# Patient Record
Sex: FEMALE | Race: BLACK OR AFRICAN AMERICAN | NOT HISPANIC OR LATINO | Employment: OTHER | URBAN - NONMETROPOLITAN AREA
[De-identification: names, ages, dates, MRNs, and addresses within clinical notes are randomized per-mention and may not be internally consistent; named-entity substitution may affect disease eponyms.]

---

## 2017-01-04 ENCOUNTER — HOSPITAL ENCOUNTER (EMERGENCY)
Facility: HOSPITAL | Age: 62
Discharge: HOME | End: 2017-01-04
Attending: EMERGENCY MEDICINE | Admitting: EMERGENCY MEDICINE
Payer: COMMERCIAL

## 2017-01-04 VITALS
OXYGEN SATURATION: 93 % | HEART RATE: 78 BPM | HEIGHT: 65 IN | BODY MASS INDEX: 38.99 KG/M2 | RESPIRATION RATE: 18 BRPM | TEMPERATURE: 98 F | DIASTOLIC BLOOD PRESSURE: 59 MMHG | WEIGHT: 234 LBS | SYSTOLIC BLOOD PRESSURE: 111 MMHG

## 2017-01-04 DIAGNOSIS — T78.40XA ALLERGIC REACTION: ICD-10-CM

## 2017-01-04 DIAGNOSIS — H57.89 DISCHARGE OF RIGHT EYE: Primary | ICD-10-CM

## 2017-01-04 DIAGNOSIS — H53.141 VISUAL DISCOMFORT, RIGHT EYE: ICD-10-CM

## 2017-01-04 PROCEDURE — 99283 EMERGENCY DEPT VISIT LOW MDM: CPT

## 2017-01-04 RX ORDER — CETIRIZINE HYDROCHLORIDE 5 MG/1
5 TABLET ORAL DAILY
Qty: 30 TABLET | Refills: 0 | Status: ON HOLD | OUTPATIENT
Start: 2017-01-04 | End: 2017-07-28

## 2017-02-07 ENCOUNTER — RX ONLY (RX ONLY)
Age: 62
End: 2017-02-07

## 2017-02-07 ENCOUNTER — DOCTOR'S OFFICE (OUTPATIENT)
Dept: URBAN - NONMETROPOLITAN AREA CLINIC 1 | Facility: CLINIC | Age: 62
Setting detail: OPHTHALMOLOGY
End: 2017-02-07
Payer: COMMERCIAL

## 2017-02-07 ENCOUNTER — GENERIC CONVERSION - ENCOUNTER (OUTPATIENT)
Dept: OTHER | Facility: OTHER | Age: 62
End: 2017-02-07

## 2017-02-07 DIAGNOSIS — H27.9: ICD-10-CM

## 2017-02-07 DIAGNOSIS — H27.8: ICD-10-CM

## 2017-02-07 DIAGNOSIS — H04.121: ICD-10-CM

## 2017-02-07 DIAGNOSIS — E11.9: ICD-10-CM

## 2017-02-07 DIAGNOSIS — H40.013: ICD-10-CM

## 2017-02-07 DIAGNOSIS — H04.122: ICD-10-CM

## 2017-02-07 PROCEDURE — 76514 ECHO EXAM OF EYE THICKNESS: CPT | Performed by: OPHTHALMOLOGY

## 2017-02-07 PROCEDURE — 92004 COMPRE OPH EXAM NEW PT 1/>: CPT | Performed by: OPHTHALMOLOGY

## 2017-02-07 ASSESSMENT — REFRACTION_MANIFEST
OS_VA2: 20/
OU_VA: 20/
OS_VA2: 20/
OS_VA3: 20/
OD_VA2: 20/
OD_VA1: 20/
OD_VA3: 20/
OS_VA3: 20/
OD_VA1: 20/
OS_VA3: 20/
OS_VA1: 20/
OD_VA1: 20/
OD_VA2: 20/
OD_VA3: 20/
OD_VA2: 20/
OD_VA3: 20/
OU_VA: 20/
OS_VA1: 20/
OU_VA: 20/
OS_VA2: 20/
OS_VA1: 20/

## 2017-02-07 ASSESSMENT — SPHEQUIV_DERIVED
OD_SPHEQUIV: -0.875
OS_SPHEQUIV: -0.5

## 2017-02-07 ASSESSMENT — REFRACTION_AUTOREFRACTION
OS_AXIS: 60
OD_SPHERE: -0.50
OD_CYLINDER: -0.75
OS_SPHERE: -0.25
OS_CYLINDER: -0.50
OD_AXIS: 177

## 2017-02-07 ASSESSMENT — REFRACTION_CURRENTRX
OS_OVR_VA: 20/
OS_OVR_VA: 20/
OD_OVR_VA: 20/
OS_OVR_VA: 20/

## 2017-02-07 ASSESSMENT — PACHYMETRY
OS_CT_CORRECTION: -3
OD_CT_CORRECTION: -3
OS_CT_UM: 585
OD_CT_UM: 582

## 2017-02-07 ASSESSMENT — VISUAL ACUITY
OD_BCVA: 20/25-2
OS_BCVA: 20/25

## 2017-02-07 ASSESSMENT — SUPERFICIAL PUNCTATE KERATITIS (SPK)
OD_SPK: 1+ 2+
OS_SPK: T

## 2017-02-07 ASSESSMENT — CONFRONTATIONAL VISUAL FIELD TEST (CVF)
OD_FINDINGS: FULL
OS_FINDINGS: FULL

## 2017-02-16 ENCOUNTER — GENERIC CONVERSION - ENCOUNTER (OUTPATIENT)
Dept: OTHER | Facility: OTHER | Age: 62
End: 2017-02-16

## 2017-04-06 ENCOUNTER — ALLSCRIPTS OFFICE VISIT (OUTPATIENT)
Dept: OTHER | Facility: OTHER | Age: 62
End: 2017-04-06

## 2017-04-06 ENCOUNTER — TRANSCRIBE ORDERS (OUTPATIENT)
Dept: ADMINISTRATIVE | Facility: HOSPITAL | Age: 62
End: 2017-04-06

## 2017-04-06 DIAGNOSIS — E11.29 TYPE 2 DIABETES MELLITUS WITH OTHER DIABETIC KIDNEY COMPLICATION (HCC): ICD-10-CM

## 2017-04-06 DIAGNOSIS — R07.89 OTHER CHEST PAIN: Primary | ICD-10-CM

## 2017-04-06 DIAGNOSIS — R07.89 OTHER CHEST PAIN: ICD-10-CM

## 2017-04-11 ENCOUNTER — GENERIC CONVERSION - ENCOUNTER (OUTPATIENT)
Dept: OTHER | Facility: OTHER | Age: 62
End: 2017-04-11

## 2017-04-11 ENCOUNTER — APPOINTMENT (OUTPATIENT)
Dept: LAB | Facility: CLINIC | Age: 62
End: 2017-04-11
Payer: COMMERCIAL

## 2017-04-11 DIAGNOSIS — E11.29 TYPE 2 DIABETES MELLITUS WITH OTHER DIABETIC KIDNEY COMPLICATION (HCC): ICD-10-CM

## 2017-04-11 DIAGNOSIS — R07.89 OTHER CHEST PAIN: ICD-10-CM

## 2017-04-11 LAB
ALBUMIN SERPL BCP-MCNC: 3.6 G/DL (ref 3.5–5)
ALP SERPL-CCNC: 83 U/L (ref 46–116)
ALT SERPL W P-5'-P-CCNC: 23 U/L (ref 12–78)
ANION GAP SERPL CALCULATED.3IONS-SCNC: 9 MMOL/L (ref 4–13)
AST SERPL W P-5'-P-CCNC: 15 U/L (ref 5–45)
BILIRUB SERPL-MCNC: 0.5 MG/DL (ref 0.2–1)
BUN SERPL-MCNC: 10 MG/DL (ref 5–25)
CALCIUM SERPL-MCNC: 8.9 MG/DL (ref 8.3–10.1)
CHLORIDE SERPL-SCNC: 102 MMOL/L (ref 100–108)
CHOLEST SERPL-MCNC: 212 MG/DL (ref 50–200)
CK SERPL-CCNC: 86 U/L (ref 26–192)
CO2 SERPL-SCNC: 27 MMOL/L (ref 21–32)
CREAT SERPL-MCNC: 0.87 MG/DL (ref 0.6–1.3)
EST. AVERAGE GLUCOSE BLD GHB EST-MCNC: 283 MG/DL
GFR SERPL CREATININE-BSD FRML MDRD: >60 ML/MIN/1.73SQ M
GLUCOSE P FAST SERPL-MCNC: 264 MG/DL (ref 65–99)
HBA1C MFR BLD: 11.5 % (ref 4.2–6.3)
HDLC SERPL-MCNC: 42 MG/DL (ref 40–60)
LDLC SERPL CALC-MCNC: 149 MG/DL (ref 0–100)
POTASSIUM SERPL-SCNC: 3.6 MMOL/L (ref 3.5–5.3)
PROT SERPL-MCNC: 7.7 G/DL (ref 6.4–8.2)
SODIUM SERPL-SCNC: 138 MMOL/L (ref 136–145)
TRIGL SERPL-MCNC: 106 MG/DL
TROPONIN I SERPL-MCNC: <0.02 NG/ML

## 2017-04-11 PROCEDURE — 80061 LIPID PANEL: CPT

## 2017-04-11 PROCEDURE — 36415 COLL VENOUS BLD VENIPUNCTURE: CPT

## 2017-04-11 PROCEDURE — 84484 ASSAY OF TROPONIN QUANT: CPT

## 2017-04-11 PROCEDURE — 82550 ASSAY OF CK (CPK): CPT

## 2017-04-11 PROCEDURE — 83036 HEMOGLOBIN GLYCOSYLATED A1C: CPT

## 2017-04-11 PROCEDURE — 80053 COMPREHEN METABOLIC PANEL: CPT

## 2017-04-14 ENCOUNTER — HOSPITAL ENCOUNTER (OUTPATIENT)
Dept: NON INVASIVE DIAGNOSTICS | Facility: HOSPITAL | Age: 62
Discharge: HOME | End: 2017-04-14
Attending: FAMILY MEDICINE
Payer: COMMERCIAL

## 2017-04-14 ENCOUNTER — HOSPITAL ENCOUNTER (OUTPATIENT)
Dept: NON INVASIVE DIAGNOSTICS | Facility: HOSPITAL | Age: 62
End: 2017-04-14
Attending: FAMILY MEDICINE
Payer: COMMERCIAL

## 2017-04-14 ENCOUNTER — HOSPITAL ENCOUNTER (OUTPATIENT)
Dept: RADIOLOGY | Facility: HOSPITAL | Age: 62
Discharge: HOME | End: 2017-04-14
Attending: FAMILY MEDICINE
Payer: COMMERCIAL

## 2017-04-14 ENCOUNTER — TRANSCRIBE ORDERS (OUTPATIENT)
Dept: ADMINISTRATIVE | Facility: HOSPITAL | Age: 62
End: 2017-04-14

## 2017-04-14 ENCOUNTER — GENERIC CONVERSION - ENCOUNTER (OUTPATIENT)
Dept: OTHER | Facility: OTHER | Age: 62
End: 2017-04-14

## 2017-04-14 DIAGNOSIS — R07.89 OTHER CHEST PAIN: ICD-10-CM

## 2017-04-14 DIAGNOSIS — R07.89 OTHER CHEST PAIN: Primary | ICD-10-CM

## 2017-04-14 LAB
ATRIAL RATE: 69 BPM
P AXIS: 51 DEGREES
PR INTERVAL: 188 MS
QRS AXIS: -32 DEGREES
QRSD INTERVAL: 90 MS
QT INTERVAL: 420 MS
QTC INTERVAL: 450 MS
T WAVE AXIS: -20 DEGREES
VENTRICULAR RATE: 69 BPM

## 2017-04-14 PROCEDURE — 93005 ELECTROCARDIOGRAM TRACING: CPT

## 2017-04-14 PROCEDURE — 71020 HB CHEST X-RAY 2VW FRONTAL&LATL: CPT

## 2017-04-14 PROCEDURE — 93306 TTE W/DOPPLER COMPLETE: CPT

## 2017-04-14 PROCEDURE — 74220 X-RAY XM ESOPHAGUS 1CNTRST: CPT

## 2017-04-17 ENCOUNTER — GENERIC CONVERSION - ENCOUNTER (OUTPATIENT)
Dept: OTHER | Facility: OTHER | Age: 62
End: 2017-04-17

## 2017-05-15 ENCOUNTER — TRANSCRIBE ORDERS (OUTPATIENT)
Dept: ADMINISTRATIVE | Facility: HOSPITAL | Age: 62
End: 2017-05-15

## 2017-05-15 ENCOUNTER — ALLSCRIPTS OFFICE VISIT (OUTPATIENT)
Dept: OTHER | Facility: OTHER | Age: 62
End: 2017-05-15

## 2017-05-15 DIAGNOSIS — R07.9 CHEST PAIN, UNSPECIFIED: ICD-10-CM

## 2017-05-15 DIAGNOSIS — I67.2 CEREBRAL ATHEROSCLEROSIS: Primary | ICD-10-CM

## 2017-05-15 DIAGNOSIS — R07.9 CHEST PAIN: ICD-10-CM

## 2017-05-15 DIAGNOSIS — I67.2 CEREBRAL ATHEROSCLEROSIS: ICD-10-CM

## 2017-05-23 ENCOUNTER — TRANSCRIBE ORDERS (OUTPATIENT)
Dept: ADMINISTRATIVE | Facility: HOSPITAL | Age: 62
End: 2017-05-23

## 2017-05-23 DIAGNOSIS — Z12.31 VISIT FOR SCREENING MAMMOGRAM: Primary | ICD-10-CM

## 2017-05-26 ENCOUNTER — HOSPITAL ENCOUNTER (OUTPATIENT)
Dept: MAMMOGRAPHY | Facility: HOSPITAL | Age: 62
Discharge: HOME | End: 2017-05-26
Attending: FAMILY MEDICINE
Payer: COMMERCIAL

## 2017-05-26 ENCOUNTER — HOSPITAL ENCOUNTER (OUTPATIENT)
Dept: NUCLEAR MEDICINE | Facility: HOSPITAL | Age: 62
Discharge: HOME | End: 2017-05-26
Attending: INTERNAL MEDICINE
Payer: COMMERCIAL

## 2017-05-26 ENCOUNTER — HOSPITAL ENCOUNTER (OUTPATIENT)
Dept: ULTRASOUND IMAGING | Facility: HOSPITAL | Age: 62
Discharge: HOME | End: 2017-05-26
Attending: INTERNAL MEDICINE
Payer: COMMERCIAL

## 2017-05-26 DIAGNOSIS — I67.2 CEREBRAL ATHEROSCLEROSIS: ICD-10-CM

## 2017-05-26 DIAGNOSIS — R07.9 CHEST PAIN: ICD-10-CM

## 2017-05-26 DIAGNOSIS — Z12.31 ENCOUNTER FOR SCREENING MAMMOGRAM FOR MALIGNANT NEOPLASM OF BREAST: ICD-10-CM

## 2017-05-26 PROCEDURE — A9502 TC99M TETROFOSMIN: HCPCS

## 2017-05-26 PROCEDURE — 93017 CV STRESS TEST TRACING ONLY: CPT

## 2017-05-26 PROCEDURE — G0202 SCR MAMMO BI INCL CAD: HCPCS

## 2017-05-26 PROCEDURE — 93880 EXTRACRANIAL BILAT STUDY: CPT

## 2017-05-26 PROCEDURE — 78452 HT MUSCLE IMAGE SPECT MULT: CPT

## 2017-06-02 ENCOUNTER — ALLSCRIPTS OFFICE VISIT (OUTPATIENT)
Dept: OTHER | Facility: OTHER | Age: 62
End: 2017-06-02

## 2017-06-09 ENCOUNTER — GENERIC CONVERSION - ENCOUNTER (OUTPATIENT)
Dept: OTHER | Facility: OTHER | Age: 62
End: 2017-06-09

## 2017-06-28 ENCOUNTER — ALLSCRIPTS OFFICE VISIT (OUTPATIENT)
Dept: OTHER | Facility: OTHER | Age: 62
End: 2017-06-28

## 2017-07-11 DIAGNOSIS — R92.8 OTHER ABNORMAL AND INCONCLUSIVE FINDINGS ON DIAGNOSTIC IMAGING OF BREAST: ICD-10-CM

## 2017-07-13 DIAGNOSIS — I10 ESSENTIAL (PRIMARY) HYPERTENSION: ICD-10-CM

## 2017-07-13 DIAGNOSIS — E11.29 TYPE 2 DIABETES MELLITUS WITH OTHER DIABETIC KIDNEY COMPLICATION (HCC): ICD-10-CM

## 2017-07-13 DIAGNOSIS — E78.5 HYPERLIPIDEMIA: ICD-10-CM

## 2017-07-13 DIAGNOSIS — R07.9 CHEST PAIN: ICD-10-CM

## 2017-07-13 DIAGNOSIS — E11.9 TYPE 2 DIABETES MELLITUS WITHOUT COMPLICATIONS (HCC): ICD-10-CM

## 2017-07-13 DIAGNOSIS — R94.39 OTHER NONSPECIFIC ABNORMAL CARDIOVASCULAR SYSTEM FUNCTION STUDY: ICD-10-CM

## 2017-07-20 ENCOUNTER — HOSPITAL ENCOUNTER (OUTPATIENT)
Dept: MAMMOGRAPHY | Facility: HOSPITAL | Age: 62
Discharge: HOME | End: 2017-07-20
Attending: FAMILY MEDICINE
Payer: COMMERCIAL

## 2017-07-20 ENCOUNTER — GENERIC CONVERSION - ENCOUNTER (OUTPATIENT)
Dept: OTHER | Facility: OTHER | Age: 62
End: 2017-07-20

## 2017-07-20 ENCOUNTER — HOSPITAL ENCOUNTER (OUTPATIENT)
Dept: ULTRASOUND IMAGING | Facility: HOSPITAL | Age: 62
Discharge: HOME | End: 2017-07-20
Attending: FAMILY MEDICINE
Payer: COMMERCIAL

## 2017-07-20 DIAGNOSIS — R92.8 OTHER ABNORMAL AND INCONCLUSIVE FINDINGS ON DIAGNOSTIC IMAGING OF BREAST: ICD-10-CM

## 2017-07-20 PROCEDURE — 76642 ULTRASOUND BREAST LIMITED: CPT

## 2017-07-20 PROCEDURE — G0279 TOMOSYNTHESIS, MAMMO: HCPCS

## 2017-07-20 PROCEDURE — G0204 DX MAMMO INCL CAD BI: HCPCS

## 2017-07-24 ENCOUNTER — ALLSCRIPTS OFFICE VISIT (OUTPATIENT)
Dept: OTHER | Facility: OTHER | Age: 62
End: 2017-07-24

## 2017-07-27 ENCOUNTER — TELEPHONE (OUTPATIENT)
Dept: INPATIENT UNIT | Facility: HOSPITAL | Age: 62
End: 2017-07-27

## 2017-07-27 PROBLEM — R94.39 ABNORMAL STRESS TEST: Status: ACTIVE | Noted: 2017-07-27

## 2017-07-27 RX ORDER — ASPIRIN 81 MG/1
324 TABLET, CHEWABLE ORAL ONCE
Status: CANCELLED | OUTPATIENT
Start: 2017-07-27 | End: 2017-07-27

## 2017-07-27 RX ORDER — SODIUM CHLORIDE 9 MG/ML
125 INJECTION, SOLUTION INTRAVENOUS CONTINUOUS
Status: CANCELLED | OUTPATIENT
Start: 2017-07-27

## 2017-07-28 ENCOUNTER — HOSPITAL ENCOUNTER (OUTPATIENT)
Dept: NON INVASIVE DIAGNOSTICS | Facility: HOSPITAL | Age: 62
Discharge: HOME | End: 2017-07-28
Attending: INTERNAL MEDICINE | Admitting: INTERNAL MEDICINE
Payer: COMMERCIAL

## 2017-07-28 VITALS
HEART RATE: 54 BPM | HEIGHT: 65 IN | OXYGEN SATURATION: 96 % | TEMPERATURE: 97.5 F | BODY MASS INDEX: 37.15 KG/M2 | WEIGHT: 223 LBS | DIASTOLIC BLOOD PRESSURE: 71 MMHG | RESPIRATION RATE: 18 BRPM | SYSTOLIC BLOOD PRESSURE: 142 MMHG

## 2017-07-28 DIAGNOSIS — R07.9 CHEST PAIN: ICD-10-CM

## 2017-07-28 LAB
ANION GAP SERPL CALCULATED.3IONS-SCNC: 7 MMOL/L (ref 4–13)
ATRIAL RATE: 66 BPM
BUN SERPL-MCNC: 15 MG/DL (ref 5–25)
CALCIUM SERPL-MCNC: 9.1 MG/DL (ref 8.3–10.1)
CHLORIDE SERPL-SCNC: 100 MMOL/L (ref 100–108)
CHOLEST SERPL-MCNC: 162 MG/DL (ref 50–200)
CO2 SERPL-SCNC: 30 MMOL/L (ref 21–32)
CREAT SERPL-MCNC: 0.92 MG/DL (ref 0.6–1.3)
ERYTHROCYTE [DISTWIDTH] IN BLOOD BY AUTOMATED COUNT: 14.2 % (ref 11.6–15.1)
GFR SERPL CREATININE-BSD FRML MDRD: 78 ML/MIN/1.73SQ M
GLUCOSE P FAST SERPL-MCNC: 275 MG/DL (ref 65–99)
GLUCOSE SERPL-MCNC: 275 MG/DL (ref 65–140)
HCT VFR BLD AUTO: 38.4 % (ref 34.8–46.1)
HDLC SERPL-MCNC: 40 MG/DL (ref 40–60)
HGB BLD-MCNC: 12.6 G/DL (ref 11.5–15.4)
LDLC SERPL CALC-MCNC: 99 MG/DL (ref 0–100)
MAGNESIUM SERPL-MCNC: 1.7 MG/DL (ref 1.6–2.6)
MCH RBC QN AUTO: 26.9 PG (ref 26.8–34.3)
MCHC RBC AUTO-ENTMCNC: 32.8 G/DL (ref 31.4–37.4)
MCV RBC AUTO: 82 FL (ref 82–98)
P AXIS: 40 DEGREES
PLATELET # BLD AUTO: 203 THOUSANDS/UL (ref 149–390)
PMV BLD AUTO: 10.5 FL (ref 8.9–12.7)
POTASSIUM SERPL-SCNC: 3.5 MMOL/L (ref 3.5–5.3)
PR INTERVAL: 194 MS
QRS AXIS: -39 DEGREES
QRSD INTERVAL: 92 MS
QT INTERVAL: 422 MS
QTC INTERVAL: 442 MS
RBC # BLD AUTO: 4.68 MILLION/UL (ref 3.81–5.12)
SODIUM SERPL-SCNC: 137 MMOL/L (ref 136–145)
T WAVE AXIS: -26 DEGREES
TRIGL SERPL-MCNC: 113 MG/DL
VENTRICULAR RATE: 66 BPM
WBC # BLD AUTO: 4.94 THOUSAND/UL (ref 4.31–10.16)

## 2017-07-28 PROCEDURE — 80061 LIPID PANEL: CPT | Performed by: STUDENT IN AN ORGANIZED HEALTH CARE EDUCATION/TRAINING PROGRAM

## 2017-07-28 PROCEDURE — 93005 ELECTROCARDIOGRAM TRACING: CPT | Performed by: STUDENT IN AN ORGANIZED HEALTH CARE EDUCATION/TRAINING PROGRAM

## 2017-07-28 PROCEDURE — 85027 COMPLETE CBC AUTOMATED: CPT | Performed by: STUDENT IN AN ORGANIZED HEALTH CARE EDUCATION/TRAINING PROGRAM

## 2017-07-28 PROCEDURE — 80048 BASIC METABOLIC PNL TOTAL CA: CPT | Performed by: STUDENT IN AN ORGANIZED HEALTH CARE EDUCATION/TRAINING PROGRAM

## 2017-07-28 PROCEDURE — 83735 ASSAY OF MAGNESIUM: CPT | Performed by: STUDENT IN AN ORGANIZED HEALTH CARE EDUCATION/TRAINING PROGRAM

## 2017-07-28 PROCEDURE — 99152 MOD SED SAME PHYS/QHP 5/>YRS: CPT | Performed by: INTERNAL MEDICINE

## 2017-07-28 PROCEDURE — C1894 INTRO/SHEATH, NON-LASER: HCPCS | Performed by: INTERNAL MEDICINE

## 2017-07-28 PROCEDURE — C1769 GUIDE WIRE: HCPCS | Performed by: INTERNAL MEDICINE

## 2017-07-28 PROCEDURE — 93458 L HRT ARTERY/VENTRICLE ANGIO: CPT | Performed by: INTERNAL MEDICINE

## 2017-07-28 RX ORDER — SODIUM CHLORIDE 9 MG/ML
125 INJECTION, SOLUTION INTRAVENOUS CONTINUOUS
Status: DISPENSED | OUTPATIENT
Start: 2017-07-28 | End: 2017-07-28

## 2017-07-28 RX ORDER — ASPIRIN 81 MG/1
324 TABLET, CHEWABLE ORAL ONCE
Status: COMPLETED | OUTPATIENT
Start: 2017-07-28 | End: 2017-07-28

## 2017-07-28 RX ORDER — LIDOCAINE HYDROCHLORIDE 10 MG/ML
INJECTION, SOLUTION INFILTRATION; PERINEURAL CODE/TRAUMA/SEDATION MEDICATION
Status: COMPLETED | OUTPATIENT
Start: 2017-07-28 | End: 2017-07-28

## 2017-07-28 RX ORDER — MIDAZOLAM HYDROCHLORIDE 1 MG/ML
INJECTION INTRAMUSCULAR; INTRAVENOUS CODE/TRAUMA/SEDATION MEDICATION
Status: COMPLETED | OUTPATIENT
Start: 2017-07-28 | End: 2017-07-28

## 2017-07-28 RX ORDER — SODIUM CHLORIDE 9 MG/ML
125 INJECTION, SOLUTION INTRAVENOUS CONTINUOUS
Status: DISCONTINUED | OUTPATIENT
Start: 2017-07-28 | End: 2017-07-28

## 2017-07-28 RX ORDER — ALBUTEROL SULFATE 90 UG/1
2 AEROSOL, METERED RESPIRATORY (INHALATION) EVERY 4 HOURS PRN
COMMUNITY
End: 2019-01-18 | Stop reason: SDUPTHER

## 2017-07-28 RX ORDER — SIMVASTATIN 20 MG
20 TABLET ORAL
COMMUNITY
End: 2018-04-25 | Stop reason: SDUPTHER

## 2017-07-28 RX ORDER — HEPARIN SODIUM 1000 [USP'U]/ML
INJECTION, SOLUTION INTRAVENOUS; SUBCUTANEOUS CODE/TRAUMA/SEDATION MEDICATION
Status: COMPLETED | OUTPATIENT
Start: 2017-07-28 | End: 2017-07-28

## 2017-07-28 RX ORDER — FENTANYL CITRATE 50 UG/ML
INJECTION, SOLUTION INTRAMUSCULAR; INTRAVENOUS CODE/TRAUMA/SEDATION MEDICATION
Status: COMPLETED | OUTPATIENT
Start: 2017-07-28 | End: 2017-07-28

## 2017-07-28 RX ORDER — NITROGLYCERIN 20 MG/100ML
INJECTION INTRAVENOUS CODE/TRAUMA/SEDATION MEDICATION
Status: COMPLETED | OUTPATIENT
Start: 2017-07-28 | End: 2017-07-28

## 2017-07-28 RX ADMIN — FENTANYL CITRATE 50 MCG: 50 INJECTION, SOLUTION INTRAMUSCULAR; INTRAVENOUS at 13:29

## 2017-07-28 RX ADMIN — HEPARIN SODIUM 4000 UNITS: 1000 INJECTION INTRAVENOUS; SUBCUTANEOUS at 13:35

## 2017-07-28 RX ADMIN — SODIUM CHLORIDE 125 ML/HR: 0.9 INJECTION, SOLUTION INTRAVENOUS at 09:17

## 2017-07-28 RX ADMIN — NITROGLYCERIN 200 MCG: 20 INJECTION INTRAVENOUS at 13:34

## 2017-07-28 RX ADMIN — MIDAZOLAM 2 MG: 1 INJECTION INTRAMUSCULAR; INTRAVENOUS at 13:29

## 2017-07-28 RX ADMIN — ASPIRIN 81 MG 324 MG: 81 TABLET ORAL at 09:14

## 2017-07-28 RX ADMIN — LIDOCAINE HYDROCHLORIDE 1 ML: 10 INJECTION, SOLUTION INFILTRATION; PERINEURAL at 13:31

## 2017-07-28 RX ADMIN — IOHEXOL 60 ML: 350 INJECTION, SOLUTION INTRAVENOUS at 13:40

## 2017-08-15 ENCOUNTER — ALLSCRIPTS OFFICE VISIT (OUTPATIENT)
Dept: OTHER | Facility: OTHER | Age: 62
End: 2017-08-15

## 2017-08-18 ENCOUNTER — ALLSCRIPTS OFFICE VISIT (OUTPATIENT)
Dept: OTHER | Facility: OTHER | Age: 62
End: 2017-08-18

## 2017-09-27 ENCOUNTER — OFFICE VISIT (OUTPATIENT)
Dept: URGENT CARE | Facility: CLINIC | Age: 62
End: 2017-09-27
Payer: COMMERCIAL

## 2017-09-27 PROCEDURE — G0382 LEV 3 HOSP TYPE B ED VISIT: HCPCS

## 2017-09-27 PROCEDURE — 99283 EMERGENCY DEPT VISIT LOW MDM: CPT
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
## 2017-11-07 ENCOUNTER — ALLSCRIPTS OFFICE VISIT (OUTPATIENT)
Dept: OTHER | Facility: OTHER | Age: 62
End: 2017-11-07

## 2017-11-07 DIAGNOSIS — R10.13 EPIGASTRIC PAIN: ICD-10-CM

## 2017-11-07 DIAGNOSIS — I10 ESSENTIAL (PRIMARY) HYPERTENSION: ICD-10-CM
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
## 2017-11-17 ENCOUNTER — LAB REQUISITION (OUTPATIENT)
Dept: LAB | Facility: HOSPITAL | Age: 62
End: 2017-11-17
Payer: COMMERCIAL

## 2017-11-17 ENCOUNTER — GENERIC CONVERSION - ENCOUNTER (OUTPATIENT)
Dept: OTHER | Facility: OTHER | Age: 62
End: 2017-11-17

## 2017-11-17 DIAGNOSIS — R30.0 DYSURIA: ICD-10-CM

## 2017-11-17 DIAGNOSIS — Z01.419 ENCOUNTER FOR GYNECOLOGICAL EXAMINATION WITHOUT ABNORMAL FINDING: ICD-10-CM

## 2017-11-17 PROCEDURE — 81001 URINALYSIS AUTO W/SCOPE: CPT | Performed by: OBSTETRICS & GYNECOLOGY

## 2017-11-17 PROCEDURE — 87086 URINE CULTURE/COLONY COUNT: CPT | Performed by: OBSTETRICS & GYNECOLOGY

## 2017-11-17 PROCEDURE — 87624 HPV HI-RISK TYP POOLED RSLT: CPT | Performed by: OBSTETRICS & GYNECOLOGY

## 2017-11-17 PROCEDURE — G0145 SCR C/V CYTO,THINLAYER,RESCR: HCPCS | Performed by: OBSTETRICS & GYNECOLOGY

## 2017-11-18 LAB
BACTERIA UR CULT: ABNORMAL
BACTERIA UR QL AUTO: NORMAL /HPF
BILIRUB UR QL STRIP: NEGATIVE
CLARITY UR: CLEAR
COLOR UR: YELLOW
GLUCOSE UR STRIP-MCNC: ABNORMAL MG/DL
HGB UR QL STRIP.AUTO: NEGATIVE
HYALINE CASTS #/AREA URNS LPF: NORMAL /LPF
KETONES UR STRIP-MCNC: NEGATIVE MG/DL
LEUKOCYTE ESTERASE UR QL STRIP: ABNORMAL
NITRITE UR QL STRIP: NEGATIVE
NON-SQ EPI CELLS URNS QL MICRO: NORMAL /HPF
PH UR STRIP.AUTO: 5.5 [PH] (ref 4.5–8)
PROT UR STRIP-MCNC: ABNORMAL MG/DL
RBC #/AREA URNS AUTO: NORMAL /HPF
SP GR UR STRIP.AUTO: 1.04 (ref 1–1.03)
UROBILINOGEN UR QL STRIP.AUTO: 0.2 E.U./DL
WBC #/AREA URNS AUTO: NORMAL /HPF

## 2017-11-21 LAB — HPV RRNA GENITAL QL NAA+PROBE: NORMAL

## 2017-11-27 LAB
LAB AP GYN PRIMARY INTERPRETATION: NORMAL
Lab: NORMAL

## 2017-11-29 ENCOUNTER — GENERIC CONVERSION - ENCOUNTER (OUTPATIENT)
Dept: OTHER | Facility: OTHER | Age: 62
End: 2017-11-29

## 2017-12-19 ENCOUNTER — GENERIC CONVERSION - ENCOUNTER (OUTPATIENT)
Dept: OTHER | Facility: OTHER | Age: 62
End: 2017-12-19

## 2018-01-02 ENCOUNTER — ALLSCRIPTS OFFICE VISIT (OUTPATIENT)
Dept: OTHER | Facility: OTHER | Age: 63
End: 2018-01-02

## 2018-01-02 RX ORDER — FLUCONAZOLE 150 MG/1
150 TABLET ORAL ONCE
COMMUNITY
End: 2018-03-01

## 2018-01-02 RX ORDER — INSULIN GLARGINE 100 [IU]/ML
30 INJECTION, SOLUTION SUBCUTANEOUS
COMMUNITY
End: 2018-01-24 | Stop reason: SDUPTHER

## 2018-01-02 RX ORDER — PANTOPRAZOLE SODIUM 40 MG/1
40 TABLET, DELAYED RELEASE ORAL DAILY
COMMUNITY
End: 2018-03-06 | Stop reason: SDUPTHER

## 2018-01-02 RX ORDER — ASPIRIN 325 MG
325 TABLET ORAL DAILY
COMMUNITY
End: 2018-03-01

## 2018-01-04 ENCOUNTER — ANESTHESIA EVENT (OUTPATIENT)
Dept: PERIOP | Facility: HOSPITAL | Age: 63
End: 2018-01-04
Payer: COMMERCIAL

## 2018-01-05 ENCOUNTER — GENERIC CONVERSION - ENCOUNTER (OUTPATIENT)
Dept: GASTROENTEROLOGY | Facility: CLINIC | Age: 63
End: 2018-01-05

## 2018-01-05 ENCOUNTER — ANESTHESIA (OUTPATIENT)
Dept: PERIOP | Facility: HOSPITAL | Age: 63
End: 2018-01-05
Payer: COMMERCIAL

## 2018-01-05 ENCOUNTER — HOSPITAL ENCOUNTER (OUTPATIENT)
Facility: HOSPITAL | Age: 63
Setting detail: OUTPATIENT SURGERY
Discharge: HOME | End: 2018-01-05
Attending: INTERNAL MEDICINE | Admitting: INTERNAL MEDICINE
Payer: COMMERCIAL

## 2018-01-05 VITALS
DIASTOLIC BLOOD PRESSURE: 76 MMHG | HEART RATE: 66 BPM | OXYGEN SATURATION: 97 % | TEMPERATURE: 97.6 F | SYSTOLIC BLOOD PRESSURE: 134 MMHG | RESPIRATION RATE: 18 BRPM

## 2018-01-05 DIAGNOSIS — R10.13 EPIGASTRIC PAIN: ICD-10-CM

## 2018-01-05 PROCEDURE — 88342 IMHCHEM/IMCYTCHM 1ST ANTB: CPT | Performed by: INTERNAL MEDICINE

## 2018-01-05 PROCEDURE — 88305 TISSUE EXAM BY PATHOLOGIST: CPT | Performed by: INTERNAL MEDICINE

## 2018-01-05 RX ORDER — LIDOCAINE HYDROCHLORIDE 10 MG/ML
INJECTION, SOLUTION INFILTRATION; PERINEURAL AS NEEDED
Status: DISCONTINUED | OUTPATIENT
Start: 2018-01-05 | End: 2018-01-05 | Stop reason: SURG

## 2018-01-05 RX ORDER — PROPOFOL 10 MG/ML
INJECTION, EMULSION INTRAVENOUS AS NEEDED
Status: DISCONTINUED | OUTPATIENT
Start: 2018-01-05 | End: 2018-01-05 | Stop reason: SURG

## 2018-01-05 RX ORDER — SODIUM CHLORIDE, SODIUM LACTATE, POTASSIUM CHLORIDE, CALCIUM CHLORIDE 600; 310; 30; 20 MG/100ML; MG/100ML; MG/100ML; MG/100ML
125 INJECTION, SOLUTION INTRAVENOUS CONTINUOUS
Status: DISCONTINUED | OUTPATIENT
Start: 2018-01-05 | End: 2018-01-05 | Stop reason: HOSPADM

## 2018-01-05 RX ADMIN — PROPOFOL 30 MG: 10 INJECTION, EMULSION INTRAVENOUS at 08:37

## 2018-01-05 RX ADMIN — PROPOFOL 20 MG: 10 INJECTION, EMULSION INTRAVENOUS at 08:40

## 2018-01-05 RX ADMIN — LIDOCAINE HYDROCHLORIDE 50 MG: 10 INJECTION, SOLUTION INFILTRATION; PERINEURAL at 08:34

## 2018-01-05 RX ADMIN — PROPOFOL 120 MG: 10 INJECTION, EMULSION INTRAVENOUS at 08:34

## 2018-01-05 RX ADMIN — SODIUM CHLORIDE, POTASSIUM CHLORIDE, SODIUM LACTATE AND CALCIUM CHLORIDE 125 ML/HR: 600; 310; 30; 20 INJECTION, SOLUTION INTRAVENOUS at 07:06

## 2018-01-05 NOTE — ANESTHESIA PREPROCEDURE EVALUATION
Review of Systems/Medical History  Patient summary reviewed  Chart reviewed  No history of anesthetic complications     Cardiovascular  Hyperlipidemia, Hypertension ,   Comment: Clean cath 7/2017, EF 55%,  Pulmonary  Asthma (prn inhaler, last 1 week ago): well controlled/ stable , Recent URI (dry cough only) , ,        GI/Hepatic    GERD ,  Hiatal hernia,        Negative  ROS        Endo/Other  Diabetes () poorly controlled type 2 ,      GYN  Negative gynecology ROS          Hematology  Negative hematology ROS      Musculoskeletal  Obesity (BMI 37) ,        Neurology    TIA (16 years ago), Diabetic neuropathy,    Psychology   Anxiety, Depression ,            Physical Exam    Airway    Mallampati score: I  TM Distance: >3 FB  Neck ROM: full     Dental   Comment: Crowns, denies loose,     Cardiovascular      Pulmonary      Other Findings       Lab Results   Component Value Date    WBC 4 94 07/28/2017    HGB 12 6 07/28/2017     07/28/2017     Lab Results   Component Value Date     07/28/2017    K 3 5 07/28/2017    BUN 15 07/28/2017    CREATININE 0 92 07/28/2017    GLUCOSE 275 (H) 07/28/2017     Lab Results   Component Value Date    HGBA1C 11 5 (H) 04/11/2017         Anesthesia Plan  ASA Score- 3     Anesthesia Type- IV sedation with anesthesia with ASA Monitors  Additional Monitors:   Airway Plan:         Plan Factors-    Induction- intravenous  Postoperative Plan-     Informed Consent- Anesthetic plan and risks discussed with patient, spouse and daughter

## 2018-01-05 NOTE — OP NOTE
**** GI/ENDOSCOPY REPORT ****     PATIENT NAME: Brianna Bliss - VISIT ID:  Patient ID: [id number]   YOB: 1955     INTRODUCTION: Esophagogastroduodenoscopy - A 58 female patient presents   for an outpatient Esophagogastroduodenoscopy at [facility]  INDICATIONS: Abdominal pain  GERD  CONSENT: The benefits, risks, and alternatives to the procedure were   discussed and informed consent was obtained from the patient  PREPARATION:  EKG, pulse, pulse oximetry and blood pressure were monitored   throughout the procedure  MEDICATIONS: Anesthesia-check records     PROCEDURE:  The endoscope was passed without difficulty through the mouth   under direct visualization and advanced to the 2nd portion of the   duodenum  The scope was withdrawn and the mucosa was carefully examined  FINDINGS:   Esophagus: Mild LA Class B esophagitis was found in the distal   third of the esophagus  There was also a Schatzki's ring at the distal   esophagus  Biopsies were taken from the esophagitis and also from the ring   for tissue destruction  Stomach: The stomach appeared to be normal on   direct and retroflexed views  A biopsy was taken  Duodenum: The 1st   portion of the duodenum and 2nd portion of the duodenum appeared to be   normal  A cold forceps biopsy was taken  COMPLICATIONS: There were no complications  IMPRESSIONS: Mild esophagitis seen in the distal third of the esophagus  Biopsy taken  Schatzki's ring  Biopsied  Normal stomach  Biopsy taken  Normal 1st portion of the duodenum and 2nd portion of the duodenum  Biopsy   taken  RECOMMENDATIONS: Await biopsy results  PATHOLOGY SPECIMENS: Biopsy taken  Associated finding: Esophagitis  Random   biopsy taken  Cold forceps random biopsy taken       ESTIMATED BLOOD LOSS:     PROCEDURE CODES:     ICD-9 Codes: 789 00 Abdominal pain, unspecified site 530 81 Esophageal   reflux 530 10 Esophagitis, unspecified ICD-10 Codes: R10 Abdominal and pelvic pain K21 Gastro-esophageal reflux   disease K20 9 Esophagitis, unspecified     PERFORMED BY: MURPHY Castillo  on 01/05/2018  Version 1, electronically signed by MURPHY Braun  on   01/05/2018 at 08:53

## 2018-01-05 NOTE — ANESTHESIA POSTPROCEDURE EVALUATION
Post-Op Assessment Note      CV Status:  Stable    Mental Status:  Alert and awake    Hydration Status:  Euvolemic    PONV Controlled:  Controlled    Airway Patency:  Patent    Post Op Vitals Reviewed: Yes          Staff: Anesthesiologist           BP   113/61   Temp 97 7   Pulse 64   Resp 16   SpO2 100
Principal Discharge DX:	Pneumonia

## 2018-01-05 NOTE — H&P
History and Physical - [facility]  Eleno Doran 58 y o  female MRN: [id number]                  HPI: Eleno Doran is a 58y o  year old female who presents for abdominal pain for EGD  REVIEW OF SYSTEMS: Per the HPI, and otherwise unremarkable  Historical Information   Past Medical History:   Diagnosis Date    Asthma     Back problem     Depression     Diabetes mellitus ([address] )     Hyperlipidemia     Hypertension     Neuropathy     Psychiatric disorder     anxiety     Past Surgical History:   Procedure Laterality Date    ARM WOUND REPAIR / CLOSURE      CATARACT EXTRACTION      CYST REMOVAL      right upper arm   EYE SURGERY      cataract removaql    TUBAL LIGATION       Social History   History   Alcohol Use No     History   Drug Use No     History   Smoking Status    Never Smoker   Smokeless Tobacco    Not on file     No family history on file      Meds/Allergies     Prescriptions Prior to Admission   Medication    albuterol (PROVENTIL HFA,VENTOLIN HFA) 90 mcg/act inhaler    amLODIPine (NORVASC) 10 mg tablet    aspirin 325 mg tablet    beclomethasone (QVAR) 80 MCG/ACT inhaler    busPIRone (BUSPAR) 5 mg tablet    cycloSPORINE (RESTASIS) 0 05 % ophthalmic emulsion    escitalopram (LEXAPRO) 10 mg tablet    fluconazole (DIFLUCAN) 150 mg tablet    gabapentin (NEURONTIN) 300 mg capsule    glipiZIDE-metFORMIN (METAGLIP) 5-500 MG per tablet    hydrochlorothiazide (HYDRODIURIL) 25 mg tablet    insulin detemir (LEVEMIR) 100 units/mL subcutaneous injection    insulin glargine (LANTUS) 100 units/mL subcutaneous injection    lisinopril (ZESTRIL) 40 mg tablet    metFORMIN (GLUCOPHAGE) 500 mg tablet    pantoprazole (PROTONIX) 40 mg tablet    simvastatin (ZOCOR) 20 mg tablet    traZODone (DESYREL) 50 mg tablet       Allergies   Allergen Reactions    Percocet [Oxycodone-Acetaminophen] GI Intolerance    Vicodin [Hydrocodone-Acetaminophen] GI Intolerance Objective     Blood pressure 125/67, pulse 78, temperature 98 2 °F (36 8 °C), temperature source Tympanic, resp  rate 18, SpO2 95 %  PHYSICAL EXAM    Gen: NAD  CV: RRR  CHEST: Clear  ABD: soft, NT/ND  EXT: no edema  Neuro: AAO      ASSESSMENT/PLAN:  This is a 58y o  year old female here for abdominal pain      PLAN: EGD

## 2018-01-09 NOTE — RESULT NOTES
Verified Results  * MAMMO SCREENING BILATERAL W CAD [id number] 07:29AM Marrie Shape Order Number: [id number]    - Patient Instructions: To schedule this appointment, please contact Central Scheduling at [phone]  Do not wear any perfume, powder, lotion or deodorant on breast or underarm area  Please bring your doctors order, referral (if needed) and insurance information with you on the day of the test  Failure to bring this information may result in this test being rescheduled  Arrive 15 minutes prior to your appointment time to register  On the day of your test, please bring any prior mammogram or breast studies with you that were not performed at a [facility]  Failure to bring prior exams may result in your test needing to be rescheduled  Test Name Result Flag Reference   MAMMO SCREENING BILATERAL W CAD (Report)     Patient History:   Patient is postmenopausal    No known family history of cancer  No Hormone Replacement Therapy   Patient has never smoked  Patient's BMI is 38 9  Reason for exam: screening, asymptomatic  Screening     Mammo Screening Bilateral W CAD: May 26, 2017 - Check In #:    [de-identified]   Bilateral CC and MLO view(s) were taken  Technologist: Viki Mohan, RT(R)(M)   No prior studies available for comparison  There are scattered fibroglandular densities  There is a 1 2 cm    nodular asymmetry in the outer left breast, 8 cm deep to the    nipple, possibly located in the upper half of the breast in the    mediolateral oblique view  The patient should return for lateral   and spot compression views, with possible ultrasound, for more    definitive evaluation  There is, in addition, an 8 mm nodular asymmetry in the upper    medial periareolar left breast, 4 cm from the nipple  This also    should be further evaluated with lateral and spot compression    views, with possible ultrasound       There is a 1 1 cm nodular asymmetry in the right breast, just    below the nipple line, 11 cm deep to the nipple, possible    summation artifact  This is seen with certainty on the    mediolateral oblique view only  Lateral and spot compression    views, with possible ultrasound, recommended  No dominant soft tissue mass, architectural distortion or    suspicious calcifications are noted in either breast  The skin    and nipple contours are within normal limits  1  There are bilateral nodular asymmetries, possible summation    artifact  Further evaluation recommended  2  If the patient's prior outside studies can be sent to us, and   it is shown that the areas of asymmetry are stable, the    recommendation for additional imaging would change  ACR BI-RADSï¾® Assessments: BiRad:0 - Incomplete: needs additional    imaging evaluation     Recommendation:   Further imaging of both breasts  Obtain prior study for comparison  A breast health care nurse from our facility will be contacting    the patient regarding the need for additional imaging  Analyzed by CAD     8-10% of cancers will be missed on mammography  Management of a    palpable abnormality must be based on clinical grounds  Patients   will be notified of their results via letter from our facility  Accredited by [employer] of Radiology and FDA       Transcription Location: [facility]: [id number]     Risk Value(s):   Tyrer-Cuzick 10 Year: 3 000%, Tyrer-Cuzick Lifetime: 7 300%,    Myriad Table: 1 5%, LESIA 5 Year: 1 5%, NCI Lifetime: 6 7%   Signed by:   John Delvalle MD   6/9/17   Faxed release to [phone] [address] in [place], [place]

## 2018-01-10 ENCOUNTER — GENERIC CONVERSION - ENCOUNTER (OUTPATIENT)
Dept: OTHER | Facility: OTHER | Age: 63
End: 2018-01-10

## 2018-01-10 NOTE — RESULT NOTES
Verified Results  ECHO COMPLETE WITH CONTRAST IF INDICATED 2017 09:58AM Duong Luo Order Number: [id number]    - Patient Instructions: To schedule this appointment, please contact Central Scheduling at [phone]  Test Name Result Flag Reference   ECHO COMPLETE WITH CONTRAST IF INDICATED (Report)      [facility]   [address]   [phone]     Transthoracic Echocardiogram   2D, M-mode, Doppler, and Color Doppler     Study date: 2017     Patient: Josephine Brady   MR number: [id number]   Account number: [de-identified]   : 1955   Age: 64 years   Gender: Female   Status: Outpatient   Location: Echo lab   Height: 65 in   Weight: 237 6 lb   BP: 124/ 76 mmHg     Diagnoses: R07 9 - Chest pain, unspecified     Sonographer: Cuba Rivers RDCS   Primary Physician: Vadim Burk DO   Referring Physician: Vadim Burk DO   Group: [facility]   Interpreting Physician: Ronak Lau MD     SUMMARY     LEFT VENTRICLE:   Size was normal    Systolic function was normal  Ejection fraction was estimated to be 60 %  There were no regional wall motion abnormalities  Wall thickness was normal      TRICUSPID VALVE:   There was mild regurgitation  HISTORY: PRIOR HISTORY: DIABETES MELLITUS, PALPITATIONS, HYPERTENSION     PROCEDURE: The procedure was performed in the echo lab  This was a routine study  The transthoracic approach was used  The study included complete 2D imaging, M-mode, complete spectral Doppler, and color Doppler  Images were obtained from   the parasternal, apical, subcostal, and suprasternal notch acoustic windows  Image quality was adequate  LEFT VENTRICLE: Size was normal  Systolic function was normal  Ejection fraction was estimated to be 60 %  There were no regional wall motion abnormalities   Wall thickness was normal      RIGHT VENTRICLE: The size was normal  Systolic function was normal  Wall thickness was normal      LEFT ATRIUM: Size was normal      RIGHT ATRIUM: Size was normal      MITRAL VALVE: Valve structure was normal  There was normal leaflet separation  DOPPLER: The transmitral velocity was within the normal range  There was no evidence for stenosis  There was no significant regurgitation  AORTIC VALVE: The valve was trileaflet  Leaflets exhibited normal thickness and normal cuspal separation  DOPPLER: Transaortic velocity was within the normal range  There was no evidence for stenosis  There was no regurgitation  TRICUSPID VALVE: The valve structure was normal  There was normal leaflet separation  DOPPLER: The transtricuspid velocity was within the normal range  There was no evidence for stenosis  There was mild regurgitation  PULMONIC VALVE: Leaflets exhibited normal thickness, no calcification, and normal cuspal separation  DOPPLER: The transpulmonic velocity was within the normal range  There was no regurgitation  PERICARDIUM: There was no pericardial effusion  The pericardium was normal in appearance  AORTA: The root exhibited normal size       SYSTEM MEASUREMENT TABLES     2D   %FS: 45 11 %   AV Diam: 3 03 cm   EDV(Teich): 62 6 ml   EF(Teich): 77 12 %   ESV(Teich): 14 32 ml   IVSd: 1 37 cm   LA Area: 15 47 cm2   LA Diam: 3 65 cm   LVEDV MOD A4C: 121 38 ml   LVEF MOD A4C: 63 23 %   LVESV MOD A4C: 44 63 ml   LVIDd: 3 82 cm   LVIDs: 2 1 cm   LVLd A4C: 8 67 cm   LVLs A4C: 6 58 cm   LVPWd: 0 91 cm   RA Area: 11 67 cm2   RV DIAM: 2 16 cm   SV MOD A4C: 76 75 ml   SV(Teich): 48 28 ml     CW   AV Vmax: 1 29 m/s   AV maxP 61 mmHg   PV Vmax: 0 94 m/s   PV maxPG: 3 51 mmHg     MM   TAPSE: 2 03 cm     PW   E': 0 08 m/s   E/E': 7 45   LVOT Vmax: 0 63 m/s   LVOT maxP 61 mmHg   MV A Tomas: 0 77 m/s   MV Dec Avery: 3 04 m/s2   MV DecT: 203 31 ms   MV E Tomas: 0 62 m/s   MV E/A Ratio: 0 81   RVOT Vmax: 0 51 m/s   RVOT maxP 03 mmHg     [employer] Accredited Echocardiography Laboratory     Prepared and electronically signed by     Temo Smith MD   Signed 14-Apr-2017 12:14:21       Plan  Yeast vaginitis    · DrRx Diflucan 150 MG #1; take one now and one in one week

## 2018-01-10 NOTE — RESULT NOTES
Verified Results  [facility] BARIUM SWALLOW [id number] 09:57AM Daljit Sheikh   TW Order Number: [id number]    - Patient Instructions: To schedule this appointment, please contact Central Scheduling at [phone]  Test Name Result Flag Reference   FL ESOPHAGRAM COMPLETE (Report)     BARIUM SWALLOW-ESOPHAGRAM     INDICATION: Hiatal hernia, reflux  COMPARISON: CT abdomen and pelvis 8/27/2016     IMAGES: 22     FLUOROSCOPY TIME:  3 1 min      TECHNIQUE:   The patient was given effervescent granules and barium by mouth and images of the esophagus were obtained  FINDINGS:     The esophagus is normal in caliber  Esophageal motility is normal and emptying of contrast from the esophagus is prompt  No mucosal lesion, ulceration or evidence of fold thickening is seen  Gastroesophageal reflux was not observed  Small hiatal hernia is present  IMPRESSION:     Small hiatal hernia       Otherwise unremarkable esophagram        Workstation performed: [id number]     Signed by:   Paulino Cueva MD   4/14/17

## 2018-01-10 NOTE — CONSULTS
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 from the lesion  The other images do not   demonstrate any definitive shadowing  At the 1:00 location 2 cm from nipple there is a 4 mm simple   cyst    At the 1:30 position 7 cm from nipple there is a 2 or 3 mm simple   cyst    At the 2:00 location 4 cm from nipple there is a hypoechoic   structure which is probably an intramammary lymph node, appearing   to have an echogenic fatty hilum  It measures 6 x 6 x 4 mm  Doppler doesn't suggest possible hilar flow  This could also be   reassessed on the next follow-up  Immediately adjacent to it   there is a simple cyst which is 3 mm  US Breast Right Limited: July 20, 2017 - Check In #: [de-identified]   Standard views  Technologist: Jil Hernandez RDMS     ACR BI-RADS? ??  Assessments: BiRad:3 - Probably Benign (Overall)   Left breast Left Brst US: BiRad:3 - probably benign finding in   the left breast    Right breast Right Brst US: BiRad:2 - benign finding in the right   breast    Diag Mammo: BiRad:3 - probably benign finding in the left breast      Recommendation:   Ultrasound of the left breast in 6 months  The patient is scheduled in a reminder system  Transcription Location: [facility] 143: [id number]     Risk Value(s):   Tyrer-Cuzick 10 Year: 3 000%, Tyrer-Cuzick Lifetime: 7 300%,   Myriad Table: 1 5%, LESIA 5 Year: 1 5%, NCI Lifetime: 6 7%         Ordered by: Jasper Boggs Collected/Examined: [id number] 09:54AM   Verified by: Jasper Boggs [id number] 03:01PM   Result Communication: No patient communication needed at this time;  Stage: Final Resulted: [id number] 02:41PM Last Updated: [id number] 03:01PM Accession: [id number]  Future Appointments    Date/Time Provider Specialty Site   11/07/2017 02:00 PM Erika Cheng DO Gastroenterology Adult [facility]   09/14/2017 01:40 PM ANNETTE Manning   Cardiology  [address]     Signatures   Electronically signed by : Jossie Villasenor MD; Aug 18 2017  8:53AM EST                       (Author)

## 2018-01-13 VITALS
HEART RATE: 78 BPM | TEMPERATURE: 98.4 F | BODY MASS INDEX: 37.82 KG/M2 | SYSTOLIC BLOOD PRESSURE: 124 MMHG | HEIGHT: 65 IN | OXYGEN SATURATION: 98 % | WEIGHT: 227 LBS | DIASTOLIC BLOOD PRESSURE: 82 MMHG | RESPIRATION RATE: 15 BRPM

## 2018-01-13 VITALS
SYSTOLIC BLOOD PRESSURE: 120 MMHG | HEIGHT: 66 IN | WEIGHT: 238 LBS | BODY MASS INDEX: 38.25 KG/M2 | DIASTOLIC BLOOD PRESSURE: 72 MMHG

## 2018-01-13 VITALS
WEIGHT: 218.5 LBS | BODY MASS INDEX: 36.4 KG/M2 | DIASTOLIC BLOOD PRESSURE: 71 MMHG | TEMPERATURE: 98.6 F | HEIGHT: 65 IN | HEART RATE: 78 BPM | OXYGEN SATURATION: 96 % | SYSTOLIC BLOOD PRESSURE: 113 MMHG

## 2018-01-13 VITALS
BODY MASS INDEX: 36.96 KG/M2 | WEIGHT: 230 LBS | SYSTOLIC BLOOD PRESSURE: 102 MMHG | DIASTOLIC BLOOD PRESSURE: 60 MMHG | HEIGHT: 66 IN

## 2018-01-13 NOTE — RESULT NOTES
Verified Results  ECG 12-LEAD 14Apr2017 10:04AM Jayy Cody     Test Name Result Flag Reference   ECG 12-LEAD (Report)     Normal sinus rhythm   Left axis deviation   Minimal voltage criteria for LVH, may be normal variant   Septal infarct , age undetermined   T wave abnormality, consider inferior ischemia   No previous ECGs available   Confirmed by [facility] - [facility] Joe PINTO 59 ([id number]) on 4/14/2017 5:21:57 PM     * XR CHEST PA & LATERAL 14Apr2017 10:02AM Fenton Peed Order Number: [id number]     Test Name Result Flag Reference   XR CHEST PA & LATERAL (Report)     CHEST      INDICATION: Chest pain     COMPARISON: None     VIEWS: Frontal and lateral projections     IMAGES: 2     FINDINGS:        Cardiomediastinal silhouette appears unremarkable  The lungs are clear  No pneumothorax or pleural effusion  Visualized osseous structures appear within normal limits for the patient's age  IMPRESSION:     No active pulmonary disease         Workstation performed: [id number]     Signed by:   Marlen Evans MD   4/16/17

## 2018-01-14 VITALS
SYSTOLIC BLOOD PRESSURE: 132 MMHG | WEIGHT: 230 LBS | DIASTOLIC BLOOD PRESSURE: 78 MMHG | HEIGHT: 66 IN | BODY MASS INDEX: 36.96 KG/M2

## 2018-01-14 VITALS
WEIGHT: 227 LBS | HEART RATE: 76 BPM | DIASTOLIC BLOOD PRESSURE: 80 MMHG | BODY MASS INDEX: 36.48 KG/M2 | HEIGHT: 66 IN | SYSTOLIC BLOOD PRESSURE: 124 MMHG

## 2018-01-14 VITALS
SYSTOLIC BLOOD PRESSURE: 156 MMHG | HEART RATE: 64 BPM | HEIGHT: 66 IN | BODY MASS INDEX: 37.28 KG/M2 | DIASTOLIC BLOOD PRESSURE: 96 MMHG | WEIGHT: 232 LBS

## 2018-01-14 VITALS
HEIGHT: 66 IN | HEART RATE: 76 BPM | SYSTOLIC BLOOD PRESSURE: 110 MMHG | BODY MASS INDEX: 36.8 KG/M2 | DIASTOLIC BLOOD PRESSURE: 70 MMHG | WEIGHT: 229 LBS

## 2018-01-14 NOTE — RESULT NOTES
Verified Results  (1) THIN PREP PAP WITH IMAGING 27Nov2017 09:53AM Edison Ramsey     Test Name Result Flag Reference   LAB AP CASE REPORT (Report)     Gynecologic Cytology Report            Case: [id number]                  Authorizing Provider: Eben Patino MD Collected:      11/17/2017           First Screen:     SANTI Blackman    Received:      11/20/2017 0911        Specimen:  LIQUID-BASED PAP, SCREENING, Endocervical   HPV HIGH RISK RESULT (Report)     HPV, High Risk: HPV NEG, HPV16 NEG, HPV18 NEG      Other High Risk HPV Negative, HPV 16 Negative, HPV 18 Negative  HPV types: 16,18,31,33,35,39,45,51,52,56,58,59,66 and 68 DNA are undetectable or below the pre-set threshold  [employer] FDA approved Marlen 4800 is utilized with strict adherence to the manufacturers instruction  manual to test for the presence of High-Risk HPV DNA, as well as HPV 16 and HPV 18  This instrument  has been validated by our laboratory and/or by the   A negative result does not preclude the presence of HPV infection because results depend on adequate  specimen collection, absence of inhibitors and sufficient DNA to be detected  Additionally, HPV negative  results are not intended to prevent women from proceeding to colposcopy if clinically warranted  Positive HPV test results indicate the presence of any one or more of the high risk types, but since patients  are often co-infected with low-risk types it does not rule out the presence of low-risk types in patients  with mixed infections  LAB AP GYN PRIMARY INTERPRETATION      Negative for intraepithelial lesion or malignancy  Electronically signed by SANTI Blackman on 11/27/2017 at 9:53 AM   LAB AP GYN SPECIMEN ADEQUACY      Satisfactory for evaluation  Endocervical/transformation zone component present     LAB AP GYN ADDITIONAL INFORMATION (Report)     [employer] FDA approved ,  and ThinPrep Imaging System are   utilized with strict adherence to the 's instruction manual to   prepare gynecologic and non-gynecologic cytology specimens for the   production of ThinPrep slides as well as for gynecologic ThinPrep imaging  These processes have been validated by our laboratory and/or by the     The Pap test is not a diagnostic procedure and should not be used as the   sole means to detect cervical cancer  It is only a screening procedure to   aid in the detection of cervical cancer and its precursors  Both   false-negative and false-positive results have been experienced  Your   patient's test result should be interpreted in this context together with   the history and clinical findings

## 2018-01-16 ENCOUNTER — GENERIC CONVERSION - ENCOUNTER (OUTPATIENT)
Dept: OTHER | Facility: OTHER | Age: 63
End: 2018-01-16

## 2018-01-16 ENCOUNTER — ALLSCRIPTS OFFICE VISIT (OUTPATIENT)
Dept: OTHER | Facility: OTHER | Age: 63
End: 2018-01-16

## 2018-01-16 DIAGNOSIS — E78.5 HYPERLIPIDEMIA: ICD-10-CM

## 2018-01-16 DIAGNOSIS — E11.29 TYPE 2 DIABETES MELLITUS WITH OTHER DIABETIC KIDNEY COMPLICATION (CODE): ICD-10-CM

## 2018-01-16 NOTE — RESULT NOTES
Verified Results  *US BREAST RIGHT LIMITED (DIAGNOSTIC) [id number] 09:54AM Leata Lennox Order Number: [id number]    - Patient Instructions: To schedule this appointment, please contact Central Scheduling at [phone]  Test Name Result Flag Reference   US BREAST RIGHT LIMITED (Report)     Patient History:   Patient is postmenopausal    No known family history of cancer  No Hormone Replacement Therapy   Patient has never smoked  Patient's BMI is 38 9  Reason for exam: addl eval requested from prior study  Mammo Diagnostic Bilateral W DBT and CAD: July 20, 2017 - Check    In #: [de-identified]   3D Procedure   3D views: Bilateral MLO and CC view(s) were taken  2D Synthetic views: Bilateral MLO and CC view(s) were taken  Technologist: KIKO Galeas (KIKO)(M)   Prior study comparison: May 26, 2017, mammo screening bilateral W   CAD performed at [address] [place] [address]  US Breast Left Limited: July 20, 2017 - Check In #: [de-identified]   Standard views  Technologist: Giovanni Hyde RDMS   A uniformly echogenic layer of fibroglandular tissue  3-D CC and   MLO images of both breasts were obtained and compared with the    screening study  In the right breast there is a small    circumscribed benign-appearing persistent mass in the outer    quadrant at the 9 o'clock position  It measures about 9 mm long    axis diameter  About mid 3rd depth in the breast  An ultrasound   was performed over this area  At the 9 o'clock position 7 cm    from nipple there is a concordant appearing anechoic structure    which is 7 x 4 x 4 mm  Doppler does not demonstrate any internal   or peripheral blood flow  This is most compatible with a benign   cyst and no specific surveillance is required  Shadowing which    is present beneath this area of the breast does not seem to    originate from the cyst and rather seems to be dense breast    tissue which is more diffusely distributed       In the left breast, there were 3 separate small benign-appearing    nodular masses seen in the upper outer quadrant on the    mammographic views  The largest seems to represent the finding    which was seen on the screening study and measures almost 1 cm    long axis at about the 12 to 1 o'clock position  A smaller    finding is noted slightly farther back in the breast in the same    general vicinity, estimated to be about 5 mm diameter  A 3rd    finding slightly lower in the breast towards the nipple line on    the MLO view minimally to the 3:00 location is also about 5 or 6    mm diameter  There was no upper inner quadrant lesion seen on    the 3-D images  An ultrasound of the left breast was performed  At the 12    o'clock position 5 cm from nipple there is what appears to be a    cluster of small cysts  In total, this measures 1 cm long axis    and seems to be a reasonably good correlate for the mammogram     As there are some internal echoes seen, this probably warrants    reevaluation with ultrasound in 6 months  Shadowing which is    visible deep to this area on a couple of the images is again felt   to probably be an artifact of the tissue plane rather than    actually arising from the lesion  The other images do not    demonstrate any definitive shadowing  At the 1:00 location 2 cm from nipple there is a 4 mm simple    cyst    At the 1:30 position 7 cm from nipple there is a 2 or 3 mm simple   cyst    At the 2:00 location 4 cm from nipple there is a hypoechoic    structure which is probably an intramammary lymph node, appearing   to have an echogenic fatty hilum  It measures 6 x 6 x 4 mm  Doppler doesn't suggest possible hilar flow  This could also be    reassessed on the next follow-up  Immediately adjacent to it    there is a simple cyst which is 3 mm  US Breast Right Limited: July 20, 2017 - Check In #: [de-identified]   Standard views       Technologist: Alyssa Houser RDMS     ACR BI-RADSï¾® Assessments: BiRad:3 - Probably Benign (Overall)   Left breast Left Brst US: BiRad:3 - probably benign finding in    the left breast    Right breast Right Brst US: BiRad:2 - benign finding in the right   breast    Diag Mammo: BiRad:3 - probably benign finding in the left breast      Recommendation:   Ultrasound of the left breast in 6 months  The patient is scheduled in a reminder system  Transcription Location: [facility] 98: [id number]     Risk Value(s):   Tyrer-Cuzick 10 Year: 3 000%, Tyrer-Cuzick Lifetime: 7 300%,    Myriad Table: 1 5%, LESIA 5 Year: 1 5%, NCI Lifetime: 6 7%     MAMMO DIAGNOSTIC BILATERAL W 3D & CAD [id number] 09:53AM [facility] Order Number: [id number]    - Patient Instructions: To schedule this appointment, please contact Central Scheduling at [phone]  Do not wear any perfume, powder, lotion or deodorant on breast or underarm area  Please bring your doctors order, referral (if needed) and insurance information with you on the day of the test  Failure to bring this information may result in this test being rescheduled  Arrive 15 minutes prior to your appointment time to register  On the day of your test, please bring any prior mammogram or breast studies with you that were not performed at a [facility]  Failure to bring prior exams may result in your test needing to be rescheduled  Test Name Result Flag Reference   MAMMO DIAGNOSTIC BILATERAL W 3D & CAD (Report)     Patient History:   Patient is postmenopausal    No known family history of cancer  No Hormone Replacement Therapy   Patient has never smoked  Patient's BMI is 38 9  Reason for exam: addl eval requested from prior study  Mammo Diagnostic Bilateral W DBT and CAD: July 20, 2017 - Check    In #: [de-identified]   3D Procedure   3D views: Bilateral MLO and CC view(s) were taken  2D Synthetic views: Bilateral MLO and CC view(s) were taken         Technologist: KIKO Matias (R)(M)   Prior study comparison: May 26, 2017, mammo screening bilateral W   CAD performed at [address] [address]  US Breast Left Limited: July 20, 2017 - Check In #: [de-identified]   Standard views  Technologist: Rossy Cameron RDMS   A uniformly echogenic layer of fibroglandular tissue  3-D CC and   MLO images of both breasts were obtained and compared with the    screening study  In the right breast there is a small    circumscribed benign-appearing persistent mass in the outer    quadrant at the 9 o'clock position  It measures about 9 mm long    axis diameter  About mid 3rd depth in the breast  An ultrasound   was performed over this area  At the 9 o'clock position 7 cm    from nipple there is a concordant appearing anechoic structure    which is 7 x 4 x 4 mm  Doppler does not demonstrate any internal   or peripheral blood flow  This is most compatible with a benign   cyst and no specific surveillance is required  Shadowing which    is present beneath this area of the breast does not seem to    originate from the cyst and rather seems to be dense breast    tissue which is more diffusely distributed  In the left breast, there were 3 separate small benign-appearing    nodular masses seen in the upper outer quadrant on the    mammographic views  The largest seems to represent the finding    which was seen on the screening study and measures almost 1 cm    long axis at about the 12 to 1 o'clock position  A smaller    finding is noted slightly farther back in the breast in the same    general vicinity, estimated to be about 5 mm diameter  A 3rd    finding slightly lower in the breast towards the nipple line on    the MLO view minimally to the 3:00 location is also about 5 or 6    mm diameter  There was no upper inner quadrant lesion seen on    the 3-D images  An ultrasound of the left breast was performed   At the 12    o'clock position 5 cm from nipple there is what appears to be a    cluster of small cysts  In total, this measures 1 cm long axis    and seems to be a reasonably good correlate for the mammogram     As there are some internal echoes seen, this probably warrants    reevaluation with ultrasound in 6 months  Shadowing which is    visible deep to this area on a couple of the images is again felt   to probably be an artifact of the tissue plane rather than    actually arising from the lesion  The other images do not    demonstrate any definitive shadowing  At the 1:00 location 2 cm from nipple there is a 4 mm simple    cyst    At the 1:30 position 7 cm from nipple there is a 2 or 3 mm simple   cyst    At the 2:00 location 4 cm from nipple there is a hypoechoic    structure which is probably an intramammary lymph node, appearing   to have an echogenic fatty hilum  It measures 6 x 6 x 4 mm  Doppler doesn't suggest possible hilar flow  This could also be    reassessed on the next follow-up  Immediately adjacent to it    there is a simple cyst which is 3 mm  US Breast Right Limited: July 20, 2017 - Check In #: [de-identified]   Standard views  Technologist: Molly Houser RDMS     ACR BI-RADSï¾® Assessments: BiRad:3 - Probably Benign (Overall)   Left breast Left Brst US: BiRad:3 - probably benign finding in    the left breast    Right breast Right Brst US: BiRad:2 - benign finding in the right   breast    Diag Mammo: BiRad:3 - probably benign finding in the left breast      Recommendation:   Ultrasound of the left breast in 6 months  The patient is scheduled in a reminder system       Transcription Location: [facility] 98: [id number]     Risk Value(s):   Tyrer-Cuzick 10 Year: 3 000%, Tyrer-Cuzick Lifetime: 7 300%,    Myriad Table: 1 5%, LESIA 5 Year: 1 5%, NCI Lifetime: 6 7%

## 2018-01-16 NOTE — RESULT NOTES
Verified Results  (1) COMPREHENSIVE METABOLIC PANEL [id number] 18:29ZT Adelfo Osuna Order Number: [id number]     Test Name Result Flag Reference   SODIUM 138 mmol/L  136-145   POTASSIUM 3 6 mmol/L  3 5-5 3   CHLORIDE 102 mmol/L  100-108   CARBON DIOXIDE 27 mmol/L  21-32   ANION GAP (CALC) 9 mmol/L  4-13   BLOOD UREA NITROGEN 10 mg/dL  5-25   CREATININE 0 87 mg/dL  0 60-1 30   Standardized to IDMS reference method   CALCIUM 8 9 mg/dL  8 3-10 1   BILI, TOTAL 0 50 mg/dL  0 20-1 00   ALK PHOSPHATAS 83 U/L     ALT (SGPT) 23 U/L  12-78   AST(SGOT) 15 U/L  5-45   ALBUMIN 3 6 g/dL  3 5-5 0   TOTAL PROTEIN 7 7 g/dL  6 4-8 2   eGFR Non-African American      >60 0 ml/min/1 73sq m   - Patient Instructions: This is a fasting blood test  Water,black tea or black  coffee only after 9:00pm the night before test Drink 2 glasses of water the morning of test   National Kidney Disease Education Program recommendations are as follows:  GFR calculation is accurate only with a steady state creatinine  Chronic Kidney disease less than 60 ml/min/1 73 sq  meters  Kidney failure less than 15 ml/min/1 73 sq  meters  GLUCOSE FASTING 264 mg/dL H 65-99     (1) HEMOGLOBIN A1C 11Apr2017 08:45AM [facility]    Order Number: [id number]     Test Name Result Flag Reference   HEMOGLOBIN A1C 11 5 % H 4 2-6 3   EST  AVG  GLUCOSE 283 mg/dl       (1) LIPID PANEL, FASTING 11Apr2017 08:45AM [facility]    Order Number: [id number]     Test Name Result Flag Reference   CHOLESTEROL 212 mg/dL H    HDL,DIRECT 42 mg/dL  40-60   Specimen collection should occur prior to Metamizole administration due to the potential for falsely depressed results  LDL CHOLESTEROL CALCULATED 149 mg/dL H 0-100   - Patient Instructions: This is a fasting blood test  Water,black tea or black  coffee only after 9:00pm the night before test   Drink 2 glasses of water the morning of test     - Patient Instructions:  This is a fasting blood test  Water,black tea or black  coffee only after 9:00pm the night before test Drink 2 glasses of water the morning of test   Triglyceride:         Normal              <150 mg/dl       Borderline High    150-199 mg/dl       High               200-499 mg/dl       Very High          >499 mg/dl  Cholesterol:         Desirable        <200 mg/dl      Borderline High  200-239 mg/dl      High             >239 mg/dl  HDL Cholesterol:        High    >59 mg/dL      Low     <41 mg/dL  LDL CALCULATED:    This screening LDL is a calculated result  It does not have the accuracy of the Direct Measured LDL in the monitoring of patients with hyperlipidemia and/or statin therapy  Direct Measure LDL (LXB226) must be ordered separately in these patients  TRIGLYCERIDES 106 mg/dL  <=150   Specimen collection should occur prior to N-Acetylcysteine or Metamizole administration due to the potential for falsely depressed results
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
## 2018-01-22 VITALS
WEIGHT: 222 LBS | SYSTOLIC BLOOD PRESSURE: 110 MMHG | DIASTOLIC BLOOD PRESSURE: 70 MMHG | BODY MASS INDEX: 36.99 KG/M2 | HEIGHT: 65 IN

## 2018-01-23 VITALS
DIASTOLIC BLOOD PRESSURE: 84 MMHG | SYSTOLIC BLOOD PRESSURE: 128 MMHG | HEIGHT: 65 IN | BODY MASS INDEX: 36.33 KG/M2 | WEIGHT: 218.04 LBS

## 2018-01-23 VITALS
HEIGHT: 65 IN | WEIGHT: 222.5 LBS | SYSTOLIC BLOOD PRESSURE: 104 MMHG | BODY MASS INDEX: 37.07 KG/M2 | DIASTOLIC BLOOD PRESSURE: 68 MMHG

## 2018-01-23 NOTE — RESULT NOTES
Discussion/Summary   egd biopsies were fine, nothing worrisome     Verified Results  (1) TISSUE EXAM [id number] 08:37AM Quinten Marilee     Test Name Result Flag Reference   LAB AP CASE REPORT (Report)     Surgical Pathology Report             Case: [id number]                   Authorizing Provider: Luiza Kim DO    Collected:      01/05/2018 7048        Ordering Location:   [facility] Received:      01/05/2018 1116                    Operating Room                                 Pathologist:      Stephanie Thurman MD                             Specimens:  A) - Duodenum, normal appearing duodenum, retrieved with cold biopsy forcep              B) - Stomach, stomach body, normal appearing, retrieved with cold biopsy forcep            C) - Esophagus, distal esophagus biopsy, esophagitis, retrieved with cold biopsy            forcep   LAB AP FINAL DIAGNOSIS (Report)     A  Duodenum, biopsy:    - No significant histologic abnormality     - No increase in intraepithelial lymphocytes, villous blunting, acute   and chronic inflammation     - Negative for granulomas, dysplasia and malignancy  B  Gastric body, biopsy:    - Chronic inactive gastritis  - Negative for intestinal metaplasia, dysplasia and malignancy  C  Distal esophagus, biopsy:    - Squamocolumnar gastroesophageal junction mucosa with chronic active   inflammation, reactive epithelial      changes and features consistent with reflux     - Squamous intraepithelial eosinophils range from 2-focally 8/hpf  - Negative for intestinal metaplasia, dysplasia and malignancy  Electronically signed by Stephanie Thurman MD on 1/8/2018 at 12:35 PM   LAB AP NOTE      Immunohistochemical stain for Helicobacter pylori organisms on part B is   in progress  An addendum report will   follow      Interpretation performed at [facility], [address] [place] [address]   LAB AP SURGICAL ADDITIONAL INFORMATION (Report)     All controls performed with the immunohistochemical stains reported above   reacted appropriately  These tests were developed and their performance   characteristics determined by [facility] or   Jessica  They may not be cleared or approved by the [place]  Food and Drug Administration  The FDA has determined that such clearance   or approval is not necessary  These tests are used for clinical purposes  They should not be regarded as investigational or for research  This   laboratory has been approved by [place] [address], designated as a high-complexity   laboratory and is qualified to perform these tests  LAB AP GROSS DESCRIPTION (Report)     A  The specimen is received in formalin, labeled with the patient's   name and hospital number, and is designated normal-appearing duodenum   The specimen consists of 3 rubbery tan-brown tissue fragments measuring   from 0 2 up to 0 3 cm in greatest dimension  Entirely submitted  One   cassette  B  The specimen is received in formalin, labeled with the patient's name   and hospital number, and is designated Stomach body, normal appearing   The specimen consists of 2 rubbery and friable, tan-brown tissue fragments   measuring from 0 1 up to 0 4 cm in greatest dimension  Entirely submitted  One cassette  C  The specimen is received in formalin, labeled with the patient's name   and hospital number, and is designated Distal esophagus biopsy,   esophagitis   The specimen consists of 3 rubbery tan tissue fragments   measuring from 0 1 up to 0 25 cm in greatest dimension  Entirely   submitted  One cassette  Note: The estimated total formalin fixation time based upon information   provided by the submitting clinician and the standard processing schedule   is less than 72 hours    S[facility]endez   LAB AP ADDENDUM 1      Helicobacter pylori organisms are identified by immunohistochemical stain   performed on part B (appropriate   positive control performed)    Addendum electronically signed by Erum Higgins MD on 1/9/2018 at 9:24 AM

## 2018-01-23 NOTE — MISCELLANEOUS
Message  GI Reminder Recall [facility]:   Date: 12/20/2017   Dear Crissy Chen:     Review of our records shows you are due for the following: Follow Up Visit and Please schedule for after your procedure  Please call the following office to schedule your appointment:   [address] [phone]  We look forward to hearing from you!      Sincerely,     [facility]      Signatures   Electronically signed by : Luis Vaughn, ; Dec 20 2017  2:22PM EST                       (Author)

## 2018-01-23 NOTE — MISCELLANEOUS
Message  GI Reminder Recall [facility]:   Date: 01/16/2018   Dear Collette Antonio:     Review of our records shows you are due for the following: Our office has tried to contact you  Please call us at [phone] for your procedure results  Please call the following office to schedule your appointment:     We look forward to hearing from you!      Sincerely,     [facility]      Signatures   Electronically signed by : Gloria Delatorre, ; Jan 16 2018  1:14PM EST                       (Author)
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
## 2018-01-24 DIAGNOSIS — E11.22 TYPE 2 DIABETES MELLITUS WITH CHRONIC KIDNEY DISEASE, WITH LONG-TERM CURRENT USE OF INSULIN, UNSPECIFIED CKD STAGE (HCC): Primary | ICD-10-CM

## 2018-01-24 DIAGNOSIS — Z79.4 TYPE 2 DIABETES MELLITUS WITH CHRONIC KIDNEY DISEASE, WITH LONG-TERM CURRENT USE OF INSULIN, UNSPECIFIED CKD STAGE (HCC): Primary | ICD-10-CM

## 2018-01-24 RX ORDER — INSULIN GLARGINE 100 [IU]/ML
34 INJECTION, SOLUTION SUBCUTANEOUS
Qty: 10 ML | Refills: 5 | Status: CANCELLED | OUTPATIENT
Start: 2018-01-24

## 2018-01-24 RX ORDER — INSULIN GLARGINE 100 [IU]/ML
30 INJECTION, SOLUTION SUBCUTANEOUS
Qty: 10 ML | Refills: 0 | Status: SHIPPED | OUTPATIENT
Start: 2018-01-24 | End: 2018-02-13 | Stop reason: SDUPTHER

## 2018-01-25 ENCOUNTER — TRANSCRIBE ORDERS (OUTPATIENT)
Dept: ADMINISTRATIVE | Facility: HOSPITAL | Age: 63
End: 2018-01-25

## 2018-01-25 DIAGNOSIS — M54.9 CHRONIC BACK PAIN, UNSPECIFIED BACK LOCATION, UNSPECIFIED BACK PAIN LATERALITY: Primary | ICD-10-CM

## 2018-01-25 DIAGNOSIS — G89.29 CHRONIC BACK PAIN, UNSPECIFIED BACK LOCATION, UNSPECIFIED BACK PAIN LATERALITY: Primary | ICD-10-CM

## 2018-02-08 ENCOUNTER — TELEPHONE (OUTPATIENT)
Dept: FAMILY MEDICINE CLINIC | Facility: CLINIC | Age: 63
End: 2018-02-08

## 2018-02-08 DIAGNOSIS — Z79.4 TYPE 2 DIABETES MELLITUS WITH CHRONIC KIDNEY DISEASE, WITH LONG-TERM CURRENT USE OF INSULIN, UNSPECIFIED CKD STAGE (HCC): ICD-10-CM

## 2018-02-08 DIAGNOSIS — E11.22 TYPE 2 DIABETES MELLITUS WITH CHRONIC KIDNEY DISEASE, WITH LONG-TERM CURRENT USE OF INSULIN, UNSPECIFIED CKD STAGE (HCC): ICD-10-CM

## 2018-02-13 DIAGNOSIS — Z79.4 TYPE 2 DIABETES MELLITUS WITH CHRONIC KIDNEY DISEASE, WITH LONG-TERM CURRENT USE OF INSULIN, UNSPECIFIED CKD STAGE (HCC): ICD-10-CM

## 2018-02-13 DIAGNOSIS — E11.22 TYPE 2 DIABETES MELLITUS WITH CHRONIC KIDNEY DISEASE, WITH LONG-TERM CURRENT USE OF INSULIN, UNSPECIFIED CKD STAGE (HCC): ICD-10-CM

## 2018-02-13 RX ORDER — INSULIN GLARGINE 100 [IU]/ML
30 INJECTION, SOLUTION SUBCUTANEOUS
Qty: 10 ML | Refills: 5 | Status: SHIPPED | OUTPATIENT
Start: 2018-02-13 | End: 2018-03-01

## 2018-02-18 DIAGNOSIS — N60.02 SOLITARY CYST OF LEFT BREAST: ICD-10-CM

## 2018-02-20 PROBLEM — H92.09 OTALGIA: Status: ACTIVE | Noted: 2018-01-02

## 2018-02-20 PROBLEM — IMO0002 DIABETES MELLITUS TYPE 2, UNCONTROLLED: Status: ACTIVE | Noted: 2017-11-17

## 2018-02-20 PROBLEM — N60.02 CYST OF LEFT BREAST: Status: ACTIVE | Noted: 2017-07-20

## 2018-02-20 PROBLEM — IMO0001 IMPAIRED HEARING: Status: ACTIVE | Noted: 2017-06-02

## 2018-02-20 PROBLEM — E11.40 DIABETIC NEUROPATHY (HCC): Status: ACTIVE | Noted: 2018-01-02

## 2018-02-20 PROBLEM — E78.5 HYPERLIPIDEMIA: Status: ACTIVE | Noted: 2017-04-13

## 2018-02-20 PROBLEM — I67.2 CEREBRAL ATHEROSCLEROSIS: Status: ACTIVE | Noted: 2017-05-15

## 2018-02-20 PROBLEM — K59.09 CHRONIC CONSTIPATION: Status: ACTIVE | Noted: 2017-11-07

## 2018-02-20 PROBLEM — H91.90 IMPAIRED HEARING: Status: ACTIVE | Noted: 2017-06-02

## 2018-02-21 ENCOUNTER — APPOINTMENT (OUTPATIENT)
Dept: LAB | Facility: CLINIC | Age: 63
End: 2018-02-21
Payer: COMMERCIAL

## 2018-02-21 ENCOUNTER — TRANSCRIBE ORDERS (OUTPATIENT)
Dept: LAB | Facility: CLINIC | Age: 63
End: 2018-02-21

## 2018-02-21 ENCOUNTER — TRANSCRIBE ORDERS (OUTPATIENT)
Dept: URGENT CARE | Facility: CLINIC | Age: 63
End: 2018-02-21

## 2018-02-21 ENCOUNTER — APPOINTMENT (OUTPATIENT)
Dept: RADIOLOGY | Facility: CLINIC | Age: 63
End: 2018-02-21
Payer: COMMERCIAL

## 2018-02-21 DIAGNOSIS — Z87.01 HISTORY OF RECURRENT PNEUMONIA: ICD-10-CM

## 2018-02-21 DIAGNOSIS — Z87.01 HISTORY OF RECURRENT PNEUMONIA: Primary | ICD-10-CM

## 2018-02-21 DIAGNOSIS — E78.5 HYPERLIPIDEMIA: ICD-10-CM

## 2018-02-21 LAB
ALBUMIN SERPL BCP-MCNC: 3.9 G/DL (ref 3.5–5)
ALP SERPL-CCNC: 87 U/L (ref 46–116)
ALT SERPL W P-5'-P-CCNC: 20 U/L (ref 12–78)
ANION GAP SERPL CALCULATED.3IONS-SCNC: 8 MMOL/L (ref 4–13)
AST SERPL W P-5'-P-CCNC: 15 U/L (ref 5–45)
BILIRUB SERPL-MCNC: 0.55 MG/DL (ref 0.2–1)
BUN SERPL-MCNC: 10 MG/DL (ref 5–25)
CALCIUM SERPL-MCNC: 9.1 MG/DL (ref 8.3–10.1)
CHLORIDE SERPL-SCNC: 100 MMOL/L (ref 100–108)
CHOLEST SERPL-MCNC: 176 MG/DL (ref 50–200)
CO2 SERPL-SCNC: 29 MMOL/L (ref 21–32)
CREAT SERPL-MCNC: 0.94 MG/DL (ref 0.6–1.3)
CREAT UR-MCNC: 145 MG/DL
ERYTHROCYTE [DISTWIDTH] IN BLOOD BY AUTOMATED COUNT: 14.4 % (ref 11.6–15.1)
EST. AVERAGE GLUCOSE BLD GHB EST-MCNC: 344 MG/DL
GFR SERPL CREATININE-BSD FRML MDRD: 75 ML/MIN/1.73SQ M
GLUCOSE P FAST SERPL-MCNC: 322 MG/DL (ref 65–99)
HBA1C MFR BLD: 13.6 % (ref 4.2–6.3)
HCT VFR BLD AUTO: 41.8 % (ref 34.8–46.1)
HDLC SERPL-MCNC: 44 MG/DL (ref 40–60)
HGB BLD-MCNC: 13.8 G/DL (ref 11.5–15.4)
LDLC SERPL CALC-MCNC: 117 MG/DL (ref 0–100)
MCH RBC QN AUTO: 26.9 PG (ref 26.8–34.3)
MCHC RBC AUTO-ENTMCNC: 33 G/DL (ref 31.4–37.4)
MCV RBC AUTO: 82 FL (ref 82–98)
MICROALBUMIN UR-MCNC: 545 MG/L (ref 0–20)
MICROALBUMIN/CREAT 24H UR: 376 MG/G CREATININE (ref 0–30)
PLATELET # BLD AUTO: 226 THOUSANDS/UL (ref 149–390)
PMV BLD AUTO: 11.3 FL (ref 8.9–12.7)
POTASSIUM SERPL-SCNC: 3.8 MMOL/L (ref 3.5–5.3)
PROT SERPL-MCNC: 8.3 G/DL (ref 6.4–8.2)
RBC # BLD AUTO: 5.13 MILLION/UL (ref 3.81–5.12)
SODIUM SERPL-SCNC: 137 MMOL/L (ref 136–145)
TRIGL SERPL-MCNC: 74 MG/DL
TSH SERPL DL<=0.05 MIU/L-ACNC: 2.42 UIU/ML (ref 0.36–3.74)
WBC # BLD AUTO: 4.92 THOUSAND/UL (ref 4.31–10.16)

## 2018-02-21 PROCEDURE — 82570 ASSAY OF URINE CREATININE: CPT

## 2018-02-21 PROCEDURE — 3062F POS MACROALBUMINURIA REV: CPT | Performed by: PHYSICIAN ASSISTANT

## 2018-02-21 PROCEDURE — 82043 UR ALBUMIN QUANTITATIVE: CPT

## 2018-02-21 PROCEDURE — 72110 X-RAY EXAM L-2 SPINE 4/>VWS: CPT

## 2018-02-21 PROCEDURE — 83036 HEMOGLOBIN GLYCOSYLATED A1C: CPT

## 2018-02-21 PROCEDURE — 80061 LIPID PANEL: CPT

## 2018-02-21 PROCEDURE — 72050 X-RAY EXAM NECK SPINE 4/5VWS: CPT

## 2018-02-21 PROCEDURE — 36415 COLL VENOUS BLD VENIPUNCTURE: CPT

## 2018-02-21 PROCEDURE — 80053 COMPREHEN METABOLIC PANEL: CPT

## 2018-02-21 PROCEDURE — 85027 COMPLETE CBC AUTOMATED: CPT

## 2018-02-21 PROCEDURE — 84443 ASSAY THYROID STIM HORMONE: CPT

## 2018-02-26 ENCOUNTER — TELEPHONE (OUTPATIENT)
Dept: FAMILY MEDICINE CLINIC | Facility: CLINIC | Age: 63
End: 2018-02-26

## 2018-02-26 NOTE — TELEPHONE ENCOUNTER
L/M requesting return call to discuss recommendations & if she is willing to be set up at [facility] for Diabetic teaching

## 2018-02-26 NOTE — TELEPHONE ENCOUNTER
HbA1c elevated to 13 6    Pt was to see Mellisa Stauffer to assist with Diabetic control - Provider is out on sick leave    ASKING IF THERE IS ANYONE ELSE THAT YOU CAN RECOMMEND?

## 2018-02-26 NOTE — TELEPHONE ENCOUNTER
Set patient up for a  Diabetic teaching about her diet and set her up for a visit to come in here so that we can talk about getting her sugars under control all take the ball in run with it

## 2018-02-28 ENCOUNTER — APPOINTMENT (OUTPATIENT)
Dept: LAB | Facility: CLINIC | Age: 63
End: 2018-02-28
Payer: COMMERCIAL

## 2018-02-28 DIAGNOSIS — R10.13 EPIGASTRIC PAIN: ICD-10-CM

## 2018-02-28 DIAGNOSIS — IMO0002 UNCONTROLLED TYPE 1 DIABETES MELLITUS WITH COMPLICATION: Primary | ICD-10-CM

## 2018-02-28 PROCEDURE — 87338 HPYLORI STOOL AG IA: CPT

## 2018-03-01 ENCOUNTER — APPOINTMENT (OUTPATIENT)
Dept: LAB | Facility: MEDICAL CENTER | Age: 63
End: 2018-03-01
Payer: COMMERCIAL

## 2018-03-01 ENCOUNTER — OFFICE VISIT (OUTPATIENT)
Dept: FAMILY MEDICINE CLINIC | Facility: CLINIC | Age: 63
End: 2018-03-01
Payer: COMMERCIAL

## 2018-03-01 ENCOUNTER — TRANSCRIBE ORDERS (OUTPATIENT)
Dept: LAB | Facility: MEDICAL CENTER | Age: 63
End: 2018-03-01

## 2018-03-01 VITALS
DIASTOLIC BLOOD PRESSURE: 68 MMHG | WEIGHT: 216.4 LBS | HEIGHT: 65 IN | SYSTOLIC BLOOD PRESSURE: 122 MMHG | BODY MASS INDEX: 36.06 KG/M2

## 2018-03-01 DIAGNOSIS — B37.3 VAGINAL CANDIDIASIS: ICD-10-CM

## 2018-03-01 DIAGNOSIS — E11.9 TYPE 2 DIABETES MELLITUS WITHOUT COMPLICATION, WITHOUT LONG-TERM CURRENT USE OF INSULIN (HCC): Primary | ICD-10-CM

## 2018-03-01 DIAGNOSIS — Z11.59 NEED FOR HEPATITIS C SCREENING TEST: ICD-10-CM

## 2018-03-01 LAB — H PYLORI AG STL QL IA: POSITIVE

## 2018-03-01 PROCEDURE — 99214 OFFICE O/P EST MOD 30 MIN: CPT | Performed by: PHYSICIAN ASSISTANT

## 2018-03-01 PROCEDURE — 36415 COLL VENOUS BLD VENIPUNCTURE: CPT | Performed by: PHYSICIAN ASSISTANT

## 2018-03-01 PROCEDURE — 86803 HEPATITIS C AB TEST: CPT | Performed by: PHYSICIAN ASSISTANT

## 2018-03-01 RX ORDER — FLUCONAZOLE 150 MG/1
150 TABLET ORAL ONCE
Qty: 2 TABLET | Refills: 0 | Status: SHIPPED | OUTPATIENT
Start: 2018-03-01 | End: 2018-03-01

## 2018-03-01 RX ORDER — DIAPER,BRIEF,INFANT-TODD,DISP
EACH MISCELLANEOUS 2 TIMES DAILY PRN
COMMUNITY
Start: 2017-09-27 | End: 2020-03-24 | Stop reason: SDUPTHER

## 2018-03-01 NOTE — PROGRESS NOTES
Assessment/Plan:    No problem-specific Assessment & Plan notes found for this encounter  Diagnoses and all orders for this visit:    Type 2 diabetes mellitus without complication, without long-term current use of insulin (HCC)  -     Empagliflozin (JARDIANCE) 25 MG TABS; Take 1 tablet (25 mg total) by mouth every morning  -     metFORMIN (GLUCOPHAGE) 1000 MG tablet; Take 1 tablet (1,000 mg total) by mouth 2 (two) times a day with meals  -     Ambulatory referral to Endocrinology; Future    Need for hepatitis C screening test  -     Hepatitis C antibody    Other orders  -     hydrocortisone 0 5 % cream; Apply topically 2 (two) times a day  -     POLYETHYLENE GLYCOL 3350 PO; Take 17 g by mouth daily as needed          Had a long discussion today with Alexia Lipscomb about her diet  She is going to try to cut down on some carbohydrates  There was a referral placed for a nutrition consult at [facility]  She would also like a referral to an endocrinologist and says she has transportation  Will increase metformin and also try adding Jardiance  I'll see her back in 1 month or sooner if needed  Pauline Tong will start checking daily FBS and keep a log so we can watch her glucose trends  Subjective:      Patient ID: Claudia Frias is a 58 y o  female  Diabetes   She presents for her follow-up diabetic visit  She has type 2 diabetes mellitus  Her disease course has been worsening  There are no hypoglycemic associated symptoms  Pertinent negatives for hypoglycemia include no dizziness  Associated symptoms include fatigue  Pertinent negatives for diabetes include no blurred vision, no chest pain, no foot paresthesias, no foot ulcerations and no visual change  There are no hypoglycemic complications  Symptoms are stable  Risk factors for coronary artery disease include diabetes mellitus, obesity, post-menopausal and hypertension   Current diabetic treatment includes diet, insulin injections and oral agent (monotherapy)  She is compliant with treatment some of the time  She is following a generally unhealthy diet  An ACE inhibitor/angiotensin II receptor blocker is being taken  Braxton Long admits to eating a lot of carbohydrates including bagels, breads, white rice, and pasta  She also drinks [place] green tea (not diet) and ice cream  She struggles because she and her  use food stamps and she can only buy certain foods  Braxton Long has not been using her Lantus x 3 weeks because she said it is not covered by her insurance  She is interested in adding another oral agent instead of trying a different injectable  She also is complaining of a vaginal yeast infection due to her increased glucose  The following portions of the patient's history were reviewed and updated as appropriate:   She  has a past medical history of Asthma; Back problem; Depression; Diabetes mellitus ([address] ); Hyperlipidemia; Hypertension; Neuropathy; and Psychiatric disorder  She   Patient Active Problem List    Diagnosis Date Noted    Diabetic neuropathy ([facility] [address] ) 01/02/2018    Otalgia 01/02/2018    Chronic constipation 11/07/2017    Abnormal stress test 07/27/2017    Cyst of left breast 07/20/2017    Impaired hearing 06/02/2017    Cerebral atherosclerosis 05/15/2017    Hyperlipidemia 04/13/2017    Moderate persistent asthma without complication 47/01/1453    Back pain of lumbar region with sciatica 10/18/2016    Type 2 diabetes mellitus without complication ([address] ) 23/03/1212    Hiatal hernia 10/18/2016    Keloid of skin 10/18/2016    Mild persistent asthma without complication 98/35/0954    Chronic back pain 09/06/2016    Depression 09/06/2016    Essential hypertension 09/06/2016    Neuropathy, diabetic ([facility] [address] ) 09/06/2016     She  has a past surgical history that includes Cyst Removal; Eye surgery; Cataract extraction; Tubal ligation;  Arm wound repair / closure; and pr esophagogastroduodenoscopy transoral diagnostic (N/A, 1/5/2018)  Her family history includes Arthritis in her mother; Diabetes in her mother; Heart disease in her mother  She  reports that she has never smoked  She has never used smokeless tobacco  She reports that she does not drink alcohol or use drugs  Current Outpatient Prescriptions   Medication Sig Dispense Refill    albuterol (PROVENTIL HFA,VENTOLIN HFA) 90 mcg/act inhaler Inhale 2 puffs every 4 (four) hours as needed for wheezing      amLODIPine (NORVASC) 10 mg tablet Take 10 mg by mouth daily   aspirin 325 mg tablet Take 1 tablet by mouth daily      beclomethasone (QVAR) 80 MCG/ACT inhaler Inhale 2 puffs 2 (two) times a day      busPIRone (BUSPAR) 5 mg tablet Take 5 mg by mouth 3 (three) times a day   cycloSPORINE (RESTASIS) 0 05 % ophthalmic emulsion Apply to eye      escitalopram (LEXAPRO) 10 mg tablet Take 10 mg by mouth every morning   gabapentin (NEURONTIN) 300 mg capsule Take 300 mg by mouth 3 (three) times a day   glucose blood (ACCU-CHEK SHANE PLUS) test strip by In Vitro route 2 (two) times a day      hydrochlorothiazide (HYDRODIURIL) 25 mg tablet Take 25 mg by mouth daily   hydrocortisone 0 5 % cream Apply topically 2 (two) times a day      Insulin Pen Needle (B-D UF III MINI PEN NEEDLES) 31G X 5 MM MISC by Does not apply route      Lancets (FREESTYLE) lancets by Does not apply route      lisinopril (ZESTRIL) 40 mg tablet Take 40 mg by mouth daily   metFORMIN (GLUCOPHAGE) 1000 MG tablet Take 1 tablet (1,000 mg total) by mouth 2 (two) times a day with meals 60 tablet 2    pantoprazole (PROTONIX) 40 mg tablet Take 40 mg by mouth daily      POLYETHYLENE GLYCOL 3350 PO Take 17 g by mouth daily as needed      simvastatin (ZOCOR) 20 mg tablet Take 20 mg by mouth daily at bedtime      traZODone (DESYREL) 50 mg tablet Take 50 mg by mouth daily at bedtime        Empagliflozin (JARDIANCE) 25 MG TABS Take 1 tablet (25 mg total) by mouth every morning 30 tablet 2 No current facility-administered medications for this visit  Current Outpatient Prescriptions on File Prior to Visit   Medication Sig    albuterol (PROVENTIL HFA,VENTOLIN HFA) 90 mcg/act inhaler Inhale 2 puffs every 4 (four) hours as needed for wheezing    amLODIPine (NORVASC) 10 mg tablet Take 10 mg by mouth daily   aspirin 325 mg tablet Take 1 tablet by mouth daily    beclomethasone (QVAR) 80 MCG/ACT inhaler Inhale 2 puffs 2 (two) times a day    busPIRone (BUSPAR) 5 mg tablet Take 5 mg by mouth 3 (three) times a day   cycloSPORINE (RESTASIS) 0 05 % ophthalmic emulsion Apply to eye    escitalopram (LEXAPRO) 10 mg tablet Take 10 mg by mouth every morning   gabapentin (NEURONTIN) 300 mg capsule Take 300 mg by mouth 3 (three) times a day   glucose blood (ACCU-CHEK SHANE PLUS) test strip by In Vitro route 2 (two) times a day    hydrochlorothiazide (HYDRODIURIL) 25 mg tablet Take 25 mg by mouth daily   Insulin Pen Needle (B-D UF III MINI PEN NEEDLES) 31G X 5 MM MISC by Does not apply route    Lancets (FREESTYLE) lancets by Does not apply route    lisinopril (ZESTRIL) 40 mg tablet Take 40 mg by mouth daily   pantoprazole (PROTONIX) 40 mg tablet Take 40 mg by mouth daily    simvastatin (ZOCOR) 20 mg tablet Take 20 mg by mouth daily at bedtime    traZODone (DESYREL) 50 mg tablet Take 50 mg by mouth daily at bedtime   [DISCONTINUED] metFORMIN (GLUCOPHAGE) 500 mg tablet Take 500 mg by mouth 2 (two) times a day with meals    [DISCONTINUED] aspirin 325 mg tablet Take 325 mg by mouth daily    [DISCONTINUED] beclomethasone (QVAR) 80 MCG/ACT inhaler Inhale 2 puffs every 4 (four) hours    [DISCONTINUED] cycloSPORINE (RESTASIS) 0 05 % ophthalmic emulsion 1 drop 2 (two) times a day   [DISCONTINUED] fluconazole (DIFLUCAN) 150 mg tablet Take 150 mg by mouth once    [DISCONTINUED] glipiZIDE-metFORMIN (METAGLIP) 5-500 MG per tablet Take 1 tablet by mouth 2 (two) times a day before meals   [DISCONTINUED] insulin detemir (LEVEMIR) 100 units/mL subcutaneous injection Inject 15 Units under the skin Medrol Dose Pack scheduling ONLY      [DISCONTINUED] insulin glargine (LANTUS) 100 units/mL subcutaneous injection Inject 30 Units under the skin daily at bedtime (Patient taking differently: Inject 34 Units under the skin daily at bedtime  )     No current facility-administered medications on file prior to visit  She is allergic to percocet [oxycodone-acetaminophen] and vicodin [hydrocodone-acetaminophen]       Review of Systems   Constitutional: Positive for fatigue  Negative for chills and fever  Eyes: Negative for blurred vision and visual disturbance  Respiratory: Negative for cough, chest tightness and shortness of breath  Cardiovascular: Negative for chest pain and palpitations  Gastrointestinal: Negative for abdominal pain, constipation, diarrhea, nausea and vomiting  Genitourinary: Negative for difficulty urinating and dysuria  Skin: Negative for rash and wound  Neurological: Negative for dizziness and numbness  Objective:      /68 (BP Location: Left arm, Patient Position: Sitting)   Ht 5' 5" (1 651 m)   Wt 98 2 kg (216 lb 6 4 oz)   BMI 36 01 kg/m²          Physical Exam   Constitutional: She is oriented to person, place, and time  She appears well-developed and well-nourished  No distress  HENT:   Head: Normocephalic and atraumatic  Right Ear: External ear normal    Nose: Nose normal    Mouth/Throat: No oropharyngeal exudate  Eyes: Conjunctivae are normal    Neck: Neck supple  No thyromegaly present  Cardiovascular: Normal rate, regular rhythm and normal heart sounds  Pulmonary/Chest: Effort normal and breath sounds normal  No respiratory distress  Musculoskeletal: Normal range of motion  Lymphadenopathy:     She has no cervical adenopathy  Neurological: She is alert and oriented to person, place, and time  Skin: Skin is warm and dry   She is not diaphoretic  Psychiatric: She has a normal mood and affect  Her behavior is normal  Judgment and thought content normal    Vitals reviewed

## 2018-03-01 NOTE — PATIENT INSTRUCTIONS
10% - bad control"> 10% - bad control,Hemoglobin A1c (HbA1c) greater than 10% indicating poor diabetic control,Haemoglobin A1c greater than 10% indicating poor diabetic control">   Diabetes Mellitus Type 2 in Adults, Ambulatory Care   GENERAL INFORMATION:   Diabetes mellitus type 2  is a disease that affects how your body uses glucose (sugar)  Insulin helps move sugar out of the blood so it can be used for energy  Normally, when the blood sugar level increases, the pancreas makes more insulin  Type 2 diabetes develops because either the body cannot make enough insulin, or it cannot use the insulin correctly  After many years, your pancreas may stop making insulin  Common symptoms include the following:   · More hunger or thirst than usual     · Frequent urination     · Weight loss without trying     · Blurred vision  Seek immediate care for the following symptoms:   · Severe abdominal pain, or pain that spreads to your back  You may also be vomiting  · Trouble staying awake or focusing    · Shaking or sweating    · Blurred or double vision    · Breath has a fruity, sweet smell    · Breathing is deep and labored, or rapid and shallow    · Heartbeat is fast and weak  Treatment for diabetes mellitus type 2  includes keeping your blood sugar at a normal level  You must eat the right foods, and exercise regularly  You may also need medicine if you cannot control your blood sugar level with nutrition and exercise  Manage diabetes mellitus type 2:   · Check your blood sugar level  You will be taught how to check a small drop of blood in a glucose monitor  Ask your healthcare provider when and how often to check during the day  Ask your healthcare provider what your blood sugar levels should be when you check them  · Keep track of carbohydrates (sugar and starchy foods)  Your blood sugar level can get too high if you eat too many carbohydrates   Your dietitian will help you plan meals and snacks that have the right amount of carbohydrates  · Eat low-fat foods  Some examples are skinless chicken and low-fat milk  · Eat less sodium (salt)  Some examples of high-sodium foods to limit are soy sauce, potato chips, and soup  Do not add salt to food you cook  Limit your use of table salt  · Eat high-fiber foods  Foods that are a good source of fiber include vegetables, whole grain bread, and beans  · Limit alcohol  Alcohol affects your blood sugar level and can make it harder to manage your diabetes  Women should limit alcohol to 1 drink a day  Men should limit alcohol to 2 drinks a day  A drink of alcohol is 12 ounces of beer, 5 ounces of wine, or 1½ ounces of liquor  · Get regular exercise  Exercise can help keep your blood sugar level steady, decrease your risk of heart disease, and help you lose weight  Exercise for at least 30 minutes, 5 days a week  Include muscle strengthening activities 2 days each week  Work with your healthcare provider to create an exercise plan  · Check your feet each day  for injuries or open sores  Ask your healthcare provider for activities you can do if you have an open sore  · Quit smoking  If you smoke, it is never too late to quit  Smoking can worsen the problems that may occur with diabetes  Ask your healthcare provider for information about how to stop smoking if you are having trouble quitting  · Ask about your weight:  Ask healthcare providers if you need to lose weight, and how much to lose  Ask them to help you with a weight loss program  Even a 10 to 15 pound weight loss can help you manage your blood sugar level  · Carry medical alert identification  Wear medical alert jewelry or carry a card that says you have diabetes  Ask your healthcare provider where to get these items  · Ask about vaccines  Diabetes puts you at risk of serious illness if you get the flu, pneumonia, or hepatitis   Ask your healthcare provider if you should get a flu, pneumonia, or hepatitis B vaccine, and when to get the vaccine  Follow up with your healthcare provider as directed:  Write down your questions so you remember to ask them during your visits  CARE AGREEMENT:   You have the right to help plan your care  Learn about your health condition and how it may be treated  Discuss treatment options with your caregivers to decide what care you want to receive  You always have the right to refuse treatment  The above information is an  only  It is not intended as medical advice for individual conditions or treatments  Talk to your doctor, nurse or pharmacist before following any medical regimen to see if it is safe and effective for you  © 2014 [address] is for End User's use only and may not be sold, redistributed or otherwise used for commercial purposes  All illustrations and images included in CareNotes® are the copyrighted property of A D A M , Inc  or Matthew Espinosa

## 2018-03-02 ENCOUNTER — TELEPHONE (OUTPATIENT)
Dept: FAMILY MEDICINE CLINIC | Facility: CLINIC | Age: 63
End: 2018-03-02

## 2018-03-02 LAB — HCV AB SER QL: NORMAL

## 2018-03-05 ENCOUNTER — CLINICAL SUPPORT (OUTPATIENT)
Dept: NUTRITION | Facility: HOSPITAL | Age: 63
End: 2018-03-05
Payer: COMMERCIAL

## 2018-03-05 VITALS — WEIGHT: 215 LBS | HEIGHT: 65 IN | BODY MASS INDEX: 35.82 KG/M2

## 2018-03-05 DIAGNOSIS — E10.8 TYPE I DIABETES MELLITUS WITH MANIFESTATIONS (HCC): ICD-10-CM

## 2018-03-05 DIAGNOSIS — A04.8 H. PYLORI INFECTION: Primary | ICD-10-CM

## 2018-03-05 PROCEDURE — 97802 MEDICAL NUTRITION INDIV IN: CPT

## 2018-03-05 RX ORDER — CLARITHROMYCIN 500 MG/1
500 TABLET, COATED ORAL EVERY 12 HOURS SCHEDULED
Qty: 28 TABLET | Refills: 0 | Status: SHIPPED | OUTPATIENT
Start: 2018-03-05 | End: 2018-03-06 | Stop reason: SDUPTHER

## 2018-03-05 RX ORDER — AMOXICILLIN 500 MG/1
1000 CAPSULE ORAL EVERY 12 HOURS SCHEDULED
Qty: 56 CAPSULE | Refills: 0 | Status: SHIPPED | OUTPATIENT
Start: 2018-03-05 | End: 2018-03-06 | Stop reason: SDUPTHER

## 2018-03-05 NOTE — PROGRESS NOTES
Initial Nutrition Assessment Form    Patient Name: Danelle Robison    YOB: 1955    Sex: Female     Assessment Date: 3/5/2018  Start Time: 11:20 Stop Time: 12:20 Total Minutes: 60     Data:  Present at session: self   Parent Concerns: Blood sugars   Medical Dx/Reason for Referral: Diabetes   Past Medical History:   Diagnosis Date    Asthma     Back problem     Depression     Diabetes mellitus ([address] )     Hyperlipidemia     Hypertension     Neuropathy     Psychiatric disorder     anxiety       Current Outpatient Prescriptions   Medication Sig Dispense Refill    albuterol (PROVENTIL HFA,VENTOLIN HFA) 90 mcg/act inhaler Inhale 2 puffs every 4 (four) hours as needed for wheezing      amLODIPine (NORVASC) 10 mg tablet Take 10 mg by mouth daily   aspirin 325 mg tablet Take 1 tablet by mouth daily      beclomethasone (QVAR) 80 MCG/ACT inhaler Inhale 2 puffs 2 (two) times a day      busPIRone (BUSPAR) 5 mg tablet Take 5 mg by mouth 3 (three) times a day   cycloSPORINE (RESTASIS) 0 05 % ophthalmic emulsion Apply to eye      Empagliflozin (JARDIANCE) 25 MG TABS Take 1 tablet (25 mg total) by mouth every morning 30 tablet 2    escitalopram (LEXAPRO) 10 mg tablet Take 10 mg by mouth every morning   gabapentin (NEURONTIN) 300 mg capsule Take 300 mg by mouth 3 (three) times a day   glucose blood (ACCU-CHEK SHANE PLUS) test strip by In Vitro route 2 (two) times a day      hydrochlorothiazide (HYDRODIURIL) 25 mg tablet Take 25 mg by mouth daily   hydrocortisone 0 5 % cream Apply topically 2 (two) times a day      Insulin Pen Needle (B-D UF III MINI PEN NEEDLES) 31G X 5 MM MISC by Does not apply route      Lancets (FREESTYLE) lancets by Does not apply route      lisinopril (ZESTRIL) 40 mg tablet Take 40 mg by mouth daily        metFORMIN (GLUCOPHAGE) 1000 MG tablet Take 1 tablet (1,000 mg total) by mouth 2 (two) times a day with meals 60 tablet 2    pantoprazole (PROTONIX) 40 mg tablet Take 40 mg by mouth daily      POLYETHYLENE GLYCOL 3350 PO Take 17 g by mouth daily as needed      simvastatin (ZOCOR) 20 mg tablet Take 20 mg by mouth daily at bedtime      traZODone (DESYREL) 50 mg tablet Take 50 mg by mouth daily at bedtime  No current facility-administered medications for this visit  Additional Meds/Supplements:    Special Learning Needs: none   Height: HC Readings from Last 3 Encounters:   No data found for [facility]       Weight: Wt Readings from Last 3 Encounters:   03/05/18 97 5 kg (215 lb)   03/01/18 98 2 kg (216 lb 6 4 oz)   01/16/18 98 9 kg (218 lb 0 6 oz)     Body mass index is 35 78 kg/m²  Recent Weight Change: [x]Yes     []No  Amount: Wt loss 41# over 2 year      Energy Needs: ~1700 calories (25 jailene/kg ABW)   Allergies   Allergen Reactions    Percocet [Oxycodone-Acetaminophen] GI Intolerance    Vicodin [Hydrocodone-Acetaminophen] GI Intolerance       History   Alcohol Use No       History   Smoking Status    Never Smoker   Smokeless Tobacco    Never Used       Who shops? patient   Who cooks? spouse   Exercise: Not currently but patient has stationary bike   Prior Counseling? []Yes     [x]No  When:    Why:         Diet Hx:  Breakfast: 7:00 a m  Non-sweetened cereal with 1 tsp sugar, lactaid milk, coffee or tea with 1 tsp sugar or eggs and large bagel or oatmeal; occasionally french toast or pancakes   Lunch: 12-1 p m  [place] or breaded chicken thomas on bagel, regular jello, regular ice tea and lots of it         Dinner: 7:00-8:00  p m    Cooked meal which includes meat, starch and vegetable; large serving of starch plus starchy beans, green tea, regular jello         Snacks:   PM - chips or donut or fruit          Nutrition Diagnosis:   Excess carbohydrate intake  related to Physiological causes requiring modified carbohydrate intake (i e  diabetes mellitus) as  evidenced by Hemoglobin A1C >6%       Medical Nutrition Therapy Intervention:  [x]Individualized Meal Plan [x]Understanding Lab Values   []Basic Pathophysiology of Disease []Food/Medication Interactions   []Food Diary [x]Exercise   []Lifestyle/Behavior Modification Techniques []Medication, Mechanism of Action   [x]Label Reading [x]Self Blood Glucose Monitoring   [x]Weight/BMI Goals []Other -    Other Notes: Taking medication as prescribed; currently not taking lantus due to insurance coverage; metformin increased per patient       Comprehension: []Excellent  []Very Good  [x]Good  []Fair   []Poor    Receptivity: []Excellent  []Very Good  [x]Good  []Fair   []Poor    Expected Compliance: []Excellent  []Very Good  [x]Good  []Fair   []Poor        Goals:  1  Improve A1c level by 1 point within 6 months; currently 13 6%   2   Carb counting with goal intake <213 gm daily; measure portions for accuracy   3  Exercise 150 minutes week       Follow up scheduled;   April 2, 2018  Labs:  [facility]  Lab Results   Component Value Date     02/21/2018    K 3 8 02/21/2018     02/21/2018    CO2 29 02/21/2018    ANIONGAP 8 02/21/2018    BUN 10 02/21/2018    CREATININE 0 94 02/21/2018    GLUCOSE 275 (H) 07/28/2017    GLUF 322 (H) 02/21/2018    CALCIUM 9 1 02/21/2018    AST 15 02/21/2018    ALT 20 02/21/2018    ALKPHOS 87 02/21/2018    PROT 8 3 (H) 02/21/2018    BILITOT 0 55 02/21/2018    EGFR 75 02/21/2018       BMP  Lab Results   Component Value Date    GLUCOSE 275 (H) 07/28/2017    CALCIUM 9 1 02/21/2018     02/21/2018    K 3 8 02/21/2018    CO2 29 02/21/2018     02/21/2018    BUN 10 02/21/2018    CREATININE 0 94 02/21/2018       Lipids  Lab Results   Component Value Date    CHOL 176 02/21/2018    CHOL 162 07/28/2017    CHOL 212 (H) 04/11/2017     Lab Results   Component Value Date    HDL 44 02/21/2018    HDL 40 07/28/2017    HDL 42 04/11/2017     Lab Results   Component Value Date    LDLCALC 117 (H) 02/21/2018    LDLCALC 99 07/28/2017    LDLCALC 149 (H) 04/11/2017     Lab Results   Component Value Date    TRIG 74 02/21/2018    TRIG 113 07/28/2017    TRIG 106 04/11/2017     No components found for: CHOLHDL    Hemoglobin A1C  Lab Results   Component Value Date    HGBA1C 13 6 (H) 02/21/2018       Fasting Glucose  Lab Results   Component Value Date    GLUF 322 (H) 02/21/2018       Insulin     Thyroid  No results found for: TSH, G5ONDJI, N2NKGDI, THYROIDAB    Hepatic Function Panel  Lab Results   Component Value Date    ALT 20 02/21/2018    AST 15 02/21/2018    ALKPHOS 87 02/21/2018    BILITOT 0 55 02/21/2018       Celiac Disease Antibody Panel  No results found for: ENDOMYSIAL IGA, GLIADIN IGA, GLIADIN IGG, IGA, TISSUE TRANSGLUT AB, TTG IGA   Iron  No results found for: IRON, TIBC, FERRITIN    Vitamins  No results found for: VITAMIN B2   No results found for: NICOTINAMIDE, NICOTINIC ACID   No results found for: VITAMINB6  No results found for: [id number]  No results found for: VITB5  No results found for: U1QUECZA  No results found for: THYROGLB  No results found for: VITAMIN K   No results found for: 25-HYDROXY VIT D   No results found for: [facility] MA,RD,LDN,CDE  [address] [address]

## 2018-03-06 DIAGNOSIS — E11.8 TYPE 2 DIABETES MELLITUS WITH COMPLICATION, UNSPECIFIED LONG TERM INSULIN USE STATUS: Primary | ICD-10-CM

## 2018-03-06 DIAGNOSIS — A04.8 H. PYLORI INFECTION: ICD-10-CM

## 2018-03-06 RX ORDER — CLARITHROMYCIN 500 MG/1
500 TABLET, COATED ORAL EVERY 12 HOURS SCHEDULED
Qty: 28 TABLET | Refills: 0 | Status: SHIPPED | OUTPATIENT
Start: 2018-03-06 | End: 2018-03-20

## 2018-03-06 RX ORDER — AMOXICILLIN 500 MG/1
1000 CAPSULE ORAL EVERY 12 HOURS SCHEDULED
Qty: 56 CAPSULE | Refills: 0 | Status: SHIPPED | OUTPATIENT
Start: 2018-03-06 | End: 2018-03-20

## 2018-03-06 RX ORDER — PANTOPRAZOLE SODIUM 40 MG/1
40 TABLET, DELAYED RELEASE ORAL DAILY
Qty: 30 TABLET | Refills: 0 | Status: SHIPPED | OUTPATIENT
Start: 2018-03-06 | End: 2018-12-19 | Stop reason: SDUPTHER

## 2018-03-13 DIAGNOSIS — F32.4 MAJOR DEPRESSIVE DISORDER WITH SINGLE EPISODE, IN PARTIAL REMISSION (HCC): Primary | ICD-10-CM

## 2018-03-13 RX ORDER — ESCITALOPRAM OXALATE 10 MG/1
10 TABLET ORAL EVERY MORNING
Qty: 30 TABLET | Refills: 2 | Status: SHIPPED | OUTPATIENT
Start: 2018-03-13 | End: 2018-12-06 | Stop reason: SDUPTHER

## 2018-04-06 DIAGNOSIS — Z79.4 TYPE 2 DIABETES MELLITUS WITHOUT COMPLICATION, WITH LONG-TERM CURRENT USE OF INSULIN (HCC): Primary | ICD-10-CM

## 2018-04-06 DIAGNOSIS — E11.9 TYPE 2 DIABETES MELLITUS WITHOUT COMPLICATION, WITH LONG-TERM CURRENT USE OF INSULIN (HCC): Primary | ICD-10-CM

## 2018-04-06 RX ORDER — INSULIN GLARGINE 100 [IU]/ML
INJECTION, SOLUTION SUBCUTANEOUS
Qty: 15 ML | Refills: 3 | Status: SHIPPED | OUTPATIENT
Start: 2018-04-06 | End: 2018-08-17 | Stop reason: SDUPTHER

## 2018-04-13 ENCOUNTER — CLINICAL SUPPORT (OUTPATIENT)
Dept: NUTRITION | Facility: HOSPITAL | Age: 63
End: 2018-04-13
Payer: COMMERCIAL

## 2018-04-13 DIAGNOSIS — E10.8 TYPE I DIABETES MELLITUS WITH MANIFESTATIONS (HCC): ICD-10-CM

## 2018-04-13 PROCEDURE — S9470 NUTRITIONAL COUNSELING, DIET: HCPCS

## 2018-04-13 NOTE — PROGRESS NOTES
Follow-Up Nutrition Assessment Form    Patient Name: Jesus Rodriguez    YOB: 1955    Sex: Female      Follow Up Date: 4/13/2018  Start Time: 11:10 Stop Time: 12:10 Total Minutes: 60 minutes     Data:  Present at session: Self/   Parent Concerns: Blood sugars/heart disease   Medical Dx/Reason for Referral: Diabetes   Past Medical History:   Diagnosis Date    Asthma     Back problem     Depression     Diabetes mellitus ([address] )     Hyperlipidemia     Hypertension     Neuropathy     Psychiatric disorder     anxiety       Current Outpatient Prescriptions   Medication Sig Dispense Refill    albuterol (PROVENTIL HFA,VENTOLIN HFA) 90 mcg/act inhaler Inhale 2 puffs every 4 (four) hours as needed for wheezing      amLODIPine (NORVASC) 10 mg tablet Take 10 mg by mouth daily   aspirin 325 mg tablet Take 1 tablet by mouth daily      beclomethasone (QVAR) 80 MCG/ACT inhaler Inhale 2 puffs 2 (two) times a day      busPIRone (BUSPAR) 5 mg tablet Take 5 mg by mouth 3 (three) times a day   cycloSPORINE (RESTASIS) 0 05 % ophthalmic emulsion Apply to eye      Empagliflozin (JARDIANCE) 25 MG TABS Take 1 tablet (25 mg total) by mouth every morning 30 tablet 2    escitalopram (LEXAPRO) 10 mg tablet Take 1 tablet (10 mg total) by mouth every morning 30 tablet 2    gabapentin (NEURONTIN) 300 mg capsule Take 300 mg by mouth 3 (three) times a day   glucose blood (ACCU-CHEK SHANE PLUS) test strip by In Vitro route 2 (two) times a day      hydrochlorothiazide (HYDRODIURIL) 25 mg tablet Take 25 mg by mouth daily   hydrocortisone 0 5 % cream Apply topically 2 (two) times a day      Insulin Pen Needle (B-D UF III MINI PEN NEEDLES) 31G X 5 MM MISC by Does not apply route      Lancets (FREESTYLE) lancets by Does not apply route      LANTUS SOLOSTAR injection pen 100 units/mL INJECT 20 UNITS DAILY 15 mL 3    lisinopril (ZESTRIL) 40 mg tablet Take 40 mg by mouth daily        metFORMIN (GLUCOPHAGE) 1000 MG tablet Take 1 tablet (1,000 mg total) by mouth 2 (two) times a day with meals 60 tablet 2    pantoprazole (PROTONIX) 40 mg tablet Take 1 tablet (40 mg total) by mouth daily 30 tablet 0    POLYETHYLENE GLYCOL 3350 PO Take 17 g by mouth daily as needed      simvastatin (ZOCOR) 20 mg tablet Take 20 mg by mouth daily at bedtime      traZODone (DESYREL) 50 mg tablet Take 50 mg by mouth daily at bedtime  No current facility-administered medications for this visit  Additional Meds/Supplements:    Barriers to Learning: None   Labs:    Height: Ht Readings from Last 3 Encounters:   03/05/18 5' 5" (1 651 m)   03/01/18 5' 5" (1 651 m)   01/16/18 5' 5" (1 651 m)      Weight: Wt Readings from Last 3 Encounters:   03/05/18 97 5 kg (215 lb)   03/01/18 98 2 kg (216 lb 6 4 oz)   01/16/18 98 9 kg (218 lb 0 6 oz)     There is no height or weight on file to calculate BMI  Wt  Change Since Last Visit: []Yes     []No  Amount:       Energy Needs:    Pain Screen: Are you having pain now? No      Goals Achieved:  1  Blood sugars improved per SMBG  2  Carb intake within goal range 213 gm daily  3  Gave up sugary drinks       New Goals:   1  Exercise 150 minute week   2  Test Blood sugar daily   3  Start insulin as prescribed       Initial PES: Excess carbohydrate intake  related to Physiological causes requiring modified carbohydrate intake (i e  diabetes mellitus) as  evidenced by Hemoglobin A1C >6%      New PES: No Change      New Problem List:  1  Confusion with prescribed meds   2    3        Assessment:  Patient seen for f/u visit for DM  She is seeing improvement in blood sugars with dietary changes  She is feeling better but mentioned she is having some stomach issues  She believes it is r/t metformin however recently treated for H pylori which can be contributing to the symptoms of gas and diarrhea  Patient to discuss with physician       Medical Nutrition Therapy Intervention:  [x]Individualized Meal Plan []Understanding Lab Values   []Basic Pathophysiology of Disease []Food/Medication Interactions   []Food Diary []Exercise   []Lifestyle/Behavior Modification Techniques [x]Medication, Mechanism of Action   [x]Label Reading []Self Blood Glucose Monitoring   []Weight/BMI Goals []Other -    Other Notes:        Comprehension: []Excellent  []Very Good  [x]Good  []Fair   []Poor    Receptivity: []Excellent  []Very Good  [x]Good  []Fair   []Poor    Expected Compliance: []Excellent  []Very Good  [x]Good  []Fair   []Poor      Labs:  CMP  Lab Results   Component Value Date     02/21/2018    K 3 8 02/21/2018     02/21/2018    CO2 29 02/21/2018    ANIONGAP 8 02/21/2018    BUN 10 02/21/2018    CREATININE 0 94 02/21/2018    GLUCOSE 275 (H) 07/28/2017    GLUF 322 (H) 02/21/2018    CALCIUM 9 1 02/21/2018    AST 15 02/21/2018    ALT 20 02/21/2018    ALKPHOS 87 02/21/2018    PROT 8 3 (H) 02/21/2018    BILITOT 0 55 02/21/2018    EGFR 75 02/21/2018       BMP  Lab Results   Component Value Date    GLUCOSE 275 (H) 07/28/2017    CALCIUM 9 1 02/21/2018     02/21/2018    K 3 8 02/21/2018    CO2 29 02/21/2018     02/21/2018    BUN 10 02/21/2018    CREATININE 0 94 02/21/2018       Lipids  Lab Results   Component Value Date    CHOL 176 02/21/2018    CHOL 162 07/28/2017    CHOL 212 (H) 04/11/2017     Lab Results   Component Value Date    HDL 44 02/21/2018    HDL 40 07/28/2017    HDL 42 04/11/2017     Lab Results   Component Value Date    LDLCALC 117 (H) 02/21/2018    LDLCALC 99 07/28/2017    LDLCALC 149 (H) 04/11/2017     Lab Results   Component Value Date    TRIG 74 02/21/2018    TRIG 113 07/28/2017    TRIG 106 04/11/2017     No components found for: CHOLHDL    Hemoglobin A1C  Lab Results   Component Value Date    HGBA1C 13 6 (H) 02/21/2018       Fasting Glucose  Lab Results   Component Value Date    GLUF 322 (H) 02/21/2018       Insulin     Thyroid  No results found for: TSH, Татьяна Kiran    Hepatic Function Panel  Lab Results   Component Value Date    ALT 20 02/21/2018    AST 15 02/21/2018    ALKPHOS 87 02/21/2018    BILITOT 0 55 02/21/2018       Celiac Disease Antibody Panel  No results found for: ENDOMYSIAL IGA, GLIADIN IGA, GLIADIN IGG, IGA, TISSUE TRANSGLUT AB, TTG IGA   Iron  No results found for: IRON, TIBC, FERRITIN    Vitamins  No results found for: VITAMIN B2   No results found for: NICOTINAMIDE, NICOTINIC ACID   No results found for: VITAMINB6  No results found for: [id number]  No results found for: VITB5  No results found for: [id number]  No results found for: THYROGLB  No results found for: VITAMIN K   No results found for: 25-HYDROXY VIT D   No results found for: VITAMINE     Follow up appointment:  May 11, 2018    Tulio Joyner MA,RD,LDN,CDE  [address] [facility]  [address] [facility] [address]

## 2018-04-25 DIAGNOSIS — E78.5 HYPERLIPIDEMIA, UNSPECIFIED HYPERLIPIDEMIA TYPE: Primary | ICD-10-CM

## 2018-04-25 RX ORDER — SIMVASTATIN 20 MG
20 TABLET ORAL
Qty: 30 TABLET | Refills: 3 | Status: SHIPPED | OUTPATIENT
Start: 2018-04-25 | End: 2019-05-16 | Stop reason: SDUPTHER

## 2018-05-01 ENCOUNTER — TRANSCRIBE ORDERS (OUTPATIENT)
Dept: ADMINISTRATIVE | Facility: HOSPITAL | Age: 63
End: 2018-05-01

## 2018-05-01 DIAGNOSIS — Z12.31 ENCOUNTER FOR SCREENING MAMMOGRAM FOR MALIGNANT NEOPLASM OF BREAST: Primary | ICD-10-CM

## 2018-05-03 ENCOUNTER — OFFICE VISIT (OUTPATIENT)
Dept: URGENT CARE | Facility: CLINIC | Age: 63
End: 2018-05-03
Payer: COMMERCIAL

## 2018-05-03 VITALS
BODY MASS INDEX: 35.99 KG/M2 | WEIGHT: 216 LBS | HEART RATE: 70 BPM | DIASTOLIC BLOOD PRESSURE: 80 MMHG | SYSTOLIC BLOOD PRESSURE: 132 MMHG | TEMPERATURE: 99.2 F | HEIGHT: 65 IN | OXYGEN SATURATION: 97 % | RESPIRATION RATE: 18 BRPM

## 2018-05-03 DIAGNOSIS — K08.89 PAIN, DENTAL: ICD-10-CM

## 2018-05-03 DIAGNOSIS — J01.90 ACUTE SINUSITIS, RECURRENCE NOT SPECIFIED, UNSPECIFIED LOCATION: Primary | ICD-10-CM

## 2018-05-03 PROCEDURE — 99283 EMERGENCY DEPT VISIT LOW MDM: CPT | Performed by: PHYSICIAN ASSISTANT

## 2018-05-03 PROCEDURE — G0382 LEV 3 HOSP TYPE B ED VISIT: HCPCS | Performed by: PHYSICIAN ASSISTANT

## 2018-05-03 RX ORDER — AMOXICILLIN AND CLAVULANATE POTASSIUM 875; 125 MG/1; MG/1
1 TABLET, FILM COATED ORAL EVERY 12 HOURS SCHEDULED
Qty: 20 TABLET | Refills: 0 | Status: SHIPPED | OUTPATIENT
Start: 2018-05-03 | End: 2018-05-13

## 2018-05-03 NOTE — PROGRESS NOTES
[address] [facility]  (office) [phone]  (fax) [phone]        NAME: Fiona Coles is a 58 y o  female  : 1955    MRN: [id number]  DATE: May 3, 2018  TIME: 4:47 PM    Assessment and Plan   Acute sinusitis, recurrence not specified, unspecified location [J01 90]  1  Acute sinusitis, recurrence not specified, unspecified location  amoxicillin-clavulanate (AUGMENTIN) 875-125 mg per tablet   2  Pain, dental         Patient Instructions   I have prescribed an antibiotic for the infection  Please take the antibiotic as prescribed and finish the entire prescription  I recommend that the patient takes an over the counter probiotic or eats yogurt with live cultures in it [place]) to keep good bacteria in the gut and help prevent diarrhea  Wash hands frequently to prevent the spread of infection  Can use over the counter cough and cold medications to help with symptoms  Ibuprofen and/or tylenol as needed for pain or fever  If not improving over the next 3-5 days, follow up with PCP  To follow up with dentist for ongoing dental pain  To present to the ER if symptoms worsen  Chief Complaint     Chief Complaint   Patient presents with    Earache     bilateral x 1 week    Dental Pain     R lower tooth pain [facility] L[facility]         History of Present Illness   Fiona Coles presents to the clinic c/o    In process of trying to find a dentist       Dental Pain    This is a new problem  The current episode started in the past 7 days  The problem occurs constantly  The problem has been unchanged  The pain is mild  Associated symptoms include sinus pressure  Pertinent negatives include no fever or oral bleeding  She has tried NSAIDs for the symptoms  The treatment provided mild relief  Sinusitis   This is a new problem  The current episode started in the past 7 days  The problem has been gradually worsening since onset  There has been no fever  The pain is moderate  Associated symptoms include congestion, coughing (occassional), ear pain, sinus pressure and a sore throat  Pertinent negatives include no chills, diaphoresis, headaches, hoarse voice, neck pain, shortness of breath, sneezing or swollen glands  Past treatments include nothing  The treatment provided no relief  Review of Systems   Review of Systems   Constitutional: Negative for activity change, appetite change, chills, diaphoresis, fatigue and fever  HENT: Positive for congestion, dental problem, ear pain, sinus pressure and sore throat  Negative for ear discharge, facial swelling, hoarse voice, rhinorrhea, sinus pain and sneezing  Eyes: Negative for photophobia, pain, discharge, redness, itching and visual disturbance  Respiratory: Positive for cough (occassional)  Negative for apnea, chest tightness, shortness of breath and wheezing  Cardiovascular: Negative for chest pain  Gastrointestinal: Negative for abdominal distention, abdominal pain, anal bleeding, blood in stool, constipation, diarrhea, nausea and vomiting  Genitourinary: Negative for dysuria, flank pain, frequency, hematuria and urgency  Musculoskeletal: Negative for arthralgias, back pain, gait problem, joint swelling, myalgias, neck pain and neck stiffness  Skin: Negative for color change, rash and wound  Allergic/Immunologic: Negative for immunocompromised state  Neurological: Negative for dizziness, facial asymmetry and headaches  Hematological: Negative for adenopathy  Psychiatric/Behavioral: Negative for confusion and decreased concentration           Current Medications     Long-Term Prescriptions   Medication Sig Dispense Refill    beclomethasone (QVAR) 80 MCG/ACT inhaler Inhale 2 puffs 2 (two) times a day      escitalopram (LEXAPRO) 10 mg tablet Take 1 tablet (10 mg total) by mouth every morning 30 tablet 2    hydrocortisone 0 5 % cream Apply topically 2 (two) times a day      Insulin Pen Needle (B-D UF III MINI PEN NEEDLES) 31G X 5 MM MISC by Does not apply route      Lancets (FREESTYLE) lancets by Does not apply route      LANTUS SOLOSTAR injection pen 100 units/mL INJECT 20 UNITS DAILY 15 mL 3    metFORMIN (GLUCOPHAGE) 1000 MG tablet Take 1 tablet (1,000 mg total) by mouth 2 (two) times a day with meals 60 tablet 2    pantoprazole (PROTONIX) 40 mg tablet Take 1 tablet (40 mg total) by mouth daily 30 tablet 0    simvastatin (ZOCOR) 20 mg tablet Take 1 tablet (20 mg total) by mouth daily at bedtime 30 tablet 3       Current Allergies     Allergies as of 05/03/2018 - Reviewed 05/03/2018   Allergen Reaction Noted    Percocet [oxycodone-acetaminophen] GI Intolerance 09/06/2016    Vicodin [hydrocodone-acetaminophen] GI Intolerance 09/06/2016            The following portions of the patient's history were reviewed and updated as appropriate: allergies, current medications, past family history, past medical history, past social history, past surgical history and problem list   Past Medical History:   Diagnosis Date    Asthma     Back problem     Depression     Diabetes mellitus ([address] )     Hyperlipidemia     Hypertension     Neuropathy     Psychiatric disorder     anxiety     Past Surgical History:   Procedure Laterality Date    ARM WOUND REPAIR / CLOSURE      CATARACT EXTRACTION      CYST REMOVAL      right upper arm   EYE SURGERY      cataract removaql    ND ESOPHAGOGASTRODUODENOSCOPY TRANSORAL DIAGNOSTIC N/A 1/5/2018    Procedure: ESOPHAGOGASTRODUODENOSCOPY (EGD); Surgeon: Caryn Cannon DO;  Location: [facility];  Service: Gastroenterology    TUBAL LIGATION       Social History     Social History    Marital status: /Civil Union     Spouse name: N/A    Number of children: N/A    Years of education: N/A     Occupational History    Not on file       Social History Main Topics    Smoking status: Never Smoker    Smokeless tobacco: Never Used    Alcohol use No    Drug use: No    Sexual activity: Not on file     Other Topics Concern    Not on file     Social History Narrative    No narrative on file       Objective   /80 (BP Location: Right arm, Patient Position: Sitting, Cuff Size: Large)   Pulse 70   Temp 99 2 °F (37 3 °C) (Tympanic)   Resp 18   Ht 5' 5" (1 651 m)   Wt 98 kg (216 lb)   SpO2 97%   BMI 35 94 kg/m²      Physical Exam     Physical Exam   Constitutional: She is oriented to person, place, and time  She appears well-developed and well-nourished  No distress  HENT:   Head: Normocephalic and atraumatic  Right Ear: Tympanic membrane and external ear normal    Left Ear: Tympanic membrane and external ear normal    Nose: Mucosal edema present  Right sinus exhibits maxillary sinus tenderness  Right sinus exhibits no frontal sinus tenderness  Left sinus exhibits maxillary sinus tenderness  Left sinus exhibits no frontal sinus tenderness  Mouth/Throat: Oropharynx is clear and moist  No oropharyngeal exudate or posterior oropharyngeal erythema  Poor dentition, multiple missing teeth, no abscess visualized, no halitosis, no gingivitis   Eyes: Conjunctivae and EOM are normal  Pupils are equal, round, and reactive to light  Right eye exhibits no discharge  Left eye exhibits no discharge  No scleral icterus  Neck: Normal range of motion  Neck supple  No JVD present  No tracheal deviation present  No thyromegaly present  Cardiovascular: Normal rate, regular rhythm and normal heart sounds  Exam reveals no gallop and no friction rub  No murmur heard  Pulmonary/Chest: Effort normal and breath sounds normal  No stridor  No respiratory distress  She has no wheezes  She has no rales  She exhibits no tenderness  Abdominal: Soft  Bowel sounds are normal  She exhibits no distension and no mass  There is no tenderness  There is no rebound and no guarding  Musculoskeletal: Normal range of motion  She exhibits no tenderness or deformity     Lymphadenopathy: She has no cervical adenopathy  Neurological: She is alert and oriented to person, place, and time  She has normal reflexes  Coordination normal    Skin: Skin is warm and dry  No rash noted  She is not diaphoretic  No erythema  No pallor  Psychiatric: She has a normal mood and affect  Her behavior is normal  Judgment and thought content normal    Nursing note and vitals reviewed        Gin Reynoso PA-C

## 2018-07-25 DIAGNOSIS — I10 ESSENTIAL HYPERTENSION: Primary | ICD-10-CM

## 2018-07-26 PROCEDURE — 4010F ACE/ARB THERAPY RXD/TAKEN: CPT | Performed by: PHYSICIAN ASSISTANT

## 2018-07-26 RX ORDER — LISINOPRIL 40 MG/1
TABLET ORAL
Qty: 30 TABLET | Refills: 5 | Status: SHIPPED | OUTPATIENT
Start: 2018-07-26 | End: 2019-03-29

## 2018-07-26 RX ORDER — AMLODIPINE BESYLATE 10 MG/1
TABLET ORAL
Qty: 30 TABLET | Refills: 5 | Status: SHIPPED | OUTPATIENT
Start: 2018-07-26 | End: 2019-03-29

## 2018-08-14 ENCOUNTER — DOCTOR'S OFFICE (OUTPATIENT)
Dept: URBAN - NONMETROPOLITAN AREA CLINIC 1 | Facility: CLINIC | Age: 63
Setting detail: OPHTHALMOLOGY
End: 2018-08-14
Payer: COMMERCIAL

## 2018-08-14 DIAGNOSIS — Z96.1: ICD-10-CM

## 2018-08-14 DIAGNOSIS — E11.9: ICD-10-CM

## 2018-08-14 DIAGNOSIS — H02.844: ICD-10-CM

## 2018-08-14 DIAGNOSIS — H05.20: ICD-10-CM

## 2018-08-14 DIAGNOSIS — H04.123: ICD-10-CM

## 2018-08-14 DIAGNOSIS — H40.013: ICD-10-CM

## 2018-08-14 DIAGNOSIS — H26.493: ICD-10-CM

## 2018-08-14 LAB
LEFT EYE DIABETIC RETINOPATHY: NORMAL
RIGHT EYE DIABETIC RETINOPATHY: NORMAL

## 2018-08-14 PROCEDURE — 92250 FUNDUS PHOTOGRAPHY W/I&R: CPT | Performed by: OPHTHALMOLOGY

## 2018-08-14 PROCEDURE — 92014 COMPRE OPH EXAM EST PT 1/>: CPT | Performed by: OPHTHALMOLOGY

## 2018-08-14 PROCEDURE — 3072F LOW RISK FOR RETINOPATHY: CPT | Performed by: PHYSICIAN ASSISTANT

## 2018-08-14 ASSESSMENT — REFRACTION_MANIFEST
OS_VA3: 20/
OD_VA3: 20/
OS_VA1: 20/
OD_VA2: 20/
OU_VA: 20/
OS_VA1: 20/
OD_VA3: 20/
OS_VA3: 20/
OD_VA2: 20/
OU_VA: 20/
OU_VA: 20/
OD_VA1: 20/
OS_VA2: 20/
OS_VA1: 20/
OD_VA1: 20/
OD_VA3: 20/
OS_VA3: 20/
OS_VA2: 20/
OS_VA2: 20/
OD_VA2: 20/
OD_VA1: 20/

## 2018-08-14 ASSESSMENT — REFRACTION_CURRENTRX
OD_OVR_VA: 20/
OS_OVR_VA: 20/
OS_OVR_VA: 20/
OD_OVR_VA: 20/
OS_OVR_VA: 20/
OD_OVR_VA: 20/

## 2018-08-14 ASSESSMENT — REFRACTION_AUTOREFRACTION
OD_AXIS: 021
OD_CYLINDER: -0.75
OS_SPHERE: +0.75
OD_SPHERE: +0.25
OS_CYLINDER: -1.25
OS_AXIS: 070

## 2018-08-14 ASSESSMENT — VISUAL ACUITY
OS_BCVA: 20/25-1
OD_BCVA: 20/40+2

## 2018-08-14 ASSESSMENT — CONFRONTATIONAL VISUAL FIELD TEST (CVF)
OD_FINDINGS: FULL
OS_FINDINGS: FULL

## 2018-08-14 ASSESSMENT — SPHEQUIV_DERIVED
OD_SPHEQUIV: -0.125
OS_SPHEQUIV: 0.125

## 2018-08-14 ASSESSMENT — LID EXAM ASSESSMENTS: OS_EDEMA: LUL

## 2018-08-14 ASSESSMENT — SUPERFICIAL PUNCTATE KERATITIS (SPK)
OS_SPK: T
OD_SPK: 1+ 2+

## 2018-08-16 ENCOUNTER — APPOINTMENT (OUTPATIENT)
Dept: LAB | Facility: MEDICAL CENTER | Age: 63
End: 2018-08-16
Payer: COMMERCIAL

## 2018-08-16 ENCOUNTER — TRANSCRIBE ORDERS (OUTPATIENT)
Dept: LAB | Facility: MEDICAL CENTER | Age: 63
End: 2018-08-16

## 2018-08-16 ENCOUNTER — OFFICE VISIT (OUTPATIENT)
Dept: FAMILY MEDICINE CLINIC | Facility: CLINIC | Age: 63
End: 2018-08-16
Payer: COMMERCIAL

## 2018-08-16 VITALS
DIASTOLIC BLOOD PRESSURE: 78 MMHG | HEIGHT: 65 IN | WEIGHT: 214 LBS | SYSTOLIC BLOOD PRESSURE: 110 MMHG | BODY MASS INDEX: 35.65 KG/M2

## 2018-08-16 DIAGNOSIS — Z12.12 SCREENING FOR COLORECTAL CANCER: ICD-10-CM

## 2018-08-16 DIAGNOSIS — B37.3 VAGINAL YEAST INFECTION: ICD-10-CM

## 2018-08-16 DIAGNOSIS — Z23 NEED FOR INFLUENZA VACCINATION: ICD-10-CM

## 2018-08-16 DIAGNOSIS — E03.9 ACQUIRED HYPOTHYROIDISM: ICD-10-CM

## 2018-08-16 DIAGNOSIS — I10 ESSENTIAL HYPERTENSION: ICD-10-CM

## 2018-08-16 DIAGNOSIS — E11.9 TYPE 2 DIABETES MELLITUS WITHOUT COMPLICATION, WITH LONG-TERM CURRENT USE OF INSULIN (HCC): ICD-10-CM

## 2018-08-16 DIAGNOSIS — E78.2 MIXED HYPERLIPIDEMIA: ICD-10-CM

## 2018-08-16 DIAGNOSIS — J45.30 MILD PERSISTENT ASTHMA WITHOUT COMPLICATION: ICD-10-CM

## 2018-08-16 DIAGNOSIS — Z12.11 SCREENING FOR COLORECTAL CANCER: ICD-10-CM

## 2018-08-16 DIAGNOSIS — Z79.4 TYPE 2 DIABETES MELLITUS WITHOUT COMPLICATION, WITH LONG-TERM CURRENT USE OF INSULIN (HCC): ICD-10-CM

## 2018-08-16 DIAGNOSIS — H05.20 PROPTOSIS: ICD-10-CM

## 2018-08-16 DIAGNOSIS — Z23 NEED FOR PNEUMOCOCCAL VACCINATION: Primary | ICD-10-CM

## 2018-08-16 DIAGNOSIS — H05.20 PROPTOSIS: Primary | ICD-10-CM

## 2018-08-16 LAB
ANION GAP SERPL CALCULATED.3IONS-SCNC: 8 MMOL/L (ref 4–13)
BUN SERPL-MCNC: 13 MG/DL (ref 5–25)
CALCIUM SERPL-MCNC: 9.3 MG/DL (ref 8.3–10.1)
CHLORIDE SERPL-SCNC: 99 MMOL/L (ref 100–108)
CO2 SERPL-SCNC: 29 MMOL/L (ref 21–32)
CREAT SERPL-MCNC: 0.98 MG/DL (ref 0.6–1.3)
CREAT UR-MCNC: 455 MG/DL
EST. AVERAGE GLUCOSE BLD GHB EST-MCNC: 272 MG/DL
GFR SERPL CREATININE-BSD FRML MDRD: 71 ML/MIN/1.73SQ M
GLUCOSE P FAST SERPL-MCNC: 239 MG/DL (ref 65–99)
HBA1C MFR BLD: 11.1 % (ref 4.2–6.3)
MICROALBUMIN UR-MCNC: 1970 MG/L (ref 0–20)
MICROALBUMIN/CREAT 24H UR: 433 MG/G CREATININE (ref 0–30)
POTASSIUM SERPL-SCNC: 3.5 MMOL/L (ref 3.5–5.3)
SODIUM SERPL-SCNC: 136 MMOL/L (ref 136–145)
T3 SERPL-MCNC: 1 NG/ML (ref 0.6–1.8)
T4 SERPL-MCNC: 10.4 UG/DL (ref 4.7–13.3)
TSH SERPL DL<=0.05 MIU/L-ACNC: 1.35 UIU/ML (ref 0.36–3.74)

## 2018-08-16 PROCEDURE — 3008F BODY MASS INDEX DOCD: CPT | Performed by: FAMILY MEDICINE

## 2018-08-16 PROCEDURE — 90471 IMMUNIZATION ADMIN: CPT | Performed by: FAMILY MEDICINE

## 2018-08-16 PROCEDURE — 3062F POS MACROALBUMINURIA REV: CPT | Performed by: PHYSICIAN ASSISTANT

## 2018-08-16 PROCEDURE — 90682 RIV4 VACC RECOMBINANT DNA IM: CPT | Performed by: FAMILY MEDICINE

## 2018-08-16 PROCEDURE — 3046F HEMOGLOBIN A1C LEVEL >9.0%: CPT | Performed by: FAMILY MEDICINE

## 2018-08-16 PROCEDURE — 82043 UR ALBUMIN QUANTITATIVE: CPT | Performed by: FAMILY MEDICINE

## 2018-08-16 PROCEDURE — 3074F SYST BP LT 130 MM HG: CPT | Performed by: FAMILY MEDICINE

## 2018-08-16 PROCEDURE — 84436 ASSAY OF TOTAL THYROXINE: CPT

## 2018-08-16 PROCEDURE — 84443 ASSAY THYROID STIM HORMONE: CPT | Performed by: FAMILY MEDICINE

## 2018-08-16 PROCEDURE — 90472 IMMUNIZATION ADMIN EACH ADD: CPT | Performed by: FAMILY MEDICINE

## 2018-08-16 PROCEDURE — 82570 ASSAY OF URINE CREATININE: CPT | Performed by: FAMILY MEDICINE

## 2018-08-16 PROCEDURE — 83036 HEMOGLOBIN GLYCOSYLATED A1C: CPT | Performed by: FAMILY MEDICINE

## 2018-08-16 PROCEDURE — 99214 OFFICE O/P EST MOD 30 MIN: CPT | Performed by: FAMILY MEDICINE

## 2018-08-16 PROCEDURE — 36415 COLL VENOUS BLD VENIPUNCTURE: CPT | Performed by: FAMILY MEDICINE

## 2018-08-16 PROCEDURE — 3078F DIAST BP <80 MM HG: CPT | Performed by: FAMILY MEDICINE

## 2018-08-16 PROCEDURE — 84480 ASSAY TRIIODOTHYRONINE (T3): CPT

## 2018-08-16 PROCEDURE — 90670 PCV13 VACCINE IM: CPT | Performed by: FAMILY MEDICINE

## 2018-08-16 PROCEDURE — 80048 BASIC METABOLIC PNL TOTAL CA: CPT | Performed by: FAMILY MEDICINE

## 2018-08-16 RX ORDER — HYDROCHLOROTHIAZIDE 25 MG/1
25 TABLET ORAL DAILY
Qty: 30 TABLET | Refills: 5 | Status: SHIPPED | OUTPATIENT
Start: 2018-08-16 | End: 2019-06-03 | Stop reason: SDUPTHER

## 2018-08-16 RX ORDER — FLUCONAZOLE 150 MG/1
150 TABLET ORAL ONCE
Qty: 1 TABLET | Refills: 0 | Status: SHIPPED | OUTPATIENT
Start: 2018-08-16 | End: 2018-08-16

## 2018-08-16 NOTE — ASSESSMENT & PLAN NOTE
Lab Results   Component Value Date    HGBA1C 13 6 (H) 02/21/2018   needs ffresh labs    No results for input(s): POCGLU in the last 72 hours      Blood Sugar Average: Last 72 hrs:

## 2018-08-16 NOTE — PROGRESS NOTES
Assessment/Plan:    Type 2 diabetes mellitus without complication (HCC)  Lab Results   Component Value Date    HGBA1C 13 6 (H) 02/21/2018   needs ffresh labs    No results for input(s): POCGLU in the last 72 hours  Blood Sugar Average: Last 72 hrs:         Diagnoses and all orders for this visit:    Mild persistent asthma without complication  -     beclomethasone (QVAR) 80 MCG/ACT inhaler; Inhale 2 puffs 2 (two) times a day    Type 2 diabetes mellitus without complication, with long-term current use of insulin (HCC)  -     Basic metabolic panel; Standing  -     Microalbumin / creatinine urine ratio; Standing  -     HEMOGLOBIN A1C W/ EAG ESTIMATION; Standing    Vaginal yeast infection  -     fluconazole (DIFLUCAN) 150 mg tablet; Take 1 tablet (150 mg total) by mouth once for 1 dose    Screening for colorectal cancer  -     Ambulatory referral to Gastroenterology; Future    Essential hypertension  -     hydrochlorothiazide (HYDRODIURIL) 25 mg tablet; Take 1 tablet (25 mg total) by mouth daily    Mixed hyperlipidemia          Subjective:      Patient ID: Funmi Maurer is a 58 y o  female  Diabetes   Pertinent negatives for hypoglycemia include no dizziness, headaches, seizures or speech difficulty  Pertinent negatives for diabetes include no chest pain, no fatigue, no polydipsia, no polyphagia and no polyuria  The following portions of the patient's history were reviewed and updated as appropriate:   She  has a past medical history of Abnormal ECG; Abnormal mammogram; Abnormal stress test; Arthritis; Asthma; Back problem; Chest pain; Cyst of left breast; Depression; Depression; Diabetes mellitus ([address] ); Hiatal hernia; Hyperlipidemia; Hypertension; Keloid of skin; Neuropathy; and Psychiatric disorder    She   Patient Active Problem List    Diagnosis Date Noted    Diabetic neuropathy ([address] ) 01/02/2018    Otalgia 01/02/2018    Chronic constipation 11/07/2017    Abnormal stress test 07/27/2017    Cyst of left breast 07/20/2017    Impaired hearing 06/02/2017    Cerebral atherosclerosis 05/15/2017    Hyperlipidemia 04/13/2017    Moderate persistent asthma without complication 03/66/3714    Back pain of lumbar region with sciatica 10/18/2016    Type 2 diabetes mellitus without complication ([facility] ) 88/47/9471    Hiatal hernia 10/18/2016    Keloid of skin 10/18/2016    Mild persistent asthma without complication 41/48/2156    Chronic back pain 09/06/2016    Depression 09/06/2016    Essential hypertension 09/06/2016    Neuropathy, diabetic ([facility] ) 09/06/2016     She  has a past surgical history that includes Cyst Removal; Eye surgery; Cataract extraction; Tubal ligation; Arm wound repair / closure; and pr esophagogastroduodenoscopy transoral diagnostic (N/A, 1/5/2018)  Her family history includes Arthritis in her mother, paternal grandmother, and sister; Cancer in her family, maternal grandmother, and paternal grandmother; Coronary artery disease in her family; Diabetes in her mother; Glaucoma in her family; Heart attack in her maternal uncle, mother, and paternal grandmother; Heart disease in her mother; Hypertension in her father and mother; Kidney disease in her father; Rheum arthritis in her family; Stroke in her maternal aunt and maternal grandmother  She  reports that she has never smoked  She has never used smokeless tobacco  She reports that she does not drink alcohol or use drugs  Current Outpatient Prescriptions   Medication Sig Dispense Refill    albuterol (PROVENTIL HFA,VENTOLIN HFA) 90 mcg/act inhaler Inhale 2 puffs every 4 (four) hours as needed for wheezing      amLODIPine (NORVASC) 10 mg tablet take 1 tablet by mouth once daily 30 tablet 5    aspirin 325 mg tablet Take 1 tablet by mouth daily      beclomethasone (QVAR) 80 MCG/ACT inhaler Inhale 2 puffs 2 (two) times a day      busPIRone (BUSPAR) 5 mg tablet Take 5 mg by mouth 3 (three) times a day        cycloSPORINE (RESTASIS) 0 05 % ophthalmic emulsion Apply to eye      escitalopram (LEXAPRO) 10 mg tablet Take 1 tablet (10 mg total) by mouth every morning 30 tablet 2    gabapentin (NEURONTIN) 300 mg capsule Take 300 mg by mouth 3 (three) times a day   glucose blood (ACCU-CHEK SHANE PLUS) test strip by In Vitro route 2 (two) times a day      hydrochlorothiazide (HYDRODIURIL) 25 mg tablet Take 25 mg by mouth daily   hydrocortisone 0 5 % cream Apply topically 2 (two) times a day      Insulin Pen Needle (B-D UF III MINI PEN NEEDLES) 31G X 5 MM MISC by Does not apply route      Lancets (FREESTYLE) lancets by Does not apply route      LANTUS SOLOSTAR injection pen 100 units/mL INJECT 20 UNITS DAILY 15 mL 3    lisinopril (ZESTRIL) 40 mg tablet take 1 tablet by mouth once daily 30 tablet 5    metFORMIN (GLUCOPHAGE) 1000 MG tablet Take 1 tablet (1,000 mg total) by mouth 2 (two) times a day with meals 60 tablet 2    pantoprazole (PROTONIX) 40 mg tablet Take 1 tablet (40 mg total) by mouth daily 30 tablet 0    POLYETHYLENE GLYCOL 3350 PO Take 17 g by mouth daily as needed      simvastatin (ZOCOR) 20 mg tablet Take 1 tablet (20 mg total) by mouth daily at bedtime 30 tablet 3    traZODone (DESYREL) 50 mg tablet Take 50 mg by mouth daily at bedtime  No current facility-administered medications for this visit  Current Outpatient Prescriptions on File Prior to Visit   Medication Sig    albuterol (PROVENTIL HFA,VENTOLIN HFA) 90 mcg/act inhaler Inhale 2 puffs every 4 (four) hours as needed for wheezing    amLODIPine (NORVASC) 10 mg tablet take 1 tablet by mouth once daily    aspirin 325 mg tablet Take 1 tablet by mouth daily    beclomethasone (QVAR) 80 MCG/ACT inhaler Inhale 2 puffs 2 (two) times a day    busPIRone (BUSPAR) 5 mg tablet Take 5 mg by mouth 3 (three) times a day      cycloSPORINE (RESTASIS) 0 05 % ophthalmic emulsion Apply to eye    escitalopram (LEXAPRO) 10 mg tablet Take 1 tablet (10 mg total) by mouth every morning    gabapentin (NEURONTIN) 300 mg capsule Take 300 mg by mouth 3 (three) times a day   glucose blood (ACCU-CHEK SHANE PLUS) test strip by In Vitro route 2 (two) times a day    hydrochlorothiazide (HYDRODIURIL) 25 mg tablet Take 25 mg by mouth daily   hydrocortisone 0 5 % cream Apply topically 2 (two) times a day    Insulin Pen Needle (B-D UF III MINI PEN NEEDLES) 31G X 5 MM MISC by Does not apply route    Lancets (FREESTYLE) lancets by Does not apply route    LANTUS SOLOSTAR injection pen 100 units/mL INJECT 20 UNITS DAILY    lisinopril (ZESTRIL) 40 mg tablet take 1 tablet by mouth once daily    metFORMIN (GLUCOPHAGE) 1000 MG tablet Take 1 tablet (1,000 mg total) by mouth 2 (two) times a day with meals    pantoprazole (PROTONIX) 40 mg tablet Take 1 tablet (40 mg total) by mouth daily    POLYETHYLENE GLYCOL 3350 PO Take 17 g by mouth daily as needed    simvastatin (ZOCOR) 20 mg tablet Take 1 tablet (20 mg total) by mouth daily at bedtime    traZODone (DESYREL) 50 mg tablet Take 50 mg by mouth daily at bedtime  No current facility-administered medications on file prior to visit  She is allergic to percocet [oxycodone-acetaminophen] and vicodin [hydrocodone-acetaminophen]       Review of Systems   Constitutional: Negative for activity change, appetite change, diaphoresis, fatigue and fever  HENT: Negative  Eyes: Negative  Respiratory: Negative for apnea, cough, chest tightness, shortness of breath and wheezing  Cardiovascular: Negative for chest pain, palpitations and leg swelling  Gastrointestinal: Negative for abdominal distention, abdominal pain, anal bleeding, constipation, diarrhea, nausea and vomiting  Endocrine: Negative for cold intolerance, heat intolerance, polydipsia, polyphagia and polyuria  Genitourinary: Negative for difficulty urinating, dysuria, flank pain, hematuria and urgency  Musculoskeletal: Positive for arthralgias and back pain   Negative for gait problem, joint swelling and myalgias  Skin: Negative for color change, rash and wound  Allergic/Immunologic: Negative for environmental allergies, food allergies and immunocompromised state  Neurological: Negative for dizziness, seizures, syncope, speech difficulty, numbness and headaches  Hematological: Negative for adenopathy  Does not bruise/bleed easily  Psychiatric/Behavioral: Negative for agitation, behavioral problems, hallucinations, sleep disturbance and suicidal ideas  Objective:      /78 (BP Location: Left arm, Patient Position: Sitting, Cuff Size: Standard)   Ht 5' 5" (1 651 m)   Wt 97 1 kg (214 lb)   BMI 35 61 kg/m²          Physical Exam   Constitutional: She is oriented to person, place, and time  She appears well-developed and well-nourished  No distress  HENT:   Head: Normocephalic  Right Ear: External ear normal    Left Ear: External ear normal    Nose: Nose normal    Mouth/Throat: Oropharynx is clear and moist    Eyes: Conjunctivae and EOM are normal  Pupils are equal, round, and reactive to light  Right eye exhibits no discharge  Left eye exhibits no discharge  No scleral icterus  Neck: Normal range of motion  No tracheal deviation present  No thyromegaly present  Cardiovascular: Normal rate, regular rhythm and normal heart sounds  Exam reveals no gallop and no friction rub  Pulses are no weak pulses  No murmur heard  Pulses:       Dorsalis pedis pulses are 2+ on the right side, and 2+ on the left side  Posterior tibial pulses are 2+ on the right side, and 2+ on the left side  Pulmonary/Chest: Effort normal and breath sounds normal  No respiratory distress  She has no wheezes  Abdominal: Soft  Bowel sounds are normal  She exhibits no mass  There is no tenderness  There is no guarding  Musculoskeletal: She exhibits tenderness  She exhibits no edema or deformity     Feet:   Right Foot:   Skin Integrity: Negative for ulcer, skin breakdown, erythema, warmth, callus or dry skin  Left Foot:   Skin Integrity: Negative for ulcer, skin breakdown, erythema, warmth, callus or dry skin  Lymphadenopathy:     She has no cervical adenopathy  Neurological: She is alert and oriented to person, place, and time  No cranial nerve deficit  Skin: Skin is warm and dry  No rash noted  She is not diaphoretic  No erythema  Psychiatric: She has a normal mood and affect  Thought content normal      Patient's shoes and socks removed  Right Foot/Ankle   Right Foot Inspection  Skin Exam: skin normal and skin intact no dry skin, no warmth, no callus, no erythema, no maceration, no abnormal color, no pre-ulcer, no ulcer and no callus                          Toe Exam: ROM and strength within normal limits  Sensory   Vibration: intact  Proprioception: intact   Monofilament testing: intact  Vascular  Capillary refills: < 3 seconds  The right DP pulse is 2+  The right PT pulse is 2+  Left Foot/Ankle  Left Foot Inspection  Skin Exam: skin normal and skin intactno dry skin, no warmth, no erythema, no maceration, normal color, no pre-ulcer, no ulcer and no callus                         Toe Exam: ROM and strength within normal limits                   Sensory   Vibration: intact  Proprioception: intact  Monofilament: intact  Vascular  Capillary refills: < 3 seconds  The left DP pulse is 2+  The left PT pulse is 2+  Assign Risk Category:  No deformity present; No loss of protective sensation;  No weak pulses       Risk: 0

## 2018-08-17 DIAGNOSIS — E11.21 TYPE 2 DIABETES MELLITUS WITH DIABETIC NEPHROPATHY, WITH LONG-TERM CURRENT USE OF INSULIN (HCC): Primary | ICD-10-CM

## 2018-08-17 DIAGNOSIS — R80.1 PERSISTENT PROTEINURIA: ICD-10-CM

## 2018-08-17 DIAGNOSIS — Z79.4 TYPE 2 DIABETES MELLITUS WITHOUT COMPLICATION, WITH LONG-TERM CURRENT USE OF INSULIN (HCC): ICD-10-CM

## 2018-08-17 DIAGNOSIS — Z79.4 TYPE 2 DIABETES MELLITUS WITH DIABETIC NEPHROPATHY, WITH LONG-TERM CURRENT USE OF INSULIN (HCC): Primary | ICD-10-CM

## 2018-08-17 DIAGNOSIS — E11.9 TYPE 2 DIABETES MELLITUS WITHOUT COMPLICATION, WITH LONG-TERM CURRENT USE OF INSULIN (HCC): ICD-10-CM

## 2018-08-17 NOTE — PROGRESS NOTES
Please call the patient regarding her abnormal result   Hemoglobin A1c is really bad her value was 11 1 1 year ago it was 9 7 so she is going in the wrong direction her average blood sugar for the preceding 90 days was 272 not good her urine sample shows that she is spilling a tremendous amount of protein in her urine that is from diabetic kidney disease her thyroid is normal her chemistry profile shows blood sugar of 239 the rest of these are normal she needs to really get serious about following a diabetic diet her on protein in her urine needs to be investigated by doing a 24 hour urine for quantitative protein excretion I will put the order in the chart and she needs to add Januvia 100 mg daily to her list of medications to try to get better control of her blood sugar and she needs to increase her Lantus by 10 units she needs repeat hemoglobin A1c and BMP in 3 months

## 2018-09-04 DIAGNOSIS — Z79.4 TYPE 2 DIABETES MELLITUS WITHOUT COMPLICATION, WITH LONG-TERM CURRENT USE OF INSULIN (HCC): Primary | ICD-10-CM

## 2018-09-04 DIAGNOSIS — E11.9 TYPE 2 DIABETES MELLITUS WITHOUT COMPLICATION, WITH LONG-TERM CURRENT USE OF INSULIN (HCC): Primary | ICD-10-CM

## 2018-09-04 RX ORDER — BLOOD SUGAR DIAGNOSTIC
STRIP MISCELLANEOUS
Qty: 100 EACH | Refills: 3 | Status: SHIPPED | OUTPATIENT
Start: 2018-09-04 | End: 2019-02-20 | Stop reason: SDUPTHER

## 2018-09-07 ENCOUNTER — OFFICE VISIT (OUTPATIENT)
Dept: URGENT CARE | Facility: CLINIC | Age: 63
End: 2018-09-07
Payer: COMMERCIAL

## 2018-09-07 ENCOUNTER — HOSPITAL ENCOUNTER (EMERGENCY)
Facility: HOSPITAL | Age: 63
Discharge: HOME | End: 2018-09-07
Attending: EMERGENCY MEDICINE
Payer: COMMERCIAL

## 2018-09-07 ENCOUNTER — APPOINTMENT (OUTPATIENT)
Dept: LAB | Facility: CLINIC | Age: 63
End: 2018-09-07
Payer: COMMERCIAL

## 2018-09-07 ENCOUNTER — APPOINTMENT (EMERGENCY)
Dept: CT IMAGING | Facility: HOSPITAL | Age: 63
End: 2018-09-07
Payer: COMMERCIAL

## 2018-09-07 VITALS
SYSTOLIC BLOOD PRESSURE: 138 MMHG | HEART RATE: 69 BPM | BODY MASS INDEX: 35.99 KG/M2 | OXYGEN SATURATION: 97 % | WEIGHT: 216 LBS | DIASTOLIC BLOOD PRESSURE: 84 MMHG | RESPIRATION RATE: 18 BRPM | TEMPERATURE: 98.7 F | HEIGHT: 65 IN

## 2018-09-07 VITALS
HEIGHT: 65 IN | OXYGEN SATURATION: 98 % | SYSTOLIC BLOOD PRESSURE: 141 MMHG | DIASTOLIC BLOOD PRESSURE: 77 MMHG | WEIGHT: 222 LBS | RESPIRATION RATE: 18 BRPM | BODY MASS INDEX: 36.99 KG/M2 | TEMPERATURE: 98.2 F | HEART RATE: 50 BPM

## 2018-09-07 DIAGNOSIS — K44.9 HIATAL HERNIA: Primary | ICD-10-CM

## 2018-09-07 DIAGNOSIS — R80.1 PERSISTENT PROTEINURIA: ICD-10-CM

## 2018-09-07 DIAGNOSIS — R07.89 OTHER CHEST PAIN: Primary | ICD-10-CM

## 2018-09-07 LAB
ALBUMIN SERPL BCP-MCNC: 3.4 G/DL (ref 3.5–5)
ALP SERPL-CCNC: 74 U/L (ref 46–116)
ALT SERPL W P-5'-P-CCNC: 19 U/L (ref 12–78)
ANION GAP SERPL CALCULATED.3IONS-SCNC: 6 MMOL/L (ref 4–13)
APTT PPP: 26 SECONDS (ref 24–36)
AST SERPL W P-5'-P-CCNC: 14 U/L (ref 5–45)
BASOPHILS # BLD AUTO: 0.01 THOUSANDS/ΜL (ref 0–0.1)
BASOPHILS NFR BLD AUTO: 0 % (ref 0–1)
BILIRUB SERPL-MCNC: 0.2 MG/DL (ref 0.2–1)
BUN SERPL-MCNC: 15 MG/DL (ref 5–25)
CALCIUM SERPL-MCNC: 8.9 MG/DL (ref 8.3–10.1)
CHLORIDE SERPL-SCNC: 102 MMOL/L (ref 100–108)
CO2 SERPL-SCNC: 32 MMOL/L (ref 21–32)
CREAT SERPL-MCNC: 1.03 MG/DL (ref 0.6–1.3)
DEPRECATED D DIMER PPP: 694 NG/ML (FEU) (ref 0–424)
EOSINOPHIL # BLD AUTO: 0.09 THOUSAND/ΜL (ref 0–0.61)
EOSINOPHIL NFR BLD AUTO: 2 % (ref 0–6)
ERYTHROCYTE [DISTWIDTH] IN BLOOD BY AUTOMATED COUNT: 13.9 % (ref 11.6–15.1)
GFR SERPL CREATININE-BSD FRML MDRD: 67 ML/MIN/1.73SQ M
GLUCOSE SERPL-MCNC: 175 MG/DL (ref 65–140)
HCT VFR BLD AUTO: 38.6 % (ref 34.8–46.1)
HGB BLD-MCNC: 12.3 G/DL (ref 11.5–15.4)
IMM GRANULOCYTES # BLD AUTO: 0.01 THOUSAND/UL (ref 0–0.2)
IMM GRANULOCYTES NFR BLD AUTO: 0 % (ref 0–2)
INR PPP: 0.97 (ref 0.86–1.17)
LYMPHOCYTES # BLD AUTO: 1.63 THOUSANDS/ΜL (ref 0.6–4.47)
LYMPHOCYTES NFR BLD AUTO: 39 % (ref 14–44)
MCH RBC QN AUTO: 26.7 PG (ref 26.8–34.3)
MCHC RBC AUTO-ENTMCNC: 31.9 G/DL (ref 31.4–37.4)
MCV RBC AUTO: 84 FL (ref 82–98)
MONOCYTES # BLD AUTO: 0.42 THOUSAND/ΜL (ref 0.17–1.22)
MONOCYTES NFR BLD AUTO: 10 % (ref 4–12)
NEUTROPHILS # BLD AUTO: 2.06 THOUSANDS/ΜL (ref 1.85–7.62)
NEUTS SEG NFR BLD AUTO: 49 % (ref 43–75)
NRBC BLD AUTO-RTO: 0 /100 WBCS
PLATELET # BLD AUTO: 211 THOUSANDS/UL (ref 149–390)
PMV BLD AUTO: 10.7 FL (ref 8.9–12.7)
POTASSIUM SERPL-SCNC: 4.2 MMOL/L (ref 3.5–5.3)
PROT SERPL-MCNC: 7.8 G/DL (ref 6.4–8.2)
PROTHROMBIN TIME: 12.4 SECONDS (ref 11.8–14.2)
RBC # BLD AUTO: 4.6 MILLION/UL (ref 3.81–5.12)
SODIUM SERPL-SCNC: 140 MMOL/L (ref 136–145)
TROPONIN I SERPL-MCNC: <0.02 NG/ML
TROPONIN I SERPL-MCNC: <0.02 NG/ML
WBC # BLD AUTO: 4.22 THOUSAND/UL (ref 4.31–10.16)

## 2018-09-07 PROCEDURE — 80053 COMPREHEN METABOLIC PANEL: CPT | Performed by: EMERGENCY MEDICINE

## 2018-09-07 PROCEDURE — 85379 FIBRIN DEGRADATION QUANT: CPT | Performed by: EMERGENCY MEDICINE

## 2018-09-07 PROCEDURE — G0382 LEV 3 HOSP TYPE B ED VISIT: HCPCS | Performed by: PHYSICIAN ASSISTANT

## 2018-09-07 PROCEDURE — 84156 ASSAY OF PROTEIN URINE: CPT

## 2018-09-07 PROCEDURE — 99285 EMERGENCY DEPT VISIT HI MDM: CPT

## 2018-09-07 PROCEDURE — 85730 THROMBOPLASTIN TIME PARTIAL: CPT | Performed by: EMERGENCY MEDICINE

## 2018-09-07 PROCEDURE — 36415 COLL VENOUS BLD VENIPUNCTURE: CPT | Performed by: EMERGENCY MEDICINE

## 2018-09-07 PROCEDURE — 96360 HYDRATION IV INFUSION INIT: CPT

## 2018-09-07 PROCEDURE — 74177 CT ABD & PELVIS W/CONTRAST: CPT

## 2018-09-07 PROCEDURE — 85025 COMPLETE CBC W/AUTO DIFF WBC: CPT | Performed by: EMERGENCY MEDICINE

## 2018-09-07 PROCEDURE — 93005 ELECTROCARDIOGRAM TRACING: CPT

## 2018-09-07 PROCEDURE — 99283 EMERGENCY DEPT VISIT LOW MDM: CPT | Performed by: PHYSICIAN ASSISTANT

## 2018-09-07 PROCEDURE — 84484 ASSAY OF TROPONIN QUANT: CPT | Performed by: EMERGENCY MEDICINE

## 2018-09-07 PROCEDURE — 85610 PROTHROMBIN TIME: CPT | Performed by: EMERGENCY MEDICINE

## 2018-09-07 PROCEDURE — 71275 CT ANGIOGRAPHY CHEST: CPT

## 2018-09-07 RX ORDER — FAMOTIDINE 20 MG/1
20 TABLET, FILM COATED ORAL ONCE
Status: COMPLETED | OUTPATIENT
Start: 2018-09-07 | End: 2018-09-07

## 2018-09-07 RX ORDER — MAGNESIUM HYDROXIDE/ALUMINUM HYDROXICE/SIMETHICONE 120; 1200; 1200 MG/30ML; MG/30ML; MG/30ML
30 SUSPENSION ORAL ONCE
Status: COMPLETED | OUTPATIENT
Start: 2018-09-07 | End: 2018-09-07

## 2018-09-07 RX ORDER — FAMOTIDINE 20 MG/1
20 TABLET, FILM COATED ORAL 2 TIMES DAILY
Qty: 30 TABLET | Refills: 0 | Status: SHIPPED | OUTPATIENT
Start: 2018-09-07 | End: 2019-06-13 | Stop reason: ALTCHOICE

## 2018-09-07 RX ADMIN — SODIUM CHLORIDE 1000 ML: 0.9 INJECTION, SOLUTION INTRAVENOUS at 10:45

## 2018-09-07 RX ADMIN — SODIUM CHLORIDE 500 ML: 0.9 INJECTION, SOLUTION INTRAVENOUS at 13:41

## 2018-09-07 RX ADMIN — IOHEXOL 100 ML: 350 INJECTION, SOLUTION INTRAVENOUS at 12:00

## 2018-09-07 RX ADMIN — FAMOTIDINE 20 MG: 20 TABLET ORAL at 13:40

## 2018-09-07 RX ADMIN — ALUMINUM HYDROXIDE, MAGNESIUM HYDROXIDE, AND SIMETHICONE 30 ML: 200; 200; 20 SUSPENSION ORAL at 10:50

## 2018-09-07 NOTE — ED PROVIDER NOTES
History  Chief Complaint   Patient presents with    Chest Pain     Mid sternal chest pain started yesterday evening  Had similiar episode 8 month ago  59-year-old female presents complaining of a burning sensation under her sternum which began yesterday at 1800 hr  She states that she had this discomfort in the past and after an EGD was told that she had gastritis  She states she is out of medication for this  She states she did eat buffalo wings prior to this discomfort  Patient had a cardiac catheterization performed on July 28, 2017 she had no significant stenosis of her coronary vessels at that time had an ejection fraction of 55%  The patient states that when she drank coffee this more the burning sensation in her chest became worse  There is not seem to be any increase in this pain with exertion  History provided by:  Patient  Chest Pain   Pain location:  Substernal area and epigastric  Pain quality: aching and burning    Pain radiates to:  Does not radiate  Pain radiates to the back: no    Pain severity:  Mild  Onset quality:  Gradual  Duration:  17 hours  Timing:  Constant  Progression:  Waxing and waning  Chronicity:  Recurrent  Context: not breathing and no drug use    Relieved by:  Nothing  Worsened by:  Nothing tried  Ineffective treatments:  None tried  Associated symptoms: no abdominal pain, no AICD problem, no altered mental status, no back pain, no dizziness and no headache    Risk factors: no aortic disease, no birth control and no coronary artery disease        Prior to Admission Medications   Prescriptions Last Dose Informant Patient Reported? Taking?    ACCU-CHEK SHANE PLUS test strip   No No   Sig: TEST 2 TIMES A DAY   Insulin Pen Needle (B-D UF III MINI PEN NEEDLES) 31G X 5 MM MISC   Yes No   Sig: by Does not apply route   Lancets (FREESTYLE) lancets   Yes No   Sig: by Does not apply route   POLYETHYLENE GLYCOL 3350 PO   Yes No   Sig: Take 17 g by mouth daily as needed albuterol (PROVENTIL HFA,VENTOLIN HFA) 90 mcg/act inhaler   Yes No   Sig: Inhale 2 puffs every 4 (four) hours as needed for wheezing   amLODIPine (NORVASC) 10 mg tablet   No No   Sig: take 1 tablet by mouth once daily   aspirin 325 mg tablet   Yes No   Sig: Take 1 tablet by mouth daily   beclomethasone (QVAR) 80 MCG/ACT inhaler   No No   Sig: Inhale 2 puffs 2 (two) times a day   busPIRone (BUSPAR) 5 mg tablet   Yes No   Sig: Take 5 mg by mouth 3 (three) times a day  cycloSPORINE (RESTASIS) 0 05 % ophthalmic emulsion   Yes No   Sig: Apply to eye   escitalopram (LEXAPRO) 10 mg tablet   No No   Sig: Take 1 tablet (10 mg total) by mouth every morning   gabapentin (NEURONTIN) 300 mg capsule   Yes No   Sig: Take 300 mg by mouth 3 (three) times a day  hydrochlorothiazide (HYDRODIURIL) 25 mg tablet   No No   Sig: Take 1 tablet (25 mg total) by mouth daily   hydrocortisone 0 5 % cream   Yes No   Sig: Apply topically 2 (two) times a day   insulin glargine (LANTUS SOLOSTAR) 100 units/mL injection pen   No No   Sig: Inject 30 Units under the skin daily   lisinopril (ZESTRIL) 40 mg tablet   No No   Sig: take 1 tablet by mouth once daily   metFORMIN (GLUCOPHAGE) 1000 MG tablet   No No   Sig: Take 1 tablet (1,000 mg total) by mouth 2 (two) times a day with meals   Patient not taking: Reported on 9/7/2018    pantoprazole (PROTONIX) 40 mg tablet   No No   Sig: Take 1 tablet (40 mg total) by mouth daily   Patient not taking: Reported on 9/7/2018    simvastatin (ZOCOR) 20 mg tablet   No No   Sig: Take 1 tablet (20 mg total) by mouth daily at bedtime   sitaGLIPtin (JANUVIA) 100 mg tablet   No No   Sig: Take 1 tablet (100 mg total) by mouth daily   traZODone (DESYREL) 50 mg tablet   Yes No   Sig: Take 50 mg by mouth daily at bedtime        Facility-Administered Medications: None       Past Medical History:   Diagnosis Date    Abnormal ECG     last assessed: 5/15/17    Abnormal mammogram     last assessed: 7/11/17    Abnormal stress test     last assesed: 7/24/17    Arthritis     Asthma     Back problem     Chest pain     last assessed: 7/24/17    Cyst of left breast     last assessed: 8/18/17    Depression     Depression     resolved: 1/2/18    Diabetes mellitus ([address] )     Hiatal hernia     last assessed: 11/7/17    Hyperlipidemia     Hypertension     Keloid of skin     last assessed: 11/2/16    Neuropathy     Psychiatric disorder     anxiety       Past Surgical History:   Procedure Laterality Date    ARM WOUND REPAIR / CLOSURE      CATARACT EXTRACTION      CYST REMOVAL      right upper arm   EYE SURGERY      cataract removaql    MT ESOPHAGOGASTRODUODENOSCOPY TRANSORAL DIAGNOSTIC N/A 1/5/2018    Procedure: ESOPHAGOGASTRODUODENOSCOPY (EGD); Surgeon: Hector Zaragoza DO;  Location: [facility];  Service: Gastroenterology    TUBAL LIGATION         Family History   Problem Relation Age of Onset    Heart disease Mother     Diabetes Mother     Arthritis Mother     Heart attack Mother     Hypertension Mother     Kidney disease Father     Hypertension Father     Arthritis Sister     Cancer Maternal Grandmother     Stroke Maternal Grandmother     Arthritis Paternal Grandmother     Cancer Paternal Grandmother     Heart attack Paternal Grandmother     Stroke Maternal Aunt     Heart attack Maternal Uncle     Cancer Family         spinal column    Coronary artery disease Family     Glaucoma Family     Rheum arthritis Family      I have reviewed and agree with the history as documented  Social History   Substance Use Topics    Smoking status: Never Smoker    Smokeless tobacco: Never Used    Alcohol use No        Review of Systems   Constitutional: Negative for activity change, appetite change and chills  HENT: Negative for congestion, dental problem and drooling  Eyes: Negative for pain, discharge and itching  Respiratory: Negative for apnea, choking and chest tightness      Cardiovascular: Positive for chest pain  Gastrointestinal: Negative for abdominal pain  Endocrine: Negative for cold intolerance, heat intolerance and polydipsia  Genitourinary: Negative for difficulty urinating, dyspareunia, dysuria and enuresis  Musculoskeletal: Negative for arthralgias, back pain and gait problem  Skin: Negative for color change and pallor  Allergic/Immunologic: Negative for environmental allergies and food allergies  Neurological: Negative for dizziness, facial asymmetry and headaches  Hematological: Negative for adenopathy  Does not bruise/bleed easily  Psychiatric/Behavioral: Negative for agitation, behavioral problems, confusion and decreased concentration  All other systems reviewed and are negative  Physical Exam  Physical Exam   Constitutional: She appears well-developed and well-nourished  HENT:   Head: Normocephalic  Right Ear: External ear normal    Left Ear: External ear normal    Eyes: Pupils are equal, round, and reactive to light  Right eye exhibits no discharge  Left eye exhibits no discharge  Neck: Normal range of motion  No tracheal deviation present  No thyromegaly present  Cardiovascular: Normal rate, regular rhythm and normal heart sounds  Pulmonary/Chest: Effort normal  No respiratory distress  She has no wheezes  She has no rales  Abdominal: Soft  She exhibits no distension and no mass  There is tenderness  There is no guarding  Minimal tenderness in the epigastric region   Musculoskeletal: Normal range of motion  She exhibits no edema, tenderness or deformity  Neurological: She is alert  She displays normal reflexes  No cranial nerve deficit  Coordination normal    Skin: Skin is warm  Capillary refill takes less than 2 seconds  No erythema  Psychiatric: She has a normal mood and affect  Her behavior is normal    Vitals reviewed        Vital Signs  ED Triage Vitals [09/07/18 1026]   Temperature Pulse Respirations Blood Pressure SpO2   98 2 °F (36 8 °C) 65 18 160/78 96 %      Temp Source Heart Rate Source Patient Position - Orthostatic VS BP Location FiO2 (%)   Temporal Monitor Lying Left arm --      Pain Score       8           Vitals:    09/07/18 1026 09/07/18 1115 09/07/18 1345 09/07/18 1355   BP: 160/78 152/71 134/74 141/77   Pulse: 65 57 (!) 54 (!) 50   Patient Position - Orthostatic VS: Lying Sitting Sitting        Visual Acuity      ED Medications  Medications   sodium chloride 0 9 % bolus 1,000 mL (0 mL Intravenous Stopped 9/7/18 1200)   aluminum-magnesium hydroxide-simethicone (MYLANTA) 200-200-20 mg/5 mL oral suspension 30 mL (30 mL Oral Given 9/7/18 1050)   iohexol (OMNIPAQUE) 350 MG/ML injection (SINGLE-DOSE) 100 mL (100 mL Intravenous Given 9/7/18 1200)   sodium chloride 0 9 % bolus 500 mL (0 mL Intravenous Stopped 9/7/18 1356)   famotidine (PEPCID) tablet 20 mg (20 mg Oral Given 9/7/18 1340)       Diagnostic Studies  Results Reviewed     Procedure Component Value Units Date/Time    Troponin I [[id number]]  (Normal) Collected:  09/07/18 1314    Lab Status:  Final result Specimen:  Blood from Hand, Left Updated:  09/07/18 1336     Troponin I <0 02 ng/mL     D-Dimer [[id number]]  (Abnormal) Collected:  09/07/18 1044    Lab Status:  Final result Specimen:  Blood from Arm, Right Updated:  09/07/18 1124     D-Dimer, Quant 694 (H) ng/ml (FEU)     Troponin I [[id number]]  (Normal) Collected:  09/07/18 1044    Lab Status:  Final result Specimen:  Blood from Arm, Right Updated:  09/07/18 1108     Troponin I <0 02 ng/mL     Comprehensive metabolic panel [[id number]]  (Abnormal) Collected:  09/07/18 1044    Lab Status:  Final result Specimen:  Blood from Arm, Right Updated:  09/07/18 1106     Sodium 140 mmol/L      Potassium 4 2 mmol/L      Chloride 102 mmol/L      CO2 32 mmol/L      ANION GAP 6 mmol/L      BUN 15 mg/dL      Creatinine 1 03 mg/dL      Glucose 175 (H) mg/dL      Calcium 8 9 mg/dL      AST 14 U/L      ALT 19 U/L      Alkaline Phosphatase 74 U/L      Total Protein 7 8 g/dL      Albumin 3 4 (L) g/dL      Total Bilirubin 0 20 mg/dL      eGFR 67 ml/min/1 73sq m     Narrative:         National Kidney Disease Education Program recommendations are as follows:  GFR calculation is accurate only with a steady state creatinine  Chronic Kidney disease less than 60 ml/min/1 73 sq  meters  Kidney failure less than 15 ml/min/1 73 sq  meters  Protime-INR [[id number]]  (Normal) Collected:  09/07/18 1044    Lab Status:  Final result Specimen:  Blood from Arm, Right Updated:  09/07/18 1100     Protime 12 4 seconds      INR 0 97    APTT [[id number]]  (Normal) Collected:  09/07/18 1044    Lab Status:  Final result Specimen:  Blood from Arm, Right Updated:  09/07/18 1100     PTT 26 seconds     CBC and differential [[id number]]  (Abnormal) Collected:  09/07/18 1044    Lab Status:  Final result Specimen:  Blood from Arm, Right Updated:  09/07/18 1053     WBC 4 22 (L) Thousand/uL      RBC 4 60 Million/uL      Hemoglobin 12 3 g/dL      Hematocrit 38 6 %      MCV 84 fL      MCH 26 7 (L) pg      MCHC 31 9 g/dL      RDW 13 9 %      MPV 10 7 fL      Platelets 338 Thousands/uL      nRBC 0 /100 WBCs      Neutrophils Relative 49 %      Immat GRANS % 0 %      Lymphocytes Relative 39 %      Monocytes Relative 10 %      Eosinophils Relative 2 %      Basophils Relative 0 %      Neutrophils Absolute 2 06 Thousands/µL      Immature Grans Absolute 0 01 Thousand/uL      Lymphocytes Absolute 1 63 Thousands/µL      Monocytes Absolute 0 42 Thousand/µL      Eosinophils Absolute 0 09 Thousand/µL      Basophils Absolute 0 01 Thousands/µL                  PE Study with CT Abdomen and Pelvis with contrast   Final Result by Simone Hsu MD (09/07 4445)   Addendum 1 of 1 by Simone Hsu MD (09/07 3036)   ADDENDUM:      Note: Imaging follow-up reminder notification was scheduled in the    electronic medical record  Final      1  No pulmonary embolism or other acute thoracic findings        2   No acute abdominopelvic findings  Chronic findings including small hiatal hernia and stable fat-containing umbilical hernia  3   Incidental thyroid nodule for which nonemergent thyroid ultrasound is recommended  Workstation performed: [id number]                    Procedures  Procedures       Phone Contacts  ED Phone Contact    ED Course         HEART Risk Score      Most Recent Value   History  1 Filed at: 09/07/2018 1113   ECG  1 Filed at: 09/07/2018 1113   Age  1 Filed at: 09/07/2018 1113   Risk Factors  1 Filed at: 09/07/2018 1113   Troponin  0 Filed at: 09/07/2018 1113   Heart Score Risk Calculator   History  1 Filed at: 09/07/2018 1113   ECG  1 Filed at: 09/07/2018 1113   Age  1 Filed at: 09/07/2018 1113   Risk Factors  1 Filed at: 09/07/2018 1113   Troponin  0 Filed at: 09/07/2018 1113   HEART Score  4 Filed at: 09/07/2018 1113   HEART Score  4 Filed at: 09/07/2018 1113                            MDM  Number of Diagnoses or Management Options  Diagnosis management comments: 80-year-old female presents complaining of burning in her chest which she states feels similar to previous episode of GERD that she has had the past   Patient does have a known history of hiatal hernia  Patient was referred here by urgent care  Patient states the symptoms have been present for over 17 hr continuously  She states that when she drank coffee this morning her symptoms got worse  Differential diagnosis 1  Gastritis 2 duodenitis 3 acute coronary syndrome for pulmonary embolism  I will perform EKG check labs  I will also give patient Maalox which will hopefully relieve symptoms    11:18  Patient states that her symptoms are greatly improved after receiving Maalox p o     13:16  I spoke with patient at length she is aware of thyroid nodule will follow up with PCP           Amount and/or Complexity of Data Reviewed  Clinical lab tests: reviewed  Tests in the radiology section of CPT®: reviewed  Tests in the medicine section of CPT®: reviewed    Risk of Complications, Morbidity, and/or Mortality  Presenting problems: high  Diagnostic procedures: high  Management options: high      CritCare Time    Disposition  Final diagnoses:   Hiatal hernia     Time reflects when diagnosis was documented in both MDM as applicable and the Disposition within this note     Time User Action Codes Description Comment    9/7/2018  1:16 PM Milagros Felty Add [K44 9] Hiatal hernia       ED Disposition     ED Disposition Condition Comment    Discharge  [facility] discharge to home/self care  Condition at discharge: Good        Follow-up Information    None         Patient's Medications   Discharge Prescriptions    FAMOTIDINE (PEPCID) 20 MG TABLET    Take 1 tablet (20 mg total) by mouth 2 (two) times a day       Start Date: 9/7/2018  End Date: --       Order Dose: 20 mg       Quantity: 30 tablet    Refills: 0     No discharge procedures on file      ED Provider  Electronically Signed by           Enzo Miller DO  09/07/18 5782

## 2018-09-07 NOTE — DISCHARGE INSTRUCTIONS
Hiatal Hernia   WHAT YOU NEED TO KNOW:   What is a hiatal hernia? A hiatal hernia is a condition that causes part of your stomach to bulge through the hiatus (small opening) in your diaphragm  The part of the stomach may move up and down, or it may get trapped above the diaphragm  What increases my risk for a hiatal hernia? The exact cause of a hiatal hernia is not known  You may have been born with a large hiatus  The following may increase your risk of a hiatal hernia:  · Obesity    · Older age    · Medical conditions such as diverticulosis or esophagitis    · Previous surgery of the esophagus or stomach or trauma such as from a motor vehicle accident  What are the types of hiatal hernia? · Type I (sliding hiatal hernia): A portion of the stomach slides in and out of the hiatus  This type is the most common and usually causes gastroesophageal reflux disease (GERD)  GERD occurs when the esophageal sphincter does not close properly and causes acid reflux  The esophageal sphincter is the lower muscle of the esophagus  · Type II (paraesophageal hiatal hernia):  Type II hiatal hernia forms when a part of the stomach squeezes through the hiatus and lies next to the esophagus  · Type III (combined):  Type III hiatal hernia is a combination of a sliding and a paraesophageal hiatal hernia  · Type IV (complex paraesophageal hiatal hernia): The whole stomach, the small and large bowels, spleen, pancreas, or liver is pushed up into the chest   What are the signs and symptoms of a hiatal hernia? The most common symptom is heartburn  This usually occurs after meals and spreads to your neck, jaw, or shoulder   You may have no signs or symptoms, or you may have any of the following:  · Abdominal pain, especially in the area just above your navel    · Bitter or acid taste in your mouth    · Trouble swallowing    · Coughing or hoarseness    · Chest pain or shortness of breath that occurs after eating    · Frequent burping or hiccups    · Uncomfortable feeling of fullness after eating  How is a hiatal hernia diagnosed? · An upper GI series test  includes x-rays of your esophagus, stomach, and your small intestines  It is also called a barium swallow test  You will be given barium (a chalky liquid) to drink before the pictures are taken  This liquid helps your stomach and intestines show up better on the x-rays  An upper GI series can show if you have an ulcer, a blocked intestine, or other problems  · An endoscopy  uses a scope to see the inside of your digestive tract  A scope is a long, bendable tube with a light on the end of it  A camera may be hooked to the scope to take pictures  How is a hiatal hernia treated? Treatment depends on the type of hiatal hernia you have and on your symptoms  You may not need any treatment  You may need any of the following:  · Medicines  may be given to relieve heartburn symptoms  These medicines help to decrease or block stomach acid  You may also be given medicines that help to tighten the esophageal sphincter  · Surgery  may be done when medicines cannot control your symptoms, or other problems are present  Your healthcare provider may also suggest surgery depending on the type of hernia you have  Your healthcare provider can put your stomach back into its normal location  He may make the hiatus (hole) smaller and anchor your stomach in your abdomen  Fundoplication is a surgery that wraps the upper part of the stomach around the esophageal sphincter to strengthen it  How can I manage symptoms? The following nutrition and lifestyle changes may be recommended to relieve symptoms of heartburn  · Avoid foods that make your symptoms worse  These may include spicy foods, fruit juices, alcohol, caffeine, chocolate, and mint  · Eat several small meals during the day    Small meals give your stomach less food to digest     · Avoid lying down and bending forward after you eat  Do not eat meals 2 to 3 hours before bedtime  This decreases your risk for reflux  · Maintain a healthy weight  If you are overweight, weight loss may help relieve your symptoms  · Sleep with your head elevated  at least 6 inches  · Do not smoke  Smoking can increase your symptoms of heartburn  When should I seek immediate care? · You have severe abdominal pain  · You try to vomit but nothing comes out (retching)  · You have severe chest pain and sudden trouble breathing  · Your bowel movements are black or bloody  · Your vomit looks like coffee grounds or has blood in it  When should I contact my healthcare provider? · Your symptoms are getting worse  · You have nausea, and you are vomiting  · You are losing weight without trying  · You have questions or concerns about your condition or care  CARE AGREEMENT:   You have the right to help plan your care  Learn about your health condition and how it may be treated  Discuss treatment options with your caregivers to decide what care you want to receive  You always have the right to refuse treatment  The above information is an  only  It is not intended as medical advice for individual conditions or treatments  Talk to your doctor, nurse or pharmacist before following any medical regimen to see if it is safe and effective for you  © 2017 [employer]  Information is for End User's use only and may not be sold, redistributed or otherwise used for commercial purposes  All illustrations and images included in CareNotes® are the copyrighted property of [employer]  or Matthew Espinosa

## 2018-09-07 NOTE — PROGRESS NOTES
[address] [facility]  (office) [phone]  (fax) [phone]        NAME: Uzma You is a 61 y o  female  : 1955    MRN: [id number]  DATE: 2018  TIME: 9:39 AM    Assessment and Plan   Other chest pain [R07 89]  1  Other chest pain  POCT ECG    Transfer to other facility       Patient Instructions   Reviewed EKG no acute findings at this time  NSR  Patient headed to [address] via private vehicle,  driving for further evaluation/cardiac enzymes  Left clinic in no acute distress  Personally called [facility] and spoke with [facility] to update him on patient's status  To present to the ER if symptoms worsen  Chief Complaint     Chief Complaint   Patient presents with    Chest Pain     x 1 day  Has had episodes of chest pain x months    Nicki Holman LPN         History of Present Illness   Uzma You presents to the clinic c/o    Patient does report a hx of GERD  She has not been taking her medication as she reports it irritates her stomach  She does report she ate hot wings last night  Chest Pain    This is a new problem  The current episode started yesterday (although has had years on and off of chest pain (had a full work up 5 months ago per patient which was normal))  The onset quality is sudden  The problem occurs constantly  The problem has been unchanged  The pain is present in the substernal region  The pain is at a severity of 7/10  The pain is moderate  The quality of the pain is described as sharp  The pain radiates to the left arm, lower back and epigastrium  Associated symptoms include malaise/fatigue   Pertinent negatives include no abdominal pain, back pain, claudication, cough, diaphoresis, dizziness, exertional chest pressure, fever, headaches, hemoptysis, irregular heartbeat, leg pain, lower extremity edema, nausea, near-syncope, numbness, orthopnea, palpitations, PND, shortness of breath, sputum production, syncope, vomiting or weakness  The pain is aggravated by nothing  She has tried nothing for the symptoms  The treatment provided no relief  Review of Systems   Review of Systems   Constitutional: Positive for malaise/fatigue  Negative for activity change, appetite change, chills, diaphoresis, fatigue and fever  HENT: Negative for congestion, ear discharge, ear pain, facial swelling, rhinorrhea, sinus pain, sinus pressure, sneezing and sore throat  Eyes: Negative for photophobia, pain, discharge, redness, itching and visual disturbance  Respiratory: Negative for apnea, cough, hemoptysis, sputum production, chest tightness, shortness of breath and wheezing  Cardiovascular: Positive for chest pain  Negative for palpitations, orthopnea, claudication, syncope, PND and near-syncope  Gastrointestinal: Negative for abdominal distention, abdominal pain, anal bleeding, blood in stool, constipation, diarrhea, nausea and vomiting  Genitourinary: Negative for dysuria, flank pain, frequency, hematuria and urgency  Musculoskeletal: Positive for arthralgias and myalgias  Negative for back pain, gait problem, joint swelling, neck pain and neck stiffness  Skin: Negative for color change, rash and wound  Allergic/Immunologic: Negative for immunocompromised state  Neurological: Negative for dizziness, facial asymmetry, weakness, numbness and headaches  Hematological: Negative for adenopathy  Psychiatric/Behavioral: Negative for confusion and decreased concentration           Current Medications     Long-Term Prescriptions   Medication Sig Dispense Refill    amLODIPine (NORVASC) 10 mg tablet take 1 tablet by mouth once daily 30 tablet 5    beclomethasone (QVAR) 80 MCG/ACT inhaler Inhale 2 puffs 2 (two) times a day 1 Inhaler 5    escitalopram (LEXAPRO) 10 mg tablet Take 1 tablet (10 mg total) by mouth every morning 30 tablet 2    hydrochlorothiazide (HYDRODIURIL) 25 mg tablet Take 1 tablet (25 mg total) by mouth daily 30 tablet 5    hydrocortisone 0 5 % cream Apply topically 2 (two) times a day      insulin glargine (LANTUS SOLOSTAR) 100 units/mL injection pen Inject 30 Units under the skin daily 30 mL 2    Insulin Pen Needle (B-D UF III MINI PEN NEEDLES) 31G X 5 MM MISC by Does not apply route      Lancets (FREESTYLE) lancets by Does not apply route      lisinopril (ZESTRIL) 40 mg tablet take 1 tablet by mouth once daily 30 tablet 5    simvastatin (ZOCOR) 20 mg tablet Take 1 tablet (20 mg total) by mouth daily at bedtime 30 tablet 3    sitaGLIPtin (JANUVIA) 100 mg tablet Take 1 tablet (100 mg total) by mouth daily 30 tablet 5    metFORMIN (GLUCOPHAGE) 1000 MG tablet Take 1 tablet (1,000 mg total) by mouth 2 (two) times a day with meals (Patient not taking: Reported on 9/7/2018 ) 60 tablet 2    pantoprazole (PROTONIX) 40 mg tablet Take 1 tablet (40 mg total) by mouth daily (Patient not taking: Reported on 9/7/2018 ) 30 tablet 0       Current Allergies     Allergies as of 09/07/2018 - Reviewed 09/07/2018   Allergen Reaction Noted    Percocet [oxycodone-acetaminophen] GI Intolerance 09/06/2016    Vicodin [hydrocodone-acetaminophen] GI Intolerance 09/06/2016            The following portions of the patient's history were reviewed and updated as appropriate: allergies, current medications, past family history, past medical history, past social history, past surgical history and problem list   Past Medical History:   Diagnosis Date    Abnormal ECG     last assessed: 5/15/17    Abnormal mammogram     last assessed: 7/11/17    Abnormal stress test     last assesed: 7/24/17    Arthritis     Asthma     Back problem     Chest pain     last assessed: 7/24/17    Cyst of left breast     last assessed: 8/18/17    Depression     Depression     resolved: 1/2/18    Diabetes mellitus ([facility] [address] )     Hiatal hernia     last assessed: 11/7/17    Hyperlipidemia     Hypertension     Keloid of skin last assessed: 11/2/16    Neuropathy     Psychiatric disorder     anxiety     Past Surgical History:   Procedure Laterality Date    ARM WOUND REPAIR / CLOSURE      CATARACT EXTRACTION      CYST REMOVAL      right upper arm   EYE SURGERY      cataract removaql    TX ESOPHAGOGASTRODUODENOSCOPY TRANSORAL DIAGNOSTIC N/A 1/5/2018    Procedure: ESOPHAGOGASTRODUODENOSCOPY (EGD); Surgeon: Hank Berumen DO;  Location: [facility];  Service: Gastroenterology    TUBAL LIGATION       Social History     Social History    Marital status: /Civil Union     Spouse name: N/A    Number of children: N/A    Years of education: N/A     Occupational History    housewife/homemaker      Social History Main Topics    Smoking status: Never Smoker    Smokeless tobacco: Never Used    Alcohol use No    Drug use: No    Sexual activity: Not Currently     Other Topics Concern    Not on file     Social History Narrative    Always uses seatbelts    No living will       Objective   /84 (BP Location: Right arm, Patient Position: Sitting, Cuff Size: Standard)   Pulse 69   Temp 98 7 °F (37 1 °C) (Tympanic)   Resp 18   Ht 5' 5" (1 651 m)   Wt 98 kg (216 lb)   SpO2 97%   BMI 35 94 kg/m²      Physical Exam     Physical Exam   Constitutional: She is oriented to person, place, and time  She appears well-developed and well-nourished  No distress  HENT:   Head: Normocephalic and atraumatic  Right Ear: Tympanic membrane and external ear normal    Left Ear: Tympanic membrane and external ear normal    Nose: Nose normal    Mouth/Throat: Oropharynx is clear and moist  No oropharyngeal exudate  Eyes: Conjunctivae and EOM are normal  Pupils are equal, round, and reactive to light  Right eye exhibits no discharge  Left eye exhibits no discharge  No scleral icterus  Neck: Normal range of motion  Neck supple  No JVD present  No tracheal deviation present  No thyromegaly present     Cardiovascular: Normal rate, regular rhythm and normal heart sounds  Exam reveals no gallop and no friction rub  No murmur heard  Pulmonary/Chest: Effort normal and breath sounds normal  No stridor  No respiratory distress  She has no wheezes  She has no rales  She exhibits no tenderness  Abdominal: Soft  Bowel sounds are normal  She exhibits no distension and no mass  There is generalized tenderness  There is no rebound, no guarding and no CVA tenderness  Musculoskeletal: Normal range of motion  She exhibits no tenderness or deformity  Lymphadenopathy:     She has no cervical adenopathy  Neurological: She is alert and oriented to person, place, and time  She has normal reflexes  Coordination normal    Skin: Skin is warm and dry  No rash noted  She is not diaphoretic  No erythema  No pallor  Psychiatric: She has a normal mood and affect  Her behavior is normal  Judgment and thought content normal    Nursing note and vitals reviewed        Jasper Ayers PA-C

## 2018-09-07 NOTE — ED PROCEDURE NOTE
PROCEDURE  ECG 12 Lead Documentation  Date/Time: 9/7/2018 10:39 AM  Performed by: Regino Juan  Authorized by: Regino Juan     Indications / Diagnosis:  Chest pain   ECG reviewed by me, the ED Provider: yes    Patient location:  ED  Previous ECG:     Previous ECG:  Unavailable  Interpretation:     Interpretation: non-specific    Rate:     ECG rate:  62    ECG rate assessment: normal    ST segments:     ST segments:  Non-specific         Vania Jaimes DO  09/07/18 1039

## 2018-09-08 LAB
PROT 24H UR-MCNC: 216.3 MG/24 HRS (ref 40–150)
SPECIMEN VOL UR: 1030 ML

## 2018-09-10 LAB
ATRIAL RATE: 62 BPM
ATRIAL RATE: 65 BPM
P AXIS: 37 DEGREES
P AXIS: 43 DEGREES
PR INTERVAL: 194 MS
PR INTERVAL: 200 MS
QRS AXIS: -31 DEGREES
QRS AXIS: -34 DEGREES
QRSD INTERVAL: 92 MS
QRSD INTERVAL: 96 MS
QT INTERVAL: 438 MS
QT INTERVAL: 440 MS
QTC INTERVAL: 446 MS
QTC INTERVAL: 455 MS
T WAVE AXIS: -14 DEGREES
T WAVE AXIS: -15 DEGREES
VENTRICULAR RATE: 62 BPM
VENTRICULAR RATE: 65 BPM

## 2018-09-10 PROCEDURE — 93010 ELECTROCARDIOGRAM REPORT: CPT | Performed by: INTERNAL MEDICINE

## 2018-09-17 NOTE — PROGRESS NOTES
Please call the patient regarding her abnormal result   Please make sure this diabetic eye exam gets into the dashboard

## 2018-11-14 ENCOUNTER — HOSPITAL ENCOUNTER (OUTPATIENT)
Dept: MAMMOGRAPHY | Facility: HOSPITAL | Age: 63
Discharge: HOME | End: 2018-11-14
Attending: FAMILY MEDICINE
Payer: COMMERCIAL

## 2018-11-14 VITALS — BODY MASS INDEX: 36.99 KG/M2 | HEIGHT: 65 IN | WEIGHT: 222 LBS

## 2018-11-14 DIAGNOSIS — Z12.31 ENCOUNTER FOR SCREENING MAMMOGRAM FOR MALIGNANT NEOPLASM OF BREAST: ICD-10-CM

## 2018-11-14 PROCEDURE — 77063 BREAST TOMOSYNTHESIS BI: CPT

## 2018-11-14 PROCEDURE — 77067 SCR MAMMO BI INCL CAD: CPT

## 2018-11-15 NOTE — PROGRESS NOTES
Please call the patient regarding her abnormal result    Mammogram needs follow-up with additional mammogram views and an ultrasound of the breast make sure patient follows up

## 2018-11-16 DIAGNOSIS — N63.20 MASSES OF BOTH BREASTS: Primary | ICD-10-CM

## 2018-11-16 DIAGNOSIS — N63.10 MASSES OF BOTH BREASTS: Primary | ICD-10-CM

## 2018-12-06 DIAGNOSIS — F32.4 MAJOR DEPRESSIVE DISORDER WITH SINGLE EPISODE, IN PARTIAL REMISSION (HCC): ICD-10-CM

## 2018-12-06 RX ORDER — ESCITALOPRAM OXALATE 10 MG/1
10 TABLET ORAL EVERY MORNING
Qty: 30 TABLET | Refills: 5 | Status: SHIPPED | OUTPATIENT
Start: 2018-12-06 | End: 2019-03-29

## 2018-12-07 ENCOUNTER — TELEPHONE (OUTPATIENT)
Dept: FAMILY MEDICINE CLINIC | Facility: CLINIC | Age: 63
End: 2018-12-07

## 2018-12-08 ENCOUNTER — APPOINTMENT (EMERGENCY)
Dept: ULTRASOUND IMAGING | Facility: HOSPITAL | Age: 63
End: 2018-12-08
Payer: COMMERCIAL

## 2018-12-08 ENCOUNTER — APPOINTMENT (EMERGENCY)
Dept: CT IMAGING | Facility: HOSPITAL | Age: 63
End: 2018-12-08
Payer: COMMERCIAL

## 2018-12-08 ENCOUNTER — HOSPITAL ENCOUNTER (EMERGENCY)
Facility: HOSPITAL | Age: 63
Discharge: HOME | End: 2018-12-08
Attending: EMERGENCY MEDICINE | Admitting: EMERGENCY MEDICINE
Payer: COMMERCIAL

## 2018-12-08 ENCOUNTER — APPOINTMENT (EMERGENCY)
Dept: RADIOLOGY | Facility: HOSPITAL | Age: 63
End: 2018-12-08
Payer: COMMERCIAL

## 2018-12-08 VITALS
HEIGHT: 65 IN | RESPIRATION RATE: 18 BRPM | BODY MASS INDEX: 37.47 KG/M2 | TEMPERATURE: 98.5 F | OXYGEN SATURATION: 96 % | HEART RATE: 61 BPM | DIASTOLIC BLOOD PRESSURE: 83 MMHG | SYSTOLIC BLOOD PRESSURE: 163 MMHG | WEIGHT: 224.87 LBS

## 2018-12-08 DIAGNOSIS — E04.1 THYROID NODULE: Primary | ICD-10-CM

## 2018-12-08 DIAGNOSIS — M25.512 LEFT SHOULDER PAIN: ICD-10-CM

## 2018-12-08 LAB
ALBUMIN SERPL BCP-MCNC: 3.1 G/DL (ref 3.5–5)
ALP SERPL-CCNC: 73 U/L (ref 46–116)
ALT SERPL W P-5'-P-CCNC: 19 U/L (ref 12–78)
ANION GAP SERPL CALCULATED.3IONS-SCNC: 9 MMOL/L (ref 4–13)
APTT PPP: 27 SECONDS (ref 26–38)
AST SERPL W P-5'-P-CCNC: 11 U/L (ref 5–45)
BASOPHILS # BLD AUTO: 0.01 THOUSANDS/ΜL (ref 0–0.1)
BASOPHILS NFR BLD AUTO: 0 % (ref 0–1)
BILIRUB SERPL-MCNC: 0.2 MG/DL (ref 0.2–1)
BUN SERPL-MCNC: 14 MG/DL (ref 5–25)
CALCIUM SERPL-MCNC: 8.3 MG/DL (ref 8.3–10.1)
CHLORIDE SERPL-SCNC: 100 MMOL/L (ref 100–108)
CO2 SERPL-SCNC: 30 MMOL/L (ref 21–32)
CREAT SERPL-MCNC: 0.96 MG/DL (ref 0.6–1.3)
EOSINOPHIL # BLD AUTO: 0.09 THOUSAND/ΜL (ref 0–0.61)
EOSINOPHIL NFR BLD AUTO: 2 % (ref 0–6)
ERYTHROCYTE [DISTWIDTH] IN BLOOD BY AUTOMATED COUNT: 14.3 % (ref 11.6–15.1)
GFR SERPL CREATININE-BSD FRML MDRD: 73 ML/MIN/1.73SQ M
GLUCOSE SERPL-MCNC: 292 MG/DL (ref 65–140)
HCT VFR BLD AUTO: 36.1 % (ref 34.8–46.1)
HGB BLD-MCNC: 11.8 G/DL (ref 11.5–15.4)
IMM GRANULOCYTES # BLD AUTO: 0.01 THOUSAND/UL (ref 0–0.2)
IMM GRANULOCYTES NFR BLD AUTO: 0 % (ref 0–2)
INR PPP: 1.01 (ref 0.86–1.17)
LYMPHOCYTES # BLD AUTO: 1.62 THOUSANDS/ΜL (ref 0.6–4.47)
LYMPHOCYTES NFR BLD AUTO: 36 % (ref 14–44)
MCH RBC QN AUTO: 27.3 PG (ref 26.8–34.3)
MCHC RBC AUTO-ENTMCNC: 32.7 G/DL (ref 31.4–37.4)
MCV RBC AUTO: 84 FL (ref 82–98)
MONOCYTES # BLD AUTO: 0.38 THOUSAND/ΜL (ref 0.17–1.22)
MONOCYTES NFR BLD AUTO: 8 % (ref 4–12)
NEUTROPHILS # BLD AUTO: 2.45 THOUSANDS/ΜL (ref 1.85–7.62)
NEUTS SEG NFR BLD AUTO: 54 % (ref 43–75)
NRBC BLD AUTO-RTO: 0 /100 WBCS
PLATELET # BLD AUTO: 202 THOUSANDS/UL (ref 149–390)
PMV BLD AUTO: 10.4 FL (ref 8.9–12.7)
POTASSIUM SERPL-SCNC: 3.6 MMOL/L (ref 3.5–5.3)
PROT SERPL-MCNC: 7.2 G/DL (ref 6.4–8.2)
PROTHROMBIN TIME: 12.8 SECONDS (ref 11.8–14.2)
RBC # BLD AUTO: 4.32 MILLION/UL (ref 3.81–5.12)
SODIUM SERPL-SCNC: 139 MMOL/L (ref 136–145)
TROPONIN I SERPL-MCNC: <0.02 NG/ML
WBC # BLD AUTO: 4.56 THOUSAND/UL (ref 4.31–10.16)

## 2018-12-08 PROCEDURE — 93005 ELECTROCARDIOGRAM TRACING: CPT

## 2018-12-08 PROCEDURE — 93970 EXTREMITY STUDY: CPT | Performed by: SURGERY

## 2018-12-08 PROCEDURE — 72125 CT NECK SPINE W/O DYE: CPT

## 2018-12-08 PROCEDURE — 96374 THER/PROPH/DIAG INJ IV PUSH: CPT

## 2018-12-08 PROCEDURE — 85730 THROMBOPLASTIN TIME PARTIAL: CPT | Performed by: EMERGENCY MEDICINE

## 2018-12-08 PROCEDURE — 85025 COMPLETE CBC W/AUTO DIFF WBC: CPT | Performed by: EMERGENCY MEDICINE

## 2018-12-08 PROCEDURE — 99285 EMERGENCY DEPT VISIT HI MDM: CPT

## 2018-12-08 PROCEDURE — 85610 PROTHROMBIN TIME: CPT | Performed by: EMERGENCY MEDICINE

## 2018-12-08 PROCEDURE — 84484 ASSAY OF TROPONIN QUANT: CPT | Performed by: EMERGENCY MEDICINE

## 2018-12-08 PROCEDURE — 36415 COLL VENOUS BLD VENIPUNCTURE: CPT | Performed by: EMERGENCY MEDICINE

## 2018-12-08 PROCEDURE — 93970 EXTREMITY STUDY: CPT

## 2018-12-08 PROCEDURE — 74177 CT ABD & PELVIS W/CONTRAST: CPT

## 2018-12-08 PROCEDURE — 73060 X-RAY EXAM OF HUMERUS: CPT

## 2018-12-08 PROCEDURE — 71275 CT ANGIOGRAPHY CHEST: CPT

## 2018-12-08 PROCEDURE — 96361 HYDRATE IV INFUSION ADD-ON: CPT

## 2018-12-08 PROCEDURE — 80053 COMPREHEN METABOLIC PANEL: CPT | Performed by: EMERGENCY MEDICINE

## 2018-12-08 RX ORDER — KETOROLAC TROMETHAMINE 30 MG/ML
15 INJECTION, SOLUTION INTRAMUSCULAR; INTRAVENOUS ONCE
Status: COMPLETED | OUTPATIENT
Start: 2018-12-08 | End: 2018-12-08

## 2018-12-08 RX ORDER — NAPROXEN 500 MG/1
500 TABLET ORAL 2 TIMES DAILY WITH MEALS
Qty: 14 TABLET | Refills: 0 | Status: SHIPPED | OUTPATIENT
Start: 2018-12-08 | End: 2019-08-12

## 2018-12-08 RX ADMIN — IOHEXOL 100 ML: 350 INJECTION, SOLUTION INTRAVENOUS at 15:18

## 2018-12-08 RX ADMIN — SODIUM CHLORIDE 500 ML: 0.9 INJECTION, SOLUTION INTRAVENOUS at 15:33

## 2018-12-08 RX ADMIN — KETOROLAC TROMETHAMINE 15 MG: 30 INJECTION, SOLUTION INTRAMUSCULAR at 15:33

## 2018-12-08 NOTE — ED PROCEDURE NOTE
PROCEDURE  ECG 12 Lead Documentation  Date/Time: 12/8/2018 1:49 PM  Performed by: Aleyda Rao  Authorized by: Alyeda Rao     Indications / Diagnosis:  Left arm pain   ECG reviewed by me, the ED Provider: yes    Patient location:  ED  Previous ECG:     Previous ECG:  Unavailable  Interpretation:     Interpretation: non-specific    Rate:     ECG rate:  76    ECG rate assessment: normal    Rhythm:     Rhythm: sinus rhythm    ST segments:     ST segments:  Non-specific         Carlos Mccarty DO  12/08/18 0505

## 2018-12-08 NOTE — ED PROVIDER NOTES
History  Chief Complaint   Patient presents with    Chest Pain     Intermittent, chest pain for one week radiating down left arm      66-year-old female presents with left-sided chest and shoulder pain radiating down her arm for 1 week  Patient states she did not see her PCP for this because she was busy"  Patient states the pain has been present daily  the pain is intensified when she moves her arm  She also states she has been having abdominal pain and belching  She states that it has felt like indigestion"  Patient has had no focal neurological deficits  Patient also states that her ankles were swollen last night when she went to bed but better when she woke up this morning  History provided by:  Patient  Chest Pain   Pain location:  L chest  Pain quality: aching and radiating    Pain radiates to:  L shoulder  Pain severity:  Mild  Onset quality:  Gradual  Duration:  6 days  Timing:  Intermittent  Progression:  Waxing and waning  Chronicity:  Recurrent  Context: lifting, movement, raising an arm, at rest and stress    Relieved by:  Rest  Worsened by:  Exertion and movement  Ineffective treatments:  None tried  Associated symptoms: abdominal pain, fatigue, heartburn, lower extremity edema and nausea    Associated symptoms: no back pain, no fever and no headache    Risk factors: coronary artery disease, diabetes mellitus, high cholesterol, hypertension and obesity    Risk factors: no aortic disease        Prior to Admission Medications   Prescriptions Last Dose Informant Patient Reported? Taking?    ACCU-CHEK SHANE PLUS test strip   No No   Sig: TEST 2 TIMES A DAY   Insulin Pen Needle (B-D UF III MINI PEN NEEDLES) 31G X 5 MM MISC   Yes No   Sig: by Does not apply route   Lancets (FREESTYLE) lancets   Yes No   Sig: by Does not apply route   POLYETHYLENE GLYCOL 3350 PO   Yes No   Sig: Take 17 g by mouth daily as needed   albuterol (PROVENTIL HFA,VENTOLIN HFA) 90 mcg/act inhaler   Yes No   Sig: Inhale 2 puffs every 4 (four) hours as needed for wheezing   amLODIPine (NORVASC) 10 mg tablet   No No   Sig: take 1 tablet by mouth once daily   aspirin 325 mg tablet   Yes No   Sig: Take 1 tablet by mouth daily   beclomethasone (QVAR) 80 MCG/ACT inhaler   No No   Sig: Inhale 2 puffs 2 (two) times a day   busPIRone (BUSPAR) 5 mg tablet   Yes No   Sig: Take 5 mg by mouth 3 (three) times a day  cycloSPORINE (RESTASIS) 0 05 % ophthalmic emulsion   Yes No   Sig: Apply to eye   escitalopram (LEXAPRO) 10 mg tablet   No No   Sig: Take 1 tablet (10 mg total) by mouth every morning   famotidine (PEPCID) 20 mg tablet   No No   Sig: Take 1 tablet (20 mg total) by mouth 2 (two) times a day   gabapentin (NEURONTIN) 300 mg capsule   Yes No   Sig: Take 300 mg by mouth 3 (three) times a day  hydrochlorothiazide (HYDRODIURIL) 25 mg tablet   No No   Sig: Take 1 tablet (25 mg total) by mouth daily   hydrocortisone 0 5 % cream   Yes No   Sig: Apply topically 2 (two) times a day   ibuprofen (MOTRIN) 600 mg tablet   Yes No   Sig: Take 600 mg by mouth every 6 (six) hours as needed   insulin glargine (LANTUS SOLOSTAR) 100 units/mL injection pen   No No   Sig: Inject 30 Units under the skin daily   lisinopril (ZESTRIL) 40 mg tablet   No No   Sig: take 1 tablet by mouth once daily   metFORMIN (GLUCOPHAGE) 1000 MG tablet   No No   Sig: Take 1 tablet (1,000 mg total) by mouth 2 (two) times a day with meals   Patient not taking: Reported on 9/7/2018    pantoprazole (PROTONIX) 40 mg tablet   No No   Sig: Take 1 tablet (40 mg total) by mouth daily   Patient not taking: Reported on 9/7/2018    simvastatin (ZOCOR) 20 mg tablet   No No   Sig: Take 1 tablet (20 mg total) by mouth daily at bedtime   sitaGLIPtin (JANUVIA) 100 mg tablet   No No   Sig: Take 1 tablet (100 mg total) by mouth daily   traZODone (DESYREL) 50 mg tablet   Yes No   Sig: Take 50 mg by mouth daily at bedtime        Facility-Administered Medications: None       Past Medical History:   Diagnosis Date    Abnormal ECG     last assessed: 5/15/17    Abnormal mammogram     last assessed: 7/11/17    Abnormal stress test     last assesed: 7/24/17    Arthritis     Asthma     Back problem     Breast cyst     Chest pain     last assessed: 7/24/17    Cyst of left breast     last assessed: 8/18/17    Depression     Depression     resolved: 1/2/18    Diabetes mellitus ([address] )     Hiatal hernia     last assessed: 11/7/17    Hyperlipidemia     Hypertension     Keloid of skin     last assessed: 11/2/16    Neuropathy     Psychiatric disorder     anxiety       Past Surgical History:   Procedure Laterality Date    ARM WOUND REPAIR / CLOSURE      CATARACT EXTRACTION      CYST REMOVAL      right upper arm   EYE SURGERY      cataract removaql    NC ESOPHAGOGASTRODUODENOSCOPY TRANSORAL DIAGNOSTIC N/A 1/5/2018    Procedure: ESOPHAGOGASTRODUODENOSCOPY (EGD); Surgeon: William Mcgovern DO;  Location: [facility];  Service: Gastroenterology    TUBAL LIGATION         Family History   Problem Relation Age of Onset    Heart disease Mother     Diabetes Mother     Arthritis Mother     Heart attack Mother     Hypertension Mother     Kidney disease Father     Hypertension Father     Arthritis Sister     Cancer Maternal Grandmother     Stroke Maternal Grandmother     Arthritis Paternal Grandmother     Cancer Paternal Grandmother     Heart attack Paternal Grandmother     Stroke Maternal Aunt     Heart attack Maternal Uncle     Cancer Family         spinal column    Coronary artery disease Family     Glaucoma Family     Rheum arthritis Family      I have reviewed and agree with the history as documented  Social History   Substance Use Topics    Smoking status: Never Smoker    Smokeless tobacco: Never Used    Alcohol use No        Review of Systems   Constitutional: Positive for fatigue  Negative for fever  HENT: Negative for congestion, dental problem and drooling      Eyes: Negative for pain, discharge and itching  Respiratory: Negative for apnea, choking and chest tightness  Cardiovascular: Positive for chest pain  Gastrointestinal: Positive for abdominal pain, heartburn and nausea  Endocrine: Negative for cold intolerance, heat intolerance and polydipsia  Genitourinary: Negative for difficulty urinating, dyspareunia and dysuria  Musculoskeletal: Negative for arthralgias, back pain and gait problem  Tender palpation over the left proximal humerus   Skin: Negative for color change  Allergic/Immunologic: Negative for environmental allergies  Neurological: Negative for headaches  Hematological: Negative for adenopathy  Psychiatric/Behavioral: Negative for agitation and behavioral problems  Physical Exam  Physical Exam   Constitutional: She appears well-developed  HENT:   Head: Normocephalic  Right Ear: External ear normal    Eyes: Pupils are equal, round, and reactive to light  Right eye exhibits discharge  Left eye exhibits no discharge  Neck: Normal range of motion  No tracheal deviation present  No thyromegaly present  Cardiovascular: Normal rate and regular rhythm  Pulmonary/Chest: Effort normal  No respiratory distress  She has no wheezes  Abdominal: Soft  She exhibits no distension  There is no tenderness  There is no guarding  Musculoskeletal: She exhibits edema  She exhibits no deformity  Neurological: She displays normal reflexes  No cranial nerve deficit  She exhibits normal muscle tone  Coordination normal    Skin: Skin is warm  Capillary refill takes less than 2 seconds  She is not diaphoretic  No erythema  Psychiatric: She has a normal mood and affect  Her behavior is normal  Thought content normal    Vitals reviewed        Vital Signs  ED Triage Vitals [12/08/18 1342]   Temperature Pulse Respirations Blood Pressure SpO2   98 5 °F (36 9 °C) 76 18 (!) 178/86 99 %      Temp Source Heart Rate Source Patient Position - Orthostatic VS BP Location FiO2 (%)   Oral Monitor Lying Right arm --      Pain Score       7           Vitals:    12/08/18 1342 12/08/18 1430 12/08/18 1630   BP: (!) 178/86 168/86 163/83   Pulse: 76 68 61   Patient Position - Orthostatic VS: Lying         Visual Acuity      ED Medications  Medications   sodium chloride 0 9 % bolus 500 mL (500 mL Intravenous New Bag 12/8/18 1533)   ketorolac (TORADOL) injection 15 mg (15 mg Intravenous Given 12/8/18 1533)   iohexol (OMNIPAQUE) 350 MG/ML injection (MULTI-DOSE) 100 mL (100 mL Intravenous Given 12/8/18 1518)       Diagnostic Studies  Results Reviewed     Procedure Component Value Units Date/Time    Comprehensive metabolic panel [[id number]]  (Abnormal) Collected:  12/08/18 1356    Lab Status:  Final result Specimen:  Blood from Arm, Left Updated:  12/08/18 1428     Sodium 139 mmol/L      Potassium 3 6 mmol/L      Chloride 100 mmol/L      CO2 30 mmol/L      ANION GAP 9 mmol/L      BUN 14 mg/dL      Creatinine 0 96 mg/dL      Glucose 292 (H) mg/dL      Calcium 8 3 mg/dL      AST 11 U/L      ALT 19 U/L      Alkaline Phosphatase 73 U/L      Total Protein 7 2 g/dL      Albumin 3 1 (L) g/dL      Total Bilirubin 0 20 mg/dL      eGFR 73 ml/min/1 73sq m     Narrative:         National Kidney Disease Education Program recommendations are as follows:  GFR calculation is accurate only with a steady state creatinine  Chronic Kidney disease less than 60 ml/min/1 73 sq  meters  Kidney failure less than 15 ml/min/1 73 sq  meters      Troponin I [[id number]]  (Normal) Collected:  12/08/18 1356    Lab Status:  Final result Specimen:  Blood from Arm, Left Updated:  12/08/18 1420     Troponin I <0 02 ng/mL     Protime-INR [[id number]]  (Normal) Collected:  12/08/18 1356    Lab Status:  Final result Specimen:  Blood from Arm, Left Updated:  12/08/18 1412     Protime 12 8 seconds      INR 1 01    APTT [[id number]]  (Normal) Collected:  12/08/18 1356    Lab Status:  Final result Specimen:  Blood from Arm, Left Updated:  12/08/18 1412     PTT 27 seconds     CBC and differential [[id number]] Collected:  12/08/18 1356    Lab Status:  Final result Specimen:  Blood from Arm, Left Updated:  12/08/18 1402     WBC 4 56 Thousand/uL      RBC 4 32 Million/uL      Hemoglobin 11 8 g/dL      Hematocrit 36 1 %      MCV 84 fL      MCH 27 3 pg      MCHC 32 7 g/dL      RDW 14 3 %      MPV 10 4 fL      Platelets 642 Thousands/uL      nRBC 0 /100 WBCs      Neutrophils Relative 54 %      Immat GRANS % 0 %      Lymphocytes Relative 36 %      Monocytes Relative 8 %      Eosinophils Relative 2 %      Basophils Relative 0 %      Neutrophils Absolute 2 45 Thousands/µL      Immature Grans Absolute 0 01 Thousand/uL      Lymphocytes Absolute 1 62 Thousands/µL      Monocytes Absolute 0 38 Thousand/µL      Eosinophils Absolute 0 09 Thousand/µL      Basophils Absolute 0 01 Thousands/µL                  PE Study with CT Abdomen and Pelvis with contrast   Final Result by Tiffany Cifuentes MD (12/08 1644)      1  No pulmonary embolism  2   No acute abdominopelvic findings  Chronic findings including small hiatal hernia and stable fat-containing umbilical hernia  3   Incidental left thyroid nodule for which nonemergent thyroid ultrasound is again recommended  Workstation performed: [id number]         CT cervical spine without contrast   Final Result by Tiffany Cifuentes MD (12/08 1634)      No cervical spine fracture or traumatic malalignment  4 1 cm left thyroid nodule  Thyroid ultrasound recommended               Workstation performed: [id number]         XR humerus LEFT   ED Interpretation by Bal Gage DO (12/08 1410)   No fracture       VAS lower limb venous duplex study, complete bilateral    (Results Pending)              Procedures  Procedures       Phone Contacts  ED Phone Contact    ED Course         HEART Risk Score      Most Recent Value   History  1 Filed at: 12/08/2018 1349   ECG  0 Filed at: 12/08/2018 1348 Age  1 Filed at: 12/08/2018 1349   Risk Factors  1 Filed at: 12/08/2018 1349   Troponin  0 Filed at: 12/08/2018 1349   Heart Score Risk Calculator   History  1 Filed at: 12/08/2018 1349   ECG  0 Filed at: 12/08/2018 1349   Age  1 Filed at: 12/08/2018 1349   Risk Factors  1 Filed at: 12/08/2018 1349   Troponin  0 Filed at: 12/08/2018 1349   HEART Score  3 Filed at: 12/08/2018 1349   HEART Score  3 Filed at: 12/08/2018 1349                            MDM  Number of Diagnoses or Management Options  Left shoulder pain:   Thyroid nodule:   Diagnosis management comments: Differential diagnosis 1  Acute coronary syndrome 2  Pulmonary embolism 3  Colitis 3 DVT for peripheral neuropathy secondary to nerve impingement in the cervical spine  Will perform CT of the cervical spine CT scan the chest on pelvis  16 50 print  I spoke with patient at length about following up with PCP for a ultrasound of her thyroid  As patient has a thyroid nodule  Patient understands this and will follow up         Amount and/or Complexity of Data Reviewed  Clinical lab tests: reviewed  Tests in the radiology section of CPT®: reviewed  Tests in the medicine section of CPT®: reviewed    Risk of Complications, Morbidity, and/or Mortality  Presenting problems: high  Diagnostic procedures: high  Management options: high      CritCare Time    Disposition  Final diagnoses:   Thyroid nodule   Left shoulder pain     Time reflects when diagnosis was documented in both MDM as applicable and the Disposition within this note     Time User Action Codes Description Comment    12/8/2018  4:45 PM Martin Neri Add [E04 1] Thyroid nodule, cold     12/8/2018  4:45 PM Martin Neri Remove [E04 1] Thyroid nodule, cold     12/8/2018  4:45 PM Martin Neri Add [E04 1] Thyroid nodule     12/8/2018  4:49 PM Martin Neri Add [M25 512] Left shoulder pain       ED Disposition     ED Disposition Condition Comment    Discharge  [facility] discharge to home/self care  Condition at discharge: Good        Follow-up Information    None         Patient's Medications   Discharge Prescriptions    No medications on file     No discharge procedures on file      ED Provider  Electronically Signed by           Justin Carney DO  12/08/18 6862

## 2018-12-08 NOTE — DISCHARGE INSTRUCTIONS
Arm Pain   WHAT YOU NEED TO KNOW:   Your arm pain may be caused by a number of conditions  Examples include arthritis, nerve problems, or an awkward position while you sleep  X-rays did not show a broken bone in your arm or wrist  Arm pain may be a sign of a serious condition that needs immediate care, such as a heart attack  DISCHARGE INSTRUCTIONS:   Call 911 for any of the following: You have any of the following signs of a heart attack:   · Squeezing, pressure, or pain in your chest that lasts longer than 5 minutes or returns    · Discomfort or pain in your back, neck, jaw, stomach, or arm     · Trouble breathing or a fast, fluttery heartbeat    · Nausea or vomiting    · Lightheadedness or a sudden cold sweat, especially with chest pain or trouble breathing  Return to the emergency department if:   · You have severe pain, or pain that spreads from your arm to other areas  · You have swelling, tingling, or numbness in your hand or fingers, or the skin turns blue  · You cannot move your arm  Contact your healthcare provider if:   · You have questions or concerns about your condition or care  Medicines: You may need any of the following:  · Prescription pain medicine  may be given  Ask how to take this medicine safely  · NSAIDs , such as ibuprofen, help decrease swelling, pain, and fever  This medicine is available with or without a doctor's order  NSAIDs can cause stomach bleeding or kidney problems in certain people  If you take blood thinner medicine, always ask your healthcare provider if NSAIDs are safe for you  Always read the medicine label and follow directions  · Take your medicine as directed  Contact your healthcare provider if you think your medicine is not helping or if you have side effects  Tell him or her if you are allergic to any medicine  Keep a list of the medicines, vitamins, and herbs you take  Include the amounts, and when and why you take them   Bring the list or the pill bottles to follow-up visits  Carry your medicine list with you in case of an emergency  Self-care:   · Rest your arm as directed  A sling may be used to keep your arm from moving while it heals  · Apply ice as directed  Ice helps decrease pain and swelling  Ice may also help prevent tissue damage  Use an ice pack, or put crushed ice in a plastic bag  Cover it with a towel  Apply it to your arm for 20 minutes every few hours, or as directed  Ask how many times to apply ice each day, and for how many days  · Elevate your arm above the level of your heart as often as you can  This will help decrease swelling and pain  Prop your arm on pillows or blankets to keep the area elevated comfortably  · Adjust your position if you work in front of a computer  You may need arm or wrist supports or change the height of your chair  · Keep a pain record  Write down when your pain happens and how severe it is  Include any other symptoms you have with your pain  A record will help you keep track of pain cycles  Bring the record with you to your follow-up visits  It may also help your healthcare provider find out what is causing your pain  Follow up with your healthcare provider as directed: You may need physical therapy  You may need to see an orthopedic specialist  Write down your questions so you remember to ask them during your visits  © 2017 [address] Information is for End User's use only and may not be sold, redistributed or otherwise used for commercial purposes  All illustrations and images included in CareNotes® are the copyrighted property of [employer]  or Matthew Espinosa  The above information is an  only  It is not intended as medical advice for individual conditions or treatments  Talk to your doctor, nurse or pharmacist before following any medical regimen to see if it is safe and effective for you      Shoulder Pain, Ambulatory Care   GENERAL INFORMATION:   Shoulder pain  is a common problem and can affect your daily activities  Pain can be caused by a problem within your shoulder  Shoulder pain may also be caused by pain that spreads to your shoulder from another part of your body  Seek immediate care for the following symptoms:   · Severe pain    · Inability to move your arm or shoulder    · Numbness or tingling in your shoulder or arm  Treatment for shoulder pain  may include any of the following:  · Acetaminophen  decreases pain and fever  It is available without a doctor's order  Ask how much to take and how often to take it  Follow directions  Acetaminophen can cause liver damage if not taken correctly  · NSAIDs  help decrease swelling and pain or fever  This medicine is available with or without a doctor's order  NSAIDs can cause stomach bleeding or kidney problems in certain people  If you take blood thinner medicine, always ask your healthcare provider if NSAIDs are safe for you  Always read the medicine label and follow directions  · A steroid injection  may help decrease pain and swelling  · Surgery  may be needed for long-term pain and loss of function  Manage your symptoms:   · Apply ice  on your shoulder for 20 to 30 minutes every 2 hours or as directed  Use an ice pack, or put crushed ice in a plastic bag  Cover it with a towel  Ice helps prevent tissue damage and decreases swelling and pain  · Apply heat if ice does not help your symptoms  Apply heat on your shoulder for 20 to 30 minutes every 2 hours for as many days as directed  Heat helps decrease pain and muscle spasms  · Go to physical or occupational therapy as directed  A physical therapist teaches you exercises to help improve movement and strength, and to decrease pain  An occupational therapist teaches you skills to help with your daily activities  Prevent shoulder pain:   · Stretch and strengthen your shoulder    Use proper technique during exercises and sports  · Limit activities as directed  Try to avoid repeated overhead movements  Follow up with your healthcare provider or orthopedist as directed:  Write down your questions so you remember to ask them during your visits  CARE AGREEMENT:   You have the right to help plan your care  Learn about your health condition and how it may be treated  Discuss treatment options with your caregivers to decide what care you want to receive  You always have the right to refuse treatment  The above information is an  only  It is not intended as medical advice for individual conditions or treatments  Talk to your doctor, nurse or pharmacist before following any medical regimen to see if it is safe and effective for you  © 2014 [address] is for End User's use only and may not be sold, redistributed or otherwise used for commercial purposes  All illustrations and images included in CareNotes® are the copyrighted property of [employer]  or Matthew Espinosa  Thyroid Nodules   WHAT YOU NEED TO KNOW:   Thyroid nodules are growths on your thyroid gland  Your thyroid makes hormones that help control your body temperature, heart rate, and growth  The hormones also control how fast your body uses food for energy  Some nodules are lumps of tissue, and others are filled with fluid  DISCHARGE INSTRUCTIONS:   Medicines:   · Thyroid medicine  is given to bring your thyroid hormone levels back to a normal range  · Radioactive iodine  is given to damage cells in your thyroid gland and decrease the size of your nodules  · Take your medicine as directed  Contact your healthcare provider if you think your medicine is not helping or if you have side effects  Tell him or her if you are allergic to any medicine  Keep a list of the medicines, vitamins, and herbs you take  Include the amounts, and when and why you take them   Bring the list or the pill bottles to follow-up visits  Carry your medicine list with you in case of an emergency  Follow up with your healthcare provider as directed: You may need frequent blood tests to check your thyroid hormone levels  You may also need tests such as an ultrasound to check if any nodules are growing or have returned  If you had surgery, follow directions about how to care for your wound  Write down your questions so you remember to ask them during your visits  Eat iodine-rich foods:  Examples include fish, seaweed, dairy products, eggs, beans, and lean meat  Iodized salt also contains iodine  You may need to use iodized table salt when you cook and season your food  Iodine may be added to bread or to your drinking water  Ask for a list of foods that contain iodine, and ask how much iodine you need each day  Contact your healthcare provider if:   · You have a new cough that does not improve  · You begin choking or have new or increased trouble swallowing  · Your voice becomes hoarse  · You are losing weight without trying  · You have questions or concerns about your condition or care  Seek immediate care or call 911 if:   · You have redness, swelling, or drainage at your surgery site  · You have sudden chest pain or trouble breathing  · Your symptoms worsen, even after you take your medicines  © 2017 [address]  Information is for End User's use only and may not be sold, redistributed or otherwise used for commercial purposes  All illustrations and images included in CareNotes® are the copyrighted property of [employer]  or Matthew Espinosa  The above information is an  only  It is not intended as medical advice for individual conditions or treatments  Talk to your doctor, nurse or pharmacist before following any medical regimen to see if it is safe and effective for you

## 2018-12-09 LAB
ATRIAL RATE: 76 BPM
P AXIS: 35 DEGREES
PR INTERVAL: 198 MS
QRS AXIS: -40 DEGREES
QRSD INTERVAL: 92 MS
QT INTERVAL: 384 MS
QTC INTERVAL: 432 MS
T WAVE AXIS: -6 DEGREES
VENTRICULAR RATE: 76 BPM

## 2018-12-09 PROCEDURE — 93010 ELECTROCARDIOGRAM REPORT: CPT | Performed by: INTERNAL MEDICINE

## 2018-12-10 ENCOUNTER — HOSPITAL ENCOUNTER (OUTPATIENT)
Dept: ULTRASOUND IMAGING | Facility: HOSPITAL | Age: 63
Discharge: HOME | End: 2018-12-10
Attending: FAMILY MEDICINE
Payer: COMMERCIAL

## 2018-12-10 ENCOUNTER — HOSPITAL ENCOUNTER (OUTPATIENT)
Dept: MAMMOGRAPHY | Facility: HOSPITAL | Age: 63
Discharge: HOME | End: 2018-12-10
Attending: FAMILY MEDICINE
Payer: COMMERCIAL

## 2018-12-10 DIAGNOSIS — N63.10 MASSES OF BOTH BREASTS: ICD-10-CM

## 2018-12-10 DIAGNOSIS — R92.8 ABNORMAL MAMMOGRAM: ICD-10-CM

## 2018-12-10 DIAGNOSIS — N63.20 MASSES OF BOTH BREASTS: ICD-10-CM

## 2018-12-10 PROCEDURE — 76642 ULTRASOUND BREAST LIMITED: CPT

## 2018-12-19 ENCOUNTER — OFFICE VISIT (OUTPATIENT)
Dept: FAMILY MEDICINE CLINIC | Facility: CLINIC | Age: 63
End: 2018-12-19
Payer: COMMERCIAL

## 2018-12-19 VITALS
HEIGHT: 65 IN | DIASTOLIC BLOOD PRESSURE: 82 MMHG | WEIGHT: 228 LBS | SYSTOLIC BLOOD PRESSURE: 134 MMHG | BODY MASS INDEX: 37.99 KG/M2

## 2018-12-19 DIAGNOSIS — A04.8 H. PYLORI INFECTION: ICD-10-CM

## 2018-12-19 DIAGNOSIS — E04.1 THYROID NODULE: ICD-10-CM

## 2018-12-19 DIAGNOSIS — F41.1 GENERALIZED ANXIETY DISORDER: Primary | ICD-10-CM

## 2018-12-19 PROCEDURE — 99214 OFFICE O/P EST MOD 30 MIN: CPT | Performed by: FAMILY MEDICINE

## 2018-12-19 PROCEDURE — 3008F BODY MASS INDEX DOCD: CPT | Performed by: FAMILY MEDICINE

## 2018-12-19 RX ORDER — BUSPIRONE HYDROCHLORIDE 5 MG/1
5 TABLET ORAL 3 TIMES DAILY
Qty: 90 TABLET | Refills: 2 | Status: SHIPPED | OUTPATIENT
Start: 2018-12-19 | End: 2019-05-19 | Stop reason: SDUPTHER

## 2018-12-19 RX ORDER — PANTOPRAZOLE SODIUM 40 MG/1
40 TABLET, DELAYED RELEASE ORAL DAILY
Qty: 30 TABLET | Refills: 5 | Status: SHIPPED | OUTPATIENT
Start: 2018-12-19 | End: 2019-06-17 | Stop reason: SDUPTHER

## 2018-12-19 NOTE — PROGRESS NOTES
Assessment/Plan:    No problem-specific Assessment & Plan notes found for this encounter  Diagnoses and all orders for this visit:    Generalized anxiety disorder  -     busPIRone (BUSPAR) 5 mg tablet; Take 1 tablet (5 mg total) by mouth 3 (three) times a day    H  pylori infection  -     pantoprazole (PROTONIX) 40 mg tablet; Take 1 tablet (40 mg total) by mouth daily    Thyroid nodule    Other orders  -     Cancel: US abdomen limited; Future          Subjective:      Patient ID: Amanda Joshi is a 61 y o  female  Recent emergency room visit feelings of malaise fatigue left shoulder and arm pain incidental finding of a little they thyroid nodule also on PE study CT scan of the chest was normal patient is head was ruled out as an MI also      Diabetes   Pertinent negatives for hypoglycemia include no dizziness, headaches, seizures or speech difficulty  Pertinent negatives for diabetes include no chest pain, no fatigue, no polydipsia, no polyphagia and no polyuria  The following portions of the patient's history were reviewed and updated as appropriate:   She  has a past medical history of Abnormal ECG; Abnormal mammogram; Abnormal stress test; Arthritis; Asthma; Back problem; Breast cyst; Chest pain; Cyst of left breast; Depression; Depression; Diabetes mellitus ([address] ); Hiatal hernia; Hyperlipidemia; Hypertension; Keloid of skin; Neuropathy; and Psychiatric disorder    She   Patient Active Problem List    Diagnosis Date Noted    Diabetic neuropathy ([address] ) 01/02/2018    Otalgia 01/02/2018    Chronic constipation 11/07/2017    Abnormal stress test 07/27/2017    Cyst of left breast 07/20/2017    Impaired hearing 06/02/2017    Cerebral atherosclerosis 05/15/2017    Hyperlipidemia 04/13/2017    Moderate persistent asthma without complication 85/15/4406    Back pain of lumbar region with sciatica 10/18/2016    Type 2 diabetes mellitus without complication ([address] ) 02/12/2089    Hiatal hernia 10/18/2016    Keloid of skin 10/18/2016    Mild persistent asthma without complication 46/87/1205    Chronic back pain 09/06/2016    Depression 09/06/2016    Essential hypertension 09/06/2016    Neuropathy, diabetic ([facility] [address] ) 09/06/2016     She  has a past surgical history that includes Cyst Removal; Eye surgery; Cataract extraction; Tubal ligation; Arm wound repair / closure; and pr esophagogastroduodenoscopy transoral diagnostic (N/A, 1/5/2018)  Her family history includes Arthritis in her mother, paternal grandmother, and sister; Cancer in her family, maternal grandmother, and paternal grandmother; Coronary artery disease in her family; Diabetes in her mother; Glaucoma in her family; Heart attack in her maternal uncle, mother, and paternal grandmother; Heart disease in her mother; Hypertension in her father and mother; Kidney disease in her father; Rheum arthritis in her family; Stroke in her maternal aunt and maternal grandmother  She  reports that she has never smoked  She has never used smokeless tobacco  She reports that she does not drink alcohol or use drugs  Current Outpatient Prescriptions   Medication Sig Dispense Refill    ACCU-CHEK SHANE PLUS test strip TEST 2 TIMES A  each 3    albuterol (PROVENTIL HFA,VENTOLIN HFA) 90 mcg/act inhaler Inhale 2 puffs every 4 (four) hours as needed for wheezing      amLODIPine (NORVASC) 10 mg tablet take 1 tablet by mouth once daily 30 tablet 5    aspirin 325 mg tablet Take 1 tablet by mouth daily      beclomethasone (QVAR) 80 MCG/ACT inhaler Inhale 2 puffs 2 (two) times a day 1 Inhaler 5    busPIRone (BUSPAR) 5 mg tablet Take 5 mg by mouth 3 (three) times a day        cycloSPORINE (RESTASIS) 0 05 % ophthalmic emulsion Apply to eye      escitalopram (LEXAPRO) 10 mg tablet Take 1 tablet (10 mg total) by mouth every morning 30 tablet 5    famotidine (PEPCID) 20 mg tablet Take 1 tablet (20 mg total) by mouth 2 (two) times a day 30 tablet 0    gabapentin (NEURONTIN) 300 mg capsule Take 300 mg by mouth 3 (three) times a day   hydrochlorothiazide (HYDRODIURIL) 25 mg tablet Take 1 tablet (25 mg total) by mouth daily 30 tablet 5    hydrocortisone 0 5 % cream Apply topically 2 (two) times a day      ibuprofen (MOTRIN) 600 mg tablet Take 600 mg by mouth every 6 (six) hours as needed      insulin glargine (LANTUS SOLOSTAR) 100 units/mL injection pen Inject 30 Units under the skin daily 30 mL 2    Insulin Pen Needle (B-D UF III MINI PEN NEEDLES) 31G X 5 MM MISC by Does not apply route      Lancets (FREESTYLE) lancets by Does not apply route      lisinopril (ZESTRIL) 40 mg tablet take 1 tablet by mouth once daily 30 tablet 5    naproxen (NAPROSYN) 500 mg tablet Take 1 tablet (500 mg total) by mouth 2 (two) times a day with meals 14 tablet 0    simvastatin (ZOCOR) 20 mg tablet Take 1 tablet (20 mg total) by mouth daily at bedtime 30 tablet 3    sitaGLIPtin (JANUVIA) 100 mg tablet Take 1 tablet (100 mg total) by mouth daily 30 tablet 5    pantoprazole (PROTONIX) 40 mg tablet Take 1 tablet (40 mg total) by mouth daily (Patient not taking: Reported on 9/7/2018 ) 30 tablet 0    POLYETHYLENE GLYCOL 3350 PO Take 17 g by mouth daily as needed      traZODone (DESYREL) 50 mg tablet Take 50 mg by mouth daily at bedtime  No current facility-administered medications for this visit  Current Outpatient Prescriptions on File Prior to Visit   Medication Sig    ACCU-CHEK SHANE PLUS test strip TEST 2 TIMES A DAY    albuterol (PROVENTIL HFA,VENTOLIN HFA) 90 mcg/act inhaler Inhale 2 puffs every 4 (four) hours as needed for wheezing    amLODIPine (NORVASC) 10 mg tablet take 1 tablet by mouth once daily    aspirin 325 mg tablet Take 1 tablet by mouth daily    beclomethasone (QVAR) 80 MCG/ACT inhaler Inhale 2 puffs 2 (two) times a day    busPIRone (BUSPAR) 5 mg tablet Take 5 mg by mouth 3 (three) times a day      cycloSPORINE (RESTASIS) 0 05 % ophthalmic emulsion Apply to eye    escitalopram (LEXAPRO) 10 mg tablet Take 1 tablet (10 mg total) by mouth every morning    famotidine (PEPCID) 20 mg tablet Take 1 tablet (20 mg total) by mouth 2 (two) times a day    gabapentin (NEURONTIN) 300 mg capsule Take 300 mg by mouth 3 (three) times a day   hydrochlorothiazide (HYDRODIURIL) 25 mg tablet Take 1 tablet (25 mg total) by mouth daily    hydrocortisone 0 5 % cream Apply topically 2 (two) times a day    ibuprofen (MOTRIN) 600 mg tablet Take 600 mg by mouth every 6 (six) hours as needed    insulin glargine (LANTUS SOLOSTAR) 100 units/mL injection pen Inject 30 Units under the skin daily    Insulin Pen Needle (B-D UF III MINI PEN NEEDLES) 31G X 5 MM MISC by Does not apply route    Lancets (FREESTYLE) lancets by Does not apply route    lisinopril (ZESTRIL) 40 mg tablet take 1 tablet by mouth once daily    naproxen (NAPROSYN) 500 mg tablet Take 1 tablet (500 mg total) by mouth 2 (two) times a day with meals    simvastatin (ZOCOR) 20 mg tablet Take 1 tablet (20 mg total) by mouth daily at bedtime    sitaGLIPtin (JANUVIA) 100 mg tablet Take 1 tablet (100 mg total) by mouth daily    pantoprazole (PROTONIX) 40 mg tablet Take 1 tablet (40 mg total) by mouth daily (Patient not taking: Reported on 9/7/2018 )    POLYETHYLENE GLYCOL 3350 PO Take 17 g by mouth daily as needed    traZODone (DESYREL) 50 mg tablet Take 50 mg by mouth daily at bedtime   [DISCONTINUED] metFORMIN (GLUCOPHAGE) 1000 MG tablet Take 1 tablet (1,000 mg total) by mouth 2 (two) times a day with meals (Patient not taking: Reported on 9/7/2018 )     No current facility-administered medications on file prior to visit  She is allergic to percocet [oxycodone-acetaminophen] and vicodin [hydrocodone-acetaminophen]       Review of Systems   Constitutional: Negative for activity change, appetite change, diaphoresis, fatigue and fever  HENT: Negative  Eyes: Negative      Respiratory: Negative for apnea, cough, chest tightness, shortness of breath and wheezing  Cardiovascular: Negative for chest pain, palpitations and leg swelling  Gastrointestinal: Negative for abdominal distention, abdominal pain, anal bleeding, constipation, diarrhea, nausea and vomiting  Endocrine: Negative for cold intolerance, heat intolerance, polydipsia, polyphagia and polyuria  Genitourinary: Negative for difficulty urinating, dysuria, flank pain, hematuria and urgency  Musculoskeletal: Negative for arthralgias, back pain, gait problem, joint swelling and myalgias  Skin: Negative for color change, rash and wound  Allergic/Immunologic: Negative for environmental allergies, food allergies and immunocompromised state  Neurological: Negative for dizziness, seizures, syncope, speech difficulty, numbness and headaches  Hematological: Negative for adenopathy  Does not bruise/bleed easily  Psychiatric/Behavioral: Negative for agitation, behavioral problems, hallucinations, sleep disturbance and suicidal ideas  Objective:      /82 (BP Location: Left arm, Patient Position: Sitting, Cuff Size: Standard)   Ht 5' 5" (1 651 m)   Wt 103 kg (228 lb)   BMI 37 94 kg/m²          Physical Exam   Constitutional: She is oriented to person, place, and time  She appears well-developed and well-nourished  No distress  HENT:   Head: Normocephalic  Right Ear: External ear normal    Left Ear: External ear normal    Nose: Nose normal    Mouth/Throat: Oropharynx is clear and moist    Eyes: Pupils are equal, round, and reactive to light  Conjunctivae and EOM are normal  Right eye exhibits no discharge  Left eye exhibits no discharge  No scleral icterus  Neck: Normal range of motion  No tracheal deviation present  No thyromegaly present  Cardiovascular: Normal rate, regular rhythm and normal heart sounds  Exam reveals no gallop and no friction rub  No murmur heard    Pulmonary/Chest: Effort normal and breath sounds normal  No respiratory distress  She has no wheezes  Abdominal: Soft  Bowel sounds are normal  She exhibits no mass  There is no tenderness  There is no guarding  Musculoskeletal: She exhibits no edema or deformity  Lymphadenopathy:     She has no cervical adenopathy  Neurological: She is alert and oriented to person, place, and time  No cranial nerve deficit  Skin: Skin is warm and dry  No rash noted  She is not diaphoretic  No erythema  Psychiatric: She has a normal mood and affect   Thought content normal

## 2019-01-04 ENCOUNTER — APPOINTMENT (OUTPATIENT)
Dept: LAB | Facility: CLINIC | Age: 64
End: 2019-01-04
Payer: COMMERCIAL

## 2019-01-04 ENCOUNTER — TRANSCRIBE ORDERS (OUTPATIENT)
Dept: LAB | Facility: CLINIC | Age: 64
End: 2019-01-04

## 2019-01-09 ENCOUNTER — DOCTOR'S OFFICE (OUTPATIENT)
Dept: URBAN - NONMETROPOLITAN AREA CLINIC 1 | Facility: CLINIC | Age: 64
Setting detail: OPHTHALMOLOGY
End: 2019-01-09
Payer: COMMERCIAL

## 2019-01-09 DIAGNOSIS — H04.122: ICD-10-CM

## 2019-01-09 DIAGNOSIS — H04.121: ICD-10-CM

## 2019-01-09 DIAGNOSIS — H05.20: ICD-10-CM

## 2019-01-09 DIAGNOSIS — E11.9: ICD-10-CM

## 2019-01-09 DIAGNOSIS — Z96.1: ICD-10-CM

## 2019-01-09 DIAGNOSIS — H26.493: ICD-10-CM

## 2019-01-09 DIAGNOSIS — H40.013: ICD-10-CM

## 2019-01-09 LAB
LEFT EYE DIABETIC RETINOPATHY: NORMAL
RIGHT EYE DIABETIC RETINOPATHY: NORMAL

## 2019-01-09 PROCEDURE — 76514 ECHO EXAM OF EYE THICKNESS: CPT | Performed by: OPHTHALMOLOGY

## 2019-01-09 PROCEDURE — 92133 CPTRZD OPH DX IMG PST SGM ON: CPT | Performed by: OPHTHALMOLOGY

## 2019-01-09 PROCEDURE — 92012 INTRM OPH EXAM EST PATIENT: CPT | Performed by: OPHTHALMOLOGY

## 2019-01-09 PROCEDURE — 3072F LOW RISK FOR RETINOPATHY: CPT | Performed by: FAMILY MEDICINE

## 2019-01-09 ASSESSMENT — PACHYMETRY
OS_CT_CORRECTION: -6
OD_CT_CORRECTION: -6
OS_CT_UM: 626
OD_CT_UM: 632

## 2019-01-09 ASSESSMENT — REFRACTION_MANIFEST
OD_VA2: 20/
OD_VA3: 20/
OD_VA2: 20/
OS_VA2: 20/
OU_VA: 20/
OS_VA1: 20/
OD_VA1: 20/
OD_VA3: 20/
OS_VA1: 20/
OS_VA3: 20/
OU_VA: 20/
OD_VA1: 20/
OS_VA2: 20/
OS_VA3: 20/

## 2019-01-09 ASSESSMENT — VISUAL ACUITY
OS_BCVA: 20/30+1
OD_BCVA: 20/30-2

## 2019-01-09 ASSESSMENT — REFRACTION_AUTOREFRACTION
OS_SPHERE: -0.25
OD_AXIS: 001
OD_CYLINDER: -1.75
OS_AXIS: 055
OD_SPHERE: +0.25
OS_CYLINDER: -1.00

## 2019-01-09 ASSESSMENT — REFRACTION_CURRENTRX
OD_OVR_VA: 20/
OS_OVR_VA: 20/
OD_OVR_VA: 20/
OD_OVR_VA: 20/
OS_OVR_VA: 20/
OS_OVR_VA: 20/

## 2019-01-09 ASSESSMENT — SPHEQUIV_DERIVED
OS_SPHEQUIV: -0.75
OD_SPHEQUIV: -0.625

## 2019-01-09 ASSESSMENT — LID EXAM ASSESSMENTS: OS_EDEMA: LUL

## 2019-01-09 ASSESSMENT — CONFRONTATIONAL VISUAL FIELD TEST (CVF)
OD_FINDINGS: FULL
OS_FINDINGS: FULL

## 2019-01-09 ASSESSMENT — SUPERFICIAL PUNCTATE KERATITIS (SPK)
OS_SPK: T
OD_SPK: 1+ 2+

## 2019-01-11 DIAGNOSIS — E11.9 TYPE 2 DIABETES MELLITUS WITHOUT COMPLICATION, WITH LONG-TERM CURRENT USE OF INSULIN (HCC): ICD-10-CM

## 2019-01-11 DIAGNOSIS — Z79.4 TYPE 2 DIABETES MELLITUS WITHOUT COMPLICATION, WITH LONG-TERM CURRENT USE OF INSULIN (HCC): ICD-10-CM

## 2019-01-18 DIAGNOSIS — J45.20 INTERMITTENT ASTHMA, UNSPECIFIED ASTHMA SEVERITY, UNSPECIFIED WHETHER COMPLICATED: ICD-10-CM

## 2019-01-18 DIAGNOSIS — E11.9 TYPE 2 DIABETES MELLITUS WITHOUT COMPLICATION, WITH LONG-TERM CURRENT USE OF INSULIN (HCC): Primary | ICD-10-CM

## 2019-01-18 DIAGNOSIS — Z79.4 TYPE 2 DIABETES MELLITUS WITHOUT COMPLICATION, WITH LONG-TERM CURRENT USE OF INSULIN (HCC): Primary | ICD-10-CM

## 2019-01-18 RX ORDER — ALBUTEROL SULFATE 90 UG/1
2 AEROSOL, METERED RESPIRATORY (INHALATION) EVERY 4 HOURS PRN
Qty: 1 INHALER | Refills: 5 | Status: SHIPPED | OUTPATIENT
Start: 2019-01-18 | End: 2020-04-20 | Stop reason: SDUPTHER

## 2019-02-01 ENCOUNTER — OFFICE VISIT (OUTPATIENT)
Dept: OTOLARYNGOLOGY | Facility: CLINIC | Age: 64
End: 2019-02-01
Payer: COMMERCIAL

## 2019-02-01 VITALS
HEIGHT: 65 IN | WEIGHT: 236 LBS | SYSTOLIC BLOOD PRESSURE: 140 MMHG | DIASTOLIC BLOOD PRESSURE: 80 MMHG | BODY MASS INDEX: 39.32 KG/M2

## 2019-02-01 DIAGNOSIS — E04.1 THYROID NODULE: Primary | ICD-10-CM

## 2019-02-01 DIAGNOSIS — R49.9 HOARSENESS OR CHANGING VOICE: ICD-10-CM

## 2019-02-01 PROCEDURE — 31575 DIAGNOSTIC LARYNGOSCOPY: CPT | Performed by: OTOLARYNGOLOGY

## 2019-02-01 PROCEDURE — 99243 OFF/OP CNSLTJ NEW/EST LOW 30: CPT | Performed by: OTOLARYNGOLOGY

## 2019-02-01 NOTE — PROGRESS NOTES
Review of Systems   Constitutional: Negative  HENT: Positive for trouble swallowing  Eyes: Negative  Respiratory: Negative  Cardiovascular: Negative  Gastrointestinal: Negative  Endocrine: Negative  Genitourinary: Negative  Musculoskeletal: Negative  Skin: Negative  Allergic/Immunologic: Negative  Neurological: Negative  Hematological: Negative  Psychiatric/Behavioral: Negative

## 2019-02-01 NOTE — PROGRESS NOTES
Consultation - Otolaryngology - Head and Neck Surgery  Facial Plastic and Reconstructive Surgery  Oliver Gonzáles 61 y o  female MRN: [id number]  Encounter: [id number]        Assessment/Plan:  1  Thyroid nodule     2  Hoarseness or changing voice         Thyroid nodule: We discussed the nature of thyroid nodules and the appropriate work up per the recommendation of the SERINA and ACR  She is scheduled for an ultrasound of the thyroid  We discussed classification of thyroid nodules with the TI-RADS system  We also discussed option to remove thyroid due to symptoms of mass effect  Discussed the need for thyroid hormone replacement following total thyroidectomy  She will follow up after the ultrasound to further discuss options  History of Present Illness   Physician Requesting Consult: Aimee Segovia DO  Reason for Consult / Principal Problem: Thyroid nodule  HPI: Oliver Gonzáles is a 61y o  year old female who presents for evaluation of a thyroid nodule found incidentally on CT/PE scan  She reports a history of thyroid nodules in the past that were FNA'd over 10 years ago  Reports nodules were not concerning at that time  She has not had an ultrasound of her thyroid  Most recent TSH in August was 1 350, T4 10 4  She denies any family history of thyroid cancers, no history of head or neck radiation exposure, no history of environmental radiation  She does not the presence of mass effect symptoms including shortness of breath when laying down, dysphagia, light headedness  She also notes occasional changes in her voice  Review of systems:  10 Point ROS was performed and negative except as above or otherwise noted in the medical record      Historical Information   Past Medical History:   Diagnosis Date    Abnormal ECG     last assessed: 5/15/17    Abnormal mammogram     last assessed: 7/11/17    Abnormal stress test     last assesed: 7/24/17    Arthritis     Asthma     Back problem     Breast cyst     Chest pain     last assessed: 7/24/17    Cyst of left breast     last assessed: 8/18/17    Depression     Depression     resolved: 1/2/18    Diabetes mellitus ([address] )     Hiatal hernia     last assessed: 11/7/17    Hyperlipidemia     Hypertension     Keloid of skin     last assessed: 11/2/16    Neuropathy     Psychiatric disorder     anxiety     Past Surgical History:   Procedure Laterality Date    ARM WOUND REPAIR / CLOSURE      CATARACT EXTRACTION      CYST REMOVAL      right upper arm   EYE SURGERY      cataract removaql    AK ESOPHAGOGASTRODUODENOSCOPY TRANSORAL DIAGNOSTIC N/A 1/5/2018    Procedure: ESOPHAGOGASTRODUODENOSCOPY (EGD);   Surgeon: Bruno Schmid DO;  Location: [facility];  Service: Gastroenterology    TUBAL LIGATION       Social History   History   Alcohol Use No     History   Drug Use No     History   Smoking Status    Never Smoker   Smokeless Tobacco    Never Used     Family History:   Family History   Problem Relation Age of Onset    Heart disease Mother     Diabetes Mother     Arthritis Mother     Heart attack Mother     Hypertension Mother     Kidney disease Father     Hypertension Father     Arthritis Sister     Cancer Maternal Grandmother     Stroke Maternal Grandmother     Arthritis Paternal Grandmother     Cancer Paternal Grandmother     Heart attack Paternal Grandmother     Stroke Maternal Aunt     Heart attack Maternal Uncle     Cancer Family         spinal column    Coronary artery disease Family     Glaucoma Family     Rheum arthritis Family        Current Outpatient Prescriptions on File Prior to Visit   Medication Sig    ACCU-CHEK SHANE PLUS test strip TEST 2 TIMES A DAY    albuterol (PROVENTIL HFA,VENTOLIN HFA) 90 mcg/act inhaler Inhale 2 puffs every 4 (four) hours as needed for wheezing    amLODIPine (NORVASC) 10 mg tablet take 1 tablet by mouth once daily    aspirin 325 mg tablet Take 1 tablet by mouth daily    beclomethasone (QVAR) 80 MCG/ACT inhaler Inhale 2 puffs 2 (two) times a day    busPIRone (BUSPAR) 5 mg tablet Take 1 tablet (5 mg total) by mouth 3 (three) times a day    cycloSPORINE (RESTASIS) 0 05 % ophthalmic emulsion Apply to eye    escitalopram (LEXAPRO) 10 mg tablet Take 1 tablet (10 mg total) by mouth every morning    famotidine (PEPCID) 20 mg tablet Take 1 tablet (20 mg total) by mouth 2 (two) times a day    gabapentin (NEURONTIN) 300 mg capsule Take 300 mg by mouth 3 (three) times a day   hydrochlorothiazide (HYDRODIURIL) 25 mg tablet Take 1 tablet (25 mg total) by mouth daily    hydrocortisone 0 5 % cream Apply topically 2 (two) times a day    ibuprofen (MOTRIN) 600 mg tablet Take 600 mg by mouth every 6 (six) hours as needed    insulin glargine (BASAGLAR KWIKPEN) 100 units/mL injection pen Inject 35 Units under the skin daily    Insulin Pen Needle (B-D UF III MINI PEN NEEDLES) 31G X 5 MM MISC Inject under the skin daily    Lancets (FREESTYLE) lancets by Does not apply route    lisinopril (ZESTRIL) 40 mg tablet take 1 tablet by mouth once daily    naproxen (NAPROSYN) 500 mg tablet Take 1 tablet (500 mg total) by mouth 2 (two) times a day with meals    pantoprazole (PROTONIX) 40 mg tablet Take 1 tablet (40 mg total) by mouth daily    POLYETHYLENE GLYCOL 3350 PO Take 17 g by mouth daily as needed    simvastatin (ZOCOR) 20 mg tablet Take 1 tablet (20 mg total) by mouth daily at bedtime    sitaGLIPtin (JANUVIA) 100 mg tablet Take 1 tablet (100 mg total) by mouth daily    traZODone (DESYREL) 50 mg tablet Take 50 mg by mouth daily at bedtime  No current facility-administered medications on file prior to visit  Allergies   Allergen Reactions    Percocet [Oxycodone-Acetaminophen] GI Intolerance    Vicodin [Hydrocodone-Acetaminophen] GI Intolerance       Vitals:    02/01/19 1019   BP: 140/80       Physical Exam   Constitutional: Oriented to person, place, and time   Well-developed and well-nourished, no apparent distress, non-toxic appearance  Cooperative, able to hear and answer questions without difficulty  Voice: Normal voice quality  Head: Normocephalic, atraumatic  No scars, masses or lesions  Face: Symmetric, no edema, no sinus tenderness  Eyes: Vision grossly intact, extra-ocular movement intact  Ears: External ears normal  Tympanic membranes intact with intact normal landmarks  No post-auricular erythema or tenderness  Nose: Septum intact, nares clear  Mucosa moist, turbinates well appearing  No crusting, polyps or discharge evident  Oral cavity: Dentition intact  Mucosa moist, lips without lesions or masses  Tongue mobile, floor of mouth soft and flat  Hard palate intact  No masses or lesions  Oropharynx: Uvula is midline, soft palate intact without lesion or mass  Oropharyngeal inlet without obstruction  Tonsil stone present in the right tonsil  Posterior pharyngeal wall clear  No masses or lesions  Salivary glands:  Parotid glands and submandibular glands symmetric, no enlargement or tenderness  Neck: Normal laryngeal elevation with swallow  Trachea midline  No masses or lesions  No palpable adenopathy  Thyroid: Without tenderness  Large, firm approx 4 5 cm thyroid nodule in the left lobe  Pulmonary/Chest: Normal effort and rate  No respiratory distress  No stertor or stridor  Musculoskeletal: Normal range of motion  Neurological: Cranial nerves 2-12 intact  Skin: Skin is warm and dry  Psychiatric: Normal mood and affect  Procedure: Flexible fiberoptic laryngoscopy    Indications: Evaluate areas of the upper aerodigestive tract not seen on physical exam    Procedure in detail: After informed verbal consent was obtained the nose was anesthetized using 4% lidocaine and neosynephrine spray  After adequate time for anesthesia the scope was guided easily along the nasal cavity floor and into the nasopharynx   The nasopharynx, oropharynx, hypopharynx and larynx were evaluated with the below listed findings  The scope was removed without difficulty and the patient tolerated the procedure well  Findings: No mucopurulence or polyposis  Bilateral vocal cords fully mobile without any nodules or masses present  Imaging Studies: I have personally reviewed pertinent reports  and I have personally reviewed pertinent films in PACS    Lab Results: I have personally reviewed pertinent lab results  Greater than 40 minutes were spent in consultation  More than 1/2 of that time was spent in counselling and coordination of care

## 2019-02-01 NOTE — LETTER
February 1, 2019     Nora Kay DO  [facility]    Patient: Marco A Jackson   YOB: 1955   Date of Visit: 2/1/2019       Dear Dr Cerna December: Thank you for referring Marco A Jackson to me for evaluation  Below are my notes for this consultation  If you have questions, please do not hesitate to call me  I look forward to following your patient along with you  Sincerely,        Shamika Gonsales MD        CC: No Recipients  Edwar Callahan PA-C  2/1/2019 10:31 AM  Sign at close encounter  Consultation - Otolaryngology - Head and Neck Surgery  Facial Plastic and Reconstructive Surgery  Marco A Jackson 61 y o  female MRN: [id number]  Encounter: [id number]        Assessment/Plan:  1  Thyroid nodule     2  Hoarseness or changing voice         Thyroid nodule: We discussed the nature of thyroid nodules and the appropriate work up per the recommendation of the SERINA and ACR  She is scheduled for an ultrasound of the thyroid  We discussed classification of thyroid nodules with the TI-RADS system  We also discussed option to remove thyroid due to symptoms of mass effect  Discussed the need for thyroid hormone replacement following total thyroidectomy  She will follow up after the ultrasound to further discuss options  History of Present Illness   Physician Requesting Consult: Nora aKy DO  Reason for Consult / Principal Problem: Thyroid nodule  HPI: Marco A Jackson is a 61y o  year old female who presents for evaluation of a thyroid nodule found incidentally on CT/PE scan  She reports a history of thyroid nodules in the past that were FNA'd over 10 years ago  Reports nodules were not concerning at that time  She has not had an ultrasound of her thyroid  Most recent TSH in August was 1 350, T4 10 4  She denies any family history of thyroid cancers, no history of head or neck radiation exposure, no history of environmental radiation   She does not the presence of mass effect symptoms including shortness of breath when laying down, dysphagia, light headedness  She also notes occasional changes in her voice  Review of systems:  10 Point ROS was performed and negative except as above or otherwise noted in the medical record  Historical Information   Past Medical History:   Diagnosis Date    Abnormal ECG     last assessed: 5/15/17    Abnormal mammogram     last assessed: 7/11/17    Abnormal stress test     last assesed: 7/24/17    Arthritis     Asthma     Back problem     Breast cyst     Chest pain     last assessed: 7/24/17    Cyst of left breast     last assessed: 8/18/17    Depression     Depression     resolved: 1/2/18    Diabetes mellitus ([facility] [address] )     Hiatal hernia     last assessed: 11/7/17    Hyperlipidemia     Hypertension     Keloid of skin     last assessed: 11/2/16    Neuropathy     Psychiatric disorder     anxiety     Past Surgical History:   Procedure Laterality Date    ARM WOUND REPAIR / CLOSURE      CATARACT EXTRACTION      CYST REMOVAL      right upper arm   EYE SURGERY      cataract removaql    [place] ESOPHAGOGASTRODUODENOSCOPY TRANSORAL DIAGNOSTIC N/A 1/5/2018    Procedure: ESOPHAGOGASTRODUODENOSCOPY (EGD);   Surgeon: Soo Oviedo DO;  Location: [facility];  Service: Gastroenterology    TUBAL LIGATION       Social History   History   Alcohol Use No     History   Drug Use No     History   Smoking Status    Never Smoker   Smokeless Tobacco    Never Used     Family History:   Family History   Problem Relation Age of Onset    Heart disease Mother     Diabetes Mother     Arthritis Mother     Heart attack Mother     Hypertension Mother     Kidney disease Father     Hypertension Father     Arthritis Sister     Cancer Maternal Grandmother     Stroke Maternal Grandmother     Arthritis Paternal Grandmother     Cancer Paternal Grandmother     Heart attack Paternal Grandmother     Stroke Maternal Aunt     Heart attack Maternal Uncle     Cancer Family         spinal column    Coronary artery disease Family     Glaucoma Family     Rheum arthritis Family        Current Outpatient Prescriptions on File Prior to Visit   Medication Sig    ACCU-CHEK SHANE PLUS test strip TEST 2 TIMES A DAY    albuterol (PROVENTIL HFA,VENTOLIN HFA) 90 mcg/act inhaler Inhale 2 puffs every 4 (four) hours as needed for wheezing    amLODIPine (NORVASC) 10 mg tablet take 1 tablet by mouth once daily    aspirin 325 mg tablet Take 1 tablet by mouth daily    beclomethasone (QVAR) 80 MCG/ACT inhaler Inhale 2 puffs 2 (two) times a day    busPIRone (BUSPAR) 5 mg tablet Take 1 tablet (5 mg total) by mouth 3 (three) times a day    cycloSPORINE (RESTASIS) 0 05 % ophthalmic emulsion Apply to eye    escitalopram (LEXAPRO) 10 mg tablet Take 1 tablet (10 mg total) by mouth every morning    famotidine (PEPCID) 20 mg tablet Take 1 tablet (20 mg total) by mouth 2 (two) times a day    gabapentin (NEURONTIN) 300 mg capsule Take 300 mg by mouth 3 (three) times a day      hydrochlorothiazide (HYDRODIURIL) 25 mg tablet Take 1 tablet (25 mg total) by mouth daily    hydrocortisone 0 5 % cream Apply topically 2 (two) times a day    ibuprofen (MOTRIN) 600 mg tablet Take 600 mg by mouth every 6 (six) hours as needed    insulin glargine (BASAGLAR KWIKPEN) 100 units/mL injection pen Inject 35 Units under the skin daily    Insulin Pen Needle (B-D UF III MINI PEN NEEDLES) 31G X 5 MM MISC Inject under the skin daily    Lancets (FREESTYLE) lancets by Does not apply route    lisinopril (ZESTRIL) 40 mg tablet take 1 tablet by mouth once daily    naproxen (NAPROSYN) 500 mg tablet Take 1 tablet (500 mg total) by mouth 2 (two) times a day with meals    pantoprazole (PROTONIX) 40 mg tablet Take 1 tablet (40 mg total) by mouth daily    POLYETHYLENE GLYCOL 3350 PO Take 17 g by mouth daily as needed    simvastatin (ZOCOR) 20 mg tablet Take 1 tablet (20 mg total) by mouth daily at bedtime    sitaGLIPtin (JANUVIA) 100 mg tablet Take 1 tablet (100 mg total) by mouth daily    traZODone (DESYREL) 50 mg tablet Take 50 mg by mouth daily at bedtime  No current facility-administered medications on file prior to visit  Allergies   Allergen Reactions    Percocet [Oxycodone-Acetaminophen] GI Intolerance    Vicodin [Hydrocodone-Acetaminophen] GI Intolerance       Vitals:    02/01/19 1019   BP: 140/80       Physical Exam   Constitutional: Oriented to person, place, and time  Well-developed and well-nourished, no apparent distress, non-toxic appearance  Cooperative, able to hear and answer questions without difficulty  Voice: Normal voice quality  Head: Normocephalic, atraumatic  No scars, masses or lesions  Face: Symmetric, no edema, no sinus tenderness  Eyes: Vision grossly intact, extra-ocular movement intact  Ears: External ears normal  Tympanic membranes intact with intact normal landmarks  No post-auricular erythema or tenderness  Nose: Septum intact, nares clear  Mucosa moist, turbinates well appearing  No crusting, polyps or discharge evident  Oral cavity: Dentition intact  Mucosa moist, lips without lesions or masses  Tongue mobile, floor of mouth soft and flat  Hard palate intact  No masses or lesions  Oropharynx: Uvula is midline, soft palate intact without lesion or mass  Oropharyngeal inlet without obstruction  Tonsil stone present in the right tonsil  Posterior pharyngeal wall clear  No masses or lesions  Salivary glands:  Parotid glands and submandibular glands symmetric, no enlargement or tenderness  Neck: Normal laryngeal elevation with swallow  Trachea midline  No masses or lesions  No palpable adenopathy  Thyroid: Without tenderness  Large, firm approx 4 5 cm thyroid nodule in the left lobe  Pulmonary/Chest: Normal effort and rate  No respiratory distress  No stertor or stridor  Musculoskeletal: Normal range of motion     Neurological: Cranial nerves 2-12 intact  Skin: Skin is warm and dry  Psychiatric: Normal mood and affect  Procedure: Flexible fiberoptic laryngoscopy    Indications: Evaluate areas of the upper aerodigestive tract not seen on physical exam    Procedure in detail: After informed verbal consent was obtained the nose was anesthetized using 4% lidocaine and neosynephrine spray  After adequate time for anesthesia the scope was guided easily along the nasal cavity floor and into the nasopharynx  The nasopharynx, oropharynx, hypopharynx and larynx were evaluated with the below listed findings  The scope was removed without difficulty and the patient tolerated the procedure well  Findings: No mucopurulence or polyposis  Bilateral vocal cords fully mobile without any nodules or masses present  Imaging Studies: I have personally reviewed pertinent reports  and I have personally reviewed pertinent films in PACS    Lab Results: I have personally reviewed pertinent lab results  Greater than 40 minutes were spent in consultation  More than 1/2 of that time was spent in counselling and coordination of care

## 2019-02-06 ENCOUNTER — OFFICE VISIT (OUTPATIENT)
Dept: FAMILY MEDICINE CLINIC | Facility: CLINIC | Age: 64
End: 2019-02-06
Payer: COMMERCIAL

## 2019-02-06 ENCOUNTER — HOSPITAL ENCOUNTER (OUTPATIENT)
Dept: RADIOLOGY | Facility: HOSPITAL | Age: 64
Discharge: HOME | End: 2019-02-06
Attending: FAMILY MEDICINE
Payer: COMMERCIAL

## 2019-02-06 VITALS
WEIGHT: 229 LBS | HEIGHT: 65 IN | DIASTOLIC BLOOD PRESSURE: 86 MMHG | BODY MASS INDEX: 38.15 KG/M2 | SYSTOLIC BLOOD PRESSURE: 154 MMHG

## 2019-02-06 DIAGNOSIS — M25.552 LEFT HIP PAIN: ICD-10-CM

## 2019-02-06 DIAGNOSIS — M54.16 LUMBAR BACK PAIN WITH RADICULOPATHY AFFECTING LEFT LOWER EXTREMITY: ICD-10-CM

## 2019-02-06 DIAGNOSIS — G56.02 CARPAL TUNNEL SYNDROME OF LEFT WRIST: Primary | ICD-10-CM

## 2019-02-06 DIAGNOSIS — E11.9 TYPE 2 DIABETES MELLITUS WITHOUT COMPLICATION, WITHOUT LONG-TERM CURRENT USE OF INSULIN (HCC): ICD-10-CM

## 2019-02-06 PROCEDURE — 73502 X-RAY EXAM HIP UNI 2-3 VIEWS: CPT

## 2019-02-06 PROCEDURE — 99214 OFFICE O/P EST MOD 30 MIN: CPT | Performed by: FAMILY MEDICINE

## 2019-02-06 PROCEDURE — 72110 X-RAY EXAM L-2 SPINE 4/>VWS: CPT

## 2019-02-06 NOTE — PROGRESS NOTES
Assessment/Plan:    No problem-specific Assessment & Plan notes found for this encounter  Diagnoses and all orders for this visit:    Carpal tunnel syndrome of left wrist  -     EMG 1 Limb; Future  -     Ambulatory referral to Neurology; Future          Subjective:      Patient ID: Gerri Palomo is a 61 y o  female  Symptomatic numbness tingling and clumsiness of the left hand patient has had previous EMG ease done several years ago in [place] which documented carpal tunnel at the time she did not pursue any surgical decompression of the median nerve but now our hand is becoming enough of a problem both from a standpoint of pain and clumsiness that she would like to have this problem corrected        The following portions of the patient's history were reviewed and updated as appropriate:   She  has a past medical history of Abnormal ECG; Abnormal mammogram; Abnormal stress test; Arthritis; Asthma; Back problem; Breast cyst; Chest pain; Cyst of left breast; Depression; Depression; Diabetes mellitus ([address] ); Hiatal hernia; Hyperlipidemia; Hypertension; Keloid of skin; Neuropathy; and Psychiatric disorder    She   Patient Active Problem List    Diagnosis Date Noted    Thyroid nodule 02/01/2019    Hoarseness or changing voice 02/01/2019    Diabetic neuropathy ([address] ) 01/02/2018    Otalgia 01/02/2018    Chronic constipation 11/07/2017    Abnormal stress test 07/27/2017    Cyst of left breast 07/20/2017    Impaired hearing 06/02/2017    Cerebral atherosclerosis 05/15/2017    Hyperlipidemia 04/13/2017    Moderate persistent asthma without complication 09/56/8427    Back pain of lumbar region with sciatica 10/18/2016    Type 2 diabetes mellitus without complication ([address] ) 61/90/5874    Hiatal hernia 10/18/2016    Keloid of skin 10/18/2016    Mild persistent asthma without complication 53/52/5291    Chronic back pain 09/06/2016    Depression 09/06/2016    Essential hypertension 09/06/2016    Neuropathy, diabetic ([facility] [address] ) 09/06/2016     She  has a past surgical history that includes Cyst Removal; Eye surgery; Cataract extraction; Tubal ligation; Arm wound repair / closure; and pr esophagogastroduodenoscopy transoral diagnostic (N/A, 1/5/2018)  Her family history includes Arthritis in her mother, paternal grandmother, and sister; Cancer in her family, maternal grandmother, and paternal grandmother; Coronary artery disease in her family; Diabetes in her mother; Glaucoma in her family; Heart attack in her maternal uncle, mother, and paternal grandmother; Heart disease in her mother; Hypertension in her father and mother; Kidney disease in her father; Rheum arthritis in her family; Stroke in her maternal aunt and maternal grandmother  She  reports that she has never smoked  She has never used smokeless tobacco  She reports that she does not drink alcohol or use drugs  Current Outpatient Prescriptions   Medication Sig Dispense Refill    ACCU-CHEK SHANE PLUS test strip TEST 2 TIMES A  each 3    albuterol (PROVENTIL HFA,VENTOLIN HFA) 90 mcg/act inhaler Inhale 2 puffs every 4 (four) hours as needed for wheezing 1 Inhaler 5    amLODIPine (NORVASC) 10 mg tablet take 1 tablet by mouth once daily 30 tablet 5    aspirin 81 MG tablet Take 81 mg by mouth daily        beclomethasone (QVAR) 80 MCG/ACT inhaler Inhale 2 puffs 2 (two) times a day 1 Inhaler 5    busPIRone (BUSPAR) 5 mg tablet Take 1 tablet (5 mg total) by mouth 3 (three) times a day 90 tablet 2    cycloSPORINE (RESTASIS) 0 05 % ophthalmic emulsion Apply to eye      escitalopram (LEXAPRO) 10 mg tablet Take 1 tablet (10 mg total) by mouth every morning 30 tablet 5    famotidine (PEPCID) 20 mg tablet Take 1 tablet (20 mg total) by mouth 2 (two) times a day 30 tablet 0    gabapentin (NEURONTIN) 300 mg capsule Take 300 mg by mouth 3 (three) times a day        hydrochlorothiazide (HYDRODIURIL) 25 mg tablet Take 1 tablet (25 mg total) by mouth daily 30 tablet 5    hydrocortisone 0 5 % cream Apply topically 2 (two) times a day      ibuprofen (MOTRIN) 600 mg tablet Take 600 mg by mouth every 6 (six) hours as needed      insulin glargine (BASAGLAR KWIKPEN) 100 units/mL injection pen Inject 35 Units under the skin daily 5 pen 5    Insulin Pen Needle (B-D UF III MINI PEN NEEDLES) 31G X 5 MM MISC Inject under the skin daily 100 each 3    Lancets (FREESTYLE) lancets by Does not apply route      lisinopril (ZESTRIL) 40 mg tablet take 1 tablet by mouth once daily 30 tablet 5    pantoprazole (PROTONIX) 40 mg tablet Take 1 tablet (40 mg total) by mouth daily 30 tablet 5    sitaGLIPtin (JANUVIA) 100 mg tablet Take 1 tablet (100 mg total) by mouth daily 30 tablet 5    traZODone (DESYREL) 50 mg tablet Take 50 mg by mouth daily at bedtime   naproxen (NAPROSYN) 500 mg tablet Take 1 tablet (500 mg total) by mouth 2 (two) times a day with meals (Patient not taking: Reported on 2/6/2019 ) 14 tablet 0    POLYETHYLENE GLYCOL 3350 PO Take 17 g by mouth daily as needed      simvastatin (ZOCOR) 20 mg tablet Take 1 tablet (20 mg total) by mouth daily at bedtime (Patient not taking: Reported on 2/6/2019 ) 30 tablet 3     No current facility-administered medications for this visit        Current Outpatient Prescriptions on File Prior to Visit   Medication Sig    ACCU-CHEK SHANE PLUS test strip TEST 2 TIMES A DAY    albuterol (PROVENTIL HFA,VENTOLIN HFA) 90 mcg/act inhaler Inhale 2 puffs every 4 (four) hours as needed for wheezing    amLODIPine (NORVASC) 10 mg tablet take 1 tablet by mouth once daily    aspirin 81 MG tablet Take 81 mg by mouth daily      beclomethasone (QVAR) 80 MCG/ACT inhaler Inhale 2 puffs 2 (two) times a day    busPIRone (BUSPAR) 5 mg tablet Take 1 tablet (5 mg total) by mouth 3 (three) times a day    cycloSPORINE (RESTASIS) 0 05 % ophthalmic emulsion Apply to eye    escitalopram (LEXAPRO) 10 mg tablet Take 1 tablet (10 mg total) by mouth every morning    famotidine (PEPCID) 20 mg tablet Take 1 tablet (20 mg total) by mouth 2 (two) times a day    gabapentin (NEURONTIN) 300 mg capsule Take 300 mg by mouth 3 (three) times a day   hydrochlorothiazide (HYDRODIURIL) 25 mg tablet Take 1 tablet (25 mg total) by mouth daily    hydrocortisone 0 5 % cream Apply topically 2 (two) times a day    ibuprofen (MOTRIN) 600 mg tablet Take 600 mg by mouth every 6 (six) hours as needed    insulin glargine (BASAGLAR KWIKPEN) 100 units/mL injection pen Inject 35 Units under the skin daily    Insulin Pen Needle (B-D UF III MINI PEN NEEDLES) 31G X 5 MM MISC Inject under the skin daily    Lancets (FREESTYLE) lancets by Does not apply route    lisinopril (ZESTRIL) 40 mg tablet take 1 tablet by mouth once daily    pantoprazole (PROTONIX) 40 mg tablet Take 1 tablet (40 mg total) by mouth daily    sitaGLIPtin (JANUVIA) 100 mg tablet Take 1 tablet (100 mg total) by mouth daily    traZODone (DESYREL) 50 mg tablet Take 50 mg by mouth daily at bedtime   naproxen (NAPROSYN) 500 mg tablet Take 1 tablet (500 mg total) by mouth 2 (two) times a day with meals (Patient not taking: Reported on 2/6/2019 )    POLYETHYLENE GLYCOL 3350 PO Take 17 g by mouth daily as needed    simvastatin (ZOCOR) 20 mg tablet Take 1 tablet (20 mg total) by mouth daily at bedtime (Patient not taking: Reported on 2/6/2019 )     No current facility-administered medications on file prior to visit  She is allergic to percocet [oxycodone-acetaminophen] and vicodin [hydrocodone-acetaminophen]       Review of Systems   Constitutional: Negative for activity change, appetite change, diaphoresis, fatigue and fever  HENT: Negative  Eyes: Negative  Respiratory: Negative for apnea, cough, chest tightness, shortness of breath and wheezing  Cardiovascular: Negative for chest pain, palpitations and leg swelling     Gastrointestinal: Negative for abdominal distention, abdominal pain, anal bleeding, constipation, diarrhea, nausea and vomiting  Endocrine: Negative for cold intolerance, heat intolerance, polydipsia, polyphagia and polyuria  Genitourinary: Negative for difficulty urinating, dysuria, flank pain, hematuria and urgency  Musculoskeletal: Negative for arthralgias, back pain, gait problem, joint swelling and myalgias  Skin: Negative for color change, rash and wound  Allergic/Immunologic: Negative for environmental allergies, food allergies and immunocompromised state  Neurological: Negative for dizziness, seizures, syncope, speech difficulty, numbness and headaches  Pain left hand secondary to chronic carpal tunnel syndrome   Hematological: Negative for adenopathy  Does not bruise/bleed easily  Psychiatric/Behavioral: Negative for agitation, behavioral problems, hallucinations, sleep disturbance and suicidal ideas  Objective:      /86 (BP Location: Left arm, Patient Position: Sitting, Cuff Size: Standard)   Ht 5' 5" (1 651 m)   Wt 104 kg (229 lb)   BMI 38 11 kg/m²          Physical Exam   Constitutional: She is oriented to person, place, and time  She appears well-developed and well-nourished  No distress  HENT:   Head: Normocephalic  Right Ear: External ear normal    Left Ear: External ear normal    Nose: Nose normal    Mouth/Throat: Oropharynx is clear and moist    Eyes: Pupils are equal, round, and reactive to light  Conjunctivae and EOM are normal  Right eye exhibits no discharge  Left eye exhibits no discharge  No scleral icterus  Neck: Normal range of motion  No tracheal deviation present  No thyromegaly present  Cardiovascular: Normal rate, regular rhythm and normal heart sounds  Exam reveals no gallop and no friction rub  No murmur heard  Pulmonary/Chest: Effort normal and breath sounds normal  No respiratory distress  She has no wheezes  Abdominal: Soft  Bowel sounds are normal  She exhibits no mass  There is no tenderness   There is no guarding  Musculoskeletal: She exhibits no edema or deformity  Lymphadenopathy:     She has no cervical adenopathy  Neurological: She is alert and oriented to person, place, and time  No cranial nerve deficit  Skin: Skin is warm and dry  No rash noted  She is not diaphoretic  No erythema  Psychiatric: She has a normal mood and affect   Thought content normal

## 2019-02-07 DIAGNOSIS — E11.42 DIABETIC POLYNEUROPATHY ASSOCIATED WITH TYPE 2 DIABETES MELLITUS (HCC): Primary | ICD-10-CM

## 2019-02-08 ENCOUNTER — TELEPHONE (OUTPATIENT)
Dept: FAMILY MEDICINE CLINIC | Facility: CLINIC | Age: 64
End: 2019-02-08

## 2019-02-08 DIAGNOSIS — B37.3 VAGINAL MONILIASIS: Primary | ICD-10-CM

## 2019-02-08 RX ORDER — TERCONAZOLE 80 MG/1
80 SUPPOSITORY VAGINAL
Qty: 3 SUPPOSITORY | Refills: 0 | Status: SHIPPED | OUTPATIENT
Start: 2019-02-08 | End: 2019-03-29

## 2019-02-08 RX ORDER — GABAPENTIN 300 MG/1
CAPSULE ORAL
Qty: 90 CAPSULE | Refills: 3 | Status: SHIPPED | OUTPATIENT
Start: 2019-02-08 | End: 2019-05-16 | Stop reason: SDUPTHER

## 2019-02-10 NOTE — RESULT ENCOUNTER NOTE
Please call the patient regarding her abnormal result    Patient had some harsh arthritic changes and some degenerative disc changes of the lumbar spine no tumors and no fractures physical therapy weight reduction and pain management should be her 1st things that she does for her back

## 2019-02-10 NOTE — RESULT ENCOUNTER NOTE
Please call the patient regarding her abnormal result    Arthritic changes of the left hip mild in severity a few bone spurs also

## 2019-02-11 DIAGNOSIS — M54.16 LUMBAR BACK PAIN WITH RADICULOPATHY AFFECTING LEFT LOWER EXTREMITY: Primary | ICD-10-CM

## 2019-02-11 DIAGNOSIS — M25.552 LEFT HIP PAIN: ICD-10-CM

## 2019-02-13 DIAGNOSIS — E04.1 THYROID NODULE: Primary | ICD-10-CM

## 2019-02-14 ENCOUNTER — TELEPHONE (OUTPATIENT)
Dept: FAMILY MEDICINE CLINIC | Facility: CLINIC | Age: 64
End: 2019-02-14

## 2019-02-14 NOTE — TELEPHONE ENCOUNTER
CC: Vaginal Yeast infection - Terconazole vaginal cream is $83+    Asking if you could change Rx to Diflucan      Allergies: Percocet, Vicodin    Pharm: [facility]

## 2019-02-15 DIAGNOSIS — B37.3 VAGINAL MONILIASIS: Primary | ICD-10-CM

## 2019-02-15 RX ORDER — FLUCONAZOLE 150 MG/1
150 TABLET ORAL ONCE
Qty: 1 TABLET | Refills: 0 | Status: SHIPPED | OUTPATIENT
Start: 2019-02-15 | End: 2019-02-15

## 2019-02-20 DIAGNOSIS — Z79.4 TYPE 2 DIABETES MELLITUS WITHOUT COMPLICATION, WITH LONG-TERM CURRENT USE OF INSULIN (HCC): ICD-10-CM

## 2019-02-20 DIAGNOSIS — E11.9 TYPE 2 DIABETES MELLITUS WITHOUT COMPLICATION, WITH LONG-TERM CURRENT USE OF INSULIN (HCC): ICD-10-CM

## 2019-02-20 RX ORDER — LANCETS
EACH MISCELLANEOUS
Qty: 100 EACH | Refills: 3 | Status: SHIPPED | OUTPATIENT
Start: 2019-02-20 | End: 2019-04-11 | Stop reason: SDUPTHER

## 2019-03-01 ENCOUNTER — OFFICE VISIT (OUTPATIENT)
Dept: OTOLARYNGOLOGY | Facility: CLINIC | Age: 64
End: 2019-03-01
Payer: COMMERCIAL

## 2019-03-01 VITALS
HEIGHT: 65 IN | SYSTOLIC BLOOD PRESSURE: 132 MMHG | HEART RATE: 80 BPM | BODY MASS INDEX: 38.32 KG/M2 | DIASTOLIC BLOOD PRESSURE: 84 MMHG | WEIGHT: 230 LBS

## 2019-03-01 DIAGNOSIS — E04.1 THYROID NODULE: Primary | ICD-10-CM

## 2019-03-01 PROCEDURE — 99212 OFFICE O/P EST SF 10 MIN: CPT | Performed by: OTOLARYNGOLOGY

## 2019-03-01 RX ORDER — GABAPENTIN 300 MG/1
300 CAPSULE ORAL 3 TIMES DAILY
COMMUNITY
End: 2019-03-29

## 2019-03-01 RX ORDER — CYCLOSPORINE 0.5 MG/ML
1 EMULSION OPHTHALMIC 2 TIMES DAILY
COMMUNITY
End: 2019-03-29

## 2019-03-01 RX ORDER — TRAZODONE HYDROCHLORIDE 50 MG/1
50 TABLET ORAL AS NEEDED
Status: ON HOLD | COMMUNITY
End: 2019-04-19

## 2019-03-01 RX ORDER — BUSPIRONE HYDROCHLORIDE 5 MG/1
5 TABLET ORAL 3 TIMES DAILY
COMMUNITY
End: 2019-03-29

## 2019-03-01 RX ORDER — AMLODIPINE BESYLATE 10 MG/1
10 TABLET ORAL DAILY
COMMUNITY
End: 2019-05-06 | Stop reason: SDUPTHER

## 2019-03-01 RX ORDER — LISINOPRIL 40 MG/1
40 TABLET ORAL DAILY
COMMUNITY
End: 2019-05-06 | Stop reason: SDUPTHER

## 2019-03-01 RX ORDER — ESCITALOPRAM OXALATE 10 MG/1
10 TABLET ORAL
COMMUNITY
End: 2019-08-12 | Stop reason: SDUPTHER

## 2019-03-01 NOTE — PROGRESS NOTES
Bhavani Amador is a 61 y  o female who presents for re-evaluation of thyroid nodule  At her previous visit, we discussed the need for an ultrasound of her thyroid to evaluate nodule  Unfortunately, she forgot to schedule the ultrasound  She continues to have mild symptoms related to mass effect  No other concerns at this time  Past Medical History:   Diagnosis Date    Abnormal ECG     last assessed: 5/15/17    Abnormal mammogram     last assessed: 7/11/17    Abnormal stress test     last assesed: 7/24/17    Arthritis     Asthma     Back problem     Breast cyst     Chest pain     last assessed: 7/24/17    Cyst of left breast     last assessed: 8/18/17    Depression     Depression     resolved: 1/2/18    Diabetes mellitus ([facility] [address] )     Hiatal hernia     last assessed: 11/7/17    Hyperlipidemia     Hypertension     Keloid of skin     last assessed: 11/2/16    Neuropathy     Psychiatric disorder     anxiety       /84   Pulse 80   Ht 5' 5" (1 651 m)   Wt 104 kg (230 lb)   BMI 38 27 kg/m²       Physical Exam   Constitutional: Oriented to person, place, and time  Well-developed and well-nourished, no apparent distress, non-toxic appearance  Cooperative, able to hear and answer questions without difficulty  Voice: Normal voice quality  Head: Normocephalic, atraumatic  No scars, masses or lesions  Face: Symmetric, no edema, no sinus tenderness  Eyes: Vision grossly intact, extra-ocular movement intact  Ears: External ear normal   Bilateral tympanic membranes are intact with intact normal landmarks  No post-auricular erythema or tenderness  Nose: Septum midline, nares clear  Mucosa moist, turbinates well appearing  No crusting, polyps or discharge evident  Oral cavity: Dentition intact  Mucosa moist, lips normal   Tongue mobile, floor of mouth normal   Hard palate unremarkable  No masses or lesions  Oropharynx: Uvula is midline, soft palate normal   Unremarkable oropharyngeal inlet  Tonsils unremarkable  Posterior pharyngeal wall clear  No masses or lesions  Salivary glands:  Parotid glands and submandibular glands symmetric, no enlargement or tenderness  Neck: Normal laryngeal elevation with swallow  Trachea midline  No masses or lesions  No palpable adenopathy  Thyroid: Without tenderness  Large, firm approx  4 5 cm nodule in left thyroid lobe  Pulmonary/Chest: Normal effort and rate  No respiratory distress  Musculoskeletal: Normal range of motion  Neurological: Cranial nerves 2-12 intact  Skin: Skin is warm and dry  Psychiatric: Normal mood and affect  A/P: Thyroid nodule: We discussed the importance of ultrasound in the evaluation and classification of thyroid nodules  We provided her with another prescription for thyroid ultrasound  Follow up after ultrasound to review results

## 2019-03-01 NOTE — PROGRESS NOTES
Review of Systems   Constitutional: Negative  HENT: Positive for ear pain (itching), postnasal drip and sore throat (off and on)  Eyes: Positive for pain and itching  Respiratory: Negative  Cardiovascular: Negative  Gastrointestinal: Negative  Endocrine: Negative  Genitourinary: Negative  Musculoskeletal: Negative  Skin: Negative  Allergic/Immunologic: Negative  Neurological: Positive for headaches  Hematological: Negative  Psychiatric/Behavioral: Negative

## 2019-03-07 ENCOUNTER — HOSPITAL ENCOUNTER (OUTPATIENT)
Dept: ULTRASOUND IMAGING | Facility: HOSPITAL | Age: 64
Discharge: HOME | End: 2019-03-07
Payer: COMMERCIAL

## 2019-03-07 DIAGNOSIS — E04.1 THYROID NODULE: ICD-10-CM

## 2019-03-07 PROCEDURE — 76536 US EXAM OF HEAD AND NECK: CPT

## 2019-03-12 ENCOUNTER — HOSPITAL ENCOUNTER (OUTPATIENT)
Dept: NEUROLOGY | Facility: CLINIC | Age: 64
Discharge: HOME | End: 2019-03-12
Payer: COMMERCIAL

## 2019-03-12 DIAGNOSIS — G56.02 CARPAL TUNNEL SYNDROME OF LEFT WRIST: ICD-10-CM

## 2019-03-12 DIAGNOSIS — G56.02 CARPAL TUNNEL SYNDROME OF LEFT WRIST: Primary | ICD-10-CM

## 2019-03-12 PROCEDURE — 95886 MUSC TEST DONE W/N TEST COMP: CPT | Performed by: PHYSICAL MEDICINE & REHABILITATION

## 2019-03-12 PROCEDURE — 95909 NRV CNDJ TST 5-6 STUDIES: CPT | Performed by: PHYSICAL MEDICINE & REHABILITATION

## 2019-03-12 NOTE — PROCEDURES
[facility]   Marija Paniagua [facility] , [address]   [phone]        Name: Kris Chris  Patient ID: [id number]   Age: 61 Years 10 Months  YOB: 1955   Account Number:    Gender: Female   Technologist:    Date of Exam: 3/12/2019 09:19   Referring Physician: Kyle Byrd DO  Temperature: 29   Examining Physician: Domenica Shaw MD  Height:                 Patient History:   61 female with longstanding diabetes, peripheral neuropathy, with left hand numbness/pain in digits 1-3  Referred for carpal tunnel syndrome evaluation  She endorses neck pain radiating to the left hand  5/5 bilateral finger flexion/abduction, wrist extension, elbow flexion/extension, shoulder abduction  +phalen left         MNC      Nerve / Sites Muscle Latency Ref  Amplitude Ref  Duration Rel Amp Distance Lat Diff Velocity Ref  ms ms mV mV ms % mm ms m/s m/s   L Median - APB      Wrist APB 5 26 ?4 20 10 0 ?4 0 5 73 100 70         Elbow APB 9 95  9 5  5 89 95 9 220 4 69 47 ?50   L Ulnar - ADM      Wrist ADM 2 81 ?3 30 8 5 ?5 0 5 26 100 70         B  Elbow ADM 7 08  8 0  5 83 94 4 230 4 27 54 ?50      A  Elbow ADM 8 85  8 3  6 09 103 100 1 77 56 ?49            6 04         SNC      Nerve / Sites Rec  Site Onset Lat Peak Lat Ref  Amp Ref  Distance Velocity Ref  ms ms ms µV µV mm m/s m/s   L Median - Digit II (Antidromic)      Wrist Dig II 3 96 5 21 ?3 50 10 5 ?10 0 130 33 ?50   L Ulnar - Digit V (Antidromic)      Wrist Dig V 1 88 2 76 ?3 10 19 8 ?10 0 110 59 ?50   L Radial - Anatomical snuff box (Forearm)      Forearm Wrist 1 98 2 50 ?2 90 14 7 ?10 0 100 51 ?50       EMG         Needle EMG Examination     Insertional Spontaneous MUAP   Muscle Nerve Roots Activity Fib PSW Fasc Dur  Amp Poly Config Recruitment   L  Deltoid Axillary C5-C6 Normal None None None Normal Normal None Normal Normal   L  Biceps brachii Musculocutaneous C5-C6 Normal None None None Normal Normal None Normal Normal   L  Triceps brachii Radial C6-C8 Normal None None None Normal Normal None Normal Normal   L  Pronator teres Median C6-C7 Normal None None None Normal Normal None Normal Normal   L  First dorsal interosseous Ulnar C8-T1 Normal None None None Normal Normal None Normal Normal   L  Abductor pollicis brevis Median L0-W2 Normal None None None Normal Normal None Normal Normal         Findings:   Motor:  Left median compound motor action potential (CMAP) demonstrated prolonged distal latency, normal amplitude, and decreased conduction velocity across the forearm  Left ulnar compound motor action potential (CMAP) demonstrated normal distal latency, normal amplitude, and normal conduction velocity across the elbow and forearm  Sensory:  Left median sensory nerve action potential (SNAP) demonstrated normal amplitude and prolonged peak latency  Left ulnar sensory nerve action potential (SNAP) demonstrated normal amplitude and normal peak latency  Left radial sensory nerve action potential (SNAP) demonstrated normal amplitude and normal peak latency  EMG:  A disposable monopolar needle was used to study selected muscles in the left upper extremity including the deltoid, biceps, triceps, pronator teres, first dorsal interosseous, and abductor pollicis brevis  All muscles tested demonstrated normal insertional activity, no abnormal spontaneous activity, and normal volitional motor unit action potentials  Impression: Abnormal study  1  There is electrodiagnostic evidence of a left median demyelinating sensorimotor mononeuropathy across the wrist, most consistent with clinical diagnosis of moderate left carpal tunnel syndrome  2  There is no electrodiagnostic evidence of a left ulnar or radial mononeuropathy  3  There is no electrodiagnostic evidence of a left brachial plexopathy or cervical radiculopathy  4  There is no electrodiagnostic evidence of a large fiber peripheral polyneuropathy   However, EMG/NCS findings of peripheral neuropathy typically present in the lower extremities before the upper extremities               ___________________________  Cash Gaspar MD

## 2019-03-12 NOTE — RESULT ENCOUNTER NOTE
Please call the patient regarding her abnormal result    Call patient and tell her that her EMG ease do show evidence of a left carpal tunnel and she would benefit from a consultation with or Orthopedics to discuss having surgery done to take the pressure off of the nerve in her wrist if she is amenable set her up with Orthopedics I would suggest Dr Joel Momin

## 2019-03-14 ENCOUNTER — TELEPHONE (OUTPATIENT)
Dept: FAMILY MEDICINE CLINIC | Facility: CLINIC | Age: 64
End: 2019-03-14

## 2019-03-14 DIAGNOSIS — G56.02 CARPAL TUNNEL SYNDROME OF LEFT WRIST: Primary | ICD-10-CM

## 2019-03-14 NOTE — TELEPHONE ENCOUNTER
CC: Carpal tunnel  - Provider change:  Dr Randall Toledo - Kayla Liu 4/5/19 - Myesha Lai does not do surgery on the hand wrist or elbow

## 2019-03-29 ENCOUNTER — OFFICE VISIT (OUTPATIENT)
Dept: OTOLARYNGOLOGY | Facility: CLINIC | Age: 64
End: 2019-03-29
Payer: COMMERCIAL

## 2019-03-29 VITALS
WEIGHT: 231 LBS | HEIGHT: 65 IN | SYSTOLIC BLOOD PRESSURE: 102 MMHG | BODY MASS INDEX: 38.49 KG/M2 | DIASTOLIC BLOOD PRESSURE: 70 MMHG

## 2019-03-29 DIAGNOSIS — E04.2 MULTINODULAR GOITER: Primary | ICD-10-CM

## 2019-03-29 PROCEDURE — 99213 OFFICE O/P EST LOW 20 MIN: CPT | Performed by: OTOLARYNGOLOGY

## 2019-03-29 NOTE — PROGRESS NOTES
Marietta Mcginnis is a 61 y  o female who presents for re-evaluation of thyroid nodules  US was performed and showed: Nodule #1  Image 2,048  RIGHT upper pole nodule measuring 0 7 x 0 5 x 0 6 cm  No priors for comparison  COMPOSITION:  2 points, solid or almost completely solid   ECHOGENICITY:  2 points, hypoechoic  SHAPE:  0 points, wider-than-tall  MARGIN: 0 points, smooth  ECHOGENIC FOCI:  0 points, none or large comet-tail artifacts  TI-RADS Score: TR 4 (4-6 points), Moderately suspicious  FNA if > 1 5 cm  Follow if > 1cm      Nodule #2  Image 2,816  RIGHT lateral lower pole nodule measuring 1 2 x 0 5 x 0 7 cm  No priors for comparison  COMPOSITION:  2 points, solid or almost completely solid   ECHOGENICITY:  2 points, hypoechoic  SHAPE:  0 points, wider-than-tall  MARGIN: 0 points, smooth  ECHOGENIC FOCI:  0 points, none or large comet-tail artifacts  TI-RADS Score: TR 4 (4-6 points), Moderately suspicious  FNA if > 1 5 cm  Follow if > 1cm  Recommend follow-up ultrasound in 1 year      Nodule #3  Image 3,584  RIGHT lower pole nodule measuring 1 1 x 0 6 x 0 7 cm  No priors for comparison  COMPOSITION:  2 points, solid or almost completely solid   ECHOGENICITY:  2 points, hypoechoic  SHAPE:  0 points, wider-than-tall  MARGIN: 0 points, smooth  ECHOGENIC FOCI:  0 points, none or large comet-tail artifacts  TI-RADS Score: TR 4 (4-6 points), Moderately suspicious  FNA if > 1 5 cm  Follow if > 1cm  Recommend follow-up ultrasound in 1 year      Nodule #4  Image 8,192 / [id number]  LEFT mid to lower pole nodule measuring 4 7 x 2 9 x 4 1 cm  No priors thyroid ultrasound for comparison  This nodule corresponds to the 4 1 cm left thyroid nodule identified on recent CT study dated 12/8/2018  COMPOSITION:  2 points, solid or almost completely solid   ECHOGENICITY:  1 point, hyperechoic or isoechoic  SHAPE:  0 points, wider-than-tall  MARGIN: 0 points, smooth      ECHOGENIC FOCI:  0 points, none or large comet-tail artifacts  TI-RADS Score: TR 3 (3 points), Mildly suspicious  FNA if >2 5 cm  Follow if >1 5 cm  Recommend for biopsy  No swallowing difficulty  No voice change  No neck masses  No weight loss  Past Medical History:   Diagnosis Date    Abnormal ECG     last assessed: 5/15/17    Abnormal mammogram     last assessed: 7/11/17    Abnormal stress test     last assesed: 7/24/17    Arthritis     Asthma     Back problem     Breast cyst     Chest pain     last assessed: 7/24/17    Cyst of left breast     last assessed: 8/18/17    Depression     Depression     resolved: 1/2/18    Diabetes mellitus ([facility] [place] [address] )     Hiatal hernia     last assessed: 11/7/17    Hyperlipidemia     Hypertension     Keloid of skin     last assessed: 11/2/16    Neuropathy     Psychiatric disorder     anxiety       /70 (BP Location: Left arm, Patient Position: Sitting, Cuff Size: Large)   Ht 5' 5" (1 651 m)   Wt 105 kg (231 lb)   BMI 38 44 kg/m²       Physical Exam   Constitutional: Oriented to person, place, and time  Well-developed and well-nourished, no apparent distress, non-toxic appearance  Cooperative, able to hear and answer questions without difficulty  Voice: Normal voice quality  Head: Normocephalic, atraumatic  No scars, masses or lesions  Face: Symmetric, no edema, no sinus tenderness  Eyes: Vision grossly intact, extra-ocular movement intact  Ears: External ear normal   Bilateral tympanic membranes are intact with intact normal landmarks  No post-auricular erythema or tenderness  Nose: Septum midline, nares clear  Mucosa moist, turbinates well appearing  No crusting, polyps or discharge evident  Oral cavity: Dentition intact  Mucosa moist, lips normal   Tongue mobile, floor of mouth normal   Hard palate unremarkable  No masses or lesions  Oropharynx: Uvula is midline, soft palate normal   Unremarkable oropharyngeal inlet  Tonsils unremarkable  Posterior pharyngeal wall clear  No masses or lesions  Salivary glands:  Parotid glands and submandibular glands symmetric, no enlargement or tenderness  Neck: Normal laryngeal elevation with swallow  Trachea midline  No masses or lesions  No palpable adenopathy  Thyroid: Without tenderness  Large, firm approx  4 5 cm nodule in left thyroid lobe  Pulmonary/Chest: Normal effort and rate  No respiratory distress  Musculoskeletal: Normal range of motion  Neurological: Cranial nerves 2-12 intact  Skin: Skin is warm and dry  Psychiatric: Normal mood and affect  A/P: Thyroid nodules: We will obtain US guided biopsy of the left thyroid nodule with AFIRMA  RV 6 weeks

## 2019-04-05 ENCOUNTER — OFFICE VISIT (OUTPATIENT)
Dept: OBGYN CLINIC | Facility: CLINIC | Age: 64
End: 2019-04-05
Payer: COMMERCIAL

## 2019-04-05 VITALS
HEART RATE: 70 BPM | SYSTOLIC BLOOD PRESSURE: 149 MMHG | DIASTOLIC BLOOD PRESSURE: 81 MMHG | HEIGHT: 65 IN | WEIGHT: 231 LBS | BODY MASS INDEX: 38.49 KG/M2

## 2019-04-05 DIAGNOSIS — G56.02 CARPAL TUNNEL SYNDROME OF LEFT WRIST: ICD-10-CM

## 2019-04-05 DIAGNOSIS — R20.0 NUMBNESS IN BOTH HANDS: Primary | ICD-10-CM

## 2019-04-05 DIAGNOSIS — G56.02 CARPAL TUNNEL SYNDROME ON LEFT: ICD-10-CM

## 2019-04-05 PROCEDURE — 99203 OFFICE O/P NEW LOW 30 MIN: CPT | Performed by: ORTHOPAEDIC SURGERY

## 2019-04-05 RX ORDER — FLUCONAZOLE 150 MG/1
TABLET ORAL
Refills: 0 | COMMUNITY
Start: 2019-02-15 | End: 2019-04-09 | Stop reason: ALTCHOICE

## 2019-04-05 RX ORDER — CEFAZOLIN SODIUM 2 G/50ML
2000 SOLUTION INTRAVENOUS ONCE
Status: CANCELLED | OUTPATIENT
Start: 2019-04-19 | End: 2019-04-05

## 2019-04-09 ENCOUNTER — OFFICE VISIT (OUTPATIENT)
Dept: URGENT CARE | Facility: CLINIC | Age: 64
End: 2019-04-09
Payer: COMMERCIAL

## 2019-04-09 ENCOUNTER — APPOINTMENT (OUTPATIENT)
Dept: OCCUPATIONAL THERAPY | Facility: CLINIC | Age: 64
End: 2019-04-09
Payer: COMMERCIAL

## 2019-04-09 VITALS
HEIGHT: 65 IN | SYSTOLIC BLOOD PRESSURE: 154 MMHG | RESPIRATION RATE: 20 BRPM | HEART RATE: 78 BPM | TEMPERATURE: 99.4 F | DIASTOLIC BLOOD PRESSURE: 80 MMHG | OXYGEN SATURATION: 96 % | BODY MASS INDEX: 38.82 KG/M2 | WEIGHT: 233 LBS

## 2019-04-09 DIAGNOSIS — R10.84 GENERALIZED ABDOMINAL PAIN: ICD-10-CM

## 2019-04-09 DIAGNOSIS — R35.0 FREQUENCY OF MICTURITION: Primary | ICD-10-CM

## 2019-04-09 LAB
SL AMB  POCT GLUCOSE, UA: ABNORMAL
SL AMB LEUKOCYTE ESTERASE,UA: ABNORMAL
SL AMB POCT BILIRUBIN,UA: ABNORMAL
SL AMB POCT BLOOD,UA: ABNORMAL
SL AMB POCT CLARITY,UA: CLEAR
SL AMB POCT COLOR,UA: ABNORMAL
SL AMB POCT KETONES,UA: ABNORMAL
SL AMB POCT NITRITE,UA: ABNORMAL
SL AMB POCT PH,UA: 6.5
SL AMB POCT SPECIFIC GRAVITY,UA: 1.01
SL AMB POCT URINE PROTEIN: ABNORMAL
SL AMB POCT UROBILINOGEN: 0.2

## 2019-04-09 PROCEDURE — G0382 LEV 3 HOSP TYPE B ED VISIT: HCPCS | Performed by: PHYSICIAN ASSISTANT

## 2019-04-09 PROCEDURE — 99213 OFFICE O/P EST LOW 20 MIN: CPT | Performed by: PHYSICIAN ASSISTANT

## 2019-04-09 PROCEDURE — 99283 EMERGENCY DEPT VISIT LOW MDM: CPT | Performed by: PHYSICIAN ASSISTANT

## 2019-04-09 PROCEDURE — 87086 URINE CULTURE/COLONY COUNT: CPT | Performed by: PHYSICIAN ASSISTANT

## 2019-04-09 PROCEDURE — 81002 URINALYSIS NONAUTO W/O SCOPE: CPT | Performed by: PHYSICIAN ASSISTANT

## 2019-04-10 LAB — BACTERIA UR CULT: NORMAL

## 2019-04-11 ENCOUNTER — OFFICE VISIT (OUTPATIENT)
Dept: FAMILY MEDICINE CLINIC | Facility: CLINIC | Age: 64
End: 2019-04-11
Payer: COMMERCIAL

## 2019-04-11 VITALS
DIASTOLIC BLOOD PRESSURE: 82 MMHG | BODY MASS INDEX: 38.89 KG/M2 | HEIGHT: 65 IN | SYSTOLIC BLOOD PRESSURE: 132 MMHG | WEIGHT: 233.4 LBS

## 2019-04-11 DIAGNOSIS — N30.00 ACUTE CYSTITIS WITHOUT HEMATURIA: Primary | ICD-10-CM

## 2019-04-11 DIAGNOSIS — R35.0 URINE FREQUENCY: ICD-10-CM

## 2019-04-11 DIAGNOSIS — Z79.4 TYPE 2 DIABETES MELLITUS WITHOUT COMPLICATION, WITH LONG-TERM CURRENT USE OF INSULIN (HCC): ICD-10-CM

## 2019-04-11 DIAGNOSIS — E11.9 TYPE 2 DIABETES MELLITUS WITHOUT COMPLICATION, WITH LONG-TERM CURRENT USE OF INSULIN (HCC): ICD-10-CM

## 2019-04-11 DIAGNOSIS — Z12.11 SCREENING FOR COLON CANCER: ICD-10-CM

## 2019-04-11 LAB
SL AMB  POCT GLUCOSE, UA: NORMAL
SL AMB LEUKOCYTE ESTERASE,UA: NORMAL
SL AMB POCT BILIRUBIN,UA: NORMAL
SL AMB POCT BLOOD,UA: NORMAL
SL AMB POCT CLARITY,UA: CLEAR
SL AMB POCT COLOR,UA: YELLOW
SL AMB POCT KETONES,UA: NORMAL
SL AMB POCT NITRITE,UA: NORMAL
SL AMB POCT PH,UA: 6
SL AMB POCT SPECIFIC GRAVITY,UA: 1.03
SL AMB POCT URINE PROTEIN: NORMAL
SL AMB POCT UROBILINOGEN: NORMAL

## 2019-04-11 PROCEDURE — 81002 URINALYSIS NONAUTO W/O SCOPE: CPT | Performed by: PHYSICIAN ASSISTANT

## 2019-04-11 PROCEDURE — 99214 OFFICE O/P EST MOD 30 MIN: CPT | Performed by: PHYSICIAN ASSISTANT

## 2019-04-11 PROCEDURE — 3008F BODY MASS INDEX DOCD: CPT | Performed by: PHYSICIAN ASSISTANT

## 2019-04-11 PROCEDURE — 87086 URINE CULTURE/COLONY COUNT: CPT | Performed by: PHYSICIAN ASSISTANT

## 2019-04-11 RX ORDER — CIPROFLOXACIN 500 MG/1
500 TABLET, FILM COATED ORAL EVERY 12 HOURS SCHEDULED
Qty: 10 TABLET | Refills: 0 | Status: SHIPPED | OUTPATIENT
Start: 2019-04-11 | End: 2019-04-16

## 2019-04-11 RX ORDER — LANCETS
EACH MISCELLANEOUS
Qty: 100 EACH | Refills: 3 | Status: SHIPPED | OUTPATIENT
Start: 2019-04-11 | End: 2019-05-20 | Stop reason: SDUPTHER

## 2019-04-13 LAB — BACTERIA UR CULT: NORMAL

## 2019-04-15 ENCOUNTER — HOSPITAL ENCOUNTER (OUTPATIENT)
Dept: ULTRASOUND IMAGING | Facility: HOSPITAL | Age: 64
Discharge: HOME | End: 2019-04-15
Attending: OTOLARYNGOLOGY
Payer: COMMERCIAL

## 2019-04-15 VITALS
RESPIRATION RATE: 16 BRPM | HEART RATE: 64 BPM | SYSTOLIC BLOOD PRESSURE: 158 MMHG | DIASTOLIC BLOOD PRESSURE: 79 MMHG | OXYGEN SATURATION: 97 %

## 2019-04-15 DIAGNOSIS — E04.2 MULTINODULAR GOITER: ICD-10-CM

## 2019-04-15 PROCEDURE — NC001 PR NO CHARGE: Performed by: RADIOLOGY

## 2019-04-15 PROCEDURE — 60100 BIOPSY OF THYROID: CPT

## 2019-04-15 PROCEDURE — 88173 CYTOPATH EVAL FNA REPORT: CPT | Performed by: PATHOLOGY

## 2019-04-15 PROCEDURE — 10005 FNA BX W/US GDN 1ST LES: CPT | Performed by: RADIOLOGY

## 2019-04-15 RX ORDER — LIDOCAINE HYDROCHLORIDE 10 MG/ML
INJECTION, SOLUTION INFILTRATION; PERINEURAL CODE/TRAUMA/SEDATION MEDICATION
Status: COMPLETED | OUTPATIENT
Start: 2019-04-15 | End: 2019-04-15

## 2019-04-15 RX ADMIN — LIDOCAINE HYDROCHLORIDE 10 ML: 10 INJECTION, SOLUTION INFILTRATION; PERINEURAL at 09:48

## 2019-04-16 ENCOUNTER — ANESTHESIA EVENT (OUTPATIENT)
Dept: PERIOP | Facility: HOSPITAL | Age: 64
End: 2019-04-16
Payer: COMMERCIAL

## 2019-04-19 ENCOUNTER — HOSPITAL ENCOUNTER (OUTPATIENT)
Facility: HOSPITAL | Age: 64
Setting detail: OUTPATIENT SURGERY
Discharge: HOME | End: 2019-04-19
Attending: ORTHOPAEDIC SURGERY | Admitting: ORTHOPAEDIC SURGERY
Payer: COMMERCIAL

## 2019-04-19 ENCOUNTER — ANESTHESIA (OUTPATIENT)
Dept: PERIOP | Facility: HOSPITAL | Age: 64
End: 2019-04-19
Payer: COMMERCIAL

## 2019-04-19 VITALS
TEMPERATURE: 98.4 F | SYSTOLIC BLOOD PRESSURE: 136 MMHG | OXYGEN SATURATION: 95 % | RESPIRATION RATE: 18 BRPM | HEART RATE: 58 BPM | DIASTOLIC BLOOD PRESSURE: 65 MMHG

## 2019-04-19 PROCEDURE — 82948 REAGENT STRIP/BLOOD GLUCOSE: CPT

## 2019-04-19 PROCEDURE — 29848 WRIST ENDOSCOPY/SURGERY: CPT | Performed by: PHYSICIAN ASSISTANT

## 2019-04-19 PROCEDURE — 29848 WRIST ENDOSCOPY/SURGERY: CPT | Performed by: ORTHOPAEDIC SURGERY

## 2019-04-19 RX ORDER — ONDANSETRON 2 MG/ML
4 INJECTION INTRAMUSCULAR; INTRAVENOUS EVERY 6 HOURS PRN
Status: DISCONTINUED | OUTPATIENT
Start: 2019-04-19 | End: 2019-04-19 | Stop reason: HOSPADM

## 2019-04-19 RX ORDER — MEPERIDINE HYDROCHLORIDE 50 MG/ML
12.5 INJECTION INTRAMUSCULAR; INTRAVENOUS; SUBCUTANEOUS
Status: DISCONTINUED | OUTPATIENT
Start: 2019-04-19 | End: 2019-04-19 | Stop reason: HOSPADM

## 2019-04-19 RX ORDER — SODIUM CHLORIDE, SODIUM LACTATE, POTASSIUM CHLORIDE, CALCIUM CHLORIDE 600; 310; 30; 20 MG/100ML; MG/100ML; MG/100ML; MG/100ML
100 INJECTION, SOLUTION INTRAVENOUS CONTINUOUS
Status: DISCONTINUED | OUTPATIENT
Start: 2019-04-19 | End: 2019-04-19 | Stop reason: HOSPADM

## 2019-04-19 RX ORDER — MAGNESIUM HYDROXIDE 1200 MG/15ML
LIQUID ORAL AS NEEDED
Status: DISCONTINUED | OUTPATIENT
Start: 2019-04-19 | End: 2019-04-19 | Stop reason: HOSPADM

## 2019-04-19 RX ORDER — FENTANYL CITRATE 50 UG/ML
INJECTION, SOLUTION INTRAMUSCULAR; INTRAVENOUS AS NEEDED
Status: DISCONTINUED | OUTPATIENT
Start: 2019-04-19 | End: 2019-04-19 | Stop reason: SURG

## 2019-04-19 RX ORDER — KETOROLAC TROMETHAMINE 30 MG/ML
30 INJECTION, SOLUTION INTRAMUSCULAR; INTRAVENOUS ONCE AS NEEDED
Status: DISCONTINUED | OUTPATIENT
Start: 2019-04-19 | End: 2019-04-19 | Stop reason: HOSPADM

## 2019-04-19 RX ORDER — MORPHINE SULFATE 4 MG/ML
2 INJECTION, SOLUTION INTRAMUSCULAR; INTRAVENOUS
Status: DISCONTINUED | OUTPATIENT
Start: 2019-04-19 | End: 2019-04-19 | Stop reason: HOSPADM

## 2019-04-19 RX ORDER — PROPOFOL 10 MG/ML
INJECTION, EMULSION INTRAVENOUS AS NEEDED
Status: DISCONTINUED | OUTPATIENT
Start: 2019-04-19 | End: 2019-04-19 | Stop reason: SURG

## 2019-04-19 RX ORDER — ONDANSETRON 2 MG/ML
4 INJECTION INTRAMUSCULAR; INTRAVENOUS ONCE AS NEEDED
Status: DISCONTINUED | OUTPATIENT
Start: 2019-04-19 | End: 2019-04-19 | Stop reason: HOSPADM

## 2019-04-19 RX ORDER — ACETAMINOPHEN 325 MG/1
650 TABLET ORAL EVERY 6 HOURS PRN
Status: DISCONTINUED | OUTPATIENT
Start: 2019-04-19 | End: 2019-04-19 | Stop reason: HOSPADM

## 2019-04-19 RX ORDER — HYDROMORPHONE HCL/PF 1 MG/ML
0.5 SYRINGE (ML) INJECTION
Status: DISCONTINUED | OUTPATIENT
Start: 2019-04-19 | End: 2019-04-19 | Stop reason: HOSPADM

## 2019-04-19 RX ORDER — SODIUM CHLORIDE, SODIUM LACTATE, POTASSIUM CHLORIDE, CALCIUM CHLORIDE 600; 310; 30; 20 MG/100ML; MG/100ML; MG/100ML; MG/100ML
125 INJECTION, SOLUTION INTRAVENOUS CONTINUOUS
Status: DISCONTINUED | OUTPATIENT
Start: 2019-04-19 | End: 2019-04-19 | Stop reason: HOSPADM

## 2019-04-19 RX ORDER — LIDOCAINE HYDROCHLORIDE AND EPINEPHRINE 10; 10 MG/ML; UG/ML
INJECTION, SOLUTION INFILTRATION; PERINEURAL AS NEEDED
Status: DISCONTINUED | OUTPATIENT
Start: 2019-04-19 | End: 2019-04-19 | Stop reason: HOSPADM

## 2019-04-19 RX ORDER — CEFAZOLIN SODIUM 2 G/50ML
2000 SOLUTION INTRAVENOUS ONCE
Status: COMPLETED | OUTPATIENT
Start: 2019-04-19 | End: 2019-04-19

## 2019-04-19 RX ORDER — MIDAZOLAM HYDROCHLORIDE 1 MG/ML
INJECTION INTRAMUSCULAR; INTRAVENOUS AS NEEDED
Status: DISCONTINUED | OUTPATIENT
Start: 2019-04-19 | End: 2019-04-19 | Stop reason: SURG

## 2019-04-19 RX ORDER — BUPIVACAINE HYDROCHLORIDE AND EPINEPHRINE 5; 5 MG/ML; UG/ML
INJECTION, SOLUTION PERINEURAL AS NEEDED
Status: DISCONTINUED | OUTPATIENT
Start: 2019-04-19 | End: 2019-04-19 | Stop reason: HOSPADM

## 2019-04-19 RX ORDER — METOCLOPRAMIDE HYDROCHLORIDE 5 MG/ML
10 INJECTION INTRAMUSCULAR; INTRAVENOUS ONCE AS NEEDED
Status: DISCONTINUED | OUTPATIENT
Start: 2019-04-19 | End: 2019-04-19 | Stop reason: HOSPADM

## 2019-04-19 RX ADMIN — PROPOFOL 40 MG: 10 INJECTION, EMULSION INTRAVENOUS at 12:20

## 2019-04-19 RX ADMIN — ACETAMINOPHEN 650 MG: 325 TABLET ORAL at 13:45

## 2019-04-19 RX ADMIN — FENTANYL CITRATE 50 MCG: 50 INJECTION INTRAMUSCULAR; INTRAVENOUS at 12:20

## 2019-04-19 RX ADMIN — PROPOFOL 50 MG: 10 INJECTION, EMULSION INTRAVENOUS at 12:18

## 2019-04-19 RX ADMIN — MIDAZOLAM HYDROCHLORIDE 2 MG: 1 INJECTION, SOLUTION INTRAMUSCULAR; INTRAVENOUS at 12:14

## 2019-04-19 RX ADMIN — SODIUM CHLORIDE, POTASSIUM CHLORIDE, SODIUM LACTATE AND CALCIUM CHLORIDE 125 ML/HR: 600; 310; 30; 20 INJECTION, SOLUTION INTRAVENOUS at 11:00

## 2019-04-19 RX ADMIN — FENTANYL CITRATE 50 MCG: 50 INJECTION INTRAMUSCULAR; INTRAVENOUS at 12:16

## 2019-04-19 RX ADMIN — PROPOFOL 20 MG: 10 INJECTION, EMULSION INTRAVENOUS at 12:26

## 2019-04-19 RX ADMIN — LIDOCAINE HYDROCHLORIDE 50 MG: 20 INJECTION, SOLUTION INTRAVENOUS at 12:20

## 2019-04-19 RX ADMIN — CEFAZOLIN SODIUM 2000 MG: 2 SOLUTION INTRAVENOUS at 12:19

## 2019-04-23 ENCOUNTER — EVALUATION (OUTPATIENT)
Dept: OCCUPATIONAL THERAPY | Facility: CLINIC | Age: 64
End: 2019-04-23
Payer: COMMERCIAL

## 2019-04-23 DIAGNOSIS — G56.02 CARPAL TUNNEL SYNDROME ON LEFT: Primary | ICD-10-CM

## 2019-04-23 LAB — GLUCOSE SERPL-MCNC: 139 MG/DL (ref 65–140)

## 2019-04-23 PROCEDURE — 97110 THERAPEUTIC EXERCISES: CPT

## 2019-04-23 PROCEDURE — 97167 OT EVAL HIGH COMPLEX 60 MIN: CPT

## 2019-05-01 ENCOUNTER — OFFICE VISIT (OUTPATIENT)
Dept: FAMILY MEDICINE CLINIC | Facility: CLINIC | Age: 64
End: 2019-05-01
Payer: COMMERCIAL

## 2019-05-01 VITALS
HEIGHT: 65 IN | DIASTOLIC BLOOD PRESSURE: 84 MMHG | BODY MASS INDEX: 38.25 KG/M2 | SYSTOLIC BLOOD PRESSURE: 158 MMHG | WEIGHT: 229.6 LBS

## 2019-05-01 DIAGNOSIS — Z12.11 SCREENING FOR COLON CANCER: Primary | ICD-10-CM

## 2019-05-01 DIAGNOSIS — N30.00 ACUTE CYSTITIS WITHOUT HEMATURIA: ICD-10-CM

## 2019-05-01 DIAGNOSIS — R10.9 ABDOMINAL PAIN, UNSPECIFIED ABDOMINAL LOCATION: ICD-10-CM

## 2019-05-01 LAB
SL AMB  POCT GLUCOSE, UA: ABNORMAL
SL AMB LEUKOCYTE ESTERASE,UA: ABNORMAL
SL AMB POCT BILIRUBIN,UA: ABNORMAL
SL AMB POCT BLOOD,UA: ABNORMAL
SL AMB POCT CLARITY,UA: CLEAR
SL AMB POCT COLOR,UA: ABNORMAL
SL AMB POCT KETONES,UA: ABNORMAL
SL AMB POCT NITRITE,UA: ABNORMAL
SL AMB POCT PH,UA: 5.5
SL AMB POCT SPECIFIC GRAVITY,UA: 1.03
SL AMB POCT URINE PROTEIN: ABNORMAL
SL AMB POCT UROBILINOGEN: 0.2

## 2019-05-01 PROCEDURE — 87086 URINE CULTURE/COLONY COUNT: CPT | Performed by: PHYSICIAN ASSISTANT

## 2019-05-01 PROCEDURE — 99214 OFFICE O/P EST MOD 30 MIN: CPT | Performed by: PHYSICIAN ASSISTANT

## 2019-05-01 PROCEDURE — 81002 URINALYSIS NONAUTO W/O SCOPE: CPT | Performed by: PHYSICIAN ASSISTANT

## 2019-05-01 RX ORDER — SULFAMETHOXAZOLE AND TRIMETHOPRIM 800; 160 MG/1; MG/1
1 TABLET ORAL EVERY 12 HOURS SCHEDULED
Qty: 14 TABLET | Refills: 0 | Status: SHIPPED | OUTPATIENT
Start: 2019-05-01 | End: 2019-05-08

## 2019-05-02 ENCOUNTER — TELEPHONE (OUTPATIENT)
Dept: GASTROENTEROLOGY | Facility: HOSPITAL | Age: 64
End: 2019-05-02

## 2019-05-03 ENCOUNTER — OFFICE VISIT (OUTPATIENT)
Dept: OBGYN CLINIC | Facility: CLINIC | Age: 64
End: 2019-05-03

## 2019-05-03 VITALS
HEART RATE: 77 BPM | HEIGHT: 65 IN | WEIGHT: 229 LBS | DIASTOLIC BLOOD PRESSURE: 82 MMHG | SYSTOLIC BLOOD PRESSURE: 129 MMHG | BODY MASS INDEX: 38.15 KG/M2

## 2019-05-03 DIAGNOSIS — G56.02 CARPAL TUNNEL SYNDROME OF LEFT WRIST: Primary | ICD-10-CM

## 2019-05-03 LAB — BACTERIA UR CULT: NORMAL

## 2019-05-03 PROCEDURE — 99024 POSTOP FOLLOW-UP VISIT: CPT | Performed by: PHYSICIAN ASSISTANT

## 2019-05-04 DIAGNOSIS — I10 ESSENTIAL HYPERTENSION: ICD-10-CM

## 2019-05-06 PROCEDURE — 4010F ACE/ARB THERAPY RXD/TAKEN: CPT | Performed by: PHYSICIAN ASSISTANT

## 2019-05-06 RX ORDER — LISINOPRIL 40 MG/1
TABLET ORAL
Qty: 30 TABLET | Refills: 5 | Status: SHIPPED | OUTPATIENT
Start: 2019-05-06 | End: 2020-03-24 | Stop reason: SDUPTHER

## 2019-05-06 RX ORDER — AMLODIPINE BESYLATE 10 MG/1
TABLET ORAL
Qty: 30 TABLET | Refills: 5 | Status: SHIPPED | OUTPATIENT
Start: 2019-05-06 | End: 2019-08-17 | Stop reason: HOSPADM

## 2019-05-16 DIAGNOSIS — E11.42 DIABETIC POLYNEUROPATHY ASSOCIATED WITH TYPE 2 DIABETES MELLITUS (HCC): ICD-10-CM

## 2019-05-16 DIAGNOSIS — E78.5 HYPERLIPIDEMIA, UNSPECIFIED HYPERLIPIDEMIA TYPE: ICD-10-CM

## 2019-05-16 RX ORDER — GABAPENTIN 300 MG/1
300 CAPSULE ORAL 3 TIMES DAILY
Qty: 90 CAPSULE | Refills: 5 | Status: SHIPPED | OUTPATIENT
Start: 2019-05-16 | End: 2020-03-24 | Stop reason: SDUPTHER

## 2019-05-16 RX ORDER — SIMVASTATIN 20 MG
20 TABLET ORAL
Qty: 90 TABLET | Refills: 1 | Status: SHIPPED | OUTPATIENT
Start: 2019-05-16 | End: 2019-08-17 | Stop reason: HOSPADM

## 2019-05-19 DIAGNOSIS — Z79.4 TYPE 2 DIABETES MELLITUS WITH DIABETIC NEPHROPATHY, WITH LONG-TERM CURRENT USE OF INSULIN (HCC): ICD-10-CM

## 2019-05-19 DIAGNOSIS — E11.21 TYPE 2 DIABETES MELLITUS WITH DIABETIC NEPHROPATHY, WITH LONG-TERM CURRENT USE OF INSULIN (HCC): ICD-10-CM

## 2019-05-19 DIAGNOSIS — F41.1 GENERALIZED ANXIETY DISORDER: ICD-10-CM

## 2019-05-20 ENCOUNTER — OFFICE VISIT (OUTPATIENT)
Dept: URGENT CARE | Facility: CLINIC | Age: 64
End: 2019-05-20
Payer: COMMERCIAL

## 2019-05-20 ENCOUNTER — APPOINTMENT (EMERGENCY)
Dept: CT IMAGING | Facility: HOSPITAL | Age: 64
End: 2019-05-20
Payer: COMMERCIAL

## 2019-05-20 ENCOUNTER — HOSPITAL ENCOUNTER (EMERGENCY)
Facility: HOSPITAL | Age: 64
Discharge: HOME | End: 2019-05-20
Attending: EMERGENCY MEDICINE | Admitting: EMERGENCY MEDICINE
Payer: COMMERCIAL

## 2019-05-20 VITALS
DIASTOLIC BLOOD PRESSURE: 73 MMHG | TEMPERATURE: 99.3 F | BODY MASS INDEX: 38.32 KG/M2 | WEIGHT: 230 LBS | RESPIRATION RATE: 18 BRPM | SYSTOLIC BLOOD PRESSURE: 139 MMHG | HEIGHT: 65 IN | HEART RATE: 85 BPM | OXYGEN SATURATION: 97 %

## 2019-05-20 VITALS
DIASTOLIC BLOOD PRESSURE: 81 MMHG | RESPIRATION RATE: 18 BRPM | OXYGEN SATURATION: 97 % | HEART RATE: 71 BPM | BODY MASS INDEX: 37.79 KG/M2 | TEMPERATURE: 100 F | SYSTOLIC BLOOD PRESSURE: 169 MMHG | WEIGHT: 227.07 LBS

## 2019-05-20 DIAGNOSIS — K04.7 DENTAL ABSCESS: Primary | ICD-10-CM

## 2019-05-20 DIAGNOSIS — Z79.4 TYPE 2 DIABETES MELLITUS WITHOUT COMPLICATION, WITH LONG-TERM CURRENT USE OF INSULIN (HCC): ICD-10-CM

## 2019-05-20 DIAGNOSIS — E11.9 TYPE 2 DIABETES MELLITUS WITHOUT COMPLICATION, WITH LONG-TERM CURRENT USE OF INSULIN (HCC): ICD-10-CM

## 2019-05-20 LAB
ANION GAP SERPL CALCULATED.3IONS-SCNC: 6 MMOL/L (ref 4–13)
BASOPHILS # BLD AUTO: 0.01 THOUSANDS/ΜL (ref 0–0.1)
BASOPHILS NFR BLD AUTO: 0 % (ref 0–1)
BUN SERPL-MCNC: 16 MG/DL (ref 5–25)
CALCIUM SERPL-MCNC: 9.4 MG/DL (ref 8.3–10.1)
CHLORIDE SERPL-SCNC: 100 MMOL/L (ref 100–108)
CO2 SERPL-SCNC: 31 MMOL/L (ref 21–32)
CREAT SERPL-MCNC: 0.99 MG/DL (ref 0.6–1.3)
EOSINOPHIL # BLD AUTO: 0.04 THOUSAND/ΜL (ref 0–0.61)
EOSINOPHIL NFR BLD AUTO: 1 % (ref 0–6)
ERYTHROCYTE [DISTWIDTH] IN BLOOD BY AUTOMATED COUNT: 14.2 % (ref 11.6–15.1)
GFR SERPL CREATININE-BSD FRML MDRD: 70 ML/MIN/1.73SQ M
GLUCOSE SERPL-MCNC: 168 MG/DL (ref 65–140)
HCT VFR BLD AUTO: 37.2 % (ref 34.8–46.1)
HGB BLD-MCNC: 11.8 G/DL (ref 11.5–15.4)
IMM GRANULOCYTES # BLD AUTO: 0.02 THOUSAND/UL (ref 0–0.2)
IMM GRANULOCYTES NFR BLD AUTO: 0 % (ref 0–2)
LYMPHOCYTES # BLD AUTO: 1.65 THOUSANDS/ΜL (ref 0.6–4.47)
LYMPHOCYTES NFR BLD AUTO: 22 % (ref 14–44)
MCH RBC QN AUTO: 27.1 PG (ref 26.8–34.3)
MCHC RBC AUTO-ENTMCNC: 31.7 G/DL (ref 31.4–37.4)
MCV RBC AUTO: 86 FL (ref 82–98)
MONOCYTES # BLD AUTO: 0.77 THOUSAND/ΜL (ref 0.17–1.22)
MONOCYTES NFR BLD AUTO: 10 % (ref 4–12)
NEUTROPHILS # BLD AUTO: 4.97 THOUSANDS/ΜL (ref 1.85–7.62)
NEUTS SEG NFR BLD AUTO: 67 % (ref 43–75)
NRBC BLD AUTO-RTO: 0 /100 WBCS
PLATELET # BLD AUTO: 244 THOUSANDS/UL (ref 149–390)
PMV BLD AUTO: 10 FL (ref 8.9–12.7)
POTASSIUM SERPL-SCNC: 4.4 MMOL/L (ref 3.5–5.3)
RBC # BLD AUTO: 4.35 MILLION/UL (ref 3.81–5.12)
SODIUM SERPL-SCNC: 137 MMOL/L (ref 136–145)
WBC # BLD AUTO: 7.46 THOUSAND/UL (ref 4.31–10.16)

## 2019-05-20 PROCEDURE — 96365 THER/PROPH/DIAG IV INF INIT: CPT

## 2019-05-20 PROCEDURE — 99284 EMERGENCY DEPT VISIT MOD MDM: CPT | Performed by: NURSE PRACTITIONER

## 2019-05-20 PROCEDURE — 36415 COLL VENOUS BLD VENIPUNCTURE: CPT | Performed by: EMERGENCY MEDICINE

## 2019-05-20 PROCEDURE — 99214 OFFICE O/P EST MOD 30 MIN: CPT | Performed by: NURSE PRACTITIONER

## 2019-05-20 PROCEDURE — 99284 EMERGENCY DEPT VISIT MOD MDM: CPT

## 2019-05-20 PROCEDURE — 80048 BASIC METABOLIC PNL TOTAL CA: CPT | Performed by: EMERGENCY MEDICINE

## 2019-05-20 PROCEDURE — 96375 TX/PRO/DX INJ NEW DRUG ADDON: CPT

## 2019-05-20 PROCEDURE — 70487 CT MAXILLOFACIAL W/DYE: CPT

## 2019-05-20 PROCEDURE — G0383 LEV 4 HOSP TYPE B ED VISIT: HCPCS | Performed by: NURSE PRACTITIONER

## 2019-05-20 PROCEDURE — 99285 EMERGENCY DEPT VISIT HI MDM: CPT | Performed by: EMERGENCY MEDICINE

## 2019-05-20 PROCEDURE — 85025 COMPLETE CBC W/AUTO DIFF WBC: CPT | Performed by: EMERGENCY MEDICINE

## 2019-05-20 RX ORDER — KETOROLAC TROMETHAMINE 30 MG/ML
15 INJECTION, SOLUTION INTRAMUSCULAR; INTRAVENOUS ONCE
Status: COMPLETED | OUTPATIENT
Start: 2019-05-20 | End: 2019-05-20

## 2019-05-20 RX ORDER — BUSPIRONE HYDROCHLORIDE 5 MG/1
TABLET ORAL
Qty: 90 TABLET | Refills: 2 | Status: SHIPPED | OUTPATIENT
Start: 2019-05-20 | End: 2019-08-08 | Stop reason: SDUPTHER

## 2019-05-20 RX ORDER — SITAGLIPTIN 100 MG/1
TABLET, FILM COATED ORAL
Qty: 30 TABLET | Refills: 5 | Status: SHIPPED | OUTPATIENT
Start: 2019-05-20 | End: 2020-04-15

## 2019-05-20 RX ORDER — MORPHINE SULFATE 4 MG/ML
4 INJECTION, SOLUTION INTRAMUSCULAR; INTRAVENOUS ONCE
Status: COMPLETED | OUTPATIENT
Start: 2019-05-20 | End: 2019-05-20

## 2019-05-20 RX ORDER — METHYLPREDNISOLONE SODIUM SUCCINATE 125 MG/2ML
125 INJECTION, POWDER, LYOPHILIZED, FOR SOLUTION INTRAMUSCULAR; INTRAVENOUS ONCE
Status: DISCONTINUED | OUTPATIENT
Start: 2019-05-20 | End: 2019-05-20

## 2019-05-20 RX ORDER — AMLODIPINE BESYLATE AND ATORVASTATIN CALCIUM 10; 40 MG/1; MG/1
1 TABLET, FILM COATED ORAL DAILY
COMMUNITY
End: 2019-08-12

## 2019-05-20 RX ORDER — CLINDAMYCIN PHOSPHATE 900 MG/50ML
900 INJECTION INTRAVENOUS ONCE
Status: COMPLETED | OUTPATIENT
Start: 2019-05-20 | End: 2019-05-20

## 2019-05-20 RX ORDER — AMOXICILLIN 500 MG/1
1000 CAPSULE ORAL EVERY 12 HOURS SCHEDULED
Qty: 28 CAPSULE | Refills: 0 | Status: SHIPPED | OUTPATIENT
Start: 2019-05-20 | End: 2019-05-27

## 2019-05-20 RX ORDER — LANCETS
EACH MISCELLANEOUS
Qty: 100 EACH | Refills: 0 | Status: SHIPPED | OUTPATIENT
Start: 2019-05-20 | End: 2019-05-28 | Stop reason: SDUPTHER

## 2019-05-20 RX ADMIN — IOHEXOL 85 ML: 350 INJECTION, SOLUTION INTRAVENOUS at 20:43

## 2019-05-20 RX ADMIN — MORPHINE SULFATE 4 MG: 4 INJECTION INTRAVENOUS at 20:08

## 2019-05-20 RX ADMIN — SODIUM CHLORIDE 1000 ML: 0.9 INJECTION, SOLUTION INTRAVENOUS at 20:06

## 2019-05-20 RX ADMIN — KETOROLAC TROMETHAMINE 15 MG: 30 INJECTION, SOLUTION INTRAMUSCULAR; INTRAVENOUS at 20:07

## 2019-05-20 RX ADMIN — CLINDAMYCIN PHOSPHATE 900 MG: 900 INJECTION, SOLUTION INTRAVENOUS at 20:14

## 2019-05-24 ENCOUNTER — OFFICE VISIT (OUTPATIENT)
Dept: OTOLARYNGOLOGY | Facility: CLINIC | Age: 64
End: 2019-05-24
Payer: COMMERCIAL

## 2019-05-24 VITALS
BODY MASS INDEX: 38.32 KG/M2 | OXYGEN SATURATION: 97 % | SYSTOLIC BLOOD PRESSURE: 140 MMHG | HEART RATE: 70 BPM | WEIGHT: 230 LBS | DIASTOLIC BLOOD PRESSURE: 78 MMHG | HEIGHT: 65 IN

## 2019-05-24 DIAGNOSIS — E04.2 MULTINODULAR GOITER: Primary | ICD-10-CM

## 2019-05-24 PROCEDURE — 99213 OFFICE O/P EST LOW 20 MIN: CPT | Performed by: OTOLARYNGOLOGY

## 2019-05-24 RX ORDER — PENICILLIN V POTASSIUM 500 MG/1
TABLET ORAL
Refills: 0 | COMMUNITY
Start: 2019-05-21 | End: 2019-08-17 | Stop reason: HOSPADM

## 2019-05-24 RX ORDER — METRONIDAZOLE 500 MG/1
TABLET ORAL
Refills: 0 | COMMUNITY
Start: 2019-05-21 | End: 2019-06-13 | Stop reason: ALTCHOICE

## 2019-05-28 DIAGNOSIS — E11.9 TYPE 2 DIABETES MELLITUS WITHOUT COMPLICATION, WITH LONG-TERM CURRENT USE OF INSULIN (HCC): ICD-10-CM

## 2019-05-28 DIAGNOSIS — Z79.4 TYPE 2 DIABETES MELLITUS WITHOUT COMPLICATION, WITH LONG-TERM CURRENT USE OF INSULIN (HCC): ICD-10-CM

## 2019-05-28 RX ORDER — LANCETS
EACH MISCELLANEOUS
Qty: 100 EACH | Refills: 0 | Status: SHIPPED | OUTPATIENT
Start: 2019-05-28

## 2019-06-03 DIAGNOSIS — I10 ESSENTIAL HYPERTENSION: ICD-10-CM

## 2019-06-04 RX ORDER — HYDROCHLOROTHIAZIDE 25 MG/1
TABLET ORAL
Qty: 30 TABLET | Refills: 5 | Status: SHIPPED | OUTPATIENT
Start: 2019-06-04 | End: 2019-08-17 | Stop reason: HOSPADM

## 2019-06-10 ENCOUNTER — TELEPHONE (OUTPATIENT)
Dept: FAMILY MEDICINE CLINIC | Facility: CLINIC | Age: 64
End: 2019-06-10

## 2019-06-13 ENCOUNTER — CONSULT (OUTPATIENT)
Dept: FAMILY MEDICINE CLINIC | Facility: CLINIC | Age: 64
End: 2019-06-13
Payer: COMMERCIAL

## 2019-06-13 VITALS
DIASTOLIC BLOOD PRESSURE: 80 MMHG | BODY MASS INDEX: 38.25 KG/M2 | SYSTOLIC BLOOD PRESSURE: 138 MMHG | HEIGHT: 65 IN | WEIGHT: 229.6 LBS

## 2019-06-13 DIAGNOSIS — E78.2 MIXED HYPERLIPIDEMIA: ICD-10-CM

## 2019-06-13 DIAGNOSIS — Z01.818 PREOP EXAMINATION: Primary | ICD-10-CM

## 2019-06-13 DIAGNOSIS — Z13.6 SCREENING FOR CARDIOVASCULAR CONDITION: ICD-10-CM

## 2019-06-13 DIAGNOSIS — K04.7 CHRONIC DENTAL INFECTION: ICD-10-CM

## 2019-06-13 DIAGNOSIS — M54.16 LUMBAR RADICULOPATHY, CHRONIC: ICD-10-CM

## 2019-06-13 DIAGNOSIS — E11.22 TYPE 2 DIABETES MELLITUS WITH STAGE 3 CHRONIC KIDNEY DISEASE, WITHOUT LONG-TERM CURRENT USE OF INSULIN (HCC): ICD-10-CM

## 2019-06-13 DIAGNOSIS — N18.30 TYPE 2 DIABETES MELLITUS WITH STAGE 3 CHRONIC KIDNEY DISEASE, WITHOUT LONG-TERM CURRENT USE OF INSULIN (HCC): ICD-10-CM

## 2019-06-13 PROCEDURE — 99242 OFF/OP CONSLTJ NEW/EST SF 20: CPT | Performed by: FAMILY MEDICINE

## 2019-06-14 ENCOUNTER — HOSPITAL ENCOUNTER (OUTPATIENT)
Dept: NON INVASIVE DIAGNOSTICS | Facility: HOSPITAL | Age: 64
Discharge: HOME | End: 2019-06-14
Attending: FAMILY MEDICINE
Payer: COMMERCIAL

## 2019-06-14 ENCOUNTER — LAB (OUTPATIENT)
Dept: LAB | Facility: HOSPITAL | Age: 64
End: 2019-06-14
Attending: FAMILY MEDICINE
Payer: COMMERCIAL

## 2019-06-14 ENCOUNTER — HOSPITAL ENCOUNTER (OUTPATIENT)
Dept: RADIOLOGY | Facility: HOSPITAL | Age: 64
Discharge: HOME | End: 2019-06-14
Attending: FAMILY MEDICINE
Payer: COMMERCIAL

## 2019-06-14 DIAGNOSIS — E11.22 TYPE 2 DIABETES MELLITUS WITH STAGE 3 CHRONIC KIDNEY DISEASE, WITHOUT LONG-TERM CURRENT USE OF INSULIN (HCC): ICD-10-CM

## 2019-06-14 DIAGNOSIS — N18.30 TYPE 2 DIABETES MELLITUS WITH STAGE 3 CHRONIC KIDNEY DISEASE, WITHOUT LONG-TERM CURRENT USE OF INSULIN (HCC): ICD-10-CM

## 2019-06-14 DIAGNOSIS — M54.16 LUMBAR RADICULOPATHY, CHRONIC: ICD-10-CM

## 2019-06-14 DIAGNOSIS — E78.2 MIXED HYPERLIPIDEMIA: ICD-10-CM

## 2019-06-14 DIAGNOSIS — M54.9 BACK PAIN, UNSPECIFIED BACK LOCATION, UNSPECIFIED BACK PAIN LATERALITY, UNSPECIFIED CHRONICITY: Primary | ICD-10-CM

## 2019-06-14 DIAGNOSIS — K04.7 CHRONIC DENTAL INFECTION: ICD-10-CM

## 2019-06-14 DIAGNOSIS — Z13.6 SCREENING FOR CARDIOVASCULAR CONDITION: ICD-10-CM

## 2019-06-14 DIAGNOSIS — Z01.818 PREOP EXAMINATION: ICD-10-CM

## 2019-06-14 LAB
ATRIAL RATE: 68 BPM
CHOLEST SERPL-MCNC: 155 MG/DL (ref 50–200)
ERYTHROCYTE [DISTWIDTH] IN BLOOD BY AUTOMATED COUNT: 15.5 % (ref 11.6–15.1)
EST. AVERAGE GLUCOSE BLD GHB EST-MCNC: 183 MG/DL
HBA1C MFR BLD: 8 % (ref 4.2–6.3)
HCT VFR BLD AUTO: 40.2 % (ref 34.8–46.1)
HDLC SERPL-MCNC: 44 MG/DL (ref 40–60)
HGB BLD-MCNC: 12.6 G/DL (ref 11.5–15.4)
LDLC SERPL CALC-MCNC: 95 MG/DL (ref 0–100)
MCH RBC QN AUTO: 26.9 PG (ref 26.8–34.3)
MCHC RBC AUTO-ENTMCNC: 31.3 G/DL (ref 31.4–37.4)
MCV RBC AUTO: 86 FL (ref 82–98)
NONHDLC SERPL-MCNC: 111 MG/DL
P AXIS: 27 DEGREES
PLATELET # BLD AUTO: 235 THOUSANDS/UL (ref 149–390)
PMV BLD AUTO: 9.7 FL (ref 8.9–12.7)
PR INTERVAL: 200 MS
QRS AXIS: -14 DEGREES
QRSD INTERVAL: 92 MS
QT INTERVAL: 404 MS
QTC INTERVAL: 429 MS
RBC # BLD AUTO: 4.69 MILLION/UL (ref 3.81–5.12)
T WAVE AXIS: -32 DEGREES
TRIGL SERPL-MCNC: 81 MG/DL
TSH SERPL DL<=0.05 MIU/L-ACNC: 1.81 UIU/ML (ref 0.36–3.74)
VENTRICULAR RATE: 68 BPM
WBC # BLD AUTO: 4.16 THOUSAND/UL (ref 4.31–10.16)

## 2019-06-14 PROCEDURE — 85027 COMPLETE CBC AUTOMATED: CPT

## 2019-06-14 PROCEDURE — 83036 HEMOGLOBIN GLYCOSYLATED A1C: CPT

## 2019-06-14 PROCEDURE — 72110 X-RAY EXAM L-2 SPINE 4/>VWS: CPT

## 2019-06-14 PROCEDURE — 80061 LIPID PANEL: CPT

## 2019-06-14 PROCEDURE — 93010 ELECTROCARDIOGRAM REPORT: CPT | Performed by: INTERNAL MEDICINE

## 2019-06-14 PROCEDURE — 84443 ASSAY THYROID STIM HORMONE: CPT

## 2019-06-14 PROCEDURE — 93005 ELECTROCARDIOGRAM TRACING: CPT

## 2019-06-14 PROCEDURE — 36415 COLL VENOUS BLD VENIPUNCTURE: CPT

## 2019-06-17 DIAGNOSIS — A04.8 H. PYLORI INFECTION: ICD-10-CM

## 2019-06-18 ENCOUNTER — TELEPHONE (OUTPATIENT)
Dept: FAMILY MEDICINE CLINIC | Facility: CLINIC | Age: 64
End: 2019-06-18

## 2019-06-18 RX ORDER — PANTOPRAZOLE SODIUM 40 MG/1
40 TABLET, DELAYED RELEASE ORAL DAILY
Qty: 30 TABLET | Refills: 5 | Status: SHIPPED | OUTPATIENT
Start: 2019-06-18 | End: 2020-02-04 | Stop reason: SDUPTHER

## 2019-07-02 RX ORDER — METFORMIN HYDROCHLORIDE 1000 MG/1
TABLET, FILM COATED, EXTENDED RELEASE ORAL
Status: ON HOLD | COMMUNITY
End: 2019-08-12 | Stop reason: CLARIF

## 2019-07-02 RX ORDER — CHLORHEXIDINE GLUCONATE 0.12 MG/ML
RINSE ORAL
Refills: 0 | Status: ON HOLD | COMMUNITY
Start: 2019-06-19 | End: 2019-08-12 | Stop reason: CLARIF

## 2019-07-02 RX ORDER — IBUPROFEN 600 MG/1
TABLET ORAL
Refills: 0 | COMMUNITY
Start: 2019-06-19 | End: 2019-08-12

## 2019-07-02 RX ORDER — ACETAMINOPHEN 325 MG
TABLET ORAL
Refills: 0 | COMMUNITY
Start: 2019-06-19

## 2019-07-02 RX ORDER — METRONIDAZOLE 500 MG/1
TABLET ORAL
Refills: 0 | Status: ON HOLD | COMMUNITY
Start: 2019-06-17 | End: 2019-08-12 | Stop reason: CLARIF

## 2019-07-02 RX ORDER — AMOXICILLIN 500 MG/1
CAPSULE ORAL
Refills: 0 | COMMUNITY
Start: 2019-06-19 | End: 2019-08-12

## 2019-07-03 ENCOUNTER — CONSULT (OUTPATIENT)
Dept: GASTROENTEROLOGY | Facility: HOSPITAL | Age: 64
End: 2019-07-03
Payer: COMMERCIAL

## 2019-07-03 VITALS
BODY MASS INDEX: 37.95 KG/M2 | HEART RATE: 75 BPM | TEMPERATURE: 98.6 F | HEIGHT: 65 IN | WEIGHT: 227.8 LBS | DIASTOLIC BLOOD PRESSURE: 73 MMHG | SYSTOLIC BLOOD PRESSURE: 140 MMHG

## 2019-07-03 DIAGNOSIS — R10.84 GENERALIZED ABDOMINAL PAIN: Primary | ICD-10-CM

## 2019-07-03 DIAGNOSIS — A04.8 H. PYLORI INFECTION: ICD-10-CM

## 2019-07-03 DIAGNOSIS — Z12.11 SCREENING FOR COLON CANCER: ICD-10-CM

## 2019-07-03 PROCEDURE — 99214 OFFICE O/P EST MOD 30 MIN: CPT | Performed by: INTERNAL MEDICINE

## 2019-07-03 RX ORDER — SODIUM, POTASSIUM,MAG SULFATES 17.5-3.13G
180 SOLUTION, RECONSTITUTED, ORAL ORAL ONCE
Qty: 180 ML | Refills: 0 | Status: SHIPPED | OUTPATIENT
Start: 2019-07-03 | End: 2019-08-12

## 2019-07-03 NOTE — PATIENT INSTRUCTIONS
Patient will call in September toSchedule  with Dr Pete Dunn in September 2019 @ [facility]  Gave instructions for Miralax/ Dulcolax   Recall in for Follow up in 4 month with bradly MORENO
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
## 2019-07-10 DIAGNOSIS — F32.4 MAJOR DEPRESSIVE DISORDER WITH SINGLE EPISODE, IN PARTIAL REMISSION (HCC): ICD-10-CM

## 2019-07-10 RX ORDER — ESCITALOPRAM OXALATE 10 MG/1
TABLET ORAL
Qty: 30 TABLET | Refills: 5 | OUTPATIENT
Start: 2019-07-10

## 2019-07-30 DIAGNOSIS — E11.9 TYPE 2 DIABETES MELLITUS WITHOUT COMPLICATION, WITHOUT LONG-TERM CURRENT USE OF INSULIN (HCC): Primary | ICD-10-CM

## 2019-07-30 RX ORDER — BLOOD-GLUCOSE METER
EACH MISCELLANEOUS 2 TIMES DAILY
Qty: 1 KIT | Refills: 0 | Status: SHIPPED | OUTPATIENT
Start: 2019-07-30

## 2019-08-08 DIAGNOSIS — F41.1 GENERALIZED ANXIETY DISORDER: ICD-10-CM

## 2019-08-08 RX ORDER — BUSPIRONE HYDROCHLORIDE 5 MG/1
TABLET ORAL
Qty: 90 TABLET | Refills: 2 | Status: SHIPPED | OUTPATIENT
Start: 2019-08-08 | End: 2020-06-04 | Stop reason: SDUPTHER

## 2019-08-12 ENCOUNTER — APPOINTMENT (EMERGENCY)
Dept: CT IMAGING | Facility: HOSPITAL | Age: 64
End: 2019-08-12
Payer: COMMERCIAL

## 2019-08-12 ENCOUNTER — HOSPITAL ENCOUNTER (OUTPATIENT)
Facility: HOSPITAL | Age: 64
Setting detail: OBSERVATION
End: 2019-08-13
Attending: EMERGENCY MEDICINE | Admitting: FAMILY MEDICINE
Payer: COMMERCIAL

## 2019-08-12 DIAGNOSIS — F41.1 GENERALIZED ANXIETY DISORDER: Primary | ICD-10-CM

## 2019-08-12 DIAGNOSIS — Z79.4 UNCONTROLLED TYPE 2 DIABETES MELLITUS WITH HYPERGLYCEMIA, WITH LONG-TERM CURRENT USE OF INSULIN (HCC): ICD-10-CM

## 2019-08-12 DIAGNOSIS — E78.5 HYPERLIPIDEMIA, UNSPECIFIED HYPERLIPIDEMIA TYPE: ICD-10-CM

## 2019-08-12 DIAGNOSIS — E11.65 UNCONTROLLED TYPE 2 DIABETES MELLITUS WITH HYPERGLYCEMIA, WITH LONG-TERM CURRENT USE OF INSULIN (HCC): ICD-10-CM

## 2019-08-12 DIAGNOSIS — H53.2 BINOCULAR VISION DISORDER WITH DIPLOPIA: Primary | ICD-10-CM

## 2019-08-12 LAB
ALBUMIN SERPL BCP-MCNC: 3.4 G/DL (ref 3.5–5)
ALP SERPL-CCNC: 83 U/L (ref 46–116)
ALT SERPL W P-5'-P-CCNC: 17 U/L (ref 12–78)
ANION GAP SERPL CALCULATED.3IONS-SCNC: 6 MMOL/L (ref 4–13)
APTT PPP: 26 SECONDS (ref 23–37)
AST SERPL W P-5'-P-CCNC: 14 U/L (ref 5–45)
BASOPHILS # BLD AUTO: 0.01 THOUSANDS/ΜL (ref 0–0.1)
BASOPHILS NFR BLD AUTO: 0 % (ref 0–1)
BILIRUB SERPL-MCNC: 0.3 MG/DL (ref 0.2–1)
BUN SERPL-MCNC: 10 MG/DL (ref 5–25)
CALCIUM SERPL-MCNC: 9.1 MG/DL (ref 8.3–10.1)
CHLORIDE SERPL-SCNC: 100 MMOL/L (ref 100–108)
CO2 SERPL-SCNC: 32 MMOL/L (ref 21–32)
CREAT SERPL-MCNC: 1.03 MG/DL (ref 0.6–1.3)
EOSINOPHIL # BLD AUTO: 0.05 THOUSAND/ΜL (ref 0–0.61)
EOSINOPHIL NFR BLD AUTO: 1 % (ref 0–6)
ERYTHROCYTE [DISTWIDTH] IN BLOOD BY AUTOMATED COUNT: 14.5 % (ref 11.6–15.1)
ERYTHROCYTE [SEDIMENTATION RATE] IN BLOOD: 32 MM/HOUR (ref 0–20)
GFR SERPL CREATININE-BSD FRML MDRD: 67 ML/MIN/1.73SQ M
GLUCOSE SERPL-MCNC: 203 MG/DL (ref 65–140)
GLUCOSE SERPL-MCNC: 274 MG/DL (ref 65–140)
HCT VFR BLD AUTO: 38 % (ref 34.8–46.1)
HGB BLD-MCNC: 12 G/DL (ref 11.5–15.4)
IMM GRANULOCYTES # BLD AUTO: 0.01 THOUSAND/UL (ref 0–0.2)
IMM GRANULOCYTES NFR BLD AUTO: 0 % (ref 0–2)
INR PPP: 1.01 (ref 0.84–1.19)
LYMPHOCYTES # BLD AUTO: 1.24 THOUSANDS/ΜL (ref 0.6–4.47)
LYMPHOCYTES NFR BLD AUTO: 26 % (ref 14–44)
MAGNESIUM SERPL-MCNC: 1.6 MG/DL (ref 1.6–2.6)
MCH RBC QN AUTO: 27.4 PG (ref 26.8–34.3)
MCHC RBC AUTO-ENTMCNC: 31.6 G/DL (ref 31.4–37.4)
MCV RBC AUTO: 87 FL (ref 82–98)
MONOCYTES # BLD AUTO: 0.46 THOUSAND/ΜL (ref 0.17–1.22)
MONOCYTES NFR BLD AUTO: 10 % (ref 4–12)
NEUTROPHILS # BLD AUTO: 2.98 THOUSANDS/ΜL (ref 1.85–7.62)
NEUTS SEG NFR BLD AUTO: 63 % (ref 43–75)
NRBC BLD AUTO-RTO: 0 /100 WBCS
PLATELET # BLD AUTO: 235 THOUSANDS/UL (ref 149–390)
PMV BLD AUTO: 10.8 FL (ref 8.9–12.7)
POTASSIUM SERPL-SCNC: 4.9 MMOL/L (ref 3.5–5.3)
PROT SERPL-MCNC: 7.6 G/DL (ref 6.4–8.2)
PROTHROMBIN TIME: 13.3 SECONDS (ref 11.6–14.5)
RBC # BLD AUTO: 4.38 MILLION/UL (ref 3.81–5.12)
SODIUM SERPL-SCNC: 138 MMOL/L (ref 136–145)
TSH SERPL DL<=0.05 MIU/L-ACNC: 1.03 UIU/ML (ref 0.36–3.74)
WBC # BLD AUTO: 4.75 THOUSAND/UL (ref 4.31–10.16)

## 2019-08-12 PROCEDURE — 85610 PROTHROMBIN TIME: CPT | Performed by: PHYSICIAN ASSISTANT

## 2019-08-12 PROCEDURE — 99285 EMERGENCY DEPT VISIT HI MDM: CPT

## 2019-08-12 PROCEDURE — 36415 COLL VENOUS BLD VENIPUNCTURE: CPT | Performed by: PHYSICIAN ASSISTANT

## 2019-08-12 PROCEDURE — 83735 ASSAY OF MAGNESIUM: CPT | Performed by: PHYSICIAN ASSISTANT

## 2019-08-12 PROCEDURE — 93005 ELECTROCARDIOGRAM TRACING: CPT

## 2019-08-12 PROCEDURE — 85730 THROMBOPLASTIN TIME PARTIAL: CPT | Performed by: PHYSICIAN ASSISTANT

## 2019-08-12 PROCEDURE — 86140 C-REACTIVE PROTEIN: CPT | Performed by: PHYSICIAN ASSISTANT

## 2019-08-12 PROCEDURE — 84443 ASSAY THYROID STIM HORMONE: CPT | Performed by: PHYSICIAN ASSISTANT

## 2019-08-12 PROCEDURE — 99285 EMERGENCY DEPT VISIT HI MDM: CPT | Performed by: PHYSICIAN ASSISTANT

## 2019-08-12 PROCEDURE — 85652 RBC SED RATE AUTOMATED: CPT | Performed by: PHYSICIAN ASSISTANT

## 2019-08-12 PROCEDURE — 85025 COMPLETE CBC W/AUTO DIFF WBC: CPT | Performed by: PHYSICIAN ASSISTANT

## 2019-08-12 PROCEDURE — 99220 PR INITIAL OBSERVATION CARE/DAY 70 MINUTES: CPT | Performed by: NURSE PRACTITIONER

## 2019-08-12 PROCEDURE — 70496 CT ANGIOGRAPHY HEAD: CPT

## 2019-08-12 PROCEDURE — 80053 COMPREHEN METABOLIC PANEL: CPT | Performed by: PHYSICIAN ASSISTANT

## 2019-08-12 PROCEDURE — 82948 REAGENT STRIP/BLOOD GLUCOSE: CPT

## 2019-08-12 RX ORDER — HYDROCHLOROTHIAZIDE 25 MG/1
25 TABLET ORAL DAILY
Status: DISCONTINUED | OUTPATIENT
Start: 2019-08-13 | End: 2019-08-13 | Stop reason: HOSPADM

## 2019-08-12 RX ORDER — POLYVINYL ALCOHOL 14 MG/ML
1 SOLUTION/ DROPS OPHTHALMIC EVERY 12 HOURS SCHEDULED
Status: DISCONTINUED | OUTPATIENT
Start: 2019-08-12 | End: 2019-08-12

## 2019-08-12 RX ORDER — GABAPENTIN 300 MG/1
300 CAPSULE ORAL 3 TIMES DAILY
Status: DISCONTINUED | OUTPATIENT
Start: 2019-08-12 | End: 2019-08-13 | Stop reason: HOSPADM

## 2019-08-12 RX ORDER — ASPIRIN 81 MG/1
81 TABLET, CHEWABLE ORAL DAILY
Status: DISCONTINUED | OUTPATIENT
Start: 2019-08-13 | End: 2019-08-13 | Stop reason: HOSPADM

## 2019-08-12 RX ORDER — PANTOPRAZOLE SODIUM 40 MG/1
40 TABLET, DELAYED RELEASE ORAL DAILY
Status: DISCONTINUED | OUTPATIENT
Start: 2019-08-13 | End: 2019-08-13 | Stop reason: HOSPADM

## 2019-08-12 RX ORDER — ALBUTEROL SULFATE 90 UG/1
2 AEROSOL, METERED RESPIRATORY (INHALATION) EVERY 4 HOURS PRN
Status: DISCONTINUED | OUTPATIENT
Start: 2019-08-12 | End: 2019-08-13 | Stop reason: HOSPADM

## 2019-08-12 RX ORDER — ESCITALOPRAM OXALATE 10 MG/1
10 TABLET ORAL
Status: DISCONTINUED | OUTPATIENT
Start: 2019-08-13 | End: 2019-08-13 | Stop reason: HOSPADM

## 2019-08-12 RX ORDER — SIMVASTATIN 20 MG
20 TABLET ORAL
Qty: 90 TABLET | Refills: 1 | OUTPATIENT
Start: 2019-08-12

## 2019-08-12 RX ORDER — PRAVASTATIN SODIUM 40 MG
40 TABLET ORAL
Status: DISCONTINUED | OUTPATIENT
Start: 2019-08-13 | End: 2019-08-13

## 2019-08-12 RX ORDER — BUSPIRONE HYDROCHLORIDE 5 MG/1
5 TABLET ORAL 3 TIMES DAILY
Status: DISCONTINUED | OUTPATIENT
Start: 2019-08-12 | End: 2019-08-13 | Stop reason: HOSPADM

## 2019-08-12 RX ORDER — INSULIN GLARGINE 100 [IU]/ML
35 INJECTION, SOLUTION SUBCUTANEOUS EVERY MORNING
Status: DISCONTINUED | OUTPATIENT
Start: 2019-08-13 | End: 2019-08-13 | Stop reason: HOSPADM

## 2019-08-12 RX ORDER — AMLODIPINE BESYLATE 10 MG/1
10 TABLET ORAL DAILY
Status: DISCONTINUED | OUTPATIENT
Start: 2019-08-13 | End: 2019-08-13 | Stop reason: HOSPADM

## 2019-08-12 RX ORDER — ESCITALOPRAM OXALATE 10 MG/1
10 TABLET ORAL
Qty: 30 TABLET | Refills: 2 | Status: SHIPPED | OUTPATIENT
Start: 2019-08-12 | End: 2019-12-25 | Stop reason: SDUPTHER

## 2019-08-12 RX ORDER — LISINOPRIL 20 MG/1
40 TABLET ORAL DAILY
Status: DISCONTINUED | OUTPATIENT
Start: 2019-08-13 | End: 2019-08-13 | Stop reason: HOSPADM

## 2019-08-12 RX ADMIN — IOHEXOL 100 ML: 350 INJECTION, SOLUTION INTRAVENOUS at 19:52

## 2019-08-12 RX ADMIN — GABAPENTIN 300 MG: 300 CAPSULE ORAL at 22:17

## 2019-08-12 RX ADMIN — INSULIN LISPRO 2 UNITS: 100 INJECTION, SOLUTION INTRAVENOUS; SUBCUTANEOUS at 22:25

## 2019-08-12 RX ADMIN — BUSPIRONE HYDROCHLORIDE 5 MG: 5 TABLET ORAL at 22:17

## 2019-08-12 NOTE — TELEPHONE ENCOUNTER
Called Pt L/M requesting return call to see if she is still taking the [address] shows that  is filling Amlodipine separately so I am not sure if she is taking it

## 2019-08-12 NOTE — ED PROVIDER NOTES
History  Chief Complaint   Patient presents with    Eye Problem     patient reports double vision since yesterday afternoon  also reports being lethargic the past two days  patient had both catarats removed five years ago  no prior hx of double vision  61year old female presents for evaluation of double vision  She notes symptoms started yesterday afternoon and have been persistent  She notes double vision is present when both eyes open  Symptoms resolve when she closes individual eyes  Whatever she looks at, she notes there is a side by side comparison  Denies blurry, decreased vision  She notes h/o cataract removal 5 years ago  She also notes h/o corrective vision surgery  She is a diabetic and notes she has not been taking her insulin for the past 3-4 weeks as she notes she wasn't able to get a glucometer to check her sugars  Denies headache but does admit to occasional headaches  Denies head injury or trauma  She notes dry eye on the right and has been unable to get her restasis  She notes she hasn't been able any of her usual meds  She notes increased stress since her  passed away 2 months ago  Denies N/V, chest pain, SOB, abdominal pain  History provided by:  Patient   used: No        Prior to Admission Medications   Prescriptions Last Dose Informant Patient Reported? Taking?    Blood Glucose Monitoring Suppl (ACCU-CHEK SHANE PLUS) w/Device KIT   No No   Sig: by Does not apply route 2 (two) times a day   Insulin Pen Needle (B-D UF III MINI PEN NEEDLES) 31G X 5 MM MISC  Self No No   Sig: Inject under the skin daily   JANUVIA 100 MG tablet 8/11/2019 at Unknown time Self No Yes   Sig: take 1 tablet by mouth once daily   Lancets (ACCU-CHEK MULTICLIX) lancets  Self No No   Sig: Test bid   RA PAIN RELIEF ACETAMINOPHEN 325 MG tablet Past Month at Unknown time Self Yes Yes   Sig: take 1-2 tablets by mouth every 6 hours if needed for MODERATE pain   albuterol (PROVENTIL HFA,VENTOLIN HFA) 90 mcg/act inhaler Past Month at Unknown time Self No Yes   Sig: Inhale 2 puffs every 4 (four) hours as needed for wheezing   amLODIPine (NORVASC) 10 mg tablet 8/11/2019 at Unknown time Self No Yes   Sig: take 1 tablet by mouth once daily   aspirin 81 MG tablet 8/11/2019 at Unknown time Self Yes Yes   Sig: Take 81 mg by mouth daily     beclomethasone (QVAR) 80 MCG/ACT inhaler 8/12/2019 at Unknown time Self No Yes   Sig: Inhale 2 puffs 2 (two) times a day   busPIRone (BUSPAR) 5 mg tablet 8/11/2019 at Unknown time  No Yes   Sig: take 1 tablet by mouth three times a day   cycloSPORINE (RESTASIS) 0 05 % ophthalmic emulsion More than a month at Unknown time Self Yes No   Sig: Apply 1 drop to eye every 12 (twelve) hours    escitalopram (LEXAPRO) 10 mg tablet 8/11/2019 at Unknown time  No Yes   Sig: Take 1 tablet (10 mg total) by mouth daily with breakfast   gabapentin (NEURONTIN) 300 mg capsule 8/11/2019 at Unknown time Self No Yes   Sig: Take 1 capsule (300 mg total) by mouth 3 (three) times a day   glucose blood (ACCU-CHEK SHANE PLUS) test strip   No No   Sig: Test twice daily   hydrochlorothiazide (HYDRODIURIL) 25 mg tablet 8/11/2019 at Unknown time Self No Yes   Sig: take 1 tablet by mouth once daily   hydrocortisone 0 5 % cream  Self Yes No   Sig: Apply topically 2 (two) times a day as needed    insulin glargine (BASAGLAR KWIKPEN) 100 units/mL injection pen  Self No Yes   Sig: Inject 35 Units under the skin daily   Patient taking differently: Inject 25 Units under the skin daily    lisinopril (ZESTRIL) 40 mg tablet 8/11/2019 at Unknown time Self No Yes   Sig: take 1 tablet by mouth once daily   pantoprazole (PROTONIX) 40 mg tablet 8/11/2019 at Unknown time Self No Yes   Sig: Take 1 tablet (40 mg total) by mouth daily   penicillin V potassium (VEETID) 500 mg tablet 8/11/2019 at Unknown time Self Yes Yes   Sig: TAKE 4 TABLETS BY MOUTH NOW, THEN 1 TAB EVERY 6 HOURS TILL FINISHED simvastatin (ZOCOR) 20 mg tablet 8/11/2019 at Unknown time Self No Yes   Sig: Take 1 tablet (20 mg total) by mouth daily at bedtime      Facility-Administered Medications: None       Past Medical History:   Diagnosis Date    Abnormal ECG     last assessed: 5/15/17    Abnormal mammogram     last assessed: 7/11/17    Abnormal stress test     last assesed: 7/24/17    Anemia     Anxiety     Arthritis     Asthma     Back problem     Breast cyst     Chest pain     last assessed: 7/24/17    Chronic pain disorder     Cyst of left breast     last assessed: 8/18/17    Depression     Depression     resolved: 1/2/18    Diabetes mellitus ([facility] [address] )     Hiatal hernia     last assessed: 11/7/17    Hyperlipidemia     Hypertension     Keloid of skin     last assessed: 11/2/16    Neuropathy     Psychiatric disorder     anxiety    Stroke [facility])     TIA 2005    Tuberculosis     as a child        Past Surgical History:   Procedure Laterality Date    ARM WOUND REPAIR / CLOSURE      CARPAL TUNNEL RELEASE      CARPAL TUNNEL RELEASE Left     CATARACT EXTRACTION Bilateral     2015    CYST REMOVAL      right upper arm   EYE SURGERY      cataract removaql    OK ESOPHAGOGASTRODUODENOSCOPY TRANSORAL DIAGNOSTIC N/A 1/5/2018    Procedure: ESOPHAGOGASTRODUODENOSCOPY (EGD);   Surgeon: Hector Zaragoza DO;  Location: [facility] MAIN OR;  Service: Gastroenterology    OK WRIST Christia Kuo LIG Left 4/19/2019    Procedure: ENDOSCOPIC CARPAL TUNNEL RELEASE;  Surgeon: Mona Arauz MD;  Location: [address] [place] [address] MAIN OR;  Service: Orthopedics    [address] THYROID BIOPSY  4/15/2019       Family History   Problem Relation Age of Onset    Heart disease Mother     Diabetes Mother     Arthritis Mother     Heart attack Mother     Hypertension Mother     Kidney disease Father     Hypertension Father     Arthritis Sister     Cancer Maternal Grandmother     Stroke Maternal Grandmother     Arthritis Paternal Grandmother     Cancer Paternal Grandmother     Heart attack Paternal Grandmother     Stroke Maternal Aunt     Heart attack Maternal Uncle     Cancer Family         spinal column    Coronary artery disease Family     Glaucoma Family     Rheum arthritis Family      I have reviewed and agree with the history as documented  Social History     Tobacco Use    Smoking status: Never Smoker    Smokeless tobacco: Never Used   Substance Use Topics    Alcohol use: No     Alcohol/week: 0 0 standard drinks     Frequency: Never     Drinks per session: Patient refused     Binge frequency: Never     Comment: 0    Drug use: No        Review of Systems   Constitutional: Negative  Negative for chills, fatigue and fever  HENT: Negative  Negative for congestion, ear pain, rhinorrhea, sore throat, trouble swallowing and voice change  Eyes: Positive for visual disturbance  Negative for photophobia, pain, discharge, redness and itching  Respiratory: Negative  Negative for cough, shortness of breath and wheezing  Cardiovascular: Negative  Negative for chest pain, palpitations and leg swelling  Gastrointestinal: Negative  Negative for abdominal pain, constipation, diarrhea, nausea and vomiting  Genitourinary: Negative  Negative for decreased urine volume, dysuria, flank pain and hematuria  Musculoskeletal: Negative  Negative for back pain and myalgias  Skin: Negative  Negative for rash  Neurological: Positive for headaches  Negative for dizziness, tremors, seizures, syncope, facial asymmetry, speech difficulty, weakness, light-headedness and numbness  Psychiatric/Behavioral: Negative  Negative for confusion  All other systems reviewed and are negative  Physical Exam  Physical Exam   Constitutional: She is oriented to person, place, and time  She appears well-developed and well-nourished  No distress  AA female   HENT:   Head: Normocephalic and atraumatic     Right Ear: Hearing, tympanic membrane, external ear and ear canal normal    Left Ear: Hearing, tympanic membrane, external ear and ear canal normal    Nose: Nose normal    Mouth/Throat: Uvula is midline, oropharynx is clear and moist and mucous membranes are normal  No oropharyngeal exudate  Eyes: Pupils are equal, round, and reactive to light  Conjunctivae, EOM and lids are normal  No scleral icterus  Right eye exhibits normal extraocular motion  Left eye exhibits normal extraocular motion  Slight proptosis noted bilaterally   Neck: Trachea normal, normal range of motion and phonation normal  Neck supple  No tracheal deviation present  Cardiovascular: Normal rate, regular rhythm, normal heart sounds and normal pulses  No murmur heard  Pulmonary/Chest: Effort normal and breath sounds normal  No respiratory distress  She has no wheezes  She has no rhonchi  She has no rales  Abdominal: Soft  Bowel sounds are normal  There is no tenderness  There is no rebound, no guarding and no CVA tenderness  Musculoskeletal: Normal range of motion  She exhibits no edema or tenderness  Neurological: She is alert and oriented to person, place, and time  She has normal strength and normal reflexes  No cranial nerve deficit or sensory deficit  She exhibits normal muscle tone  Coordination and gait normal  GCS eye subscore is 4  GCS verbal subscore is 5  GCS motor subscore is 6  Skin: Skin is warm and dry  Capillary refill takes less than 2 seconds  No rash noted  No cyanosis  Psychiatric: She has a normal mood and affect  Her speech is normal and behavior is normal    Nursing note and vitals reviewed        Vital Signs  ED Triage Vitals [08/12/19 1838]   Temperature Pulse Respirations Blood Pressure SpO2   98 7 °F (37 1 °C) 75 18 145/80 97 %      Temp Source Heart Rate Source Patient Position - Orthostatic VS BP Location FiO2 (%)   Temporal Monitor Lying Right arm --      Pain Score       No Pain           Vitals:    08/12/19 2000 08/12/19 2030 08/12/19 2100 08/12/19 2159   BP: 164/79   147/88   Pulse: 68 65 69 65   Patient Position - Orthostatic VS:             Visual Acuity  Visual Acuity      Most Recent Value   L Pupil Size (mm)  3   R Pupil Size (mm)  3          ED Medications  Medications   albuterol (PROVENTIL HFA,VENTOLIN HFA) inhaler 2 puff (has no administration in time range)   amLODIPine (NORVASC) tablet 10 mg (has no administration in time range)   aspirin chewable tablet 81 mg (has no administration in time range)   busPIRone (BUSPAR) tablet 5 mg (5 mg Oral Given 8/12/19 2217)   escitalopram (LEXAPRO) tablet 10 mg (has no administration in time range)   gabapentin (NEURONTIN) capsule 300 mg (300 mg Oral Given 8/12/19 2217)   hydrochlorothiazide (HYDRODIURIL) tablet 25 mg (has no administration in time range)   insulin glargine (LANTUS) subcutaneous injection 35 Units 0 35 mL (has no administration in time range)   sitaGLIPtin (JANUVIA) tablet 100 mg (has no administration in time range)   lisinopril (ZESTRIL) tablet 40 mg (has no administration in time range)   pantoprazole (PROTONIX) EC tablet 40 mg (has no administration in time range)   pravastatin (PRAVACHOL) tablet 40 mg (has no administration in time range)   insulin lispro (HumaLOG) 100 units/mL subcutaneous injection 1-6 Units (has no administration in time range)   insulin lispro (HumaLOG) 100 units/mL subcutaneous injection 1-6 Units (2 Units Subcutaneous Given 8/12/19 2225)   enoxaparin (LOVENOX) subcutaneous injection 40 mg (has no administration in time range)   dextran 70-hypromellose (GENTEAL TEARS) 0 1-0 3 % ophthalmic solution 1 drop (1 drop Both Eyes Not Given 8/12/19 2216)   iohexol (OMNIPAQUE) 350 MG/ML injection (SINGLE-DOSE) 100 mL (100 mL Intravenous Given 8/12/19 1952)       Diagnostic Studies  Results Reviewed     Procedure Component Value Units Date/Time    Sedimentation rate, automated [[id number]]  (Abnormal) Collected:  08/12/19 1924    Lab Status:  Final result Specimen:  Blood from Arm, Right Updated:  08/12/19 2227     Sed Rate 32 mm/hour     Platelet count [[id number]]     Lab Status:  No result Specimen:  Blood     Hemoglobin A1c w/EAG Estimation (Orders if not completed within the last 90 days) [[id number]]     Lab Status:  No result Specimen:  Blood     C-reactive protein [[id number]] Collected:  08/12/19 1924    Lab Status: In process Specimen:  Blood Updated:  08/12/19 2125    TSH [[id number]]  (Normal) Collected:  08/12/19 1924    Lab Status:  Final result Specimen:  Blood from Arm, Right Updated:  08/12/19 1950     TSH 3RD GENERATON 1 035 uIU/mL     Narrative:       Patients undergoing fluorescein dye angiography may retain small amounts of fluorescein in the body for 48-72 hours post procedure  Samples containing fluorescein can produce falsely depressed TSH values  If the patient had this procedure,a specimen should be resubmitted post fluorescein clearance        Magnesium [[id number]]  (Normal) Collected:  08/12/19 1924    Lab Status:  Final result Specimen:  Blood from Arm, Right Updated:  08/12/19 1950     Magnesium 1 6 mg/dL     Comprehensive metabolic panel [[id number]]  (Abnormal) Collected:  08/12/19 1924    Lab Status:  Final result Specimen:  Blood from Arm, Right Updated:  08/12/19 1943     Sodium 138 mmol/L      Potassium 4 9 mmol/L      Chloride 100 mmol/L      CO2 32 mmol/L      ANION GAP 6 mmol/L      BUN 10 mg/dL      Creatinine 1 03 mg/dL      Glucose 274 mg/dL      Calcium 9 1 mg/dL      AST 14 U/L      ALT 17 U/L      Alkaline Phosphatase 83 U/L      Total Protein 7 6 g/dL      Albumin 3 4 g/dL      Total Bilirubin 0 30 mg/dL      eGFR 67 ml/min/1 73sq m     Narrative:       [place] guidelines for Chronic Kidney Disease (CKD):     Stage 1 with normal or high GFR (GFR > 90 mL/min/1 73 square meters)    Stage 2 Mild CKD (GFR = 60-89 mL/min/1 73 square meters)    Stage 3A Moderate CKD (GFR = 45-59 mL/min/1 73 square meters)    Stage 3B Moderate CKD (GFR = 30-44 mL/min/1 73 square meters)    Stage 4 Severe CKD (GFR = 15-29 mL/min/1 73 square meters)    Stage 5 End Stage CKD (GFR <15 mL/min/1 73 square meters)  Note: GFR calculation is accurate only with a steady state creatinine    Protime-INR [[id number]]  (Normal) Collected:  08/12/19 1924    Lab Status:  Final result Specimen:  Blood from Arm, Right Updated:  08/12/19 1937     Protime 13 3 seconds      INR 1 01    APTT [[id number]]  (Normal) Collected:  08/12/19 1924    Lab Status:  Final result Specimen:  Blood from Arm, Right Updated:  08/12/19 1937     PTT 26 seconds     CBC and differential [[id number]] Collected:  08/12/19 1924    Lab Status:  Final result Specimen:  Blood from Arm, Right Updated:  08/12/19 1928     WBC 4 75 Thousand/uL      RBC 4 38 Million/uL      Hemoglobin 12 0 g/dL      Hematocrit 38 0 %      MCV 87 fL      MCH 27 4 pg      MCHC 31 6 g/dL      RDW 14 5 %      MPV 10 8 fL      Platelets 965 Thousands/uL      nRBC 0 /100 WBCs      Neutrophils Relative 63 %      Immat GRANS % 0 %      Lymphocytes Relative 26 %      Monocytes Relative 10 %      Eosinophils Relative 1 %      Basophils Relative 0 %      Neutrophils Absolute 2 98 Thousands/µL      Immature Grans Absolute 0 01 Thousand/uL      Lymphocytes Absolute 1 24 Thousands/µL      Monocytes Absolute 0 46 Thousand/µL      Eosinophils Absolute 0 05 Thousand/µL      Basophils Absolute 0 01 Thousands/µL                  CTA head with and without contrast   Final Result by Kevin Jones MD (08/12 2006)         1  No intracranial aneurysm or major intracranial arterial stenosis  2   Microangiopathic changes without hemorrhage            Workstation performed: [id number]         MRI inpatient order    (Results Pending)              Procedures  ECG 12 Lead Documentation Only  Date/Time: 8/12/2019 7:26 PM  Performed by: Saundra Hines PA-C  Authorized by: Saundra Hines PA-C     Indications / Diagnosis:  Double vision  ECG reviewed by me, the ED Provider: yes    Patient location:  ED  Previous ECG:     Previous ECG:  Compared to current    Comparison ECG info:  6/14/19    Similarity:  Changes noted    Comparison to cardiac monitor: Yes    Comments:      NSR rate of 68, left anterior fascicular block  , QRS 88, QT/QTc 414/440           ED Course  ED Course as of Aug 12 2233   Mon Aug 12, 2019   1952 WBC: 4 75   1952 Hemoglobin: 12 0   1952 Platelet Count: 688   1952 INR: 1 01   1952 Protime: 13 3   1952 PTT: 26   1952 TSH 3RD GENERATON: 1 035   1952 Magnesium: 1 6   1952 Glucose, Random(!): 274   1952 Creatinine: 1 03   1952 BUN: 10   1952 Sodium: 138   1952 Potassium: 4 9   1952 Chloride: 100   1952 CO2: [address]: 6   1952 Calcium: 9 1   1952 AST: 14   1952 ALT: 17   1952 Alkaline Phosphatase: 83   1952 Total Protein: 7 6   1952 Albumin(!): 3 4   1952 TOTAL BILIRUBIN: 0 30   1952 eGFR: 67   2016 Impression:      1   No intracranial aneurysm or major intracranial arterial stenosis  2   Microangiopathic changes without hemorrhage  CTA head with and without contrast   2020 Pt was updated on results  2031 Case discussed with attending Dr Maral Eckert, advised to run pass neuro on call  2040 Tiger Text discussion with neurology, Dr Eusebio Dimas  He advised checking TSH, CRP/sed rate, and admit for MRI brain and orbit and he will tele neuro consult tomorrow  2059 Spoke with pt regarding admission and she is agreeable to plan as outlined by neurology  Jake Abel NP notified to discuss admission  2112 Spoke with on call Jake FLORES NP  Appropriate pt information relayed and pt accepted for admission  Pt afebrile and hemodynamically stable  She is agreeable to plan as outlined and had no further questions                 Stroke Assessment     Row Name 08/12/19 1859             NIH Stroke Scale    Interval  Baseline      Level of Consciousness (1a )  0      LOC Questions (1b )  0      LOC Commands (1c )  0      Best Gaze (2 )  0      Visual (3 )  0      Facial Palsy (4 )  0      Motor Arm, Left (5a )  0      Motor Arm, Right (5b )  0      Motor Leg, Left (6a )  0      Motor Leg, Right (6b )  0      Limb Ataxia (7 )  0      Sensory (8 )  0      Best Language (9 )  0      Dysarthria (10 )  0      Extinction and Inattention (11 ) (Formerly Neglect)  0      Total  0          First Filed Value   TPA Decision  Patient not a TPA candidate  Patient is not a candidate options  Unclear time of onset outside appropriate time window                          MDM  Number of Diagnoses or Management Options  Binocular vision disorder with diplopia: new and requires workup  Uncontrolled type 2 diabetes mellitus with hyperglycemia, with long-term current use of insulin ([address] ): established and worsening     Amount and/or Complexity of Data Reviewed  Clinical lab tests: ordered and reviewed  Tests in the radiology section of CPT®: ordered and reviewed  Decide to obtain previous medical records or to obtain history from someone other than the patient: yes  Obtain history from someone other than the patient: yes  Review and summarize past medical records: yes  Discuss the patient with other providers: yes  Independent visualization of images, tracings, or specimens: yes        Disposition  Final diagnoses:   Binocular vision disorder with diplopia   Uncontrolled type 2 diabetes mellitus with hyperglycemia, with long-term current use of insulin ([address] )     Time reflects when diagnosis was documented in both MDM as applicable and the Disposition within this note     Time User Action Codes Description Comment    8/12/2019  9:04 PM Brad Young [H53 2] Binocular vision disorder with diplopia     8/12/2019  9:06 PM Brad Young [E11 65,  Z79 4] Uncontrolled type 2 diabetes mellitus with hyperglycemia, with long-term current use of insulin [facility])       ED Disposition     ED Disposition Condition Date/Time Comment    Admit Stable Mon Aug 12, 2019  9:29 PM Case was discussed with Baltazar Bailey NP and the patient's admission status was agreed to be Admission Status: observation status to the service of Dr Kalia Monteiro          Follow-up Information    None         Current Discharge Medication List      CONTINUE these medications which have NOT CHANGED    Details   albuterol (PROVENTIL HFA,VENTOLIN HFA) 90 mcg/act inhaler Inhale 2 puffs every 4 (four) hours as needed for wheezing  Qty: 1 Inhaler, Refills: 5    Associated Diagnoses: Intermittent asthma, unspecified asthma severity, unspecified whether complicated      amLODIPine (NORVASC) 10 mg tablet take 1 tablet by mouth once daily  Qty: 30 tablet, Refills: 5    Associated Diagnoses: Essential hypertension      aspirin 81 MG tablet Take 81 mg by mouth daily        beclomethasone (QVAR) 80 MCG/ACT inhaler Inhale 2 puffs 2 (two) times a day  Qty: 1 Inhaler, Refills: 5    Associated Diagnoses: Mild persistent asthma without complication      busPIRone (BUSPAR) 5 mg tablet take 1 tablet by mouth three times a day  Qty: 90 tablet, Refills: 2    Associated Diagnoses: Generalized anxiety disorder      escitalopram (LEXAPRO) 10 mg tablet Take 1 tablet (10 mg total) by mouth daily with breakfast  Qty: 30 tablet, Refills: 2    Associated Diagnoses: Generalized anxiety disorder      gabapentin (NEURONTIN) 300 mg capsule Take 1 capsule (300 mg total) by mouth 3 (three) times a day  Qty: 90 capsule, Refills: 5    Associated Diagnoses: Diabetic polyneuropathy associated with type 2 diabetes mellitus (HCC)      hydrochlorothiazide (HYDRODIURIL) 25 mg tablet take 1 tablet by mouth once daily  Qty: 30 tablet, Refills: 5    Associated Diagnoses: Essential hypertension      insulin glargine (BASAGLAR KWIKPEN) 100 units/mL injection pen Inject 35 Units under the skin daily  Qty: 5 pen, Refills: 5    Associated Diagnoses: Type 2 diabetes mellitus without complication, with long-term current use of insulin ([facility])      JANUVIA 100 MG tablet take 1 tablet by mouth once daily  Qty: 30 tablet, Refills: 5    Associated Diagnoses: Type 2 diabetes mellitus with diabetic nephropathy, with long-term current use of insulin ([facility])      lisinopril (ZESTRIL) 40 mg tablet take 1 tablet by mouth once daily  Qty: 30 tablet, Refills: 5    Associated Diagnoses: Essential hypertension      pantoprazole (PROTONIX) 40 mg tablet Take 1 tablet (40 mg total) by mouth daily  Qty: 30 tablet, Refills: 5    Associated Diagnoses: H  pylori infection      penicillin V potassium (VEETID) 500 mg tablet TAKE 4 TABLETS BY MOUTH NOW, THEN 1 TAB EVERY 6 HOURS TILL FINISHED  Refills: 0      RA PAIN RELIEF ACETAMINOPHEN 325 MG tablet take 1-2 tablets by mouth every 6 hours if needed for MODERATE pain  Refills: 0      simvastatin (ZOCOR) 20 mg tablet Take 1 tablet (20 mg total) by mouth daily at bedtime  Qty: 90 tablet, Refills: 1    Associated Diagnoses: Hyperlipidemia, unspecified hyperlipidemia type      Blood Glucose Monitoring Suppl (ACCU-CHEK SHANE PLUS) w/Device KIT by Does not apply route 2 (two) times a day  Qty: 1 kit, Refills: 0    Associated Diagnoses: Type 2 diabetes mellitus without complication, without long-term current use of insulin ([facility])      cycloSPORINE (RESTASIS) 0 05 % ophthalmic emulsion Apply 1 drop to eye every 12 (twelve) hours       glucose blood (ACCU-CHEK SHANE PLUS) test strip Test twice daily  Qty: 100 each, Refills: 5    Associated Diagnoses: Type 2 diabetes mellitus without complication, without long-term current use of insulin ([facility])      hydrocortisone 0 5 % cream Apply topically 2 (two) times a day as needed       Insulin Pen Needle (B-D UF III MINI PEN NEEDLES) 31G X 5 MM MISC Inject under the skin daily  Qty: 100 each, Refills: 3    Associated Diagnoses: Type 2 diabetes mellitus without complication, with long-term current use of insulin ([facility])      Lancets (ACCU-CHEK MULTICLIX) lancets Test bid  Qty: 100 each, Refills: 0    Associated Diagnoses: Type 2 diabetes mellitus without complication, with long-term current use of insulin (HCC)           No discharge procedures on file      ED Provider  Electronically Signed by           Maricruz Pagan PA-C  08/12/19 9130

## 2019-08-12 NOTE — TELEPHONE ENCOUNTER
Pt is not taking Caduet - She does not need a refill of this simvastatin because she has refills on her bottle  Only needs generic Lexapro

## 2019-08-12 NOTE — TELEPHONE ENCOUNTER
The computer program is saying that she is on Caduet which is a combination of a tore of a statin with amlodipine if that is the case then she should also be taking simvastatin that would be like double dosing please call the patient and reaffirm if she is taking Caduet

## 2019-08-13 ENCOUNTER — TELEPHONE (OUTPATIENT)
Dept: GASTROENTEROLOGY | Facility: HOSPITAL | Age: 64
End: 2019-08-13

## 2019-08-13 ENCOUNTER — HOSPITAL ENCOUNTER (INPATIENT)
Facility: HOSPITAL | Age: 64
LOS: 4 days | Discharge: HOME | DRG: 197 | End: 2019-08-17
Attending: INTERNAL MEDICINE | Admitting: INTERNAL MEDICINE
Payer: COMMERCIAL

## 2019-08-13 ENCOUNTER — APPOINTMENT (OUTPATIENT)
Dept: MRI IMAGING | Facility: HOSPITAL | Age: 64
End: 2019-08-13
Payer: COMMERCIAL

## 2019-08-13 VITALS
BODY MASS INDEX: 37.02 KG/M2 | OXYGEN SATURATION: 97 % | RESPIRATION RATE: 14 BRPM | HEIGHT: 65 IN | TEMPERATURE: 98.1 F | WEIGHT: 222.22 LBS | SYSTOLIC BLOOD PRESSURE: 135 MMHG | HEART RATE: 84 BPM | DIASTOLIC BLOOD PRESSURE: 67 MMHG

## 2019-08-13 DIAGNOSIS — Z79.4 TYPE 2 DIABETES MELLITUS WITHOUT COMPLICATION, WITH LONG-TERM CURRENT USE OF INSULIN (HCC): ICD-10-CM

## 2019-08-13 DIAGNOSIS — I67.82 SUBCORTICAL MICROVASCULAR ISCHEMIC OCCLUSIVE DISEASE: ICD-10-CM

## 2019-08-13 DIAGNOSIS — H53.2 BINOCULAR VISION DISORDER WITH DIPLOPIA: Primary | ICD-10-CM

## 2019-08-13 DIAGNOSIS — E11.9 TYPE 2 DIABETES MELLITUS WITHOUT COMPLICATION, WITHOUT LONG-TERM CURRENT USE OF INSULIN (HCC): ICD-10-CM

## 2019-08-13 DIAGNOSIS — E11.8 TYPE 2 DIABETES MELLITUS WITH COMPLICATION, WITH LONG-TERM CURRENT USE OF INSULIN (HCC): ICD-10-CM

## 2019-08-13 DIAGNOSIS — E11.9 TYPE 2 DIABETES MELLITUS WITHOUT COMPLICATION, WITH LONG-TERM CURRENT USE OF INSULIN (HCC): ICD-10-CM

## 2019-08-13 DIAGNOSIS — H49.21 SIXTH NERVE PALSY OF RIGHT EYE: ICD-10-CM

## 2019-08-13 DIAGNOSIS — Z79.4 TYPE 2 DIABETES MELLITUS WITH COMPLICATION, WITH LONG-TERM CURRENT USE OF INSULIN (HCC): ICD-10-CM

## 2019-08-13 DIAGNOSIS — Z12.11 SCREENING FOR COLON CANCER: Primary | ICD-10-CM

## 2019-08-13 LAB
ANION GAP SERPL CALCULATED.3IONS-SCNC: 7 MMOL/L (ref 4–13)
BASOPHILS # BLD AUTO: 0.02 THOUSANDS/ΜL (ref 0–0.1)
BASOPHILS NFR BLD AUTO: 0 % (ref 0–1)
BUN SERPL-MCNC: 9 MG/DL (ref 5–25)
CALCIUM SERPL-MCNC: 9 MG/DL (ref 8.3–10.1)
CHLORIDE SERPL-SCNC: 100 MMOL/L (ref 100–108)
CHOLEST SERPL-MCNC: 187 MG/DL (ref 50–200)
CO2 SERPL-SCNC: 30 MMOL/L (ref 21–32)
CREAT SERPL-MCNC: 0.86 MG/DL (ref 0.6–1.3)
CRP SERPL QL: 13.2 MG/L
EOSINOPHIL # BLD AUTO: 0.09 THOUSAND/ΜL (ref 0–0.61)
EOSINOPHIL NFR BLD AUTO: 2 % (ref 0–6)
ERYTHROCYTE [DISTWIDTH] IN BLOOD BY AUTOMATED COUNT: 14.6 % (ref 11.6–15.1)
EST. AVERAGE GLUCOSE BLD GHB EST-MCNC: 212 MG/DL
GFR SERPL CREATININE-BSD FRML MDRD: 83 ML/MIN/1.73SQ M
GLUCOSE SERPL-MCNC: 116 MG/DL (ref 65–140)
GLUCOSE SERPL-MCNC: 154 MG/DL (ref 65–140)
GLUCOSE SERPL-MCNC: 189 MG/DL (ref 65–140)
GLUCOSE SERPL-MCNC: 191 MG/DL (ref 65–140)
GLUCOSE SERPL-MCNC: 227 MG/DL (ref 65–140)
HBA1C MFR BLD: 9 % (ref 4.2–6.3)
HCT VFR BLD AUTO: 38.7 % (ref 34.8–46.1)
HDLC SERPL-MCNC: 35 MG/DL (ref 40–60)
HGB BLD-MCNC: 12.5 G/DL (ref 11.5–15.4)
IMM GRANULOCYTES # BLD AUTO: 0.01 THOUSAND/UL (ref 0–0.2)
IMM GRANULOCYTES NFR BLD AUTO: 0 % (ref 0–2)
LDLC SERPL CALC-MCNC: 122 MG/DL (ref 0–100)
LYMPHOCYTES # BLD AUTO: 1.94 THOUSANDS/ΜL (ref 0.6–4.47)
LYMPHOCYTES NFR BLD AUTO: 35 % (ref 14–44)
MAGNESIUM SERPL-MCNC: 1.7 MG/DL (ref 1.6–2.6)
MCH RBC QN AUTO: 27.7 PG (ref 26.8–34.3)
MCHC RBC AUTO-ENTMCNC: 32.3 G/DL (ref 31.4–37.4)
MCV RBC AUTO: 86 FL (ref 82–98)
MONOCYTES # BLD AUTO: 0.53 THOUSAND/ΜL (ref 0.17–1.22)
MONOCYTES NFR BLD AUTO: 9 % (ref 4–12)
NEUTROPHILS # BLD AUTO: 3.04 THOUSANDS/ΜL (ref 1.85–7.62)
NEUTS SEG NFR BLD AUTO: 54 % (ref 43–75)
NRBC BLD AUTO-RTO: 0 /100 WBCS
PHOSPHATE SERPL-MCNC: 3.2 MG/DL (ref 2.3–4.1)
PLATELET # BLD AUTO: 228 THOUSANDS/UL (ref 149–390)
PMV BLD AUTO: 10 FL (ref 8.9–12.7)
POTASSIUM SERPL-SCNC: 3.8 MMOL/L (ref 3.5–5.3)
RBC # BLD AUTO: 4.52 MILLION/UL (ref 3.81–5.12)
SODIUM SERPL-SCNC: 137 MMOL/L (ref 136–145)
TRIGL SERPL-MCNC: 152 MG/DL
WBC # BLD AUTO: 5.63 THOUSAND/UL (ref 4.31–10.16)

## 2019-08-13 PROCEDURE — 83036 HEMOGLOBIN GLYCOSYLATED A1C: CPT | Performed by: NURSE PRACTITIONER

## 2019-08-13 PROCEDURE — 85025 COMPLETE CBC W/AUTO DIFF WBC: CPT | Performed by: NURSE PRACTITIONER

## 2019-08-13 PROCEDURE — 70543 MRI ORBT/FAC/NCK W/O &W/DYE: CPT

## 2019-08-13 PROCEDURE — 82948 REAGENT STRIP/BLOOD GLUCOSE: CPT

## 2019-08-13 PROCEDURE — 80048 BASIC METABOLIC PNL TOTAL CA: CPT | Performed by: NURSE PRACTITIONER

## 2019-08-13 PROCEDURE — 99217 PR OBSERVATION CARE DISCHARGE MANAGEMENT: CPT | Performed by: PHYSICIAN ASSISTANT

## 2019-08-13 PROCEDURE — 70553 MRI BRAIN STEM W/O & W/DYE: CPT

## 2019-08-13 PROCEDURE — 99245 OFF/OP CONSLTJ NEW/EST HI 55: CPT | Performed by: PSYCHIATRY & NEUROLOGY

## 2019-08-13 PROCEDURE — 83735 ASSAY OF MAGNESIUM: CPT | Performed by: NURSE PRACTITIONER

## 2019-08-13 PROCEDURE — 80061 LIPID PANEL: CPT | Performed by: NURSE PRACTITIONER

## 2019-08-13 PROCEDURE — A9585 GADOBUTROL INJECTION: HCPCS | Performed by: NURSE PRACTITIONER

## 2019-08-13 PROCEDURE — 84100 ASSAY OF PHOSPHORUS: CPT | Performed by: NURSE PRACTITIONER

## 2019-08-13 PROCEDURE — 99219 PR INITIAL OBSERVATION CARE/DAY 50 MINUTES: CPT | Performed by: INTERNAL MEDICINE

## 2019-08-13 RX ORDER — ASPIRIN 81 MG/1
81 TABLET, CHEWABLE ORAL DAILY
Status: CANCELLED | OUTPATIENT
Start: 2019-08-14

## 2019-08-13 RX ORDER — GABAPENTIN 300 MG/1
300 CAPSULE ORAL 3 TIMES DAILY
Status: CANCELLED | OUTPATIENT
Start: 2019-08-13

## 2019-08-13 RX ORDER — ASPIRIN 81 MG/1
81 TABLET, CHEWABLE ORAL DAILY
Status: DISCONTINUED | OUTPATIENT
Start: 2019-08-14 | End: 2019-08-14

## 2019-08-13 RX ORDER — ATORVASTATIN CALCIUM 40 MG/1
40 TABLET, FILM COATED ORAL
Status: CANCELLED | OUTPATIENT
Start: 2019-08-13

## 2019-08-13 RX ORDER — LISINOPRIL 20 MG/1
40 TABLET ORAL DAILY
Status: DISCONTINUED | OUTPATIENT
Start: 2019-08-14 | End: 2019-08-14

## 2019-08-13 RX ORDER — HYDROCHLOROTHIAZIDE 25 MG/1
25 TABLET ORAL DAILY
Status: DISCONTINUED | OUTPATIENT
Start: 2019-08-14 | End: 2019-08-14

## 2019-08-13 RX ORDER — ALBUTEROL SULFATE 90 UG/1
2 AEROSOL, METERED RESPIRATORY (INHALATION) EVERY 4 HOURS PRN
Status: DISCONTINUED | OUTPATIENT
Start: 2019-08-13 | End: 2019-08-17 | Stop reason: HOSPADM

## 2019-08-13 RX ORDER — PANTOPRAZOLE SODIUM 40 MG/1
40 TABLET, DELAYED RELEASE ORAL DAILY
Status: CANCELLED | OUTPATIENT
Start: 2019-08-14

## 2019-08-13 RX ORDER — ESCITALOPRAM OXALATE 10 MG/1
10 TABLET ORAL
Status: DISCONTINUED | OUTPATIENT
Start: 2019-08-14 | End: 2019-08-17 | Stop reason: HOSPADM

## 2019-08-13 RX ORDER — ATORVASTATIN CALCIUM 40 MG/1
40 TABLET, FILM COATED ORAL
Status: DISCONTINUED | OUTPATIENT
Start: 2019-08-14 | End: 2019-08-17 | Stop reason: HOSPADM

## 2019-08-13 RX ORDER — HYDROCHLOROTHIAZIDE 25 MG/1
25 TABLET ORAL DAILY
Status: CANCELLED | OUTPATIENT
Start: 2019-08-14

## 2019-08-13 RX ORDER — BUSPIRONE HYDROCHLORIDE 5 MG/1
5 TABLET ORAL 3 TIMES DAILY
Status: CANCELLED | OUTPATIENT
Start: 2019-08-13

## 2019-08-13 RX ORDER — BUSPIRONE HYDROCHLORIDE 5 MG/1
5 TABLET ORAL 3 TIMES DAILY
Status: DISCONTINUED | OUTPATIENT
Start: 2019-08-13 | End: 2019-08-17 | Stop reason: HOSPADM

## 2019-08-13 RX ORDER — GABAPENTIN 300 MG/1
300 CAPSULE ORAL 3 TIMES DAILY
Status: DISCONTINUED | OUTPATIENT
Start: 2019-08-13 | End: 2019-08-17 | Stop reason: HOSPADM

## 2019-08-13 RX ORDER — ALBUTEROL SULFATE 90 UG/1
2 AEROSOL, METERED RESPIRATORY (INHALATION) EVERY 4 HOURS PRN
Status: CANCELLED | OUTPATIENT
Start: 2019-08-13

## 2019-08-13 RX ORDER — ESCITALOPRAM OXALATE 10 MG/1
10 TABLET ORAL
Status: CANCELLED | OUTPATIENT
Start: 2019-08-14

## 2019-08-13 RX ORDER — PANTOPRAZOLE SODIUM 40 MG/1
40 TABLET, DELAYED RELEASE ORAL DAILY
Status: DISCONTINUED | OUTPATIENT
Start: 2019-08-14 | End: 2019-08-17 | Stop reason: HOSPADM

## 2019-08-13 RX ORDER — INSULIN GLARGINE 100 [IU]/ML
35 INJECTION, SOLUTION SUBCUTANEOUS EVERY MORNING
Status: DISCONTINUED | OUTPATIENT
Start: 2019-08-14 | End: 2019-08-14

## 2019-08-13 RX ORDER — AMLODIPINE BESYLATE 10 MG/1
10 TABLET ORAL DAILY
Status: DISCONTINUED | OUTPATIENT
Start: 2019-08-14 | End: 2019-08-14

## 2019-08-13 RX ORDER — INSULIN GLARGINE 100 [IU]/ML
35 INJECTION, SOLUTION SUBCUTANEOUS EVERY MORNING
Status: CANCELLED | OUTPATIENT
Start: 2019-08-14

## 2019-08-13 RX ORDER — LISINOPRIL 20 MG/1
40 TABLET ORAL DAILY
Status: CANCELLED | OUTPATIENT
Start: 2019-08-14

## 2019-08-13 RX ORDER — ATORVASTATIN CALCIUM 40 MG/1
40 TABLET, FILM COATED ORAL
Status: DISCONTINUED | OUTPATIENT
Start: 2019-08-13 | End: 2019-08-13 | Stop reason: HOSPADM

## 2019-08-13 RX ORDER — AMLODIPINE BESYLATE 10 MG/1
10 TABLET ORAL DAILY
Status: CANCELLED | OUTPATIENT
Start: 2019-08-14

## 2019-08-13 RX ADMIN — GABAPENTIN 300 MG: 300 CAPSULE ORAL at 08:39

## 2019-08-13 RX ADMIN — SITAGLIPTIN 100 MG: 100 TABLET, FILM COATED ORAL at 08:38

## 2019-08-13 RX ADMIN — GADOBUTROL 10 ML: 604.72 INJECTION INTRAVENOUS at 09:41

## 2019-08-13 RX ADMIN — HYDROCHLOROTHIAZIDE 25 MG: 25 TABLET ORAL at 08:39

## 2019-08-13 RX ADMIN — INSULIN LISPRO 1 UNITS: 100 INJECTION, SOLUTION INTRAVENOUS; SUBCUTANEOUS at 16:32

## 2019-08-13 RX ADMIN — INSULIN LISPRO 2 UNITS: 100 INJECTION, SOLUTION INTRAVENOUS; SUBCUTANEOUS at 11:53

## 2019-08-13 RX ADMIN — ESCITALOPRAM OXALATE 10 MG: 10 TABLET ORAL at 08:39

## 2019-08-13 RX ADMIN — INSULIN LISPRO 2 UNITS: 100 INJECTION, SOLUTION INTRAVENOUS; SUBCUTANEOUS at 07:44

## 2019-08-13 RX ADMIN — LISINOPRIL 40 MG: 20 TABLET ORAL at 08:38

## 2019-08-13 RX ADMIN — PANTOPRAZOLE SODIUM 40 MG: 40 TABLET, DELAYED RELEASE ORAL at 08:39

## 2019-08-13 RX ADMIN — ASPIRIN 81 MG 81 MG: 81 TABLET ORAL at 08:39

## 2019-08-13 RX ADMIN — DEXTRAN 70 AND HYPROMELLOSE 2910 1 DROP: 1; 3 SOLUTION/ DROPS OPHTHALMIC at 21:23

## 2019-08-13 RX ADMIN — INSULIN GLARGINE 35 UNITS: 100 INJECTION, SOLUTION SUBCUTANEOUS at 08:37

## 2019-08-13 RX ADMIN — ATORVASTATIN CALCIUM 40 MG: 40 TABLET, FILM COATED ORAL at 16:30

## 2019-08-13 RX ADMIN — GLYCERIN 1 DROP: .002; .002; .01 SOLUTION/ DROPS OPHTHALMIC at 08:40

## 2019-08-13 RX ADMIN — BUSPIRONE HYDROCHLORIDE 5 MG: 5 TABLET ORAL at 08:39

## 2019-08-13 RX ADMIN — BUSPIRONE HYDROCHLORIDE 5 MG: 5 TABLET ORAL at 16:30

## 2019-08-13 RX ADMIN — ENOXAPARIN SODIUM 40 MG: 40 INJECTION SUBCUTANEOUS at 08:37

## 2019-08-13 RX ADMIN — GABAPENTIN 300 MG: 300 CAPSULE ORAL at 21:23

## 2019-08-13 RX ADMIN — BUSPIRONE HYDROCHLORIDE 5 MG: 5 TABLET ORAL at 21:23

## 2019-08-13 RX ADMIN — AMLODIPINE BESYLATE 10 MG: 10 TABLET ORAL at 08:39

## 2019-08-13 RX ADMIN — GABAPENTIN 300 MG: 300 CAPSULE ORAL at 16:30

## 2019-08-13 NOTE — DISCHARGE SUMMARY
Discharge- Huseyin Iglesias 1955, 61 y o  female MRN: [id number]    Unit/Bed#: 078-92 Encounter: [id number]    Primary Care Provider: Lawanda Perez DO   Date and time admitted to hospital: 8/12/2019  6:32 PM        * Binocular vision disorder with diplopia  Assessment & Plan  Began on 8/11/19  side double vision which is relieved when one eye is covered  Neurology consult by emergency room and recommended MRI of the brain and orbit-ordered    MRI brain with orbits:  1  No acute intracranial abnormality  2   Moderate cerebral chronic microangiopathic disease  3   No orbital pathology  4   Incidentally noted prominent [address], partially empty and slightly enlarged sella, and low normal ventricular size  Correlate for history of headaches  as these constellation of findings can be seen in patients with intracranial   hypertension  Alternatively, the findings may represent anatomic variations of Meckel's diverticula and partially empty sella  5   No pathologic intracranial enhancement  Neurology felt the etiology of the patient's diplopia was likely small vessel ischemic disease  And that prognosis is favorable with expected resolution in 12 weeks  She was advised to patch her left eye intermittently, and to follow with her ophthalmologist  However there was also some concerns about intracranial hypertension, considering the recent development of headache, and the presence of fluid-filled empty sella with bilateral pouching of dura to parasellar area  Since Idiopathic intracranial hypertension can cause 6th nerve palsy as well, Neurology recommended excluding this etiology by performing LP and measuring opening pressure and routine labs (CSF glucose, protein, cells and gram stain)  Given this the patient need to be transferred to a higher level of care to [facility]  The patient's case was discussed with Dr Alex Loomis who accepted the patient onto the [facility] service  Hyperlipidemia  Assessment & Plan  Component      Latest Ref Rng & Units 8/13/2019   Cholesterol      50 - 200 mg/dL 187   Triglycerides      <=150 mg/dL 152 (H)   HDL      40 - 60 mg/dL 35 (L)   LDL Direct      0 - 100 mg/dL 122 (H)     The patient's statin was changed to Lipitor 40 mg po daily, this should be continued on discharge  Essential hypertension  Assessment & Plan  Blood pressure mildly elevated on admission, currently improved  Continue prior to admission medication    Type 2 diabetes mellitus without complication [facility])  Assessment & Plan  Lab Results   Component Value Date    HGBA1C 9 0 (H) 08/13/2019       Recent Labs     08/12/19  2215 08/13/19  0733 08/13/19  1152   POCGLU 203* 191* 227*       Blood Sugar Average: Last 72 hrs:  (P) 207   patient has been somewhat noncompliant with her medications due to multifactorial limitation  Continue prior to admission Lantus  Accu-Cheks a c  HS  Insulin sliding scale      Discharging Physician / Practitioner: Lala Georges PA-C  PCP: Otis Curry DO  Admission Date:   Admission Orders (From admission, onward)     Ordered        08/12/19 2125  Place in Observation  Once                   Discharge Date: 08/13/19    Resolved Problems  Date Reviewed: 8/13/2019    None          Consultations During Hospital Stay:  · Tele-neurology    Procedures Performed:   · none    Significant Findings / Test Results:   Cta Head With And Without Contrast    Result Date: 8/12/2019  Impression: 1  No intracranial aneurysm or major intracranial arterial stenosis  2   Microangiopathic changes without hemorrhage    Workstation performed: [id number]     Mri Brain And Orbits Wo And W Contrast    Result Date: 8/13/2019  · Impression: 1  No acute intracranial abnormality  2   Moderate cerebral chronic microangiopathic disease  3   No orbital pathology  4   Incidentally noted prominent [address], partially empty and slightly enlarged sella, and low normal ventricular size  Correlate for history of headaches  as these constellation of findings can be seen in patients with intracranial hypertension  Alternatively, the findings may represent anatomic variations of Meckel's diverticula and partially empty sella  5   No pathologic intracranial enhancement  Workstation performed: [id number]     Incidental Findings:   · none     Complications:  none    Reason for Admission: diplopia    Hospital Course:     Fly Colvin is a 61 y o  female patient who originally presented to the hospital on 8/12/2019 due to double vision   Patient reports symptoms started yesterday afternoon at been persistent  She states double vision is present 1 both eyes are open  Additionally she reports history of cataract removed about 5 years ago in addition to corrective vision treatment  Patient has a past medical history including stroke, neuropathy, hypertension, hyperlipidemia, diabetes mellitus, depression, asthma, anxiety, diabetic  Additionally patient reports not being able to take her insulin for the past 3 or 4 weeks due to inability to get a glucometer  Patient admits to occasional headaches denies head trauma or head injury  Admits to dry eyes on the right states and has been unable to get her restasis  Additionally patient reports not being able to take any of her usual medications since her  passed away 2 months ago  Patient denies chest pain shortness of breath denies abdominal pain denies nausea vomiting diarrhea or constipation denies dysuria urgency or frequency denies melena or hematochezia  Syble Polo Please see above list of diagnoses and related plan for additional information       Condition at Discharge: good     Discharge Day Visit / Exam:     Subjective:    Vitals: Blood Pressure: 135/67 (08/13/19 0732)  Pulse: 84 (08/13/19 1613)  Temperature: 98 1 °F (36 7 °C) (08/13/19 0732)  Temp Source: Temporal (08/12/19 1838)  Respirations: 14 (08/13/19 0732)  Height: 5' 5" (165 1 cm) (08/12/19 2150)  Weight - Scale: 101 kg (222 lb 3 6 oz) (08/13/19 0600)  SpO2: 97 % (08/13/19 1613)        Discharge instructions/Information to patient and family:   See after visit summary for information provided to patient and family  Provisions for Follow-Up Care:  See after visit summary for information related to follow-up care and any pertinent home health orders  Disposition:     [address] Transfer to [address]  [address] SNF:   · Not Applicable to this Patient - Not Applicable to this Patient    Planned Readmission: yes     Discharge Statement:  I spent 25 minutes discharging the patient  This time was spent on the day of discharge  I had direct contact with the patient on the day of discharge  Greater than 50% of the total time was spent examining patient, answering all patient questions, arranging and discussing plan of care with patient as well as directly providing post-discharge instructions  Additional time then spent on discharge activities  Discharge Medications:  See after visit summary for reconciled discharge medications provided to patient and family        ** Please Note: This note has been constructed using a voice recognition system **

## 2019-08-13 NOTE — SOCIAL WORK
I met with the pt and gave her the information on what glucometer her insurance will allow her to purchase, the pt stated she called her daughter and made her aware of the d/c and I did not need to call her, pt is in agreement with the transfer to [place]

## 2019-08-13 NOTE — TELEMEDICINE
TeleConsultation - Neurology   Huseyin Iglesias 61 y o  female MRN: [id number]   Encounter: [id number]      REQUIRED DOCUMENTATION:     1  This service was provided via Telemedicine  2  Provider located at home  3  TeleMed provider: Jose De Jesus MD   4  Identify all parties in room with patient during tele consult:  RN  5  After connecting through [employer], patient was identified by name and date of birth and assistant checked wristband  Patient was then informed that this was a Telemedicine visit and that the exam was being conducted confidentially over secure lines  My office door was closed  No one else was in the room  Patient acknowledged consent and understanding of privacy and security of the Telemedicine visit, and gave us permission to have the assistant stay in the room in order to assist with the history and to conduct the exam   I informed the patient that I have reviewed their record in Epic and presented the opportunity for them to ask any questions regarding the visit today  The patient agreed to participate  Assessment/Plan   Diplopia:  Right sixth nerve palsy  1) Etiology is likely small vessel ischemic disease: She needs tighter control of BP, HLD (LDL is 121) and DM II (HgbA1c is 9)  Prognosis is favorable with expected resolution in 12 weeks  She was advised to patch her left eye intermittently, and to follow with her ophthalmologist   2) We have some concerns about intracranial hypertension, considering the recent development of headache, and the presence of fluid-filled empty sella with bilateral pouching of dura to parasellar area  Since IIH can cause 6th nerve palsy as well, we will suggest excluding this etiology by performing LP and measuring opening pressure and routine labs (CSF glucose, protein, cells and gram stain)      No reason to suspect GCA at this time   CRP/ESR are normal     History of Present Illness     Reason for Consult / Principal Problem: diplopia  Hx and PE limited by: none  HPI: Jil Cranker is a 61 y o  female who presents with history of double vision started suddenly yesterday  There is also a feeling of discomfort of right eye  She denied any concomitant headache, but she has been having frequent headaches in the last 2-3 months which she was not sure of their reason  She denied any tinnitus, or visual obscurations  No weakness, numbness, fatigue, jaw claudication or temporal pain  Inpatient consult to Neurology  Consult performed by: Dean Cadena MD  Consult ordered by: JUAN Hollingsworth          Review of Systems  Complete ROS was done and is negative other than what is mentioned in HPI    Historical Information   Past Medical History:   Diagnosis Date    Abnormal ECG     last assessed: 5/15/17    Abnormal mammogram     last assessed: 7/11/17    Abnormal stress test     last assesed: 7/24/17    Anemia     Anxiety     Arthritis     Asthma     Back problem     Breast cyst     Chest pain     last assessed: 7/24/17    Chronic pain disorder     Cyst of left breast     last assessed: 8/18/17    Depression     Depression     resolved: 1/2/18    Diabetes mellitus ([facility] [address] )     Hiatal hernia     last assessed: 11/7/17    Hyperlipidemia     Hypertension     Keloid of skin     last assessed: 11/2/16    Neuropathy     Psychiatric disorder     anxiety    Stroke [facility])     TIA 2005    Tuberculosis     as a child      Past Surgical History:   Procedure Laterality Date    ARM WOUND REPAIR / CLOSURE      CARPAL TUNNEL RELEASE      CARPAL TUNNEL RELEASE Left     CATARACT EXTRACTION Bilateral     2015    CYST REMOVAL      right upper arm   EYE SURGERY      cataract removaql    MA ESOPHAGOGASTRODUODENOSCOPY TRANSORAL DIAGNOSTIC N/A 1/5/2018    Procedure: ESOPHAGOGASTRODUODENOSCOPY (EGD);   Surgeon: Shira Mckinney DO;  Location: [facility] MAIN OR;  Service: Gastroenterology    MA WRIST Ladean Annmarie LIG Left 4/19/2019    Procedure: ENDOSCOPIC CARPAL TUNNEL RELEASE;  Surgeon: Lawyer Keagan MD;  Location[facility] MAIN OR;  Service: [address] THYROID BIOPSY  4/15/2019     Social History   Social History     Substance and Sexual Activity   Alcohol Use No    Alcohol/week: 0 0 standard drinks    Frequency: Never    Drinks per session: Patient refused    Binge frequency: Never    Comment: 0     Social History     Substance and Sexual Activity   Drug Use No     Social History     Tobacco Use   Smoking Status Never Smoker   Smokeless Tobacco Never Used     Family History: non-contributory    Review of previous medical records was completed  Meds/Allergies   all current active meds have been reviewed    Allergies   Allergen Reactions    Percocet [Oxycodone-Acetaminophen] GI Intolerance    Vicodin [Hydrocodone-Acetaminophen] GI Intolerance       Objective   Vitals:Blood pressure 135/67, pulse 67, temperature 98 1 °F (36 7 °C), resp  rate 14, height 5' 5" (1 651 m), weight 101 kg (222 lb 3 6 oz), SpO2 97 %  ,Body mass index is 36 98 kg/m²  Intake/Output Summary (Last 24 hours) at 8/13/2019 1222  Last data filed at 8/13/2019 0533  Gross per 24 hour   Intake    Output 400 ml   Net -400 ml       Invasive Devices: Invasive Devices     Peripheral Intravenous Line            Peripheral IV 08/12/19 Left Antecubital less than 1 day          Line            Arterial Sheath 6 Fr  Right Radial 745 days                Physical Exam NAD  Skin: no seen lesions  HEENT: ATNC  Chest: breathing comfortably on room air  GI: no apparent distension  Neurologic Exam Alert and oriented for self, place and time  Memory is intact to recent and remote events  Language is intact to comprehension and expression  No neglect  Speech is normal  Limitation of abduction OD  Cover-uncover test reveals esotropia OD  Pupils are symmetric  Visual field appears intact  Face is symmetric  Tongue is midline   Able to move all extremities against gravity  No dysmetria noted  Lab Results:   CBC:   Results from last 7 days   Lab Units 08/13/19  0503 08/12/19  1924   WBC Thousand/uL 5 63 4 75   RBC Million/uL 4 52 4 38   HEMOGLOBIN g/dL 12 5 12 0   HEMATOCRIT % 38 7 38 0   MCV fL 86 87   PLATELETS Thousands/uL 228 235   , BMP/CMP:   Results from last 7 days   Lab Units 08/13/19  0503 08/12/19  1924   SODIUM mmol/L 137 138   POTASSIUM mmol/L 3 8 4 9   CHLORIDE mmol/L 100 100   CO2 mmol/L 30 32   BUN mg/dL 9 10   CREATININE mg/dL 0 86 1 03   CALCIUM mg/dL 9 0 9 1   AST U/L  --  14   ALT U/L  --  17   ALK PHOS U/L  --  83   EGFR ml/min/1 73sq m 83 67   , HgBA1C:   Results from last 7 days   Lab Units 08/13/19  0503   HEMOGLOBIN A1C % 9 0*   , TSH:   Results from last 7 days   Lab Units 08/12/19  1924   TSH 3RD GENERATON uIU/mL 1 035   , Lipid Profile:   Results from last 7 days   Lab Units 08/13/19  0503   HDL mg/dL 35*   LDL CALC mg/dL 122*   TRIGLYCERIDES mg/dL 152*     Imaging Studies: I have personally reviewed pertinent films in PACS with a Radiologist   EKG, Pathology, and Other Studies: I have personally reviewed pertinent reports

## 2019-08-13 NOTE — ASSESSMENT & PLAN NOTE
Blood pressure mildly elevated on admission, currently improved  Continue prior to admission medication

## 2019-08-13 NOTE — H&P
H&P- Ryne Bryant 1955, 61 y o  female MRN: [id number]    Unit/Bed#: [facility] 708-01 Encounter: [id number]    Primary Care Provider: Delmi Medrano DO   Date and time admitted to hospital: 8/13/2019  7:17 PM        Binocular vision disorder with diplopia  Assessment & Plan  Started on a 11/14/2019, patient has double vision which is relieved when she sees from 1 eye  Seen by Neurology in the emergency room at [facility] they have recommended MRI of the brain and orbits  MRI shows no acute intracranial abnormality, has moderate to severe to April chronic microangiopathic disease, has no orbital pathology, noted to have incidental findings where there is a prominent [address], partially empty sella and slightly enlarged sella and low normal ventricular size  History of headaches as these constellation of findings are seen in patients with intracranial hypertension  Alternatively findings may represent an anatomic variation of Meckel's diverticula and partially empty sella  There is no pathological intracranial enhancement  Neurology top thought this might be related to small vessel ischemic disease  Possible relapse solution in about 12 weeks  She was has twice to wear patch on her left eye intermittently and to follow with her ophthalmologist   However because of the intracranial hypertension, secondary headache that she has developed and empty sella with fluid and bilateral putting of the dura to parasellar area they have decided she needs a lumbar puncture  [facility] does not have opening pressure measuring so patient is transferred to [place] for that pressure measurement  Of note idiopathic intracranial hypertension may cause 6th nerve palsy and hence Neurology wants to exclude this       Consult Neurology  Consult Ophthalmology    Essential hypertension  Assessment & Plan  Patient has blood pressure old mildly elevated on admission, currently improved  Continue to monitor    Type 2 diabetes mellitus without complication [facility])  Assessment & Plan  Lab Results   Component Value Date    HGBA1C 9 0 (H) 08/13/2019       Recent Labs     08/12/19  2215 08/13/19  0733 08/13/19  1152 08/13/19  1631   POCGLU 203* 191* 227* 154*       Blood Sugar Average: Last 72 hrs:   Type 2 diabetes mellitus without complication  Patient is noncompliant with medications  Continue to monitor fingersticks  Better glucose control        VTE Prophylaxis: Enoxaparin (Lovenox)  / sequential compression device   Code Status: full code  POLST: POLST is not applicable to this patient  Discussion with family:      Anticipated Length of Stay:  Patient will be admitted on an Inpatient basis with an anticipated length of stay of  more 2 midnights  Justification for Hospital Stay: needs LP tomorrow by IR     Total Time for Visit, including Counseling / Coordination of Care: 30 minutes  Greater than 50% of this total time spent on direct patient counseling and coordination of care  Chief Complaint:  Binoccular vision    History of Present Illness:    Del Singletary is a 61 y o  female who presents with double vision present by opening her eyes  She reported a history of cataract removed but 5 years ago in addition to correct to treatment  She has diabetes poorly controlled she has a history of stroke, neuropathy hypertension, hyperlipidemia, and depression, asthma, anxiety  She was seen by Neurology and has recommended MRI of the brain and the orbits  Findings consists of increased intracranial pressure, neurology wanted to obtain an lumbar puncture with an opening pressure  [facility] does not have this at their facility the patient was transferred here for an LP with an opening pressure  Review of Systems:    Review of Systems   Constitutional: Negative  HENT: Negative  Eyes: Positive for photophobia and visual disturbance  Respiratory: Negative  Cardiovascular: Negative  Gastrointestinal: Negative  Endocrine: Negative  Genitourinary: Negative  Musculoskeletal: Negative  Skin: Negative  Allergic/Immunologic: Negative  Neurological: Positive for headaches  Hematological: Negative  Psychiatric/Behavioral: Negative  Past Medical and Surgical History:     Past Medical History:   Diagnosis Date    Abnormal ECG     last assessed: 5/15/17    Abnormal mammogram     last assessed: 7/11/17    Abnormal stress test     last assesed: 7/24/17    Anemia     Anxiety     Arthritis     Asthma     Back problem     Breast cyst     Chest pain     last assessed: 7/24/17    Chronic pain disorder     Cyst of left breast     last assessed: 8/18/17    Depression     Depression     resolved: 1/2/18    Diabetes mellitus ([facility] [address] )     Hiatal hernia     last assessed: 11/7/17    Hyperlipidemia     Hypertension     Keloid of skin     last assessed: 11/2/16    Neuropathy     Psychiatric disorder     anxiety    Stroke [facility])     TIA 2005    Tuberculosis     as a child        Past Surgical History:   Procedure Laterality Date    ARM WOUND REPAIR / CLOSURE      CARPAL TUNNEL RELEASE      CARPAL TUNNEL RELEASE Left     CATARACT EXTRACTION Bilateral     2015    CYST REMOVAL      right upper arm   EYE SURGERY      cataract removaql    NM ESOPHAGOGASTRODUODENOSCOPY TRANSORAL DIAGNOSTIC N/A 1/5/2018    Procedure: ESOPHAGOGASTRODUODENOSCOPY (EGD); Surgeon: Karlie Clemente DO;  Location: [facility] MAIN OR;  Service: Gastroenterology    NM WRIST Eri Angela LIG Left 4/19/2019    Procedure: ENDOSCOPIC CARPAL TUNNEL RELEASE;  Surgeon: Hanna Simmons MD;  Location: [address] [place] [address] MAIN OR;  Service: [address] THYROID BIOPSY  4/15/2019       Meds/Allergies:    Prior to Admission medications    Medication Sig Start Date End Date Taking?  Authorizing Provider   albuterol (PROVENTIL HFA,VENTOLIN HFA) 90 mcg/act inhaler Inhale 2 puffs every 4 (four) hours as needed for wheezing 1/18/19 Dannis Hal, DO   amLODIPine (NORVASC) 10 mg tablet take 1 tablet by mouth once daily 5/6/19 Dannis Hal, DO   aspirin 81 MG tablet Take 81 mg by mouth daily   6/28/17   Historical Provider, MD   beclomethasone (QVAR) 80 MCG/ACT inhaler Inhale 2 puffs 2 (two) times a day 8/16/18   [facility], DO   Blood Glucose Monitoring Suppl (ACCU-CHEK SHANE PLUS) w/Device KIT by Does not apply route 2 (two) times a day 7/30/19 Dannis Hal, DO   busPIRone (BUSPAR) 5 mg tablet take 1 tablet by mouth three times a day 8/8/19 Dannis Hal, DO   cycloSPORINE (RESTASIS) 0 05 % ophthalmic emulsion Apply 1 drop to eye every 12 (twelve) hours     Historical Provider, MD   escitalopram (LEXAPRO) 10 mg tablet Take 1 tablet (10 mg total) by mouth daily with breakfast 8/12/19 Dannis Hal, DO   gabapentin (NEURONTIN) 300 mg capsule Take 1 capsule (300 mg total) by mouth 3 (three) times a day 5/16/19 Dannis Hal, DO   glucose blood (ACCU-CHEK SHANE PLUS) test strip Test twice daily 7/30/19 Dannis Hal, DO   hydrochlorothiazide (HYDRODIURIL) 25 mg tablet take 1 tablet by mouth once daily 6/4/19 Dannis Hal, DO   hydrocortisone 0 5 % cream Apply topically 2 (two) times a day as needed  9/27/17   Historical Provider, MD   insulin glargine (BASAGLAR KWIKPEN) 100 units/mL injection pen Inject 35 Units under the skin daily  Patient taking differently: Inject 25 Units under the skin daily  1/18/19 Dannis Hal, DO   Insulin Pen Needle (B-D UF III MINI PEN NEEDLES) 31G X 5 MM MISC Inject under the skin daily 1/18/19 Dannis Hal, DO   JANUVIA 100 MG tablet take 1 tablet by mouth once daily 5/20/19 Dannis Hal, DO   Lancets (ACCU-CHEK MULTICLIX) lancets Test bid 5/28/19 Dannis Hal, DO   lisinopril (ZESTRIL) 40 mg tablet take 1 tablet by mouth once daily 5/6/19 Dannis Hal, DO   pantoprazole (PROTONIX) 40 mg tablet Take 1 tablet (40 mg total) by mouth daily 6/18/19   Jony Choi, DO jaffe V potassium (VEETID) 500 mg tablet TAKE 4 TABLETS BY MOUTH NOW, THEN 1 TAB EVERY 6 HOURS TILL FINISHED 5/21/19   Historical Provider, MD   RA PAIN RELIEF ACETAMINOPHEN 325 MG tablet take 1-2 tablets by mouth every 6 hours if needed for MODERATE pain 6/19/19   Historical Provider, MD   simvastatin (ZOCOR) 20 mg tablet Take 1 tablet (20 mg total) by mouth daily at bedtime 5/16/19   Carlos Later, DO     I have reviewed home medications with patient personally  Allergies:    Allergies   Allergen Reactions    Percocet [Oxycodone-Acetaminophen] GI Intolerance    Vicodin [Hydrocodone-Acetaminophen] GI Intolerance       Social History:     Marital Status: /Civil Union   Occupation: retired  Patient Pre-hospital Living Situation: independent  Patient Pre-hospital Level of Mobility: independent  Patient Pre-hospital Diet Restrictions: diabetic   Substance Use History:   Social History     Substance and Sexual Activity   Alcohol Use Not Currently    Alcohol/week: 0 0 standard drinks    Frequency: Never    Drinks per session: Patient refused    Binge frequency: Never    Comment: 0     Social History     Tobacco Use   Smoking Status Never Smoker   Smokeless Tobacco Never Used     Social History     Substance and Sexual Activity   Drug Use No       Family History:    Family History   Problem Relation Age of Onset    Heart disease Mother     Diabetes Mother     Arthritis Mother     Heart attack Mother     Hypertension Mother     Kidney disease Father     Hypertension Father     Arthritis Sister     Cancer Maternal Grandmother     Stroke Maternal Grandmother     Arthritis Paternal Grandmother     Cancer Paternal Grandmother     Heart attack Paternal Grandmother     Stroke Maternal Aunt     Heart attack Maternal Uncle     Cancer Family         spinal column    Coronary artery disease Family     Glaucoma Family     Rheum arthritis Family        Physical Exam:     Vitals:   Blood Pressure: 125/78 (08/13/19 1926)  Pulse: 67 (08/13/19 1926)  Temperature: 98 4 °F (36 9 °C) (08/13/19 1926)  Respirations: 14 (08/13/19 1926)  SpO2: 97 % (08/13/19 1926)    Physical Exam   Constitutional: She is oriented to person, place, and time  She appears well-developed and well-nourished  HENT:   Head: Normocephalic and atraumatic  Eyes: Pupils are equal, round, and reactive to light  Conjunctivae and EOM are normal    Neck: Normal range of motion  Neck supple  Cardiovascular: Normal rate, regular rhythm, normal heart sounds and intact distal pulses  Exam reveals no gallop and no friction rub  No murmur heard  Pulmonary/Chest: Effort normal and breath sounds normal  No respiratory distress  Abdominal: Soft  Bowel sounds are normal    Musculoskeletal: Normal range of motion  She exhibits no edema, tenderness or deformity  Neurological: She is alert and oriented to person, place, and time  No cranial nerve deficit  Skin: Skin is warm and dry  Psychiatric: She has a normal mood and affect  Her behavior is normal    Nursing note and vitals reviewed  Additional Data:     Lab Results: I have personally reviewed pertinent reports        Results from last 7 days   Lab Units 08/13/19  0503   WBC Thousand/uL 5 63   HEMOGLOBIN g/dL 12 5   HEMATOCRIT % 38 7   PLATELETS Thousands/uL 228   NEUTROS PCT % 54   LYMPHS PCT % 35   MONOS PCT % 9   EOS PCT % 2     Results from last 7 days   Lab Units 08/13/19  0503 08/12/19  1924   SODIUM mmol/L 137 138   POTASSIUM mmol/L 3 8 4 9   CHLORIDE mmol/L 100 100   CO2 mmol/L 30 32   BUN mg/dL 9 10   CREATININE mg/dL 0 86 1 03   ANION GAP mmol/L 7 6   CALCIUM mg/dL 9 0 9 1   ALBUMIN g/dL  --  3 4*   TOTAL BILIRUBIN mg/dL  --  0 30   ALK PHOS U/L  --  83   ALT U/L  --  17   AST U/L  --  14   GLUCOSE RANDOM mg/dL 189* 274*     Results from last 7 days   Lab Units 08/12/19  1924   INR  1 01     Results from last 7 days   Lab Units 08/13/19  1631 08/13/19  1152 08/13/19  4419 08/12/19  2215   POC GLUCOSE mg/dl 154* 227* 191* 203*     Results from last 7 days   Lab Units 08/13/19  0503   HEMOGLOBIN A1C % 9 0*           Imaging: I have personally reviewed pertinent reports  No orders to display        Allscripts / Epic Records Reviewed: Yes     ** Please Note: This note has been constructed using a voice recognition system   **

## 2019-08-13 NOTE — NURSING NOTE
[facility]  Vital signs stable on tranfer, no complaints at this time

## 2019-08-13 NOTE — ASSESSMENT & PLAN NOTE
Lab Results   Component Value Date    HGBA1C 9 0 (H) 08/13/2019       Recent Labs     08/12/19  2215 08/13/19  0733 08/13/19  1152   POCGLU 203* 191* 227*       Blood Sugar Average: Last 72 hrs:  (P) 207   patient has been somewhat noncompliant with her medications due to multifactorial limitation  Continue prior to admission Lantus  Accu-Cheks a c  HS  Insulin sliding scale

## 2019-08-13 NOTE — SOCIAL WORK
Chart reviewed by case management, I attempted tos assess the pt at 09:14 and the pt was not in her room, I attempted again at 09:51 and she was not in the room, I was able to assess the pt at 12:50 pm, I made the pt aware that she is here as an obs status and obs booklet was given, pt lives with her daughter now, she stated her  has just passed away,pt lives in a trailer home, no steps inside and 5 steps outside , pt is disabled, pt has hx of depression, pt has a glucometer and she is having trouble getting supplies from her insurance company, pharmacy was called and the [facility] will pay for "accu check guide" lancets fast click 233, pt was given this information,  Pt states she is independent and she drives, d/c plan was discussed at care coordination rounds, pt needs a LP and needs to be transferred as per the doctor, cm will continue to follow, Patient/caregiver received discharge checklist   Content reviewed  Patient/caregiver encouraged to participate in discharge plan of care prior to discharge home  CM reviewed d/c planning process including the following: identifying help at home, patient preference for d/c planning needs, availability of treatment team to discuss questions or concerns patient and/or family may have regarding understanding medications and recognizing signs and symptoms once discharged  CM also encouraged patient to follow up with all recommended appointments after discharge  Patient advised of importance for patient and family to participate in managing patients medical well being

## 2019-08-13 NOTE — TELEPHONE ENCOUNTER
----- Message from Sadiq Duran sent at 8/2/2019  1:02 PM EDT -----  Regarding: Schedule patient for colon at [facility]  Please contact patient to schedule colon/[facility]

## 2019-08-13 NOTE — ASSESSMENT & PLAN NOTE
Component      Latest Ref Rng & Units 8/13/2019   Cholesterol      50 - 200 mg/dL 187   Triglycerides      <=150 mg/dL 152 (H)   HDL      40 - 60 mg/dL 35 (L)   LDL Direct      0 - 100 mg/dL 122 (H)     The patient's statin was changed to Lipitor 40 mg po daily, this should be continued on discharge

## 2019-08-13 NOTE — ASSESSMENT & PLAN NOTE
Patient has blood pressure old mildly elevated on admission, currently improved  Continue to monitor

## 2019-08-13 NOTE — PLAN OF CARE
Problem: DISCHARGE PLANNING - CARE MANAGEMENT  Goal: Discharge to post-acute care or home with appropriate resources  Description  INTERVENTIONS:  - Conduct assessment to determine patient/family and health care team treatment goals, and need for post-acute services based on payer coverage, community resources, and patient preferences, and barriers to discharge  - Address psychosocial, clinical, and financial barriers to discharge as identified in assessment in conjunction with the patient/family and health care team  - Arrange appropriate level of post-acute services according to patient's   needs and preference and payer coverage in collaboration with the physician and health care team  - Communicate with and update the patient/family, physician, and health care team regarding progress on the discharge plan  - Arrange appropriate transportation to post-acute venues    Pt to be transferred for higher level of care   Outcome: Completed

## 2019-08-13 NOTE — PLAN OF CARE
Problem: Potential for Falls  Goal: Patient will remain free of falls  Description  INTERVENTIONS:  - Assess patient frequently for physical needs  -  Identify cognitive and physical deficits and behaviors that affect risk of falls    -  [place] fall precautions as indicated by assessment   - Educate patient/family on patient safety including physical limitations  - Instruct patient to call for assistance with activity based on assessment  - Modify environment to reduce risk of injury  - Consider OT/PT consult to assist with strengthening/mobility  Outcome: Progressing     Problem: PAIN - ADULT  Goal: Verbalizes/displays adequate comfort level or baseline comfort level  Description  Interventions:  - Encourage patient to monitor pain and request assistance  - Assess pain using appropriate pain scale  - Administer analgesics based on type and severity of pain and evaluate response  - Implement non-pharmacological measures as appropriate and evaluate response  - Consider cultural and social influences on pain and pain management  - Notify physician/advanced practitioner if interventions unsuccessful or patient reports new pain  Outcome: Progressing     Problem: SAFETY ADULT  Goal: Maintain or return to baseline ADL function  Description  INTERVENTIONS:  -  Assess patient's ability to carry out ADLs; assess patient's baseline for ADL function and identify physical deficits which impact ability to perform ADLs (bathing, care of mouth/teeth, toileting, grooming, dressing, etc )  - Assess/evaluate cause of self-care deficits   - Assess range of motion  - Assess patient's mobility; develop plan if impaired  - Assess patient's need for assistive devices and provide as appropriate  - Encourage maximum independence but intervene and supervise when necessary  ¯ Involve family in performance of ADLs  ¯ Assess for home care needs following discharge   ¯ Request OT consult to assist with ADL evaluation and planning for discharge  ¯ Provide patient education as appropriate  Outcome: Progressing  Goal: Maintain or return mobility status to optimal level  Description  INTERVENTIONS:  - Assess patient's baseline mobility status (ambulation, transfers, stairs, etc )    - Identify cognitive and physical deficits and behaviors that affect mobility  - Identify mobility aids required to assist with transfers and/or ambulation (gait belt, sit-to-stand, lift, walker, cane, etc )  - [place] fall precautions as indicated by assessment  - Record patient progress and toleration of activity level on Mobility SBAR; progress patient to next Phase/Stage  - Instruct patient to call for assistance with activity based on assessment  - Request Rehabilitation consult to assist with strengthening/weightbearing, etc   Outcome: Progressing

## 2019-08-13 NOTE — H&P
H&P- Alyson Archer 1955, 61 y o  female MRN: [id number]    Unit/Bed#: RM10 Encounter: [id number]    Primary Care Provider: Fernanda Hall DO   Date and time admitted to hospital: 8/12/2019  6:32 PM        * Binocular vision disorder with diplopia  Assessment & Plan  Began yesterday  Signed by side double vision which is relieved when one eye is covered  Neurology consult by emergency room  Patient will be admitted for observation  Neurology recommended MRI of the brain and orbit-ordered  Admit to med surge with telemetry  Monitor per protocol  Provide supportive care    Hyperlipidemia  Assessment & Plan  Continue prior to admission statin    Essential hypertension  Assessment & Plan  Blood pressure currently mildly elevated  Admit to med surge with telemetry  Monitor per protocol  Continue prior to admission medication    Type 2 diabetes mellitus without complication ([facility] [address] )  Assessment & Plan  Lab Results   Component Value Date    HGBA1C 8 0 (H) 06/14/2019       No results for input(s): POCGLU in the last 72 hours  Blood Sugar Average: Last 72 hrs:   patient has been somewhat noncompliant with her medications due to multifactorial limitation  Continue prior to admission Lantus  Accu-Cheks a c  HS  Insulin sliding scale          VTE Prophylaxis: Enoxaparin (Lovenox)  / sequential compression device   Code Status: FULL  POLST: POLST is not applicable to this patient    Anticipated Length of Stay:  Patient will be admitted on an Observation basis with an anticipated length of stay of  Less than 2 midnights  Justification for Hospital Stay:  Double vision    Total Time for Visit, including Counseling / Coordination of Care: 1 hour  Greater than 50% of this total time spent on direct patient counseling and coordination of care  Chief Complaint:  Double vision since yesterday afternoon    Additionally reports increased lethargy for the past 2 days    History of Present Illness:    Alyson Archer is a 61 y o  female who presented to emergency room for evaluation of double vision   Patient reports symptoms started yesterday afternoon at been persistent  She states double vision is present 1 both eyes are open  Additionally she reports history of cataract removed about 5 years ago in addition to corrective vision treatment  Patient has a past medical history including stroke, neuropathy, hypertension, hyperlipidemia, diabetes mellitus, depression, asthma, anxiety, diabetic  Additionally patient reports not being able to take her insulin for the past 3 or 4 weeks due to inability to get a glucometer  Patient admits to occasional headaches denies head trauma or head injury  Admits to dry eyes on the right states and has been unable to get her restasis  Additionally patient reports not being able to take any of her usual medications since her  passed away 2 months ago  Patient denies chest pain shortness of breath denies abdominal pain denies nausea vomiting diarrhea or constipation denies dysuria urgency or frequency denies melena or hematochezia     Images and labs were collected emergency room-see below  Emergency room doctor consult a neurologist on call-per neurologist patient should be admitted for observation and MRI of the brain ordered should be collected in addition to TSH  Review of Systems:    Review of Systems   HENT:        Visual distrubance    Patient reports dull pressure right below right eye   Neurological: Positive for headaches  All other systems reviewed and are negative        Past Medical and Surgical History:     Past Medical History:   Diagnosis Date    Abnormal ECG     last assessed: 5/15/17    Abnormal mammogram     last assessed: 7/11/17    Abnormal stress test     last assesed: 7/24/17    Anemia     Anxiety     Arthritis     Asthma     Back problem     Breast cyst     Chest pain     last assessed: 7/24/17    Chronic pain disorder     Cyst of left breast last assessed: 8/18/17    Depression     Depression     resolved: 1/2/18    Diabetes mellitus ([facility] [address] )     Hiatal hernia     last assessed: 11/7/17    Hyperlipidemia     Hypertension     Keloid of skin     last assessed: 11/2/16    Neuropathy     Psychiatric disorder     anxiety    Stroke [facility])     TIA 2005    Tuberculosis     as a child        Past Surgical History:   Procedure Laterality Date    ARM WOUND REPAIR / CLOSURE      CARPAL TUNNEL RELEASE      CARPAL TUNNEL RELEASE Left     CATARACT EXTRACTION Bilateral     2015    CYST REMOVAL      right upper arm   EYE SURGERY      cataract removaql    NV ESOPHAGOGASTRODUODENOSCOPY TRANSORAL DIAGNOSTIC N/A 1/5/2018    Procedure: ESOPHAGOGASTRODUODENOSCOPY (EGD); Surgeon: Maggy Cotter DO;  Location: [facility] MAIN OR;  Service: Gastroenterology    NV WRIST Cathye Graciela LIG Left 4/19/2019    Procedure: ENDOSCOPIC CARPAL TUNNEL RELEASE;  Surgeon: Martha Mcelroy MD;  Location: [address] [place] [address] MAIN OR;  Service: [address] THYROID BIOPSY  4/15/2019       Meds/Allergies:    Prior to Admission medications    Medication Sig Start Date End Date Taking?  Authorizing Provider   albuterol (PROVENTIL HFA,VENTOLIN HFA) 90 mcg/act inhaler Inhale 2 puffs every 4 (four) hours as needed for wheezing 1/18/19  Yes Vadim Burk DO   amLODIPine (NORVASC) 10 mg tablet take 1 tablet by mouth once daily 5/6/19  Yes Vadim Burk DO   aspirin 81 MG tablet Take 81 mg by mouth daily   6/28/17  Yes Historical Provider, MD   beclomethasone (QVAR) 80 MCG/ACT inhaler Inhale 2 puffs 2 (two) times a day 8/16/18  Yes Vadim Burk DO   busPIRone (BUSPAR) 5 mg tablet take 1 tablet by mouth three times a day 8/8/19  Yes Vadim Burk DO   escitalopram (LEXAPRO) 10 mg tablet Take 1 tablet (10 mg total) by mouth daily with breakfast 8/12/19  Yes Vadim Burk DO   gabapentin (NEURONTIN) 300 mg capsule Take 1 capsule (300 mg total) by mouth 3 (three) times a day 5/16/19  Yes Unknown Litchfield, DO   hydrochlorothiazide (HYDRODIURIL) 25 mg tablet take 1 tablet by mouth once daily 6/4/19  Yes Unknown Litchfield, DO   insulin glargine [facility]) 100 units/mL injection pen Inject 35 Units under the skin daily 1/18/19  Yes Unknown Litchfield, DO   JANUVIA 100 MG tablet take 1 tablet by mouth once daily 5/20/19  Yes Unknown Litchfield, DO   lisinopril (ZESTRIL) 40 mg tablet take 1 tablet by mouth once daily 5/6/19  Yes Unknown Litchfield, DO   pantoprazole (PROTONIX) 40 mg tablet Take 1 tablet (40 mg total) by mouth daily 6/18/19  Yes Unknown Litchfield, DO   penicillin V potassium (VEETID) 500 mg tablet TAKE 4 TABLETS BY MOUTH NOW, THEN 1 TAB EVERY 6 HOURS TILL FINISHED 5/21/19  Yes Historical Provider, MD   simvastatin (ZOCOR) 20 mg tablet Take 1 tablet (20 mg total) by mouth daily at bedtime 5/16/19  Yes Unknown Litchfield, DO   Blood Glucose Monitoring Suppl (ACCU-CHEK SHANE PLUS) w/Device KIT by Does not apply route 2 (two) times a day 7/30/19   Unknown Litchfield, DO   chlorhexidine (PERIDEX) 0 12 % solution RINSE WITH 1/2 CAPFUL BY MOUTH FOR 1 MINUTE BEFORE BREAKFAST AND BEFORE BED 6/19/19   Historical Provider, MD   cycloSPORINE (RESTASIS) 0 05 % ophthalmic emulsion Apply to eye    Historical Provider, MD   glucose blood (ACCU-CHEK SHANE PLUS) test strip Test twice daily 7/30/19   Unknown Litchfield, DO   hydrocortisone 0 5 % cream Apply topically 2 (two) times a day 9/27/17   Historical Provider, MD   insulin detemir (LEVEMIR) 100 units/mL subcutaneous injection Inject under the skin    Historical Provider, MD   Insulin Pen Needle (B-D UF III MINI PEN NEEDLES) 31G X 5 MM MISC Inject under the skin daily 1/18/19   Unknown Litchfield, DO   Lancets (ACCU-CHEK MULTICLIX) lancets Test bid 5/28/19   Unknown Litchfield, DO   metFORMIN (GLUMETZA) 1000 MG (MOD) 24 hr tablet Take by mouth    Historical Provider, MD   metroNIDAZOLE (FLAGYL) 500 mg tablet  6/17/19   Historical Provider, MD   RA PAIN RELIEF ACETAMINOPHEN 325 MG tablet take 1-2 tablets by mouth every 6 hours if needed for MODERATE pain 6/19/19   Historical Provider, MD   amLODIPine-atorvastatin (CADUET) 10-40 MG per tablet Take 1 tablet by mouth daily  8/12/19  Historical Provider, MD   amoxicillin (AMOXIL) 500 mg capsule take 1 capsule by mouth every 8 hours until finished 6/19/19 8/12/19  Historical Provider, MD   escitalopram (LEXAPRO) 10 mg tablet Take 10 mg by mouth daily with breakfast  8/12/19  Historical Provider, MD   ibuprofen (MOTRIN) 600 mg tablet take 1 tablet by mouth every 6 hours if needed for pain and inflammation 6/19/19 8/12/19  Historical Provider, MD   naproxen (NAPROSYN) 500 mg tablet Take 1 tablet (500 mg total) by mouth 2 (two) times a day with meals 12/8/18 8/12/19  Julio Cooper DO   SUPREP BOWEL PREP KIT 17 5-3 13-1 6 GM/177ML SOLN Take 180 mL by mouth once for 1 dose 7/3/19 8/12/19  Dominick Sullivan MD     I have reviewed home medications with patient personally  Allergies:    Allergies   Allergen Reactions    Percocet [Oxycodone-Acetaminophen] GI Intolerance    Vicodin [Hydrocodone-Acetaminophen] GI Intolerance       Social History:     Marital Status: /Civil Union   Occupation:  Retired  Patient Pre-hospital Living Situation:  Independent  Patient Pre-hospital Level of Mobility:  Independent  Patient Pre-hospital Diet Restrictions:  Diabetic  Substance Use History:   Social History     Substance and Sexual Activity   Alcohol Use No     Social History     Tobacco Use   Smoking Status Never Smoker   Smokeless Tobacco Never Used     Social History     Substance and Sexual Activity   Drug Use No       Family History:    Family History   Problem Relation Age of Onset    Heart disease Mother     Diabetes Mother     Arthritis Mother     Heart attack Mother     Hypertension Mother     Kidney disease Father     Hypertension Father     Arthritis Sister     Cancer Maternal Grandmother     Stroke Maternal Grandmother     Arthritis Paternal Grandmother     Cancer Paternal Grandmother     Heart attack Paternal Grandmother     Stroke Maternal Aunt     Heart attack Maternal Uncle     Cancer Family         spinal column    Coronary artery disease Family     Glaucoma Family     Rheum arthritis Family        Physical Exam:     Vitals:   Blood Pressure: 164/79 (08/12/19 2000)  Pulse: 69 (08/12/19 2100)  Temperature: 98 7 °F (37 1 °C) (08/12/19 1838)  Temp Source: Temporal (08/12/19 1838)  Respirations: 18 (08/12/19 2100)  Height: 5' 5" (165 1 cm) (08/12/19 1838)  Weight - Scale: 103 kg (227 lb 1 2 oz) (08/12/19 1838)  SpO2: 99 % (08/12/19 2100)    Physical Exam   Constitutional: She is oriented to person, place, and time  She appears well-developed and well-nourished  Slight proptosis noted bilaterally   HENT:   Head: Normocephalic and atraumatic  Eyes: Pupils are equal, round, and reactive to light  EOM are normal  Right eye exhibits no discharge  Left eye exhibits no discharge  Neck: Normal range of motion  Neck supple  No JVD present  No tracheal deviation present  No thyromegaly present  Cardiovascular: Normal rate, regular rhythm, normal heart sounds and intact distal pulses  Exam reveals no gallop and no friction rub  No murmur heard  Pulmonary/Chest: Effort normal and breath sounds normal  No stridor  No respiratory distress  She has no wheezes  Abdominal: Soft  Bowel sounds are normal  She exhibits no distension  There is no tenderness  There is no guarding  Musculoskeletal: She exhibits no edema, tenderness or deformity  Neurological: She is alert and oriented to person, place, and time  She displays normal reflexes  No cranial nerve deficit  Coordination normal    Skin: Skin is warm and dry  Capillary refill takes less than 2 seconds  No rash noted  No erythema  No pallor  Psychiatric: She has a normal mood and affect   Her behavior is normal  Judgment and thought content normal          Additional Data:     Lab Results: I have personally reviewed pertinent reports  Results from last 7 days   Lab Units 08/12/19  1924   WBC Thousand/uL 4 75   HEMOGLOBIN g/dL 12 0   HEMATOCRIT % 38 0   PLATELETS Thousands/uL 235   NEUTROS PCT % 63   LYMPHS PCT % 26   MONOS PCT % 10   EOS PCT % 1     Results from last 7 days   Lab Units 08/12/19  1924   POTASSIUM mmol/L 4 9   CHLORIDE mmol/L 100   CO2 mmol/L 32   BUN mg/dL 10   CREATININE mg/dL 1 03   CALCIUM mg/dL 9 1   ALK PHOS U/L 83   ALT U/L 17   AST U/L 14     Results from last 7 days   Lab Units 08/12/19  1924   INR  1 01       Imaging: I have personally reviewed pertinent reports  Cta Head With And Without Contrast    Result Date: 8/12/2019  Narrative: CTA BRAIN - WITH AND WITHOUT CONTRAST INDICATION: Dizziness COMPARISON:   None  TECHNIQUE:  Routine noncontrast CT brain followed by axial 0 625 mm imaging after administration of intravenous contrast   MIP and 3D reconstructions performed  3D rendering was performed on an independent workstation  Radiation dose length product (DLP) for this visit:  1277 81 mGy-cm   This examination, like all CT scans performed in the [facility], was performed utilizing techniques to minimize radiation dose exposure, including the use of iterative reconstruction and automated exposure control  IV Contrast:  100 mL of iohexol (OMNIPAQUE)  IMAGE QUALITY:  Diagnostic  FINDINGS: NONCONTRAST BRAIN:  PARENCHYMA: Decreased attenuation is noted in periventricular and subcortical white matter demonstrating an appearance that is statistically most likely to represent mild microangiopathic change  Chronic appearing right thalamic lacunar infarction noted  Incidental note is made of senescent bilateral basal ganglial calcifications  Bilateral lens implants noted  No acute parenchymal hemorrhage  VENTRICLES AND EXTRA-AXIAL SPACES:  Normal for the patient's age  VISUALIZED ORBITS AND PARANASAL SINUSES:  Unremarkable   VASCULATURE: DISTAL INTERNAL CAROTID ARTERIES:  Normal enhancement of the distal cervical internal carotid arteries and intracranial portions of the internal carotid arteries  Normal ophthalmic artery origins  Normal ICA terminus  ANTERIOR CIRCULATION:  Symmetric A1 segments and anterior cerebral arteries with normal enhancement  Normal anterior communicating artery  MIDDLE CEREBRAL ARTERY CIRCULATION:  M1 segment and middle cerebral artery branches demonstrate normal enhancement bilaterally  DISTAL VERTEBRAL ARTERIES:  Normal distal vertebral arteries  Posterior inferior cerebellar artery origins are normal  Normal vertebral basilar junction  BASILAR ARTERY:  Basilar artery is normal in caliber  Normal superior cerebellar arteries  POSTERIOR CEREBRAL ARTERIES: Both posterior cerebral arteries arises from the basilar tip  Both arteries demonstrate normal enhancement  Normal posterior communicating arteries  DURAL VENOUS SINUSES:  Normal  BONY STRUCTURES:  Unremarkable bony structures  Impression: 1  No intracranial aneurysm or major intracranial arterial stenosis  2   Microangiopathic changes without hemorrhage    Workstation performed: [id number]         [address] [place] [address] Rd / Care Everywhere Records Reviewed: Yes     ** Please Note: This note has been constructed using a voice recognition system   **

## 2019-08-13 NOTE — UTILIZATION REVIEW
Initial Clinical Review    Admission: Date/Time/Statement:   OBS  ORDER    8/12  @    2125     Orders Placed This Encounter   Procedures    Place in Observation     Standing Status:   Standing     Number of Occurrences:   1     Order Specific Question:   Admitting Physician     Answer:   Zahrakatty Donya [[id number]]     Order Specific Question:   Level of Care     Answer:   Med Surg [[id number]]     ED Arrival Information     Expected Arrival Acuity Means of Arrival Escorted By Service Admission Type    - 8/12/2019 18:29 Urgent Walk-In Family Member General Medicine Urgent    Arrival Complaint    vision problem        Chief Complaint   Patient presents with    Eye Problem     patient reports double vision since yesterday afternoon  also reports being lethargic the past two days  patient had both catarats removed five years ago  no prior hx of double vision  Assessment/Plan: Binocular vision disorder with diplopia  Assessment & Plan  Began yesterday  Signed by side double vision which is relieved when one eye is covered  Neurology consult by emergency room  Patient will be admitted for observation  Neurology recommended MRI of the brain and orbit-ordered  Admit to med surge with telemetry  Monitor per protocol  Provide supportive care     Hyperlipidemia  Assessment & Plan  Continue prior to admission statin     Essential hypertension  Assessment & Plan  Blood pressure currently mildly elevated  Admit to med surge with telemetry  Monitor per protocol  Continue prior to admission medication     Type 2 diabetes mellitus without complication ([facility] [address] )    vision   Patient reports symptoms started yesterday afternoon at been persistent  She states double vision is present 1 both eyes are open  Additionally she reports history of cataract removed about 5 years ago in addition to corrective vision treatment    Patient has a past medical history including stroke, neuropathy, hypertension, hyperlipidemia, diabetes mellitus, depression, asthma, anxiety, diabetic  Additionally patient reports not being able to take her insulin for the past 3 or 4 weeks due to inability to get a glucometer  Patient admits to occasional headaches denies head trauma or head injury  Admits to dry eyes on the right states and has been unable to get her restasis  Additionally patient reports not being able to take any of her usual medications since her  passed away 2 months ago  Patient denies chest pain shortness of breath denies abdominal pain denies nausea vomiting diarrhea or constipation denies dysuria urgency or frequency denies melena or hematochezia     Images and labs were collected emergency room-see below  Emergency room doctor consult a neurologist on call-per neurologist patient should be admitted for observation and MRI of the brain ordered should be collected in addition to TSH         ED Triage Vitals [08/12/19 1838]   Temperature Pulse Respirations Blood Pressure SpO2   98 7 °F (37 1 °C) 75 18 145/80 97 %      Temp Source Heart Rate Source Patient Position - Orthostatic VS BP Location FiO2 (%)   Temporal Monitor Lying Right arm --      Pain Score       No Pain        Wt Readings from Last 1 Encounters:   08/13/19 101 kg (222 lb 3 6 oz)     Additional Vital Signs:   08/13/19 07:32:42  98 1 °F (36 7 °C)  67  14  135/67  90  97 %       08/12/19 21:59:11  98 5 °F (36 9 °C)  65  20  147/88  108  99 %       08/12/19 2100    69  18      99 %       08/12/19 2030    65  18      96 %       08/12/19 2000    68  12  164/79    98 %       08/12/19 1930    69  18      95 %       08/12/19 1838  98 7 °F (37 1 °C)  75  18  145/80    97 %  None (Room air)  Lying         Pertinent Labs/Diagnostic Test Results:   Results from last 7 days   Lab Units 08/13/19  0503 08/12/19  1924   WBC Thousand/uL 5 63 4 75   HEMOGLOBIN g/dL 12 5 12 0   HEMATOCRIT % 38 7 38 0   PLATELETS Thousands/uL 228 235   NEUTROS ABS Thousands/µL 3 04 2 98 Results from last 7 days   Lab Units 08/13/19  0503 08/12/19  1924   SODIUM mmol/L 137 138   POTASSIUM mmol/L 3 8 4 9   CHLORIDE mmol/L 100 100   CO2 mmol/L 30 32   ANION GAP mmol/L 7 6   BUN mg/dL 9 10   CREATININE mg/dL 0 86 1 03   EGFR ml/min/1 73sq m 83 67   CALCIUM mg/dL 9 0 9 1   MAGNESIUM mg/dL 1 7 1 6   PHOSPHORUS mg/dL 3 2  --      Results from last 7 days   Lab Units 08/12/19  1924   AST U/L 14   ALT U/L 17   ALK PHOS U/L 83   TOTAL PROTEIN g/dL 7 6   ALBUMIN g/dL 3 4*   TOTAL BILIRUBIN mg/dL 0 30     Results from last 7 days   Lab Units 08/13/19  0733 08/12/19  2215   POC GLUCOSE mg/dl 191* 203*     Results from last 7 days   Lab Units 08/13/19  0503 08/12/19  1924   GLUCOSE RANDOM mg/dL 189* 274*           Results from last 7 days   Lab Units 08/12/19  1924   PROTIME seconds 13 3   INR  1 01   PTT seconds 26     Results from last 7 days   Lab Units 08/12/19  1924   TSH 3RD GENERATON uIU/mL 1 035               Results from last 7 days   Lab Units 08/12/19  1924   CRP mg/L 13 2*   SED RATE mm/hour 32*       CTA  Head/neck:     No intracranial aneurysm or major intracranial arterial stenosis  2   Microangiopathic changes without hemorrhage       ED Treatment:   Medication Administration from 08/12/2019 1829 to 08/12/2019 2154       Date/Time Order Dose Route Action Action by Comments     08/12/2019 1952 iohexol (OMNIPAQUE) 350 MG/ML injection (SINGLE-DOSE) 100 mL 100 mL Intravenous Given Candida Walker         Past Medical History:   Diagnosis Date    Abnormal ECG     last assessed: 5/15/17    Abnormal mammogram     last assessed: 7/11/17    Abnormal stress test     last assesed: 7/24/17    Anemia     Anxiety     Arthritis     Asthma     Back problem     Breast cyst     Chest pain     last assessed: 7/24/17    Chronic pain disorder     Cyst of left breast     last assessed: 8/18/17    Depression     Depression     resolved: 1/2/18    Diabetes mellitus ([address] )     Hiatal hernia     last assessed: 11/7/17    Hyperlipidemia     Hypertension     Keloid of skin     last assessed: 11/2/16    Neuropathy     Psychiatric disorder     anxiety    Stroke [facility])     TIA 2005    Tuberculosis     as a child      Present on Admission:   Type 2 diabetes mellitus without complication ([address] )   Binocular vision disorder with diplopia   Essential hypertension   Hyperlipidemia      Admitting Diagnosis: Vision problem [H54 7]  Binocular vision disorder with diplopia [H53 2]  Uncontrolled type 2 diabetes mellitus with hyperglycemia, with long-term current use of insulin (HCC) [E11 65, Z79 4]  Age/Sex: 61 y o  female  Admission Orders:    Current Facility-Administered Medications:  albuterol 2 puff Inhalation Q4H PRN Georgina Y Swank, CRNP   amLODIPine 10 mg Oral Daily Georgina Y Swank, CRNP   aspirin 81 mg Oral Daily Georgina Y Swank, CRNP   busPIRone 5 mg Oral TID Georgina Y Swank, CRNP   enoxaparin 40 mg Subcutaneous Daily Georgina Y Swank, CRNP   escitalopram 10 mg Oral Daily With Breakfast Georgina Y Swank, CRNP   gabapentin 300 mg Oral TID Gonzalez Salguero, CRNP   glycerin-hypromellose- 1 drop Both Eyes Q12H [address] Stanislav Bonilla MD   hydrochlorothiazide 25 mg Oral Daily Georgina Y Swank, CRNP   insulin glargine 35 Units Subcutaneous QAM Georgina Y Swank, CRNP   insulin lispro 1-6 Units Subcutaneous TID AC Georgina Y Swank, CRNP   insulin lispro 1-6 Units Subcutaneous HS Georgina Y Swank, CRNP   lisinopril 40 mg Oral Daily Georgina Y Swank, CRNP   pantoprazole 40 mg Oral Daily Georgina Y Swank, CRNP   pravastatin 40 mg Oral Daily With Dinner Georgina Y Swank, CRNP   sitaGLIPtin 100 mg Oral Daily Georgina Y Swank, CRNP       IP CONSULT TO NEUROLOGY  IP CONSULT TO CASE MANAGEMENT     MRI  brain    Network Utilization Review Department  Phone: [phone]; Fax [phone]  [email or web address]  ATTENTION: Please call with any questions or concerns to [phone]  and carefully listen to the prompts so that you are directed to the right person  Send all requests for admission clinical reviews, approved or denied determinations and any other requests to fax [phone]   All voicemails are confidential

## 2019-08-13 NOTE — ASSESSMENT & PLAN NOTE
Started on a 11/14/2019, patient has double vision which is relieved when she sees from 1 eye  Seen by Neurology in the emergency room at [facility] they have recommended MRI of the brain and orbits  MRI shows no acute intracranial abnormality, has moderate to severe to April chronic microangiopathic disease, has no orbital pathology, noted to have incidental findings where there is a prominent [address], partially empty sella and slightly enlarged sella and low normal ventricular size  History of headaches as these constellation of findings are seen in patients with intracranial hypertension  Alternatively findings may represent an anatomic variation of Meckel's diverticula and partially empty sella  There is no pathological intracranial enhancement  Neurology top thought this might be related to small vessel ischemic disease  Possible relapse solution in about 12 weeks  She was has twice to wear patch on her left eye intermittently and to follow with her ophthalmologist   However because of the intracranial hypertension, secondary headache that she has developed and empty sella with fluid and bilateral putting of the dura to parasellar area they have decided she needs a lumbar puncture  [facility] does not have opening pressure measuring so patient is transferred to [place] for that pressure measurement  Of note idiopathic intracranial hypertension may cause 6th nerve palsy and hence Neurology wants to exclude this       Consult Neurology  Consult Ophthalmology

## 2019-08-13 NOTE — ASSESSMENT & PLAN NOTE
Began yesterday  Signed by side double vision which is relieved when one eye is covered  Neurology consult by emergency room  Patient will be admitted for observation  Neurology recommended MRI of the brain and orbit-ordered  Admit to [facility] with telemetry  Monitor per protocol  Provide supportive care

## 2019-08-13 NOTE — ASSESSMENT & PLAN NOTE
Lab Results   Component Value Date    HGBA1C 9 0 (H) 08/13/2019       Recent Labs     08/12/19  2215 08/13/19  0733 08/13/19  1152 08/13/19  1631   POCGLU 203* 191* 227* 154*       Blood Sugar Average: Last 72 hrs:   Type 2 diabetes mellitus without complication  Patient is noncompliant with medications  Continue to monitor fingersticks  Better glucose control

## 2019-08-13 NOTE — SOCIAL WORK
Spoke with Ria Young at [facility] who took the information regarding pt being transferred  Ria Young also will be sending it off for ambulance auth and they will call us back with ambulance authorization  CM will then call the ambulance in the am with the authorization information

## 2019-08-13 NOTE — SOCIAL WORK
Pt being transferred to [facility]  Cm is calling pt's insurance([employer]) to notify them and to see if prior auth/notification is required

## 2019-08-13 NOTE — PHYSICAL THERAPY NOTE
PT NOTE:              Order received and chart review performed  Pt with MRI showing possible intracranial HTN with HA and double vision  Pt neuro consult, pt being transferred to [facility] for LP  Will hold PT evaluation as pt is being transferred this date      Bhupendra Kaplan PT

## 2019-08-13 NOTE — ASSESSMENT & PLAN NOTE
Blood pressure currently mildly elevated  Admit to [facility] with telemetry  Monitor per protocol  Continue prior to admission medication

## 2019-08-13 NOTE — ASSESSMENT & PLAN NOTE
Began on 8/11/19  side double vision which is relieved when one eye is covered  Neurology consult by emergency room and recommended MRI of the brain and orbit-ordered    MRI brain with orbits:  1  No acute intracranial abnormality  2   Moderate cerebral chronic microangiopathic disease  3   No orbital pathology  4   Incidentally noted prominent [address], partially empty and slightly enlarged sella, and low normal ventricular size  Correlate for history of headaches  as these constellation of findings can be seen in patients with intracranial   hypertension  Alternatively, the findings may represent anatomic variations of Meckel's diverticula and partially empty sella  5   No pathologic intracranial enhancement  Neurology felt the etiology of the patient's diplopia was likely small vessel ischemic disease  And that prognosis is favorable with expected resolution in 12 weeks  She was advised to patch her left eye intermittently, and to follow with her ophthalmologist  However there was also some concerns about intracranial hypertension, considering the recent development of headache, and the presence of fluid-filled empty sella with bilateral pouching of dura to parasellar area  Since Idiopathic intracranial hypertension can cause 6th nerve palsy as well, Neurology recommended excluding this etiology by performing LP and measuring opening pressure and routine labs (CSF glucose, protein, cells and gram stain)  Given this the patient need to be transferred to a higher level of care to [place] [id number]  The patient's case was discussed with Dr Amy Bean who accepted the patient onto the [place] service

## 2019-08-13 NOTE — PLAN OF CARE
Problem: Potential for Falls  Goal: Patient will remain free of falls  Description  INTERVENTIONS:  - Assess patient frequently for physical needs  -  Identify cognitive and physical deficits and behaviors that affect risk of falls    -  [place] fall precautions as indicated by assessment   - Educate patient/family on patient safety including physical limitations  - Instruct patient to call for assistance with activity based on assessment  - Modify environment to reduce risk of injury  - Consider OT/PT consult to assist with strengthening/mobility  Outcome: Progressing     Problem: PAIN - ADULT  Goal: Verbalizes/displays adequate comfort level or baseline comfort level  Description  Interventions:  - Encourage patient to monitor pain and request assistance  - Assess pain using appropriate pain scale  - Administer analgesics based on type and severity of pain and evaluate response  - Implement non-pharmacological measures as appropriate and evaluate response  - Consider cultural and social influences on pain and pain management  - Notify physician/advanced practitioner if interventions unsuccessful or patient reports new pain  Outcome: Progressing     Problem: SAFETY ADULT  Goal: Maintain or return to baseline ADL function  Description  INTERVENTIONS:  -  Assess patient's ability to carry out ADLs; assess patient's baseline for ADL function and identify physical deficits which impact ability to perform ADLs (bathing, care of mouth/teeth, toileting, grooming, dressing, etc )  - Assess/evaluate cause of self-care deficits   - Assess range of motion  - Assess patient's mobility; develop plan if impaired  - Assess patient's need for assistive devices and provide as appropriate  - Encourage maximum independence but intervene and supervise when necessary  ¯ Involve family in performance of ADLs  ¯ Assess for home care needs following discharge   ¯ Request OT consult to assist with ADL evaluation and planning for discharge  ¯ Provide patient education as appropriate  Outcome: Progressing  Goal: Maintain or return mobility status to optimal level  Description  INTERVENTIONS:  - Assess patient's baseline mobility status (ambulation, transfers, stairs, etc )    - Identify cognitive and physical deficits and behaviors that affect mobility  - Identify mobility aids required to assist with transfers and/or ambulation (gait belt, sit-to-stand, lift, walker, cane, etc )  - [place] fall precautions as indicated by assessment  - Record patient progress and toleration of activity level on Mobility SBAR; progress patient to next Phase/Stage  - Instruct patient to call for assistance with activity based on assessment  - Request Rehabilitation consult to assist with strengthening/weightbearing, etc   Outcome: Progressing

## 2019-08-13 NOTE — ASSESSMENT & PLAN NOTE
Lab Results   Component Value Date    HGBA1C 8 0 (H) 06/14/2019       No results for input(s): POCGLU in the last 72 hours      Blood Sugar Average: Last 72 hrs:   patient has been somewhat noncompliant with her medications due to multifactorial limitation  Continue prior to admission Lantus  Accu-Cheks a c  HS  Insulin sliding scale

## 2019-08-14 PROBLEM — I67.82 SUBCORTICAL MICROVASCULAR ISCHEMIC OCCLUSIVE DISEASE: Status: ACTIVE | Noted: 2019-08-14

## 2019-08-14 LAB
ANION GAP SERPL CALCULATED.3IONS-SCNC: 8 MMOL/L (ref 4–13)
ATRIAL RATE: 68 BPM
BASOPHILS # BLD AUTO: 0.03 THOUSANDS/ΜL (ref 0–0.1)
BASOPHILS NFR BLD AUTO: 1 % (ref 0–1)
BUN SERPL-MCNC: 13 MG/DL (ref 5–25)
CALCIUM SERPL-MCNC: 9.8 MG/DL (ref 8.3–10.1)
CHLORIDE SERPL-SCNC: 103 MMOL/L (ref 100–108)
CO2 SERPL-SCNC: 31 MMOL/L (ref 21–32)
CREAT SERPL-MCNC: 0.84 MG/DL (ref 0.6–1.3)
EOSINOPHIL # BLD AUTO: 0.08 THOUSAND/ΜL (ref 0–0.61)
EOSINOPHIL NFR BLD AUTO: 2 % (ref 0–6)
ERYTHROCYTE [DISTWIDTH] IN BLOOD BY AUTOMATED COUNT: 14.7 % (ref 11.6–15.1)
EST. AVERAGE GLUCOSE BLD GHB EST-MCNC: 209 MG/DL
GFR SERPL CREATININE-BSD FRML MDRD: 86 ML/MIN/1.73SQ M
GLUCOSE SERPL-MCNC: 100 MG/DL (ref 65–140)
GLUCOSE SERPL-MCNC: 109 MG/DL (ref 65–140)
GLUCOSE SERPL-MCNC: 137 MG/DL (ref 65–140)
GLUCOSE SERPL-MCNC: 194 MG/DL (ref 65–140)
GLUCOSE SERPL-MCNC: 96 MG/DL (ref 65–140)
HBA1C MFR BLD: 8.9 % (ref 4.2–6.3)
HCT VFR BLD AUTO: 41.5 % (ref 34.8–46.1)
HGB BLD-MCNC: 13.1 G/DL (ref 11.5–15.4)
IMM GRANULOCYTES # BLD AUTO: 0.01 THOUSAND/UL (ref 0–0.2)
IMM GRANULOCYTES NFR BLD AUTO: 0 % (ref 0–2)
LYMPHOCYTES # BLD AUTO: 2.05 THOUSANDS/ΜL (ref 0.6–4.47)
LYMPHOCYTES NFR BLD AUTO: 38 % (ref 14–44)
MCH RBC QN AUTO: 26.7 PG (ref 26.8–34.3)
MCHC RBC AUTO-ENTMCNC: 31.6 G/DL (ref 31.4–37.4)
MCV RBC AUTO: 85 FL (ref 82–98)
MONOCYTES # BLD AUTO: 0.53 THOUSAND/ΜL (ref 0.17–1.22)
MONOCYTES NFR BLD AUTO: 10 % (ref 4–12)
NEUTROPHILS # BLD AUTO: 2.77 THOUSANDS/ΜL (ref 1.85–7.62)
NEUTS SEG NFR BLD AUTO: 49 % (ref 43–75)
NRBC BLD AUTO-RTO: 0 /100 WBCS
P AXIS: 44 DEGREES
PLATELET # BLD AUTO: 242 THOUSANDS/UL (ref 149–390)
PMV BLD AUTO: 10.5 FL (ref 8.9–12.7)
POTASSIUM SERPL-SCNC: 3.6 MMOL/L (ref 3.5–5.3)
PR INTERVAL: 202 MS
QRS AXIS: -50 DEGREES
QRSD INTERVAL: 88 MS
QT INTERVAL: 414 MS
QTC INTERVAL: 440 MS
RBC # BLD AUTO: 4.9 MILLION/UL (ref 3.81–5.12)
SODIUM SERPL-SCNC: 142 MMOL/L (ref 136–145)
T WAVE AXIS: 4 DEGREES
VENTRICULAR RATE: 68 BPM
WBC # BLD AUTO: 5.47 THOUSAND/UL (ref 4.31–10.16)

## 2019-08-14 PROCEDURE — 80048 BASIC METABOLIC PNL TOTAL CA: CPT | Performed by: INTERNAL MEDICINE

## 2019-08-14 PROCEDURE — G8979 MOBILITY GOAL STATUS: HCPCS

## 2019-08-14 PROCEDURE — 85025 COMPLETE CBC W/AUTO DIFF WBC: CPT | Performed by: INTERNAL MEDICINE

## 2019-08-14 PROCEDURE — 82948 REAGENT STRIP/BLOOD GLUCOSE: CPT

## 2019-08-14 PROCEDURE — 83036 HEMOGLOBIN GLYCOSYLATED A1C: CPT | Performed by: INTERNAL MEDICINE

## 2019-08-14 PROCEDURE — 99232 SBSQ HOSP IP/OBS MODERATE 35: CPT | Performed by: GENERAL PRACTICE

## 2019-08-14 PROCEDURE — G8987 SELF CARE CURRENT STATUS: HCPCS

## 2019-08-14 PROCEDURE — 99232 SBSQ HOSP IP/OBS MODERATE 35: CPT | Performed by: PSYCHIATRY & NEUROLOGY

## 2019-08-14 PROCEDURE — G8978 MOBILITY CURRENT STATUS: HCPCS

## 2019-08-14 PROCEDURE — 93010 ELECTROCARDIOGRAM REPORT: CPT | Performed by: INTERNAL MEDICINE

## 2019-08-14 PROCEDURE — G8988 SELF CARE GOAL STATUS: HCPCS

## 2019-08-14 PROCEDURE — 97163 PT EVAL HIGH COMPLEX 45 MIN: CPT

## 2019-08-14 PROCEDURE — 97166 OT EVAL MOD COMPLEX 45 MIN: CPT

## 2019-08-14 RX ORDER — SODIUM CHLORIDE 9 MG/ML
75 INJECTION, SOLUTION INTRAVENOUS CONTINUOUS
Status: DISCONTINUED | OUTPATIENT
Start: 2019-08-14 | End: 2019-08-14

## 2019-08-14 RX ORDER — HYDROCHLOROTHIAZIDE 25 MG/1
25 TABLET ORAL DAILY
Status: DISCONTINUED | OUTPATIENT
Start: 2019-08-14 | End: 2019-08-15

## 2019-08-14 RX ORDER — LISINOPRIL 20 MG/1
40 TABLET ORAL DAILY
Status: DISCONTINUED | OUTPATIENT
Start: 2019-08-14 | End: 2019-08-17 | Stop reason: HOSPADM

## 2019-08-14 RX ORDER — INSULIN GLARGINE 100 [IU]/ML
18 INJECTION, SOLUTION SUBCUTANEOUS EVERY MORNING
Status: DISCONTINUED | OUTPATIENT
Start: 2019-08-14 | End: 2019-08-14

## 2019-08-14 RX ORDER — AMLODIPINE BESYLATE 10 MG/1
10 TABLET ORAL DAILY
Status: DISCONTINUED | OUTPATIENT
Start: 2019-08-14 | End: 2019-08-15

## 2019-08-14 RX ORDER — ACETAMINOPHEN 325 MG/1
650 TABLET ORAL EVERY 6 HOURS PRN
Status: DISCONTINUED | OUTPATIENT
Start: 2019-08-14 | End: 2019-08-17 | Stop reason: HOSPADM

## 2019-08-14 RX ORDER — INSULIN GLARGINE 100 [IU]/ML
25 INJECTION, SOLUTION SUBCUTANEOUS EVERY MORNING
Status: DISCONTINUED | OUTPATIENT
Start: 2019-08-14 | End: 2019-08-17 | Stop reason: HOSPADM

## 2019-08-14 RX ADMIN — BUSPIRONE HYDROCHLORIDE 5 MG: 5 TABLET ORAL at 09:00

## 2019-08-14 RX ADMIN — DEXTRAN 70 AND HYPROMELLOSE 2910 1 DROP: 1; 3 SOLUTION/ DROPS OPHTHALMIC at 08:34

## 2019-08-14 RX ADMIN — GABAPENTIN 300 MG: 300 CAPSULE ORAL at 09:03

## 2019-08-14 RX ADMIN — SODIUM CHLORIDE 75 ML/HR: 0.9 INJECTION, SOLUTION INTRAVENOUS at 04:58

## 2019-08-14 RX ADMIN — BUSPIRONE HYDROCHLORIDE 5 MG: 5 TABLET ORAL at 16:42

## 2019-08-14 RX ADMIN — INSULIN GLARGINE 25 UNITS: 100 INJECTION, SOLUTION SUBCUTANEOUS at 09:10

## 2019-08-14 RX ADMIN — ASPIRIN 81 MG 81 MG: 81 TABLET ORAL at 09:02

## 2019-08-14 RX ADMIN — ACETAMINOPHEN 650 MG: 325 TABLET ORAL at 04:57

## 2019-08-14 RX ADMIN — GABAPENTIN 300 MG: 300 CAPSULE ORAL at 16:42

## 2019-08-14 RX ADMIN — ATORVASTATIN CALCIUM 40 MG: 40 TABLET, FILM COATED ORAL at 16:42

## 2019-08-14 RX ADMIN — GABAPENTIN 300 MG: 300 CAPSULE ORAL at 21:45

## 2019-08-14 RX ADMIN — DEXTRAN 70 AND HYPROMELLOSE 2910 1 DROP: 1; 3 SOLUTION/ DROPS OPHTHALMIC at 21:45

## 2019-08-14 RX ADMIN — HYDROCHLOROTHIAZIDE 25 MG: 25 TABLET ORAL at 09:03

## 2019-08-14 RX ADMIN — PANTOPRAZOLE SODIUM 40 MG: 40 TABLET, DELAYED RELEASE ORAL at 09:05

## 2019-08-14 RX ADMIN — ESCITALOPRAM OXALATE 10 MG: 10 TABLET ORAL at 09:02

## 2019-08-14 RX ADMIN — BUSPIRONE HYDROCHLORIDE 5 MG: 5 TABLET ORAL at 21:45

## 2019-08-14 NOTE — PLAN OF CARE
Problem: OCCUPATIONAL THERAPY ADULT  Goal: Performs self-care activities at highest level of function for planned discharge setting  See evaluation for individualized goals  Description  Treatment Interventions: Visual perceptual retraining, Patient/family training, Equipment evaluation/education, Compensatory technique education, Continued evaluation, Activityengagement, Functional transfer training, ADL retraining  Equipment Recommended: Tub seat with back       See flowsheet documentation for full assessment, interventions and recommendations  Note:   Limitation: Visual deficit, Decreased high-level ADLs, Decreased self-care trans  Prognosis: Good  Assessment: Pt is a 61year old female seen for initial OT evaluation s/p admission to [facility] 8/13/19 with binocular vision disorder with diplopia  MRI negative for acute abnormalities, however showed moderate microangiopathic disease  Pt found to have cranial nerve 6 palsy likely secondary to small vessel ischemic disease  Additional active problems include: DM and lumbar pain  Pt with active OT orders and cleared by BHARAT Diego for OT evaluation and OOB mobility  Pt resides with her 25year old daughter in a mobile home with 5STE  Pt's daughter is leaving for college at the end of the month, however pt reports that her other daughter may be able to assist her  Pt reports being I with ADLs, IADLs (with rest breaks), and functional mobility without use of DME PTA  Pt is a   Pt does not work secondary to reported back injury  Pt is currently demonstrating the following occupational deficits: UB ADLs with supervision, LB ADLs with Bobby, bed mobility with supervision, functional transfers with Bobby, and functional mobility with Bobby  Factors currently limiting pt's independence in these areas include: visual deficits, balance, functional mobility, and unsupportive home environment  Overall, pt scored 55/100 on the Barthel Index    From an OT standpoint, recommend STR upon d/c pending progress  Pt is to continue to benefit from skilled occupational therapy services while in the hospital to maximize functioning and independence with daily activities  See below for OT goals to be addressed 3-5x/wk         OT Discharge Recommendation: Short Term Rehab(pending progress and availability of support at home)  OT - OK to Discharge: Yes(if to STR)

## 2019-08-14 NOTE — PROGRESS NOTES
Progress Note - Neurology   Mirta Lock 61 y o  female [id number]  Unit/Bed#: [facility]    Assessment:  Mirta Lock is a 61 y o  female who originally presented with diplopia, found to have a cranial nerve 6 palsy, with concern for intracranial hypertension  If no evidence of elevated ICP on LP, likely secondary to small vessel ischemic disease  Plan:  - LP pending with opening pressure, RBC, WBC, protein, glucose, and gram stain   - Aspirin on hold in setting of LP- Restart after LP  - Recommend intermittently patching left eye    - Continue to follow with ophthalmologist    - Medical management and supportive care per primary team  Correction of any metabolic or infectious disturbances  Subjective: In summary, patient is a 61year old female with multiple vascular risk factors who presented to  [facility] with double vision x 1 day  She was seen in neurologic consultation via TeleConsult by Dr Richard Cordero, who suspected the patient to have a right 6th nerve palsy, likely secondary to small vessel ischemic disease  She was transferred to [facility] due to concern over increased intracranial hypertension in the setting of headache and abnormal MRI  She is to receive an LP  GCA was ruled out as ESR/CRP are normal   She underwent an MRI brain/orbits which revealed no acute abnormalities, moderate microangiopathic disease, and an incidentally noted prominent Meckel's cave, partially empty and slightly enlarged sella, and low normal ventricular size  Since admission, her vitals have been stable  Today on neurologic evaluation, the patient reports that she is feeling off balance  She had ambulated with physical therapy earlier today  She did not note anything off balance related to something mechanical, such as weakness or pain, rather she noted herself to be somewhat lightheaded  She reports an improvement in her headache today   Yesterday it was a 5/10, described as a pressure sensation, and located in the right parietal region of her head  She had received Tylenol this morning and states that her headache has mostly resolved, and is only a 1/10  She continues to experience double vision, specifically when she looks midline and to the far right or far left  It is horizontally stacked  She reports some slight dry eye on the right  Past Medical History:   Diagnosis Date    Abnormal ECG     last assessed: 5/15/17    Abnormal mammogram     last assessed: 7/11/17    Abnormal stress test     last assesed: 7/24/17    Anemia     Anxiety     Arthritis     Asthma     Back problem     Breast cyst     Chest pain     last assessed: 7/24/17    Chronic pain disorder     Cyst of left breast     last assessed: 8/18/17    Depression     Depression     resolved: 1/2/18    Diabetes mellitus ([address] )     Hiatal hernia     last assessed: 11/7/17    Hyperlipidemia     Hypertension     Keloid of skin     last assessed: 11/2/16    Neuropathy     Psychiatric disorder     anxiety    Stroke [facility])     TIA 2005    Tuberculosis     as a child      Past Surgical History:   Procedure Laterality Date    ARM WOUND REPAIR / CLOSURE      CARPAL TUNNEL RELEASE      CARPAL TUNNEL RELEASE Left     CATARACT EXTRACTION Bilateral     2015    CYST REMOVAL      right upper arm   EYE SURGERY      cataract removaql    WI ESOPHAGOGASTRODUODENOSCOPY TRANSORAL DIAGNOSTIC N/A 1/5/2018    Procedure: ESOPHAGOGASTRODUODENOSCOPY (EGD);   Surgeon: Jared Velazquez DO;  Location: [facility] MAIN OR;  Service: Gastroenterology    WI WRIST Castella Dine LIG Left 4/19/2019    Procedure: ENDOSCOPIC CARPAL TUNNEL RELEASE;  Surgeon: Jacque Anguiano MD;  Location: [facility] MAIN OR;  Service: Orthopedics    [facility] [address] THYROID BIOPSY  4/15/2019     Family History   Problem Relation Age of Onset    Heart disease Mother     Diabetes Mother     Arthritis Mother     Heart attack Mother    Veronica Montgomery Hypertension Mother     Kidney disease Father     Hypertension Father     Arthritis Sister     Cancer Maternal Grandmother     Stroke Maternal Grandmother     Arthritis Paternal Grandmother     Cancer Paternal Grandmother     Heart attack Paternal Grandmother     Stroke Maternal Aunt     Heart attack Maternal Uncle     Cancer Family         spinal column    Coronary artery disease Family     Glaucoma Family     Rheum arthritis Family      Social History     Socioeconomic History    Marital status: /Civil Union     Spouse name: Not on file    Number of children: Not on file    Years of education: Not on file    Highest education level: Not on file   Occupational History    Occupation: housewife/homemaker   Social Needs    Financial resource strain: Not on file    Food insecurity:     Worry: Not on file     Inability: Not on file    Transportation needs:     Medical: Not on file     Non-medical: Not on file   Tobacco Use    Smoking status: Never Smoker    Smokeless tobacco: Never Used   Substance and Sexual Activity    Alcohol use: Not Currently     Alcohol/week: 0 0 standard drinks     Frequency: Never     Drinks per session: Patient refused     Binge frequency: Never     Comment: 0    Drug use: No    Sexual activity: Not Currently     Partners: Male   Lifestyle    Physical activity:     Days per week: Not on file     Minutes per session: Not on file    Stress: Not on file   Relationships    Social connections:     Talks on phone: Not on file     Gets together: Not on file     Attends [employer] service: Not on file     Active member of club or organization: Not on file     Attends meetings of clubs or organizations: Not on file     Relationship status: Not on file    Intimate partner violence:     Fear of current or ex partner: Not on file     Emotionally abused: Not on file     Physically abused: Not on file     Forced sexual activity: Not on file   Other Topics Concern    Not on file   Social History Narrative    Always uses seatbelts    No living will         Medications: All current active meds have been reviewed and current meds:  Scheduled Meds:  Current Facility-Administered Medications:  acetaminophen 650 mg Oral Q6H PRN Remy Basilio PA-C   albuterol 2 puff Inhalation Q4H PRN Keagan Heredia MD   amLODIPine 10 mg Oral Daily Shelia Chavarria,    aspirin 81 mg Oral Daily Keagan Heredia MD   atorvastatin 40 mg Oral Daily With Corkimberly Jung MD   busPIRone 5 mg Oral TID Keagan Heerdia MD   dextran 70-hypromellose 1 drop Both Eyes Q12H [facility] Keagan Heredia MD   [START ON 8/15/2019] enoxaparin 40 mg Subcutaneous Daily Shelia Chavarria,    escitalopram 10 mg Oral Daily With Breakfast Keagan Heredia MD   gabapentin 300 mg Oral TID Keagan Heredia MD   hydrochlorothiazide 25 mg Oral Daily Shelia Chavarria,    insulin glargine 25 Units Subcutaneous QAM Shelia Chavarria, DO   insulin lispro 1-6 Units Subcutaneous HS Keagan Heredia MD   insulin lispro 1-6 Units Subcutaneous TID AC Keagan Heredia MD   lisinopril 40 mg Oral Daily Shelia Chavarria,    pantoprazole 40 mg Oral Daily Keagan Heredia MD     Continuous Infusions:   PRN Meds:   acetaminophen    albuterol       ROS:   Review of Systems          Vitals:   /71   Pulse 61   Temp 98 1 °F (36 7 °C)   Resp 16   Ht 5' 5" (1 651 m)   Wt 99 7 kg (219 lb 12 8 oz)   SpO2 97%   BMI 36 58 kg/m²     Physical Exam:   Physical Exam   Constitutional: She is oriented to person, place, and time  She appears well-developed and well-nourished  No distress  Resting comfortably in chair  HENT:   Head: Normocephalic and atraumatic  Right Ear: External ear normal    Left Ear: External ear normal    Nose: Nose normal    Mouth/Throat: Oropharynx is clear and moist    Eyes: Pupils are equal, round, and reactive to light  Conjunctivae are normal  Right eye exhibits no discharge  Left eye exhibits no discharge  No scleral icterus  Reduced abduction of right eye     Neck: Normal range of motion  Neck supple  Cardiovascular: Normal rate, regular rhythm and normal heart sounds  Exam reveals no gallop and no friction rub  No murmur heard  Pulmonary/Chest: Effort normal and breath sounds normal  No stridor  No respiratory distress  She has no wheezes  Musculoskeletal: Normal range of motion  She exhibits no edema, tenderness or deformity  Neurological: She is alert and oriented to person, place, and time  She has normal strength  No sensory deficit  Reflex Scores:       Bicep reflexes are 2+ on the right side and 2+ on the left side  Brachioradialis reflexes are 2+ on the right side and 2+ on the left side  Patellar reflexes are 3+ on the right side and 2+ on the left side  Achilles reflexes are 2+ on the right side and 1+ on the left side  Skin: Skin is warm and dry  No rash noted  She is not diaphoretic  No erythema  Psychiatric: She has a normal mood and affect  Her speech is normal and behavior is normal  Judgment and thought content normal    Nursing note and vitals reviewed  Neurologic Exam     Mental Status   Oriented to person, place, and time  Speech: speech is normal   Level of consciousness: alert  Able to name object  Able to repeat  Cranial Nerves     CN II   Visual fields full to confrontation  Visual acuity: (Grossly intact, other than double vision noted slightly to the right of midline and with far right or far left gaze)    CN III, IV, VI   Pupils are equal, round, and reactive to light  Right pupil: Size: 3 mm  Shape: regular  Left pupil: Size: 3 mm  Shape: regular  CN VI: right CN VI palsy (Unable to full abduct right eye)  Nystagmus: none   Conjugate gaze absent: Slightly dysconjugate gaze at times  CN V   Facial sensation intact  CN VII   Facial expression full, symmetric       CN VIII   Hearing: intact    CN XI   Right sternocleidomastoid strength: normal  Left sternocleidomastoid strength: normal  Right trapezius strength: normal  Left trapezius strength: normal    CN XII   Tongue: not atrophic  Fasciculations: absent  Tongue deviation: none  No ocular muscle weakness     Motor Exam   Right arm pronator drift: absent  Left arm pronator drift: absent    Strength   Strength 5/5 throughout  Sensory Exam   Light touch normal      Gait, Coordination, and Reflexes     Tremor   Resting tremor: absent  Intention tremor: absent  Action tremor: absent    Reflexes   Right brachioradialis: 2+  Left brachioradialis: 2+  Right biceps: 2+  Left biceps: 2+  Right patellar: 3+  Left patellar: 2+  Right achilles: 2+  Left achilles: 1+  Right plantar: normal  Left plantar: normal          Labs: I have personally reviewed pertinent reports     Recent Results (from the past 24 hour(s))   Fingerstick Glucose (POCT)    Collection Time: 08/13/19 11:52 AM   Result Value Ref Range    POC Glucose 227 (H) 65 - 140 mg/dl   Fingerstick Glucose (POCT)    Collection Time: 08/13/19  4:31 PM   Result Value Ref Range    POC Glucose 154 (H) 65 - 140 mg/dl   Fingerstick Glucose (POCT)    Collection Time: 08/13/19  9:35 PM   Result Value Ref Range    POC Glucose 116 65 - 140 mg/dl   CBC and differential    Collection Time: 08/14/19  5:29 AM   Result Value Ref Range    WBC 5 47 4 31 - 10 16 Thousand/uL    RBC 4 90 3 81 - 5 12 Million/uL    Hemoglobin 13 1 11 5 - 15 4 g/dL    Hematocrit 41 5 34 8 - 46 1 %    MCV 85 82 - 98 fL    MCH 26 7 (L) 26 8 - 34 3 pg    MCHC 31 6 31 4 - 37 4 g/dL    RDW 14 7 11 6 - 15 1 %    MPV 10 5 8 9 - 12 7 fL    Platelets 064 842 - 252 Thousands/uL    nRBC 0 /100 WBCs    Neutrophils Relative 49 43 - 75 %    Immat GRANS % 0 0 - 2 %    Lymphocytes Relative 38 14 - 44 %    Monocytes Relative 10 4 - 12 %    Eosinophils Relative 2 0 - 6 %    Basophils Relative 1 0 - 1 %    Neutrophils Absolute 2 77 1 85 - 7 62 Thousands/µL    Immature Grans Absolute 0 01 0 00 - 0 20 Thousand/uL    Lymphocytes Absolute 2 05 0 60 - 4 47 Thousands/µL    Monocytes Absolute 0 53 0 17 - 1 22 Thousand/µL    Eosinophils Absolute 0 08 0 00 - 0 61 Thousand/µL    Basophils Absolute 0 03 0 00 - 0 10 Thousands/µL   Basic metabolic panel    Collection Time: 08/14/19  5:29 AM   Result Value Ref Range    Sodium 142 136 - 145 mmol/L    Potassium 3 6 3 5 - 5 3 mmol/L    Chloride 103 100 - 108 mmol/L    CO2 31 21 - 32 mmol/L    ANION GAP 8 4 - 13 mmol/L    BUN 13 5 - 25 mg/dL    Creatinine 0 84 0 60 - 1 30 mg/dL    Glucose 100 65 - 140 mg/dL    Calcium 9 8 8 3 - 10 1 mg/dL    eGFR 86 ml/min/1 73sq m   Hemoglobin A1C w/ EAG Estimation    Collection Time: 08/14/19  5:29 AM   Result Value Ref Range    Hemoglobin A1C 8 9 (H) 4 2 - 6 3 %     mg/dl   Fingerstick Glucose (POCT)    Collection Time: 08/14/19  6:35 AM   Result Value Ref Range    POC Glucose 96 65 - 140 mg/dl   Fingerstick Glucose (POCT)    Collection Time: 08/14/19 10:55 AM   Result Value Ref Range    POC Glucose 137 65 - 140 mg/dl       Imaging: I have personally reviewed pertinent imaging and I have personally reviewed PACS reports  EKG, Pathology, and Other Studies: I have personally reviewed pertinent reports  VTE Prophylaxis: Sequential compression device (Venodyne)  and Enoxaparin (Lovenox)        Total time spent today 25 minutes  Greater than 50% of total time was spent with the patient and / or family counseling and / or coordination of care  A description of the counseling / coordination of care: Discussed pathophysiology, reason for LP and basic procedure, and current medications   Reviewed MRI

## 2019-08-14 NOTE — ASSESSMENT & PLAN NOTE
Lab Results   Component Value Date    HGBA1C 8 9 (H) 08/14/2019       Recent Labs     08/13/19  1631 08/13/19  2135 08/14/19  0635 08/14/19  1055   POCGLU 154* 116 96 137       Blood Sugar Average: Last 72 hrs:  (P) 742 [id number] Type 2 diabetes mellitus without complication  Patient is noncompliant with medications  BSs appear controlled on Lantus 25U qAM

## 2019-08-14 NOTE — ASSESSMENT & PLAN NOTE
Started on a 11/14/2019, patient has double vision which is relieved when she sees from 1 eye  Seen by Neurology in the emergency room at [facility] they have recommended MRI of the brain and orbits  MRI shows no acute intracranial abnormality, has moderate to severe to April chronic microangiopathic disease, has no orbital pathology, noted to have incidental findings where there is a prominent [address], partially empty sella and slightly enlarged sella and low normal ventricular size  History of headaches as these constellation of findings are seen in patients with intracranial hypertension  Alternatively findings may represent an anatomic variation of Meckel's diverticula and partially empty sella  There is no pathological intracranial enhancement  Neurology top thought this might be related to small vessel ischemic disease  Possible relapse solution in about 12 weeks  She was has twice to wear patch on her left eye intermittently and to follow with her ophthalmologist   However because of the intracranial hypertension, secondary headache that she has developed and empty sella with fluid and bilateral putting of the dura to parasellar area they have decided she needs a lumbar puncture  [facility] does not have opening pressure measuring so patient is transferred to [facility] for that pressure measurement  Of note idiopathic intracranial hypertension may cause 6th nerve palsy and hence Neurology wants to exclude this       Consult Neurology  Consult Ophthalmology

## 2019-08-14 NOTE — SOCIAL WORK
Late entry:Pt received a call from [address] [place] [address] at [employer] with the ambulance auth for pt to be transferred to [facility]  Auth # is [id number]  Cm will notify [facility] ambulance

## 2019-08-14 NOTE — PLAN OF CARE
Problem: Nutrition/Hydration-ADULT  Goal: Nutrient/Hydration intake appropriate for improving, restoring or maintaining nutritional needs  Description  Monitor and assess patient's nutrition/hydration status for malnutrition  Collaborate with interdisciplinary team and initiate plan and interventions as ordered  Monitor patient's weight and dietary intake as ordered or per policy  Utilize nutrition screening tool and intervene as necessary  Determine patient's food preferences and provide high-protein, high-caloric foods as appropriate       INTERVENTIONS:  - Monitor oral intake, urinary output, labs, and treatment plans  - Assess nutrition and hydration status and recommend course of action  - Evaluate amount of meals eaten  - Assist patient with eating if necessary   - Allow adequate time for meals  - Recommend/ encourage appropriate diets, oral nutritional supplements, and vitamin/mineral supplements  - Order, calculate, and assess calorie counts as needed  - Recommend, monitor, and adjust tube feedings and TPN/PPN based on assessed needs  - Assess need for intravenous fluids  - Provide specific nutrition/hydration education as appropriate  - Include patient/family/caregiver in decisions related to nutrition  Outcome: Progressing

## 2019-08-14 NOTE — UTILIZATION REVIEW
Initial Clinical Review    Admission: Date/Time/Statement: 8/13/19 @ 1917     Orders Placed This Encounter   Procedures    Inpatient Admission     Standing Status:   Standing     Number of Occurrences:   1     Order Specific Question:   Admitting Physician     Answer:   Mary Maynard [[id number]]     Order Specific Question:   Level of Care     Answer:   Med Surg [[id number]]     Order Specific Question:   Estimated length of stay     Answer:   More than 2 Midnights     Order Specific Question:   Certification     Answer:   I certify that inpatient services are medically necessary for this patient for a duration of greater than two midnights  See H&P and MD Progress Notes for additional information about the patient's course of treatment  8/12/2019 - 8/13/2019 (23 hours)  Observation   St  [id number] [facility] vision disorder with diplopia   Sixth nerve palsy of right eye   Transfer to [address] [place] [address]     Assessment/Plan:     61year old female received from [address] [place] [address] (observation) for inpatient medical evaluation of double vision and MRI which indicates increased intracranial pressure   PMHX cataract surgery 5 years ago and stroke  Plan to obtain lumbar puncture with an opening pressure and continue neurologic follow up  Consult ophthalmology  CSF results pending  Patient with headache today 5/10  3/10     Consult neurology  Patient with cranial nerve 6 palsy with concern for intracranial hypertension  If no elevated intracranial pressure on lumbar puncture, etiology is likely secondary to small vessel ischemic disease       Recommend eye patch    08/13/19 1926 08/13/19 1926 08/13/19 1926 08/13/19 1926 08/13/19 1926   98 4 °F (36 9 °C) 67 14 125/78 97 %      No Pain       08/14/19 99 7 kg (219 lb 12 8 oz)     Additional Vital Signs:    Date/Time  Temp  Pulse  Resp  BP  MAP (mmHg)  SpO2  O2 Device   08/14/19 0901  --  --  --  117/71  --  --  --   08/14/19 07:14:28  98 1 °F (36 7 °C)  61  16  115/74  88  97 %  --   08/14/19 0315  --  --  --  --  --  --  None (Room air)   08/13/19 2155  97 7 °F (36 5 °C)  62  16  131/81  98  96 %  --   08/13/19 21:52:37  97 7 °F (36 5 °C)  66  --  131/81  98  95 %  --   08/13/19 19:26:45  98 4 °F (36 9 °C)  67  14  125/78  94  97 %       Date and Time R Pupil Size (mm) L Pupil Size (mm) R Pupil Reaction L Pupil Reaction   08/13/19 2000 2 2 Brisk Brisk   08/13/19 1634 3 3 Brisk Brisk   08/12/19 1924 3 3 Brisk Brisk     Date and Time Eye Opening Best Verbal Response Best Motor Response [place] Coma Scale Score   08/14/19 0840 4 5 6 15   08/14/19 0315 4 5 6 15   08/13/19 2000 4 5 6 15   08/13/19 1634 4 5 6 15   08/12/19 2354 4 5 6 15   08/12/19 1924 4 5 6 15     PAIN HEADACHE 5/10  3/10      Pertinent Labs/Diagnostic Test Results:      Previous ECG:  Compared to current    Comparison ECG info:  6/14/19    Similarity:  Changes noted    Comparison to cardiac monitor: Yes    Comments:      NSR rate of 68, left anterior fascicular block  , QRS 88, QT/QTc 414/440      MRI BRAIN  1   No acute intracranial abnormality  2   Moderate cerebral chronic microangiopathic disease  3   No orbital pathology  4   Incidentally noted prominent [address], partially empty and slightly enlarged sella, and low normal ventricular size   Correlate for history of headaches   as these constellation of findings can be seen in patients with intracranial hypertension   Alternatively, the findings may represent anatomic variations of Meckel's diverticula and partially empty sella  5   No pathologic intracranial enhancement  CTA HEAD AND NECK  1   No intracranial aneurysm or major intracranial arterial stenosis    2   Microangiopathic changes without hemorrhage    Results from last 7 days   Lab Units 08/14/19  0529 08/13/19  0503 08/12/19  1924   WBC Thousand/uL 5 47 5 63 4 75   HEMOGLOBIN g/dL 13 1 12 5 12 0   HEMATOCRIT % 41 5 38 7 38 0   PLATELETS Thousands/uL 242 228 235 NEUTROS ABS Thousands/µL 2 77 3 04 2 98         Results from last 7 days   Lab Units 08/14/19  0529 08/13/19  0503 08/12/19  1924   SODIUM mmol/L 142 137 138   POTASSIUM mmol/L 3 6 3 8 4 9   CHLORIDE mmol/L 103 100 100   CO2 mmol/L 31 30 32   ANION GAP mmol/L 8 7 6   BUN mg/dL 13 9 10   CREATININE mg/dL 0 84 0 86 1 03   EGFR ml/min/1 73sq m 86 83 67   CALCIUM mg/dL 9 8 9 0 9 1   MAGNESIUM mg/dL  --  1 7 1 6   PHOSPHORUS mg/dL  --  3 2  --      Results from last 7 days   Lab Units 08/12/19  1924   AST U/L 14   ALT U/L 17   ALK PHOS U/L 83   TOTAL PROTEIN g/dL 7 6   ALBUMIN g/dL 3 4*   TOTAL BILIRUBIN mg/dL 0 30     Results from last 7 days   Lab Units 08/14/19  1055 08/14/19  0635 08/13/19  2135 08/13/19  1631 08/13/19  1152 08/13/19  0733 08/12/19  2215   POC GLUCOSE mg/dl 137 96 116 154* 227* 191* 203*     Results from last 7 days   Lab Units 08/14/19  0529 08/13/19  0503 08/12/19  1924   GLUCOSE RANDOM mg/dL 100 189* 274*         Results from last 7 days   Lab Units 08/14/19  0529 08/13/19  0503   HEMOGLOBIN A1C % 8 9* 9 0*   EAG mg/dl 209 212     Results from last 7 days   Lab Units 08/12/19  1924   PROTIME seconds 13 3   INR  1 01   PTT seconds 26     Results from last 7 days   Lab Units 08/12/19  1924   TSH 3RD GENERATON uIU/mL 1 035     Results from last 7 days   Lab Units 08/12/19  1924   CRP mg/L 13 2*   SED RATE mm/hour 32*       Past Medical History:   Diagnosis    Abnormal ECG    last assessed: 5/15/17    Abnormal mammogram    last assessed: 7/11/17    Abnormal stress test    last assesed: 7/24/17    Anemia    Anxiety    Arthritis    Asthma    Back problem    Breast cyst    Chest pain    last assessed: 7/24/17    Chronic pain disorder    Cyst of left breast    last assessed: 8/18/17    Depression    Depression    resolved: 1/2/18    Diabetes mellitus ([facility] )    Hiatal hernia    last assessed: 11/7/17    Hyperlipidemia    Hypertension    Keloid of skin    last assessed: 11/2/16    Neuropathy    Psychiatric disorder    anxiety    Stroke [facility])    TIA 2005    Tuberculosis    as a child      Present on Admission:   Essential hypertension   Binocular vision disorder with diplopia   Type 2 diabetes mellitus without complication (HCC)   Lumbar pain      Admitting Diagnosis: Binocular vision disorder [H53 30]    Age/Sex: 61 y o  female     Admission Orders:      acetaminophen 650 mg Oral Q6H PRN   albuterol 2 puff Inhalation Q4H PRN   amLODIPine 10 mg Oral Daily   atorvastatin 40 mg Oral Daily With Dinner   busPIRone 5 mg Oral TID   dextran 70-hypromellose 1 drop Both Eyes Q12H [facility]   [START ON 8/15/2019] enoxaparin 40 mg Subcutaneous Daily   escitalopram 10 mg Oral Daily With Breakfast   gabapentin 300 mg Oral TID   hydrochlorothiazide 25 mg Oral Daily   insulin glargine 25 Units Subcutaneous QAM   insulin lispro 1-6 Units Subcutaneous HS   insulin lispro 1-6 Units Subcutaneous TID AC   lisinopril 40 mg Oral Daily   pantoprazole 40 mg Oral Daily       IP CONSULT TO NEUROLOGY  IP CONSULT TO OPHTHALMOLOGY    Network Utilization Review Department  Phone: [phone]; Fax [phone]  [email or web address]  ATTENTION: Please call with any questions or concerns to [phone]  and carefully listen to the prompts so that you are directed to the right person  Send all requests for admission clinical reviews, approved or denied determinations and any other requests to fax [phone]   All voicemails are confidential

## 2019-08-14 NOTE — PHYSICAL THERAPY NOTE
Physical Therapy Evaluation    Patient's Name: Funmi Maurer    Admitting Diagnosis  Binocular vision disorder [H53 30]    Problem List  Patient Active Problem List   Diagnosis    Abnormal stress test    Lumbar pain    Cerebral atherosclerosis    Chronic back pain    Chronic constipation    Type 2 diabetes mellitus without complication ([address] )    Cyst of left breast    Depression    Diabetic neuropathy ([address] )    Hiatal hernia    Essential hypertension    Hyperlipidemia    Keloid of skin    Mild persistent asthma without complication    Moderate persistent asthma without complication    Neuropathy, diabetic ([address] )    Otalgia    Impaired hearing    Multinodular goiter    Hoarseness or changing voice    Carpal tunnel syndrome on left    Numbness in both hands    Carpal tunnel syndrome of left wrist    Binocular vision disorder with diplopia    Sixth nerve palsy of right eye    Subcortical microvascular ischemic occlusive disease       Past Medical History  Past Medical History:   Diagnosis Date    Abnormal ECG     last assessed: 5/15/17    Abnormal mammogram     last assessed: 7/11/17    Abnormal stress test     last assesed: 7/24/17    Anemia     Anxiety     Arthritis     Asthma     Back problem     Breast cyst     Chest pain     last assessed: 7/24/17    Chronic pain disorder     Cyst of left breast     last assessed: 8/18/17    Depression     Depression     resolved: 1/2/18    Diabetes mellitus ([address] )     Hiatal hernia     last assessed: 11/7/17    Hyperlipidemia     Hypertension     Keloid of skin     last assessed: 11/2/16    Neuropathy     Psychiatric disorder     anxiety    Stroke [facility])     TIA 2005    Tuberculosis     as a child        Past Surgical History  Past Surgical History:   Procedure Laterality Date    ARM WOUND REPAIR / CLOSURE      CARPAL TUNNEL RELEASE      CARPAL TUNNEL RELEASE Left     CATARACT EXTRACTION Bilateral     2015    CYST REMOVAL right upper arm   EYE SURGERY      cataract removaql    IN ESOPHAGOGASTRODUODENOSCOPY TRANSORAL DIAGNOSTIC N/A 1/5/2018    Procedure: ESOPHAGOGASTRODUODENOSCOPY (EGD); Surgeon: Miguel Sawyer DO;  Location: [facility] MAIN OR;  Service: Gastroenterology    IN WRIST Julane Money LIG Left 4/19/2019    Procedure: ENDOSCOPIC CARPAL TUNNEL RELEASE;  Surgeon: Aung Keith MD;  Location: [address] [place] [address] MAIN OR;  Service: Orthopedics    [address] THYROID BIOPSY  4/15/2019        08/14/19 1000   Note Type   Note type Eval only   Pain Assessment   Pain Assessment 0-10   Pain Score 3   Pain Type Acute pain   Pain Location Head   Patient's Stated Pain Goal No pain   Hospital Pain Intervention(s) Ambulation/increased activity   Response to Interventions unchanged   Home Living   Type of Home Other (Comment)   Additional Comments Resides alone in mobile home, [address]  Indep self care, ambulates w/ out device  dtr checks on pt frequently   Prior Function   Level of [place] Independent with ADLs and functional mobility   Falls in the last 6 months 0   Restrictions/Precautions   Weight Bearing Precautions Per Order No   Braces or Orthoses   (using eye patch- switches frequently between eyes)   Other Precautions Fall Risk;Pain; Impulsive;Visual impairment   General   Family/Caregiver Present No   Cognition   Overall Cognitive Status WFL   Arousal/Participation Alert   Orientation Level Oriented X4   Memory Unable to assess   Following Commands Follows one step commands without difficulty   RLE Assessment   RLE Assessment   (strength grossly 4-/5)   LLE Assessment   LLE Assessment   (strength grossly 4-/5)   Coordination   Movements are Fluid and Coordinated 0   Coordination and Movement Description B LE ataxia   Bed Mobility   Supine to Sit 5  Supervision   Additional items Assist x 1   Transfers   Sit to Stand 4  Minimal assistance   Additional items Assist x 1   Stand to Sit 4  Minimal assistance Additional items Assist x 1   Ambulation/Elevation   Gait pattern   (ataxia, several small LOB- c/o double vision)   Gait Assistance 4  Minimal assist   Additional items Assist x 2   Assistive Device   (none initially, HHA of 2 for 2nd half of gair distance)   Distance 70'   Balance   Static Sitting Good   Dynamic Sitting Fair   Static Standing Fair -   Dynamic Standing Poor +   Ambulatory Poor +   Endurance Deficit   Endurance Deficit Yes   Endurance Deficit Description fatugue, weakness, pain, double vision   Activity Tolerance   Activity Tolerance Patient limited by fatigue;Treatment limited secondary to medical complications (Comment); Patient limited by pain   Nurse Made Aware yes   Assessment   Prognosis Fair   Problem List Decreased strength;Decreased endurance;Decreased mobility; Impaired balance;Decreased coordination;Pain; Impaired vision   Assessment Pt seen for high complexity physical therapy evaluation  Pt is a 60 y/o female w/ history/comorbidities of HLD, HTN, DM II, CVA, neuropathy, depression/anxiety now admitted as a transfer from [facility]  Seen there w/ double vision- dx include R 6th nerve palsy, ? intracranial hypertension  Transferred here for w/u and care  Due to acute medical issues w/ unclear dx, need for transfer to higher level of care, pain, fall risk due to double vision, note unstable clinical picture  PT consutled to assess mobility, d/c needs  Pt presents w/ decreased functional mob, standing balance, endurance, B LE strength and coordination, barriers at home  will benefit from skilled PT to correct for the above problems    recommend rehab at d/c presently   Goals   Patient Goals to feel better   [facility] Expiration Date 08/28/19   Short Term Goal #1 1-2 wks: bed mob and transfers w/ indep, standing balance to good/normal w/ device as needed, ambulate 200-300 ft w/ RW vs least restrictive device and mod I, increase B LE strength by 1/2 -1 grade, ambulate 5 stairs w S   Treatment Day 0   Plan   Treatment/Interventions Functional transfer training;Elevations;LE strengthening/ROM; Therapeutic exercise; Endurance training;Gait training;Bed mobility; Equipment eval/education;Patient/family training   PT Frequency   (3-5x/wk)   Recommendation   Recommendation   (currently, recommend rehab at d/c)   Equipment Recommended Nish Camera   PT - OK to Discharge Yes  (when stable to rehab presently)   Modified [place] Scale   Modified [place] Scale 4   Barthel Index   Feeding 10   Bathing 0   Grooming Score 5   Dressing Score 5   Bladder Score 10   Bowels Score 10   Toilet Use Score 5   Transfers (Bed/Chair) Score 10   Mobility (Level Surface) Score 0   Stairs Score 0   Barthel Index Score 55           Chema Trujillo PT, DPT, CSRS

## 2019-08-14 NOTE — PLAN OF CARE
Problem: PHYSICAL THERAPY ADULT  Goal: Performs mobility at highest level of function for planned discharge setting  See evaluation for individualized goals  Description  Treatment/Interventions: Functional transfer training, Elevations, LE strengthening/ROM, Therapeutic exercise, Endurance training, Gait training, Bed mobility, Equipment eval/education, Patient/family training  Equipment Recommended: Adriana Charles       See flowsheet documentation for full assessment, interventions and recommendations  Note:   Prognosis: Fair  Problem List: Decreased strength, Decreased endurance, Decreased mobility, Impaired balance, Decreased coordination, Pain, Impaired vision  Assessment: Pt seen for high complexity physical therapy evaluation  Pt is a 62 y/o female w/ history/comorbidities of HLD, HTN, DM II, CVA, neuropathy, depression/anxiety now admitted as a transfer from [facility]  Seen there w/ double vision- dx include R 6th nerve palsy, ? intracranial hypertension  Transferred here for w/u and care  Due to acute medical issues w/ unclear dx, need for transfer to higher level of care, pain, fall risk due to double vision, note unstable clinical picture  PT consutled to assess mobility, d/c needs  Pt presents w/ decreased functional mob, standing balance, endurance, B LE strength and coordination, barriers at home  will benefit from skilled PT to correct for the above problems  recommend rehab at d/c presently        Recommendation: (currently, recommend rehab at d/c)     PT - OK to Discharge: (S) Yes(when stable to rehab presently)    See flowsheet documentation for full assessment

## 2019-08-14 NOTE — OCCUPATIONAL THERAPY NOTE
[address]  Evaluation     Patient Name: Carlos Caldwell  FXAGA'R Date: 8/14/2019  Problem List  Patient Active Problem List   Diagnosis    Abnormal stress test    Lumbar pain    Cerebral atherosclerosis    Chronic back pain    Chronic constipation    Type 2 diabetes mellitus without complication ([address] )    Cyst of left breast    Depression    Diabetic neuropathy ([address]    Hiatal hernia    Essential hypertension    Hyperlipidemia    Keloid of skin    Mild persistent asthma without complication    Moderate persistent asthma without complication    Neuropathy, diabetic ([address] )    Otalgia    Impaired hearing    Multinodular goiter    Hoarseness or changing voice    Carpal tunnel syndrome on left    Numbness in both hands    Carpal tunnel syndrome of left wrist    Binocular vision disorder with diplopia    Sixth nerve palsy of right eye    Subcortical microvascular ischemic occlusive disease     Past Medical History  Past Medical History:   Diagnosis Date    Abnormal ECG     last assessed: 5/15/17    Abnormal mammogram     last assessed: 7/11/17    Abnormal stress test     last assesed: 7/24/17    Anemia     Anxiety     Arthritis     Asthma     Back problem     Breast cyst     Chest pain     last assessed: 7/24/17    Chronic pain disorder     Cyst of left breast     last assessed: 8/18/17    Depression     Depression     resolved: 1/2/18    Diabetes mellitus ([address] )     Hiatal hernia     last assessed: 11/7/17    Hyperlipidemia     Hypertension     Keloid of skin     last assessed: 11/2/16    Neuropathy     Psychiatric disorder     anxiety    Stroke [facility])     TIA 2005    Tuberculosis     as a child      Past Surgical History  Past Surgical History:   Procedure Laterality Date    ARM WOUND REPAIR / CLOSURE      CARPAL TUNNEL RELEASE      CARPAL TUNNEL RELEASE Left     CATARACT EXTRACTION Bilateral     2015    CYST REMOVAL      right upper arm      EYE SURGERY cataract removaql    [place] ESOPHAGOGASTRODUODENOSCOPY TRANSORAL DIAGNOSTIC N/A 1/5/2018    Procedure: ESOPHAGOGASTRODUODENOSCOPY (EGD); Surgeon: Joslyn Martinez DO;  Location: [facility] MAIN OR;  Service: Gastroenterology    [place] WRIST Suzan Shaver LIG Left 4/19/2019    Procedure: ENDOSCOPIC CARPAL TUNNEL RELEASE;  Surgeon: Kristina Almaraz MD;  Location: [facility] MAIN OR;  Service: Orthopedics   [address] [facility] THYROID BIOPSY  4/15/2019        08/14/19 7120   Note Type   Note type Eval only   Restrictions/Precautions   Weight Bearing Precautions Per Order No   Other Precautions Visual impairment; Fall Risk;Multiple lines; Bed Alarm; Chair Alarm   Pain Assessment   Pain Assessment No/denies pain   Pain Score No Pain   Home Living   Type of Home Mobile home  (5STE)   Home Layout One level   Bathroom Shower/Tub Tub/shower unit   Bathroom Toilet Standard   Bathroom Equipment   (no bathroom DME)   Home Equipment   (no use of DME PTA)   Additional Comments Pt resides with her 25year old daughter in a mobile home with 5STE  Pt's daughter is leaving for college at the end of the month  Pt's other daughter may be able to help upon d/c   Prior Function   Level of [place] Independent with ADLs and functional mobility   Lives With Daughter   ADL Assistance Independent   IADLs Independent   Falls in the last 6 months 0   Vocational On disability   Lifestyle   Autonomy Pt reports being I with ADLs, IADLs (with breaks due to back pain), and functional mobility without use of DME PTA  Pt is a   Reciprocal Relationships 5 kids (lives with youngest daughter),  recently passed away (June)   Service to Others on disability   Intrinsic Gratification loves her 4 dogs and 1 cat   Psychosocial   Psychosocial (WDL) WDL   Subjective   Subjective Pt has an eyepatch prior to OT evaluation  Pt noted to frequently switch between eyes/forehead     ADL   Eating Assistance 5  Supervision/Setup   Grooming Assistance 5  Supervision/Setup   UB Bathing Assistance 5  Supervision/Setup   UB Bathing Deficit   (seated)   LB Bathing Assistance 4  Minimal Assistance   LB Bathing Deficit   (seated)   UB Dressing Assistance 5  Supervision/Setup   LB Dressing Assistance 4  Minimal Assistance   Toileting Assistance  5  Supervision/Setup   Bed Mobility   Supine to Sit 5  Supervision   Additional items Increased time required   Sit to Supine 5  Supervision   Additional Comments Reports mild dizziness upon sitting EOB  Resolves quickly   Transfers   Sit to Stand 4  Minimal assistance   Stand to Sit 5  Supervision   Stand pivot 4  Minimal assistance   Additional items Assist x 1; Increased time required;Verbal cues   Functional Mobility   Functional Mobility 4  Minimal assistance   Additional Comments Bobby without use of DME, noted to hold on to railing for majority of ambulation   Balance   Static Sitting Good   Dynamic Sitting Fair +   Static Standing Fair -   Dynamic Standing Poor +   Ambulatory Poor +   Activity Tolerance   Activity Tolerance Patient tolerated treatment well   Medical Staff Made Aware Spoke with PT Darrel Carroll   Nurse Made Aware Pt cleared by RN Ralene Moritz for OT evaluation and OOB mobility   RUE Assessment   RUE Assessment WFL   LUE Assessment   LUE Assessment WFL   Hand Function   Gross Motor Coordination Impaired  (may be secondary to diplopia)   Sensation   Light Touch No apparent deficits   Sharp/Dull No apparent deficits   Stereognosis No apparent deficits   Vision-Basic Assessment   Current Vision Does not wear glasses   Patient Visual Report Diplopia  (R>L)   Vision - Complex Assessment   Ocular Range of Motion WFL   Head Position WDL   Tracking Able to track stimulus in all quads without difficulty   Acuity   (reports mild decreased acuity R eye)   Additional Comments Pt with decreased diplopia with eye patch, however still present   Perception   Inattention/Neglect Appears intact   Cognition   Overall Cognitive Status WFL   Arousal/Participation Alert; Responsive; Cooperative   Attention Within functional limits   Orientation Level Oriented X4   Memory Within functional limits   Following Commands Follows one step commands without difficulty   Assessment   Limitation Visual deficit; Decreased high-level ADLs; Decreased self-care trans   Prognosis Good   Assessment Pt is a 61year old female seen for initial OT evaluation s/p admission to [facility] 8/13/19 with binocular vision disorder with diplopia  MRI negative for acute abnormalities, however showed moderate microangiopathic disease  Pt found to have cranial nerve 6 palsy likely secondary to small vessel ischemic disease  Additional active problems include: DM and lumbar pain  Pt with active OT orders and cleared by BHARAT Dennis for OT evaluation and OOB mobility  Pt resides with her 25year old daughter in a mobile home with 5STE  Pt's daughter is leaving for college at the end of the month, however pt reports that her other daughter may be able to assist her  Pt reports being I with ADLs, IADLs (with rest breaks), and functional mobility without use of DME PTA  Pt is a   Pt does not work secondary to reported back injury  Pt is currently demonstrating the following occupational deficits: UB ADLs with supervision, LB ADLs with Bobby, bed mobility with supervision, functional transfers with Bobby, and functional mobility with Bobby  Factors currently limiting pt's independence in these areas include: visual deficits, balance, functional mobility, and unsupportive home environment  Overall, pt scored 55/100 on the Barthel Index  From an OT standpoint, recommend STR upon d/c pending progress  Pt is to continue to benefit from skilled occupational therapy services while in the hospital to maximize functioning and independence with daily activities  See below for OT goals to be addressed 3-5x/wk       Goals   Patient Goals when medically stable   Plan   Treatment Interventions Visual perceptual retraining;Patient/family training;Equipment evaluation/education; Compensatory technique education;Continued evaluation; Activityengagement; Functional transfer training;ADL retraining   Goal Expiration Date 08/24/19   Treatment Day 1   OT Frequency 3-5x/wk   Recommendation   OT Discharge Recommendation Short Term Rehab  (pending progress and availability of support at home)   Equipment Recommended Tub seat with back   OT - OK to Discharge Yes  (if to STR)   Barthel Index   Feeding 10   Bathing 0   Grooming Score 5   Dressing Score 5   Bladder Score 10   Bowels Score 10   Toilet Use Score 5   Transfers (Bed/Chair) Score 10   Mobility (Level Surface) Score 0   Stairs Score 0   Barthel Index Score 55     GOALS    Pt will improve activity tolerance to G for min 30 min txment sessions for increase engagement in functional tasks    Pt will complete all self care w/ mod I using adaptive device and DME as needed    Pt will improve functional transfers to Mod I on/off all surfaces using DME as needed w/ G balance/safety     Pt will improve functional mobility during ADL/IADL/leisure tasks to Mod I using DME as needed w/ G balance/safety     Pt will participate in simulated IADL management task to increase independence to Mod I w/ G safety and endurance    Pt will demonstrate G carryover of pt/caregiver education and training as appropriate w/o cues w/ good tolerance to increase safety during functional tasks    Maged Mcarthur, MAYA, OTR/L

## 2019-08-14 NOTE — PLAN OF CARE
Problem: Potential for Falls  Goal: Patient will remain free of falls  Description  INTERVENTIONS:  - Assess patient frequently for physical needs  -  Identify cognitive and physical deficits and behaviors that affect risk of falls  -  [place] fall precautions as indicated by assessment   - Educate patient/family on patient safety including physical limitations  - Instruct patient to call for assistance with activity based on assessment  - Modify environment to reduce risk of injury  - Consider OT/PT consult to assist with strengthening/mobility  Outcome: Progressing     Problem: NEUROSENSORY - ADULT  Goal: Achieves stable or improved neurological status  Description  INTERVENTIONS  - Monitor and report changes in neurological status  - Monitor vital signs such as temperature, blood pressure, glucose, and any other labs ordered   - Initiate measures to prevent increased intracranial pressure  - Monitor for seizure activity and implement precautions if appropriate      Outcome: Progressing  Goal: Achieves maximal functionality and self care  Description  INTERVENTIONS  - Monitor swallowing and airway patency with patient fatigue and changes in neurological status  - Encourage and assist patient to increase activity and self care     - Encourage visually impaired, hearing impaired and aphasic patients to use assistive/communication devices  Outcome: Progressing     Problem: SAFETY ADULT  Goal: Maintain or return to baseline ADL function  Description  INTERVENTIONS:  -  Assess patient's ability to carry out ADLs; assess patient's baseline for ADL function and identify physical deficits which impact ability to perform ADLs (bathing, care of mouth/teeth, toileting, grooming, dressing, etc )  - Assess/evaluate cause of self-care deficits   - Assess range of motion  - Assess patient's mobility; develop plan if impaired  - Assess patient's need for assistive devices and provide as appropriate  - Encourage maximum independence but intervene and supervise when necessary  - Involve family in performance of ADLs  - Assess for home care needs following discharge   - Consider OT consult to assist with ADL evaluation and planning for discharge  - Provide patient education as appropriate  Outcome: Progressing  Goal: Maintain or return mobility status to optimal level  Description  INTERVENTIONS:  - Assess patient's baseline mobility status (ambulation, transfers, stairs, etc )    - Identify cognitive and physical deficits and behaviors that affect mobility  - Identify mobility aids required to assist with transfers and/or ambulation (gait belt, sit-to-stand, lift, walker, cane, etc )  - [place] fall precautions as indicated by assessment  - Record patient progress and toleration of activity level on Mobility SBAR; progress patient to next Phase/Stage  - Instruct patient to call for assistance with activity based on assessment  - Consider rehabilitation consult to assist with strengthening/weightbearing, etc   Outcome: Progressing     Problem: DISCHARGE PLANNING  Goal: Discharge to home or other facility with appropriate resources  Description  INTERVENTIONS:  - Identify barriers to discharge w/patient and caregiver  - Arrange for needed discharge resources and transportation as appropriate  - Identify discharge learning needs (meds, wound care, etc )  - Arrange for interpretive services to assist at discharge as needed  - Refer to Case Management Department for coordinating discharge planning if the patient needs post-hospital services based on physician/advanced practitioner order or complex needs related to functional status, cognitive ability, or social support system  Outcome: Progressing     Problem: Knowledge Deficit  Goal: Patient/family/caregiver demonstrates understanding of disease process, treatment plan, medications, and discharge instructions  Description  Complete learning assessment and assess knowledge base   Interventions:  - Provide teaching at level of understanding  - Provide teaching via preferred learning methods  Outcome: Progressing

## 2019-08-14 NOTE — RESTORATIVE TECHNICIAN NOTE
Restorative Specialist Mobility Note       Activity: Ambulate in bautista, Ambulate in room, Bathroom privileges, Chair, Dangle, Stand at bedside(Educated/encouraged pt to ambulate with assistance 3-4 x's/day  Chair alarm on   Pt callbell, phone/tray within reach )     Assistive Device: Other (Comment)(HHA x2, Assisted PT Randall )    Naa Yoon BS, Restorative Technician, [employer]

## 2019-08-14 NOTE — SOCIAL WORK
Met with pt and explained Cm role  Pt lives with her dgt in a mobile home with 5 [address]  Pt was independent PTA and was able to drive  Pt's PCP is Dr Derek Le  No DME avail  No hx of HHC or rehab  Pt has a hx of anxiety and depression  Pt denied inpt [address] stay  Pt stated that she recently lost her  in June of this year and Dr Dee Chung was assisting pt in obtaining an appt with a psychiatrist to help with medication adjustments  No hx of drug or alcohol use  Pt has a prescription plan and uses [facility] in [place] for her prescriptions  Primary contact is pt's dgt Gisell Corley, contact # (H) [phone] (pt's phone they use as a home phone) or (c) [phone], or (P) [phone] (per pt this number is turned off currently)  Pt is unsure of transportation home upon discharge  Pt does not have a POA  Informed pt of therapy's recommendation for inpt acute rehab  Pt was agreeable  A post acute care recommendation was made by your care team for Acute Rehab  Discussed [place] of Choice with patient  List of facilities given to patient via in person  patient aware the list is custom filtered for them by zip code location and that [facility] post acute providers are designated  Pt prefers a referral to [address] [place] [address] referral sent for same

## 2019-08-14 NOTE — UTILIZATION REVIEW
Notification of Inpatient Admission/Inpatient Authorization Request  This is a Notification of Inpatient Admission/Request for Inpatient Authorization for our facility [place] [address]  Be advised that this patient was admitted to our facility under Inpatient Status  Please contact the Utilization Review Department where the patient is receiving care services for additional admission information  Place of Service Code: 24   Place of Service Name: Inpatient Hospital  Presentation Date & Time: 8/13/2019  7:17 PM  Inpatient Admission Date & Time: 8/13/19 0001  Discharge Date & Time: No discharge date for patient encounter  Discharge Disposition (if discharged): [facility]  Attending Physician: Jose Ott Do [[id number]]   Attending Physician:  MURPHY Foote  Specialty- Hospitalist,   [facility] ID- [id number]  [address]  Phone 1: [phone]  Fax: [phone]  Admission Orders (From admission, onward)     Ordered        08/13/19 1924  Inpatient Admission  Once                   Facility: [address])  [facility] Utilization Review Department  Phone: [phone]; Fax [phone]  [email or web address]  ATTENTION: Please call with any questions or concerns to [phone]  and carefully listen to the prompts so that you are directed to the right person  Send all requests for admission clinical reviews, approved or denied determinations and any other requests to fax [phone]   All voicemails are confidential

## 2019-08-14 NOTE — PROGRESS NOTES
Progress Note - Del Singletary 1955, 61 y o  female MRN: [id number]    Unit/Bed#: [facility] 708-01 Encounter: [id number]    Primary Care Provider: Kathie Martinez DO   Date and time admitted to hospital: 8/13/2019  7:17 PM        * Binocular vision disorder with diplopia  Assessment & Plan  Started on a 11/14/2019, patient has double vision which is relieved when she sees from 1 eye  Seen by Neurology in the emergency room at [address] [place] [address] they have recommended MRI of the brain and orbits  MRI shows no acute intracranial abnormality, has moderate to severe to April chronic microangiopathic disease, has no orbital pathology, noted to have incidental findings where there is a prominent [address], partially empty sella and slightly enlarged sella and low normal ventricular size  History of headaches as these constellation of findings are seen in patients with intracranial hypertension  Alternatively findings may represent an anatomic variation of Meckel's diverticula and partially empty sella  There is no pathological intracranial enhancement  Neurology top thought this might be related to small vessel ischemic disease  Possible relapse solution in about 12 weeks  She was has twice to wear patch on her left eye intermittently and to follow with her ophthalmologist   However because of the intracranial hypertension, secondary headache that she has developed and empty sella with fluid and bilateral putting of the dura to parasellar area they have decided she needs a lumbar puncture  [facility] does not have opening pressure measuring so patient is transferred to [place] for that pressure measurement  Of note idiopathic intracranial hypertension may cause 6th nerve palsy and hence Neurology wants to exclude this       Consult Neurology  Consult Ophthalmology    Subcortical microvascular ischemic occlusive disease  Assessment & Plan  Restart ASA after LP  Will d/c on Lipitor as LDL > 100    Essential hypertension  Assessment & Plan  Will hold BP meds if SBP < 130  Needs to be on ACEi at d/c in setting of DM    Type 2 diabetes mellitus without complication [facility])  Assessment & Plan  Lab Results   Component Value Date    HGBA1C 8 9 (H) 2019       Recent Labs     19  1631 19  2135 19  0635 19  1055   POCGLU 154* 116 96 137       Blood Sugar Average: Last 72 hrs:  (P) 427 [id number] Type 2 diabetes mellitus without complication  Patient is noncompliant with medications  BSs appear controlled on Lantus 25U qAM      Lumbar pain  Assessment & Plan  C/w Neurontin  Tylenol prn      VTE Pharmacologic Prophylaxis:   Pharmacologic: Enoxaparin (Lovenox) will be given after LP  Mechanical VTE Prophylaxis in Place: Yes    Patient Centered Rounds: I have performed bedside rounds with nursing staff today  Discussions with Specialists or Other Care Team Provider: neuro    Education and Discussions with Family / Patient: pt    Time Spent for Care: 30 minutes  More than 50% of total time spent on counseling and coordination of care as described above  Current Length of Stay: 1 day(s)    Current Patient Status: Inpatient   Certification Statement: The patient will continue to require additional inpatient hospital stay due to need for LP and ophtho eval    Discharge Plan: pending w/u    Code Status: Level 1 - Full Code      Subjective:   c/o double vision when looking to her right    Objective:     Vitals:   Temp (24hrs), Av °F (36 7 °C), Min:97 7 °F (36 5 °C), Max:98 4 °F (36 9 °C)    Temp:  [97 7 °F (36 5 °C)-98 4 °F (36 9 °C)] 98 1 °F (36 7 °C)  HR:  [61-84] 61  Resp:  [14-16] 16  BP: (115-131)/(71-81) 117/71  SpO2:  [95 %-97 %] 97 %  Body mass index is 36 58 kg/m²  Input and Output Summary (last 24 hours):        Intake/Output Summary (Last 24 hours) at 2019 1511  Last data filed at 2019 1300  Gross per 24 hour   Intake 674 25 ml   Output 1600 ml   Net -925 75 ml Physical Exam:     Physical Exam   Constitutional: She is oriented to person, place, and time  No distress  HENT:   Head: Normocephalic and atraumatic  Eyes: Conjunctivae are normal    Horizontal nystagmus   Neck: Normal range of motion  Neck supple  Cardiovascular: Normal rate and regular rhythm  Pulmonary/Chest: Effort normal and breath sounds normal  She has no wheezes  She has no rales  Abdominal: Soft  Bowel sounds are normal  She exhibits no distension  There is no tenderness  Musculoskeletal: Normal range of motion  She exhibits no edema  Neurological: She is alert and oriented to person, place, and time  Skin: Skin is warm and dry  She is not diaphoretic  Additional Data:     Labs:    Results from last 7 days   Lab Units 08/14/19  0529   WBC Thousand/uL 5 47   HEMOGLOBIN g/dL 13 1   HEMATOCRIT % 41 5   PLATELETS Thousands/uL 242   NEUTROS PCT % 49   LYMPHS PCT % 38   MONOS PCT % 10   EOS PCT % 2     Results from last 7 days   Lab Units 08/14/19  0529  08/12/19  1924   POTASSIUM mmol/L 3 6   < > 4 9   CHLORIDE mmol/L 103   < > 100   CO2 mmol/L 31   < > 32   BUN mg/dL 13   < > 10   CREATININE mg/dL 0 84   < > 1 03   CALCIUM mg/dL 9 8   < > 9 1   ALK PHOS U/L  --   --  83   ALT U/L  --   --  17   AST U/L  --   --  14    < > = values in this interval not displayed  Results from last 7 days   Lab Units 08/12/19  1924   INR  1 01       * I Have Reviewed All Lab Data Listed Above  * Additional Pertinent Lab Tests Reviewed:  [facility] Admission Reviewed        Recent Cultures (last 7 days):           Last 24 Hours Medication List:     Current Facility-Administered Medications:  acetaminophen 650 mg Oral Q6H PRN Erik Gee PA-C   albuterol 2 puff Inhalation Q4H PRN Maegan Patel MD   amLODIPine 10 mg Oral Daily Dipika Buck DO   atorvastatin 40 mg Oral Daily With Tati Armenta MD   busPIRone 5 mg Oral TID Maegan Patel MD   dextran 70-hypromellose 1 drop Both Eyes Q12H [address] Jair Harrell MD   [START ON 8/15/2019] enoxaparin 40 mg Subcutaneous Daily Lise Carrasco DO   escitalopram 10 mg Oral Daily With Breakfast Jair Harrell MD   gabapentin 300 mg Oral TID Jair Harrell MD   hydrochlorothiazide 25 mg Oral Daily Lise Carrasco DO   insulin glargine 25 Units Subcutaneous QAM Lise Carrasco DO   insulin lispro 1-6 Units Subcutaneous HS Jair Harrell MD   insulin lispro 1-6 Units Subcutaneous TID AC Jair Harrell MD   lisinopril 40 mg Oral Daily Lise Carrasco DO   pantoprazole 40 mg Oral Daily Jair Harrell MD        Today, Patient Was Seen By: Lise Carrasco DO    ** Please Note: Dictation voice to text software may have been used in the creation of this document   **

## 2019-08-14 NOTE — CONSULTS
Duplicate order  Please see original consult performed via TeleConsult by Dr Ema Grier on 8/13  Neurology to follow

## 2019-08-14 NOTE — PLAN OF CARE
Problem: SAFETY ADULT  Goal: Maintain or return to baseline ADL function  Description  INTERVENTIONS:  -  Assess patient's ability to carry out ADLs; assess patient's baseline for ADL function and identify physical deficits which impact ability to perform ADLs (bathing, care of mouth/teeth, toileting, grooming, dressing, etc )  - Assess/evaluate cause of self-care deficits   - Assess range of motion  - Assess patient's mobility; develop plan if impaired  - Assess patient's need for assistive devices and provide as appropriate  - Encourage maximum independence but intervene and supervise when necessary  - Involve family in performance of ADLs  - Assess for home care needs following discharge   - Consider OT consult to assist with ADL evaluation and planning for discharge  - Provide patient education as appropriate  Outcome: Progressing

## 2019-08-15 ENCOUNTER — APPOINTMENT (INPATIENT)
Dept: RADIOLOGY | Facility: HOSPITAL | Age: 64
DRG: 197 | End: 2019-08-15
Payer: COMMERCIAL

## 2019-08-15 ENCOUNTER — TRANSITIONAL CARE MANAGEMENT (OUTPATIENT)
Dept: FAMILY MEDICINE CLINIC | Facility: CLINIC | Age: 64
End: 2019-08-15

## 2019-08-15 LAB
ANION GAP SERPL CALCULATED.3IONS-SCNC: 7 MMOL/L (ref 4–13)
APPEARANCE CSF: CLEAR
BUN SERPL-MCNC: 16 MG/DL (ref 5–25)
CALCIUM SERPL-MCNC: 9.2 MG/DL (ref 8.3–10.1)
CHLORIDE SERPL-SCNC: 104 MMOL/L (ref 100–108)
CO2 SERPL-SCNC: 31 MMOL/L (ref 21–32)
CREAT SERPL-MCNC: 0.77 MG/DL (ref 0.6–1.3)
ERYTHROCYTE [DISTWIDTH] IN BLOOD BY AUTOMATED COUNT: 14.6 % (ref 11.6–15.1)
GFR SERPL CREATININE-BSD FRML MDRD: 95 ML/MIN/1.73SQ M
GLUCOSE CSF-MCNC: 89 MG/DL (ref 50–80)
GLUCOSE SERPL-MCNC: 102 MG/DL (ref 65–140)
GLUCOSE SERPL-MCNC: 110 MG/DL (ref 65–140)
GLUCOSE SERPL-MCNC: 151 MG/DL (ref 65–140)
GLUCOSE SERPL-MCNC: 221 MG/DL (ref 65–140)
GLUCOSE SERPL-MCNC: 302 MG/DL (ref 65–140)
HCG SERPL QL: NEGATIVE
HCT VFR BLD AUTO: 41.3 % (ref 34.8–46.1)
HGB BLD-MCNC: 13.2 G/DL (ref 11.5–15.4)
INR PPP: 1.08 (ref 0.84–1.19)
LYMPHOCYTES NFR CSF MANUAL: 78 %
MAGNESIUM SERPL-MCNC: 2.2 MG/DL (ref 1.6–2.6)
MCH RBC QN AUTO: 27.2 PG (ref 26.8–34.3)
MCHC RBC AUTO-ENTMCNC: 32 G/DL (ref 31.4–37.4)
MCV RBC AUTO: 85 FL (ref 82–98)
MONOS+MACROS CSF MANUAL: 22 %
PHOSPHATE SERPL-MCNC: 3.5 MG/DL (ref 2.3–4.1)
PLATELET # BLD AUTO: 247 THOUSANDS/UL (ref 149–390)
PMV BLD AUTO: 10.9 FL (ref 8.9–12.7)
POTASSIUM SERPL-SCNC: 3.8 MMOL/L (ref 3.5–5.3)
PROT CSF-MCNC: 50 MG/DL (ref 15–45)
PROTHROMBIN TIME: 13.6 SECONDS (ref 11.6–14.5)
RBC # BLD AUTO: 4.86 MILLION/UL (ref 3.81–5.12)
RBC # CSF MANUAL: 1 UL (ref 0–10)
SODIUM SERPL-SCNC: 142 MMOL/L (ref 136–145)
T4 SERPL-MCNC: 10.8 UG/DL (ref 4.7–13.3)
TOTAL CELLS COUNTED BLD: NO
TOTAL CELLS COUNTED SPEC: 50
TUBE # CSF: 4
VIT B12 SERPL-MCNC: 536 PG/ML (ref 100–900)
WBC # BLD AUTO: 5.78 THOUSAND/UL (ref 4.31–10.16)
WBC # CSF AUTO: 2 /UL (ref 0–5)

## 2019-08-15 PROCEDURE — 009U3ZX DRAINAGE OF SPINAL CANAL, PERCUTANEOUS APPROACH, DIAGNOSTIC: ICD-10-PCS | Performed by: GENERAL PRACTICE

## 2019-08-15 PROCEDURE — 84703 CHORIONIC GONADOTROPIN ASSAY: CPT | Performed by: PHYSICIAN ASSISTANT

## 2019-08-15 PROCEDURE — 82948 REAGENT STRIP/BLOOD GLUCOSE: CPT

## 2019-08-15 PROCEDURE — 88108 CYTOPATH CONCENTRATE TECH: CPT | Performed by: PATHOLOGY

## 2019-08-15 PROCEDURE — 84100 ASSAY OF PHOSPHORUS: CPT | Performed by: GENERAL PRACTICE

## 2019-08-15 PROCEDURE — 86255 FLUORESCENT ANTIBODY SCREEN: CPT | Performed by: PHYSICIAN ASSISTANT

## 2019-08-15 PROCEDURE — 85610 PROTHROMBIN TIME: CPT | Performed by: PHYSICIAN ASSISTANT

## 2019-08-15 PROCEDURE — 85027 COMPLETE CBC AUTOMATED: CPT | Performed by: GENERAL PRACTICE

## 2019-08-15 PROCEDURE — 82042 OTHER SOURCE ALBUMIN QUAN EA: CPT | Performed by: PHYSICIAN ASSISTANT

## 2019-08-15 PROCEDURE — 86038 ANTINUCLEAR ANTIBODIES: CPT | Performed by: PHYSICIAN ASSISTANT

## 2019-08-15 PROCEDURE — 97535 SELF CARE MNGMENT TRAINING: CPT

## 2019-08-15 PROCEDURE — 82040 ASSAY OF SERUM ALBUMIN: CPT | Performed by: PHYSICIAN ASSISTANT

## 2019-08-15 PROCEDURE — B01B1ZZ FLUOROSCOPY OF SPINAL CORD USING LOW OSMOLAR CONTRAST: ICD-10-PCS | Performed by: GENERAL PRACTICE

## 2019-08-15 PROCEDURE — 80048 BASIC METABOLIC PNL TOTAL CA: CPT | Performed by: GENERAL PRACTICE

## 2019-08-15 PROCEDURE — 82784 ASSAY IGA/IGD/IGG/IGM EACH: CPT | Performed by: PHYSICIAN ASSISTANT

## 2019-08-15 PROCEDURE — 97116 GAIT TRAINING THERAPY: CPT

## 2019-08-15 PROCEDURE — 84157 ASSAY OF PROTEIN OTHER: CPT | Performed by: PHYSICIAN ASSISTANT

## 2019-08-15 PROCEDURE — 99232 SBSQ HOSP IP/OBS MODERATE 35: CPT | Performed by: GENERAL PRACTICE

## 2019-08-15 PROCEDURE — 83873 ASSAY OF CSF PROTEIN: CPT | Performed by: PHYSICIAN ASSISTANT

## 2019-08-15 PROCEDURE — 82607 VITAMIN B-12: CPT | Performed by: PHYSICIAN ASSISTANT

## 2019-08-15 PROCEDURE — 99233 SBSQ HOSP IP/OBS HIGH 50: CPT | Performed by: PHYSICIAN ASSISTANT

## 2019-08-15 PROCEDURE — 83735 ASSAY OF MAGNESIUM: CPT | Performed by: GENERAL PRACTICE

## 2019-08-15 PROCEDURE — 83916 OLIGOCLONAL BANDS: CPT | Performed by: PHYSICIAN ASSISTANT

## 2019-08-15 PROCEDURE — 82945 GLUCOSE OTHER FLUID: CPT | Performed by: PHYSICIAN ASSISTANT

## 2019-08-15 PROCEDURE — 87070 CULTURE OTHR SPECIMN AEROBIC: CPT | Performed by: PHYSICIAN ASSISTANT

## 2019-08-15 PROCEDURE — 86430 RHEUMATOID FACTOR TEST QUAL: CPT | Performed by: PHYSICIAN ASSISTANT

## 2019-08-15 PROCEDURE — 86235 NUCLEAR ANTIGEN ANTIBODY: CPT | Performed by: PHYSICIAN ASSISTANT

## 2019-08-15 PROCEDURE — 89050 BODY FLUID CELL COUNT: CPT | Performed by: PHYSICIAN ASSISTANT

## 2019-08-15 PROCEDURE — 89051 BODY FLUID CELL COUNT: CPT | Performed by: PHYSICIAN ASSISTANT

## 2019-08-15 PROCEDURE — 62270 DX LMBR SPI PNXR: CPT

## 2019-08-15 PROCEDURE — 84436 ASSAY OF TOTAL THYROXINE: CPT | Performed by: PHYSICIAN ASSISTANT

## 2019-08-15 RX ORDER — PYRIDOSTIGMINE BROMIDE 60 MG/1
30 TABLET ORAL 3 TIMES DAILY
Status: DISCONTINUED | OUTPATIENT
Start: 2019-08-15 | End: 2019-08-17 | Stop reason: HOSPADM

## 2019-08-15 RX ORDER — ASPIRIN 81 MG/1
81 TABLET ORAL DAILY
Status: DISCONTINUED | OUTPATIENT
Start: 2019-08-16 | End: 2019-08-17 | Stop reason: HOSPADM

## 2019-08-15 RX ORDER — LIDOCAINE HYDROCHLORIDE 10 MG/ML
15 INJECTION, SOLUTION INFILTRATION; PERINEURAL
Status: COMPLETED | OUTPATIENT
Start: 2019-08-15 | End: 2019-08-15

## 2019-08-15 RX ADMIN — GABAPENTIN 300 MG: 300 CAPSULE ORAL at 08:13

## 2019-08-15 RX ADMIN — DEXTRAN 70 AND HYPROMELLOSE 2910 1 DROP: 1; 3 SOLUTION/ DROPS OPHTHALMIC at 21:21

## 2019-08-15 RX ADMIN — INSULIN LISPRO 2 UNITS: 100 INJECTION, SOLUTION INTRAVENOUS; SUBCUTANEOUS at 11:16

## 2019-08-15 RX ADMIN — LIDOCAINE HYDROCHLORIDE 15 ML: 10 INJECTION, SOLUTION INFILTRATION; PERINEURAL at 12:10

## 2019-08-15 RX ADMIN — INSULIN LISPRO 4 UNITS: 100 INJECTION, SOLUTION INTRAVENOUS; SUBCUTANEOUS at 21:21

## 2019-08-15 RX ADMIN — PANTOPRAZOLE SODIUM 40 MG: 40 TABLET, DELAYED RELEASE ORAL at 08:13

## 2019-08-15 RX ADMIN — BUSPIRONE HYDROCHLORIDE 5 MG: 5 TABLET ORAL at 21:19

## 2019-08-15 RX ADMIN — ACETAMINOPHEN 650 MG: 325 TABLET ORAL at 07:49

## 2019-08-15 RX ADMIN — PYRIDOSTIGMINE BROMIDE 30 MG: 60 TABLET ORAL at 21:19

## 2019-08-15 RX ADMIN — GABAPENTIN 300 MG: 300 CAPSULE ORAL at 21:19

## 2019-08-15 RX ADMIN — INSULIN GLARGINE 25 UNITS: 100 INJECTION, SOLUTION SUBCUTANEOUS at 08:13

## 2019-08-15 RX ADMIN — ESCITALOPRAM OXALATE 10 MG: 10 TABLET ORAL at 07:47

## 2019-08-15 RX ADMIN — BUSPIRONE HYDROCHLORIDE 5 MG: 5 TABLET ORAL at 17:00

## 2019-08-15 RX ADMIN — INSULIN LISPRO 1 UNITS: 100 INJECTION, SOLUTION INTRAVENOUS; SUBCUTANEOUS at 17:14

## 2019-08-15 RX ADMIN — ATORVASTATIN CALCIUM 40 MG: 40 TABLET, FILM COATED ORAL at 17:15

## 2019-08-15 RX ADMIN — GABAPENTIN 300 MG: 300 CAPSULE ORAL at 17:00

## 2019-08-15 RX ADMIN — DEXTRAN 70 AND HYPROMELLOSE 2910 1 DROP: 1; 3 SOLUTION/ DROPS OPHTHALMIC at 08:13

## 2019-08-15 RX ADMIN — BUSPIRONE HYDROCHLORIDE 5 MG: 5 TABLET ORAL at 08:13

## 2019-08-15 NOTE — RESTORATIVE TECHNICIAN NOTE
Restorative Specialist Mobility Note       Activity: Ambulate in bautista, Ambulate in room, Bathroom privileges, Chair, Dangle, Stand at bedside(Educated/encouraged pt to ambulate with assistance 3-4 x's/day  Chair alarm on   Pt callbell, phone/tray within reach )     Assistive Device: Other (Comment)(HHA x1)    Sheng LANTIGUA, Restorative Technician, [employer]

## 2019-08-15 NOTE — PROGRESS NOTES
Pt forgot to notify attending here, that she had "bad infection about 2 months ago and it was in her sinuses, her entire R side of face was swollen  Also, because of the infection she had to have 6 teeth pulled out  At that time she had a lot of pain and it also affected her R eye  Pt states that she was put on strong antibiotics and it took couple days for her to start feeling better  She is not sure if that has any impact on what is happening right now  But felt that it might be important information " Information was passed on to  The next shift and will be addressed with Attending  Continue to monitor

## 2019-08-15 NOTE — PLAN OF CARE
Problem: OCCUPATIONAL THERAPY ADULT  Goal: Performs self-care activities at highest level of function for planned discharge setting  See evaluation for individualized goals  Description  Treatment Interventions: Visual perceptual retraining, Patient/family training, Equipment evaluation/education, Compensatory technique education, Continued evaluation, Activityengagement, Functional transfer training, ADL retraining  Equipment Recommended: Tub seat with back       See flowsheet documentation for full assessment, interventions and recommendations  Outcome: Progressing  Note:   Limitation: Visual deficit, Decreased high-level ADLs, Decreased self-care trans  Prognosis: Good  Assessment: Pt participated in occupational therapy with focus on activity tolerance,  bed mob, functional transfers/mob, standing tolerance and balance for pt engagement in UB/LB self-care and grooming, energy conservation techniques  Pt cleared by [address] for pt participation in therapy  Pt received supine/following pt on bed rest following pt lumbar puncture  pt agreeable to therapy following pt Identifiers confirmed  Pt reported her goal today to return to home with family support and reported his daughter would assist her  Pt is now progressing well and able to complete her full ADL including toileting with SBA/set up assist  OT now anticipates pt should be able to return to home with family support         OT Discharge Recommendation: Home with family support  OT - OK to Discharge: Yes(if to STR)

## 2019-08-15 NOTE — PROGRESS NOTES
Progress Note - Funmi Maurer 1955, 61 y o  female MRN: [id number]    Unit/Bed#: [facility] 708-01 Encounter: [id number]    Primary Care Provider: Poly Almaraz DO   Date and time admitted to hospital: 8/13/2019  7:17 PM        * Binocular vision disorder with diplopia  Assessment & Plan  Started on a 11/14/2019, patient has double vision which is relieved when she sees from 1 eye  Seen by Neurology in the emergency room at [facility] they have recommended MRI of the brain and orbits  MRI shows no acute intracranial abnormality, has moderate to severe to April chronic microangiopathic disease, has no orbital pathology, noted to have incidental findings where there is a prominent [address], partially empty sella and slightly enlarged sella and low normal ventricular size  History of headaches as these constellation of findings are seen in patients with intracranial hypertension  Alternatively findings may represent an anatomic variation of Meckel's diverticula and partially empty sella  There is no pathological intracranial enhancement  Neurology top thought this might be related to small vessel ischemic disease  Possible relapse solution in about 12 weeks  She was has twice to wear patch on her left eye intermittently and to follow with her ophthalmologist   However because of the intracranial hypertension, secondary headache that she has developed and empty sella with fluid and bilateral putting of the dura to parasellar area they have decided she needs a lumbar puncture  [place] does not have opening pressure measuring so patient was transferred to [place] for that pressure measurement    ICP WNL    LP studies pending:  · C/S  · Lyme   · HSV  · MS  · ACE    Blood tests pending:  · RF   · GARY   · ANCA  · Sjogren's      Consult Neurology - appreciated  Consult Ophthalmology - pending    Subcortical microvascular ischemic occlusive disease  Assessment & Plan  Restart ASA after LP  Will d/c on Lipitor as LDL > 100    Essential hypertension  Assessment & Plan  Will stop ACEi and norvasc and only give ACEi if SBP < 130  Needs to be on ACEi at d/c in setting of DM    Depression  Assessment & Plan  C/w Lexapro and Buspar    Type 2 diabetes mellitus without complication [facility])  Assessment & Plan  Lab Results   Component Value Date    HGBA1C 8 9 (H) 2019       Recent Labs     19  1558 19  2055 08/15/19  0627 08/15/19  1106   POCGLU 109 194* 110 221*       Blood Sugar Average: Last 72 hrs:  (P) 140 [id number] Type 2 diabetes mellitus without complication  Patient is noncompliant with medications  BSs overall acceptable on Lantus 25U qAM  Will need glucometer and insulin if d/c home      Lumbar pain  Assessment & Plan  C/w Neurontin  Tylenol prn  PT/OT      VTE Pharmacologic Prophylaxis:   Pharmacologic: Enoxaparin (Lovenox)  Mechanical VTE Prophylaxis in Place: Yes    Patient Centered Rounds: I have performed bedside rounds with nursing staff today  Discussions with Specialists or Other Care Team Provider: neuro    Education and Discussions with Family / Patient: pt    Time Spent for Care: 30 minutes  More than 50% of total time spent on counseling and coordination of care as described above  Current Length of Stay: 2 day(s)    Current Patient Status: Inpatient   Certification Statement: The patient will continue to require additional inpatient hospital stay due to need to w/u diplopia    Discharge Plan: pending w/u likely needs rehab    Code Status: Level 1 - Full Code      Subjective:   Double vision improving    Objective:     Vitals:   Temp (24hrs), Av 5 °F (36 9 °C), Min:98 1 °F (36 7 °C), Max:99 °F (37 2 °C)    Temp:  [98 1 °F (36 7 °C)-99 °F (37 2 °C)] 98 1 °F (36 7 °C)  HR:  [64-82] 65  Resp:  [18-20] 20  BP: (118-146)/(74-93) 146/88  SpO2:  [95 %-98 %] 95 %  Body mass index is 36 61 kg/m²  Input and Output Summary (last 24 hours):        Intake/Output Summary (Last 24 hours) at 8/15/2019 1528  Last data filed at 8/15/2019 0744  Gross per 24 hour   Intake 420 ml   Output 1750 ml   Net -1330 ml       Physical Exam:     Physical Exam   Constitutional: She is oriented to person, place, and time  No distress  HENT:   Head: Normocephalic and atraumatic  Eyes: Conjunctivae are normal    Horizontal nystagmus   Neck: Normal range of motion  Neck supple  Cardiovascular: Normal rate and regular rhythm  Pulmonary/Chest: Effort normal and breath sounds normal  She has no wheezes  She has no rales  Abdominal: Soft  Bowel sounds are normal  She exhibits no distension  There is no tenderness  Musculoskeletal: Normal range of motion  She exhibits no edema  Neurological: She is alert and oriented to person, place, and time  Skin: Skin is warm and dry  She is not diaphoretic  Additional Data:     Labs:    Results from last 7 days   Lab Units 08/15/19  0458 08/14/19  0529   WBC Thousand/uL 5 78 5 47   HEMOGLOBIN g/dL 13 2 13 1   HEMATOCRIT % 41 3 41 5   PLATELETS Thousands/uL 247 242   NEUTROS PCT %  --  49   LYMPHS PCT %  --  38   MONOS PCT %  --  10   EOS PCT %  --  2     Results from last 7 days   Lab Units 08/15/19  0458  08/12/19  1924   POTASSIUM mmol/L 3 8   < > 4 9   CHLORIDE mmol/L 104   < > 100   CO2 mmol/L 31   < > 32   BUN mg/dL 16   < > 10   CREATININE mg/dL 0 77   < > 1 03   CALCIUM mg/dL 9 2   < > 9 1   ALK PHOS U/L  --   --  83   ALT U/L  --   --  17   AST U/L  --   --  14    < > = values in this interval not displayed  Results from last 7 days   Lab Units 08/15/19  0921   INR  1 08       * I Have Reviewed All Lab Data Listed Above  * Additional Pertinent Lab Tests Reviewed:  Marybeth 66 Admission Reviewed        Recent Cultures (last 7 days):           Last 24 Hours Medication List:     Current Facility-Administered Medications:  acetaminophen 650 mg Oral Q6H PRN Giles Sorensen PA-C   albuterol 2 puff Inhalation Q4H PRN Edis Helm Denilson Akhtar MD   [START ON 8/16/2019] aspirin 81 mg Oral Daily Deandra Madera PA-C   atorvastatin 40 mg Oral Daily With Pepe Rosenberg MD   busPIRone 5 mg Oral TID Jesús Macdonald MD   dextran 70-hypromellose 1 drop Both Eyes Q12H [facility] Jesús Macdonald MD   enoxaparin 40 mg Subcutaneous Daily Pita Leos DO   escitalopram 10 mg Oral Daily With Breakfast Jesús Macdonald MD   gabapentin 300 mg Oral TID Jesús Macdonald MD   insulin glargine 25 Units Subcutaneous QAM Pita Leos DO   insulin lispro 1-6 Units Subcutaneous HS Jesús Macdonald MD   insulin lispro 1-6 Units Subcutaneous TID AC Jesús Macdonald MD   lisinopril 40 mg Oral Daily Pita Leos DO   pantoprazole 40 mg Oral Daily Jesús Macdonald MD        Today, Patient Was Seen By: Pita Leos DO    ** Please Note: Dictation voice to text software may have been used in the creation of this document   **

## 2019-08-15 NOTE — ASSESSMENT & PLAN NOTE
Lab Results   Component Value Date    HGBA1C 8 9 (H) 08/14/2019       Recent Labs     08/14/19  1558 08/14/19  2055 08/15/19  0627 08/15/19  1106   POCGLU 109 194* 110 221*       Blood Sugar Average: Last 72 hrs:  (P) 140 [id number] Type 2 diabetes mellitus without complication  Patient is noncompliant with medications  BSs overall acceptable on Lantus 25U qAM  Will need glucometer and insulin if d/c home

## 2019-08-15 NOTE — PLAN OF CARE
Problem: PHYSICAL THERAPY ADULT  Goal: Performs mobility at highest level of function for planned discharge setting  See evaluation for individualized goals  Description  Treatment/Interventions: Functional transfer training, Elevations, LE strengthening/ROM, Therapeutic exercise, Endurance training, Gait training, Bed mobility, Equipment eval/education, Patient/family training  Equipment Recommended: [place]       See flowsheet documentation for full assessment, interventions and recommendations  Outcome: Progressing  Note:   Prognosis: Good  Problem List: Decreased strength, Decreased endurance, Impaired balance, Decreased mobility, Decreased coordination, Impaired vision  Assessment: Pt is making steady progress with functional mobility  Gait is slow but steady today and without loss of balance  Pt requires occasional use of hallway rail  Pt reports daughter is home with her 24/7 until she attends college beginning Sept 1st   Pending stair trial and OT assessment, recommend rehab vs  home with therapies once medically stable for discharge  Recommendation: (Pending progress rehab vs  home PT)     PT - OK to Discharge: (S) Yes(when stable to rehab presently)    See flowsheet documentation for full assessment

## 2019-08-15 NOTE — OCCUPATIONAL THERAPY NOTE
Occupational Therapy Treatment Note     08/15/19 1502   Restrictions/Precautions   Weight Bearing Precautions Per Order No   Braces or Orthoses   (using eye patch- switches frequently between eyes)   Other Precautions Fall Risk   Lifestyle   Autonomy Pt reports being I with ADLs, IADLs (with breaks due to back pain), and functional mobility without use of DME PTA  Pt is a   Reciprocal Relationships 5 kids (lives with youngest daughter),  recently passed away (June)   Service to Others on disability   Intrinsic Gratification loves her 4 dogs and 1 cat   Pain Assessment   Pain Assessment No/denies pain   Pain Score No Pain   ADL   Where Assessed Standing at sink   Grooming Assistance 5  Supervision/Setup   Grooming Deficit Setup   UB Bathing Assistance 5  Supervision/Setup   UB Bathing Deficit Setup   LB Bathing Assistance 5  Supervision/Setup   LB Bathing Deficit Setup   UB Dressing Assistance 5  Supervision/Setup   UB Dressing Deficit Setup   LB Dressing Assistance 5  Supervision/Setup   LB Dressing Deficit Setup   Toileting Assistance  5  Supervision/Setup   Toileting Deficit Setup   Bed Mobility   Supine to Sit 5  Supervision   Additional items Increased time required   Sit to Supine 5  Supervision   Additional items Increased time required   Transfers   Sit to Stand 5  Supervision   Additional items Increased time required   Stand to Sit 5  Supervision   Additional items Increased time required   Functional Mobility   Functional Mobility 5  Supervision   Additional Comments without use of DME   Cognition   Overall Cognitive Status [facility]   Arousal/Participation Alert; Cooperative   Attention Within functional limits   Orientation Level Oriented X4   Memory Within functional limits   Following Commands Follows one step commands without difficulty   Assessment   Assessment Pt participated in occupational therapy with focus on activity tolerance,  bed mob, functional transfers/mob, standing tolerance and balance for pt engagement in UB/LB self-care and grooming, energy conservation techniques  Pt cleared by [address] for pt participation in therapy  Pt received supine/following pt on bed rest following pt lumbar puncture  pt agreeable to therapy following pt Identifiers confirmed  Pt reported her goal today to return to home with family support and reported his daughter would assist her  Pt is now progressing well and able to complete her full ADL including toileting with SBA/set up assist  OT now anticipates pt should be able to return to home with family support       Plan   Treatment Interventions Visual perceptual retraining;Patient/family training;Energy conservation   Goal Expiration Date 08/24/19   Treatment Day 2   OT Frequency 3-5x/wk   Recommendation   OT Discharge Recommendation Home with family support  (pending pt progress with physical therapy vs STR)   Barthel Index   Feeding 10   Bathing 0   Grooming Score 5   Dressing Score 5   Bladder Score 10   Bowels Score 10   Toilet Use Score 5   Transfers (Bed/Chair) Score 10   Mobility (Level Surface) Score 0   Stairs Score 0   Barthel Index Score 55   Modified Harris Scale   Modified Harris Scale 4       Cleo ROA/ZAFAR

## 2019-08-15 NOTE — BRIEF OP NOTE (RAD/CATH)
FLUOROSCOPICALLY GUIDED LUMBAR PUNCTURE     INDICATION:  Diplopia and 6th intracranial policy  Evaluate for intracranial hypertension  FLUOROSCOPY TIME:  2 MINUTES 32 SECONDS    IMAGES:  3      TECHNIQUE:       Consent was obtained after fully explaining the procedure to the patient  Risks and benefits of procedure were described and understood  Precautions to avoid spinal headache were reviewed  1% lidocaine was infiltrated at the puncture site  Starting in the left lateral decubitus position ,utilizing the right paravertebral approach, a 20 gauge 6 inch spinal needle was advanced under fluoroscopic guidance towards the subarachnoid space at the L2-L3 level, utilizing sterile technique  After several attempts we were unable to access the subarachnoid space at L2-L3  Thus the patient was placed in the prone position  Continuing to use the right paravertebral approach, a 20 gauge 6 inch needle was advanced under fluoroscopy toward the subarachnoid space at the the L2-L3 level  Prior to entering the subarachnoid space, the patient was placed in the left lateral decubitus position  The needle was then advanced into the subarachnoid space of L2-L3  Once in position, opening pressure was 11 cm H2O  Approximately 16 5 cc of clear, colorless CSF were removed and placed into 4 tubes which subsequently were transported to the lab for requested analysis  Closing pressure was 7 cm H2O  The needle was removed  The patient tolerated the procedure well  The patient was discharged from the department with appropriate instructions  IMPRESSION:    Successful fluoroscopically guided lumbar puncture with an opening pressure of 11 cm H2O  Approximately 16 5 mL's of clear colorless CSF was removed and sent to lab for requested analysis  Closing pressure was 7 cm H2O  I reviewed the above findings and procedure with Dr Mark Flanagan  PERFORMED by Griselda Doran PA-C and Dr Brett Hayden      DICTATED AND SIGNED BY: Davy Magana PA-C

## 2019-08-15 NOTE — PROGRESS NOTES
Progress Note - Neurology   Carmen Gage 61 y o  female [id number]  Unit/Bed#: [facility] 708/[facility] 708-01    Assessment:  Carmen Gage is a 61 y o  female with a cranial nerve 6 palsy, of unclear etiology   Suspicious for this to occur secondary to small vessel disease due to uncontrolled risk factors, although awaiting labs to rule out other possibilities, including infection/rheumatologic  No evidence of elevated ICP on LP, so suspect headache secondary to diplopia  Given report of worsening weakness with exertion and ptosis, will trial Mestinon to rule out the possibility that this is secondary to myasthenia gravis  Acetylcholine labs are pending  Plan:  - Will trial Mestinon 30 mg TID due to concerns over mysasthenia gravis  - Labs pending:   · LP results pending: Cytology, ACE, MS panel, HSV, and gram stain   · Rheumatologic labs pending: GARY, ANCA, Sjogren's, RF  · Lyme pending   · Acetylcholine receptor antibodies, binding/blocking/modulating  · Vitamin B12   · Vitamin B1  - Aspirin on hold in setting of LP- Resume 81 mg tomorrow    - Recommend intermittently patching left eye  Patient does have eyepatch    - Follow with outpatient ophthalmologist closely  (approximately every 2 months)  - PT/OT  - Medical management and supportive care per primary team    - Consider outpatient sleep apnea testing in the future  - Follow up with the outpatient neurology team  An appointment has been requested  Results:   - Opening pressure: 11   - Normal LP results: RBC, WBC w/Diff,   - Abnormal LP results: Glucose 89, Protein 50      Subjective:   Patient underwent LP this morning without complications  There was no evidence of elevated opening pressure on exam  She tolerated the procedure well and denies any subsequent headache or pain at the puncture site     Upon further questioning of the patient regarding other possibilities that may have caused her cranial nerve palsy, the patient does report that she, over the past few weeks, has been noting that she is becoming more easily fatigued  She states that she notices that she cannot ambulate around the grocery store as long as she was able to do in the past   She denies any significant shortness of breath  She reports urinary frequency  She reports night sweats  She does report that she is noticing headaches upon awakening in the morning  She has been told by her  and daughter in the past that she does snore  The patient has a history of difficulty swallowing, that seems to be getting somewhat worse, and is most evident with solids  She does follow with ENT  She is noted to have ptosis on examination  Past Medical History:   Diagnosis Date    Abnormal ECG     last assessed: 5/15/17    Abnormal mammogram     last assessed: 7/11/17    Abnormal stress test     last assesed: 7/24/17    Anemia     Anxiety     Arthritis     Asthma     Back problem     Breast cyst     Chest pain     last assessed: 7/24/17    Chronic pain disorder     Cyst of left breast     last assessed: 8/18/17    Depression     Depression     resolved: 1/2/18    Diabetes mellitus ([address] )     Hiatal hernia     last assessed: 11/7/17    Hyperlipidemia     Hypertension     Keloid of skin     last assessed: 11/2/16    Neuropathy     Psychiatric disorder     anxiety    Stroke [facility])     TIA 2005    Tuberculosis     as a child      Past Surgical History:   Procedure Laterality Date    ARM WOUND REPAIR / CLOSURE      CARPAL TUNNEL RELEASE      CARPAL TUNNEL RELEASE Left     CATARACT EXTRACTION Bilateral     2015    CYST REMOVAL      right upper arm   EYE SURGERY      cataract removaql    ME ESOPHAGOGASTRODUODENOSCOPY TRANSORAL DIAGNOSTIC N/A 1/5/2018    Procedure: ESOPHAGOGASTRODUODENOSCOPY (EGD);   Surgeon: Joslyn Martinez DO;  Location: [facility] MAIN OR;  Service: Gastroenterology    ME WRIST Suzan Shaver LIG Left 4/19/2019    Procedure: ENDOSCOPIC CARPAL TUNNEL RELEASE;  Surgeon: Crissy Biswas MD;  Location: [address] [place] [address] MAIN OR;  Service: Orthopedics    [address] THYROID BIOPSY  4/15/2019     Family History   Problem Relation Age of Onset    Heart disease Mother     Diabetes Mother     Arthritis Mother     Heart attack Mother     Hypertension Mother     Kidney disease Father     Hypertension Father     Arthritis Sister     Cancer Maternal Grandmother     Stroke Maternal Grandmother     Arthritis Paternal Grandmother     Cancer Paternal Grandmother     Heart attack Paternal Grandmother     Stroke Maternal Aunt     Heart attack Maternal Uncle     Cancer Family         spinal column    Coronary artery disease Family     Glaucoma Family     Rheum arthritis Family      Social History     Socioeconomic History    Marital status: /Civil Union     Spouse name: Not on file    Number of children: Not on file    Years of education: Not on file    Highest education level: Not on file   Occupational History    Occupation: housewife/homemaker   Social Needs    Financial resource strain: Not on file    Food insecurity:     Worry: Not on file     Inability: Not on file    Transportation needs:     Medical: Not on file     Non-medical: Not on file   Tobacco Use    Smoking status: Never Smoker    Smokeless tobacco: Never Used   Substance and Sexual Activity    Alcohol use: Not Currently     Alcohol/week: 0 0 standard drinks     Frequency: Never     Drinks per session: Patient refused     Binge frequency: Never     Comment: 0    Drug use: No    Sexual activity: Not Currently     Partners: Male   Lifestyle    Physical activity:     Days per week: Not on file     Minutes per session: Not on file    Stress: Not on file   Relationships    Social connections:     Talks on phone: Not on file     Gets together: Not on file     Attends Hoahaoism service: Not on file     Active member of club or organization: Not on file Attends meetings of clubs or organizations: Not on file     Relationship status: Not on file    Intimate partner violence:     Fear of current or ex partner: Not on file     Emotionally abused: Not on file     Physically abused: Not on file     Forced sexual activity: Not on file   Other Topics Concern    Not on file   Social History Narrative    Always uses seatbelts    No living will         Medications: All current active meds have been reviewed and current meds:  Scheduled Meds:  Current Facility-Administered Medications:  acetaminophen 650 mg Oral Q6H PRN Cheko Cardona PA-C   albuterol 2 puff Inhalation Q4H PRN Nadia Metcalf MD   atorvastatin 40 mg Oral Daily With Oliverio Alexander MD   busPIRone 5 mg Oral TID Nadia Metcalf MD   dextran 70-hypromellose 1 drop Both Eyes Q12H [address] Nadia Metcalf MD   enoxaparin 40 mg Subcutaneous Daily Silverio Martinez,    escitalopram 10 mg Oral Daily With Breakfast Nadia Metcalf MD   gabapentin 300 mg Oral TID Nadia Metcalf MD   insulin glargine 25 Units Subcutaneous QAM Silverio Martinez, DO   insulin lispro 1-6 Units Subcutaneous HS Nadia Metcalf MD   insulin lispro 1-6 Units Subcutaneous TID AC Nadia Metcalf MD   lisinopril 40 mg Oral Daily Silverio Martinez, DO   pantoprazole 40 mg Oral Daily Nadia Metcalf MD     Continuous Infusions:   PRN Meds:   acetaminophen    albuterol       ROS:   A 12 point ROS was completed  Other than the above mentioned complaints in the HPI, all remaining systems were negative  Vitals:   /93   Pulse 82   Temp 99 °F (37 2 °C)   Resp 18   Ht 5' 5" (1 651 m)   Wt 99 8 kg (220 lb 0 3 oz)   SpO2 98%   BMI 36 61 kg/m²     Physical Exam:   Physical Exam   Constitutional: She is oriented to person, place, and time  She appears well-developed and well-nourished  No distress  Resting comfortably in the chair   HENT:   Head: Normocephalic and atraumatic     Right Ear: External ear normal  Left Ear: External ear normal    Nose: Nose normal    Mouth/Throat: Oropharynx is clear and moist    Eyes: Pupils are equal, round, and reactive to light  Conjunctivae are normal  Right eye exhibits no discharge  Left eye exhibits no discharge  No scleral icterus  Ptosis noted, evidence of cranial nerve 6 palsy, unable to abduct right eye   Neck: Normal range of motion  Neck supple  Cardiovascular: Normal rate  Pulmonary/Chest: Effort normal  No respiratory distress  Musculoskeletal: Normal range of motion  She exhibits no edema, tenderness or deformity  Neurological: She is alert and oriented to person, place, and time  She has normal strength  No sensory deficit  Coordination normal    Reflex Scores:       Tricep reflexes are 2+ on the right side and 2+ on the left side  Bicep reflexes are 2+ on the right side and 2+ on the left side  Brachioradialis reflexes are 2+ on the right side and 2+ on the left side  Patellar reflexes are 2+ on the right side and 2+ on the left side  Achilles reflexes are 2+ on the right side and 2+ on the left side  Skin: Skin is warm and dry  No rash noted  She is not diaphoretic  No erythema  Psychiatric: She has a normal mood and affect  Her speech is normal and behavior is normal  Judgment and thought content normal    Nursing note and vitals reviewed  Neurologic Exam     Mental Status   Oriented to person, place, and time  Speech: speech is normal   Level of consciousness: alert    Cranial Nerves     CN II   Visual acuity: (Diplopia with far right gaze)    CN III, IV, VI   Pupils are equal, round, and reactive to light  CN VI: right CN VI palsy    CN V   Facial sensation intact  CN VII   Facial expression full, symmetric       CN VIII   Hearing: intact    CN XI   Right sternocleidomastoid strength: normal  Left sternocleidomastoid strength: normal    CN XII   Tongue: not atrophic  Fasciculations: absent  Tongue deviation: none  Slight ocular muscle weakness on right  Ptosis noted     Motor Exam     Strength   Strength 5/5 throughout  Sensory Exam   Light touch normal      Gait, Coordination, and Reflexes     Tremor   Resting tremor: absent  Intention tremor: absent  Action tremor: absent    Reflexes   Right brachioradialis: 2+  Left brachioradialis: 2+  Right biceps: 2+  Left biceps: 2+  Right triceps: 2+  Left triceps: 2+  Right patellar: 2+  Left patellar: 2+  Right achilles: 2+  Left achilles: 2+          Labs: I have personally reviewed pertinent reports     Recent Results (from the past 24 hour(s))   Fingerstick Glucose (POCT)    Collection Time: 08/14/19  3:58 PM   Result Value Ref Range    POC Glucose 109 65 - 140 mg/dl   Fingerstick Glucose (POCT)    Collection Time: 08/14/19  8:55 PM   Result Value Ref Range    POC Glucose 194 (H) 65 - 140 mg/dl   CBC    Collection Time: 08/15/19  4:58 AM   Result Value Ref Range    WBC 5 78 4 31 - 10 16 Thousand/uL    RBC 4 86 3 81 - 5 12 Million/uL    Hemoglobin 13 2 11 5 - 15 4 g/dL    Hematocrit 41 3 34 8 - 46 1 %    MCV 85 82 - 98 fL    MCH 27 2 26 8 - 34 3 pg    MCHC 32 0 31 4 - 37 4 g/dL    RDW 14 6 11 6 - 15 1 %    Platelets 888 986 - 196 Thousands/uL    MPV 10 9 8 9 - 12 7 fL   Basic metabolic panel    Collection Time: 08/15/19  4:58 AM   Result Value Ref Range    Sodium 142 136 - 145 mmol/L    Potassium 3 8 3 5 - 5 3 mmol/L    Chloride 104 100 - 108 mmol/L    CO2 31 21 - 32 mmol/L    ANION GAP 7 4 - 13 mmol/L    BUN 16 5 - 25 mg/dL    Creatinine 0 77 0 60 - 1 30 mg/dL    Glucose 102 65 - 140 mg/dL    Calcium 9 2 8 3 - 10 1 mg/dL    eGFR 95 ml/min/1 73sq m   Magnesium    Collection Time: 08/15/19  4:58 AM   Result Value Ref Range    Magnesium 2 2 1 6 - 2 6 mg/dL   Phosphorus    Collection Time: 08/15/19  4:58 AM   Result Value Ref Range    Phosphorus 3 5 2 3 - 4 1 mg/dL   Fingerstick Glucose (POCT)    Collection Time: 08/15/19  6:27 AM   Result Value Ref Range    POC Glucose 110 65 - 140 mg/dl   Pregnancy Test (HCG Qualitative)    Collection Time: 08/15/19  9:21 AM   Result Value Ref Range    Preg, Serum Negative Negative   Protime-INR    Collection Time: 08/15/19  9:21 AM   Result Value Ref Range    Protime 13 6 11 6 - 14 5 seconds    INR 1 08 0 84 - 1 19   Fingerstick Glucose (POCT)    Collection Time: 08/15/19 11:06 AM   Result Value Ref Range    POC Glucose 221 (H) 65 - 140 mg/dl       Imaging: I have personally reviewed pertinent imaging and I have personally reviewed PACS reports  EKG, Pathology, and Other Studies: I have personally reviewed pertinent reports         VTE Prophylaxis: Sequential compression device (Venodyne)  and Enoxaparin (Lovenox)

## 2019-08-15 NOTE — ASSESSMENT & PLAN NOTE
Will stop ACEi and norvasc and only give ACEi if SBP < 130  Needs to be on ACEi at d/c in setting of DM

## 2019-08-15 NOTE — PROGRESS NOTES
08/15/19 [address]    Spiritual Beliefs/Perceptions   Concept of God Accepting   God's Role in Disease Natural   Relationship with God Close   Support Systems Children   Stress Factors   Patient Stress Factors Health changes; Financial concerns   Coping Responses   Patient Coping Sadness; Anxiety   Plan of Care   Comments Cultivated a relationship of care and support with patient  Listened empathetically about health issues  Explored support system  Provided grief support in regards to pt's 's death (June 2019)  Explored how her oscar helps in difficult times     Assessment Completed by: Unit visit

## 2019-08-15 NOTE — ASSESSMENT & PLAN NOTE
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, has no orbital pathology, noted to have incidental findings where there is a prominent [address], partially empty sella and slightly enlarged sella and low normal ventricular size  History of headaches as these constellation of findings are seen in patients with intracranial hypertension  Alternatively findings may represent an anatomic variation of Meckel's diverticula and partially empty sella  There is no pathological intracranial enhancement  Neurology top thought this might be related to small vessel ischemic disease  Possible relapse solution in about 12 weeks  She was has twice to wear patch on her left eye intermittently and to follow with her ophthalmologist   However because of the intracranial hypertension, secondary headache that she has developed and empty sella with fluid and bilateral putting of the dura to parasellar area they have decided she needs a lumbar puncture  [facility] does not have opening pressure measuring so patient was transferred to [place] for that pressure measurement    ICP WNL    LP studies pending:  · C/S  · Lyme   · HSV  · MS  · ACE    Blood tests pending:  · RF   · GARY   · ANCA  · Sjogren's      Consult Neurology - appreciated  Consult Ophthalmology - pending

## 2019-08-15 NOTE — PHYSICAL THERAPY NOTE
Physical Therapy Progress Note     08/15/19 1529   Pain Assessment   Pain Assessment No/denies pain   Pain Score No Pain   Restrictions/Precautions   Weight Bearing Precautions Per Order No   Other Precautions Fall Risk   General   Family/Caregiver Present No   Cognition   Overall Cognitive Status WFL   Arousal/Participation Alert; Cooperative   Transfers   Sit to Stand 5  Supervision   Additional items Assist x 1   Stand to Sit 5  Supervision   Additional items Assist x 1   Ambulation/Elevation   Gait pattern   (slow, short step length)   Gait Assistance 5  Supervision   Additional items Assist x 1   Assistive Device   (occasional use of hallway rail)   Distance 110 feet   Balance   Static Sitting Fair +   Static Standing Fair +   Dynamic Standing Fair   Ambulatory Fair -   Endurance Deficit   Endurance Deficit Yes   Endurance Deficit Description fatigue   Activity Tolerance   Activity Tolerance Patient limited by fatigue   Nurse [address] to see per RN   Assessment   Prognosis Good   Problem List Decreased strength;Decreased endurance; Impaired balance;Decreased mobility; Decreased coordination; Impaired vision   Assessment Pt is making steady progress with functional mobility  Gait is slow but steady today and without loss of balance  Pt requires occasional use of hallway rail  Pt reports daughter is home with her 24/7 until she attends college beginning Sept 1st   Pending stair trial and OT assessment, recommend rehab vs  home with therapies once medically stable for discharge  Goals   Patient Goals To go home   STG Expiration Date 08/28/19   Short Term Goal #1 1-2 wks: bed mob and transfers w/ indep, standing balance to good/normal w/ device as needed, ambulate 200-300 ft w/ RW vs least restrictive device and mod I, increase B LE strength by 1/2 -1 grade, ambulate 5 stairs w S   Treatment Day 1   Plan   Treatment/Interventions Functional transfer training;LE strengthening/ROM; Elevations; Therapeutic exercise; Endurance training;Patient/family training;Bed mobility;Gait training;Spoke to nursing   Progress Progressing toward goals   PT Frequency   (3-5x/week)   Recommendation   Recommendation   (Pending progress rehab vs  home PT)     Lindy Serrano, EDY

## 2019-08-16 LAB
ENA SS-A AB SER-ACNC: <0.2 AI (ref 0–0.9)
ENA SS-B AB SER-ACNC: <0.2 AI (ref 0–0.9)
GLUCOSE SERPL-MCNC: 111 MG/DL (ref 65–140)
GLUCOSE SERPL-MCNC: 135 MG/DL (ref 65–140)
GLUCOSE SERPL-MCNC: 149 MG/DL (ref 65–140)
GLUCOSE SERPL-MCNC: 225 MG/DL (ref 65–140)
RHEUMATOID FACT SER QL LA: NEGATIVE
RYE IGE QN: NEGATIVE

## 2019-08-16 PROCEDURE — 84425 ASSAY OF VITAMIN B-1: CPT | Performed by: PHYSICIAN ASSISTANT

## 2019-08-16 PROCEDURE — 83519 RIA NONANTIBODY: CPT | Performed by: PHYSICIAN ASSISTANT

## 2019-08-16 PROCEDURE — 97116 GAIT TRAINING THERAPY: CPT

## 2019-08-16 PROCEDURE — 84238 ASSAY NONENDOCRINE RECEPTOR: CPT | Performed by: PHYSICIAN ASSISTANT

## 2019-08-16 PROCEDURE — 99232 SBSQ HOSP IP/OBS MODERATE 35: CPT | Performed by: PSYCHIATRY & NEUROLOGY

## 2019-08-16 PROCEDURE — 99232 SBSQ HOSP IP/OBS MODERATE 35: CPT | Performed by: GENERAL PRACTICE

## 2019-08-16 PROCEDURE — 82948 REAGENT STRIP/BLOOD GLUCOSE: CPT

## 2019-08-16 RX ORDER — LANCETS 30 GAUGE
EACH MISCELLANEOUS DAILY
Qty: 30 EACH | Refills: 0 | Status: SHIPPED | OUTPATIENT
Start: 2019-08-16 | End: 2019-10-31 | Stop reason: SDUPTHER

## 2019-08-16 RX ORDER — LANCETS 30 GAUGE
EACH MISCELLANEOUS DAILY
Qty: 30 EACH | Refills: 0 | Status: SHIPPED | OUTPATIENT
Start: 2019-08-16 | End: 2019-09-26 | Stop reason: SDUPTHER

## 2019-08-16 RX ORDER — PEN NEEDLE, DIABETIC 30 GX3/16"
NEEDLE, DISPOSABLE MISCELLANEOUS
Qty: 30 EACH | Refills: 0 | Status: SHIPPED | OUTPATIENT
Start: 2019-08-16 | End: 2019-10-31 | Stop reason: SDUPTHER

## 2019-08-16 RX ORDER — BLOOD GLUCOSE CONTROL HIGH,LOW
EACH MISCELLANEOUS DAILY
Qty: 1 EACH | Refills: 0 | Status: SHIPPED | OUTPATIENT
Start: 2019-08-16

## 2019-08-16 RX ADMIN — GABAPENTIN 300 MG: 300 CAPSULE ORAL at 08:34

## 2019-08-16 RX ADMIN — INSULIN GLARGINE 25 UNITS: 100 INJECTION, SOLUTION SUBCUTANEOUS at 08:36

## 2019-08-16 RX ADMIN — PYRIDOSTIGMINE BROMIDE 30 MG: 60 TABLET ORAL at 21:32

## 2019-08-16 RX ADMIN — ENOXAPARIN SODIUM 40 MG: 40 INJECTION SUBCUTANEOUS at 08:36

## 2019-08-16 RX ADMIN — PANTOPRAZOLE SODIUM 40 MG: 40 TABLET, DELAYED RELEASE ORAL at 08:34

## 2019-08-16 RX ADMIN — LISINOPRIL 40 MG: 20 TABLET ORAL at 08:34

## 2019-08-16 RX ADMIN — PYRIDOSTIGMINE BROMIDE 30 MG: 60 TABLET ORAL at 08:35

## 2019-08-16 RX ADMIN — BUSPIRONE HYDROCHLORIDE 5 MG: 5 TABLET ORAL at 16:58

## 2019-08-16 RX ADMIN — ASPIRIN 81 MG: 81 TABLET, COATED ORAL at 08:34

## 2019-08-16 RX ADMIN — BUSPIRONE HYDROCHLORIDE 5 MG: 5 TABLET ORAL at 08:34

## 2019-08-16 RX ADMIN — BUSPIRONE HYDROCHLORIDE 5 MG: 5 TABLET ORAL at 21:31

## 2019-08-16 RX ADMIN — INSULIN LISPRO 2 UNITS: 100 INJECTION, SOLUTION INTRAVENOUS; SUBCUTANEOUS at 11:50

## 2019-08-16 RX ADMIN — GABAPENTIN 300 MG: 300 CAPSULE ORAL at 21:31

## 2019-08-16 RX ADMIN — ESCITALOPRAM OXALATE 10 MG: 10 TABLET ORAL at 08:34

## 2019-08-16 RX ADMIN — DEXTRAN 70 AND HYPROMELLOSE 2910 1 DROP: 1; 3 SOLUTION/ DROPS OPHTHALMIC at 21:30

## 2019-08-16 RX ADMIN — ACETAMINOPHEN 650 MG: 325 TABLET ORAL at 10:18

## 2019-08-16 RX ADMIN — PYRIDOSTIGMINE BROMIDE 30 MG: 60 TABLET ORAL at 16:58

## 2019-08-16 RX ADMIN — DEXTRAN 70 AND HYPROMELLOSE 2910 1 DROP: 1; 3 SOLUTION/ DROPS OPHTHALMIC at 08:36

## 2019-08-16 RX ADMIN — ATORVASTATIN CALCIUM 40 MG: 40 TABLET, FILM COATED ORAL at 16:58

## 2019-08-16 RX ADMIN — GABAPENTIN 300 MG: 300 CAPSULE ORAL at 16:58

## 2019-08-16 NOTE — SOCIAL WORK
Informed by Renetta Hodges, and Rocio Sommers, that pt will need outpt therapy at discharge  Outpt therapy script provided to pt along with outpt therapy locations  She stated that she will have her dgt provide her with transportation to outpt therapy

## 2019-08-16 NOTE — PHYSICAL THERAPY NOTE
Physical Therapy Progress Note     08/16/19 1143   Pain Assessment   Pain Assessment No/denies pain   Pain Score No Pain   General   Family/Caregiver Present No   Cognition   Overall Cognitive Status WFL   Arousal/Participation Cooperative; Alert   Transfers   Sit to Stand 6  Modified independent   Stand to Sit 6  Modified independent   Ambulation/Elevation   Gait pattern   (slow, short step length)   Gait Assistance 5  Supervision   Additional items Assist x 1   Assistive Device None   Distance 200 feet   Stair Management Assistance 5  Supervision   Additional items Assist x 1   Stair Management Technique One rail R;Alternating pattern   Number of Stairs 7   Balance   Static Standing Fair +   Dynamic Standing Fair   Ambulatory Fair   Activity Tolerance   Activity Tolerance Patient tolerated treatment well   Nurse [facility] to see per RN Sahra   Assessment   Prognosis Good   Problem List Decreased strength;Decreased endurance;Decreased coordination;Decreased mobility; Impaired vision   Assessment Pt is progressing well with functional mobility  Gait is slow but steady and without loss of balance  Pt does not require use of handrail today during ambulation trial   Completed 7 stairs with one handrail  Without loss of balance as well  Based on continued progress, recommend home with family support (24/7)  Goals   Patient Goals To go home   STG Expiration Date 08/28/19   Short Term Goal #1 1-2 wks: bed mob and transfers w/ indep, standing balance to good/normal w/ device as needed, ambulate 200-300 ft w/ RW vs least restrictive device and mod I, increase B LE strength by 1/2 -1 grade, ambulate 5 stairs w S   Treatment Day 2   Plan   Treatment/Interventions Functional transfer training;LE strengthening/ROM; Elevations; Therapeutic exercise; Endurance training;Patient/family training;Bed mobility;Gait training;Spoke to nursing   Progress Progressing toward goals   PT Frequency   (4-6x/week)   Recommendation Recommendation Home with family support;Home PT     Jovanni Hugo, PTA

## 2019-08-16 NOTE — SOCIAL WORK
Informed by Dr Patino Cool that pt will be discharged to home on [facility] and will need a glucometer with supplies  Prescriptions received for Basaglar, Glucometer, test strips, and pen needles  Cm met with pt to discuss above and she was agreeable to have her copay cost checked at [facility]  Prescriptions sent to [address] [place] [address] to have pt's copay cost checked  Received phone call from Bryan Trujillo who stated that pt's copay for all above medications and supplies is $0  She stated that pt's insurance covers [employer] and requested a script for control solution and lancets  CM informed Dr Patino Cool of request for a script for control solution and lancets  Prescriptions received and sent to [facility]  Cm informed pt that her Basaglar, glucometer and supplies are $0 copay

## 2019-08-16 NOTE — RESTORATIVE TECHNICIAN NOTE
Restorative Specialist Mobility Note       Activity: Ambulate in bautista, Ambulate in room, Bathroom privileges, Dangle, Chair, Stand at bedside(Educated/encouraged pt to ambulate with assistance 3-4 x's/day  Chair alarm on   Pt callbell, phone/tray within reach )     Assistive Device: None          [employer] BS, Restorative Technician, [employer] [facility]

## 2019-08-16 NOTE — SOCIAL WORK
Received phone call from Constantin Handy at [employer]. who stated that the lancets are $0 copay and the control solution is not covered and costs $11 56  Cm informed pt of cost of control solution and she stated that she does not have the cost with her and would not be able to afford the control solution  Cm contacted Rafa Serrano CM Manager to discuss the cost of the control solution and pt unable to afford the cost  She approved the cost of the control solution under indigent with [employer].  Indigent form completed and sent to [employer].  Cm informed Constantin Handy at [employer]. that the form was completed and faxed to [employer] [facility].  Fernando informed pt's bedside BHARAT Varma that pt's [employer]raClimb, glucometer, and supplies are ready and will need to be picked up at [facility]. prior to discharge

## 2019-08-16 NOTE — PLAN OF CARE
Problem: PHYSICAL THERAPY ADULT  Goal: Performs mobility at highest level of function for planned discharge setting  See evaluation for individualized goals  Description  Treatment/Interventions: Functional transfer training, Elevations, LE strengthening/ROM, Therapeutic exercise, Endurance training, Gait training, Bed mobility, Equipment eval/education, Patient/family training  Equipment Recommended: Jocelyn Coronado       See flowsheet documentation for full assessment, interventions and recommendations  Outcome: Progressing  Note:   Prognosis: Good  Problem List: Decreased strength, Decreased endurance, Decreased coordination, Decreased mobility, Impaired vision  Assessment: Pt is progressing well with functional mobility  Gait is slow but steady and without loss of balance  Pt does not require use of handrail today during ambulation trial   Completed 7 stairs with one handrail  Without loss of balance as well  Based on continued progress, recommend home with family support (24/7)  Recommendation: Home with family support, Home PT     PT - OK to Discharge: (S) Yes(when stable to rehab presently)    See flowsheet documentation for full assessment

## 2019-08-16 NOTE — ASSESSMENT & PLAN NOTE
Will stop ACEi and norvasc and only give ACEi if SBP > 130  Needs to be on ACEi at d/c in setting of DM

## 2019-08-16 NOTE — ASSESSMENT & PLAN NOTE
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

## 2019-08-16 NOTE — ASSESSMENT & PLAN NOTE
Started on a 11/14/2019, patient has double vision which is relieved when she sees from 1 eye  Seen by Neurology in the emergency room at [facility] they have recommended MRI of the brain and orbits  MRI shows no acute intracranial abnormality, has moderate to severe to April chronic microangiopathic disease, has no orbital pathology, noted to have incidental findings where there is a prominent [address], partially empty sella and slightly enlarged sella and low normal ventricular size  History of headaches as these constellation of findings are seen in patients with intracranial hypertension  Alternatively findings may represent an anatomic variation of Meckel's diverticula and partially empty sella  There is no pathological intracranial enhancement  Neurology top thought this might be related to small vessel ischemic disease  Possible relapse solution in about 12 weeks  She was has twice to wear patch on her left eye intermittently and to follow with her ophthalmologist   However because of the intracranial hypertension, secondary headache that she has developed and empty sella with fluid and bilateral putting of the dura to parasellar area they have decided she needs a lumbar puncture  [facility] does not have opening pressure measuring so patient was transferred to [place] for that pressure measurement    ICP WNL    LP studies pending:  · C/S  · Lyme   · HSV  · MS  · ACE    Blood tests pending:  · ANCA  · MG testing  · For suspected MG, trialing Mestinon tid  GARY, RF, Sjogren's negative        Consult Neurology - appreciated  Consult Ophthalmology - pending  Noted to have R CN VI deficit today - c/w intermittent patching

## 2019-08-16 NOTE — PROGRESS NOTES
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, unclear etiology, though suspicious for autoimmune process  Not clearly myasthenic in nature, patient additionally has proptosis of unknown etiology  Patient with additional headache, likely secondary to diplopia, no concern for elevated ICP  -MRI brain demonstrates no acute intracranial abnormality  Incidental notation of slightly enlarged sella with low ventricular size  Patient did have lumbar puncture with opening pressure of 11, no concern at this point for increased intracranial hypertension   -Mestinon 30 mg PO TID    -CSF studies:    -Cytology, ACE, MS panel, HSV, and gram stain    -Serum labs pending:     -ANCA, Sjogren's, Lyme, B12, B1     -RF, GARY within normal limits   -Acetylcholine receptor antibodies, binding/blocking/modulating  -ASA 81mg PO QD    - Recommend intermittently patching left eye  Patient does have eyepatch     - Follow with outpatient ophthalmologist closely  (approximately every 2 months)   -supportive care and medication management per primary team    Subjective:   Patient is a 70-year-old female with a past medical history of hypertension who originally presented to [facility] with a chief complaint of new onset diplopia  The patient was found to have a cranial nerve 6 palsy with concern for increased intracranial hypertension  The patient was transferred to [facility] for further neurologic evaluation  Lumbar puncture performed yesterday, which demonstrated opening pressure of 11  Patient has had additional autoimmune and CSF workup as above  No clear etiology at this time  On examination today, the patient is sitting in a chair in no acute distress    A 12 point review systems was completed and is negative with exception of occasional brief periods of posterior bilateral headache, as well as binocular diplopia  The patient was initiated on Mestinon yesterday, which she reports has improved symptoms somewhat  Neurological exam is significant for bilateral proptosis, left greater than right eyelid ptosis, inability to fully abduct right eye, and inability to fully abduct left eye  Patient does demonstrate full strength with eyelid closure, neck flexion and extension intact, no fatigable weakness noted  Remainder of neurological exam as detailed below  ROS:  See subjective    Medications:  Scheduled Meds:  Current Facility-Administered Medications:  acetaminophen 650 mg Oral Q6H PRN Mc Baca PA-C   albuterol 2 puff Inhalation Q4H PRN Sammy Puente MD   aspirin 81 mg Oral Daily Deandra Madera PA-C   atorvastatin 40 mg Oral Daily With Katherin Dimas MD   busPIRone 5 mg Oral TID Sammy Puente MD   dextran 70-hypromellose 1 drop Both Eyes Q12H [address] Sammy Puente MD   enoxaparin 40 mg Subcutaneous Daily Alverto Reason, DO   escitalopram 10 mg Oral Daily With Breakfast Sammy Puente MD   gabapentin 300 mg Oral TID Sammy Puente MD   insulin glargine 25 Units Subcutaneous QAM Alverto Reason, DO   insulin lispro 1-6 Units Subcutaneous HS Sammy Puente MD   insulin lispro 1-6 Units Subcutaneous TID AC Sammy Puente MD   lisinopril 40 mg Oral Daily Alverto Reason, DO   pantoprazole 40 mg Oral Daily Sammy Puente MD   pyridostigmine 30 mg Oral TID Deandra Madera PA-C     Continuous Infusions:   PRN Meds:   acetaminophen    albuterol      Vitals: Blood pressure 148/89, pulse 69, temperature 98 8 °F (37 1 °C), resp  rate 18, height 5' 5" (1 651 m), weight 100 kg (221 lb 1 9 oz), SpO2 97 %  ,Body mass index is 36 8 kg/m²  Physical Exam:   Physical Exam   Constitutional: She is oriented to person, place, and time  She appears well-developed and well-nourished  No distress     HENT:   Head: Normocephalic and atraumatic  Right Ear: External ear normal    Left Ear: External ear normal    Mouth/Throat: Oropharynx is clear and moist  No oropharyngeal exudate  Eyes: Conjunctivae are normal  Right eye exhibits no discharge  Left eye exhibits no discharge  No scleral icterus  Bilateral proptosis noted   Neck: Normal range of motion  Neck supple  Pulmonary/Chest: Effort normal  No respiratory distress  Musculoskeletal: Normal range of motion  She exhibits no edema, tenderness or deformity  Neurological: She is oriented to person, place, and time  She has normal strength  She has a normal Finger-Nose-Finger Test  Gait normal    Skin: Skin is warm and dry  No rash noted  She is not diaphoretic  No erythema  No pallor  Psychiatric: She has a normal mood and affect  Her speech is normal and behavior is normal    Nursing note and vitals reviewed  Neurologic Exam     Mental Status   Oriented to person, place, and time  Follows 2 step commands  Attention: normal  Concentration: normal    Speech: speech is normal   Level of consciousness: alert  Knowledge: good  Normal comprehension  Cranial Nerves     Cranial nerves 2-12 intact with exception of:  Minimal bilateral lid ptosis,, L>R  Right 6th nerve palsy  Left slightly decreased but incomplete right eye abduction  Patient with fully preserved eye closure against resistance  Tongue strength and neck flexion extension intact     Motor Exam   Muscle bulk: normal  Overall muscle tone: normal    Strength   Strength 5/5 throughout  No fatigable weakness noted     Sensory Exam   Light touch normal      Gait, Coordination, and Reflexes     Gait  Gait: normal    Coordination   Finger to nose coordination: normal    Tremor   Resting tremor: absent      Lab, Imaging and other studies: I have personally reviewed pertinent reports     I have personally reviewed pertinent imaging in PACs   Recent Results (from the past 24 hour(s))   CSF white cell count with differential    Collection Time: 08/15/19 12:20 PM   Result Value Ref Range    Appearance, CSF clear     Tube Number, CSF 4     WBC, CSF 2 0 - 5 /uL    Xanthochromia No No   Glucose, CSF    Collection Time: 08/15/19 12:20 PM   Result Value Ref Range    Glucose, CSF 89 (H) 50 - 80 mg/dL   RBC count,CSF    Collection Time: 08/15/19 12:20 PM   Result Value Ref Range    RBC, CSF 1 0 - 10 uL   Total Protein, CSF    Collection Time: 08/15/19 12:20 PM   Result Value Ref Range    Protein, CSF 50 (H) 15 - 45 mg/dL   CSF Diff    Collection Time: 08/15/19 12:20 PM   Result Value Ref Range    Total Counted 50     Lymphs % CSF 78 %    Monocytes % (CSF) 22 %   GARY Screen w/ Reflex to Titer/Pattern    Collection Time: 08/15/19  3:10 PM   Result Value Ref Range    GARY Negative Negative   RF Screen w/ Reflex to Titer    Collection Time: 08/15/19  3:10 PM   Result Value Ref Range    Rheumatoid Factor Negative Negative   Fingerstick Glucose (POCT)    Collection Time: 08/15/19  4:22 PM   Result Value Ref Range    POC Glucose 151 (H) 65 - 140 mg/dl   Fingerstick Glucose (POCT)    Collection Time: 08/15/19  9:00 PM   Result Value Ref Range    POC Glucose 302 (H) 65 - 140 mg/dl   Fingerstick Glucose (POCT)    Collection Time: 08/16/19  6:46 AM   Result Value Ref Range    POC Glucose 135 65 - 140 mg/dl   Fingerstick Glucose (POCT)    Collection Time: 08/16/19 11:10 AM   Result Value Ref Range    POC Glucose 225 (H) 65 - 140 mg/dl   ]      VTE Prophylaxis: Sequential compression device (Venodyne)  and Enoxaparin (Lovenox)    Counseling / Coordination of Care  Total time spent today 25 minutes  Greater than 50% of total time was spent with the patient and / or family counseling and / or coordination of care  A description of the counseling / coordination of care: Kim Cervantes The pt was seen and examined by myself and the attending physician  The chart was reviewed thoroughly, including laboratory values and imaging studies   The pt was counseled in the room

## 2019-08-16 NOTE — PROGRESS NOTES
Progress Note - Suly Greene 1955, 61 y o  female MRN: [id number]    Unit/Bed#: [facility] 708-01 Encounter: [id number]    Primary Care Provider: Vadim Burk DO   Date and time admitted to hospital: 8/13/2019  7:17 PM        * Binocular vision disorder with diplopia  Assessment & Plan  Started on a 11/14/2019, patient has double vision which is relieved when she sees from 1 eye  Seen by Neurology in the emergency room at [facility] they have recommended MRI of the brain and orbits  MRI shows no acute intracranial abnormality, has moderate to severe to April chronic microangiopathic disease, has no orbital pathology, noted to have incidental findings where there is a prominent [address], partially empty sella and slightly enlarged sella and low normal ventricular size  History of headaches as these constellation of findings are seen in patients with intracranial hypertension  Alternatively findings may represent an anatomic variation of Meckel's diverticula and partially empty sella  There is no pathological intracranial enhancement  Neurology top thought this might be related to small vessel ischemic disease  Possible relapse solution in about 12 weeks  She was has twice to wear patch on her left eye intermittently and to follow with her ophthalmologist   However because of the intracranial hypertension, secondary headache that she has developed and empty sella with fluid and bilateral putting of the dura to parasellar area they have decided she needs a lumbar puncture  [facility] does not have opening pressure measuring so patient was transferred to [place] for that pressure measurement    ICP WNL    LP studies pending:  · C/S  · Lyme   · HSV  · MS  · ACE    Blood tests pending:  · ANCA  · MG testing  · For suspected MG, trialing Mestinon tid  GARY, RF, Sjogren's negative        Consult Neurology - appreciated  Consult Ophthalmology - pending  Noted to have R CN VI deficit today - c/w intermittent patching    Subcortical microvascular ischemic occlusive disease  Assessment & Plan  Restarted ASA after LP  Will d/c on Lipitor as LDL > 100    Essential hypertension  Assessment & Plan  Will stop ACEi and norvasc and only give ACEi if SBP > 130  Needs to be on ACEi at d/c in setting of DM    Depression  Assessment & Plan  C/w Lexapro and Buspar    Type 2 diabetes mellitus with complication, with long-term current use of insulin [facility])  Assessment & Plan  Lab Results   Component Value Date    HGBA1C 8 9 (H) 2019       Recent Labs     08/15/19  1622 08/15/19  2100 19  0646 19  1110   POCGLU 151* 302* 135 225*       Blood Sugar Average: Last 72 hrs:  (P) 163 [id number] 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  Patient is noncompliant with medications  BSs overall acceptable on Lantus 25U qAM  I gave to CM scripts for Basaglar, glucometer, test strips, and lancets  Pt told to check BSs daily for now  Lumbar pain  Assessment & Plan  C/w Neurontin  Tylenol prn  PT/OT appreciated - will plan for outpt PT      VTE Pharmacologic Prophylaxis:   Pharmacologic: Enoxaparin (Lovenox)  Mechanical VTE Prophylaxis in Place: Yes    Patient Centered Rounds: I have performed bedside rounds with nursing staff today  Discussions with Specialists or Other Care Team Provider: neuro    Education and Discussions with Family / Patient: pt    Time Spent for Care: 30 minutes  More than 50% of total time spent on counseling and coordination of care as described above  Current Length of Stay: 3 day(s)    Current Patient Status: Inpatient   Certification Statement: The patient will continue to require additional inpatient hospital stay due to need for mesitnon and to see further w/u    Discharge Plan: possibly in 24h pending w/u    Code Status: Level 1 - Full Code      Subjective:   Still has double vision    Improved w/ patch    Objective:     Vitals:   Temp (24hrs), Av 6 °F (37 °C), Min:98 1 °F (36 7 °C), Max:98 8 °F (37 1 °C)    Temp:  [98 1 °F (36 7 °C)-98 8 °F (37 1 °C)] 98 1 °F (36 7 °C)  HR:  [63-69] 63  Resp:  [17-18] 18  BP: (113-148)/(68-89) 113/68  SpO2:  [97 %-98 %] 98 %  Body mass index is 36 8 kg/m²  Input and Output Summary (last 24 hours): Intake/Output Summary (Last 24 hours) at 8/16/2019 1523  Last data filed at 8/16/2019 1143  Gross per 24 hour   Intake 1440 ml   Output 1800 ml   Net -360 ml       Physical Exam:     Physical Exam   Constitutional: She is oriented to person, place, and time  No distress  HENT:   Head: Normocephalic and atraumatic  Eyes: Conjunctivae and EOM are normal    Neck: Normal range of motion  Neck supple  Cardiovascular: Normal rate and regular rhythm  Pulmonary/Chest: Effort normal and breath sounds normal  She has no wheezes  She has no rales  Abdominal: Soft  Bowel sounds are normal  She exhibits no distension  There is no tenderness  Musculoskeletal: Normal range of motion  She exhibits no edema  Neurological: She is alert and oriented to person, place, and time  Skin: Skin is warm and dry  She is not diaphoretic  Additional Data:     Labs:    Results from last 7 days   Lab Units 08/15/19  0458 08/14/19  0529   WBC Thousand/uL 5 78 5 47   HEMOGLOBIN g/dL 13 2 13 1   HEMATOCRIT % 41 3 41 5   PLATELETS Thousands/uL 247 242   NEUTROS PCT %  --  49   LYMPHS PCT %  --  38   MONOS PCT %  --  10   EOS PCT %  --  2     Results from last 7 days   Lab Units 08/15/19  0458  08/12/19  1924   POTASSIUM mmol/L 3 8   < > 4 9   CHLORIDE mmol/L 104   < > 100   CO2 mmol/L 31   < > 32   BUN mg/dL 16   < > 10   CREATININE mg/dL 0 77   < > 1 03   CALCIUM mg/dL 9 2   < > 9 1   ALK PHOS U/L  --   --  83   ALT U/L  --   --  17   AST U/L  --   --  14    < > = values in this interval not displayed  Results from last 7 days   Lab Units 08/15/19  0921   INR  1 08       * I Have Reviewed All Lab Data Listed Above    * Additional Pertinent Lab Tests Reviewed: Marioinglan 66 Admission Reviewed        Recent Cultures (last 7 days):           Last 24 Hours Medication List:     Current Facility-Administered Medications:  acetaminophen 650 mg Oral Q6H PRN Rosalba March PA-C   albuterol 2 puff Inhalation Q4H PRN Joe Trujillo MD   aspirin 81 mg Oral Daily Deandra Madera PA-C   atorvastatin 40 mg Oral Daily With Jaylyn Robles MD   busPIRone 5 mg Oral TID Joe Trujillo MD   dextran 70-hypromellose 1 drop Both Eyes Q12H [address] Joe Trujillo MD   enoxaparin 40 mg Subcutaneous Daily Terrell Del Angel DO   escitalopram 10 mg Oral Daily With Breakfast Joe Trujillo MD   gabapentin 300 mg Oral TID Joe Trujillo MD   insulin glargine 25 Units Subcutaneous QAM Terrell Del Angel DO   insulin lispro 1-6 Units Subcutaneous HS Joe Trujillo MD   insulin lispro 1-6 Units Subcutaneous TID AC Joe Trujillo MD   lisinopril 40 mg Oral Daily Terrell Del Angel DO   pantoprazole 40 mg Oral Daily Joe Trujillo MD   pyridostigmine 30 mg Oral TID Kristin Pillai PA-C        Today, Patient Was Seen By: Terrell Del Angel DO    ** Please Note: Dictation voice to text software may have been used in the creation of this document   **

## 2019-08-17 VITALS
HEIGHT: 65 IN | RESPIRATION RATE: 18 BRPM | SYSTOLIC BLOOD PRESSURE: 121 MMHG | TEMPERATURE: 98.6 F | WEIGHT: 221.12 LBS | BODY MASS INDEX: 36.84 KG/M2 | OXYGEN SATURATION: 96 % | DIASTOLIC BLOOD PRESSURE: 78 MMHG | HEART RATE: 71 BPM

## 2019-08-17 LAB
GLUCOSE SERPL-MCNC: 172 MG/DL (ref 65–140)
GLUCOSE SERPL-MCNC: 174 MG/DL (ref 65–140)

## 2019-08-17 PROCEDURE — 82948 REAGENT STRIP/BLOOD GLUCOSE: CPT

## 2019-08-17 PROCEDURE — 99239 HOSP IP/OBS DSCHRG MGMT >30: CPT | Performed by: GENERAL PRACTICE

## 2019-08-17 RX ORDER — ATORVASTATIN CALCIUM 40 MG/1
40 TABLET, FILM COATED ORAL
Qty: 30 TABLET | Refills: 0 | Status: SHIPPED | OUTPATIENT
Start: 2019-08-17 | End: 2019-08-29 | Stop reason: SDUPTHER

## 2019-08-17 RX ORDER — PYRIDOSTIGMINE BROMIDE 30 MG/1
30 TABLET ORAL 3 TIMES DAILY
Qty: 90 TABLET | Refills: 0 | Status: SHIPPED | OUTPATIENT
Start: 2019-08-17 | End: 2019-08-29 | Stop reason: SDUPTHER

## 2019-08-17 RX ADMIN — INSULIN GLARGINE 25 UNITS: 100 INJECTION, SOLUTION SUBCUTANEOUS at 08:36

## 2019-08-17 RX ADMIN — INSULIN LISPRO 1 UNITS: 100 INJECTION, SOLUTION INTRAVENOUS; SUBCUTANEOUS at 08:38

## 2019-08-17 RX ADMIN — ASPIRIN 81 MG: 81 TABLET, COATED ORAL at 08:35

## 2019-08-17 RX ADMIN — LISINOPRIL 40 MG: 20 TABLET ORAL at 08:35

## 2019-08-17 RX ADMIN — BUSPIRONE HYDROCHLORIDE 5 MG: 5 TABLET ORAL at 08:35

## 2019-08-17 RX ADMIN — GABAPENTIN 300 MG: 300 CAPSULE ORAL at 08:35

## 2019-08-17 RX ADMIN — INSULIN LISPRO 1 UNITS: 100 INJECTION, SOLUTION INTRAVENOUS; SUBCUTANEOUS at 11:10

## 2019-08-17 RX ADMIN — ENOXAPARIN SODIUM 40 MG: 40 INJECTION SUBCUTANEOUS at 08:35

## 2019-08-17 RX ADMIN — ESCITALOPRAM OXALATE 10 MG: 10 TABLET ORAL at 08:37

## 2019-08-17 RX ADMIN — DEXTRAN 70 AND HYPROMELLOSE 2910 1 DROP: 1; 3 SOLUTION/ DROPS OPHTHALMIC at 08:38

## 2019-08-17 RX ADMIN — PANTOPRAZOLE SODIUM 40 MG: 40 TABLET, DELAYED RELEASE ORAL at 08:35

## 2019-08-17 RX ADMIN — PYRIDOSTIGMINE BROMIDE 30 MG: 60 TABLET ORAL at 08:39

## 2019-08-17 NOTE — ASSESSMENT & PLAN NOTE
Started on a 11/14/2019, patient has double vision which is relieved when she sees from 1 eye  Seen by Neurology in the emergency room at [facility] they have recommended MRI of the brain and orbits  MRI shows no acute intracranial abnormality, has moderate to severe to April chronic microangiopathic disease, has no orbital pathology, noted to have incidental findings where there is a prominent [address], partially empty sella and slightly enlarged sella and low normal ventricular size  History of headaches as these constellation of findings are seen in patients with intracranial hypertension  Alternatively findings may represent an anatomic variation of Meckel's diverticula and partially empty sella  There is no pathological intracranial enhancement  Neurology top thought this might be related to small vessel ischemic disease  Possible relapse solution in about 12 weeks  She was has twice to wear patch on her left eye intermittently and to follow with her ophthalmologist   However because of the intracranial hypertension, secondary headache that she has developed and empty sella with fluid and bilateral putting of the dura to parasellar area they have decided she needs a lumbar puncture  [facility] does not have opening pressure measuring so patient was transferred to [place] for that pressure measurement  ICP WNL    LP studies pending:  · C/S - prelim neg  · Lyme   · HSV  · MS  · ACE    Blood tests pending:  · ANCA  · MG testing  · For suspected MG, trialing Mestinon tid  Neuro will follow this result  If negative, neuro will call patient and tell her to stop this med    GARY, RF, Sjogren's negative        Consult Neurology - appreciated  Consult Ophthalmology - pending  Noted to have R CN VI deficit today - c/w intermittent patching

## 2019-08-17 NOTE — SOCIAL WORK
Pt discharging with home PT  Script previously provided  Pt made aware of $0 copay for meds and supplies @ [facility]  Pt will  prescriptions prior to discharge  Pt advised was transferred from [facility] and may need assistance with transportation home  CM following

## 2019-08-17 NOTE — PLAN OF CARE
Problem: Potential for Falls  Goal: Patient will remain free of falls  Description  INTERVENTIONS:  - Assess patient frequently for physical needs  -  Identify cognitive and physical deficits and behaviors that affect risk of falls  -  [place] fall precautions as indicated by assessment   - Educate patient/family on patient safety including physical limitations  - Instruct patient to call for assistance with activity based on assessment  - Modify environment to reduce risk of injury  - Consider OT/PT consult to assist with strengthening/mobility  Outcome: Progressing     Problem: NEUROSENSORY - ADULT  Goal: Achieves stable or improved neurological status  Description  INTERVENTIONS  - Monitor and report changes in neurological status  - Monitor vital signs such as temperature, blood pressure, glucose, and any other labs ordered   - Initiate measures to prevent increased intracranial pressure  - Monitor for seizure activity and implement precautions if appropriate      Outcome: Progressing  Goal: Achieves maximal functionality and self care  Description  INTERVENTIONS  - Monitor swallowing and airway patency with patient fatigue and changes in neurological status  - Encourage and assist patient to increase activity and self care     - Encourage visually impaired, hearing impaired and aphasic patients to use assistive/communication devices  Outcome: Progressing     Problem: SAFETY ADULT  Goal: Maintain or return to baseline ADL function  Description  INTERVENTIONS:  -  Assess patient's ability to carry out ADLs; assess patient's baseline for ADL function and identify physical deficits which impact ability to perform ADLs (bathing, care of mouth/teeth, toileting, grooming, dressing, etc )  - Assess/evaluate cause of self-care deficits   - Assess range of motion  - Assess patient's mobility; develop plan if impaired  - Assess patient's need for assistive devices and provide as appropriate  - Encourage maximum independence but intervene and supervise when necessary  - Involve family in performance of ADLs  - Assess for home care needs following discharge   - Consider OT consult to assist with ADL evaluation and planning for discharge  - Provide patient education as appropriate  Outcome: Progressing  Goal: Maintain or return mobility status to optimal level  Description  INTERVENTIONS:  - Assess patient's baseline mobility status (ambulation, transfers, stairs, etc )    - Identify cognitive and physical deficits and behaviors that affect mobility  - Identify mobility aids required to assist with transfers and/or ambulation (gait belt, sit-to-stand, lift, walker, cane, etc )  - [place] fall precautions as indicated by assessment  - Record patient progress and toleration of activity level on Mobility SBAR; progress patient to next Phase/Stage  - Instruct patient to call for assistance with activity based on assessment  - Consider rehabilitation consult to assist with strengthening/weightbearing, etc   Outcome: Progressing     Problem: DISCHARGE PLANNING  Goal: Discharge to home or other facility with appropriate resources  Description  INTERVENTIONS:  - Identify barriers to discharge w/patient and caregiver  - Arrange for needed discharge resources and transportation as appropriate  - Identify discharge learning needs (meds, wound care, etc )  - Arrange for interpretive services to assist at discharge as needed  - Refer to Case Management Department for coordinating discharge planning if the patient needs post-hospital services based on physician/advanced practitioner order or complex needs related to functional status, cognitive ability, or social support system  Outcome: Progressing     Problem: Knowledge Deficit  Goal: Patient/family/caregiver demonstrates understanding of disease process, treatment plan, medications, and discharge instructions  Description  Complete learning assessment and assess knowledge base   Interventions:  - Provide teaching at level of understanding  - Provide teaching via preferred learning methods  Outcome: Progressing     Problem: Nutrition/Hydration-ADULT  Goal: Nutrient/Hydration intake appropriate for improving, restoring or maintaining nutritional needs  Description  Monitor and assess patient's nutrition/hydration status for malnutrition  Collaborate with interdisciplinary team and initiate plan and interventions as ordered  Monitor patient's weight and dietary intake as ordered or per policy  Utilize nutrition screening tool and intervene as necessary  Determine patient's food preferences and provide high-protein, high-caloric foods as appropriate       INTERVENTIONS:  - Monitor oral intake, urinary output, labs, and treatment plans  - Assess nutrition and hydration status and recommend course of action  - Evaluate amount of meals eaten  - Assist patient with eating if necessary   - Allow adequate time for meals  - Recommend/ encourage appropriate diets, oral nutritional supplements, and vitamin/mineral supplements  - Order, calculate, and assess calorie counts as needed  - Recommend, monitor, and adjust tube feedings and TPN/PPN based on assessed needs  - Assess need for intravenous fluids  - Provide specific nutrition/hydration education as appropriate  - Include patient/family/caregiver in decisions related to nutrition  Outcome: Progressing

## 2019-08-17 NOTE — ASSESSMENT & PLAN NOTE
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

## 2019-08-17 NOTE — DISCHARGE INSTRUCTIONS
Neurology will discuss the results of your lumbar puncture with you        Lumbar Puncture   WHAT YOU NEED TO KNOW:   Lumbar puncture (LP) is a procedure in which a needle is inserted in your back and into your spinal canal  This is usually done to collect cerebrospinal fluid (CSF) to check for an infection, inflammation, bleeding, or other conditions that affect the brain  CSF is a clear, protective fluid that flows around the brain and inside the spinal canal  LP may also be done to remove CSF to reduce pressure in the brain  DISCHARGE INSTRUCTIONS:   Medicines:   · Acetaminophen: This medicine decreases pain and lowers a fever  It is available without a doctor's order  Ask how much to take and how often to take it  Follow directions  Acetaminophen can cause liver damage  · NSAIDs:  These medicines decrease swelling, pain, and fever  NSAIDs are available without a doctor's order  Ask your healthcare provider which medicine is right for you and how much to take  Take as directed  NSAIDs can cause stomach bleeding or kidney problems if not taken correctly  · Pain medicine: You may be given a prescription medicine to decrease severe pain  Do not wait until the pain is severe before you take more pain medicine  · Take your medicine as directed  Contact your healthcare provider if you think your medicine is not helping or if you have side effects  Tell him or her if you are allergic to any medicine  Keep a list of the medicines, vitamins, and herbs you take  Include the amounts, and when and why you take them  Bring the list or the pill bottles to follow-up visits  Carry your medicine list with you in case of an emergency  Follow up with your healthcare provider as directed:  Write down your questions so you remember to ask them during your visits  Post-lumbar puncture headache: You may develop a headache during the first few hours after your LP that may last for several days   The headache may be mild to severe and may get worse when you sit or stand  The following may help ease a post-lumbar puncture headache:  · Drink plenty of liquids: You should drink more liquid than usual after your LP  Ask how much liquid is right for you  Caffeine may be used to treat a headache  Drinks, such as coffee, tea, or some sodas, have caffeine  Caffeine is also available over the counter in tablet form  Ask about using caffeine to treat your headache  Do not drink alcohol  · Lie down: If you have a headache after your lumbar puncture, it may be helpful to lie down and rest   Contact your healthcare provider if:   · You have questions or concerns about your condition or care  Seek care immediately or call 911 if:   · You have a severe headache that does not get better after you lie down  · You have a fever  · You have a stiff neck or have trouble thinking clearly  · Your legs, feet, or other parts below the waist feel numb, tingly, or weak  · You have bleeding or a discharge coming from the area where the needle was put into your back  · You have severe pain in your back or neck  © 2017 [facility] Information is for End User's use only and may not be sold, redistributed or otherwise used for commercial purposes  All illustrations and images included in CareNotes® are the copyrighted property of A D A M , Inc  or [employer]  The above information is an  only  It is not intended as medical advice for individual conditions or treatments  Talk to your doctor, nurse or pharmacist before following any medical regimen to see if it is safe and effective for you  Lumbar Puncture   WHAT YOU NEED TO KNOW:   Lumbar puncture (LP) is a procedure in which a needle is inserted in your back and into your spinal canal  This is usually done to collect cerebrospinal fluid (CSF) to check for an infection, inflammation, bleeding, or other conditions that affect the brain  CSF is a clear, protective fluid that flows around the brain and inside the spinal canal  LP may also be done to remove CSF to reduce pressure in the brain  WHILE YOU ARE HERE:   Before your procedure:  · Informed consent  is a legal document that explains the tests, treatments, or procedures that you may need  Informed consent means you understand what will be done and can make decisions about what you want  You give your permission when you sign the consent form  You can have someone sign this form for you if you are not able to sign it  You have the right to understand your medical care in words you know  Before you sign the consent form, understand the risks and benefits of what will be done  Make sure all your questions are answered  During your procedure:  · You will be asked to lie on your side or to sit up  If you are on your side, your knees will be bent and drawn up toward your chest  Your neck will be tucked toward your chest and you may be given a pillow  If you are sitting, you will need to bend forward with your neck tucked toward your chest  You may be given medicine to help you relax or make you drowsy  · Your healthcare provider will feel your lower spine to look for the best place to do the LP  He will lilia this position using a marker  Your lower back is cleaned  You may be given one or more shots of numbing medicine under the skin  A needle is inserted between the vertebrae (spine) in your lower back  You may feel some pushing or discomfort as the needle enters your back  Tell your healthcare provider if you have pain that does not stop within a few seconds  Your healthcare provider may need to pull out, reinsert, or change the position of the needle  · Your healthcare provider may take readings of your CSF pressure  This is done by connecting a measuring device to the needle  After the pressure is measured, the device is removed and CSF is allowed to flow out of the needle   Samples of your CSF may be taken and placed in sterile bottles  The needle is then taken out and the area will be covered with a bandage  After your procedure: You should lie flat in bed until your healthcare provider says it is okay to get up  When healthcare providers see that you are okay, you may be allowed to go home  If healthcare providers want you to stay in the hospital, you will be taken back to your hospital room  Tell a healthcare provider if you have a headache, back pain, or tingling, numbness, or weakness below your waist   · Medicines:      ¨ Acetaminophen: This medicine decreases pain and lowers a fever  Acetaminophen can cause liver damage  ¨ NSAIDs:  These medicines decrease swelling, pain, and fever  NSAIDs can cause stomach bleeding or kidney problems if not taken correctly  ¨ Pain medicine: You may be given a prescription medicine to decrease severe pain  Do not wait until the pain is severe before you ask for more pain medicine  · Post-lumbar puncture headache: You may develop a headache during the first few hours after your LP that may last for several days  The headache may be mild to severe and may get worse when you sit or stand  The following may be used to treat a post-lumbar puncture headache:    ¨ Liquids: You may be asked to drink more liquid than usual after your LP  Ask your healthcare provider how much liquid is right for you  ¨ Caffeine:  Caffeine may be used to treat a LP headache  Caffeine may be given in drinks, such as coffee or soda, every 4 to 6 hours  You may also be given caffeine through an IV  · Monitoring:      ¨ Vital signs:  Caregivers will check your blood pressure, heart rate, breathing rate, and temperature  They will also ask about your pain  These vital signs give caregivers information about your current health  ¨ Neurologic exam:  This is also called neuro signs, neuro checks, or neuro status   A neurologic exam can show caregivers how well your brain works after an injury or illness  Caregivers will check how your pupils (black dots in the center of each eye) react to light  They may check your memory and how easily you wake up  Your hand grasp and balance may also be tested  RISKS:   You may have a headache that gets worse when you sit or stand  You may also have neck or back pain  There may be bleeding, infection, or injury to a disk in your spine  Spinal fluid may leak from the puncture site  Your nerves or spinal cord may be damaged  Patients who have blood disorders or who are taking certain medicines are at a higher risk for problems  CARE AGREEMENT:   You have the right to help plan your care  Learn about your health condition and how it may be treated  Discuss treatment options with your caregivers to decide what care you want to receive  You always have the right to refuse treatment  © 2017 [facility] Information is for End User's use only and may not be sold, redistributed or otherwise used for commercial purposes  All illustrations and images included in CareNotes® are the copyrighted property of A D A M , Inc  or Matthew Espinosa  The above information is an  only  It is not intended as medical advice for individual conditions or treatments  Talk to your doctor, nurse or pharmacist before following any medical regimen to see if it is safe and effective for you  Diplopia   WHAT YOU NEED TO KNOW:   Diplopia is double vision in one or both eyes  Diplopia may be caused by eye problems, eye surgery, or health conditions such as Graves disease or Parkinson disease  The cause of your diplopia may not be known  DISCHARGE INSTRUCTIONS:   Follow up with your healthcare provider or ophthalmologist as directed: You may be referred to other specialists for more testing if your diplopia does not improve with treatment  Write down your questions so you remember to ask them during your visits    Manage your diplopia:   · Do not drive  until you know how the eye patch, prism, or other treatments affect you  Ask your healthcare provider for more information  · Manage other health conditions  that may be causing or worsening your diplopia  Take your medicine for the condition as directed  Contact your healthcare provider if:   · Your vision worsens, or does not improve, even after treatment  · You have questions or concerns about your condition or care  © 2017 [facility] Information is for End User's use only and may not be sold, redistributed or otherwise used for commercial purposes  All illustrations and images included in CareNotes® are the copyrighted property of A D A M , Inc  or Matthew Espinosa  The above information is an  only  It is not intended as medical advice for individual conditions or treatments  Talk to your doctor, nurse or pharmacist before following any medical regimen to see if it is safe and effective for you

## 2019-08-17 NOTE — SOCIAL WORK
Prescription picked up by staff and delivered to pt room  Pt signed [employer] waiver  Nurse given phone number and instruction to phone [facility] for ED pickup

## 2019-08-17 NOTE — QUICK NOTE
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

## 2019-08-17 NOTE — DISCHARGE SUMMARY
Discharge- Man Vickers 1955, 61 y o  female MRN: [id number]    Unit/Bed#: [facility] 708-01 Encounter: [id number]    Primary Care Provider: Carlos Bailey DO   Date and time admitted to hospital: 8/13/2019  7:17 PM        * Binocular vision disorder with diplopia  Assessment & Plan  Started on a 11/14/2019, patient has double vision which is relieved when she sees from 1 eye  Seen by Neurology in the emergency room at [facility] ([place], [place]) they have recommended MRI of the brain and orbits  MRI shows no acute intracranial abnormality, has moderate to severe to April chronic microangiopathic disease, has no orbital pathology, noted to have incidental findings where there is a prominent [address], partially empty sella and slightly enlarged sella and low normal ventricular size  History of headaches as these constellation of findings are seen in patients with intracranial hypertension  Alternatively findings may represent an anatomic variation of Meckel's diverticula and partially empty sella  There is no pathological intracranial enhancement  Neurology top thought this might be related to small vessel ischemic disease  Possible relapse solution in about 12 weeks  She was has twice to wear patch on her left eye intermittently and to follow with her ophthalmologist   However because of the intracranial hypertension, secondary headache that she has developed and empty sella with fluid and bilateral putting of the dura to parasellar area they have decided she needs a lumbar puncture  [facility] does not have opening pressure measuring so patient was transferred to [place] for that pressure measurement  ICP WNL    LP studies pending:  · C/S - prelim neg  · Lyme   · HSV  · MS  · ACE    Blood tests pending:  · ANCA  · MG testing  · For suspected MG, trialing Mestinon tid  Neuro will follow this result  If negative, neuro will call patient and tell her to stop this med    GARY, RF, Sjogren's negative        Consult Neurology - appreciated  Consult Ophthalmology - pending  Noted to have R CN VI deficit today - c/w intermittent patching    Subcortical microvascular ischemic occlusive disease  Assessment & Plan  Restarted ASA after LP  Will d/c on Lipitor as LDL > 100    Essential hypertension  Assessment & Plan  Will stop HCTZ and norvasc and only give ACEi  Needs to be on ACEi at d/c in setting of DM    Depression  Assessment & Plan  C/w Lexapro and Buspar    Type 2 diabetes mellitus with complication, with long-term current use of insulin [facility])  Assessment & Plan  Lab Results   Component Value Date    HGBA1C 8 9 (H) 08/14/2019       Recent Labs     08/16/19  1559 08/16/19  2119 08/17/19  0804 08/17/19  1110   POCGLU 111 149* 174* 172*       Blood Sugar Average: Last 72 hrs:  (P) 163 [id number] 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  Patient is noncompliant with medications  BSs overall acceptable on Lantus 25U qAM  I gave to CM scripts for Basaglar, glucometer, test strips, and lancets  Pt told to check BSs daily for now  Lumbar pain  Assessment & Plan  C/w Neurontin  Tylenol prn  PT/OT appreciated - will plan for outpt PT        Discharging Physician / Practitioner: Alverto Napier DO  PCP: Anabel Thomas DO  Admission Date:   Admission Orders (From admission, onward)     Ordered        08/13/19 1924  Inpatient Admission  Once                   Discharge Date: 08/17/19    Resolved Problems  Date Reviewed: 8/17/2019    None          Consultations During Hospital Stay:  · Neuro    Procedures Performed:   · LP    Significant Findings / Test Results:   · See above    Incidental Findings:   · none     Test Results Pending at Discharge (will require follow up):    · See above - neuro will follow up on these results     Outpatient Tests Requested:  · Pt needs to check her BSs daily  · Pt needs to f/u w/ ophtho    Complications:  none    Reason for Admission: diplopia needing LP    Hospital Course: Rosita Concepcion is a 61 y o  female patient who originally presented to the hospital on 8/13/2019 due to diplopia needing an LP  LP was done and most results are pending  CSF pressure WNL  Pt thought to have possible MG and started on Mestinon w/ some improvement, so neuro rec d/c on Mestinon until MG tests resulted  Please see above list of diagnoses and related plan for additional information  Condition at Discharge: stable     Discharge Day Visit / Exam:     Subjective:  Has double vision when looking right  Vitals: Blood Pressure: 121/78 (08/17/19 0742)  Pulse: 71 (08/17/19 0742)  Temperature: 98 6 °F (37 °C) (08/17/19 0742)  Respirations: 18 (08/17/19 0742)  Height: 5' 5" (165 1 cm) (08/13/19 1926)  Weight - Scale: 100 kg (221 lb 1 9 oz) (08/16/19 0600)  SpO2: 96 % (08/17/19 0742)  Exam:   Physical Exam   Constitutional: She is oriented to person, place, and time  No distress  HENT:   Head: Normocephalic and atraumatic  Eyes: Conjunctivae are normal    R CN6 palsy   Neck: Normal range of motion  Neck supple  Cardiovascular: Normal rate and regular rhythm  Pulmonary/Chest: Effort normal and breath sounds normal  She has no wheezes  She has no rales  Abdominal: Soft  Bowel sounds are normal  She exhibits no distension  There is no tenderness  Musculoskeletal: Normal range of motion  She exhibits no edema  Neurological: She is alert and oriented to person, place, and time  Skin: Skin is warm and dry  She is not diaphoretic  Discussion with Family: no    Discharge instructions/Information to patient and family:   See after visit summary for information provided to patient and family  Provisions for Follow-Up Care:  See after visit summary for information related to follow-up care and any pertinent home health orders        Disposition:     Home    For Discharges to [facility] SNF:   · Not Applicable to this Patient - Not Applicable to this Patient    Planned Readmission: no     Discharge Statement:  I spent 35 minutes discharging the patient  This time was spent on the day of discharge  I had direct contact with the patient on the day of discharge  Greater than 50% of the total time was spent examining patient, answering all patient questions, arranging and discussing plan of care with patient as well as directly providing post-discharge instructions  Additional time then spent on discharge activities  Discharge Medications:  See after visit summary for reconciled discharge medications provided to patient and family        ** Please Note: This note has been constructed using a voice recognition system **

## 2019-08-18 LAB — BACTERIA CSF CULT: NO GROWTH

## 2019-08-19 LAB
C-ANCA TITR SER IF: ABNORMAL TITER
MYELOPEROXIDASE AB SER IA-ACNC: <9 U/ML (ref 0–9)
P-ANCA ATYPICAL TITR SER IF: ABNORMAL TITER
P-ANCA TITR SER IF: ABNORMAL TITER
PROTEINASE3 AB SER IA-ACNC: 4.5 U/ML (ref 0–3.5)
VIT B1 BLD-SCNC: 115.3 NMOL/L (ref 66.5–200)

## 2019-08-19 NOTE — TELEPHONE ENCOUNTER
I called patient and scheduled her for 10- with Dr Valentino Stanford  Sent her out a blue folder with all the paper work that she needs to have  I and gave her instructions for  [facility]-prep  She is going to stop by the office to   I scheduled her for a follow up with LYLE Rowley on 11/26/2019 @ [facility]

## 2019-08-19 NOTE — TELEPHONE ENCOUNTER
Patient is scheduled for a Colonoscopy with Dr Teddy Marrufo, on  10/29/2019 @ [facility]  Can we put in a script for [facility]-prep for a sample?

## 2019-08-20 ENCOUNTER — TRANSITIONAL CARE MANAGEMENT (OUTPATIENT)
Dept: FAMILY MEDICINE CLINIC | Facility: CLINIC | Age: 64
End: 2019-08-20

## 2019-08-20 ENCOUNTER — TELEPHONE (OUTPATIENT)
Dept: NEUROLOGY | Facility: CLINIC | Age: 64
End: 2019-08-20

## 2019-08-20 LAB
ACHR BIND AB SER-SCNC: 0.05 NMOL/L (ref 0–0.24)
ACHR BLOCK AB/ACHR TOTAL SFR SER: 20 % (ref 0–25)
ALB CSF/SERPL: 5 {RATIO} (ref 0–8)
ALBUMIN CSF-MCNC: 21 MG/DL (ref 11–48)
ALBUMIN SERPL-MCNC: 4.1 G/DL (ref 3.6–4.8)
IGG CSF-MCNC: 3.4 MG/DL (ref 0–8.6)
IGG SERPL-MCNC: 1416 MG/DL (ref 700–1600)
IGG SYNTH RATE SER+CSF CALC-MRATE: -4.5 MG/DAY
IGG/ALB CLEAR SER+CSF-RTO: 0.5 (ref 0–0.7)
IGG/ALB CSF: 0.16 {RATIO} (ref 0–0.25)
MBP CSF-MCNC: 3.3 NG/ML (ref 0–1.2)
OLIGOCLONAL BANDS.IT SER+CSF QL: ABNORMAL

## 2019-08-20 NOTE — TELEPHONE ENCOUNTER
----- Message from Jerri Medina PA-C sent at 8/17/2019 12:47 PM EDT -----  Regarding: HFU    Diagnosis/Reason for follow-up: Nerve palsies   Subspecialty for follow-up: General neuro  Existing neurologist: None   Tests/Labs/Imaging ordered: Rheum, Myasthenia labs pending   Orders placed electronically: None       Please add patient seen in General Neurology at earliest appointment, may see Dr Liana joe AP

## 2019-08-21 ENCOUNTER — TELEPHONE (OUTPATIENT)
Dept: FAMILY MEDICINE CLINIC | Facility: CLINIC | Age: 64
End: 2019-08-21

## 2019-08-21 ENCOUNTER — OFFICE VISIT (OUTPATIENT)
Dept: FAMILY MEDICINE CLINIC | Facility: CLINIC | Age: 64
End: 2019-08-21
Payer: COMMERCIAL

## 2019-08-21 VITALS — BODY MASS INDEX: 37.82 KG/M2 | WEIGHT: 227 LBS | HEIGHT: 65 IN

## 2019-08-21 DIAGNOSIS — Z79.4 TYPE 2 DIABETES MELLITUS WITH COMPLICATION, WITH LONG-TERM CURRENT USE OF INSULIN (HCC): Primary | ICD-10-CM

## 2019-08-21 DIAGNOSIS — I67.82 SUBCORTICAL MICROVASCULAR ISCHEMIC OCCLUSIVE DISEASE: ICD-10-CM

## 2019-08-21 DIAGNOSIS — E11.8 TYPE 2 DIABETES MELLITUS WITH COMPLICATION, WITH LONG-TERM CURRENT USE OF INSULIN (HCC): Primary | ICD-10-CM

## 2019-08-21 PROCEDURE — 99496 TRANSJ CARE MGMT HIGH F2F 7D: CPT | Performed by: FAMILY MEDICINE

## 2019-08-21 NOTE — PROGRESS NOTES
Assessment/Plan:  Patient will continue her basal are she will start regular insulin 3 units at breakfast 4 units at lunch and 5 units at suppertime unfortunately she will have to draw this up out of a vial she already has the syringes at home and just needs the vial she is familiar with insulin technique patient will continue to monitor her blood sugars and we will see her in 1 month she will follow up with Neurology she will start exercising she will monitor her and modify her diet     Diagnoses and all orders for this visit:    Type 2 diabetes mellitus with complication, with long-term current use of insulin ([address] )    Subcortical microvascular ischemic occlusive disease         Subjective:     Patient ID: Mirta Lock is a 59 y o  female  Mrs Jayne Muller is here for transition of care I have reviewed her hospital record she had a sudden onset of diplopia and while in the hospital she was evaluated for normal pressure hydrocephalus multiple sclerosis and myasthenia gravis she has a follow-up appointment with Neurology services to rule out these she did have some abnormal proteins in the leg 0 bands in her spinal fluid she also had acetylcholine receptors antibodies which were considered abnormal so she was given a trial of Mestinon she is also using basal are an while in the hospital she was on mealtime insulin but she did not continue this when she came home and went back to her basal are in [address] [facility] [address] I did look at her meter her sugars are in the 190-200 range I did explain to her that getting her sugars under better control and will possibly help her diplopia in case this is an oculomotor nerve palsy due to her diabetes      Review of Systems   Constitutional: Positive for activity change  Negative for appetite change, diaphoresis, fatigue and fever  HENT: Negative  Eyes: Negative  Respiratory: Negative for apnea, cough, chest tightness, shortness of breath and wheezing      Cardiovascular: Negative for chest pain, palpitations and leg swelling  Gastrointestinal: Negative for abdominal distention, abdominal pain, anal bleeding, constipation, diarrhea, nausea and vomiting  Endocrine: Negative for cold intolerance, heat intolerance, polydipsia, polyphagia and polyuria  Genitourinary: Negative for difficulty urinating, dysuria, flank pain, hematuria and urgency  Musculoskeletal: Negative for arthralgias, back pain, gait problem, joint swelling and myalgias  Skin: Negative for color change, rash and wound  Allergic/Immunologic: Negative for environmental allergies, food allergies and immunocompromised state  Neurological: Positive for headaches  Negative for dizziness, seizures, syncope, speech difficulty and numbness  diplopia   Hematological: Negative for adenopathy  Does not bruise/bleed easily  Psychiatric/Behavioral: Negative for agitation, behavioral problems, hallucinations, sleep disturbance and suicidal ideas  Objective:     Physical Exam   Constitutional: She is oriented to person, place, and time  She appears well-developed and well-nourished  No distress  HENT:   Head: Normocephalic  Right Ear: External ear normal    Left Ear: External ear normal    Nose: Nose normal    Mouth/Throat: Oropharynx is clear and moist    Eyes: Pupils are equal, round, and reactive to light  Conjunctivae and EOM are normal  Right eye exhibits no discharge  Left eye exhibits no discharge  No scleral icterus  Neck: Normal range of motion  No tracheal deviation present  No thyromegaly present  Cardiovascular: Normal rate, regular rhythm and normal heart sounds  Exam reveals no gallop and no friction rub  No murmur heard  Pulmonary/Chest: Effort normal and breath sounds normal  No respiratory distress  She has no wheezes  Abdominal: Soft  Bowel sounds are normal  She exhibits no mass  There is no tenderness  There is no guarding  Musculoskeletal: She exhibits no edema or deformity  Lymphadenopathy:     She has no cervical adenopathy  Neurological: She is alert and oriented to person, place, and time  No cranial nerve deficit  Skin: Skin is warm and dry  No rash noted  She is not diaphoretic  No erythema  Psychiatric: She has a normal mood and affect  Thought content normal          Vitals:    08/21/19 0921   Weight: 103 kg (227 lb)   Height: 5' 5" (1 651 m)       The following portions of the patient's history were reviewed and updated as appropriate:   She  has a past medical history of Abnormal ECG, Abnormal mammogram, Abnormal stress test, Anemia, Anxiety, Arthritis, Asthma, Back problem, Breast cyst, Chest pain, Chronic pain disorder, Cyst of left breast, Depression, Depression, Diabetes mellitus ([address] ), Hiatal hernia, Hyperlipidemia, Hypertension, Keloid of skin, Neuropathy, Psychiatric disorder, Stroke ([address] ), and Tuberculosis    She   Patient Active Problem List    Diagnosis Date Noted    Subcortical microvascular ischemic occlusive disease 08/14/2019    Sixth nerve palsy of right eye     Binocular vision disorder with diplopia 08/12/2019    Carpal tunnel syndrome on left 04/05/2019    Numbness in both hands 04/05/2019    Carpal tunnel syndrome of left wrist 04/05/2019    Multinodular goiter 02/01/2019    Hoarseness or changing voice 02/01/2019    Diabetic neuropathy ([address] ) 01/02/2018    Otalgia 01/02/2018    Chronic constipation 11/07/2017    Abnormal stress test 07/27/2017    Cyst of left breast 07/20/2017    Impaired hearing 06/02/2017    Cerebral atherosclerosis 05/15/2017    Hyperlipidemia 04/13/2017    Moderate persistent asthma without complication 36/19/5550    Lumbar pain 10/18/2016    Type 2 diabetes mellitus with complication, with long-term current use of insulin ([address] ) 10/18/2016    Hiatal hernia 10/18/2016    Keloid of skin 10/18/2016    Mild persistent asthma without complication 88/44/1307    Chronic back pain 09/06/2016    Depression 09/06/2016    Essential hypertension 09/06/2016    Neuropathy, diabetic ([facility] [address] ) 09/06/2016     She  has a past surgical history that includes Cyst Removal; Eye surgery; Tubal ligation; Arm wound repair / closure; pr esophagogastroduodenoscopy transoral diagnostic (N/A, 1/5/2018); Cataract extraction (Bilateral); US guided thyroid biopsy (4/15/2019); pr wrist arthroscop,release xvers lig (Left, 4/19/2019); Carpal tunnel release; Carpal tunnel release (Left); and [place] lumbar puncture (8/15/2019)  Her family history includes Arthritis in her mother, paternal grandmother, and sister; Cancer in her family, maternal grandmother, and paternal grandmother; Coronary artery disease in her family; Diabetes in her mother; Glaucoma in her family; Heart attack in her maternal uncle, mother, and paternal grandmother; Heart disease in her mother; Hypertension in her father and mother; Kidney disease in her father; Rheum arthritis in her family; Stroke in her maternal aunt and maternal grandmother  She  reports that she has never smoked  She has never used smokeless tobacco  She reports that she drank alcohol  She reports that she does not use drugs    Current Outpatient Medications   Medication Sig Dispense Refill    albuterol (PROVENTIL HFA,VENTOLIN HFA) 90 mcg/act inhaler Inhale 2 puffs every 4 (four) hours as needed for wheezing 1 Inhaler 5    aspirin 81 MG tablet Take 81 mg by mouth daily        atorvastatin (LIPITOR) 40 mg tablet Take 1 tablet (40 mg total) by mouth daily with dinner 30 tablet 0    beclomethasone (QVAR) 80 MCG/ACT inhaler Inhale 2 puffs 2 (two) times a day 1 Inhaler 5    Blood Glucose Calibration (ACCU-CHEK COMPACT PLUS CONTROL) SOLN by In Vitro route daily 1 each 0    Blood Glucose Monitoring Suppl (ACCU-CHEK SHANE PLUS) w/Device KIT by Does not apply route 2 (two) times a day 1 kit 0    busPIRone (BUSPAR) 5 mg tablet take 1 tablet by mouth three times a day 90 tablet 2    cycloSPORINE (RESTASIS) 0 05 % ophthalmic emulsion Apply 1 drop to eye every 12 (twelve) hours       escitalopram (LEXAPRO) 10 mg tablet Take 1 tablet (10 mg total) by mouth daily with breakfast 30 tablet 2    gabapentin (NEURONTIN) 300 mg capsule Take 1 capsule (300 mg total) by mouth 3 (three) times a day 90 capsule 5    glucose blood (ACCU-CHEK SHANE PLUS) test strip Test twice daily 100 each 5    hydrocortisone 0 5 % cream Apply topically 2 (two) times a day as needed       insulin glargine (BASAGLAR KWIKPEN) 100 units/mL injection pen Inject 25 Units under the skin daily at bedtime 5 pen 0    Insulin Pen Needle (B-D UF III MINI PEN NEEDLES) 31G X 5 MM MISC Inject under the skin daily 100 each 3    Insulin Pen Needle (PEN NEEDLES) 31G X 5 MM MISC by Does not apply route daily at bedtime 30 each 0    JANUVIA 100 MG tablet take 1 tablet by mouth once daily 30 tablet 5    Lancets (ACCU-CHEK MULTICLIX) lancets Test bid 100 each 0    Lancets MISC by Does not apply route daily 30 each 0    Lancets MISC by Does not apply route daily 30 each 0    lisinopril (ZESTRIL) 40 mg tablet take 1 tablet by mouth once daily 30 tablet 5    Na Sulfate-K Sulfate-Mg Sulf (SUPREP BOWEL PREP KIT) 17 5-3 13-1 6 GM/177ML SOLN Take as directed by the office  2 Bottle 0    pantoprazole (PROTONIX) 40 mg tablet Take 1 tablet (40 mg total) by mouth daily 30 tablet 5    pyridostigmine 30 MG TABS Take 30 mg by mouth 3 (three) times a day 90 tablet 0    RA PAIN RELIEF ACETAMINOPHEN 325 MG tablet take 1-2 tablets by mouth every 6 hours if needed for MODERATE pain  0     No current facility-administered medications for this visit        Current Outpatient Medications on File Prior to Visit   Medication Sig    albuterol (PROVENTIL HFA,VENTOLIN HFA) 90 mcg/act inhaler Inhale 2 puffs every 4 (four) hours as needed for wheezing    aspirin 81 MG tablet Take 81 mg by mouth daily      atorvastatin (LIPITOR) 40 mg tablet Take 1 tablet (40 mg total) by mouth daily with dinner    beclomethasone (QVAR) 80 MCG/ACT inhaler Inhale 2 puffs 2 (two) times a day    Blood Glucose Calibration (ACCU-CHEK COMPACT PLUS CONTROL) SOLN by In Vitro route daily    Blood Glucose Monitoring Suppl (ACCU-CHEK SHANE PLUS) w/Device KIT by Does not apply route 2 (two) times a day    busPIRone (BUSPAR) 5 mg tablet take 1 tablet by mouth three times a day    cycloSPORINE (RESTASIS) 0 05 % ophthalmic emulsion Apply 1 drop to eye every 12 (twelve) hours     escitalopram (LEXAPRO) 10 mg tablet Take 1 tablet (10 mg total) by mouth daily with breakfast    gabapentin (NEURONTIN) 300 mg capsule Take 1 capsule (300 mg total) by mouth 3 (three) times a day    glucose blood (ACCU-CHEK SHANE PLUS) test strip Test twice daily    hydrocortisone 0 5 % cream Apply topically 2 (two) times a day as needed     insulin glargine (BASAGLAR KWIKPEN) 100 units/mL injection pen Inject 25 Units under the skin daily at bedtime    Insulin Pen Needle (B-D UF III MINI PEN NEEDLES) 31G X 5 MM MISC Inject under the skin daily    Insulin Pen Needle (PEN NEEDLES) 31G X 5 MM MISC by Does not apply route daily at bedtime    JANUVIA 100 MG tablet take 1 tablet by mouth once daily    Lancets (ACCU-CHEK MULTICLIX) lancets Test bid    Lancets MISC by Does not apply route daily    Lancets MISC by Does not apply route daily    lisinopril (ZESTRIL) 40 mg tablet take 1 tablet by mouth once daily    Na Sulfate-K Sulfate-Mg Sulf (SUPREP BOWEL PREP KIT) 17 5-3 13-1 6 GM/177ML SOLN Take as directed by the office   pantoprazole (PROTONIX) 40 mg tablet Take 1 tablet (40 mg total) by mouth daily    pyridostigmine 30 MG TABS Take 30 mg by mouth 3 (three) times a day    RA PAIN RELIEF ACETAMINOPHEN 325 MG tablet take 1-2 tablets by mouth every 6 hours if needed for MODERATE pain     No current facility-administered medications on file prior to visit        She is allergic to percocet [oxycodone-acetaminophen] and vicodin [hydrocodone-acetaminophen]       Transitional Care Management Review:  Lindsey Cage is a 59 y o  female here for TCM follow up  During the TCM phone call patient stated:    TCM Call (since 7/21/2019)     Date and time call was made  8/20/2019  1:44 PM    Patient was hospitialized at  [facility]    Date of Admission  08/13/19    Date of discharge  08/17/19    Diagnosis  Binocular Vision Disorder with Diplopia    Disposition  Home    Were the patients medications reviewed and updated  No    Current Symptoms  Leg pain - left side; Leg pain - right side; Back pain - left side; Back pain - right side; Headache; Dizziness <img src='C:FILES (X86)    Left side leg pain severity  Mild    Leg pain, left side, onset  Unable to tell; Unable to assess    Right side leg pain severity  Moderate    Leg pain, right side, onset  Unable to tell; Unable to assess    Back pain, left side, severity  Moderate <img src='C:FILES (X86)    Back pain, left side, onset  Unable to tell; Unable to assess    Dizziness severity  Mild; Moderate <img src='C:FILES (X86)    Back pain, right side, severity  Moderate <img src='C:FILES (X86)    Back pain, right side, onset  Unable to tell; Unable to assess    Headache pain severity  Moderate <img src='C:FILES (X86)    Headache pain onset  Ongoing <img src='C:FILES (X86)    Headache cause / trigger  -- <img src='C:FILES (X86)      TCM Call (since 7/21/2019)     Modifying factors-feels better  Rest; Darkened room <img src='C:FILES (X86)    Modifying factors-feels worse  Light    Clinical progress  Unchanged    Headache risk factors  -- <img src='C:FILES (X86)    Post hospital issues  Reduced activity <img src='C:FILES (X86)    Should patient be enrolled in anticoag monitoring? No    Scheduled for follow up?   Yes <img src='C:FILES (X86)    Not clinically warranted  PT TRANSFERRED TO [address] [place] [address]    Patients specialists  Neurologist; Other (comment)    Other specialists names Ophthalmolgy    Referrals needed  psychiatrist wanted by patient because she lost her     Did you obtain your prescribed medications  No <img src='C:FILES (X86)    Why were you unable to obtain your medications  no RX  Do you need help managing your prescriptions or medications  No    Is transportation to your appointment needed  No    I have advised the patient to call PCP with any new or worsening symptoms  [address]; Friends;  Neighboors    The type of support provided  Emotional; Financial; Physical    Do you have social support  Yes, as much as I need    Are you recieving any outpatient services  No <img src='C:FILES (X86)    Are you recieving home care services  No    Are you using any community resources  No    Current waiver services  No    Have you fallen in the last 12 months  No    Interperter language line needed  No    Counseling  Patient              Carlos Later, DO

## 2019-08-23 LAB — ACHR MOD AB/ACHR TOTAL SFR SER: <12 % (ref 0–20)

## 2019-08-28 ENCOUNTER — RX ONLY (RX ONLY)
Age: 64
End: 2019-08-28

## 2019-08-28 ENCOUNTER — DOCTOR'S OFFICE (OUTPATIENT)
Dept: URBAN - NONMETROPOLITAN AREA CLINIC 1 | Facility: CLINIC | Age: 64
Setting detail: OPHTHALMOLOGY
End: 2019-08-28
Payer: COMMERCIAL

## 2019-08-28 DIAGNOSIS — H26.493: ICD-10-CM

## 2019-08-28 DIAGNOSIS — E03.4: ICD-10-CM

## 2019-08-28 DIAGNOSIS — H05.20: ICD-10-CM

## 2019-08-28 DIAGNOSIS — G70.01: ICD-10-CM

## 2019-08-28 DIAGNOSIS — E11.9: ICD-10-CM

## 2019-08-28 DIAGNOSIS — H04.122: ICD-10-CM

## 2019-08-28 DIAGNOSIS — H40.011: ICD-10-CM

## 2019-08-28 DIAGNOSIS — Z96.1: ICD-10-CM

## 2019-08-28 DIAGNOSIS — H40.012: ICD-10-CM

## 2019-08-28 DIAGNOSIS — H04.121: ICD-10-CM

## 2019-08-28 DIAGNOSIS — H50.00: ICD-10-CM

## 2019-08-28 PROCEDURE — 92060 SENSORIMOTOR EXAMINATION: CPT | Performed by: OPHTHALMOLOGY

## 2019-08-28 PROCEDURE — 92083 EXTENDED VISUAL FIELD XM: CPT | Performed by: OPHTHALMOLOGY

## 2019-08-28 PROCEDURE — 99214 OFFICE O/P EST MOD 30 MIN: CPT | Performed by: OPHTHALMOLOGY

## 2019-08-28 ASSESSMENT — LID EXAM ASSESSMENTS: OS_EDEMA: LUL

## 2019-08-28 ASSESSMENT — REFRACTION_CURRENTRX
OS_OVR_VA: 20/
OD_OVR_VA: 20/
OD_OVR_VA: 20/
OS_OVR_VA: 20/
OD_OVR_VA: 20/
OS_OVR_VA: 20/

## 2019-08-28 ASSESSMENT — SUPERFICIAL PUNCTATE KERATITIS (SPK)
OD_SPK: 1+ 2+
OS_SPK: T

## 2019-08-28 ASSESSMENT — REFRACTION_MANIFEST
OD_VA1: 20/
OD_VA3: 20/
OS_VA3: 20/
OD_VA3: 20/
OS_VA2: 20/
OS_VA2: 20/
OU_VA: 20/
OD_VA2: 20/
OU_VA: 20/
OD_VA1: 20/
OS_VA1: 20/
OS_VA1: 20/
OS_VA3: 20/
OD_VA2: 20/

## 2019-08-28 ASSESSMENT — CONFRONTATIONAL VISUAL FIELD TEST (CVF)
OD_FINDINGS: FULL
OS_FINDINGS: FULL

## 2019-08-28 ASSESSMENT — VISUAL ACUITY
OD_BCVA: 20/30-1
OS_BCVA: 20/30+2

## 2019-08-28 ASSESSMENT — SPHEQUIV_DERIVED
OD_SPHEQUIV: -0.75
OS_SPHEQUIV: -0.5

## 2019-08-28 ASSESSMENT — REFRACTION_AUTOREFRACTION
OS_SPHERE: 0.00
OD_CYLINDER: -1.50
OS_AXIS: 041
OD_AXIS: 168
OS_CYLINDER: -1.00
OD_SPHERE: 0.00

## 2019-08-28 ASSESSMENT — LID POSITION - COMMENTS
OS_COMMENTS: MILD TO MODERATE BUL PTOSISINCREASED PTOSIS WITH FATIGUE
OD_COMMENTS: MILD TO MODERATE BUL PTOSISINCREASED PTOSIS WITH FATIGUE

## 2019-08-29 DIAGNOSIS — I67.82 SUBCORTICAL MICROVASCULAR ISCHEMIC OCCLUSIVE DISEASE: ICD-10-CM

## 2019-08-29 DIAGNOSIS — H53.2 BINOCULAR VISION DISORDER WITH DIPLOPIA: ICD-10-CM

## 2019-08-29 RX ORDER — PYRIDOSTIGMINE BROMIDE 30 MG/1
30 TABLET ORAL 3 TIMES DAILY
Qty: 360 TABLET | Refills: 2 | Status: SHIPPED | OUTPATIENT
Start: 2019-08-29 | End: 2019-09-26

## 2019-08-29 RX ORDER — ATORVASTATIN CALCIUM 40 MG/1
40 TABLET, FILM COATED ORAL
Qty: 90 TABLET | Refills: 2 | Status: SHIPPED | OUTPATIENT
Start: 2019-08-29 | End: 2020-02-05 | Stop reason: ALTCHOICE

## 2019-09-12 ENCOUNTER — TELEPHONE (OUTPATIENT)
Dept: GASTROENTEROLOGY | Facility: CLINIC | Age: 64
End: 2019-09-12

## 2019-09-17 ENCOUNTER — TELEPHONE (OUTPATIENT)
Dept: GASTROENTEROLOGY | Facility: HOSPITAL | Age: 64
End: 2019-09-17

## 2019-09-17 NOTE — TELEPHONE ENCOUNTER
Pt emmanuel  For 10/29 with tommy Coley mailed instructions and went over prep, all questions answered

## 2019-09-23 ENCOUNTER — TELEPHONE (OUTPATIENT)
Dept: GASTROENTEROLOGY | Facility: MEDICAL CENTER | Age: 64
End: 2019-09-23

## 2019-09-23 NOTE — TELEPHONE ENCOUNTER
Dr Coley's     Patient called requesting to please send colonoscopy/egd instructions to her home address   Patient can be reached at [phone]

## 2019-09-24 ENCOUNTER — TELEPHONE (OUTPATIENT)
Dept: FAMILY MEDICINE CLINIC | Facility: CLINIC | Age: 64
End: 2019-09-24

## 2019-09-24 NOTE — TELEPHONE ENCOUNTER
Her pyrodostigmine was prescribed by Neurology perhaps they would have better luck getting it prescribed

## 2019-09-24 NOTE — TELEPHONE ENCOUNTER
Patient script for the pyridostigmine was denied by her insurance, she wants to know if there is anything that she can take that replaces it? Also wants to get [facility] transportation to get to her appt with you

## 2019-09-26 ENCOUNTER — OFFICE VISIT (OUTPATIENT)
Dept: FAMILY MEDICINE CLINIC | Facility: CLINIC | Age: 64
End: 2019-09-26
Payer: COMMERCIAL

## 2019-09-26 VITALS
WEIGHT: 227 LBS | DIASTOLIC BLOOD PRESSURE: 80 MMHG | BODY MASS INDEX: 37.82 KG/M2 | SYSTOLIC BLOOD PRESSURE: 130 MMHG | HEIGHT: 65 IN

## 2019-09-26 DIAGNOSIS — I10 ESSENTIAL HYPERTENSION: ICD-10-CM

## 2019-09-26 DIAGNOSIS — Z79.4 TYPE 2 DIABETES MELLITUS WITH COMPLICATION, WITH LONG-TERM CURRENT USE OF INSULIN (HCC): ICD-10-CM

## 2019-09-26 DIAGNOSIS — E11.8 TYPE 2 DIABETES MELLITUS WITH COMPLICATION, WITH LONG-TERM CURRENT USE OF INSULIN (HCC): ICD-10-CM

## 2019-09-26 DIAGNOSIS — Z23 NEED FOR INFLUENZA VACCINATION: Primary | ICD-10-CM

## 2019-09-26 PROBLEM — H92.09 OTALGIA: Status: RESOLVED | Noted: 2018-01-02 | Resolved: 2019-09-26

## 2019-09-26 PROBLEM — R94.39 ABNORMAL STRESS TEST: Status: RESOLVED | Noted: 2017-07-27 | Resolved: 2019-09-26

## 2019-09-26 PROCEDURE — 3075F SYST BP GE 130 - 139MM HG: CPT | Performed by: FAMILY MEDICINE

## 2019-09-26 PROCEDURE — 90682 RIV4 VACC RECOMBINANT DNA IM: CPT | Performed by: FAMILY MEDICINE

## 2019-09-26 PROCEDURE — 99213 OFFICE O/P EST LOW 20 MIN: CPT | Performed by: FAMILY MEDICINE

## 2019-09-26 PROCEDURE — 90471 IMMUNIZATION ADMIN: CPT | Performed by: FAMILY MEDICINE

## 2019-09-26 PROCEDURE — 3079F DIAST BP 80-89 MM HG: CPT | Performed by: FAMILY MEDICINE

## 2019-09-26 RX ORDER — HYDROCHLOROTHIAZIDE 25 MG/1
25 TABLET ORAL DAILY
Refills: 0 | COMMUNITY
Start: 2019-09-13 | End: 2020-07-06

## 2019-09-26 RX ORDER — AMLODIPINE BESYLATE 10 MG/1
10 TABLET ORAL DAILY
Refills: 0 | COMMUNITY
Start: 2019-09-13 | End: 2020-05-12

## 2019-09-26 NOTE — PROGRESS NOTES
Assessment/Plan:    Problem List Items Addressed This Visit        Endocrine    Type 2 diabetes mellitus with complication, with long-term current use of insulin (HCC)       Cardiovascular and Mediastinum    Essential hypertension    Relevant Medications    amLODIPine (NORVASC) 10 mg tablet    hydrochlorothiazide (HYDRODIURIL) 25 mg tablet      Other Visit Diagnoses     Need for influenza vaccination    -  Primary    Relevant Orders    influenza vaccine, [id number], quadrivalent, recombinant, PF, 0 5 mL, for patients 18 yr+ (FLUBLOK) (Completed)           Diagnoses and all orders for this visit:    Need for influenza vaccination  -     influenza vaccine, [id number], quadrivalent, recombinant, PF, 0 5 mL, for patients 18 yr+ (FLUBLOK)    Type 2 diabetes mellitus with complication, with long-term current use of insulin ([address] )    Essential hypertension    Other orders  -     amLODIPine (NORVASC) 10 mg tablet  -     hydrochlorothiazide (HYDRODIURIL) 25 mg tablet        No problem-specific Assessment & Plan notes found for this encounter  Subjective:      Patient ID: Kimberlyn Dozier is a 59 y o  female      Tejal El is here today for a follow-up of visit on she is doing okay she still has diplopia still has to wear an eye patch she has an appointment with Neurology to try to firm up a diagnosis so right now the possibilities are that her diplopia was caused by diabetic neuropathy and oculomotor nerve palsy versus multiple sclerosis versus myasthenia gravis she had been given Mestinon in the hospital she ran out of it she had called our office for refill but we deferred refilling it until she sees Neurology she feels a little better she is she also has a mild depression from losing her  on her sugars have been under really good control her large diabetic log shows very good control of her blood sugars her diet has changed significant for the better      The following portions of the patient's history were reviewed and updated as appropriate:   She has a past medical history of Abnormal ECG, Abnormal mammogram, Abnormal stress test, Anemia, Anxiety, Arthritis, Asthma, Back problem, Breast cyst, Chest pain, Chronic pain disorder, Cyst of left breast, Depression, Depression, Diabetes mellitus ([facility] ), Hiatal hernia, Hyperlipidemia, Hypertension, Keloid of skin, Neuropathy, Psychiatric disorder, Stroke ([facility] [address] ), and Tuberculosis  ,  does not have any pertinent problems on file  ,   has a past surgical history that includes Cyst Removal; Eye surgery; Tubal ligation; Arm wound repair / closure; pr esophagogastroduodenoscopy transoral diagnostic (N/A, 1/5/2018); Cataract extraction (Bilateral); US guided thyroid biopsy (4/15/2019); pr wrist arthroscop,release xvers lig (Left, 4/19/2019); Carpal tunnel release; Carpal tunnel release (Left); and FL lumbar puncture (8/15/2019)  ,  family history includes Arthritis in her mother, paternal grandmother, and sister; Cancer in her family, maternal grandmother, and paternal grandmother; Coronary artery disease in her family; Diabetes in her mother; Glaucoma in her family; Heart attack in her maternal uncle, mother, and paternal grandmother; Heart disease in her mother; Hypertension in her father and mother; Kidney disease in her father; Rheum arthritis in her family; Stroke in her maternal aunt and maternal grandmother  ,   reports that she has never smoked  She has never used smokeless tobacco  She reports that she drank alcohol  She reports that she does not use drugs  ,  is allergic to percocet [oxycodone-acetaminophen] and vicodin [hydrocodone-acetaminophen]     Current Outpatient Medications   Medication Sig Dispense Refill    albuterol (PROVENTIL HFA,VENTOLIN HFA) 90 mcg/act inhaler Inhale 2 puffs every 4 (four) hours as needed for wheezing 1 Inhaler 5    amLODIPine (NORVASC) 10 mg tablet   0    aspirin 81 MG tablet Take 81 mg by mouth daily        atorvastatin (LIPITOR) 40 mg tablet Take 1 tablet (40 mg total) by mouth daily with dinner 90 tablet 2    beclomethasone (QVAR) 80 MCG/ACT inhaler Inhale 2 puffs 2 (two) times a day 1 Inhaler 5    Blood Glucose Calibration (ACCU-CHEK COMPACT PLUS CONTROL) SOLN by In Vitro route daily 1 each 0    Blood Glucose Monitoring Suppl (ACCU-CHEK SHANE PLUS) w/Device KIT by Does not apply route 2 (two) times a day 1 kit 0    busPIRone (BUSPAR) 5 mg tablet take 1 tablet by mouth three times a day 90 tablet 2    cycloSPORINE (RESTASIS) 0 05 % ophthalmic emulsion Apply 1 drop to eye every 12 (twelve) hours       escitalopram (LEXAPRO) 10 mg tablet Take 1 tablet (10 mg total) by mouth daily with breakfast 30 tablet 2    gabapentin (NEURONTIN) 300 mg capsule Take 1 capsule (300 mg total) by mouth 3 (three) times a day 90 capsule 5    glucose blood (ACCU-CHEK SHANE PLUS) test strip Test twice daily 100 each 5    hydrochlorothiazide (HYDRODIURIL) 25 mg tablet   0    hydrocortisone 0 5 % cream Apply topically 2 (two) times a day as needed       insulin glargine (BASAGLAR KWIKPEN) 100 units/mL injection pen Inject 25 Units under the skin daily at bedtime 5 pen 0    Insulin Pen Needle (B-D UF III MINI PEN NEEDLES) 31G X 5 MM MISC Inject under the skin daily 100 each 3    Insulin Pen Needle (PEN NEEDLES) 31G X 5 MM MISC by Does not apply route daily at bedtime 30 each 0    insulin regular (HumuLIN R,NovoLIN R) 100 units/mL injection 3 units at breakfast 4 units at lunch 5 units at supper 10 mL 3    JANUVIA 100 MG tablet take 1 tablet by mouth once daily 30 tablet 5    Lancets (ACCU-CHEK MULTICLIX) lancets Test bid 100 each 0    Lancets MISC by Does not apply route daily 30 each 0    lisinopril (ZESTRIL) 40 mg tablet take 1 tablet by mouth once daily 30 tablet 5    pantoprazole (PROTONIX) 40 mg tablet Take 1 tablet (40 mg total) by mouth daily 30 tablet 5    RA PAIN RELIEF ACETAMINOPHEN 325 MG tablet take 1-2 tablets by mouth every 6 hours if needed for MODERATE pain  0     No current facility-administered medications for this visit  Review of Systems   Constitutional: Negative for activity change, appetite change, diaphoresis, fatigue and fever  HENT: Negative  Eyes: Positive for visual disturbance  Diplopia   Respiratory: Negative for apnea, cough, chest tightness, shortness of breath and wheezing  Cardiovascular: Negative for chest pain, palpitations and leg swelling  Gastrointestinal: Negative for abdominal distention, abdominal pain, anal bleeding, constipation, diarrhea, nausea and vomiting  Endocrine: Negative for cold intolerance, heat intolerance, polydipsia, polyphagia and polyuria  Genitourinary: Negative for difficulty urinating, dysuria, flank pain, hematuria and urgency  Musculoskeletal: Positive for arthralgias  Negative for back pain, gait problem, joint swelling and myalgias  Skin: Negative for color change, rash and wound  Allergic/Immunologic: Negative for environmental allergies, food allergies and immunocompromised state  Neurological: Negative for dizziness, seizures, syncope, speech difficulty, numbness and headaches  Hematological: Negative for adenopathy  Does not bruise/bleed easily  Psychiatric/Behavioral: Negative for agitation, behavioral problems, hallucinations, sleep disturbance and suicidal ideas  Objective:  Vitals:    09/26/19 1231   BP: 130/80   BP Location: Left arm   Patient Position: Sitting   Cuff Size: Standard   Weight: 103 kg (227 lb)   Height: 5' 5" (1 651 m)     Body mass index is 37 77 kg/m²  Physical Exam   Constitutional: She is oriented to person, place, and time  She appears well-developed and well-nourished  No distress  HENT:   Head: Normocephalic  Right Ear: External ear normal    Left Ear: External ear normal    Nose: Nose normal    Mouth/Throat: Oropharynx is clear and moist    Eyes: Pupils are equal, round, and reactive to light   Conjunctivae and EOM are normal  Right eye exhibits no discharge  Left eye exhibits no discharge  No scleral icterus  Neck: Normal range of motion  No tracheal deviation present  No thyromegaly present  Cardiovascular: Normal rate, regular rhythm and normal heart sounds  Exam reveals no gallop and no friction rub  No murmur heard  Pulmonary/Chest: Effort normal and breath sounds normal  No respiratory distress  She has no wheezes  Abdominal: Soft  Bowel sounds are normal  She exhibits no mass  There is no tenderness  There is no guarding  Musculoskeletal: She exhibits no edema or deformity  Lymphadenopathy:     She has no cervical adenopathy  Neurological: She is alert and oriented to person, place, and time  No cranial nerve deficit  Skin: Skin is warm and dry  No rash noted  She is not diaphoretic  No erythema  Psychiatric: She has a normal mood and affect   Thought content normal

## 2019-09-26 NOTE — PROGRESS NOTES
BMI Counseling: Body mass index is 37 77 kg/m²  The BMI is above normal  Nutrition recommendations include reducing portion sizes, decreasing overall calorie intake and 3-5 servings of fruits/vegetables daily  Exercise recommendations include moderate aerobic physical activity for 150 minutes/week

## 2019-09-26 NOTE — PATIENT INSTRUCTIONS
Obesity   AMBULATORY CARE:   Obesity  is when your body mass index (BMI) is greater than 30  Your healthcare provider will use your height and weight to measure your BMI  The risks of obesity include  many health problems, such as injuries or physical disability  You may need tests to check for the following:  · Diabetes     · High blood pressure or high cholesterol     · Heart disease     · Gallbladder or liver disease     · Cancer of the colon, breast, prostate, liver, or kidney     · Sleep apnea     · Arthritis or gout  Seek care immediately if:   · You have a severe headache, confusion, or difficulty speaking  · You have weakness on one side of your body  · You have chest pain, sweating, or shortness of breath  Contact your healthcare provider if:   · You have symptoms of gallbladder or liver disease, such as pain in your upper abdomen  · You have knee or hip pain and discomfort while walking  · You have symptoms of diabetes, such as intense hunger and thirst, and frequent urination  · You have symptoms of sleep apnea, such as snoring or daytime sleepiness  · You have questions or concerns about your condition or care  Treatment for obesity  focuses on helping you lose weight to improve your health  Even a small decrease in BMI can reduce the risk for many health problems  Your healthcare provider will help you set a weight-loss goal   · Lifestyle changes  are the first step in treating obesity  These include making healthy food choices and getting regular physical activity  Your healthcare provider may suggest a weight-loss program that involves coaching, education, and therapy  · Medicine  may help you lose weight when it is used with a healthy diet and physical activity  · Surgery  can help you lose weight if you are very obese and have other health problems  There are several types of weight-loss surgery  Ask your healthcare provider for more information    Be successful losing weight:   · Set small, realistic goals  An example of a small goal is to walk for 20 minutes 5 days a week  Anther goal is to lose 5% of your body weight  · Tell friends, family members, and coworkers about your goals  and ask for their support  Ask a friend to lose weight with you, or join a weight-loss support group  · Identify foods or triggers that may cause you to overeat , and find ways to avoid them  Remove tempting high-calorie foods from your home and workplace  Place a bowl of fresh fruit on your kitchen counter  If stress causes you to eat, then find other ways to cope with stress  · Keep a diary to track what you eat and drink  Also write down how many minutes of physical activity you do each day  Weigh yourself once a week and record it in your diary  Eating changes: You will need to eat 500 to 1,000 fewer calories each day than you currently eat to lose 1 to 2 pounds a week  The following changes will help you cut calories:  · Eat smaller portions  Use small plates, no larger than 9 inches in diameter  Fill your plate half full of fruits and vegetables  Measure your food using measuring cups until you know what a serving size looks like  · Eat 3 meals and 1 or 2 snacks each day  Plan your meals in advance  [place] and eat at home most of the time  Eat slowly  · Eat fruits and vegetables at every meal   They are low in calories and high in fiber, which makes you feel full  Do not add butter, margarine, or cream sauce to vegetables  Use herbs to season steamed vegetables  · Eat less fat and fewer fried foods  Eat more baked or grilled chicken and fish  These protein sources are lower in calories and fat than red meat  Limit fast food  Dress your salads with olive oil and vinegar instead of bottled dressing  · Limit the amount of sugar you eat  Do not drink sugary beverages  Limit alcohol  Activity changes:  Physical activity is good for your body in many ways   It helps you burn calories and build strong muscles  It decreases stress and depression, and improves your mood  It can also help you sleep better  Talk to your healthcare provider before you begin an exercise program   · Exercise for at least 30 minutes 5 days a week  Start slowly  Set aside time each day for physical activity that you enjoy and that is convenient for you  It is best to do both weight training and an activity that increases your heart rate, such as walking, bicycling, or swimming  · Find ways to be more active  Do yard work and housecleaning  Walk up the stairs instead of using elevators  Spend your leisure time going to events that require walking, such as outdoor festivals or fairs  This extra physical activity can help you lose weight and keep it off  Follow up with your healthcare provider as directed: You may need to meet with a dietitian  Write down your questions so you remember to ask them during your visits  © 2017 [address] Information is for End User's use only and may not be sold, redistributed or otherwise used for commercial purposes  All illustrations and images included in CareNotes® are the copyrighted property of A D [employer] M [employer] Inc  or Matthew Espinosa  The above information is an  only  It is not intended as medical advice for individual conditions or treatments  Talk to your doctor, nurse or pharmacist before following any medical regimen to see if it is safe and effective for you

## 2019-10-09 ENCOUNTER — DOCTOR'S OFFICE (OUTPATIENT)
Dept: URBAN - NONMETROPOLITAN AREA CLINIC 1 | Facility: CLINIC | Age: 64
Setting detail: OPHTHALMOLOGY
End: 2019-10-09
Payer: COMMERCIAL

## 2019-10-09 DIAGNOSIS — G70.01: ICD-10-CM

## 2019-10-09 DIAGNOSIS — H50.00: ICD-10-CM

## 2019-10-09 PROCEDURE — 99214 OFFICE O/P EST MOD 30 MIN: CPT | Performed by: OPHTHALMOLOGY

## 2019-10-09 ASSESSMENT — LID POSITION - COMMENTS
OD_COMMENTS: MILD TO MODERATE BUL PTOSISINCREASED PTOSIS WITH FATIGUE
OS_COMMENTS: MILD TO MODERATE BUL PTOSISINCREASED PTOSIS WITH FATIGUE

## 2019-10-09 ASSESSMENT — REFRACTION_MANIFEST
OU_VA: 20/
OD_VA2: 20/
OS_VA1: 20/
OD_VA1: 20/
OS_VA3: 20/
OS_VA1: 20/
OS_VA3: 20/
OS_VA2: 20/
OU_VA: 20/
OD_VA2: 20/
OD_VA3: 20/
OD_VA3: 20/
OS_VA2: 20/
OD_VA1: 20/

## 2019-10-09 ASSESSMENT — LID EXAM ASSESSMENTS
OD_EDEMA: RUL
OS_EDEMA: LUL

## 2019-10-09 ASSESSMENT — REFRACTION_CURRENTRX
OD_OVR_VA: 20/
OS_OVR_VA: 20/
OD_OVR_VA: 20/
OS_OVR_VA: 20/
OS_OVR_VA: 20/
OD_OVR_VA: 20/

## 2019-10-09 ASSESSMENT — REFRACTION_AUTOREFRACTION
OD_CYLINDER: -1.50
OD_AXIS: 168
OD_SPHERE: 0.00
OS_SPHERE: 0.00
OS_CYLINDER: -1.00
OS_AXIS: 041

## 2019-10-09 ASSESSMENT — VISUAL ACUITY
OD_BCVA: 20/40+2
OS_BCVA: 20/25-2

## 2019-10-09 ASSESSMENT — CONFRONTATIONAL VISUAL FIELD TEST (CVF)
OS_FINDINGS: FULL
OD_FINDINGS: FULL

## 2019-10-09 ASSESSMENT — SPHEQUIV_DERIVED
OD_SPHEQUIV: -0.75
OS_SPHEQUIV: -0.5

## 2019-10-09 ASSESSMENT — SUPERFICIAL PUNCTATE KERATITIS (SPK)
OS_SPK: T
OD_SPK: 1+ 2+

## 2019-10-10 ENCOUNTER — TELEPHONE (OUTPATIENT)
Dept: FAMILY MEDICINE CLINIC | Facility: CLINIC | Age: 64
End: 2019-10-10

## 2019-10-10 DIAGNOSIS — G70.00 MYASTHENIA GRAVIS (HCC): Primary | ICD-10-CM

## 2019-10-10 NOTE — TELEPHONE ENCOUNTER
She went to the eye doctor yesterday and they told he to call you to see if you could get her medicaid to pay for her medication for myastemia gravis    (Gave her the # for neurology)

## 2019-10-25 ENCOUNTER — TELEPHONE (OUTPATIENT)
Dept: GASTROENTEROLOGY | Facility: CLINIC | Age: 64
End: 2019-10-25

## 2019-10-28 ENCOUNTER — ANESTHESIA EVENT (OUTPATIENT)
Dept: PERIOP | Facility: HOSPITAL | Age: 64
End: 2019-10-28

## 2019-10-29 ENCOUNTER — HOSPITAL ENCOUNTER (OUTPATIENT)
Dept: PERIOP | Facility: HOSPITAL | Age: 64
Setting detail: OUTPATIENT SURGERY
Discharge: HOME | End: 2019-10-29
Attending: INTERNAL MEDICINE | Admitting: INTERNAL MEDICINE
Payer: COMMERCIAL

## 2019-10-29 ENCOUNTER — ANESTHESIA (OUTPATIENT)
Dept: PERIOP | Facility: HOSPITAL | Age: 64
End: 2019-10-29

## 2019-10-29 VITALS
RESPIRATION RATE: 20 BRPM | HEIGHT: 65 IN | OXYGEN SATURATION: 96 % | BODY MASS INDEX: 37.82 KG/M2 | TEMPERATURE: 98 F | SYSTOLIC BLOOD PRESSURE: 146 MMHG | WEIGHT: 227 LBS | HEART RATE: 82 BPM | DIASTOLIC BLOOD PRESSURE: 74 MMHG

## 2019-10-29 DIAGNOSIS — A04.8 H. PYLORI INFECTION: ICD-10-CM

## 2019-10-29 DIAGNOSIS — R10.84 GENERALIZED ABDOMINAL PAIN: ICD-10-CM

## 2019-10-29 DIAGNOSIS — Z12.11 SCREENING FOR COLON CANCER: ICD-10-CM

## 2019-10-29 LAB — GLUCOSE SERPL-MCNC: 159 MG/DL (ref 65–140)

## 2019-10-29 PROCEDURE — 82948 REAGENT STRIP/BLOOD GLUCOSE: CPT

## 2019-10-29 PROCEDURE — 88342 IMHCHEM/IMCYTCHM 1ST ANTB: CPT | Performed by: PATHOLOGY

## 2019-10-29 PROCEDURE — 88305 TISSUE EXAM BY PATHOLOGIST: CPT | Performed by: PATHOLOGY

## 2019-10-29 PROCEDURE — 45380 COLONOSCOPY AND BIOPSY: CPT | Performed by: INTERNAL MEDICINE

## 2019-10-29 PROCEDURE — NC001 PR NO CHARGE: Performed by: INTERNAL MEDICINE

## 2019-10-29 PROCEDURE — 43239 EGD BIOPSY SINGLE/MULTIPLE: CPT | Performed by: INTERNAL MEDICINE

## 2019-10-29 RX ORDER — LIDOCAINE HYDROCHLORIDE 10 MG/ML
INJECTION, SOLUTION INFILTRATION; PERINEURAL AS NEEDED
Status: DISCONTINUED | OUTPATIENT
Start: 2019-10-29 | End: 2019-10-29 | Stop reason: SURG

## 2019-10-29 RX ORDER — SODIUM CHLORIDE, SODIUM LACTATE, POTASSIUM CHLORIDE, CALCIUM CHLORIDE 600; 310; 30; 20 MG/100ML; MG/100ML; MG/100ML; MG/100ML
INJECTION, SOLUTION INTRAVENOUS CONTINUOUS PRN
Status: DISCONTINUED | OUTPATIENT
Start: 2019-10-29 | End: 2019-10-29 | Stop reason: SURG

## 2019-10-29 RX ORDER — PROPOFOL 10 MG/ML
INJECTION, EMULSION INTRAVENOUS AS NEEDED
Status: DISCONTINUED | OUTPATIENT
Start: 2019-10-29 | End: 2019-10-29 | Stop reason: SURG

## 2019-10-29 RX ORDER — SODIUM CHLORIDE, SODIUM LACTATE, POTASSIUM CHLORIDE, CALCIUM CHLORIDE 600; 310; 30; 20 MG/100ML; MG/100ML; MG/100ML; MG/100ML
100 INJECTION, SOLUTION INTRAVENOUS CONTINUOUS
Status: DISCONTINUED | OUTPATIENT
Start: 2019-10-29 | End: 2019-11-02 | Stop reason: HOSPADM

## 2019-10-29 RX ORDER — ONDANSETRON 2 MG/ML
4 INJECTION INTRAMUSCULAR; INTRAVENOUS ONCE AS NEEDED
Status: DISCONTINUED | OUTPATIENT
Start: 2019-10-29 | End: 2019-11-02 | Stop reason: HOSPADM

## 2019-10-29 RX ADMIN — PROPOFOL 50 MG: 10 INJECTION, EMULSION INTRAVENOUS at 10:45

## 2019-10-29 RX ADMIN — PROPOFOL 50 MG: 10 INJECTION, EMULSION INTRAVENOUS at 11:09

## 2019-10-29 RX ADMIN — PROPOFOL 50 MG: 10 INJECTION, EMULSION INTRAVENOUS at 10:41

## 2019-10-29 RX ADMIN — PROPOFOL 50 MG: 10 INJECTION, EMULSION INTRAVENOUS at 10:38

## 2019-10-29 RX ADMIN — PROPOFOL 50 MG: 10 INJECTION, EMULSION INTRAVENOUS at 11:00

## 2019-10-29 RX ADMIN — PROPOFOL 50 MG: 10 INJECTION, EMULSION INTRAVENOUS at 10:54

## 2019-10-29 RX ADMIN — LIDOCAINE HYDROCHLORIDE 100 MG: 10 INJECTION, SOLUTION INFILTRATION; PERINEURAL at 10:35

## 2019-10-29 RX ADMIN — PROPOFOL 50 MG: 10 INJECTION, EMULSION INTRAVENOUS at 10:57

## 2019-10-29 RX ADMIN — PROPOFOL 50 MG: 10 INJECTION, EMULSION INTRAVENOUS at 10:50

## 2019-10-29 RX ADMIN — PROPOFOL 100 MG: 10 INJECTION, EMULSION INTRAVENOUS at 10:35

## 2019-10-29 RX ADMIN — SODIUM CHLORIDE, SODIUM LACTATE, POTASSIUM CHLORIDE, AND CALCIUM CHLORIDE 100 ML/HR: .6; .31; .03; .02 INJECTION, SOLUTION INTRAVENOUS at 10:12

## 2019-10-29 RX ADMIN — SODIUM CHLORIDE, SODIUM LACTATE, POTASSIUM CHLORIDE, AND CALCIUM CHLORIDE: .6; .31; .03; .02 INJECTION, SOLUTION INTRAVENOUS at 10:07

## 2019-10-29 RX ADMIN — PROPOFOL 50 MG: 10 INJECTION, EMULSION INTRAVENOUS at 11:04

## 2019-10-29 NOTE — ANESTHESIA PREPROCEDURE EVALUATION
Review of Systems/Medical History  Patient summary reviewed    No history of anesthetic complications     Cardiovascular  EKG reviewed, Hyperlipidemia, Hypertension controlled, No dysrhythmias , No angina ,    Pulmonary  Asthma , well controlled/ stable ,        GI/Hepatic     Hiatal hernia, Bowel prep            Endo/Other  Diabetes Insulin, History of thyroid disease , hypothyroidism,   Obesity    GYN       Hematology  Anemia ,     Musculoskeletal    Arthritis     Neurology    CVA , no residual symptoms,    Psychology   Anxiety, Depression ,              Physical Exam    Airway    Mallampati score: II  TM Distance: >3 FB  Neck ROM: full     Dental       Cardiovascular  Rhythm: regular, Rate: normal,     Pulmonary  Breath sounds clear to auscultation,     Other Findings        Anesthesia Plan  ASA Score- 3     Anesthesia Type- IV sedation with anesthesia with ASA Monitors  Additional Monitors:   Airway Plan:         Plan Factors-  Patient did not smoke on day of surgery  Induction- intravenous  Postoperative Plan-     Informed Consent- Anesthetic plan and risks discussed with patient  I personally reviewed this patient with the CRNA  Discussed and agreed on the Anesthesia Plan with the CRNA  [place]ye [place]

## 2019-10-29 NOTE — ANESTHESIA POSTPROCEDURE EVALUATION
Post-Op Assessment Note    CV Status:  Stable       Mental Status:  Arousable   Hydration Status:  Stable   PONV Controlled:  None   Airway Patency:  Patent   Post Op Vitals Reviewed: Yes      Staff: CRNA           BP  94/50    Temp      Pulse  69   Resp   13   SpO2   100

## 2019-10-29 NOTE — H&P
History and Physical - [facility]  William Mancia 59 y o  female MRN: [id number]                  HPI: William Mancia is a 59y o  year old female who presents for abdominal pain as well as colon cancer screening  She has prior H pylori infection  REVIEW OF SYSTEMS: Per the HPI, and otherwise unremarkable  Historical Information   Past Medical History:   Diagnosis Date    Abnormal ECG     last assessed: 5/15/17    Abnormal mammogram     last assessed: 7/11/17    Abnormal stress test     last assesed: 7/24/17    Anemia     Anxiety     Arthritis     Asthma     Back problem     Breast cyst     Chest pain     last assessed: 7/24/17    Chronic pain disorder     Cyst of left breast     last assessed: 8/18/17    Depression     Depression     resolved: 1/2/18    Diabetes mellitus ([address] )     Hiatal hernia     last assessed: 11/7/17    Hyperlipidemia     Hypertension     Keloid of skin     last assessed: 11/2/16    Neuropathy     Psychiatric disorder     anxiety    Stroke [facility])     TIA 2005    Tuberculosis     as a child      Past Surgical History:   Procedure Laterality Date    ARM WOUND REPAIR / CLOSURE      CARPAL TUNNEL RELEASE      CARPAL TUNNEL RELEASE Left     CATARACT EXTRACTION Bilateral     2015    COLONOSCOPY      CYST REMOVAL      right upper arm   EYE SURGERY      cataract removaql    FL LUMBAR PUNCTURE  8/15/2019    LA ESOPHAGOGASTRODUODENOSCOPY TRANSORAL DIAGNOSTIC N/A 1/5/2018    Procedure: ESOPHAGOGASTRODUODENOSCOPY (EGD);   Surgeon: Chey Story DO;  Location: [facility] MAIN OR;  Service: Gastroenterology    LA WRIST Volodymyr Henle LIG Left 4/19/2019    Procedure: ENDOSCOPIC CARPAL TUNNEL RELEASE;  Surgeon: Bismark Aguilar MD;  [facility] MAIN OR;  Service: Orthopedics    [facility] [address] THYROID BIOPSY  4/15/2019     Social History   Social History     Substance and Sexual Activity   Alcohol Use Not Currently    Alcohol/week: 0 0 standard drinks    Frequency: Never    Drinks per session: Patient refused    Binge frequency: Never    Comment: 0     Social History     Substance and Sexual Activity   Drug Use No     Social History     Tobacco Use   Smoking Status Never Smoker   Smokeless Tobacco Never Used     Family History   Problem Relation Age of Onset    Heart disease Mother     Diabetes Mother     Arthritis Mother     Heart attack Mother     Hypertension Mother     Kidney disease Father     Hypertension Father     Arthritis Sister     Cancer Maternal Grandmother     Stroke Maternal Grandmother     Arthritis Paternal Grandmother     Cancer Paternal Grandmother     Heart attack Paternal Grandmother     Stroke Maternal Aunt     Heart attack Maternal Uncle     Cancer Family         spinal column    Coronary artery disease Family     Glaucoma Family     Rheum arthritis Family        Meds/Allergies       (Not in a hospital admission)    Allergies   Allergen Reactions    Percocet [Oxycodone-Acetaminophen] GI Intolerance    Vicodin [Hydrocodone-Acetaminophen] GI Intolerance       Objective     /78   Pulse 84   Temp 98 4 °F (36 9 °C) (Tympanic)   Resp 18   Ht 5' 5" (1 651 m)   Wt 103 kg (227 lb)   SpO2 95%   BMI 37 77 kg/m²       PHYSICAL EXAM    Gen: NAD  CV: RRR  CHEST: Clear  ABD: soft, NT/ND  EXT: no edema      ASSESSMENT/PLAN:  This is a 59y o  year old female here for EGD and colonoscopy, and she is stable and optimized for her procedure

## 2019-10-30 LAB — GLUCOSE SERPL-MCNC: 126 MG/DL (ref 65–140)

## 2019-10-31 ENCOUNTER — OFFICE VISIT (OUTPATIENT)
Dept: FAMILY MEDICINE CLINIC | Facility: CLINIC | Age: 64
End: 2019-10-31
Payer: COMMERCIAL

## 2019-10-31 VITALS
WEIGHT: 225 LBS | DIASTOLIC BLOOD PRESSURE: 78 MMHG | SYSTOLIC BLOOD PRESSURE: 132 MMHG | HEIGHT: 65 IN | BODY MASS INDEX: 37.49 KG/M2

## 2019-10-31 DIAGNOSIS — R87.619 ABNORMAL CERVICAL PAPANICOLAOU SMEAR, UNSPECIFIED ABNORMAL PAP FINDING: ICD-10-CM

## 2019-10-31 DIAGNOSIS — E11.8 TYPE 2 DIABETES MELLITUS WITH COMPLICATION, WITH LONG-TERM CURRENT USE OF INSULIN (HCC): Primary | ICD-10-CM

## 2019-10-31 DIAGNOSIS — N63.20 BREAST MASS, LEFT: ICD-10-CM

## 2019-10-31 DIAGNOSIS — S16.1XXA ACUTE STRAIN OF NECK MUSCLE, INITIAL ENCOUNTER: ICD-10-CM

## 2019-10-31 DIAGNOSIS — Z79.4 TYPE 2 DIABETES MELLITUS WITH COMPLICATION, WITH LONG-TERM CURRENT USE OF INSULIN (HCC): Primary | ICD-10-CM

## 2019-10-31 DIAGNOSIS — R74.01 TRANSAMINITIS: ICD-10-CM

## 2019-10-31 PROCEDURE — 3008F BODY MASS INDEX DOCD: CPT | Performed by: FAMILY MEDICINE

## 2019-10-31 PROCEDURE — 99213 OFFICE O/P EST LOW 20 MIN: CPT | Performed by: FAMILY MEDICINE

## 2019-10-31 NOTE — PROGRESS NOTES
Assessment/Plan:    Problem List Items Addressed This Visit        Endocrine    Type 2 diabetes mellitus with complication, with long-term current use of insulin ([address] ) - Primary           Diagnoses and all orders for this visit:    Type 2 diabetes mellitus with complication, with long-term current use of insulin ([address] )    Other orders  -     Polyvinyl Alcohol (LIQUID TEARS OP); Apply to eye        No problem-specific Assessment & Plan notes found for this encounter  Subjective:      Patient ID: Man Noguera is a 59 y o  female  Elvis Enamorado is here today follow-up visit she looks very well she has been eating totally differently really taking care of herself starting to walk she looks like she has lost significant weight on she has been making her visits with her subspecialists she is going to be seeing Neurology tomorrow she is due for a mammogram she also has pain in the cervical spine very much like a cervical strain or arthritis will need an x-ray of cervical spine      The following portions of the patient's history were reviewed and updated as appropriate:   She has a past medical history of Abnormal ECG, Abnormal mammogram, Abnormal stress test, Anemia, Anxiety, Arthritis, Asthma, Back problem, Breast cyst, Chest pain, Chronic pain disorder, Cyst of left breast, Depression, Depression, Diabetes mellitus ([address] ), Hiatal hernia, Hyperlipidemia, Hypertension, Keloid of skin, Neuropathy, Psychiatric disorder, Stroke ([address] ), and Tuberculosis  ,  does not have any pertinent problems on file  ,   has a past surgical history that includes Cyst Removal; Eye surgery; Tubal ligation; Arm wound repair / closure; pr esophagogastroduodenoscopy transoral diagnostic (N/A, 1/5/2018); Cataract extraction (Bilateral); US guided thyroid biopsy (4/15/2019); pr wrist arthroscop,release xvers lig (Left, 4/19/2019); Carpal tunnel release; Carpal tunnel release (Left); FL lumbar puncture (8/15/2019); and Colonoscopy  ,  family history includes Arthritis in her mother, paternal grandmother, and sister; Cancer in her family, maternal grandmother, and paternal grandmother; Coronary artery disease in her family; Diabetes in her mother; Glaucoma in her family; Heart attack in her maternal uncle, mother, and paternal grandmother; Heart disease in her mother; Hypertension in her father and mother; Kidney disease in her father; Rheum arthritis in her family; Stroke in her maternal aunt and maternal grandmother  ,   reports that she has never smoked  She has never used smokeless tobacco  She reports that she drank alcohol  She reports that she does not use drugs  ,  is allergic to percocet [oxycodone-acetaminophen] and vicodin [hydrocodone-acetaminophen]     Current Outpatient Medications   Medication Sig Dispense Refill    albuterol (PROVENTIL HFA,VENTOLIN HFA) 90 mcg/act inhaler Inhale 2 puffs every 4 (four) hours as needed for wheezing 1 Inhaler 5    amLODIPine (NORVASC) 10 mg tablet   0    aspirin 81 MG tablet Take 81 mg by mouth daily        atorvastatin (LIPITOR) 40 mg tablet Take 1 tablet (40 mg total) by mouth daily with dinner 90 tablet 2    beclomethasone (QVAR) 80 MCG/ACT inhaler Inhale 2 puffs 2 (two) times a day 1 Inhaler 5    Blood Glucose Calibration (ACCU-CHEK COMPACT PLUS CONTROL) SOLN by In Vitro route daily 1 each 0    Blood Glucose Monitoring Suppl (ACCU-CHEK SHANE PLUS) w/Device KIT by Does not apply route 2 (two) times a day 1 kit 0    busPIRone (BUSPAR) 5 mg tablet take 1 tablet by mouth three times a day 90 tablet 2    escitalopram (LEXAPRO) 10 mg tablet Take 1 tablet (10 mg total) by mouth daily with breakfast 30 tablet 2    gabapentin (NEURONTIN) 300 mg capsule Take 1 capsule (300 mg total) by mouth 3 (three) times a day 90 capsule 5    glucose blood (ACCU-CHEK SHANE PLUS) test strip Test twice daily 100 each 5    hydrochlorothiazide (HYDRODIURIL) 25 mg tablet   0    hydrocortisone 0 5 % cream Apply topically 2 (two) times a day as needed       insulin glargine (BASAGLAR KWIKPEN) 100 units/mL injection pen Inject 25 Units under the skin daily at bedtime 5 pen 0    Insulin Pen Needle (B-D UF III MINI PEN NEEDLES) 31G X 5 MM MISC Inject under the skin daily 100 each 3    insulin regular (HumuLIN R,NovoLIN R) 100 units/mL injection 3 units at breakfast 4 units at lunch 5 units at supper 10 mL 3    JANUVIA 100 MG tablet take 1 tablet by mouth once daily 30 tablet 5    Lancets (ACCU-CHEK MULTICLIX) lancets Test bid 100 each 0    lisinopril (ZESTRIL) 40 mg tablet take 1 tablet by mouth once daily 30 tablet 5    pantoprazole (PROTONIX) 40 mg tablet Take 1 tablet (40 mg total) by mouth daily 30 tablet 5    Polyvinyl Alcohol (LIQUID TEARS OP) Apply to eye      RA PAIN RELIEF ACETAMINOPHEN 325 MG tablet take 1-2 tablets by mouth every 6 hours if needed for MODERATE pain  0     No current facility-administered medications for this visit  Facility-Administered Medications Ordered in Other Visits   Medication Dose Route Frequency Provider Last Rate Last Dose    lactated ringers infusion  100 mL/hr Intravenous Continuous Ángela Sierra MD   Stopped at 10/29/19 1232    ondansetron (ZOFRAN) injection 4 mg  4 mg Intravenous Once PRN Ángela Sierra MD           Review of Systems   Constitutional: Negative for activity change, appetite change, diaphoresis, fatigue and fever  HENT: Negative  Eyes: Negative  Respiratory: Negative for apnea, cough, chest tightness, shortness of breath and wheezing  Cardiovascular: Negative for chest pain, palpitations and leg swelling  Gastrointestinal: Negative for abdominal distention, abdominal pain, anal bleeding, constipation, diarrhea, nausea and vomiting  Endocrine: Negative for cold intolerance, heat intolerance, polydipsia, polyphagia and polyuria          Type 2 diabetes due for her diabetic lab work   Genitourinary: Negative for difficulty urinating, dysuria, flank pain, hematuria and urgency  Musculoskeletal: Negative for arthralgias, back pain, gait problem, joint swelling and myalgias  Skin: Negative for color change, rash and wound  Allergic/Immunologic: Negative for environmental allergies, food allergies and immunocompromised state  Neurological: Negative for dizziness, seizures, syncope, speech difficulty, numbness and headaches  Hematological: Negative for adenopathy  Does not bruise/bleed easily  Psychiatric/Behavioral: Negative for agitation, behavioral problems, hallucinations, sleep disturbance and suicidal ideas  Objective:  Vitals:    10/31/19 1415   BP: 132/78   BP Location: Right arm   Patient Position: Sitting   Cuff Size: Standard   Weight: 102 kg (225 lb)   Height: 5' 5" (1 651 m)     Body mass index is 37 44 kg/m²  Physical Exam   Constitutional: She is oriented to person, place, and time  She appears well-developed and well-nourished  No distress  HENT:   Head: Normocephalic  Right Ear: External ear normal    Left Ear: External ear normal    Nose: Nose normal    Mouth/Throat: Oropharynx is clear and moist    Eyes: Pupils are equal, round, and reactive to light  Conjunctivae and EOM are normal  Right eye exhibits no discharge  Left eye exhibits no discharge  No scleral icterus  Neck: Normal range of motion  No tracheal deviation present  No thyromegaly present  Cardiovascular: Normal rate, regular rhythm and normal heart sounds  Exam reveals no gallop and no friction rub  Pulses are no weak pulses  No murmur heard  Pulses:       Dorsalis pedis pulses are 2+ on the right side, and 2+ on the left side  Posterior tibial pulses are 2+ on the right side, and 2+ on the left side  Pulmonary/Chest: Effort normal and breath sounds normal  No respiratory distress  She has no wheezes  Abdominal: Soft  Bowel sounds are normal  She exhibits no mass  There is no tenderness  There is no guarding     Musculoskeletal: She exhibits no edema or deformity  Feet:   Right Foot:   Skin Integrity: Negative for ulcer, skin breakdown, erythema, warmth, callus or dry skin  Left Foot:   Skin Integrity: Negative for ulcer, skin breakdown, erythema, warmth, callus or dry skin  Lymphadenopathy:     She has no cervical adenopathy  Neurological: She is alert and oriented to person, place, and time  No cranial nerve deficit  Skin: Skin is warm and dry  No rash noted  She is not diaphoretic  No erythema  Psychiatric: She has a normal mood and affect  Thought content normal      Patient's shoes and socks removed  Right Foot/Ankle   Right Foot Inspection  Skin Exam: skin normal and skin intact no dry skin, no warmth, no callus, no erythema, no maceration, no abnormal color, no pre-ulcer, no ulcer and no callus                          Toe Exam: ROM and strength within normal limits  Sensory   Vibration: intact  Proprioception: intact   Monofilament testing: intact  Vascular  Capillary refills: < 3 seconds  The right DP pulse is 2+  The right PT pulse is 2+  Left Foot/Ankle  Left Foot Inspection  Skin Exam: skin normal and skin intactno dry skin, no warmth, no erythema, no maceration, normal color, no pre-ulcer, no ulcer and no callus                         Toe Exam: ROM and strength within normal limits                   Sensory   Vibration: intact  Proprioception: intact  Monofilament: intact  Vascular  Capillary refills: < 3 seconds  The left DP pulse is 2+  The left PT pulse is 2+  Assign Risk Category:  No deformity present; No loss of protective sensation;  No weak pulses       Risk: 0

## 2019-11-01 ENCOUNTER — OFFICE VISIT (OUTPATIENT)
Dept: NEUROLOGY | Facility: CLINIC | Age: 64
End: 2019-11-01
Payer: COMMERCIAL

## 2019-11-01 ENCOUNTER — TELEPHONE (OUTPATIENT)
Dept: OBGYN CLINIC | Facility: MEDICAL CENTER | Age: 64
End: 2019-11-01

## 2019-11-01 VITALS
RESPIRATION RATE: 16 BRPM | BODY MASS INDEX: 37.82 KG/M2 | DIASTOLIC BLOOD PRESSURE: 88 MMHG | WEIGHT: 227 LBS | HEIGHT: 65 IN | HEART RATE: 70 BPM | SYSTOLIC BLOOD PRESSURE: 164 MMHG

## 2019-11-01 DIAGNOSIS — H53.2 DIPLOPIA: Primary | ICD-10-CM

## 2019-11-01 DIAGNOSIS — G70.00 MYASTHENIA GRAVIS (HCC): ICD-10-CM

## 2019-11-01 PROCEDURE — 99213 OFFICE O/P EST LOW 20 MIN: CPT | Performed by: PSYCHIATRY & NEUROLOGY

## 2019-11-01 NOTE — LETTER
November 1, 2019     Sukhdeep Baires DO  [facility]    Patient: Dary Qureshi   YOB: 1955   Date of Visit: 11/1/2019       Dear Dr Jevon Charles: Thank you for referring Dary Qureshi to me for evaluation  Below are my notes for this consultation  If you have questions, please do not hesitate to call me  I look forward to following your patient along with you  Sincerely,        Katty Calixto MD        CC: No Recipients  Katty Calixto MD  11/1/2019  3:11 PM  Sign at close encounter  Patient ID: Dary Qureshi is a 59 y o  female  Assessment/Plan:    Sixth nerve palsy of right eye  Given the history of sudden onset horizontal diplopia, exaggerated in right lateral gaze, associated with pain in about the right eye, this does appear to be consistent with a diagnosis of a 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  Not at all typical for myasthenia although I would like to complete her workup with Musk and striated muscle antibodies  Had an extensive inpatient evaluation with many study results pending at the time of discharge  Reviewed study results with patient:  Lyme serology negative, GARY and rheumatoid factor negative, Sjogren's antibodies negative, normal TSH, spinal fluid with no significant red or white cells and a mildly elevated protein consistent with her diabetic circumstance, CSF MS panel with no oligoclonal bands and normal IgG index and synthesis rate, acetyl choline receptor binding, blocking and modulating antibodies all negative  Additionally, with an MRI brain and orbits demonstrating a moderate to severe amount of chronic microangiopathic changes  Partially empty sella was present suggesting the possibility of intracranial hypertension  This prompted lumbar puncture with normal pressures  Also with a CTA head which was unremarkable    She is seeing Ophthalmology and her course is one of slow improvement in her diplopia  --will be obtaining reports from her formal outpatient ophthalmologic evaluation  --for completeness will be ordering MUSK and stride muscle antibodies  --she will continue her ongoing follow-up with ophthalmology  She will follow up in 8 weeks  Subjective:    HPI  Patient, 59years of age and right-handed, presents for further assessment in the aftermath of her mid August hospitalizations  On that occasion, she was admitted after experiencing the sudden onset of horizontal diplopia, exaggerated in right lateral gaze associated with pain in about the right eye  There was a question at the time of her evaluation of possibly some modest impairment of abduction left eye and question also of possible mild bilateral ptosis  An MRI brain and orbits was performed  The study demonstrated moderate to severe chronic microangiopathic changes  No orbital issues  Comment was made as to a partially empty sella  This raised the question of possible intracranial hypertension  Lumbar puncture was performed  Pressures normal   CTA head unremarkable  Spinal fluid demonstrated no significant white or red cells and a mildly elevated protein consistent with her diabetic circumstance  An MS panel which was not available at the time of discharge demonstrated no oligo clonal bands and normal IgG index and synthesis rate  Additional study included negative Lyme serology, negative GARY and rheumatoid factor, negative Sjogren's antibodies, and a normal TSH 1 035  As result of a suggestion of a possible association with myasthenia, a myasthenia gravis panel was performed  Those results became available after discharge  Acetyl choline receptor binding, blocking and modulating antibodies were all negative  She was arbitrarily placed on Mestinon during her hospitalization but this provided no improvement in her diplopia    It was discontinued post hospitalization as she was unable to have it approved through her insurance  Post discharge, she notes a slow improvement in her diplopia as it is becoming progressively less apparent  She is also now being followed by outpatient Ophthalmology  Past Medical History:   Diagnosis Date    Abnormal ECG     last assessed: 5/15/17    Abnormal mammogram     last assessed: 7/11/17    Abnormal stress test     last assesed: 7/24/17    Anemia     Anxiety     Arthritis     Asthma     Back problem     Breast cyst     Chest pain     last assessed: 7/24/17    Chronic pain disorder     Cyst of left breast     last assessed: 8/18/17    Depression     Depression     resolved: 1/2/18    Diabetes mellitus ([address] )     Hiatal hernia     last assessed: 11/7/17    Hyperlipidemia     Hypertension     Keloid of skin     last assessed: 11/2/16    Neuropathy     Psychiatric disorder     anxiety    Stroke [facility])     TIA 2005    Tuberculosis     as a child      Past Surgical History:   Procedure Laterality Date    ARM WOUND REPAIR / CLOSURE      CARPAL TUNNEL RELEASE      CARPAL TUNNEL RELEASE Left     CATARACT EXTRACTION Bilateral     2015    COLONOSCOPY      CYST REMOVAL      right upper arm   EYE SURGERY      cataract removaql    FL LUMBAR PUNCTURE  8/15/2019    IA ESOPHAGOGASTRODUODENOSCOPY TRANSORAL DIAGNOSTIC N/A 1/5/2018    Procedure: ESOPHAGOGASTRODUODENOSCOPY (EGD);   Surgeon: Romana Barth, DO;  Location: [facility] MAIN OR;  Service: Gastroenterology    IA WRIST Colie Risen LIG Left 4/19/2019    Procedure: ENDOSCOPIC CARPAL TUNNEL RELEASE;  Surgeon: Francine Yusuf MD;  Location: [address] [place] [address] MAIN OR;  Service: Orthopedics    [address] THYROID BIOPSY  4/15/2019     Social History     Socioeconomic History    Marital status: /Civil Union     Spouse name: None    Number of children: None    Years of education: None    Highest education level: None   Occupational History    Occupation: housewife/homemaker   Social Needs    Financial resource strain: None    Food insecurity:     Worry: None     Inability: None    Transportation needs:     Medical: None     Non-medical: None   Tobacco Use    Smoking status: Never Smoker    Smokeless tobacco: Never Used   Substance and Sexual Activity    Alcohol use: Not Currently     Alcohol/week: 0 0 standard drinks     Frequency: Never     Drinks per session: Patient refused     Binge frequency: Never     Comment: 0    Drug use: No    Sexual activity: Not Currently     Partners: Male   Lifestyle    Physical activity:     Days per week: None     Minutes per session: None    Stress: None   Relationships    Social connections:     Talks on phone: None     Gets together: None     Attends Rastafari service: None     Active member of club or organization: None     Attends meetings of clubs or organizations: None     Relationship status: None    Intimate partner violence:     Fear of current or ex partner: None     Emotionally abused: None     Physically abused: None     Forced sexual activity: None   Other Topics Concern    None   Social History Narrative    Always uses seatbelts    No living will     Family History   Problem Relation Age of Onset    Heart disease Mother     Diabetes Mother     Arthritis Mother     Heart attack Mother     Hypertension Mother     Kidney disease Father     Hypertension Father     Arthritis Sister     Cancer Maternal Grandmother     Stroke Maternal Grandmother     Arthritis Paternal Grandmother     Cancer Paternal Grandmother     Heart attack Paternal Grandmother     Stroke Maternal Aunt     Heart attack Maternal Uncle     Cancer Family         spinal column    Coronary artery disease Family     Glaucoma Family     Rheum arthritis Family      Allergies   Allergen Reactions    Percocet [Oxycodone-Acetaminophen] GI Intolerance    Vicodin [Hydrocodone-Acetaminophen] GI Intolerance       Current Outpatient Medications:     albuterol (Jackeline Craze HFA) 90 mcg/act inhaler, Inhale 2 puffs every 4 (four) hours as needed for wheezing, Disp: 1 Inhaler, Rfl: 5    amLODIPine (NORVASC) 10 mg tablet, , Disp: , Rfl: 0    aspirin 81 MG tablet, Take 81 mg by mouth daily  , Disp: , Rfl:     atorvastatin (LIPITOR) 40 mg tablet, Take 1 tablet (40 mg total) by mouth daily with dinner, Disp: 90 tablet, Rfl: 2    beclomethasone (QVAR) 80 MCG/ACT inhaler, Inhale 2 puffs 2 (two) times a day, Disp: 1 Inhaler, Rfl: 5    Blood Glucose Calibration (ACCU-CHEK COMPACT PLUS CONTROL) SOLN, by In Vitro route daily, Disp: 1 each, Rfl: 0    Blood Glucose Monitoring Suppl (ACCU-CHEK SHANE PLUS) w/Device KIT, by Does not apply route 2 (two) times a day, Disp: 1 kit, Rfl: 0    busPIRone (BUSPAR) 5 mg tablet, take 1 tablet by mouth three times a day, Disp: 90 tablet, Rfl: 2    escitalopram (LEXAPRO) 10 mg tablet, Take 1 tablet (10 mg total) by mouth daily with breakfast, Disp: 30 tablet, Rfl: 2    gabapentin (NEURONTIN) 300 mg capsule, Take 1 capsule (300 mg total) by mouth 3 (three) times a day, Disp: 90 capsule, Rfl: 5    glucose blood (ACCU-CHEK SHANE PLUS) test strip, Test twice daily, Disp: 100 each, Rfl: 5    hydrochlorothiazide (HYDRODIURIL) 25 mg tablet, , Disp: , Rfl: 0    hydrocortisone 0 5 % cream, Apply topically 2 (two) times a day as needed , Disp: , Rfl:     insulin glargine (BASAGLAR KWIKPEN) 100 units/mL injection pen, Inject 25 Units under the skin daily at bedtime, Disp: 5 pen, Rfl: 0    Insulin Pen Needle (B-D UF III MINI PEN NEEDLES) 31G X 5 MM MISC, Inject under the skin daily, Disp: 100 each, Rfl: 3    insulin regular (HumuLIN R,NovoLIN R) 100 units/mL injection, 3 units at breakfast 4 units at lunch 5 units at supper, Disp: 10 mL, Rfl: 3    JANUVIA 100 MG tablet, take 1 tablet by mouth once daily, Disp: 30 tablet, Rfl: 5    Lancets (ACCU-CHEK MULTICLIX) lancets, Test bid, Disp: 100 each, Rfl: 0    lisinopril (ZESTRIL) 40 mg tablet, take 1 tablet by mouth once daily, Disp: 30 tablet, Rfl: 5    pantoprazole (PROTONIX) 40 mg tablet, Take 1 tablet (40 mg total) by mouth daily, Disp: 30 tablet, Rfl: 5    Polyvinyl Alcohol (LIQUID TEARS OP), Apply to eye, Disp: , Rfl:     RA PAIN RELIEF ACETAMINOPHEN 325 MG tablet, take 1-2 tablets by mouth every 6 hours if needed for MODERATE pain, Disp: , Rfl: 0  No current facility-administered medications for this visit  Facility-Administered Medications Ordered in Other Visits:     lactated ringers infusion, 100 mL/hr, Intravenous, Continuous, Silverio Roman MD, Stopped at 10/29/19 1232    ondansetron (ZOFRAN) injection 4 mg, 4 mg, Intravenous, Once PRN, Silverio Roman MD    Objective:    Blood pressure 164/88, pulse 70, resp  rate 16, height 5' 5" (1 651 m), weight 103 kg (227 lb)  Physical Exam  Head normocephalic  Eyes nonicteric with prominent eyes and redundant tissues in about the eyes  Pupils with evidence of past cataract surgeries bilaterally  No audible anterior neck bruits  Lungs clear to auscultation  Rhythm regular  GI (abdomen) soft nontender with bowel sounds present  Neurological Exam  Alert  Pleasantly interactive  Fully oriented  Strong voice  No voice fatigue  Unremarkable spontaneous gait  Cranial Nerves:   I: Olfactory sense intact bilaterally  II: Visual fields full to confrontation  Pupils with evidence of past cataract surgeries bilaterally    Fundus: with bilaterally marginated discs  III,IV,VI:  Extraocular movement full with the exception of a modest reduction in abduction right eye associated with horizontal diplopia in right sided gaze  V: [place] and pterygoid strength  Sensation in the V1 through V3 distributions intact to pinprick and light touch bilaterally  VII: Face is symmetric with no weakness noted  VIII: Audition intact to finger rub bilaterally  IX/X: Uvula midline  Soft palate elevation symmetric  XI: Trapezius and SCM strength 5/5 bilaterally    XII: Tongue midline with no atrophy or fasciculations with appropriate movement  Accurate with finger-to-nose bilaterally  Full symmetrical strength throughout the 4 extremities with no for fatigable and of weakness  Sensory testing grossly intact to pin and position throughout  Muscle stretch reflexes bilaterally 1+ throughout the upper extremities, bilaterally 1 at the knees and bilaterally 1+ at the ankles  Toe response downgoing bilaterally  ROS:    Review of Systems   Constitutional: Negative  Negative for appetite change and fever  HENT: Negative  Negative for hearing loss, tinnitus, trouble swallowing and voice change  Eyes: Negative  Negative for photophobia and pain  Respiratory: Negative  Negative for shortness of breath  Cardiovascular: Negative  Negative for palpitations  Gastrointestinal: Negative  Negative for nausea and vomiting  Endocrine: Negative  Negative for cold intolerance and heat intolerance  Genitourinary: Negative  Negative for dysuria, frequency and urgency  Musculoskeletal: Positive for back pain (lower back pain), neck pain and neck stiffness  Negative for myalgias  Skin: Negative  Negative for rash  Allergic/Immunologic: Negative  Neurological: Positive for dizziness  Negative for tremors, seizures, syncope, facial asymmetry, speech difficulty, weakness, light-headedness, numbness and headaches  Hematological: Negative  Does not bruise/bleed easily  Psychiatric/Behavioral: Positive for sleep disturbance  Negative for confusion and hallucinations  The patient is nervous/anxious  I personally reviewed the ROS that was entered by the medical assistant  *Please note this document was created using voice recognition software and may contain sound-alike word errors  *

## 2019-11-01 NOTE — PROGRESS NOTES
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  Not at all typical for myasthenia although I would like to complete her workup with Musk and striated muscle antibodies  Had an extensive inpatient evaluation with many study results pending at the time of discharge  Reviewed study results with patient:  Lyme serology negative, GARY and rheumatoid factor negative, Sjogren's antibodies negative, normal TSH, spinal fluid with no significant red or white cells and a mildly elevated protein consistent with her diabetic circumstance, CSF MS panel with no oligoclonal bands and normal IgG index and synthesis rate, acetyl choline receptor binding, blocking and modulating antibodies all negative  Additionally, with an MRI brain and orbits demonstrating a moderate to severe amount of chronic microangiopathic changes  Partially empty sella was present suggesting the possibility of intracranial hypertension  This prompted lumbar puncture with normal pressures  Also with a CTA head which was unremarkable  She is seeing Ophthalmology and her course is one of slow improvement in her diplopia  --will be obtaining reports from her formal outpatient ophthalmologic evaluation  --for completeness will be ordering MUSK and stride muscle antibodies  --she will continue her ongoing follow-up with ophthalmology  She will follow up in 8 weeks  Subjective:    HPI  Patient, 59years of age and right-handed, presents for further assessment in the aftermath of her mid August hospitalizations    On that occasion, she was admitted after experiencing the sudden onset of horizontal diplopia, exaggerated in right lateral gaze associated with pain in about the right eye  There was a question at the time of her evaluation of possibly some modest impairment of abduction left eye and question also of possible mild bilateral ptosis  An MRI brain and orbits was performed  The study demonstrated moderate to severe chronic microangiopathic changes  No orbital issues  Comment was made as to a partially empty sella  This raised the question of possible intracranial hypertension  Lumbar puncture was performed  Pressures normal   CTA head unremarkable  Spinal fluid demonstrated no significant white or red cells and a mildly elevated protein consistent with her diabetic circumstance  An MS panel which was not available at the time of discharge demonstrated no oligo clonal bands and normal IgG index and synthesis rate  Additional study included negative Lyme serology, negative GARY and rheumatoid factor, negative Sjogren's antibodies, and a normal TSH 1 035  As result of a suggestion of a possible association with myasthenia, a myasthenia gravis panel was performed  Those results became available after discharge  Acetyl choline receptor binding, blocking and modulating antibodies were all negative  She was arbitrarily placed on Mestinon during her hospitalization but this provided no improvement in her diplopia  It was discontinued post hospitalization as she was unable to have it approved through her insurance  Post discharge, she notes a slow improvement in her diplopia as it is becoming progressively less apparent  She is also now being followed by outpatient Ophthalmology      Past Medical History:   Diagnosis Date    Abnormal ECG     last assessed: 5/15/17    Abnormal mammogram     last assessed: 7/11/17    Abnormal stress test     last assesed: 7/24/17    Anemia     Anxiety     Arthritis     Asthma     Back problem     Breast cyst     Chest pain     last assessed: 7/24/17    Chronic pain disorder     Cyst of left breast     last assessed: 8/18/17    Depression     Depression     resolved: 1/2/18    Diabetes mellitus ([address] )     Hiatal hernia     last assessed: 11/7/17    Hyperlipidemia     Hypertension     Keloid of skin     last assessed: 11/2/16    Neuropathy     Psychiatric disorder     anxiety    Stroke [facility])     TIA 2005    Tuberculosis     as a child      Past Surgical History:   Procedure Laterality Date    ARM WOUND REPAIR / CLOSURE      CARPAL TUNNEL RELEASE      CARPAL TUNNEL RELEASE Left     CATARACT EXTRACTION Bilateral     2015    COLONOSCOPY      CYST REMOVAL      right upper arm   EYE SURGERY      cataract removaql    FL LUMBAR PUNCTURE  8/15/2019    NC ESOPHAGOGASTRODUODENOSCOPY TRANSORAL DIAGNOSTIC N/A 1/5/2018    Procedure: ESOPHAGOGASTRODUODENOSCOPY (EGD);   Surgeon: Ludivina Elizondo DO;  Location: [facility] MA[facility];  Service: Gastroenterology    NC WRIST Juanita Lone LIG Left 4/19/2019    Procedure: ENDOSCOPIC CARPAL TUNNEL RELEASE;  Surgeon: Sandy Dow MD;  Location[facility] MAIN OR;  Service: Orthopedics    [address] [place] [address] THYROID BIOPSY  4/15/2019     Social History     Socioeconomic History    Marital status: /Civil Union     Spouse name: None    Number of children: None    Years of education: None    Highest education level: None   Occupational History    Occupation: housewife/homemaker   Social Needs    Financial resource strain: None    Food insecurity:     Worry: None     Inability: None    Transportation needs:     Medical: None     Non-medical: None   Tobacco Use    Smoking status: Never Smoker    Smokeless tobacco: Never Used   Substance and Sexual Activity    Alcohol use: Not Currently     Alcohol/week: 0 0 standard drinks     Frequency: Never     Drinks per session: Patient refused     Binge frequency: Never     Comment: 0    Drug use: No    Sexual activity: Not Currently     Partners: Male   Lifestyle    Physical activity:     Days per week: None Minutes per session: None    Stress: None   Relationships    Social connections:     Talks on phone: None     Gets together: None     Attends [employer] service: None     Active member of club or organization: None     Attends meetings of clubs or organizations: None     Relationship status: None    Intimate partner violence:     Fear of current or ex partner: None     Emotionally abused: None     Physically abused: None     Forced sexual activity: None   Other Topics Concern    None   Social History Narrative    Always uses seatbelts    No living will     Family History   Problem Relation Age of Onset    Heart disease Mother     Diabetes Mother     Arthritis Mother     Heart attack Mother     Hypertension Mother     Kidney disease Father     Hypertension Father     Arthritis Sister     Cancer Maternal Grandmother     Stroke Maternal Grandmother     Arthritis Paternal Grandmother     Cancer Paternal Grandmother     Heart attack Paternal Grandmother     Stroke Maternal Aunt     Heart attack Maternal Uncle     Cancer Family         spinal column    Coronary artery disease Family     Glaucoma Family     Rheum arthritis Family      Allergies   Allergen Reactions    Percocet [Oxycodone-Acetaminophen] GI Intolerance    Vicodin [Hydrocodone-Acetaminophen] GI Intolerance       Current Outpatient Medications:     albuterol (PROVENTIL HFA,VENTOLIN HFA) 90 mcg/act inhaler, Inhale 2 puffs every 4 (four) hours as needed for wheezing, Disp: 1 Inhaler, Rfl: 5    amLODIPine (NORVASC) 10 mg tablet, , Disp: , Rfl: 0    aspirin 81 MG tablet, Take 81 mg by mouth daily  , Disp: , Rfl:     atorvastatin (LIPITOR) 40 mg tablet, Take 1 tablet (40 mg total) by mouth daily with dinner, Disp: 90 tablet, Rfl: 2    beclomethasone (QVAR) 80 MCG/ACT inhaler, Inhale 2 puffs 2 (two) times a day, Disp: 1 Inhaler, Rfl: 5    Blood Glucose Calibration (ACCU-CHEK COMPACT PLUS CONTROL) SOLN, by In Vitro route daily, Disp: 1 each, Rfl: 0    Blood Glucose Monitoring Suppl (ACCU-CHEK SHANE PLUS) w/Device KIT, by Does not apply route 2 (two) times a day, Disp: 1 kit, Rfl: 0    busPIRone (BUSPAR) 5 mg tablet, take 1 tablet by mouth three times a day, Disp: 90 tablet, Rfl: 2    escitalopram (LEXAPRO) 10 mg tablet, Take 1 tablet (10 mg total) by mouth daily with breakfast, Disp: 30 tablet, Rfl: 2    gabapentin (NEURONTIN) 300 mg capsule, Take 1 capsule (300 mg total) by mouth 3 (three) times a day, Disp: 90 capsule, Rfl: 5    glucose blood (ACCU-CHEK SHANE PLUS) test strip, Test twice daily, Disp: 100 each, Rfl: 5    hydrochlorothiazide (HYDRODIURIL) 25 mg tablet, , Disp: , Rfl: 0    hydrocortisone 0 5 % cream, Apply topically 2 (two) times a day as needed , Disp: , Rfl:     insulin glargine (BASAGLAR KWIKPEN) 100 units/mL injection pen, Inject 25 Units under the skin daily at bedtime, Disp: 5 pen, Rfl: 0    Insulin Pen Needle (B-D UF III MINI PEN NEEDLES) 31G X 5 MM MISC, Inject under the skin daily, Disp: 100 each, Rfl: 3    insulin regular (HumuLIN R,NovoLIN R) 100 units/mL injection, 3 units at breakfast 4 units at lunch 5 units at supper, Disp: 10 mL, Rfl: 3    JANUVIA 100 MG tablet, take 1 tablet by mouth once daily, Disp: 30 tablet, Rfl: 5    Lancets (ACCU-CHEK MULTICLIX) lancets, Test bid, Disp: 100 each, Rfl: 0    lisinopril (ZESTRIL) 40 mg tablet, take 1 tablet by mouth once daily, Disp: 30 tablet, Rfl: 5    pantoprazole (PROTONIX) 40 mg tablet, Take 1 tablet (40 mg total) by mouth daily, Disp: 30 tablet, Rfl: 5    Polyvinyl Alcohol (LIQUID TEARS OP), Apply to eye, Disp: , Rfl:     RA PAIN RELIEF ACETAMINOPHEN 325 MG tablet, take 1-2 tablets by mouth every 6 hours if needed for MODERATE pain, Disp: , Rfl: 0  No current facility-administered medications for this visit       Facility-Administered Medications Ordered in Other Visits:     lactated ringers infusion, 100 mL/hr, Intravenous, Continuous, Arya Contreras MD, Stopped at 10/29/19 1232    ondansetron (ZOFRAN) injection 4 mg, 4 mg, Intravenous, Once PRN, Gokul Krueger MD    Objective:    Blood pressure 164/88, pulse 70, resp  rate 16, height 5' 5" (1 651 m), weight 103 kg (227 lb)  Physical Exam  Head normocephalic  Eyes nonicteric with prominent eyes and redundant tissues in about the eyes  Pupils with evidence of past cataract surgeries bilaterally  No audible anterior neck bruits  Lungs clear to auscultation  Rhythm regular  GI (abdomen) soft nontender with bowel sounds present  Neurological Exam  Alert  Pleasantly interactive  Fully oriented  Strong voice  No voice fatigue  Unremarkable spontaneous gait  Cranial Nerves:   I: Olfactory sense intact bilaterally  II: Visual fields full to confrontation  Pupils with evidence of past cataract surgeries bilaterally    Fundus: with bilaterally marginated discs  III,IV,VI:  Extraocular movement full with the exception of a modest reduction in abduction right eye associated with horizontal diplopia in right sided gaze  V: [place] and pterygoid strength  Sensation in the V1 through V3 distributions intact to pinprick and light touch bilaterally  VII: Face is symmetric with no weakness noted  VIII: Audition intact to finger rub bilaterally  IX/X: Uvula midline  Soft palate elevation symmetric  XI: Trapezius and SCM strength 5/5 bilaterally  XII: Tongue midline with no atrophy or fasciculations with appropriate movement  Accurate with finger-to-nose bilaterally  Full symmetrical strength throughout the 4 extremities with no for fatigable and of weakness  Sensory testing grossly intact to pin and position throughout  Muscle stretch reflexes bilaterally 1+ throughout the upper extremities, bilaterally 1 at the knees and bilaterally 1+ at the ankles  Toe response downgoing bilaterally  ROS:    Review of Systems   Constitutional: Negative  Negative for appetite change and fever     HENT: Negative  Negative for hearing loss, tinnitus, trouble swallowing and voice change  Eyes: Negative  Negative for photophobia and pain  Respiratory: Negative  Negative for shortness of breath  Cardiovascular: Negative  Negative for palpitations  Gastrointestinal: Negative  Negative for nausea and vomiting  Endocrine: Negative  Negative for cold intolerance and heat intolerance  Genitourinary: Negative  Negative for dysuria, frequency and urgency  Musculoskeletal: Positive for back pain (lower back pain), neck pain and neck stiffness  Negative for myalgias  Skin: Negative  Negative for rash  Allergic/Immunologic: Negative  Neurological: Positive for dizziness  Negative for tremors, seizures, syncope, facial asymmetry, speech difficulty, weakness, light-headedness, numbness and headaches  Hematological: Negative  Does not bruise/bleed easily  Psychiatric/Behavioral: Positive for sleep disturbance  Negative for confusion and hallucinations  The patient is nervous/anxious  I personally reviewed the ROS that was entered by the medical assistant  *Please note this document was created using voice recognition software and may contain sound-alike word errors  *

## 2019-11-07 ENCOUNTER — TELEPHONE (OUTPATIENT)
Dept: NEUROLOGY | Facility: CLINIC | Age: 64
End: 2019-11-07

## 2019-11-07 ENCOUNTER — TELEPHONE (OUTPATIENT)
Dept: GASTROENTEROLOGY | Facility: CLINIC | Age: 64
End: 2019-11-07

## 2019-11-07 NOTE — TELEPHONE ENCOUNTER
Patients GI provider:  Dr Chad Mckeon    Number to return call: [phone]    Reason for call: Pt returned call for colon/egd results     Scheduled procedure/appointment date if applicable: NA

## 2019-11-11 ENCOUNTER — TELEPHONE (OUTPATIENT)
Dept: NEUROLOGY | Facility: CLINIC | Age: 64
End: 2019-11-11

## 2019-11-11 NOTE — TELEPHONE ENCOUNTER
Contacted patient to confirm for 11/12/2019 appointment - patient stated that she would not be able to make and requested to reschedule the appointment due to the lack of transportation  Appt rescheduled for 12 9 2019 2:45PM at [facility]

## 2019-11-12 ENCOUNTER — TELEPHONE (OUTPATIENT)
Dept: NEUROLOGY | Facility: CLINIC | Age: 64
End: 2019-11-12

## 2019-11-12 NOTE — TELEPHONE ENCOUNTER
Left message for patient to call back to office to give the details of her opthamologist so LONA can be finished with that information  Would like patient to confirm if she is going to continue following here at [facility] with Dr Buzz Choi or if she is going to keep her appointment scheduled with Dara Rosales in [place] office  Can transfer call to me if patient calls back

## 2019-11-15 ENCOUNTER — TELEPHONE (OUTPATIENT)
Dept: NEUROLOGY | Facility: CLINIC | Age: 64
End: 2019-11-15

## 2019-12-25 DIAGNOSIS — F41.1 GENERALIZED ANXIETY DISORDER: ICD-10-CM

## 2019-12-26 RX ORDER — ESCITALOPRAM OXALATE 10 MG/1
TABLET ORAL
Qty: 30 TABLET | Refills: 0 | Status: SHIPPED | OUTPATIENT
Start: 2019-12-26 | End: 2020-01-27

## 2020-01-15 ENCOUNTER — TELEPHONE (OUTPATIENT)
Dept: NEUROLOGY | Facility: CLINIC | Age: 65
End: 2020-01-15

## 2020-01-15 NOTE — TELEPHONE ENCOUNTER
Call to patient at cell number on file to inform her of potential delay at 2pm visit with Neurology in [employer] due to technical issues  Spoke with Daughter, Kaylee Obando who stated mother was not available and would not be attending todays visit  I requested that the patient call our office to reschedule at her convenience

## 2020-01-26 DIAGNOSIS — F41.1 GENERALIZED ANXIETY DISORDER: ICD-10-CM

## 2020-01-27 RX ORDER — ESCITALOPRAM OXALATE 10 MG/1
TABLET ORAL
Qty: 30 TABLET | Refills: 0 | Status: SHIPPED | OUTPATIENT
Start: 2020-01-27 | End: 2020-06-22

## 2020-02-04 ENCOUNTER — OFFICE VISIT (OUTPATIENT)
Dept: GASTROENTEROLOGY | Facility: CLINIC | Age: 65
End: 2020-02-04
Payer: COMMERCIAL

## 2020-02-04 VITALS
TEMPERATURE: 98.3 F | WEIGHT: 226.8 LBS | SYSTOLIC BLOOD PRESSURE: 135 MMHG | DIASTOLIC BLOOD PRESSURE: 78 MMHG | BODY MASS INDEX: 37.79 KG/M2 | HEIGHT: 65 IN | HEART RATE: 77 BPM

## 2020-02-04 DIAGNOSIS — R07.81 RIB TENDERNESS: ICD-10-CM

## 2020-02-04 DIAGNOSIS — K21.9 GASTROESOPHAGEAL REFLUX DISEASE WITHOUT ESOPHAGITIS: ICD-10-CM

## 2020-02-04 DIAGNOSIS — Z86.010 HISTORY OF COLON POLYPS: Primary | ICD-10-CM

## 2020-02-04 PROCEDURE — 3078F DIAST BP <80 MM HG: CPT | Performed by: PHYSICIAN ASSISTANT

## 2020-02-04 PROCEDURE — 99214 OFFICE O/P EST MOD 30 MIN: CPT | Performed by: PHYSICIAN ASSISTANT

## 2020-02-04 PROCEDURE — 1036F TOBACCO NON-USER: CPT | Performed by: PHYSICIAN ASSISTANT

## 2020-02-04 PROCEDURE — 2022F DILAT RTA XM EVC RTNOPTHY: CPT | Performed by: PHYSICIAN ASSISTANT

## 2020-02-04 PROCEDURE — 3075F SYST BP GE 130 - 139MM HG: CPT | Performed by: PHYSICIAN ASSISTANT

## 2020-02-04 PROCEDURE — 3008F BODY MASS INDEX DOCD: CPT | Performed by: PHYSICIAN ASSISTANT

## 2020-02-04 RX ORDER — PANTOPRAZOLE SODIUM 40 MG/1
40 TABLET, DELAYED RELEASE ORAL DAILY
Qty: 60 TABLET | Refills: 3 | Status: SHIPPED | OUTPATIENT
Start: 2020-02-04 | End: 2020-12-22 | Stop reason: SDUPTHER

## 2020-02-04 NOTE — PROGRESS NOTES
[facility] - Outpatient Follow-up Note  Redell Degree 59 y o  female MRN: [id number]  Encounter: [id number]          ASSESSMENT AND PLAN:    1  Rib pain/tenderness   -she has tenderness under her breasts, over her ribcage which she has noticed over the past 6 or 7 months  It does not change with food or bowel movements and it only hurts when she touches the area or lays on her side for too long    -on physical exam, she is clearly tender over her ribs, not consistent with GI etiology  I have asked her to follow up with her PCP regarding this  2  GERD   -she has chronic reflux and is currently taking pantoprazole 40 mg twice daily  Her symptoms include a substernal burning for sedation, a sour taste in her mouth and chronic throat clearing  She states that she does not take it twice daily her symptoms are significantly worse in the evening  She was previously having epigastric pain that resolved with pantoprazole    -we discussed possible options including continuing on the PPI and we did discuss the risks associated with long-term PPI use, we also discussed procedures such as Stretta or Linx  She would like to take some time to consider having a procedure done; currently transportation is challenging for her since her  passed    -we reviewed reflux precautions, weight loss would likely help with her symptoms as well   -Follow up in the office in a few months    3  History of colon polyps   -she had 1 tubular adenoma removed on her recent colonoscopy, repeat recommended in 5 years for screening purposes  ____________________________________________________________    SUBJECTIVE:    66-year-old female past medical history of diabetes, TIA in 2005, anxiety and depression presents to the office for follow-up  Initially she was having epigastric pain/dyspepsia but she reports this has since resolved on Protonix 40 mg twice daily    She states that she takes the Protonix only 1 states these she has recurrence of her reflux with substernal burning sensation, sour taste in her mouth and frequent throat clearing  She states that this is worse when she lays down at night  She also describes a tenderness over her ribs that is worse when she presses on the area or when she is laying on her side for too long  This pain does not change food or bowel movements  She had mild constipation a couple of weeks ago but she increased her water intake and this did resolve  She denies any diarrhea  She denies any nausea, vomiting, change in appetite, unintended weight loss, difficulty swallowing, hematochezia, melena, fevers, chills, rashes or jaundice  She had a colonoscopy in October 2019 1 polyp removed, this was otherwise normal   She had an upper endoscopy at the same time, Schatzki's ring found in the distal esophagus, stomach biopsies were negative for H pylori  The colon polyp was a tubular adenoma so repeat was recommended in 5 years  REVIEW OF SYSTEMS IS OTHERWISE NEGATIVE        Historical Information   Past Medical History:   Diagnosis Date    Abnormal ECG     last assessed: 5/15/17    Abnormal mammogram     last assessed: 7/11/17    Abnormal stress test     last assesed: 7/24/17    Anemia     Anxiety     Arthritis     Asthma     Back problem     Breast cyst     Chest pain     last assessed: 7/24/17    Chronic pain disorder     Cyst of left breast     last assessed: 8/18/17    Depression     Depression     resolved: 1/2/18    Diabetes mellitus ([address] )     Hiatal hernia     last assessed: 11/7/17    Hyperlipidemia     Hypertension     Keloid of skin     last assessed: 11/2/16    Neuropathy     Psychiatric disorder     anxiety    Stroke [facility])     TIA 2005    Tuberculosis     as a child      Past Surgical History:   Procedure Laterality Date    ARM WOUND REPAIR / CLOSURE      CARPAL TUNNEL RELEASE      CARPAL TUNNEL RELEASE Left     CATARACT EXTRACTION Bilateral     2015    COLONOSCOPY      CYST REMOVAL      right upper arm   EYE SURGERY      cataract removaql    FL LUMBAR PUNCTURE DIAGNOSTIC  8/15/2019    IN ESOPHAGOGASTRODUODENOSCOPY TRANSORAL DIAGNOSTIC N/A 1/5/2018    Procedure: ESOPHAGOGASTRODUODENOSCOPY (EGD);   Surgeon: Emerald Vilchis DO;  Location: [facility] MA[facility];  Service: Gastroenterology    IN WRIST Chyrl Yobani LIG Left 4/19/2019    Procedure: ENDOSCOPIC CARPAL TUNNEL RELEASE;  Surgeon: Fanny Haji MD;  Location: [address] [place] [address] OR;  Service: Orthopedics   [facility] THYROID BIOPSY  4/15/2019     Social History   Social History     Substance and Sexual Activity   Alcohol Use Not Currently    Alcohol/week: 0 0 standard drinks    Frequency: Never    Drinks per session: Patient refused    Binge frequency: Never    Comment: 0     Social History     Substance and Sexual Activity   Drug Use No     Social History     Tobacco Use   Smoking Status Never Smoker   Smokeless Tobacco Never Used     Family History   Problem Relation Age of Onset    Heart disease Mother     Diabetes Mother     Arthritis Mother     Heart attack Mother     Hypertension Mother     Kidney disease Father     Hypertension Father     Arthritis Sister     Cancer Maternal Grandmother     Stroke Maternal Grandmother     Arthritis Paternal Grandmother     Cancer Paternal Grandmother     Heart attack Paternal Grandmother     Stroke Maternal Aunt     Heart attack Maternal Uncle     Cancer Family         spinal column    Coronary artery disease Family     Glaucoma Family     Rheum arthritis Family        Meds/Allergies       Current Outpatient Medications:     albuterol (PROVENTIL HFA,VENTOLIN HFA) 90 mcg/act inhaler    amLODIPine (NORVASC) 10 mg tablet    aspirin 81 MG tablet    atorvastatin (LIPITOR) 40 mg tablet    beclomethasone (QVAR) 80 MCG/ACT inhaler    Blood Glucose Calibration (ACCU-CHEK COMPACT PLUS CONTROL) SOLN    Blood Glucose Monitoring Suppl (ACCU-CHEK SHANE PLUS) w/Device KIT    busPIRone (BUSPAR) 5 mg tablet    escitalopram (LEXAPRO) 10 mg tablet    gabapentin (NEURONTIN) 300 mg capsule    glucose blood (ACCU-CHEK SHANE PLUS) test strip    hydrochlorothiazide (HYDRODIURIL) 25 mg tablet    hydrocortisone 0 5 % cream    insulin glargine (BASAGLAR KWIKPEN) 100 units/mL injection pen    Insulin Pen Needle (B-D UF III MINI PEN NEEDLES) 31G X 5 MM MISC    insulin regular (HumuLIN R,NovoLIN R) 100 units/mL injection    JANUVIA 100 MG tablet    Lancets (ACCU-CHEK MULTICLIX) lancets    lisinopril (ZESTRIL) 40 mg tablet    pantoprazole (PROTONIX) 40 mg tablet    Polyvinyl Alcohol (LIQUID TEARS OP)    RA PAIN RELIEF ACETAMINOPHEN 325 MG tablet    simvastatin (ZOCOR) 20 mg tablet    Allergies   Allergen Reactions    Percocet [Oxycodone-Acetaminophen] GI Intolerance    Vicodin [Hydrocodone-Acetaminophen] GI Intolerance           Objective     There were no vitals taken for this visit  PHYSICAL EXAM:      Physical Exam   Constitutional: She is oriented to person, place, and time  She appears well-developed  No distress  obese   HENT:   Head: Normocephalic and atraumatic  Eyes: Right eye exhibits no discharge  Left eye exhibits no discharge  No scleral icterus  Neck: Neck supple  No tracheal deviation present  Cardiovascular: Normal rate, regular rhythm and normal heart sounds  Exam reveals no gallop and no friction rub  No murmur heard  Pulmonary/Chest: Effort normal  No respiratory distress  She has no wheezes  She has no rales  She exhibits tenderness  Abdominal: Soft  Bowel sounds are normal  She exhibits no distension  There is no tenderness  There is no rebound and no guarding  Neurological: She is alert and oriented to person, place, and time  Skin: Skin is warm and dry  Psychiatric: She has a normal mood and affect               Lab Results:   No visits with results within 1 Day(s) from this visit  Latest known visit with results is:   Hospital Outpatient Visit on 10/29/2019   Component Date Value    POC Glucose 10/29/2019 159*    Case Report 10/29/2019                      Value:Surgical Pathology Report                         Case: [id number]                                   Authorizing Provider:  Jeanna Tao DO        Collected:           10/29/2019 1041              Ordering Location:     [facility] Received:            10/29/2019 1351                                     Operating Room                                                               Pathologist:           Mally Trejo MD                                                        Specimens:   A) - Stomach, stomach biopsy, normal appearing, rule out h  pylori, retrieved with                  cold biopsy forcep                                                                                  B) - Large Intestine, Right/Ascending Colon, ascending colon polyp, retrieved with                  cold biopsy forcep                                                                         Final Diagnosis 10/29/2019                      Value: This result contains rich text formatting which cannot be displayed here   Additional Information 10/29/2019                      Value: This result contains rich text formatting which cannot be displayed here   Synoptic Checklist 10/29/2019                      Value:  (COLON/RECTUM POLYP FORM-GI - All Specimens)                                                                                                                 :    Serrated Adenoma      Gross Description 10/29/2019                      Value: This result contains rich text formatting which cannot be displayed here   POC Glucose 10/29/2019 126          Radiology Results:   No results found

## 2020-02-05 DIAGNOSIS — E78.2 MIXED HYPERLIPIDEMIA: Primary | ICD-10-CM

## 2020-02-05 RX ORDER — SIMVASTATIN 20 MG
TABLET ORAL
Qty: 90 TABLET | Refills: 0 | Status: SHIPPED | OUTPATIENT
Start: 2020-02-05 | End: 2020-03-23

## 2020-03-09 ENCOUNTER — OFFICE VISIT (OUTPATIENT)
Dept: OBGYN CLINIC | Facility: CLINIC | Age: 65
End: 2020-03-09
Payer: COMMERCIAL

## 2020-03-09 VITALS
DIASTOLIC BLOOD PRESSURE: 74 MMHG | SYSTOLIC BLOOD PRESSURE: 132 MMHG | HEIGHT: 65 IN | WEIGHT: 236 LBS | BODY MASS INDEX: 39.32 KG/M2

## 2020-03-09 DIAGNOSIS — Z01.419 ENCOUNTER FOR GYNECOLOGICAL EXAMINATION WITH PAPANICOLAOU SMEAR OF CERVIX: Primary | ICD-10-CM

## 2020-03-09 DIAGNOSIS — R87.619 ABNORMAL CERVICAL PAPANICOLAOU SMEAR, UNSPECIFIED ABNORMAL PAP FINDING: ICD-10-CM

## 2020-03-09 PROCEDURE — 99396 PREV VISIT EST AGE 40-64: CPT | Performed by: NURSE PRACTITIONER

## 2020-03-09 NOTE — PROGRESS NOTES
ASSESSMENT & PLAN: Loni Muñiz is a 59 y o   with normal gynecologic exam     1   Routine well woman exam done today  2  Pap and HPV:  The patient's last pap and hpv was   It was normal     Pap with cotesting was done today  Current ASCCP Guidelines reviewed  3   Mammogram ordered  Pt  Has order to get diagnositic f/u testing done  4   Colorectal cancer screening was not ordered  5  The following were reviewed in today's visit: breast self exam, mammography screening ordered, menopause, osteoporosis, adequate intake of calcium and vitamin D, exercise and healthy diet  6  rto yearly gyn exam     CC:  Annual Gynecologic Examination    HPI: Loni Muñiz is a 59 y o   who presents for annual gynecologic examination  She has the following concerns:  None  Spouse  about 9 months ago  Doing better now and trying to get caught up with her dr noriega  Health Maintenance:    She wears her seatbelt routinely  She does perform regular monthly self breast exams  She feels safe at home       Pap: today  Last mammogram:   Last colonoscopy:2019    Past Medical History:   Diagnosis Date    Abnormal ECG     last assessed: 5/15/17    Abnormal mammogram     last assessed: 17    Abnormal stress test     last assesed: 17    Anemia     Anxiety     Arthritis     Asthma     Back problem     Breast cyst     Chest pain     last assessed: 17    Chronic pain disorder     Cyst of left breast     last assessed: 17    Depression     Depression     resolved: 18    Diabetes mellitus ([address] )     Hiatal hernia     last assessed: 17    Hyperlipidemia     Hypertension     Keloid of skin     last assessed: 16    Neuropathy     Psychiatric disorder     anxiety    Stroke [facility])     TIA 2005    Tuberculosis     as a child        Past Surgical History:   Procedure Laterality Date    ARM WOUND REPAIR / CLOSURE      CARPAL TUNNEL RELEASE      CARPAL TUNNEL RELEASE Left     CATARACT EXTRACTION Bilateral         COLONOSCOPY      CYST REMOVAL      right upper arm   EYE SURGERY      cataract removaql    FL LUMBAR PUNCTURE DIAGNOSTIC  8/15/2019    HI ESOPHAGOGASTRODUODENOSCOPY TRANSORAL DIAGNOSTIC N/A 2018    Procedure: ESOPHAGOGASTRODUODENOSCOPY (EGD); Surgeon: Katelyn Blount DO;  Location: [facility] MAIN [facility];  Service: Gastroenterology    HI WRIST [place] LIG Left 2019    Procedure: ENDOSCOPIC CARPAL TUNNEL RELEASE;  Surgeon: Ramila Austin MD;  Location: [address] [place] [address] MAIN OR;  Service: Orthopedics    [address] THYROID BIOPSY  4/15/2019       Past OB/Gyn History:  OB History        5    Para        Term                AB        Living   5       SAB        TAB        Ectopic        Multiple        Live Births   5               Pt does not have menstrual issues  History of sexually transmitted infection: No   History of abnormal pap smears: No      Patient is not currently sexually active  heterosexual   The current method of family planning is abstinence      Family History   Problem Relation Age of Onset    Heart disease Mother     Diabetes Mother     Arthritis Mother     Heart attack Mother     Hypertension Mother     Kidney disease Father     Hypertension Father     Arthritis Sister     Cancer Maternal Grandmother     Stroke Maternal Grandmother     Arthritis Paternal Grandmother     Cancer Paternal Grandmother     Heart attack Paternal Grandmother     Stroke Maternal Aunt     Heart attack Maternal Uncle     Cancer Family         spinal column    Coronary artery disease Family     Glaucoma Family     Rheum arthritis Family        Social History:  Social History     Socioeconomic History    Marital status: /Civil Union     Spouse name: Not on file    Number of children: Not on file    Years of education: Not on file    Highest education level: Not on file Occupational History    Occupation: housewife/homemaker   Social Needs    Financial resource strain: Not on file    Food insecurity:     Worry: Not on file     Inability: Not on file    Transportation needs:     Medical: Not on file     Non-medical: Not on file   Tobacco Use    Smoking status: Never Smoker    Smokeless tobacco: Never Used   Substance and Sexual Activity    Alcohol use: Not Currently     Alcohol/week: 0 0 standard drinks     Frequency: Never     Drinks per session: Patient refused     Binge frequency: Never    Drug use: No    Sexual activity: Not Currently     Partners: Male     Comment:  passed 2019 due to cancer   Lifestyle    Physical activity:     Days per week: Not on file     Minutes per session: Not on file    Stress: Not on file   Relationships    Social connections:     Talks on phone: Not on file     Gets together: Not on file     Attends [employer] service: Not on file     Active member of club or organization: Not on file     Attends meetings of clubs or organizations: Not on file     Relationship status: Not on file    Intimate partner violence:     Fear of current or ex partner: Not on file     Emotionally abused: Not on file     Physically abused: Not on file     Forced sexual activity: Not on file   Other Topics Concern    Not on file   Social History Narrative    Always uses seatbelts    No living will     Presently lives with with cat and 24 yo daughter who is going to college next year      Patient is currently retired   Allergies   Allergen Reactions    Percocet [Oxycodone-Acetaminophen] GI Intolerance    Vicodin [Hydrocodone-Acetaminophen] GI Intolerance       Current Outpatient Medications:     albuterol (PROVENTIL HFA,VENTOLIN HFA) 90 mcg/act inhaler, Inhale 2 puffs every 4 (four) hours as needed for wheezing, Disp: 1 Inhaler, Rfl: 5    amLODIPine (NORVASC) 10 mg tablet, , Disp: , Rfl: 0    aspirin 81 MG tablet, Take 81 mg by mouth daily  , Disp: , Rfl:     beclomethasone (QVAR) 80 MCG/ACT inhaler, Inhale 2 puffs 2 (two) times a day, Disp: 1 Inhaler, Rfl: 5    Blood Glucose Calibration (ACCU-CHEK COMPACT PLUS CONTROL) SOLN, by In Vitro route daily, Disp: 1 each, Rfl: 0    Blood Glucose Monitoring Suppl (ACCU-CHEK SHANE PLUS) w/Device KIT, by Does not apply route 2 (two) times a day, Disp: 1 kit, Rfl: 0    busPIRone (BUSPAR) 5 mg tablet, take 1 tablet by mouth three times a day, Disp: 90 tablet, Rfl: 2    escitalopram (LEXAPRO) 10 mg tablet, take 1 tablet by mouth once daily with breakfast, Disp: 30 tablet, Rfl: 0    gabapentin (NEURONTIN) 300 mg capsule, Take 1 capsule (300 mg total) by mouth 3 (three) times a day, Disp: 90 capsule, Rfl: 5    glucose blood (ACCU-CHEK SHANE PLUS) test strip, Test twice daily, Disp: 100 each, Rfl: 5    hydrochlorothiazide (HYDRODIURIL) 25 mg tablet, , Disp: , Rfl: 0    hydrocortisone 0 5 % cream, Apply topically 2 (two) times a day as needed , Disp: , Rfl:     insulin glargine (BASAGLAR KWIKPEN) 100 units/mL injection pen, Inject 25 Units under the skin daily at bedtime, Disp: 5 pen, Rfl: 0    Insulin Pen Needle (B-D UF III MINI PEN NEEDLES) 31G X 5 MM MISC, Inject under the skin daily, Disp: 100 each, Rfl: 3    insulin regular (HumuLIN R,NovoLIN R) 100 units/mL injection, 3 units at breakfast 4 units at lunch 5 units at supper, Disp: 10 mL, Rfl: 3    JANUVIA 100 MG tablet, take 1 tablet by mouth once daily, Disp: 30 tablet, Rfl: 5    Lancets (ACCU-CHEK MULTICLIX) lancets, Test bid, Disp: 100 each, Rfl: 0    lisinopril (ZESTRIL) 40 mg tablet, take 1 tablet by mouth once daily, Disp: 30 tablet, Rfl: 5    pantoprazole (PROTONIX) 40 mg tablet, Take 1 tablet (40 mg total) by mouth daily, Disp: 60 tablet, Rfl: 3    Polyvinyl Alcohol (LIQUID TEARS OP), Apply to eye, Disp: , Rfl:     RA PAIN RELIEF ACETAMINOPHEN 325 MG tablet, take 1-2 tablets by mouth every 6 hours if needed for MODERATE pain, Disp: , Rfl: 0   simvastatin (ZOCOR) 20 mg tablet, take 1 tablet by mouth at bedtime, Disp: 90 tablet, Rfl: 0      Review of Systems  Constitutional :no fever, feels well, no tiredness, no recent weight gain or loss  ENT: no ear ache, no loss of hearing, no nosebleeds or nasal discharge, no sore throat or hoarseness  Cardiovascular: no complaints of slow or fast heart beat, no chest pain, no palpitations, no leg claudication or lower extremity edema  Respiratory: no complaints of shortness of shortness of breath, no FULLER  Breasts:no complaints of breast pain, breast lump, or nipple discharge  Gastrointestinal: no complaints of abdominal pain, constipation, nausea, vomiting, or diarrhea or bloody stools  Genitourinary : no complaints of dysuria, incontinence, pelvic pain, no dysmenorrhea, vaginal discharge or abnormal vaginal bleeding and as noted in HPI  Musculoskeletal: no complaints of arthralgia, no myalgia, no joint swelling or stiffness, no limb pain or swelling  Integumentary: no complaints of skin rash or lesion, itching or dry skin  Neurological: no complaints of headache, no confusion, no numbness or tingling, no dizziness or fainting    Objective      /74   Ht 5' 5" (1 651 m)   Wt 107 kg (236 lb)   BMI 39 27 kg/m²     General:   appears stated age, cooperative, alert normal mood and affect   Neck: normal, supple,trachea midline, no masses   Heart: regular rate and rhythm, S1, S2 normal, no murmur, click, rub or gallop   Lungs: clear to auscultation bilaterally   Breasts: normal appearance, no masses or tenderness   Abdomen: soft, non-tender, without masses or organomegaly   Vulva: normal female genitalia   Vagina: normal vagina   Urethra: normal   Cervix: Normal, no discharge     Uterus: normal size, contour, position, consistency, mobility, non-tender   Adnexa: no mass, fullness, tenderness   Lymphatic palpation of lymph nodes in neck, axilla, groin and/or other locations: no lymphadenopathy or masses noted Skin normal skin turgor and no rashes     Psychiatric orientation to person, place, and time: normal  mood and affect: normal

## 2020-03-10 DIAGNOSIS — E11.9 TYPE 2 DIABETES MELLITUS WITHOUT COMPLICATION, WITH LONG-TERM CURRENT USE OF INSULIN (HCC): ICD-10-CM

## 2020-03-10 DIAGNOSIS — Z79.4 TYPE 2 DIABETES MELLITUS WITHOUT COMPLICATION, WITH LONG-TERM CURRENT USE OF INSULIN (HCC): ICD-10-CM

## 2020-03-10 DIAGNOSIS — K21.9 GASTROESOPHAGEAL REFLUX DISEASE WITHOUT ESOPHAGITIS: ICD-10-CM

## 2020-03-11 RX ORDER — PANTOPRAZOLE SODIUM 40 MG/1
40 TABLET, DELAYED RELEASE ORAL 2 TIMES DAILY
Qty: 60 TABLET | Refills: 1 | OUTPATIENT
Start: 2020-03-11

## 2020-03-13 DIAGNOSIS — F41.1 GENERALIZED ANXIETY DISORDER: ICD-10-CM

## 2020-03-13 RX ORDER — ESCITALOPRAM OXALATE 10 MG/1
TABLET ORAL
Qty: 30 TABLET | Refills: 0 | OUTPATIENT
Start: 2020-03-13

## 2020-03-21 DIAGNOSIS — E78.2 MIXED HYPERLIPIDEMIA: ICD-10-CM

## 2020-03-23 RX ORDER — SIMVASTATIN 20 MG
TABLET ORAL
Qty: 90 TABLET | Refills: 0 | Status: SHIPPED | OUTPATIENT
Start: 2020-03-23 | End: 2020-06-12

## 2020-03-24 ENCOUNTER — APPOINTMENT (OUTPATIENT)
Dept: LAB | Facility: MEDICAL CENTER | Age: 65
End: 2020-03-24
Payer: COMMERCIAL

## 2020-03-24 ENCOUNTER — OFFICE VISIT (OUTPATIENT)
Dept: FAMILY MEDICINE CLINIC | Facility: CLINIC | Age: 65
End: 2020-03-24
Payer: COMMERCIAL

## 2020-03-24 VITALS
HEIGHT: 65 IN | DIASTOLIC BLOOD PRESSURE: 76 MMHG | SYSTOLIC BLOOD PRESSURE: 128 MMHG | WEIGHT: 234 LBS | BODY MASS INDEX: 38.99 KG/M2

## 2020-03-24 DIAGNOSIS — R74.01 TRANSAMINITIS: ICD-10-CM

## 2020-03-24 DIAGNOSIS — N63.20 BREAST MASS, LEFT: ICD-10-CM

## 2020-03-24 DIAGNOSIS — Z79.4 TYPE 2 DIABETES MELLITUS WITH COMPLICATION, WITH LONG-TERM CURRENT USE OF INSULIN (HCC): ICD-10-CM

## 2020-03-24 DIAGNOSIS — R30.0 DYSURIA: ICD-10-CM

## 2020-03-24 DIAGNOSIS — E11.42 DIABETIC POLYNEUROPATHY ASSOCIATED WITH TYPE 2 DIABETES MELLITUS (HCC): ICD-10-CM

## 2020-03-24 DIAGNOSIS — H53.2 DIPLOPIA: ICD-10-CM

## 2020-03-24 DIAGNOSIS — L30.9 DERMATITIS: ICD-10-CM

## 2020-03-24 DIAGNOSIS — E11.8 TYPE 2 DIABETES MELLITUS WITH COMPLICATION, WITH LONG-TERM CURRENT USE OF INSULIN (HCC): ICD-10-CM

## 2020-03-24 DIAGNOSIS — I10 ESSENTIAL HYPERTENSION: ICD-10-CM

## 2020-03-24 DIAGNOSIS — E11.9 TYPE 2 DIABETES MELLITUS WITHOUT COMPLICATION, WITH LONG-TERM CURRENT USE OF INSULIN (HCC): ICD-10-CM

## 2020-03-24 DIAGNOSIS — S16.1XXA ACUTE STRAIN OF NECK MUSCLE, INITIAL ENCOUNTER: ICD-10-CM

## 2020-03-24 DIAGNOSIS — Z13.5 ENCOUNTER FOR SCREENING FOR DIABETIC RETINOPATHY: Primary | ICD-10-CM

## 2020-03-24 DIAGNOSIS — Z79.4 TYPE 2 DIABETES MELLITUS WITHOUT COMPLICATION, WITH LONG-TERM CURRENT USE OF INSULIN (HCC): ICD-10-CM

## 2020-03-24 LAB
ALBUMIN SERPL BCP-MCNC: 3.8 G/DL (ref 3.5–5)
ALP SERPL-CCNC: 96 U/L (ref 46–116)
ALT SERPL W P-5'-P-CCNC: 21 U/L (ref 12–78)
ANION GAP SERPL CALCULATED.3IONS-SCNC: 3 MMOL/L (ref 4–13)
AST SERPL W P-5'-P-CCNC: 13 U/L (ref 5–45)
BACTERIA UR QL AUTO: ABNORMAL /HPF
BILIRUB SERPL-MCNC: 0.31 MG/DL (ref 0.2–1)
BILIRUB UR QL STRIP: NEGATIVE
BUN SERPL-MCNC: 19 MG/DL (ref 5–25)
CALCIUM SERPL-MCNC: 9.2 MG/DL (ref 8.3–10.1)
CHLORIDE SERPL-SCNC: 103 MMOL/L (ref 100–108)
CHOLEST SERPL-MCNC: 194 MG/DL (ref 50–200)
CLARITY UR: ABNORMAL
CO2 SERPL-SCNC: 31 MMOL/L (ref 21–32)
COLOR UR: YELLOW
CREAT SERPL-MCNC: 1.13 MG/DL (ref 0.6–1.3)
GFR SERPL CREATININE-BSD FRML MDRD: 59 ML/MIN/1.73SQ M
GLUCOSE P FAST SERPL-MCNC: 160 MG/DL (ref 65–99)
GLUCOSE UR STRIP-MCNC: NEGATIVE MG/DL
HAV AB SER QL IA: NORMAL
HBV SURFACE AB SER-ACNC: <3.1 MIU/ML
HBV SURFACE AG SER QL: NORMAL
HCV AB SER QL: NORMAL
HDLC SERPL-MCNC: 45 MG/DL
HGB UR QL STRIP.AUTO: NEGATIVE
HYALINE CASTS #/AREA URNS LPF: ABNORMAL /LPF
KETONES UR STRIP-MCNC: NEGATIVE MG/DL
LDLC SERPL CALC-MCNC: 124 MG/DL (ref 0–100)
LEFT EYE DIABETIC RETINOPATHY: NORMAL
LEFT EYE IMAGE QUALITY: NORMAL
LEFT EYE MACULAR EDEMA: NORMAL
LEFT EYE OTHER RETINOPATHY: NORMAL
LEUKOCYTE ESTERASE UR QL STRIP: ABNORMAL
NITRITE UR QL STRIP: NEGATIVE
NON-SQ EPI CELLS URNS QL MICRO: ABNORMAL /HPF
NONHDLC SERPL-MCNC: 149 MG/DL
PH UR STRIP.AUTO: 5.5 [PH]
POTASSIUM SERPL-SCNC: 3.9 MMOL/L (ref 3.5–5.3)
PROT SERPL-MCNC: 8 G/DL (ref 6.4–8.2)
PROT UR STRIP-MCNC: ABNORMAL MG/DL
RBC #/AREA URNS AUTO: ABNORMAL /HPF
RIGHT EYE DIABETIC RETINOPATHY: NORMAL
RIGHT EYE IMAGE QUALITY: NORMAL
RIGHT EYE MACULAR EDEMA: NORMAL
RIGHT EYE OTHER RETINOPATHY: NORMAL
SEVERITY (EYE EXAM): NORMAL
SODIUM SERPL-SCNC: 137 MMOL/L (ref 136–145)
SP GR UR STRIP.AUTO: 1.02 (ref 1–1.03)
TRIGL SERPL-MCNC: 125 MG/DL
UROBILINOGEN UR QL STRIP.AUTO: 0.2 E.U./DL
WBC #/AREA URNS AUTO: ABNORMAL /HPF

## 2020-03-24 PROCEDURE — 86708 HEPATITIS A ANTIBODY: CPT | Performed by: FAMILY MEDICINE

## 2020-03-24 PROCEDURE — 3008F BODY MASS INDEX DOCD: CPT | Performed by: FAMILY MEDICINE

## 2020-03-24 PROCEDURE — 87086 URINE CULTURE/COLONY COUNT: CPT | Performed by: FAMILY MEDICINE

## 2020-03-24 PROCEDURE — 86803 HEPATITIS C AB TEST: CPT

## 2020-03-24 PROCEDURE — 3078F DIAST BP <80 MM HG: CPT | Performed by: FAMILY MEDICINE

## 2020-03-24 PROCEDURE — 87340 HEPATITIS B SURFACE AG IA: CPT

## 2020-03-24 PROCEDURE — 80053 COMPREHEN METABOLIC PANEL: CPT | Performed by: FAMILY MEDICINE

## 2020-03-24 PROCEDURE — 4010F ACE/ARB THERAPY RXD/TAKEN: CPT | Performed by: FAMILY MEDICINE

## 2020-03-24 PROCEDURE — 86255 FLUORESCENT ANTIBODY SCREEN: CPT

## 2020-03-24 PROCEDURE — 1036F TOBACCO NON-USER: CPT | Performed by: FAMILY MEDICINE

## 2020-03-24 PROCEDURE — 2022F DILAT RTA XM EVC RTNOPTHY: CPT | Performed by: FAMILY MEDICINE

## 2020-03-24 PROCEDURE — 99214 OFFICE O/P EST MOD 30 MIN: CPT | Performed by: FAMILY MEDICINE

## 2020-03-24 PROCEDURE — 86706 HEP B SURFACE ANTIBODY: CPT

## 2020-03-24 PROCEDURE — 81001 URINALYSIS AUTO W/SCOPE: CPT | Performed by: FAMILY MEDICINE

## 2020-03-24 PROCEDURE — 80061 LIPID PANEL: CPT | Performed by: FAMILY MEDICINE

## 2020-03-24 PROCEDURE — 92250 FUNDUS PHOTOGRAPHY W/I&R: CPT | Performed by: FAMILY MEDICINE

## 2020-03-24 PROCEDURE — 3074F SYST BP LT 130 MM HG: CPT | Performed by: FAMILY MEDICINE

## 2020-03-24 PROCEDURE — 83519 RIA NONANTIBODY: CPT

## 2020-03-24 RX ORDER — GABAPENTIN 300 MG/1
300 CAPSULE ORAL 3 TIMES DAILY
Qty: 90 CAPSULE | Refills: 5 | Status: CANCELLED | OUTPATIENT
Start: 2020-03-24

## 2020-03-24 RX ORDER — GABAPENTIN 300 MG/1
300 CAPSULE ORAL 3 TIMES DAILY
Qty: 90 CAPSULE | Refills: 5 | Status: SHIPPED | OUTPATIENT
Start: 2020-03-24 | End: 2020-08-24 | Stop reason: SDUPTHER

## 2020-03-24 RX ORDER — LISINOPRIL 40 MG/1
40 TABLET ORAL DAILY
Qty: 90 TABLET | Refills: 1 | Status: CANCELLED | OUTPATIENT
Start: 2020-03-24

## 2020-03-24 RX ORDER — DIAPER,BRIEF,INFANT-TODD,DISP
EACH MISCELLANEOUS 2 TIMES DAILY
Qty: 28.35 G | Refills: 1 | Status: SHIPPED | OUTPATIENT
Start: 2020-03-24

## 2020-03-24 RX ORDER — LISINOPRIL 40 MG/1
40 TABLET ORAL DAILY
Qty: 90 TABLET | Refills: 1 | Status: SHIPPED | OUTPATIENT
Start: 2020-03-24 | End: 2020-04-15 | Stop reason: HOSPADM

## 2020-03-24 RX ORDER — DIAPER,BRIEF,INFANT-TODD,DISP
EACH MISCELLANEOUS 2 TIMES DAILY PRN
Qty: 30 G | Refills: 1 | Status: CANCELLED | OUTPATIENT
Start: 2020-03-24

## 2020-03-24 NOTE — PROGRESS NOTES
BMI Counseling: Body mass index is 38 94 kg/m²  The BMI is above normal  Nutrition recommendations include decreasing portion sizes, encouraging healthy choices of fruits and vegetables, decreasing fast food intake, consuming healthier snacks, moderation in carbohydrate intake and increasing intake of lean protein  Exercise recommendations include exercising 3-5 times per week  No pharmacotherapy was ordered  Patient referred to PCP due to patient being overweight  Assessment/Plan:    Problem List Items Addressed This Visit        Endocrine    Type 2 diabetes mellitus with complication, with long-term current use of insulin ([facility] )    Diabetic neuropathy ([facility] )       Cardiovascular and Mediastinum    Essential hypertension      Other Visit Diagnoses     Encounter for screening for diabetic retinopathy    -  Primary    Relevant Orders    IRIS Diabetic eye exam    Type 2 diabetes mellitus without complication, with long-term current use of insulin ([facility] )        Dermatitis               Diagnoses and all orders for this visit:    Encounter for screening for diabetic retinopathy  -     IRIS Diabetic eye exam    Type 2 diabetes mellitus without complication, with long-term current use of insulin (HCC)  -     insulin glargine (Basaglar KwikPen) 100 units/mL injection pen; Inject 25 Units under the skin daily at bedtime    Essential hypertension  -     lisinopril (ZESTRIL) 40 mg tablet; Take 1 tablet (40 mg total) by mouth daily    Diabetic polyneuropathy associated with type 2 diabetes mellitus (HCC)  -     gabapentin (NEURONTIN) 300 mg capsule;  Take 1 capsule (300 mg total) by mouth 3 (three) times a day    Type 2 diabetes mellitus with complication, with long-term current use of insulin ([facility])    Dermatitis  -     hydrocortisone 0 5 % cream; Apply topically 2 (two) times a day    Other orders  -     Cancel: hydrocortisone 0 5 % cream; Apply topically 2 (two) times a day as needed for rash  -     Cancel: insulin glargine (Basaglar KwikPen) 100 units/mL injection pen; Inject 25 Units under the skin daily at bedtime  -     Cancel: lisinopril (ZESTRIL) 40 mg tablet; Take 1 tablet (40 mg total) by mouth daily  -     Cancel: gabapentin (NEURONTIN) 300 mg capsule; Take 1 capsule (300 mg total) by mouth 3 (three) times a day        No problem-specific Assessment & Plan notes found for this encounter  Subjective:      Patient ID: Loni Muñiz is a 59 y o  female  Mrs Aruna Mckeon is here for a visit she looks well and she has been doing better with her sugars she has not had some labs in a while so we asked her to come into the office to get her up to speed on her visits and then also to get up to speed on her labs on she has been to the ophthalmologist she goes back in about 2 months and the only other issue she has right now is that she is complaining of a little bit of dysuria so will  a urine culture and a urine for routine urinalysis      The following portions of the patient's history were reviewed and updated as appropriate:   She has a past medical history of Abnormal ECG, Abnormal mammogram, Abnormal stress test, Anemia, Anxiety, Arthritis, Asthma, Back problem, Breast cyst, Chest pain, Chronic pain disorder, Cyst of left breast, Depression, Depression, Diabetes mellitus ([address] ), Hiatal hernia, Hyperlipidemia, Hypertension, Keloid of skin, Neuropathy, Psychiatric disorder, Stroke ([address] ), and Tuberculosis  ,  does not have any pertinent problems on file  ,   has a past surgical history that includes Cyst Removal; Eye surgery; Tubal ligation; Arm wound repair / closure; pr esophagogastroduodenoscopy transoral diagnostic (N/A, 1/5/2018); Cataract extraction (Bilateral); US guided thyroid biopsy (4/15/2019); pr wrist arthroscop,release xvers lig (Left, 4/19/2019); Carpal tunnel release; Carpal tunnel release (Left); FL lumbar puncture diagnostic (8/15/2019); and Colonoscopy  ,  family history includes Arthritis in her mother, paternal grandmother, and sister; Cancer in her family, maternal grandmother, and paternal grandmother; Coronary artery disease in her family; Diabetes in her mother; Glaucoma in her family; Heart attack in her maternal uncle, mother, and paternal grandmother; Heart disease in her mother; Hypertension in her father and mother; Kidney disease in her father; Rheum arthritis in her family; Stroke in her maternal aunt and maternal grandmother  ,   reports that she has never smoked  She has never used smokeless tobacco  She reports that she drank alcohol  She reports that she does not use drugs  ,  is allergic to percocet [oxycodone-acetaminophen] and vicodin [hydrocodone-acetaminophen]     Current Outpatient Medications   Medication Sig Dispense Refill    albuterol (PROVENTIL HFA,VENTOLIN HFA) 90 mcg/act inhaler Inhale 2 puffs every 4 (four) hours as needed for wheezing 1 Inhaler 5    amLODIPine (NORVASC) 10 mg tablet   0    aspirin 81 MG tablet Take 81 mg by mouth daily        beclomethasone (QVAR) 80 MCG/ACT inhaler Inhale 2 puffs 2 (two) times a day 1 Inhaler 5    Blood Glucose Calibration (ACCU-CHEK COMPACT PLUS CONTROL) SOLN by In Vitro route daily 1 each 0    Blood Glucose Monitoring Suppl (ACCU-CHEK SHANE PLUS) w/Device KIT by Does not apply route 2 (two) times a day 1 kit 0    busPIRone (BUSPAR) 5 mg tablet take 1 tablet by mouth three times a day 90 tablet 2    escitalopram (LEXAPRO) 10 mg tablet take 1 tablet by mouth once daily with breakfast 30 tablet 0    gabapentin (NEURONTIN) 300 mg capsule Take 1 capsule (300 mg total) by mouth 3 (three) times a day 90 capsule 5    glucose blood (ACCU-CHEK SHANE PLUS) test strip Test twice daily 100 each 5    hydrochlorothiazide (HYDRODIURIL) 25 mg tablet   0    hydrocortisone 0 5 % cream Apply topically 2 (two) times a day as needed       insulin glargine (BASAGLAR KWIKPEN) 100 units/mL injection pen Inject 25 Units under the skin daily at bedtime 5 pen 0    Insulin Pen Needle (B-D UF III MINI PEN NEEDLES) 31G X 5 MM MISC Inject under the skin daily 100 each 3    insulin regular (HumuLIN R,NovoLIN R) 100 units/mL injection 3 units at breakfast 4 units at lunch 5 units at supper 10 mL 3    JANUVIA 100 MG tablet take 1 tablet by mouth once daily 30 tablet 5    Lancets (ACCU-CHEK MULTICLIX) lancets Test bid 100 each 0    lisinopril (ZESTRIL) 40 mg tablet take 1 tablet by mouth once daily 30 tablet 5    pantoprazole (PROTONIX) 40 mg tablet Take 1 tablet (40 mg total) by mouth daily 60 tablet 3    Polyvinyl Alcohol (LIQUID TEARS OP) Apply to eye      RA PAIN RELIEF ACETAMINOPHEN 325 MG tablet take 1-2 tablets by mouth every 6 hours if needed for MODERATE pain  0    simvastatin (ZOCOR) 20 mg tablet take 1 tablet by mouth at bedtime 90 tablet 0     No current facility-administered medications for this visit  Review of Systems   Constitutional: Negative for activity change, appetite change, diaphoresis, fatigue and fever  HENT: Negative  Eyes: Positive for visual disturbance  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   Respiratory: Negative for apnea, cough, chest tightness, shortness of breath and wheezing  Cardiovascular: Negative for chest pain, palpitations and leg swelling  Gastrointestinal: Negative for abdominal distention, abdominal pain, anal bleeding, constipation, diarrhea, nausea and vomiting  Endocrine: Negative for cold intolerance, heat intolerance, polydipsia, polyphagia and polyuria  Type 2 diabetes   Genitourinary: Negative for difficulty urinating, dysuria, flank pain, hematuria and urgency  Musculoskeletal: Negative for arthralgias, back pain, gait problem, joint swelling and myalgias  Skin: Negative for color change, rash and wound  Allergic/Immunologic: Negative for environmental allergies, food allergies and immunocompromised state     Neurological: Negative for dizziness, seizures, syncope, speech difficulty, numbness and headaches  Hematological: Negative for adenopathy  Does not bruise/bleed easily  Psychiatric/Behavioral: Negative for agitation, behavioral problems, hallucinations, sleep disturbance and suicidal ideas  Objective:  Vitals:    03/24/20 1227   BP: 128/76   BP Location: Left arm   Patient Position: Sitting   Cuff Size: Standard   Weight: 106 kg (234 lb)   Height: 5' 5" (1 651 m)     Body mass index is 38 94 kg/m²  Physical Exam   Constitutional: She is oriented to person, place, and time  She appears well-developed and well-nourished  No distress  HENT:   Head: Normocephalic  Right Ear: External ear normal    Left Ear: External ear normal    Nose: Nose normal    Mouth/Throat: Oropharynx is clear and moist    Eyes: Pupils are equal, round, and reactive to light  Conjunctivae and EOM are normal  Right eye exhibits no discharge  Left eye exhibits no discharge  No scleral icterus  Neck: Normal range of motion  No tracheal deviation present  No thyromegaly present  Cardiovascular: Normal rate, regular rhythm and normal heart sounds  Exam reveals no gallop and no friction rub  Pulses are no weak pulses  No murmur heard  Pulses:       Dorsalis pedis pulses are 2+ on the right side, and 2+ on the left side  Posterior tibial pulses are 2+ on the right side, and 2+ on the left side  Pulmonary/Chest: Effort normal and breath sounds normal  No respiratory distress  She has no wheezes  Abdominal: Soft  Bowel sounds are normal  She exhibits no mass  There is no tenderness  There is no guarding  Musculoskeletal: She exhibits no edema or deformity  Feet:   Right Foot:   Skin Integrity: Negative for ulcer, skin breakdown, erythema, warmth, callus or dry skin  Left Foot:   Skin Integrity: Negative for ulcer, skin breakdown, erythema, warmth, callus or dry skin  Lymphadenopathy:     She has no cervical adenopathy     Neurological: She is alert and oriented to person, place, and time  No cranial nerve deficit  Skin: Skin is warm and dry  No rash noted  She is not diaphoretic  No erythema  Psychiatric: She has a normal mood and affect  Thought content normal      Patient's shoes and socks removed  Right Foot/Ankle   Right Foot Inspection  Skin Exam: skin normal and skin intact no dry skin, no warmth, no callus, no erythema, no maceration, no abnormal color, no pre-ulcer, no ulcer and no callus                          Toe Exam: ROM and strength within normal limits  Sensory   Vibration: intact  Proprioception: intact   Monofilament testing: intact  Vascular  Capillary refills: < 3 seconds  The right DP pulse is 2+  The right PT pulse is 2+  Left Foot/Ankle  Left Foot Inspection  Skin Exam: skin normal and skin intactno dry skin, no warmth, no erythema, no maceration, normal color, no pre-ulcer, no ulcer and no callus                         Toe Exam: ROM and strength within normal limits                   Sensory   Vibration: intact  Proprioception: intact  Monofilament: intact  Vascular  Capillary refills: < 3 seconds  The left DP pulse is 2+  The left PT pulse is 2+  Assign Risk Category:  No deformity present; No loss of protective sensation;  No weak pulses       Risk: 0

## 2020-03-25 DIAGNOSIS — Z79.4 TYPE 2 DIABETES MELLITUS WITH COMPLICATION, WITH LONG-TERM CURRENT USE OF INSULIN (HCC): ICD-10-CM

## 2020-03-25 DIAGNOSIS — Z79.4 TYPE 2 DIABETES MELLITUS WITHOUT COMPLICATION, WITH LONG-TERM CURRENT USE OF INSULIN (HCC): ICD-10-CM

## 2020-03-25 DIAGNOSIS — E11.8 TYPE 2 DIABETES MELLITUS WITH COMPLICATION, WITH LONG-TERM CURRENT USE OF INSULIN (HCC): ICD-10-CM

## 2020-03-25 DIAGNOSIS — N30.00 ACUTE CYSTITIS WITHOUT HEMATURIA: Primary | ICD-10-CM

## 2020-03-25 DIAGNOSIS — E11.9 TYPE 2 DIABETES MELLITUS WITHOUT COMPLICATION, WITH LONG-TERM CURRENT USE OF INSULIN (HCC): ICD-10-CM

## 2020-03-25 LAB — BACTERIA UR CULT: NORMAL

## 2020-03-25 RX ORDER — SULFAMETHOXAZOLE AND TRIMETHOPRIM 800; 160 MG/1; MG/1
1 TABLET ORAL EVERY 12 HOURS SCHEDULED
Qty: 20 TABLET | Refills: 0 | Status: SHIPPED | OUTPATIENT
Start: 2020-03-25 | End: 2020-04-04

## 2020-03-26 DIAGNOSIS — E11.8 TYPE 2 DIABETES MELLITUS WITH COMPLICATION, WITH LONG-TERM CURRENT USE OF INSULIN (HCC): Primary | ICD-10-CM

## 2020-03-26 DIAGNOSIS — Z79.4 TYPE 2 DIABETES MELLITUS WITH COMPLICATION, WITH LONG-TERM CURRENT USE OF INSULIN (HCC): Primary | ICD-10-CM

## 2020-03-26 LAB — STRIA MUS AB TITR SER IF: NEGATIVE {TITER}

## 2020-04-02 LAB — MUSK AB SER IA-ACNC: <1 U/ML

## 2020-04-13 ENCOUNTER — TELEPHONE (OUTPATIENT)
Dept: FAMILY MEDICINE CLINIC | Facility: CLINIC | Age: 65
End: 2020-04-13

## 2020-04-14 ENCOUNTER — APPOINTMENT (EMERGENCY)
Dept: CT IMAGING | Facility: HOSPITAL | Age: 65
End: 2020-04-14
Payer: COMMERCIAL

## 2020-04-14 ENCOUNTER — HOSPITAL ENCOUNTER (OUTPATIENT)
Facility: HOSPITAL | Age: 65
Setting detail: OBSERVATION
Discharge: HOME | End: 2020-04-15
Attending: EMERGENCY MEDICINE | Admitting: INTERNAL MEDICINE
Payer: COMMERCIAL

## 2020-04-14 ENCOUNTER — APPOINTMENT (EMERGENCY)
Dept: RADIOLOGY | Facility: HOSPITAL | Age: 65
End: 2020-04-14
Payer: COMMERCIAL

## 2020-04-14 DIAGNOSIS — R07.9 CHEST PAIN: Primary | ICD-10-CM

## 2020-04-14 DIAGNOSIS — N17.9 AKI (ACUTE KIDNEY INJURY) (HCC): ICD-10-CM

## 2020-04-14 DIAGNOSIS — R06.02 SOB (SHORTNESS OF BREATH): ICD-10-CM

## 2020-04-14 DIAGNOSIS — K44.9 HIATAL HERNIA: ICD-10-CM

## 2020-04-14 PROBLEM — R06.00 DYSPNEA: Status: ACTIVE | Noted: 2020-04-14

## 2020-04-14 PROBLEM — N39.0 UTI (URINARY TRACT INFECTION): Status: ACTIVE | Noted: 2020-04-14

## 2020-04-14 LAB
ALBUMIN SERPL BCP-MCNC: 3.6 G/DL (ref 3.5–5)
ALP SERPL-CCNC: 94 U/L (ref 46–116)
ALT SERPL W P-5'-P-CCNC: 22 U/L (ref 12–78)
ANION GAP SERPL CALCULATED.3IONS-SCNC: 5 MMOL/L (ref 4–13)
AST SERPL W P-5'-P-CCNC: 17 U/L (ref 5–45)
BACTERIA UR QL AUTO: ABNORMAL /HPF
BASOPHILS # BLD AUTO: 0.01 THOUSANDS/ΜL (ref 0–0.1)
BASOPHILS NFR BLD AUTO: 0 % (ref 0–1)
BILIRUB SERPL-MCNC: 0.1 MG/DL (ref 0.2–1)
BILIRUB UR QL STRIP: NEGATIVE
BUN SERPL-MCNC: 20 MG/DL (ref 5–25)
CALCIUM SERPL-MCNC: 9.4 MG/DL (ref 8.3–10.1)
CHLORIDE SERPL-SCNC: 102 MMOL/L (ref 100–108)
CLARITY UR: CLEAR
CO2 SERPL-SCNC: 29 MMOL/L (ref 21–32)
COLOR UR: YELLOW
CREAT SERPL-MCNC: 1.49 MG/DL (ref 0.6–1.3)
D DIMER PPP FEU-MCNC: 0.73 UG/ML FEU
EOSINOPHIL # BLD AUTO: 0.08 THOUSAND/ΜL (ref 0–0.61)
EOSINOPHIL NFR BLD AUTO: 2 % (ref 0–6)
ERYTHROCYTE [DISTWIDTH] IN BLOOD BY AUTOMATED COUNT: 14.8 % (ref 11.6–15.1)
GFR SERPL CREATININE-BSD FRML MDRD: 42 ML/MIN/1.73SQ M
GLUCOSE SERPL-MCNC: 146 MG/DL (ref 65–140)
GLUCOSE SERPL-MCNC: 149 MG/DL (ref 65–140)
GLUCOSE SERPL-MCNC: 241 MG/DL (ref 65–140)
GLUCOSE UR STRIP-MCNC: NEGATIVE MG/DL
HCT VFR BLD AUTO: 36 % (ref 34.8–46.1)
HGB BLD-MCNC: 11.5 G/DL (ref 11.5–15.4)
HGB UR QL STRIP.AUTO: ABNORMAL
IMM GRANULOCYTES # BLD AUTO: 0.01 THOUSAND/UL (ref 0–0.2)
IMM GRANULOCYTES NFR BLD AUTO: 0 % (ref 0–2)
KETONES UR STRIP-MCNC: NEGATIVE MG/DL
LEUKOCYTE ESTERASE UR QL STRIP: NEGATIVE
LYMPHOCYTES # BLD AUTO: 1.47 THOUSANDS/ΜL (ref 0.6–4.47)
LYMPHOCYTES NFR BLD AUTO: 31 % (ref 14–44)
MAGNESIUM SERPL-MCNC: 1.8 MG/DL (ref 1.6–2.6)
MCH RBC QN AUTO: 27.2 PG (ref 26.8–34.3)
MCHC RBC AUTO-ENTMCNC: 31.9 G/DL (ref 31.4–37.4)
MCV RBC AUTO: 85 FL (ref 82–98)
MONOCYTES # BLD AUTO: 0.46 THOUSAND/ΜL (ref 0.17–1.22)
MONOCYTES NFR BLD AUTO: 10 % (ref 4–12)
NEUTROPHILS # BLD AUTO: 2.77 THOUSANDS/ΜL (ref 1.85–7.62)
NEUTS SEG NFR BLD AUTO: 57 % (ref 43–75)
NITRITE UR QL STRIP: NEGATIVE
NON-SQ EPI CELLS URNS QL MICRO: ABNORMAL /HPF
NRBC BLD AUTO-RTO: 0 /100 WBCS
NT-PROBNP SERPL-MCNC: 31 PG/ML
PH UR STRIP.AUTO: 7 [PH]
PLATELET # BLD AUTO: 184 THOUSANDS/UL (ref 149–390)
PLATELET # BLD AUTO: 195 THOUSANDS/UL (ref 149–390)
PMV BLD AUTO: 10.6 FL (ref 8.9–12.7)
PMV BLD AUTO: 9.9 FL (ref 8.9–12.7)
POTASSIUM SERPL-SCNC: 4.5 MMOL/L (ref 3.5–5.3)
PROT SERPL-MCNC: 7.9 G/DL (ref 6.4–8.2)
PROT UR STRIP-MCNC: NEGATIVE MG/DL
RBC # BLD AUTO: 4.23 MILLION/UL (ref 3.81–5.12)
RBC #/AREA URNS AUTO: ABNORMAL /HPF
SODIUM SERPL-SCNC: 136 MMOL/L (ref 136–145)
SP GR UR STRIP.AUTO: <=1.005 (ref 1–1.03)
TROPONIN I SERPL-MCNC: <0.02 NG/ML
UROBILINOGEN UR QL STRIP.AUTO: 0.2 E.U./DL
WBC # BLD AUTO: 4.8 THOUSAND/UL (ref 4.31–10.16)
WBC #/AREA URNS AUTO: ABNORMAL /HPF

## 2020-04-14 PROCEDURE — 84484 ASSAY OF TROPONIN QUANT: CPT | Performed by: INTERNAL MEDICINE

## 2020-04-14 PROCEDURE — 36415 COLL VENOUS BLD VENIPUNCTURE: CPT | Performed by: PHYSICIAN ASSISTANT

## 2020-04-14 PROCEDURE — 82948 REAGENT STRIP/BLOOD GLUCOSE: CPT

## 2020-04-14 PROCEDURE — 83880 ASSAY OF NATRIURETIC PEPTIDE: CPT | Performed by: PHYSICIAN ASSISTANT

## 2020-04-14 PROCEDURE — 74177 CT ABD & PELVIS W/CONTRAST: CPT

## 2020-04-14 PROCEDURE — 99220 PR INITIAL OBSERVATION CARE/DAY 70 MINUTES: CPT | Performed by: INTERNAL MEDICINE

## 2020-04-14 PROCEDURE — 84484 ASSAY OF TROPONIN QUANT: CPT | Performed by: PHYSICIAN ASSISTANT

## 2020-04-14 PROCEDURE — 99285 EMERGENCY DEPT VISIT HI MDM: CPT

## 2020-04-14 PROCEDURE — 71045 X-RAY EXAM CHEST 1 VIEW: CPT

## 2020-04-14 PROCEDURE — 81001 URINALYSIS AUTO W/SCOPE: CPT | Performed by: PHYSICIAN ASSISTANT

## 2020-04-14 PROCEDURE — 85379 FIBRIN DEGRADATION QUANT: CPT | Performed by: PHYSICIAN ASSISTANT

## 2020-04-14 PROCEDURE — 85025 COMPLETE CBC W/AUTO DIFF WBC: CPT | Performed by: PHYSICIAN ASSISTANT

## 2020-04-14 PROCEDURE — 96360 HYDRATION IV INFUSION INIT: CPT

## 2020-04-14 PROCEDURE — 99285 EMERGENCY DEPT VISIT HI MDM: CPT | Performed by: PHYSICIAN ASSISTANT

## 2020-04-14 PROCEDURE — 71275 CT ANGIOGRAPHY CHEST: CPT

## 2020-04-14 PROCEDURE — 80053 COMPREHEN METABOLIC PANEL: CPT | Performed by: PHYSICIAN ASSISTANT

## 2020-04-14 PROCEDURE — 93005 ELECTROCARDIOGRAM TRACING: CPT

## 2020-04-14 PROCEDURE — 85049 AUTOMATED PLATELET COUNT: CPT | Performed by: INTERNAL MEDICINE

## 2020-04-14 PROCEDURE — 83735 ASSAY OF MAGNESIUM: CPT | Performed by: PHYSICIAN ASSISTANT

## 2020-04-14 RX ORDER — ONDANSETRON 2 MG/ML
4 INJECTION INTRAMUSCULAR; INTRAVENOUS EVERY 6 HOURS PRN
Status: DISCONTINUED | OUTPATIENT
Start: 2020-04-14 | End: 2020-04-15 | Stop reason: HOSPADM

## 2020-04-14 RX ORDER — NITROGLYCERIN 0.4 MG/1
0.4 TABLET SUBLINGUAL ONCE
Status: COMPLETED | OUTPATIENT
Start: 2020-04-14 | End: 2020-04-14

## 2020-04-14 RX ORDER — BUSPIRONE HYDROCHLORIDE 5 MG/1
5 TABLET ORAL 3 TIMES DAILY
Status: DISCONTINUED | OUTPATIENT
Start: 2020-04-14 | End: 2020-04-15 | Stop reason: HOSPADM

## 2020-04-14 RX ORDER — MAGNESIUM HYDROXIDE/ALUMINUM HYDROXICE/SIMETHICONE 120; 1200; 1200 MG/30ML; MG/30ML; MG/30ML
30 SUSPENSION ORAL EVERY 6 HOURS PRN
Status: DISCONTINUED | OUTPATIENT
Start: 2020-04-14 | End: 2020-04-15 | Stop reason: HOSPADM

## 2020-04-14 RX ORDER — DIAPER,BRIEF,INFANT-TODD,DISP
1 EACH MISCELLANEOUS 2 TIMES DAILY
Status: DISCONTINUED | OUTPATIENT
Start: 2020-04-14 | End: 2020-04-14

## 2020-04-14 RX ORDER — FLUTICASONE PROPIONATE 110 UG/1
1 AEROSOL, METERED RESPIRATORY (INHALATION)
Status: DISCONTINUED | OUTPATIENT
Start: 2020-04-14 | End: 2020-04-15 | Stop reason: HOSPADM

## 2020-04-14 RX ORDER — SODIUM CHLORIDE 9 MG/ML
100 INJECTION, SOLUTION INTRAVENOUS CONTINUOUS
Status: DISCONTINUED | OUTPATIENT
Start: 2020-04-14 | End: 2020-04-15 | Stop reason: HOSPADM

## 2020-04-14 RX ORDER — GABAPENTIN 300 MG/1
300 CAPSULE ORAL 3 TIMES DAILY
Status: DISCONTINUED | OUTPATIENT
Start: 2020-04-14 | End: 2020-04-15 | Stop reason: HOSPADM

## 2020-04-14 RX ORDER — INSULIN GLARGINE 100 [IU]/ML
30 INJECTION, SOLUTION SUBCUTANEOUS
Status: DISCONTINUED | OUTPATIENT
Start: 2020-04-14 | End: 2020-04-15 | Stop reason: HOSPADM

## 2020-04-14 RX ORDER — ASPIRIN 81 MG/1
324 TABLET, CHEWABLE ORAL ONCE
Status: COMPLETED | OUTPATIENT
Start: 2020-04-14 | End: 2020-04-14

## 2020-04-14 RX ORDER — ASPIRIN 81 MG/1
81 TABLET, CHEWABLE ORAL DAILY
Status: DISCONTINUED | OUTPATIENT
Start: 2020-04-15 | End: 2020-04-15 | Stop reason: HOSPADM

## 2020-04-14 RX ORDER — HEPARIN SODIUM 5000 [USP'U]/ML
5000 INJECTION, SOLUTION INTRAVENOUS; SUBCUTANEOUS EVERY 8 HOURS SCHEDULED
Status: DISCONTINUED | OUTPATIENT
Start: 2020-04-14 | End: 2020-04-15 | Stop reason: HOSPADM

## 2020-04-14 RX ORDER — DIAPER,BRIEF,INFANT-TODD,DISP
1 EACH MISCELLANEOUS 2 TIMES DAILY
Status: DISCONTINUED | OUTPATIENT
Start: 2020-04-15 | End: 2020-04-15 | Stop reason: HOSPADM

## 2020-04-14 RX ORDER — AMLODIPINE BESYLATE 10 MG/1
10 TABLET ORAL DAILY
Status: DISCONTINUED | OUTPATIENT
Start: 2020-04-15 | End: 2020-04-15 | Stop reason: HOSPADM

## 2020-04-14 RX ORDER — ESCITALOPRAM OXALATE 10 MG/1
10 TABLET ORAL
Status: DISCONTINUED | OUTPATIENT
Start: 2020-04-15 | End: 2020-04-15 | Stop reason: HOSPADM

## 2020-04-14 RX ORDER — ATORVASTATIN CALCIUM 40 MG/1
40 TABLET, FILM COATED ORAL
Status: DISCONTINUED | OUTPATIENT
Start: 2020-04-14 | End: 2020-04-15 | Stop reason: HOSPADM

## 2020-04-14 RX ORDER — POLYETHYLENE GLYCOL 3350 17 G/17G
17 POWDER, FOR SOLUTION ORAL DAILY PRN
Status: DISCONTINUED | OUTPATIENT
Start: 2020-04-14 | End: 2020-04-15 | Stop reason: HOSPADM

## 2020-04-14 RX ORDER — PANTOPRAZOLE SODIUM 40 MG/1
40 TABLET, DELAYED RELEASE ORAL
Status: DISCONTINUED | OUTPATIENT
Start: 2020-04-15 | End: 2020-04-15 | Stop reason: HOSPADM

## 2020-04-14 RX ORDER — ALBUTEROL SULFATE 90 UG/1
2 AEROSOL, METERED RESPIRATORY (INHALATION) EVERY 4 HOURS PRN
Status: DISCONTINUED | OUTPATIENT
Start: 2020-04-14 | End: 2020-04-15 | Stop reason: HOSPADM

## 2020-04-14 RX ADMIN — SODIUM CHLORIDE 500 ML: 0.9 INJECTION, SOLUTION INTRAVENOUS at 10:36

## 2020-04-14 RX ADMIN — GABAPENTIN 300 MG: 300 CAPSULE ORAL at 21:30

## 2020-04-14 RX ADMIN — GABAPENTIN 300 MG: 300 CAPSULE ORAL at 16:31

## 2020-04-14 RX ADMIN — BUSPIRONE HYDROCHLORIDE 5 MG: 5 TABLET ORAL at 21:30

## 2020-04-14 RX ADMIN — INSULIN GLARGINE 30 UNITS: 100 INJECTION, SOLUTION SUBCUTANEOUS at 21:30

## 2020-04-14 RX ADMIN — SODIUM CHLORIDE 100 ML/HR: 0.9 INJECTION, SOLUTION INTRAVENOUS at 16:31

## 2020-04-14 RX ADMIN — FLUTICASONE PROPIONATE 1 PUFF: 110 AEROSOL, METERED RESPIRATORY (INHALATION) at 19:53

## 2020-04-14 RX ADMIN — ASPIRIN 81 MG 324 MG: 81 TABLET ORAL at 10:10

## 2020-04-14 RX ADMIN — HEPARIN SODIUM 5000 UNITS: 5000 INJECTION INTRAVENOUS; SUBCUTANEOUS at 16:31

## 2020-04-14 RX ADMIN — ATORVASTATIN CALCIUM 40 MG: 40 TABLET, FILM COATED ORAL at 16:31

## 2020-04-14 RX ADMIN — IOHEXOL 100 ML: 350 INJECTION, SOLUTION INTRAVENOUS at 11:07

## 2020-04-14 RX ADMIN — NITROGLYCERIN 0.4 MG: 0.4 TABLET SUBLINGUAL at 10:10

## 2020-04-14 RX ADMIN — HEPARIN SODIUM 5000 UNITS: 5000 INJECTION INTRAVENOUS; SUBCUTANEOUS at 21:30

## 2020-04-14 RX ADMIN — BUSPIRONE HYDROCHLORIDE 5 MG: 5 TABLET ORAL at 16:31

## 2020-04-15 VITALS
HEART RATE: 67 BPM | SYSTOLIC BLOOD PRESSURE: 151 MMHG | HEIGHT: 65 IN | TEMPERATURE: 99.5 F | RESPIRATION RATE: 18 BRPM | WEIGHT: 235.45 LBS | DIASTOLIC BLOOD PRESSURE: 82 MMHG | BODY MASS INDEX: 39.23 KG/M2 | OXYGEN SATURATION: 99 %

## 2020-04-15 DIAGNOSIS — E11.21 TYPE 2 DIABETES MELLITUS WITH DIABETIC NEPHROPATHY, WITH LONG-TERM CURRENT USE OF INSULIN (HCC): ICD-10-CM

## 2020-04-15 DIAGNOSIS — Z79.4 TYPE 2 DIABETES MELLITUS WITH DIABETIC NEPHROPATHY, WITH LONG-TERM CURRENT USE OF INSULIN (HCC): ICD-10-CM

## 2020-04-15 LAB
ANION GAP SERPL CALCULATED.3IONS-SCNC: 7 MMOL/L (ref 4–13)
ATRIAL RATE: 77 BPM
BASOPHILS # BLD AUTO: 0.02 THOUSANDS/ΜL (ref 0–0.1)
BASOPHILS NFR BLD AUTO: 1 % (ref 0–1)
BUN SERPL-MCNC: 12 MG/DL (ref 5–25)
CALCIUM SERPL-MCNC: 8.9 MG/DL (ref 8.3–10.1)
CHLORIDE SERPL-SCNC: 105 MMOL/L (ref 100–108)
CO2 SERPL-SCNC: 29 MMOL/L (ref 21–32)
CREAT SERPL-MCNC: 0.97 MG/DL (ref 0.6–1.3)
EOSINOPHIL # BLD AUTO: 0.07 THOUSAND/ΜL (ref 0–0.61)
EOSINOPHIL NFR BLD AUTO: 2 % (ref 0–6)
ERYTHROCYTE [DISTWIDTH] IN BLOOD BY AUTOMATED COUNT: 14.8 % (ref 11.6–15.1)
GFR SERPL CREATININE-BSD FRML MDRD: 71 ML/MIN/1.73SQ M
GLUCOSE P FAST SERPL-MCNC: 108 MG/DL (ref 65–99)
GLUCOSE SERPL-MCNC: 108 MG/DL (ref 65–140)
GLUCOSE SERPL-MCNC: 109 MG/DL (ref 65–140)
GLUCOSE SERPL-MCNC: 171 MG/DL (ref 65–140)
HCT VFR BLD AUTO: 35.4 % (ref 34.8–46.1)
HGB BLD-MCNC: 11.5 G/DL (ref 11.5–15.4)
IMM GRANULOCYTES # BLD AUTO: 0.01 THOUSAND/UL (ref 0–0.2)
IMM GRANULOCYTES NFR BLD AUTO: 0 % (ref 0–2)
LYMPHOCYTES # BLD AUTO: 1.74 THOUSANDS/ΜL (ref 0.6–4.47)
LYMPHOCYTES NFR BLD AUTO: 39 % (ref 14–44)
MAGNESIUM SERPL-MCNC: 1.8 MG/DL (ref 1.6–2.6)
MCH RBC QN AUTO: 27.5 PG (ref 26.8–34.3)
MCHC RBC AUTO-ENTMCNC: 32.5 G/DL (ref 31.4–37.4)
MCV RBC AUTO: 85 FL (ref 82–98)
MONOCYTES # BLD AUTO: 0.42 THOUSAND/ΜL (ref 0.17–1.22)
MONOCYTES NFR BLD AUTO: 10 % (ref 4–12)
NEUTROPHILS # BLD AUTO: 2.18 THOUSANDS/ΜL (ref 1.85–7.62)
NEUTS SEG NFR BLD AUTO: 48 % (ref 43–75)
NRBC BLD AUTO-RTO: 0 /100 WBCS
P AXIS: 33 DEGREES
PLATELET # BLD AUTO: 193 THOUSANDS/UL (ref 149–390)
PMV BLD AUTO: 10.1 FL (ref 8.9–12.7)
POTASSIUM SERPL-SCNC: 4.3 MMOL/L (ref 3.5–5.3)
PR INTERVAL: 206 MS
QRS AXIS: -40 DEGREES
QRSD INTERVAL: 94 MS
QT INTERVAL: 408 MS
QTC INTERVAL: 461 MS
RBC # BLD AUTO: 4.18 MILLION/UL (ref 3.81–5.12)
SODIUM SERPL-SCNC: 141 MMOL/L (ref 136–145)
T WAVE AXIS: -16 DEGREES
VENTRICULAR RATE: 77 BPM
WBC # BLD AUTO: 4.44 THOUSAND/UL (ref 4.31–10.16)

## 2020-04-15 PROCEDURE — 80048 BASIC METABOLIC PNL TOTAL CA: CPT | Performed by: INTERNAL MEDICINE

## 2020-04-15 PROCEDURE — 82948 REAGENT STRIP/BLOOD GLUCOSE: CPT

## 2020-04-15 PROCEDURE — 85025 COMPLETE CBC W/AUTO DIFF WBC: CPT | Performed by: INTERNAL MEDICINE

## 2020-04-15 PROCEDURE — 93010 ELECTROCARDIOGRAM REPORT: CPT | Performed by: INTERNAL MEDICINE

## 2020-04-15 PROCEDURE — 83735 ASSAY OF MAGNESIUM: CPT | Performed by: INTERNAL MEDICINE

## 2020-04-15 PROCEDURE — 99217 PR OBSERVATION CARE DISCHARGE MANAGEMENT: CPT | Performed by: INTERNAL MEDICINE

## 2020-04-15 RX ORDER — SITAGLIPTIN 100 MG/1
TABLET, FILM COATED ORAL
Qty: 30 TABLET | Refills: 5 | Status: SHIPPED | OUTPATIENT
Start: 2020-04-15 | End: 2020-05-08

## 2020-04-15 RX ORDER — RANITIDINE 150 MG/1
150 CAPSULE ORAL 2 TIMES DAILY
Qty: 60 CAPSULE | Refills: 0 | Status: SHIPPED | OUTPATIENT
Start: 2020-04-15 | End: 2020-07-23 | Stop reason: ALTCHOICE

## 2020-04-15 RX ADMIN — SITAGLIPTIN 100 MG: 100 TABLET, FILM COATED ORAL at 08:24

## 2020-04-15 RX ADMIN — PANTOPRAZOLE SODIUM 40 MG: 40 TABLET, DELAYED RELEASE ORAL at 05:46

## 2020-04-15 RX ADMIN — SODIUM CHLORIDE 100 ML/HR: 0.9 INJECTION, SOLUTION INTRAVENOUS at 01:09

## 2020-04-15 RX ADMIN — BUSPIRONE HYDROCHLORIDE 5 MG: 5 TABLET ORAL at 08:24

## 2020-04-15 RX ADMIN — ASPIRIN 81 MG 81 MG: 81 TABLET ORAL at 08:24

## 2020-04-15 RX ADMIN — FLUTICASONE PROPIONATE 1 PUFF: 110 AEROSOL, METERED RESPIRATORY (INHALATION) at 08:24

## 2020-04-15 RX ADMIN — ESCITALOPRAM OXALATE 10 MG: 10 TABLET ORAL at 08:24

## 2020-04-15 RX ADMIN — AMLODIPINE BESYLATE 10 MG: 10 TABLET ORAL at 08:24

## 2020-04-15 RX ADMIN — INSULIN LISPRO 1 UNITS: 100 INJECTION, SOLUTION INTRAVENOUS; SUBCUTANEOUS at 12:20

## 2020-04-15 RX ADMIN — GABAPENTIN 300 MG: 300 CAPSULE ORAL at 08:24

## 2020-04-15 RX ADMIN — HEPARIN SODIUM 5000 UNITS: 5000 INJECTION INTRAVENOUS; SUBCUTANEOUS at 05:46

## 2020-04-16 ENCOUNTER — TRANSITIONAL CARE MANAGEMENT (OUTPATIENT)
Dept: FAMILY MEDICINE CLINIC | Facility: CLINIC | Age: 65
End: 2020-04-16

## 2020-04-20 DIAGNOSIS — J45.30 MILD PERSISTENT ASTHMA WITHOUT COMPLICATION: ICD-10-CM

## 2020-04-20 DIAGNOSIS — J45.20 INTERMITTENT ASTHMA, UNSPECIFIED ASTHMA SEVERITY, UNSPECIFIED WHETHER COMPLICATED: ICD-10-CM

## 2020-04-20 RX ORDER — ALBUTEROL SULFATE 90 UG/1
2 AEROSOL, METERED RESPIRATORY (INHALATION) EVERY 4 HOURS PRN
Qty: 1 INHALER | Refills: 5 | Status: SHIPPED | OUTPATIENT
Start: 2020-04-20 | End: 2020-07-23 | Stop reason: SDUPTHER

## 2020-04-21 ENCOUNTER — TELEMEDICINE (OUTPATIENT)
Dept: FAMILY MEDICINE CLINIC | Facility: CLINIC | Age: 65
End: 2020-04-21
Payer: COMMERCIAL

## 2020-04-21 VITALS — WEIGHT: 235 LBS | BODY MASS INDEX: 39.15 KG/M2 | HEIGHT: 65 IN

## 2020-04-21 DIAGNOSIS — J45.41 MODERATE PERSISTENT ASTHMA WITH ACUTE EXACERBATION: ICD-10-CM

## 2020-04-21 DIAGNOSIS — N17.9 ACUTE KIDNEY INJURY (HCC): ICD-10-CM

## 2020-04-21 DIAGNOSIS — Z76.89 ENCOUNTER FOR SUPPORT AND COORDINATION OF TRANSITION OF CARE: Primary | ICD-10-CM

## 2020-04-21 DIAGNOSIS — N30.00 ACUTE CYSTITIS WITHOUT HEMATURIA: ICD-10-CM

## 2020-04-21 PROCEDURE — 99496 TRANSJ CARE MGMT HIGH F2F 7D: CPT | Performed by: FAMILY MEDICINE

## 2020-04-21 RX ORDER — ALBUTEROL SULFATE 90 UG/1
2 AEROSOL, METERED RESPIRATORY (INHALATION) EVERY 6 HOURS PRN
Qty: 1 INHALER | Refills: 5 | Status: SHIPPED | OUTPATIENT
Start: 2020-04-21 | End: 2021-02-04 | Stop reason: SDUPTHER

## 2020-05-08 DIAGNOSIS — E11.21 TYPE 2 DIABETES MELLITUS WITH DIABETIC NEPHROPATHY, WITH LONG-TERM CURRENT USE OF INSULIN (HCC): ICD-10-CM

## 2020-05-08 DIAGNOSIS — Z79.4 TYPE 2 DIABETES MELLITUS WITH DIABETIC NEPHROPATHY, WITH LONG-TERM CURRENT USE OF INSULIN (HCC): ICD-10-CM

## 2020-05-08 RX ORDER — SITAGLIPTIN 100 MG/1
TABLET, FILM COATED ORAL
Qty: 30 TABLET | Refills: 5 | Status: SHIPPED | OUTPATIENT
Start: 2020-05-08 | End: 2021-05-26

## 2020-05-12 DIAGNOSIS — I10 ESSENTIAL HYPERTENSION: Primary | ICD-10-CM

## 2020-05-12 RX ORDER — AMLODIPINE BESYLATE 10 MG/1
TABLET ORAL
Qty: 30 TABLET | Refills: 5 | Status: SHIPPED | OUTPATIENT
Start: 2020-05-12 | End: 2021-02-08

## 2020-05-13 ENCOUNTER — LAB (OUTPATIENT)
Dept: LAB | Facility: MEDICAL CENTER | Age: 65
End: 2020-05-13
Payer: COMMERCIAL

## 2020-05-13 DIAGNOSIS — N30.00 ACUTE CYSTITIS WITHOUT HEMATURIA: ICD-10-CM

## 2020-05-13 DIAGNOSIS — N17.9 ACUTE KIDNEY INJURY (HCC): ICD-10-CM

## 2020-05-13 DIAGNOSIS — N17.9 AKI (ACUTE KIDNEY INJURY) (HCC): ICD-10-CM

## 2020-05-13 LAB
ANION GAP SERPL CALCULATED.3IONS-SCNC: 3 MMOL/L (ref 4–13)
BACTERIA UR QL AUTO: ABNORMAL /HPF
BILIRUB UR QL STRIP: NEGATIVE
BUN SERPL-MCNC: 17 MG/DL (ref 5–25)
CALCIUM SERPL-MCNC: 8.7 MG/DL (ref 8.3–10.1)
CHLORIDE SERPL-SCNC: 99 MMOL/L (ref 100–108)
CLARITY UR: CLEAR
CO2 SERPL-SCNC: 34 MMOL/L (ref 21–32)
COLOR UR: YELLOW
CREAT SERPL-MCNC: 1.15 MG/DL (ref 0.6–1.3)
GFR SERPL CREATININE-BSD FRML MDRD: 58 ML/MIN/1.73SQ M
GLUCOSE P FAST SERPL-MCNC: 251 MG/DL (ref 65–99)
GLUCOSE UR STRIP-MCNC: ABNORMAL MG/DL
HGB UR QL STRIP.AUTO: NEGATIVE
HYALINE CASTS #/AREA URNS LPF: ABNORMAL /LPF
KETONES UR STRIP-MCNC: NEGATIVE MG/DL
LEUKOCYTE ESTERASE UR QL STRIP: NEGATIVE
NITRITE UR QL STRIP: NEGATIVE
NON-SQ EPI CELLS URNS QL MICRO: ABNORMAL /HPF
PH UR STRIP.AUTO: 6.5 [PH]
POTASSIUM SERPL-SCNC: 4 MMOL/L (ref 3.5–5.3)
PROT UR STRIP-MCNC: ABNORMAL MG/DL
RBC #/AREA URNS AUTO: ABNORMAL /HPF
SODIUM SERPL-SCNC: 136 MMOL/L (ref 136–145)
SP GR UR STRIP.AUTO: 1.02 (ref 1–1.03)
UROBILINOGEN UR QL STRIP.AUTO: 1 E.U./DL
WBC #/AREA URNS AUTO: ABNORMAL /HPF

## 2020-05-13 PROCEDURE — 81001 URINALYSIS AUTO W/SCOPE: CPT

## 2020-05-13 PROCEDURE — 87086 URINE CULTURE/COLONY COUNT: CPT

## 2020-05-13 PROCEDURE — 80048 BASIC METABOLIC PNL TOTAL CA: CPT

## 2020-05-13 PROCEDURE — 36415 COLL VENOUS BLD VENIPUNCTURE: CPT

## 2020-05-14 LAB — BACTERIA UR CULT: NORMAL

## 2020-06-04 DIAGNOSIS — F41.1 GENERALIZED ANXIETY DISORDER: ICD-10-CM

## 2020-06-04 RX ORDER — BUSPIRONE HYDROCHLORIDE 5 MG/1
5 TABLET ORAL 3 TIMES DAILY
Qty: 90 TABLET | Refills: 1 | Status: SHIPPED | OUTPATIENT
Start: 2020-06-04 | End: 2020-08-24 | Stop reason: SDUPTHER

## 2020-06-12 DIAGNOSIS — E78.2 MIXED HYPERLIPIDEMIA: ICD-10-CM

## 2020-06-12 RX ORDER — SIMVASTATIN 20 MG
TABLET ORAL
Qty: 90 TABLET | Refills: 0 | Status: SHIPPED | OUTPATIENT
Start: 2020-06-12 | End: 2020-12-01 | Stop reason: SDUPTHER

## 2020-06-22 DIAGNOSIS — F41.1 GENERALIZED ANXIETY DISORDER: ICD-10-CM

## 2020-06-22 RX ORDER — ESCITALOPRAM OXALATE 10 MG/1
TABLET ORAL
Qty: 30 TABLET | Refills: 0 | Status: SHIPPED | OUTPATIENT
Start: 2020-06-22 | End: 2020-07-24

## 2020-07-03 DIAGNOSIS — I10 ESSENTIAL HYPERTENSION: Primary | ICD-10-CM

## 2020-07-06 RX ORDER — HYDROCHLOROTHIAZIDE 25 MG/1
TABLET ORAL
Qty: 30 TABLET | Refills: 0 | Status: SHIPPED | OUTPATIENT
Start: 2020-07-06 | End: 2020-08-10 | Stop reason: SDUPTHER

## 2020-07-08 ENCOUNTER — TELEPHONE (OUTPATIENT)
Dept: SLEEP CENTER | Facility: CLINIC | Age: 65
End: 2020-07-08

## 2020-07-08 NOTE — TELEPHONE ENCOUNTER
Patient called the office and left message stating that she wants to schedule an appointment with ENT  I called patient back and left message for her to call the office back to get her set up for one

## 2020-07-22 ENCOUNTER — TELEPHONE (OUTPATIENT)
Dept: FAMILY MEDICINE CLINIC | Facility: CLINIC | Age: 65
End: 2020-07-22

## 2020-07-22 DIAGNOSIS — B37.3 VAGINAL MONILIASIS: Primary | ICD-10-CM

## 2020-07-22 RX ORDER — FLUCONAZOLE 150 MG/1
150 TABLET ORAL ONCE
Qty: 1 TABLET | Refills: 0 | Status: SHIPPED | OUTPATIENT
Start: 2020-07-22 | End: 2020-07-22

## 2020-07-23 ENCOUNTER — OFFICE VISIT (OUTPATIENT)
Dept: FAMILY MEDICINE CLINIC | Facility: CLINIC | Age: 65
End: 2020-07-23
Payer: COMMERCIAL

## 2020-07-23 VITALS
SYSTOLIC BLOOD PRESSURE: 126 MMHG | BODY MASS INDEX: 38.99 KG/M2 | HEIGHT: 65 IN | WEIGHT: 234 LBS | TEMPERATURE: 98 F | DIASTOLIC BLOOD PRESSURE: 76 MMHG

## 2020-07-23 DIAGNOSIS — Z79.4 TYPE 2 DIABETES MELLITUS WITH COMPLICATION, WITH LONG-TERM CURRENT USE OF INSULIN (HCC): ICD-10-CM

## 2020-07-23 DIAGNOSIS — I10 ESSENTIAL HYPERTENSION: Primary | ICD-10-CM

## 2020-07-23 DIAGNOSIS — E11.8 TYPE 2 DIABETES MELLITUS WITH COMPLICATION, WITH LONG-TERM CURRENT USE OF INSULIN (HCC): ICD-10-CM

## 2020-07-23 PROCEDURE — 1036F TOBACCO NON-USER: CPT | Performed by: FAMILY MEDICINE

## 2020-07-23 PROCEDURE — 3060F POS MICROALBUMINURIA REV: CPT | Performed by: FAMILY MEDICINE

## 2020-07-23 PROCEDURE — 2022F DILAT RTA XM EVC RTNOPTHY: CPT | Performed by: FAMILY MEDICINE

## 2020-07-23 PROCEDURE — 3066F NEPHROPATHY DOC TX: CPT | Performed by: FAMILY MEDICINE

## 2020-07-23 PROCEDURE — 3046F HEMOGLOBIN A1C LEVEL >9.0%: CPT | Performed by: FAMILY MEDICINE

## 2020-07-23 PROCEDURE — 3074F SYST BP LT 130 MM HG: CPT | Performed by: FAMILY MEDICINE

## 2020-07-23 PROCEDURE — 3078F DIAST BP <80 MM HG: CPT | Performed by: FAMILY MEDICINE

## 2020-07-23 PROCEDURE — 99213 OFFICE O/P EST LOW 20 MIN: CPT | Performed by: FAMILY MEDICINE

## 2020-07-23 PROCEDURE — 3008F BODY MASS INDEX DOCD: CPT | Performed by: FAMILY MEDICINE

## 2020-07-23 NOTE — PROGRESS NOTES
Assessment/Plan:    Problem List Items Addressed This Visit        Endocrine    Type 2 diabetes mellitus with complication, with long-term current use of insulin ([address] )       Cardiovascular and Mediastinum    Essential hypertension - Primary           Diagnoses and all orders for this visit:    Essential hypertension    Type 2 diabetes mellitus with complication, with long-term current use of insulin (HCC)  -     insulin regular (HumuLIN R,NovoLIN R) 100 units/mL injection; 4 units at breakfast 5 units at lunch 6 units at supper  -     glucose blood (Accu-Chek Guide) test strip; Use as instructed - twice daily        No problem-specific Assessment & Plan notes found for this encounter  Subjective:      Patient ID: Sammi Chaparro is a 59 y o  female  Amie Lea is here today chief complaint is her sugars are up a little bit her diet isn't the best but she is not really eating cakes and cookies is just that the other people in her house to do the cooking like to eat a lot of pasta and rice sugars have been in the 200-300 range going to increase her mealtime insulin and also her basal insulin basal insulin will be increased to 35 units and the mealtime insulin will be increased to 5 units with breakfast 6 units with lunch and 7 units with dinner on      The following portions of the patient's history were reviewed and updated as appropriate:   She has a past medical history of Abnormal ECG, Abnormal mammogram, Abnormal stress test, Anemia, Anxiety, Arthritis, Asthma, Back problem, Breast cyst, Chest pain, Chronic pain disorder, Cyst of left breast, Depression, Depression, Diabetes mellitus ([address] ), Hiatal hernia, Hyperlipidemia, Hypertension, Keloid of skin, Neuropathy, Psychiatric disorder, Stroke ([address] ), and Tuberculosis  ,  does not have any pertinent problems on file  ,   has a past surgical history that includes Cyst Removal; Eye surgery; Tubal ligation;  Arm wound repair / closure; pr esophagogastroduodenoscopy transoral diagnostic (N/A, 1/5/2018); Cataract extraction (Bilateral); US guided thyroid biopsy (4/15/2019); pr wrist arthroscop,release xvers lig (Left, 4/19/2019); Carpal tunnel release; Carpal tunnel release (Left); FL lumbar puncture diagnostic (8/15/2019); and Colonoscopy  ,  family history includes Arthritis in her mother, paternal grandmother, and sister; Cancer in her family, maternal grandmother, and paternal grandmother; Coronary artery disease in her family; Diabetes in her mother; Glaucoma in her family; Heart attack in her maternal uncle, mother, and paternal grandmother; Heart disease in her mother; Hypertension in her father and mother; Kidney disease in her father; Rheum arthritis in her family; Stroke in her maternal aunt and maternal grandmother  ,   reports that she has never smoked  She has never used smokeless tobacco  She reports that she drank alcohol  She reports that she does not use drugs  ,  is allergic to bactrim [sulfamethoxazole-trimethoprim]; percocet [oxycodone-acetaminophen]; and vicodin [hydrocodone-acetaminophen]     Current Outpatient Medications   Medication Sig Dispense Refill    albuterol (PROVENTIL HFA,VENTOLIN HFA) 90 mcg/act inhaler Inhale 2 puffs every 6 (six) hours as needed for wheezing or shortness of breath 1 Inhaler 5    amLODIPine (NORVASC) 10 mg tablet take 1 tablet by mouth once daily 30 tablet 5    aspirin 81 MG tablet Take 81 mg by mouth daily        beclomethasone (Qvar) 80 MCG/ACT inhaler Inhale 2 puffs 2 (two) times a day 1 Inhaler 5    Blood Glucose Calibration (ACCU-CHEK COMPACT PLUS CONTROL) SOLN by In Vitro route daily 1 each 0    Blood Glucose Monitoring Suppl (ACCU-CHEK SHANE PLUS) w/Device KIT by Does not apply route 2 (two) times a day 1 kit 0    busPIRone (BUSPAR) 5 mg tablet Take 1 tablet (5 mg total) by mouth 3 (three) times a day 90 tablet 1    escitalopram (LEXAPRO) 10 mg tablet take 1 tablet by mouth every morning 30 tablet 0    gabapentin (NEURONTIN) 300 mg capsule Take 1 capsule (300 mg total) by mouth 3 (three) times a day 90 capsule 5    hydrochlorothiazide (HYDRODIURIL) 25 mg tablet take 1 tablet by mouth once daily 30 tablet 0    hydrocortisone 0 5 % cream Apply topically 2 (two) times a day 28 35 g 1    insulin glargine (Lantus SoloStar) 100 units/mL injection pen Inject 30 Units under the skin daily 5 pen 2    Insulin Pen Needle (B-D UF III MINI PEN NEEDLES) 31G X 5 MM MISC Inject under the skin daily 100 each 3    insulin regular (HumuLIN R,NovoLIN R) 100 units/mL injection 4 units at breakfast 5 units at lunch 6 units at supper 10 mL 3    JANUVIA 100 MG tablet take 1 tablet by mouth daily 30 tablet 5    Lancets (ACCU-CHEK MULTICLIX) lancets Test bid 100 each 0    pantoprazole (PROTONIX) 40 mg tablet Take 1 tablet (40 mg total) by mouth daily 60 tablet 3    Polyvinyl Alcohol (LIQUID TEARS OP) Apply to eye      RA PAIN RELIEF ACETAMINOPHEN 325 MG tablet take 1-2 tablets by mouth every 6 hours if needed for MODERATE pain  0    simvastatin (ZOCOR) 20 mg tablet take 1 tablet by mouth at bedtime 90 tablet 0     No current facility-administered medications for this visit  Review of Systems   Constitutional: Negative for activity change, appetite change, diaphoresis, fatigue and fever  HENT: Negative  Eyes: Negative  Respiratory: Negative for apnea, cough, chest tightness, shortness of breath and wheezing  Cardiovascular: Negative for chest pain, palpitations and leg swelling  Gastrointestinal: Negative for abdominal distention, abdominal pain, anal bleeding, constipation, diarrhea, nausea and vomiting  Endocrine: Negative for cold intolerance, heat intolerance, polydipsia, polyphagia and polyuria  Genitourinary: Negative for difficulty urinating, dysuria, flank pain, hematuria and urgency  Musculoskeletal: Negative for arthralgias, back pain, gait problem, joint swelling and myalgias  Skin: Negative for color change, rash and wound  Allergic/Immunologic: Negative for environmental allergies, food allergies and immunocompromised state  Neurological: Negative for dizziness, seizures, syncope, speech difficulty, numbness and headaches  Hematological: Negative for adenopathy  Does not bruise/bleed easily  Psychiatric/Behavioral: Negative for agitation, behavioral problems, hallucinations, sleep disturbance and suicidal ideas  Objective:  Vitals:    07/23/20 1111   BP: 126/76   BP Location: Left arm   Patient Position: Sitting   Cuff Size: Standard   Temp: 98 °F (36 7 °C)   TempSrc: Temporal   Weight: 106 kg (234 lb)   Height: 5' 5" (1 651 m)     Body mass index is 38 94 kg/m²  Physical Exam   Constitutional: She is oriented to person, place, and time  She appears well-developed and well-nourished  No distress  HENT:   Head: Normocephalic  Right Ear: External ear normal    Left Ear: External ear normal    Nose: Nose normal    Mouth/Throat: Oropharynx is clear and moist    Eyes: Pupils are equal, round, and reactive to light  Conjunctivae and EOM are normal  Right eye exhibits no discharge  Left eye exhibits no discharge  No scleral icterus  Neck: Normal range of motion  No tracheal deviation present  No thyromegaly present  Cardiovascular: Normal rate, regular rhythm and normal heart sounds  Exam reveals no gallop and no friction rub  Pulses are no weak pulses  No murmur heard  Pulses:       Dorsalis pedis pulses are 2+ on the right side, and 2+ on the left side  Posterior tibial pulses are 2+ on the right side, and 2+ on the left side  Pulmonary/Chest: Effort normal and breath sounds normal  No respiratory distress  She has no wheezes  Abdominal: Soft  Bowel sounds are normal  She exhibits no mass  There is no tenderness  There is no guarding  Musculoskeletal: She exhibits no edema or deformity     Feet:   Right Foot:   Skin Integrity: Negative for ulcer, skin breakdown, erythema, warmth, callus or dry skin  Left Foot:   Skin Integrity: Negative for ulcer, skin breakdown, erythema, warmth, callus or dry skin  Lymphadenopathy:     She has no cervical adenopathy  Neurological: She is alert and oriented to person, place, and time  No cranial nerve deficit  Skin: Skin is warm and dry  No rash noted  She is not diaphoretic  No erythema  Psychiatric: She has a normal mood and affect  Thought content normal      Patient's shoes and socks removed  Right Foot/Ankle   Right Foot Inspection  Skin Exam: skin normal and skin intact no dry skin, no warmth, no callus, no erythema, no maceration, no abnormal color, no pre-ulcer, no ulcer and no callus                          Toe Exam: ROM and strength within normal limits  Sensory   Vibration: intact  Proprioception: intact   Monofilament testing: intact  Vascular  Capillary refills: < 3 seconds  The right DP pulse is 2+  The right PT pulse is 2+  Left Foot/Ankle  Left Foot Inspection  Skin Exam: skin normal and skin intactno dry skin, no warmth, no erythema, no maceration, normal color, no pre-ulcer, no ulcer and no callus                         Toe Exam: ROM and strength within normal limits                   Sensory   Vibration: intact  Proprioception: intact  Monofilament: intact  Vascular  Capillary refills: < 3 seconds  The left DP pulse is 2+  The left PT pulse is 2+  Assign Risk Category:  No deformity present; No loss of protective sensation;  No weak pulses       Risk: 0

## 2020-07-24 DIAGNOSIS — F41.1 GENERALIZED ANXIETY DISORDER: ICD-10-CM

## 2020-07-24 RX ORDER — ESCITALOPRAM OXALATE 10 MG/1
TABLET ORAL
Qty: 30 TABLET | Refills: 0 | Status: SHIPPED | OUTPATIENT
Start: 2020-07-24 | End: 2020-08-28

## 2020-08-10 DIAGNOSIS — I10 ESSENTIAL HYPERTENSION: ICD-10-CM

## 2020-08-10 RX ORDER — HYDROCHLOROTHIAZIDE 25 MG/1
25 TABLET ORAL DAILY
Qty: 30 TABLET | Refills: 0 | Status: SHIPPED | OUTPATIENT
Start: 2020-08-10 | End: 2020-11-02

## 2020-08-24 DIAGNOSIS — E11.42 DIABETIC POLYNEUROPATHY ASSOCIATED WITH TYPE 2 DIABETES MELLITUS (HCC): ICD-10-CM

## 2020-08-24 DIAGNOSIS — F41.1 GENERALIZED ANXIETY DISORDER: ICD-10-CM

## 2020-08-24 RX ORDER — BUSPIRONE HYDROCHLORIDE 5 MG/1
5 TABLET ORAL 3 TIMES DAILY
Qty: 90 TABLET | Refills: 2 | Status: SHIPPED | OUTPATIENT
Start: 2020-08-24 | End: 2020-11-18

## 2020-08-24 RX ORDER — GABAPENTIN 300 MG/1
300 CAPSULE ORAL 3 TIMES DAILY
Qty: 90 CAPSULE | Refills: 5 | Status: SHIPPED | OUTPATIENT
Start: 2020-08-24 | End: 2021-01-14

## 2020-08-25 ENCOUNTER — OFFICE VISIT (OUTPATIENT)
Dept: OTOLARYNGOLOGY | Facility: CLINIC | Age: 65
End: 2020-08-25
Payer: MEDICARE

## 2020-08-25 VITALS
OXYGEN SATURATION: 98 % | WEIGHT: 237 LBS | SYSTOLIC BLOOD PRESSURE: 110 MMHG | HEIGHT: 65 IN | TEMPERATURE: 98.5 F | BODY MASS INDEX: 39.49 KG/M2 | HEART RATE: 70 BPM | DIASTOLIC BLOOD PRESSURE: 60 MMHG

## 2020-08-25 DIAGNOSIS — E04.2 MULTINODULAR GOITER: Primary | ICD-10-CM

## 2020-08-25 PROCEDURE — 3060F POS MICROALBUMINURIA REV: CPT | Performed by: OTOLARYNGOLOGY

## 2020-08-25 PROCEDURE — 3066F NEPHROPATHY DOC TX: CPT | Performed by: OTOLARYNGOLOGY

## 2020-08-25 PROCEDURE — 3078F DIAST BP <80 MM HG: CPT | Performed by: OTOLARYNGOLOGY

## 2020-08-25 PROCEDURE — 2022F DILAT RTA XM EVC RTNOPTHY: CPT | Performed by: OTOLARYNGOLOGY

## 2020-08-25 PROCEDURE — 4040F PNEUMOC VAC/ADMIN/RCVD: CPT | Performed by: OTOLARYNGOLOGY

## 2020-08-25 PROCEDURE — 3046F HEMOGLOBIN A1C LEVEL >9.0%: CPT | Performed by: OTOLARYNGOLOGY

## 2020-08-25 PROCEDURE — 1036F TOBACCO NON-USER: CPT | Performed by: OTOLARYNGOLOGY

## 2020-08-25 PROCEDURE — 99213 OFFICE O/P EST LOW 20 MIN: CPT | Performed by: OTOLARYNGOLOGY

## 2020-08-25 PROCEDURE — 3008F BODY MASS INDEX DOCD: CPT | Performed by: OTOLARYNGOLOGY

## 2020-08-25 PROCEDURE — 3074F SYST BP LT 130 MM HG: CPT | Performed by: OTOLARYNGOLOGY

## 2020-08-25 NOTE — PROGRESS NOTES
Rocky Gusman is a 72 y  o female who presents for re-evaluation of thyroid nodule  She had her thyroid biopsied last year which returned with [place] 1  No neck pain  She denies any otalgia  No other neck masses  No weight loss  Past Medical History:   Diagnosis Date    Abnormal ECG     last assessed: 5/15/17    Abnormal mammogram     last assessed: 7/11/17    Abnormal stress test     last assesed: 7/24/17    Anemia     Anxiety     Arthritis     Asthma     Back problem     Breast cyst     Chest pain     last assessed: 7/24/17    Chronic pain disorder     Cyst of left breast     last assessed: 8/18/17    Depression     Depression     resolved: 1/2/18    Diabetes mellitus [facility] )     Hiatal hernia     last assessed: 11/7/17    Hyperlipidemia     Hypertension     Keloid of skin     last assessed: 11/2/16    Neuropathy     Psychiatric disorder     anxiety    Stroke [facility])     TIA 2005    Tuberculosis     as a child        /60 (BP Location: Left arm, Cuff Size: Large)   Pulse 70   Temp 98 5 °F (36 9 °C) (Tympanic)   Ht 5' 5" (1 651 m)   Wt 108 kg (237 lb)   SpO2 98%   BMI 39 44 kg/m²       Physical Exam   Constitutional: Oriented to person, place, and time  Well-developed and well-nourished, no apparent distress, non-toxic appearance  Cooperative, able to hear and answer questions without difficulty  Voice: Normal voice quality  Head: Normocephalic, atraumatic  No scars, masses or lesions  Face: Symmetric, no edema, no sinus tenderness  Eyes: Vision grossly intact, extra-ocular movement intact  Ears: External ear normal   Bilateral tympanic membranes are intact with intact normal landmarks  No post-auricular erythema or tenderness  Nose: Septum midline, nares clear  Mucosa moist, turbinates well appearing  No crusting, polyps or discharge evident  Oral cavity: Dentition intact    Mucosa moist, lips normal   Tongue mobile, floor of mouth normal   Hard palate unremarkable  No masses or lesions  Oropharynx: Uvula is midline, soft palate normal   Unremarkable oropharyngeal inlet  Tonsils unremarkable  Posterior pharyngeal wall clear  No masses or lesions  Salivary glands:  Parotid glands and submandibular glands symmetric, no enlargement or tenderness  Neck: Normal laryngeal elevation with swallow  Trachea midline  No masses or lesions  No palpable adenopathy  Thyroid: Without tenderness  Large, firm approx  4 5 cm nodule in left thyroid lobe  Pulmonary/Chest: Normal effort and rate  No respiratory distress  Musculoskeletal: Normal range of motion  Neurological: Cranial nerves 2-12 intact  Skin: Skin is warm and dry  Psychiatric: Normal mood and affect  A/P: Large multinodular goiter - recommend repeat ultrasound to evaluate for changes  Will likely need repeat biopsy of the large left lobe nodule as the prior biopsy was non diagnostic  Follow up after u/s to review results and discuss options

## 2020-08-27 ENCOUNTER — RX ONLY (RX ONLY)
Age: 65
End: 2020-08-27

## 2020-08-27 ENCOUNTER — TRANSCRIBE ORDERS (OUTPATIENT)
Dept: ADMINISTRATIVE | Facility: HOSPITAL | Age: 65
End: 2020-08-27

## 2020-08-27 ENCOUNTER — DOCTOR'S OFFICE (OUTPATIENT)
Dept: URBAN - NONMETROPOLITAN AREA CLINIC 1 | Facility: CLINIC | Age: 65
Setting detail: OPHTHALMOLOGY
End: 2020-08-27
Payer: COMMERCIAL

## 2020-08-27 VITALS — HEIGHT: 55 IN

## 2020-08-27 DIAGNOSIS — H02.841: ICD-10-CM

## 2020-08-27 DIAGNOSIS — H04.121: ICD-10-CM

## 2020-08-27 DIAGNOSIS — Z96.1: ICD-10-CM

## 2020-08-27 DIAGNOSIS — H40.012: ICD-10-CM

## 2020-08-27 DIAGNOSIS — H05.20 OCULAR PROPTOSIS: Primary | ICD-10-CM

## 2020-08-27 DIAGNOSIS — H02.844 EDEMA OF LEFT UPPER EYELID: ICD-10-CM

## 2020-08-27 DIAGNOSIS — H04.122: ICD-10-CM

## 2020-08-27 DIAGNOSIS — H02.841 EDEMA OF RIGHT UPPER EYELID: ICD-10-CM

## 2020-08-27 DIAGNOSIS — H02.844: ICD-10-CM

## 2020-08-27 DIAGNOSIS — H05.20 EXOPHTHALMUS: Primary | ICD-10-CM

## 2020-08-27 DIAGNOSIS — H50.00: ICD-10-CM

## 2020-08-27 DIAGNOSIS — E11.9: ICD-10-CM

## 2020-08-27 DIAGNOSIS — G70.01: ICD-10-CM

## 2020-08-27 DIAGNOSIS — H40.011: ICD-10-CM

## 2020-08-27 DIAGNOSIS — H05.20: ICD-10-CM

## 2020-08-27 PROCEDURE — 99214 OFFICE O/P EST MOD 30 MIN: CPT | Performed by: OPHTHALMOLOGY

## 2020-08-27 ASSESSMENT — REFRACTION_AUTOREFRACTION
OD_SPHERE: +0.50
OS_CYLINDER: -1.00
OD_AXIS: 032
OS_SPHERE: -0.50
OD_CYLINDER: -1.00
OS_AXIS: 029

## 2020-08-27 ASSESSMENT — VISUAL ACUITY
OS_BCVA: 20/25-1
OD_BCVA: 20/40-2

## 2020-08-27 ASSESSMENT — LID EXAM ASSESSMENTS
OS_EDEMA: LUL 3+
OD_EDEMA: RUL 3+

## 2020-08-27 ASSESSMENT — SUPERFICIAL PUNCTATE KERATITIS (SPK)
OS_SPK: T
OD_SPK: 1+ 2+

## 2020-08-27 ASSESSMENT — CONFRONTATIONAL VISUAL FIELD TEST (CVF)
OS_FINDINGS: FULL
OD_FINDINGS: FULL

## 2020-08-27 ASSESSMENT — SPHEQUIV_DERIVED
OS_SPHEQUIV: -1
OD_SPHEQUIV: 0

## 2020-08-28 DIAGNOSIS — F41.1 GENERALIZED ANXIETY DISORDER: ICD-10-CM

## 2020-08-28 RX ORDER — ESCITALOPRAM OXALATE 10 MG/1
TABLET ORAL
Qty: 30 TABLET | Refills: 0 | Status: SHIPPED | OUTPATIENT
Start: 2020-08-28 | End: 2020-10-19 | Stop reason: SDUPTHER

## 2020-08-31 ENCOUNTER — APPOINTMENT (OUTPATIENT)
Dept: LAB | Facility: MEDICAL CENTER | Age: 65
End: 2020-08-31
Payer: MEDICARE

## 2020-08-31 ENCOUNTER — TRANSCRIBE ORDERS (OUTPATIENT)
Dept: ADMINISTRATIVE | Facility: HOSPITAL | Age: 65
End: 2020-08-31

## 2020-08-31 ENCOUNTER — HOSPITAL ENCOUNTER (OUTPATIENT)
Dept: ULTRASOUND IMAGING | Facility: HOSPITAL | Age: 65
Discharge: HOME | End: 2020-08-31
Payer: MEDICARE

## 2020-08-31 DIAGNOSIS — H05.20: ICD-10-CM

## 2020-08-31 DIAGNOSIS — H05.20: Primary | ICD-10-CM

## 2020-08-31 DIAGNOSIS — E04.2 MULTINODULAR GOITER: ICD-10-CM

## 2020-08-31 LAB
BUN SERPL-MCNC: 13 MG/DL (ref 5–25)
CREAT SERPL-MCNC: 1 MG/DL (ref 0.6–1.3)
GFR SERPL CREATININE-BSD FRML MDRD: 68 ML/MIN/1.73SQ M

## 2020-08-31 PROCEDURE — 84520 ASSAY OF UREA NITROGEN: CPT

## 2020-08-31 PROCEDURE — 76536 US EXAM OF HEAD AND NECK: CPT

## 2020-08-31 PROCEDURE — 82565 ASSAY OF CREATININE: CPT

## 2020-08-31 PROCEDURE — 36415 COLL VENOUS BLD VENIPUNCTURE: CPT

## 2020-09-14 ENCOUNTER — DOCTOR'S OFFICE (OUTPATIENT)
Dept: URBAN - NONMETROPOLITAN AREA CLINIC 1 | Facility: CLINIC | Age: 65
Setting detail: OPHTHALMOLOGY
End: 2020-09-14
Payer: COMMERCIAL

## 2020-09-14 DIAGNOSIS — H52.203: ICD-10-CM

## 2020-09-14 DIAGNOSIS — H52.4: ICD-10-CM

## 2020-09-14 PROCEDURE — 92015 DETERMINE REFRACTIVE STATE: CPT | Performed by: OPTOMETRIST

## 2020-09-14 PROCEDURE — 92012 INTRM OPH EXAM EST PATIENT: CPT | Performed by: OPTOMETRIST

## 2020-09-14 ASSESSMENT — REFRACTION_MANIFEST
OD_SPHERE: -0.25
OD_ADD: +2.50
OS_VA1: 20/25-2
OS_VA2: 20/25-2
OD_VA1: 20/25-2
OD_AXIS: 015
OS_ADD: +2.50
OS_CYLINDER: -0.75
OD_VA2: 20/25-2
OS_SPHERE: PLANO
OS_AXIS: 050
OD_CYLINDER: -0.50

## 2020-09-14 ASSESSMENT — REFRACTION_AUTOREFRACTION
OS_AXIS: 052
OS_SPHERE: -0.25
OD_SPHERE: -0.25
OD_AXIS: 016
OD_CYLINDER: -0.50
OS_CYLINDER: -0.75

## 2020-09-14 ASSESSMENT — CONFRONTATIONAL VISUAL FIELD TEST (CVF)
OS_FINDINGS: FULL
OD_FINDINGS: FULL

## 2020-09-14 ASSESSMENT — SPHEQUIV_DERIVED
OD_SPHEQUIV: -0.5
OS_SPHEQUIV: -0.625
OD_SPHEQUIV: -0.5

## 2020-09-14 ASSESSMENT — VISUAL ACUITY
OS_BCVA: 20/30
OD_BCVA: 20/40

## 2020-09-22 ENCOUNTER — OFFICE VISIT (OUTPATIENT)
Dept: OTOLARYNGOLOGY | Facility: CLINIC | Age: 65
End: 2020-09-22
Payer: MEDICARE

## 2020-09-22 VITALS
BODY MASS INDEX: 38.32 KG/M2 | SYSTOLIC BLOOD PRESSURE: 118 MMHG | WEIGHT: 230 LBS | HEART RATE: 69 BPM | OXYGEN SATURATION: 96 % | DIASTOLIC BLOOD PRESSURE: 62 MMHG | TEMPERATURE: 98.3 F | HEIGHT: 65 IN

## 2020-09-22 DIAGNOSIS — E04.1 THYROID NODULE: ICD-10-CM

## 2020-09-22 DIAGNOSIS — R49.9 HOARSENESS OR CHANGING VOICE: ICD-10-CM

## 2020-09-22 DIAGNOSIS — E04.2 MULTINODULAR GOITER: Primary | ICD-10-CM

## 2020-09-22 PROCEDURE — 99213 OFFICE O/P EST LOW 20 MIN: CPT | Performed by: OTOLARYNGOLOGY

## 2020-10-13 ENCOUNTER — HOSPITAL ENCOUNTER (OUTPATIENT)
Dept: CT IMAGING | Facility: HOSPITAL | Age: 65
Discharge: HOME | End: 2020-10-13
Payer: COMMERCIAL

## 2020-10-13 DIAGNOSIS — H02.841 EDEMA OF RIGHT UPPER EYELID: ICD-10-CM

## 2020-10-13 DIAGNOSIS — H05.20 EXOPHTHALMUS: ICD-10-CM

## 2020-10-13 DIAGNOSIS — H02.844 EDEMA OF LEFT UPPER EYELID: ICD-10-CM

## 2020-10-13 PROCEDURE — 70481 CT ORBIT/EAR/FOSSA W/DYE: CPT

## 2020-10-13 PROCEDURE — G1004 CDSM NDSC: HCPCS

## 2020-10-13 RX ADMIN — IOHEXOL 100 ML: 350 INJECTION, SOLUTION INTRAVENOUS at 12:35

## 2020-10-19 DIAGNOSIS — F41.1 GENERALIZED ANXIETY DISORDER: ICD-10-CM

## 2020-10-19 RX ORDER — ESCITALOPRAM OXALATE 10 MG/1
10 TABLET ORAL EVERY MORNING
Qty: 30 TABLET | Refills: 2 | Status: SHIPPED | OUTPATIENT
Start: 2020-10-19 | End: 2020-11-23

## 2020-11-02 DIAGNOSIS — I10 ESSENTIAL HYPERTENSION: ICD-10-CM

## 2020-11-02 RX ORDER — HYDROCHLOROTHIAZIDE 25 MG/1
TABLET ORAL
Qty: 30 TABLET | Refills: 0 | Status: SHIPPED | OUTPATIENT
Start: 2020-11-02 | End: 2020-12-01 | Stop reason: ALTCHOICE

## 2020-11-18 DIAGNOSIS — F41.1 GENERALIZED ANXIETY DISORDER: ICD-10-CM

## 2020-11-18 RX ORDER — BUSPIRONE HYDROCHLORIDE 5 MG/1
TABLET ORAL
Qty: 90 TABLET | Refills: 2 | Status: SHIPPED | OUTPATIENT
Start: 2020-11-18 | End: 2021-07-08 | Stop reason: SDUPTHER

## 2020-11-21 DIAGNOSIS — F41.1 GENERALIZED ANXIETY DISORDER: ICD-10-CM

## 2020-11-23 RX ORDER — ESCITALOPRAM OXALATE 10 MG/1
TABLET ORAL
Qty: 30 TABLET | Refills: 2 | Status: SHIPPED | OUTPATIENT
Start: 2020-11-23 | End: 2021-02-26

## 2020-12-01 ENCOUNTER — OFFICE VISIT (OUTPATIENT)
Dept: FAMILY MEDICINE CLINIC | Facility: CLINIC | Age: 65
End: 2020-12-01
Payer: COMMERCIAL

## 2020-12-01 VITALS
HEIGHT: 65 IN | BODY MASS INDEX: 39.65 KG/M2 | DIASTOLIC BLOOD PRESSURE: 82 MMHG | SYSTOLIC BLOOD PRESSURE: 148 MMHG | TEMPERATURE: 97.2 F | WEIGHT: 238 LBS

## 2020-12-01 DIAGNOSIS — M54.16 LUMBAR RADICULOPATHY, CHRONIC: ICD-10-CM

## 2020-12-01 DIAGNOSIS — Z79.4 TYPE 2 DIABETES MELLITUS WITH COMPLICATION, WITH LONG-TERM CURRENT USE OF INSULIN (HCC): ICD-10-CM

## 2020-12-01 DIAGNOSIS — E11.8 TYPE 2 DIABETES MELLITUS WITH COMPLICATION, WITH LONG-TERM CURRENT USE OF INSULIN (HCC): ICD-10-CM

## 2020-12-01 DIAGNOSIS — I10 ESSENTIAL HYPERTENSION: ICD-10-CM

## 2020-12-01 DIAGNOSIS — E66.01 OBESITY, MORBID (HCC): ICD-10-CM

## 2020-12-01 DIAGNOSIS — E78.2 MIXED HYPERLIPIDEMIA: ICD-10-CM

## 2020-12-01 DIAGNOSIS — Z23 NEED FOR INFLUENZA VACCINATION: Primary | ICD-10-CM

## 2020-12-01 DIAGNOSIS — B35.1 ONYCHOMYCOSIS: ICD-10-CM

## 2020-12-01 DIAGNOSIS — R60.9 DEPENDENT EDEMA: ICD-10-CM

## 2020-12-01 PROCEDURE — 99214 OFFICE O/P EST MOD 30 MIN: CPT | Performed by: FAMILY MEDICINE

## 2020-12-01 PROCEDURE — G0008 ADMIN INFLUENZA VIRUS VAC: HCPCS | Performed by: FAMILY MEDICINE

## 2020-12-01 PROCEDURE — 90662 IIV NO PRSV INCREASED AG IM: CPT | Performed by: FAMILY MEDICINE

## 2020-12-01 RX ORDER — TORSEMIDE 5 MG/1
5 TABLET ORAL DAILY
Qty: 30 TABLET | Refills: 3 | Status: SHIPPED | OUTPATIENT
Start: 2020-12-01 | End: 2021-03-29

## 2020-12-01 RX ORDER — SIMVASTATIN 20 MG
20 TABLET ORAL
Qty: 30 TABLET | Refills: 5 | Status: SHIPPED | OUTPATIENT
Start: 2020-12-01 | End: 2020-12-08 | Stop reason: SDUPTHER

## 2020-12-01 RX ORDER — POTASSIUM CHLORIDE 750 MG/1
10 TABLET, FILM COATED, EXTENDED RELEASE ORAL
Qty: 30 TABLET | Refills: 3 | Status: SHIPPED | OUTPATIENT
Start: 2020-12-01 | End: 2021-03-29

## 2020-12-01 RX ORDER — HYDROCHLOROTHIAZIDE 25 MG/1
25 TABLET ORAL DAILY
Qty: 30 TABLET | Refills: 5 | Status: CANCELLED | OUTPATIENT
Start: 2020-12-01

## 2020-12-02 ENCOUNTER — TELEPHONE (OUTPATIENT)
Dept: FAMILY MEDICINE CLINIC | Facility: CLINIC | Age: 65
End: 2020-12-02

## 2020-12-03 DIAGNOSIS — D49.6 NEOPLASM OF BRAIN (HCC): Primary | ICD-10-CM

## 2020-12-04 ENCOUNTER — TELEPHONE (OUTPATIENT)
Dept: FAMILY MEDICINE CLINIC | Facility: CLINIC | Age: 65
End: 2020-12-04

## 2020-12-04 DIAGNOSIS — D49.6 INTRACRANIAL NEOPLASM (HCC): Primary | ICD-10-CM

## 2020-12-07 ENCOUNTER — LAB (OUTPATIENT)
Dept: LAB | Facility: MEDICAL CENTER | Age: 65
End: 2020-12-07
Payer: COMMERCIAL

## 2020-12-07 LAB
ALBUMIN SERPL BCP-MCNC: 3.5 G/DL (ref 3.5–5)
ALP SERPL-CCNC: 90 U/L (ref 46–116)
ALT SERPL W P-5'-P-CCNC: 18 U/L (ref 12–78)
ANION GAP SERPL CALCULATED.3IONS-SCNC: 4 MMOL/L (ref 4–13)
AST SERPL W P-5'-P-CCNC: 14 U/L (ref 5–45)
BILIRUB SERPL-MCNC: 0.46 MG/DL (ref 0.2–1)
BUN SERPL-MCNC: 15 MG/DL (ref 5–25)
CALCIUM SERPL-MCNC: 9.2 MG/DL (ref 8.3–10.1)
CHLORIDE SERPL-SCNC: 101 MMOL/L (ref 100–108)
CHOLEST SERPL-MCNC: 206 MG/DL (ref 50–200)
CO2 SERPL-SCNC: 32 MMOL/L (ref 21–32)
CREAT SERPL-MCNC: 1.09 MG/DL (ref 0.6–1.3)
CREAT UR-MCNC: 71.2 MG/DL
EST. AVERAGE GLUCOSE BLD GHB EST-MCNC: 280 MG/DL
GFR SERPL CREATININE-BSD FRML MDRD: 62 ML/MIN/1.73SQ M
GLUCOSE P FAST SERPL-MCNC: 210 MG/DL (ref 65–99)
HBA1C MFR BLD: 11.4 %
HDLC SERPL-MCNC: 42 MG/DL
LDLC SERPL CALC-MCNC: 137 MG/DL (ref 0–100)
MICROALBUMIN UR-MCNC: 1570 MG/L (ref 0–20)
MICROALBUMIN/CREAT 24H UR: 2205 MG/G CREATININE (ref 0–30)
NONHDLC SERPL-MCNC: 164 MG/DL
POTASSIUM SERPL-SCNC: 3.6 MMOL/L (ref 3.5–5.3)
PROT SERPL-MCNC: 7.3 G/DL (ref 6.4–8.2)
SODIUM SERPL-SCNC: 137 MMOL/L (ref 136–145)
TRIGL SERPL-MCNC: 133 MG/DL
TSH SERPL DL<=0.05 MIU/L-ACNC: 2.34 UIU/ML (ref 0.36–3.74)

## 2020-12-07 PROCEDURE — 36415 COLL VENOUS BLD VENIPUNCTURE: CPT | Performed by: FAMILY MEDICINE

## 2020-12-07 PROCEDURE — 83036 HEMOGLOBIN GLYCOSYLATED A1C: CPT | Performed by: FAMILY MEDICINE

## 2020-12-07 PROCEDURE — 82570 ASSAY OF URINE CREATININE: CPT | Performed by: FAMILY MEDICINE

## 2020-12-07 PROCEDURE — 84443 ASSAY THYROID STIM HORMONE: CPT | Performed by: FAMILY MEDICINE

## 2020-12-07 PROCEDURE — 80061 LIPID PANEL: CPT | Performed by: FAMILY MEDICINE

## 2020-12-07 PROCEDURE — 80053 COMPREHEN METABOLIC PANEL: CPT | Performed by: FAMILY MEDICINE

## 2020-12-07 PROCEDURE — 82043 UR ALBUMIN QUANTITATIVE: CPT | Performed by: FAMILY MEDICINE

## 2020-12-08 DIAGNOSIS — Z79.4 TYPE 2 DIABETES MELLITUS WITH COMPLICATION, WITH LONG-TERM CURRENT USE OF INSULIN (HCC): ICD-10-CM

## 2020-12-08 DIAGNOSIS — E11.8 TYPE 2 DIABETES MELLITUS WITH COMPLICATION, WITH LONG-TERM CURRENT USE OF INSULIN (HCC): ICD-10-CM

## 2020-12-08 DIAGNOSIS — E78.2 MIXED HYPERLIPIDEMIA: ICD-10-CM

## 2020-12-08 RX ORDER — SIMVASTATIN 20 MG
40 TABLET ORAL
Qty: 30 TABLET | Refills: 5
Start: 2020-12-08 | End: 2021-01-05 | Stop reason: SDUPTHER

## 2020-12-08 RX ORDER — INSULIN GLARGINE 100 [IU]/ML
40 INJECTION, SOLUTION SUBCUTANEOUS DAILY
Qty: 5 PEN | Refills: 2
Start: 2020-12-08 | End: 2021-10-18 | Stop reason: SDUPTHER

## 2020-12-15 ENCOUNTER — OFFICE VISIT (OUTPATIENT)
Dept: OTOLARYNGOLOGY | Facility: CLINIC | Age: 65
End: 2020-12-15
Payer: COMMERCIAL

## 2020-12-15 VITALS
BODY MASS INDEX: 39.15 KG/M2 | TEMPERATURE: 97.5 F | OXYGEN SATURATION: 97 % | SYSTOLIC BLOOD PRESSURE: 112 MMHG | HEART RATE: 72 BPM | WEIGHT: 235 LBS | DIASTOLIC BLOOD PRESSURE: 70 MMHG | HEIGHT: 65 IN

## 2020-12-15 DIAGNOSIS — E04.2 MULTINODULAR GOITER: Primary | ICD-10-CM

## 2020-12-15 PROCEDURE — 99213 OFFICE O/P EST LOW 20 MIN: CPT | Performed by: OTOLARYNGOLOGY

## 2020-12-22 ENCOUNTER — TELEPHONE (OUTPATIENT)
Dept: SURGERY | Facility: CLINIC | Age: 65
End: 2020-12-22

## 2020-12-22 DIAGNOSIS — K21.9 GASTROESOPHAGEAL REFLUX DISEASE WITHOUT ESOPHAGITIS: ICD-10-CM

## 2020-12-22 RX ORDER — PANTOPRAZOLE SODIUM 40 MG/1
40 TABLET, DELAYED RELEASE ORAL DAILY
Qty: 90 TABLET | Refills: 3 | Status: SHIPPED | OUTPATIENT
Start: 2020-12-22 | End: 2022-02-08

## 2021-01-05 DIAGNOSIS — E78.2 MIXED HYPERLIPIDEMIA: ICD-10-CM

## 2021-01-05 RX ORDER — SIMVASTATIN 20 MG
40 TABLET ORAL
Qty: 30 TABLET | Refills: 5 | Status: SHIPPED | OUTPATIENT
Start: 2021-01-05 | End: 2021-07-08 | Stop reason: SDUPTHER

## 2021-01-07 ENCOUNTER — DOCTOR'S OFFICE (OUTPATIENT)
Dept: URBAN - NONMETROPOLITAN AREA CLINIC 1 | Facility: CLINIC | Age: 66
Setting detail: OPHTHALMOLOGY
End: 2021-01-07
Payer: COMMERCIAL

## 2021-01-07 VITALS — HEIGHT: 55 IN

## 2021-01-07 DIAGNOSIS — H40.011: ICD-10-CM

## 2021-01-07 DIAGNOSIS — H40.012: ICD-10-CM

## 2021-01-07 DIAGNOSIS — H04.122: ICD-10-CM

## 2021-01-07 DIAGNOSIS — E05.00: ICD-10-CM

## 2021-01-07 DIAGNOSIS — H04.121: ICD-10-CM

## 2021-01-07 DIAGNOSIS — G70.01: ICD-10-CM

## 2021-01-07 DIAGNOSIS — H50.00: ICD-10-CM

## 2021-01-07 PROBLEM — E11.9 DIABETES TYPE 2 NO RETINOPATHY: Status: ACTIVE | Noted: 2017-02-07

## 2021-01-07 PROBLEM — H26.493 AFTER-CATARACT,OBSCURING VISION; BOTH EYES: Status: ACTIVE | Noted: 2018-08-14

## 2021-01-07 PROBLEM — E03.4: Status: ACTIVE | Noted: 2019-01-09

## 2021-01-07 PROBLEM — H52.203 ASTIGMATISM NOS; BOTH EYES: Status: ACTIVE | Noted: 2020-09-14

## 2021-01-07 PROBLEM — Z96.1 PSEUDOPHAKIA ; BOTH EYES: Status: ACTIVE | Noted: 2018-08-14

## 2021-01-07 PROBLEM — H02.841 EDEMA EYELIDS; RIGHT UPPER LID, LEFT UPPER LID: Status: ACTIVE | Noted: 2020-08-27

## 2021-01-07 PROBLEM — H02.844 EDEMA EYELIDS; RIGHT UPPER LID, LEFT UPPER LID: Status: ACTIVE | Noted: 2020-08-27

## 2021-01-07 PROCEDURE — 92083 EXTENDED VISUAL FIELD XM: CPT | Performed by: OPHTHALMOLOGY

## 2021-01-07 PROCEDURE — 92014 COMPRE OPH EXAM EST PT 1/>: CPT | Performed by: OPHTHALMOLOGY

## 2021-01-07 ASSESSMENT — LID EXAM ASSESSMENTS
OS_EDEMA: LUL 3+
OD_EDEMA: RUL 3+

## 2021-01-07 ASSESSMENT — SPHEQUIV_DERIVED
OD_SPHEQUIV: -0.5
OS_SPHEQUIV: -0.875
OD_SPHEQUIV: -0.5

## 2021-01-07 ASSESSMENT — REFRACTION_AUTOREFRACTION
OD_AXIS: 010
OD_SPHERE: 0.00
OD_CYLINDER: -1.00
OS_AXIS: 055
OS_CYLINDER: -1.25
OS_SPHERE: -0.25

## 2021-01-07 ASSESSMENT — SUPERFICIAL PUNCTATE KERATITIS (SPK)
OS_SPK: T
OD_SPK: 1+ 2+

## 2021-01-07 ASSESSMENT — VISUAL ACUITY
OD_BCVA: 20/40+2
OS_BCVA: 20/30-2

## 2021-01-07 ASSESSMENT — REFRACTION_MANIFEST
OD_ADD: +2.50
OS_SPHERE: PLANO
OD_SPHERE: -0.25
OS_ADD: +2.50
OD_CYLINDER: -0.50
OD_VA1: 20/25-2
OS_VA1: 20/25-2
OD_VA2: 20/25-2
OS_AXIS: 050
OS_CYLINDER: -0.75
OS_VA2: 20/25-2
OD_AXIS: 015

## 2021-01-07 ASSESSMENT — CONFRONTATIONAL VISUAL FIELD TEST (CVF)
OS_FINDINGS: FULL
OD_FINDINGS: FULL

## 2021-01-11 ENCOUNTER — TELEPHONE (OUTPATIENT)
Dept: NEUROSURGERY | Facility: CLINIC | Age: 66
End: 2021-01-11

## 2021-01-14 ENCOUNTER — HOSPITAL ENCOUNTER (OUTPATIENT)
Dept: RADIOLOGY | Facility: HOSPITAL | Age: 66
Discharge: HOME | End: 2021-01-14
Attending: ANESTHESIOLOGY
Payer: COMMERCIAL

## 2021-01-14 ENCOUNTER — CONSULT (OUTPATIENT)
Dept: PAIN MEDICINE | Facility: CLINIC | Age: 66
End: 2021-01-14
Payer: COMMERCIAL

## 2021-01-14 VITALS
HEIGHT: 65 IN | BODY MASS INDEX: 39.32 KG/M2 | DIASTOLIC BLOOD PRESSURE: 81 MMHG | WEIGHT: 236 LBS | SYSTOLIC BLOOD PRESSURE: 127 MMHG | HEART RATE: 82 BPM

## 2021-01-14 DIAGNOSIS — M54.2 NECK PAIN: ICD-10-CM

## 2021-01-14 DIAGNOSIS — M54.41 CHRONIC BILATERAL LOW BACK PAIN WITH BILATERAL SCIATICA: ICD-10-CM

## 2021-01-14 DIAGNOSIS — M54.16 LUMBAR RADICULOPATHY: ICD-10-CM

## 2021-01-14 DIAGNOSIS — M54.42 CHRONIC BILATERAL LOW BACK PAIN WITH BILATERAL SCIATICA: ICD-10-CM

## 2021-01-14 DIAGNOSIS — G89.4 CHRONIC PAIN SYNDROME: ICD-10-CM

## 2021-01-14 DIAGNOSIS — M54.12 CERVICAL RADICULOPATHY: ICD-10-CM

## 2021-01-14 DIAGNOSIS — E11.49 OTHER DIABETIC NEUROLOGICAL COMPLICATION ASSOCIATED WITH TYPE 2 DIABETES MELLITUS (HCC): Primary | ICD-10-CM

## 2021-01-14 DIAGNOSIS — G89.29 CHRONIC BILATERAL LOW BACK PAIN WITH BILATERAL SCIATICA: ICD-10-CM

## 2021-01-14 DIAGNOSIS — M47.816 LUMBAR SPONDYLOSIS: ICD-10-CM

## 2021-01-14 PROCEDURE — 72110 X-RAY EXAM L-2 SPINE 4/>VWS: CPT

## 2021-01-14 PROCEDURE — 99204 OFFICE O/P NEW MOD 45 MIN: CPT | Performed by: ANESTHESIOLOGY

## 2021-01-14 PROCEDURE — 72050 X-RAY EXAM NECK SPINE 4/5VWS: CPT

## 2021-01-14 RX ORDER — PREGABALIN 50 MG/1
50 CAPSULE ORAL 3 TIMES DAILY
Qty: 90 CAPSULE | Refills: 1 | Status: SHIPPED | OUTPATIENT
Start: 2021-01-14 | End: 2021-10-18 | Stop reason: SDUPTHER

## 2021-01-14 NOTE — PATIENT INSTRUCTIONS
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
## 2021-01-18 ENCOUNTER — HOSPITAL ENCOUNTER (OUTPATIENT)
Dept: MRI IMAGING | Facility: HOSPITAL | Age: 66
Discharge: HOME | End: 2021-01-18
Attending: FAMILY MEDICINE
Payer: COMMERCIAL

## 2021-01-18 DIAGNOSIS — D49.6 INTRACRANIAL NEOPLASM (HCC): ICD-10-CM

## 2021-01-18 PROCEDURE — 70553 MRI BRAIN STEM W/O & W/DYE: CPT

## 2021-01-18 PROCEDURE — A9585 GADOBUTROL INJECTION: HCPCS | Performed by: FAMILY MEDICINE

## 2021-01-18 PROCEDURE — G1004 CDSM NDSC: HCPCS

## 2021-01-18 RX ADMIN — GADOBUTROL 5 ML: 604.72 INJECTION INTRAVENOUS at 15:29

## 2021-01-25 ENCOUNTER — OFFICE VISIT (OUTPATIENT)
Dept: NEUROSURGERY | Facility: CLINIC | Age: 66
End: 2021-01-25
Payer: COMMERCIAL

## 2021-01-25 VITALS
WEIGHT: 236 LBS | SYSTOLIC BLOOD PRESSURE: 138 MMHG | DIASTOLIC BLOOD PRESSURE: 70 MMHG | BODY MASS INDEX: 39.32 KG/M2 | TEMPERATURE: 97.6 F | HEIGHT: 65 IN

## 2021-01-25 DIAGNOSIS — D49.6 NEOPLASM OF BRAIN (HCC): ICD-10-CM

## 2021-01-25 PROBLEM — Q01.9: Status: ACTIVE | Noted: 2021-01-25

## 2021-01-25 PROCEDURE — 99204 OFFICE O/P NEW MOD 45 MIN: CPT | Performed by: PHYSICIAN ASSISTANT

## 2021-01-25 RX ORDER — ALPRAZOLAM 0.5 MG/1
0.5 TABLET ORAL AS NEEDED
Qty: 2 TABLET | Refills: 0 | Status: SHIPPED | OUTPATIENT
Start: 2022-01-10 | End: 2022-07-11

## 2021-01-25 NOTE — ASSESSMENT & PLAN NOTE
· Patient presents to the neurosurgery office for consultation for right greater than left basal meningoceles  · Discovered on CT orbits during workup for exophthalmos- Concern for Grave's disease  · Imaging reviewed personally and by attending  Final results below discussed with the patient  · MRI brain w wo 01/18/2021:  Bilateral, right greater than left basal meningocele is are stable in appearance compared to prior CT and MRI brain from 2019  Stable chronic small-vessel disease present to moderate degree  No acute disease  · CT Orbits/temporal bones/skulls base w contrast 10/13/20: Bilateral exophthalmos without retrobulbar mass  Extraocular muscles are symmetric and normal volume  Benign-appearing erosion within the right petrous apex, overtly stable when compared to CT of the facial bones 5/20/2019, thought to represent basal meningocele  Plan    · Discussed imaging findings with patient and her daughter that show stable bilateral meningoceles, possibly congenital    · Discussed with pt that natural history of pseudomeningoceles  No neurosurgical intervention is anticipated at this time  · Discussed with pt follow up in 1 year w/ repeat brain MRI vs follow up as needed  · Pt elected to follow up in 1 year with repeat MRI brain w wo- to include FIESTA studies  BUN/Creatinine to be completed prior to the MRI  · Patient made aware to contact neurosurgery with any questions or concerns
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
## 2021-02-04 DIAGNOSIS — J45.30 MILD PERSISTENT ASTHMA WITHOUT COMPLICATION: ICD-10-CM

## 2021-02-04 DIAGNOSIS — J45.41 MODERATE PERSISTENT ASTHMA WITH ACUTE EXACERBATION: ICD-10-CM

## 2021-02-04 RX ORDER — ALBUTEROL SULFATE 90 UG/1
2 AEROSOL, METERED RESPIRATORY (INHALATION) EVERY 6 HOURS PRN
Qty: 1 INHALER | Refills: 5 | Status: SHIPPED | OUTPATIENT
Start: 2021-02-04

## 2021-02-06 DIAGNOSIS — I10 ESSENTIAL HYPERTENSION: ICD-10-CM

## 2021-02-08 RX ORDER — AMLODIPINE BESYLATE 10 MG/1
TABLET ORAL
Qty: 30 TABLET | Refills: 5 | Status: ON HOLD | OUTPATIENT
Start: 2021-02-08 | End: 2022-07-11 | Stop reason: SDUPTHER

## 2021-02-16 ENCOUNTER — TELEPHONE (OUTPATIENT)
Dept: PAIN MEDICINE | Facility: CLINIC | Age: 66
End: 2021-02-16

## 2021-02-26 DIAGNOSIS — F41.1 GENERALIZED ANXIETY DISORDER: ICD-10-CM

## 2021-02-26 RX ORDER — ESCITALOPRAM OXALATE 10 MG/1
TABLET ORAL
Qty: 30 TABLET | Refills: 2 | Status: SHIPPED | OUTPATIENT
Start: 2021-02-26 | End: 2021-05-26

## 2021-03-29 DIAGNOSIS — I10 ESSENTIAL HYPERTENSION: ICD-10-CM

## 2021-03-29 DIAGNOSIS — R60.9 DEPENDENT EDEMA: ICD-10-CM

## 2021-03-29 RX ORDER — TORSEMIDE 5 MG/1
TABLET ORAL
Qty: 30 TABLET | Refills: 3 | Status: SHIPPED | OUTPATIENT
Start: 2021-03-29 | End: 2021-08-11

## 2021-03-29 RX ORDER — POTASSIUM CHLORIDE 750 MG/1
TABLET, FILM COATED, EXTENDED RELEASE ORAL
Qty: 30 TABLET | Refills: 3 | Status: SHIPPED | OUTPATIENT
Start: 2021-03-29 | End: 2021-08-11

## 2021-04-15 ENCOUNTER — TELEPHONE (OUTPATIENT)
Dept: FAMILY MEDICINE CLINIC | Facility: CLINIC | Age: 66
End: 2021-04-15

## 2021-04-25 DIAGNOSIS — E11.49 OTHER DIABETIC NEUROLOGICAL COMPLICATION ASSOCIATED WITH TYPE 2 DIABETES MELLITUS (HCC): ICD-10-CM

## 2021-04-26 RX ORDER — PREGABALIN 50 MG/1
CAPSULE ORAL
Qty: 90 CAPSULE | OUTPATIENT
Start: 2021-04-26

## 2021-05-26 DIAGNOSIS — Z79.4 TYPE 2 DIABETES MELLITUS WITH DIABETIC NEPHROPATHY, WITH LONG-TERM CURRENT USE OF INSULIN (HCC): ICD-10-CM

## 2021-05-26 DIAGNOSIS — E11.21 TYPE 2 DIABETES MELLITUS WITH DIABETIC NEPHROPATHY, WITH LONG-TERM CURRENT USE OF INSULIN (HCC): ICD-10-CM

## 2021-05-26 DIAGNOSIS — F41.1 GENERALIZED ANXIETY DISORDER: ICD-10-CM

## 2021-05-26 RX ORDER — ESCITALOPRAM OXALATE 10 MG/1
TABLET ORAL
Qty: 30 TABLET | Refills: 2 | Status: SHIPPED | OUTPATIENT
Start: 2021-05-26 | End: 2022-07-11

## 2021-05-26 RX ORDER — SITAGLIPTIN 100 MG/1
TABLET, FILM COATED ORAL
Qty: 30 TABLET | Refills: 5 | Status: SHIPPED | OUTPATIENT
Start: 2021-05-26 | End: 2022-07-22 | Stop reason: SDUPTHER

## 2021-06-13 ENCOUNTER — APPOINTMENT (EMERGENCY)
Dept: RADIOLOGY | Facility: HOSPITAL | Age: 66
End: 2021-06-13
Payer: COMMERCIAL

## 2021-06-13 ENCOUNTER — HOSPITAL ENCOUNTER (EMERGENCY)
Facility: HOSPITAL | Age: 66
Discharge: HOME | End: 2021-06-13
Attending: EMERGENCY MEDICINE | Admitting: EMERGENCY MEDICINE
Payer: COMMERCIAL

## 2021-06-13 VITALS
HEART RATE: 83 BPM | RESPIRATION RATE: 14 BRPM | WEIGHT: 247.36 LBS | TEMPERATURE: 98.6 F | SYSTOLIC BLOOD PRESSURE: 165 MMHG | OXYGEN SATURATION: 93 % | BODY MASS INDEX: 41.16 KG/M2 | DIASTOLIC BLOOD PRESSURE: 89 MMHG

## 2021-06-13 DIAGNOSIS — R07.9 CHEST PAIN: Primary | ICD-10-CM

## 2021-06-13 DIAGNOSIS — E11.65 HYPERGLYCEMIA DUE TO TYPE 2 DIABETES MELLITUS (HCC): ICD-10-CM

## 2021-06-13 LAB
ALBUMIN SERPL BCP-MCNC: 3.1 G/DL (ref 3.5–5)
ALP SERPL-CCNC: 100 U/L (ref 46–116)
ALT SERPL W P-5'-P-CCNC: 19 U/L (ref 12–78)
ANION GAP SERPL CALCULATED.3IONS-SCNC: 7 MMOL/L (ref 4–13)
AST SERPL W P-5'-P-CCNC: 14 U/L (ref 5–45)
BASOPHILS # BLD AUTO: 0.03 THOUSANDS/ΜL (ref 0–0.1)
BASOPHILS NFR BLD AUTO: 1 % (ref 0–1)
BILIRUB SERPL-MCNC: 0.22 MG/DL (ref 0.2–1)
BUN SERPL-MCNC: 14 MG/DL (ref 5–25)
CALCIUM ALBUM COR SERPL-MCNC: 9.6 MG/DL (ref 8.3–10.1)
CALCIUM SERPL-MCNC: 8.9 MG/DL (ref 8.3–10.1)
CHLORIDE SERPL-SCNC: 102 MMOL/L (ref 100–108)
CO2 SERPL-SCNC: 30 MMOL/L (ref 21–32)
CREAT SERPL-MCNC: 1.15 MG/DL (ref 0.6–1.3)
EOSINOPHIL # BLD AUTO: 0.19 THOUSAND/ΜL (ref 0–0.61)
EOSINOPHIL NFR BLD AUTO: 3 % (ref 0–6)
ERYTHROCYTE [DISTWIDTH] IN BLOOD BY AUTOMATED COUNT: 14.6 % (ref 11.6–15.1)
GFR SERPL CREATININE-BSD FRML MDRD: 58 ML/MIN/1.73SQ M
GLUCOSE SERPL-MCNC: 385 MG/DL (ref 65–140)
HCT VFR BLD AUTO: 41 % (ref 34.8–46.1)
HGB BLD-MCNC: 13.1 G/DL (ref 11.5–15.4)
IMM GRANULOCYTES # BLD AUTO: 0.01 THOUSAND/UL (ref 0–0.2)
IMM GRANULOCYTES NFR BLD AUTO: 0 % (ref 0–2)
LYMPHOCYTES # BLD AUTO: 1.73 THOUSANDS/ΜL (ref 0.6–4.47)
LYMPHOCYTES NFR BLD AUTO: 27 % (ref 14–44)
MCH RBC QN AUTO: 27.1 PG (ref 26.8–34.3)
MCHC RBC AUTO-ENTMCNC: 32 G/DL (ref 31.4–37.4)
MCV RBC AUTO: 85 FL (ref 82–98)
MONOCYTES # BLD AUTO: 0.54 THOUSAND/ΜL (ref 0.17–1.22)
MONOCYTES NFR BLD AUTO: 8 % (ref 4–12)
NEUTROPHILS # BLD AUTO: 3.94 THOUSANDS/ΜL (ref 1.85–7.62)
NEUTS SEG NFR BLD AUTO: 61 % (ref 43–75)
NRBC BLD AUTO-RTO: 0 /100 WBCS
NT-PROBNP SERPL-MCNC: 63 PG/ML
PLATELET # BLD AUTO: 224 THOUSANDS/UL (ref 149–390)
PMV BLD AUTO: 10.6 FL (ref 8.9–12.7)
POTASSIUM SERPL-SCNC: 3.6 MMOL/L (ref 3.5–5.3)
PROT SERPL-MCNC: 7.5 G/DL (ref 6.4–8.2)
RBC # BLD AUTO: 4.84 MILLION/UL (ref 3.81–5.12)
SODIUM SERPL-SCNC: 139 MMOL/L (ref 136–145)
TROPONIN I SERPL-MCNC: <0.02 NG/ML
WBC # BLD AUTO: 6.44 THOUSAND/UL (ref 4.31–10.16)

## 2021-06-13 PROCEDURE — 71045 X-RAY EXAM CHEST 1 VIEW: CPT

## 2021-06-13 PROCEDURE — 84484 ASSAY OF TROPONIN QUANT: CPT | Performed by: EMERGENCY MEDICINE

## 2021-06-13 PROCEDURE — 99282 EMERGENCY DEPT VISIT SF MDM: CPT | Performed by: EMERGENCY MEDICINE

## 2021-06-13 PROCEDURE — 83880 ASSAY OF NATRIURETIC PEPTIDE: CPT | Performed by: EMERGENCY MEDICINE

## 2021-06-13 PROCEDURE — 93005 ELECTROCARDIOGRAM TRACING: CPT

## 2021-06-13 PROCEDURE — 99285 EMERGENCY DEPT VISIT HI MDM: CPT

## 2021-06-13 PROCEDURE — 85025 COMPLETE CBC W/AUTO DIFF WBC: CPT | Performed by: EMERGENCY MEDICINE

## 2021-06-13 PROCEDURE — 36415 COLL VENOUS BLD VENIPUNCTURE: CPT | Performed by: EMERGENCY MEDICINE

## 2021-06-13 PROCEDURE — 80053 COMPREHEN METABOLIC PANEL: CPT | Performed by: EMERGENCY MEDICINE

## 2021-06-13 RX ORDER — MAGNESIUM HYDROXIDE/ALUMINUM HYDROXICE/SIMETHICONE 120; 1200; 1200 MG/30ML; MG/30ML; MG/30ML
30 SUSPENSION ORAL ONCE
Status: COMPLETED | OUTPATIENT
Start: 2021-06-13 | End: 2021-06-13

## 2021-06-13 RX ORDER — SUCRALFATE ORAL 1 G/10ML
1000 SUSPENSION ORAL ONCE
Status: COMPLETED | OUTPATIENT
Start: 2021-06-13 | End: 2021-06-13

## 2021-06-13 RX ORDER — ACETAMINOPHEN 325 MG/1
975 TABLET ORAL ONCE
Status: COMPLETED | OUTPATIENT
Start: 2021-06-13 | End: 2021-06-13

## 2021-06-13 RX ORDER — FAMOTIDINE 20 MG/1
20 TABLET, FILM COATED ORAL ONCE
Status: COMPLETED | OUTPATIENT
Start: 2021-06-13 | End: 2021-06-13

## 2021-06-13 RX ADMIN — ACETAMINOPHEN 975 MG: 325 TABLET, FILM COATED ORAL at 04:14

## 2021-06-13 RX ADMIN — FAMOTIDINE 20 MG: 20 TABLET ORAL at 05:11

## 2021-06-13 RX ADMIN — ALUMINA, MAGNESIA, AND SIMETHICONE ORAL SUSPENSION REGULAR STRENGTH 30 ML: 1200; 1200; 120 SUSPENSION ORAL at 05:11

## 2021-06-13 RX ADMIN — SUCRALFATE 1000 MG: 1 SUSPENSION ORAL at 05:11

## 2021-06-13 NOTE — ED PROVIDER NOTES
History  Chief Complaint   Patient presents with    Chest Pain     chest pain past 2 days, stated the pain got worse and she could not sleep  EMS did give nitro X3 and 324 ASA         57-year-old female with past medical history of CVA, diabetes mellitus on insulin with most recent hemoglobin A1c of 11 4%, hypertension, hyperlipidemia, GERD, and peripheral edema who is presenting for evaluation of chest pain  Patient states that she has been having chest pain for the past week  The chest pain is intermittent  Initially, the pain would last only a few minutes before resolving  The patient states that the pain has become worse over the past 2 days  It has been constant for about 10 hours at this point  The pain is located in the center of the chest with radiation to either side of the chest   No radiation to the arms or to the back  Patient describes the pain as pressure-like in character  It is not exertional or pleuritic  She denies any diaphoresis  She reports associated nausea and shortness of breath  No vomiting  No known cardiac history  Patient was given nitroglycerin and aspirin by EMS  She did have some relief with the nitroglycerin  Prior to Admission Medications   Prescriptions Last Dose Informant Patient Reported? Taking? ALPRAZolam (XANAX) 0 5 mg tablet   No No   Sig: Take 1 tablet (0 5 mg total) by mouth as needed for anxiety (Take 1 ablet 30 minutes prior to your imaging - MRI of the brain   Can take 1 more tablet right before your imaging )   Blood Glucose Calibration (ACCU-CHEK COMPACT PLUS CONTROL) SOLN  Self No No   Sig: by In Vitro route daily   Blood Glucose Monitoring Suppl (ACCU-CHEK SHANE PLUS) w/Device KIT  Self No No   Sig: by Does not apply route 2 (two) times a day   Insulin Pen Needle (B-D UF III MINI PEN NEEDLES) 31G X 5 MM MISC  Self No No   Sig: Inject under the skin daily   Januvia 100 MG tablet   No No   Sig: take 1 tablet by mouth daily   Lancets (ACCU-CHEK MULTICLIX) lancets  Self No No   Sig: Test bid   Polyvinyl Alcohol (LIQUID TEARS OP)  Self Yes No   Sig: Apply to eye   RA PAIN RELIEF ACETAMINOPHEN 325 MG tablet  Self Yes No   Sig: take 1-2 tablets by mouth every 6 hours if needed for MODERATE pain   albuterol (PROVENTIL HFA,VENTOLIN HFA) 90 mcg/act inhaler   No No   Sig: Inhale 2 puffs every 6 (six) hours as needed for wheezing or shortness of breath   amLODIPine (NORVASC) 10 mg tablet   No No   Sig: take 1 tablet by mouth once daily   aspirin 81 MG tablet  Self Yes No   Sig: Take 81 mg by mouth daily     beclomethasone (Qvar) 80 MCG/ACT inhaler   No No   Sig: Inhale 2 puffs 2 (two) times a day   busPIRone (BUSPAR) 5 mg tablet  Self No No   Sig: take 1 tablet by mouth three times a day   ciclopirox (PENLAC) 8 % solution   Yes No   Sig: APPLY TO AFFECTED NAIL ONCE A DAY, THEN REMOVE ONCE A WEEK WITH NAIL POLISH REMOVER   escitalopram (LEXAPRO) 10 mg tablet   No No   Sig: take 1 tablet by mouth every morning   glucose blood (Accu-Chek Guide) test strip  Self No No   Sig: Use as instructed - twice daily   hydrocortisone 0 5 % cream  Self No No   Sig: Apply topically 2 (two) times a day   insulin glargine (Lantus SoloStar) 100 units/mL injection pen  Self No No   Sig: Inject 40 Units under the skin daily   insulin regular (HumuLIN R,NovoLIN R) 100 units/mL injection  Self No No   Si units at breakfast 5 units at lunch 6 units at supper   pantoprazole (PROTONIX) 40 mg tablet   No No   Sig: Take 1 tablet (40 mg total) by mouth daily   potassium chloride (Klor-Con) 10 mEq tablet   No No   Sig: take 1 tablet by mouth once daily   pregabalin (LYRICA) 50 mg capsule   No No   Sig: Take 1 capsule (50 mg total) by mouth 3 (three) times a day   simvastatin (ZOCOR) 20 mg tablet   No No   Sig: Take 2 tablets (40 mg total) by mouth daily at bedtime   torsemide (DEMADEX) 5 MG tablet   No No   Sig: take 1 tablet by mouth every morning      Facility-Administered Medications: None       Past Medical History:   Diagnosis Date    Abnormal ECG     last assessed: 5/15/17    Abnormal mammogram     last assessed: 7/11/17    Abnormal stress test     last assesed: 7/24/17    Anemia     Anxiety     Arthritis     Asthma     Back problem     Breast cyst     Chest pain     last assessed: 7/24/17    Chronic pain disorder     Cyst of left breast     last assessed: 8/18/17    Depression     Depression     resolved: 1/2/18    Diabetes mellitus ([address] )     Hiatal hernia     last assessed: 11/7/17    Hyperlipidemia     Hypertension     Keloid of skin     last assessed: 11/2/16    Neuropathy     Psychiatric disorder     anxiety    Stroke [facility])     TIA 2005    Tuberculosis     as a child        Past Surgical History:   Procedure Laterality Date    ARM WOUND REPAIR / CLOSURE      CARPAL TUNNEL RELEASE      CARPAL TUNNEL RELEASE Left     CATARACT EXTRACTION Bilateral     2015    COLONOSCOPY      CYST REMOVAL      right upper arm   EYE SURGERY      cataract removaql    [place] LUMBAR PUNCTURE DIAGNOSTIC  8/15/2019    [place] ESOPHAGOGASTRODUODENOSCOPY TRANSORAL DIAGNOSTIC N/A 1/5/2018    Procedure: ESOPHAGOGASTRODUODENOSCOPY (EGD);   Surgeon: Lilibeth Thomas DO;  Location: [place] MAIN OR;  Service: Gastroenterology    [place] WRIST Deborra Levo LIG Left 4/19/2019    Procedure: ENDOSCOPIC CARPAL TUNNEL RELEASE;  Surgeon: Madelyn Velasquez MD;  Location: [address] [place] [address] MAIN OR;  Service: Orthopedics    [address] THYROID BIOPSY  4/15/2019       Family History   Problem Relation Age of Onset    Heart disease Mother     Diabetes Mother     Arthritis Mother     Heart attack Mother     Hypertension Mother     Kidney disease Father     Hypertension Father     Arthritis Sister     Cancer Maternal Grandmother     Stroke Maternal Grandmother     Arthritis Paternal Grandmother     Cancer Paternal Grandmother     Heart attack Paternal Grandmother     Stroke Maternal Aunt     Heart attack Maternal Uncle     Cancer Family         spinal column    Coronary artery disease Family     Glaucoma Family     Rheum arthritis Family      I have reviewed and agree with the history as documented  E-Cigarette/Vaping    E-Cigarette Use Never User      E-Cigarette/Vaping Substances    Nicotine No     THC No     CBD No     Flavoring No     Other No     Unknown No      Social History     Tobacco Use    Smoking status: Never Smoker    Smokeless tobacco: Never Used   Vaping Use    Vaping Use: Never used   Substance Use Topics    Alcohol use: Not Currently     Alcohol/week: 0 0 standard drinks    Drug use: No       Review of Systems   Constitutional: Negative for diaphoresis, fever and unexpected weight change  HENT: Negative for congestion, rhinorrhea and sore throat  Eyes: Negative for pain, discharge and visual disturbance  Respiratory: Positive for shortness of breath  Negative for cough and wheezing  Cardiovascular: Positive for chest pain  Negative for palpitations and leg swelling  Gastrointestinal: Positive for nausea  Negative for abdominal pain, blood in stool, constipation, diarrhea and vomiting  Genitourinary: Negative for dysuria, flank pain and hematuria  Musculoskeletal: Negative for arthralgias and joint swelling  Skin: Negative for rash and wound  Allergic/Immunologic: Negative for environmental allergies and food allergies  Neurological: Negative for dizziness, seizures, weakness and numbness  Hematological: Negative for adenopathy  Psychiatric/Behavioral: Negative for confusion and hallucinations  Physical Exam  Physical Exam  Vitals and nursing note reviewed  Constitutional:       General: She is not in acute distress  Appearance: She is well-developed  HENT:      Head: Normocephalic and atraumatic        Right Ear: External ear normal       Left Ear: External ear normal    Eyes:      Conjunctiva/sclera: Conjunctivae normal  Pupils: Pupils are equal, round, and reactive to light  Cardiovascular:      Rate and Rhythm: Normal rate and regular rhythm  Heart sounds: Normal heart sounds  No murmur heard  Pulmonary:      Effort: Pulmonary effort is normal  No respiratory distress  Breath sounds: Normal breath sounds  No wheezing or rales  Abdominal:      General: Bowel sounds are normal  There is no distension  Palpations: Abdomen is soft  Tenderness: There is no abdominal tenderness  There is no guarding  Musculoskeletal:         General: No deformity  Normal range of motion  Right lower leg: Edema present  Left lower leg: Edema present  Comments: Pitting edema of the bilateral lower extremities, left greater than right  Skin:     General: Skin is warm and dry  Neurological:      Mental Status: She is alert and oriented to person, place, and time  Comments: No gross motor deficits noted  Cranial nerves II-XII are intact  Speech is normal, without dysarthria or aphasia     Psychiatric:         Mood and Affect: Mood normal          Behavior: Behavior normal          Vital Signs  ED Triage Vitals [06/13/21 0357]   Temperature Pulse Respirations Blood Pressure SpO2   98 6 °F (37 °C) 95 18 (!) 194/97 94 %      Temp Source Heart Rate Source Patient Position - Orthostatic VS BP Location FiO2 (%)   Temporal Monitor Sitting Left arm --      Pain Score       5           Vitals:    06/13/21 0357 06/13/21 0500   BP: (!) 194/97 165/89   Pulse: 95 83   Patient Position - Orthostatic VS: Sitting Lying         Visual Acuity      ED Medications  Medications   acetaminophen (TYLENOL) tablet 975 mg (975 mg Oral Given 6/13/21 0414)   sucralfate (CARAFATE) oral suspension (SETH/PEDS) 1,000 mg (1,000 mg Oral Given 6/13/21 0511)   aluminum-magnesium hydroxide-simethicone (MYLANTA) oral suspension 30 mL (30 mL Oral Given 6/13/21 0511)   famotidine (PEPCID) tablet 20 mg (20 mg Oral Given 6/13/21 0511) Diagnostic Studies  Results Reviewed     Procedure Component Value Units Date/Time    NT-BNP PRO [[id number]]  (Normal) Collected: 06/13/21 0415    Lab Status: Final result Specimen: Blood from Arm, Right Updated: 06/13/21 0443     NT-proBNP 63 pg/mL     Troponin I [[id number]]  (Normal) Collected: 06/13/21 0415    Lab Status: Final result Specimen: Blood from Arm, Right Updated: 06/13/21 0439     Troponin I <0 02 ng/mL     Comprehensive metabolic panel [[id number]]  (Abnormal) Collected: 06/13/21 0415    Lab Status: Final result Specimen: Blood from Arm, Right Updated: 06/13/21 0437     Sodium 139 mmol/L      Potassium 3 6 mmol/L      Chloride 102 mmol/L      CO2 30 mmol/L      ANION GAP 7 mmol/L      BUN 14 mg/dL      Creatinine 1 15 mg/dL      Glucose 385 mg/dL      Calcium 8 9 mg/dL      Corrected Calcium 9 6 mg/dL      AST 14 U/L      ALT 19 U/L      Alkaline Phosphatase 100 U/L      Total Protein 7 5 g/dL      Albumin 3 1 g/dL      Total Bilirubin 0 22 mg/dL      eGFR 58 ml/min/1 73sq m     Narrative:      [place] guidelines for Chronic Kidney Disease (CKD):     Stage 1 with normal or high GFR (GFR > 90 mL/min/1 73 square meters)    Stage 2 Mild CKD (GFR = 60-89 mL/min/1 73 square meters)    Stage 3A Moderate CKD (GFR = 45-59 mL/min/1 73 square meters)    Stage 3B Moderate CKD (GFR = 30-44 mL/min/1 73 square meters)    Stage 4 Severe CKD (GFR = 15-29 mL/min/1 73 square meters)    Stage 5 End Stage CKD (GFR <15 mL/min/1 73 square meters)  Note: GFR calculation is accurate only with a steady state creatinine    CBC and differential [[id number]] Collected: 06/13/21 0415    Lab Status: Final result Specimen: Blood from Arm, Right Updated: 06/13/21 0422     WBC 6 44 Thousand/uL      RBC 4 84 Million/uL      Hemoglobin 13 1 g/dL      Hematocrit 41 0 %      MCV 85 fL      MCH 27 1 pg      MCHC 32 0 g/dL      RDW 14 6 %      MPV 10 6 fL      Platelets 778 Thousands/uL      nRBC 0 /100 WBCs      Neutrophils Relative 61 %      Immat GRANS % 0 %      Lymphocytes Relative 27 %      Monocytes Relative 8 %      Eosinophils Relative 3 %      Basophils Relative 1 %      Neutrophils Absolute 3 94 Thousands/µL      Immature Grans Absolute 0 01 Thousand/uL      Lymphocytes Absolute 1 73 Thousands/µL      Monocytes Absolute 0 54 Thousand/µL      Eosinophils Absolute 0 19 Thousand/µL      Basophils Absolute 0 03 Thousands/µL                  XR chest 1 view portable   ED Interpretation by Homero Recio MD (06/13 0451)   X-ray interpreted by me  Formal Radiology interpretation to follow  Chest x-ray independently reviewed  On my reading, no consolidation, pleural effusion, or pneumothorax  No acute process  Procedures  Procedures         ED Course  ED Course as of Jun 13 0601   Sun Jun 13, 2021   6534 EKG interpreted by me  Sinus rhythm at 92 beats per minute  First-degree AV block  Left axis deviation  Possible LVH  Inverted T-wave in aVL  No other T-wave inversions  No ST elevation or ST depression  0429 WBC: 6 44   0429 Hemoglobin: 13 1   0440 Troponin I: <0 02   0440 Glucose, Random(!): 385   0446 NT-proBNP: 63   0459 Updated patient regarding results  I explained that there was no evidence of MI based on normal troponin  I clarified that she still could have coronary artery disease without MI  Offered the patient admission for observation  Also discussed discharge with outpatient stress test and PCP follow-up  After discussing the options, the patient decided to be discharged and follow up with her PCP as an outpatient  Discussed return precautions with her  Patient verbalized understanding                    HEART Risk Score      Most Recent Value   Heart Score Risk Calculator   History  1 Filed at: 06/13/2021 0440   ECG  1 Filed at: 06/13/2021 0440   Age  2 Filed at: 06/13/2021 0440   Risk Factors  1 Filed at: 06/13/2021 0440   Troponin  0 Filed at: 06/13/2021 0440   HEART Score  5 Filed at: 06/13/2021 0440                      SBIRT 22yo+      Most Recent Value   SBIRT (25 yo +)   In order to provide better care to our patients, we are screening all of our patients for alcohol and drug use  Would it be okay to ask you these screening questions? Yes Filed at: 06/13/2021 0359   Initial Alcohol Screen: US AUDIT-C    1  How often do you have a drink containing alcohol?  0 Filed at: 06/13/2021 0359   2  How many drinks containing alcohol do you have on a typical day you are drinking? 0 Filed at: 06/13/2021 0359   3a  Male UNDER 65: How often do you have five or more drinks on one occasion? 0 Filed at: 06/13/2021 0359   3b  FEMALE Any Age, or MALE 65+: How often do you have 4 or more drinks on one occassion? 0 Filed at: 06/13/2021 0359   Audit-C Score  0 Filed at: 06/13/2021 2816   TALAT: How many times in the past year have you    Used an illegal drug or used a prescription medication for non-medical reasons? Never Filed at: 06/13/2021 0359                    MDM  Number of Diagnoses or Management Options  Chest pain: new and requires workup  Hyperglycemia due to type 2 diabetes mellitus ([facility] [place] [address] ): established and worsening  Diagnosis management comments:     Patient presented for evaluation of chest pain  The chest pain has been ongoing for 1 week but became acutely worse the evening prior to presentation  It had been constantly present for 10 hours when the patient presented  She reported associated nausea and shortness of breath  Patient noted to have multiple cardiac risk factors  EKG did not show any ischemic changes  Troponin was within normal limits  Patient had hyperglycemia CMP but no other abnormalities on labs  The patient was given Tylenol with some relief  She was also given a GI cocktail for possible worsening of her GERD  The patient was offered admission for observation    She declined and preferred to be discharged with outpatient follow-up  Ordered a stress test   Recommended prompt PCP follow-up  Provided strict return precautions  Patient verbalized understanding  Amount and/or Complexity of Data Reviewed  Clinical lab tests: ordered and reviewed  Tests in the radiology section of CPT®: ordered and reviewed  Decide to obtain previous medical records or to obtain history from someone other than the patient: yes  Review and summarize past medical records: yes  Independent visualization of images, tracings, or specimens: yes    Risk of Complications, Morbidity, and/or Mortality  Presenting problems: moderate  Diagnostic procedures: minimal  Management options: minimal    Patient Progress  Patient progress: improved      Disposition  Final diagnoses:   Chest pain   Hyperglycemia due to type 2 diabetes mellitus ([facility] [address] )     Time reflects when diagnosis was documented in both MDM as applicable and the Disposition within this note     Time User Action Codes Description Comment    6/13/2021  5:00 AM Elisa Young [R07 9] Chest pain     6/13/2021  5:00 AM Elisa Young [E11 [id number]] Hyperglycemia due to type 2 diabetes mellitus [facility])       ED Disposition     ED Disposition Condition Date/Time Comment    Discharge Stable Sun Jun 13, 2021  5:00 AM King Khan discharge to home/self care  Follow-up Information     Follow up With Specialties Details Why Contact Info Suzi Jauregui 45, DO Family Medicine Call in 1 day Please follow-up with your PCP within 1-2 weeks for a recheck  [facility] Emergency Department Emergency Medicine Go to  If symptoms worsen   [address] [phone]  [facility] Emergency Department[address], [address]          Discharge Medication List as of 6/13/2021  5:05 AM      CONTINUE these medications which have NOT CHANGED    Details   albuterol (PROVENTIL HFA,VENTOLIN HFA) 90 mcg/act inhaler Inhale 2 puffs every 6 (six) hours as needed for wheezing or shortness of breath, Starting Thu 2/4/2021, Normal      ALPRAZolam (XANAX) 0 5 mg tablet Take 1 tablet (0 5 mg total) by mouth as needed for anxiety (Take 1 ablet 30 minutes prior to your imaging - MRI of the brain   Can take 1 more tablet right before your imaging ), Starting Mon 1/10/2022, Normal      amLODIPine (NORVASC) 10 mg tablet take 1 tablet by mouth once daily, Normal      aspirin 81 MG tablet Take 81 mg by mouth daily  , Starting Wed 6/28/2017, Historical Med      beclomethasone (Qvar) 80 MCG/ACT inhaler Inhale 2 puffs 2 (two) times a day, Starting Thu 2/4/2021, Normal      Blood Glucose Calibration (ACCU-CHEK COMPACT PLUS CONTROL) SOLN by In Vitro route daily, Starting Fri 8/16/2019, Print      Blood Glucose Monitoring Suppl (ACCU-CHEK SHANE PLUS) w/Device KIT by Does not apply route 2 (two) times a day, Starting Tue 7/30/2019, Normal      busPIRone (BUSPAR) 5 mg tablet take 1 tablet by mouth three times a day, Normal      ciclopirox (PENLAC) 8 % solution APPLY TO AFFECTED NAIL ONCE A DAY, THEN REMOVE ONCE A WEEK WITH NAIL POLISH REMOVER, Historical Med      escitalopram (LEXAPRO) 10 mg tablet take 1 tablet by mouth every morning, Normal      glucose blood (Accu-Chek Guide) test strip Use as instructed - twice daily, Normal      hydrocortisone 0 5 % cream Apply topically 2 (two) times a day, Starting Tue 3/24/2020, Normal      insulin glargine (Lantus SoloStar) 100 units/mL injection pen Inject 40 Units under the skin daily, Starting Tue 12/8/2020, No Print      Insulin Pen Needle (B-D UF III MINI PEN NEEDLES) 31G X 5 MM MISC Inject under the skin daily, Starting Fri 1/18/2019, Normal      insulin regular (HumuLIN R,NovoLIN R) 100 units/mL injection 4 units at breakfast 5 units at lunch 6 units at supper, Normal      Januvia 100 MG tablet take 1 tablet by mouth daily, Normal      Lancets (ACCU-CHEK MULTICLIX) lancets Test bid, Normal      pantoprazole (PROTONIX) 40 mg tablet Take 1 tablet (40 mg total) by mouth daily, Starting Tue 12/22/2020, Normal      Polyvinyl Alcohol (LIQUID TEARS OP) Apply to eye, Historical Med      potassium chloride (Klor-Con) 10 mEq tablet take 1 tablet by mouth once daily, Normal      pregabalin (LYRICA) 50 mg capsule Take 1 capsule (50 mg total) by mouth 3 (three) times a day, Starting u 1/14/2021, Until Sat 2/13/2021, Normal      RA PAIN RELIEF ACETAMINOPHEN 325 MG tablet take 1-2 tablets by mouth every 6 hours if needed for MODERATE pain, Historical Med      simvastatin (ZOCOR) 20 mg tablet Take 2 tablets (40 mg total) by mouth daily at bedtime, Starting Tue 1/5/2021, Normal      torsemide (DEMADEX) 5 MG tablet take 1 tablet by mouth every morning, Normal           Outpatient Discharge Orders   Echo stress test w contrast if indicated   Standing Status: Future Standing Exp   Date: 08/13/21       PDMP Review     None          ED Provider  Electronically Signed by           Mary Kate Willett MD  06/13/21 0825

## 2021-06-13 NOTE — DISCHARGE INSTRUCTIONS
As we discussed, your labs were normal apart from an elevated blood glucose of 385  The EKG did not show any signs of heart attack and this was confirmed with blood testing  You still could have coronary artery disease or blockages in the heart arteries  The chest pain could also be due to other causes such as musculoskeletal pain or worsening GERD  We discussed admission to the hospital but you preferred to be discharged and follow-up with your PCP  I ordered a stress test   Please call to have this scheduled for early next week  The number for Central Scheduling is [phone]  Please follow-up with your PCP within 1-2 weeks for a recheck  Return to the ER with worsening or changing chest pain, difficulty breathing that is getting worse, stroke-like symptoms, repeated vomiting, severe abdominal pain, or any other concerning symptoms

## 2021-06-14 LAB
ATRIAL RATE: 92 BPM
P AXIS: 68 DEGREES
PR INTERVAL: 220 MS
QRS AXIS: -62 DEGREES
QRSD INTERVAL: 114 MS
QT INTERVAL: 380 MS
QTC INTERVAL: 469 MS
T WAVE AXIS: 80 DEGREES
VENTRICULAR RATE: 92 BPM

## 2021-06-14 PROCEDURE — 93010 ELECTROCARDIOGRAM REPORT: CPT | Performed by: INTERNAL MEDICINE

## 2021-06-15 ENCOUNTER — TELEPHONE (OUTPATIENT)
Dept: FAMILY MEDICINE CLINIC | Facility: CLINIC | Age: 66
End: 2021-06-15

## 2021-06-15 NOTE — TELEPHONE ENCOUNTER
Pt was seen in ER [facility] Momin) - 6/13/21    Pt called stating that she is currently having chest pain with some shortness of breath - Pt supposed to be set up for ECHO & unable to get a hold of anyone to schedule    Clinical - Pt was asked to go back to the ER for evaluation if the chest pain & shortness of breath is still a problem - Recommendations to try / Hospital for T[facility] this time being it maybe closer for her     - She would be leaving for hospital just as soon as her daughter could take her

## 2021-06-16 ENCOUNTER — APPOINTMENT (EMERGENCY)
Dept: RADIOLOGY | Facility: HOSPITAL | Age: 66
End: 2021-06-16
Payer: COMMERCIAL

## 2021-06-16 ENCOUNTER — HOSPITAL ENCOUNTER (EMERGENCY)
Facility: HOSPITAL | Age: 66
Discharge: HOME | End: 2021-06-16
Attending: EMERGENCY MEDICINE
Payer: COMMERCIAL

## 2021-06-16 VITALS
BODY MASS INDEX: 39.99 KG/M2 | HEART RATE: 72 BPM | TEMPERATURE: 97.9 F | SYSTOLIC BLOOD PRESSURE: 193 MMHG | OXYGEN SATURATION: 97 % | HEIGHT: 65 IN | WEIGHT: 240 LBS | RESPIRATION RATE: 18 BRPM | DIASTOLIC BLOOD PRESSURE: 93 MMHG

## 2021-06-16 DIAGNOSIS — R07.89 ATYPICAL CHEST PAIN: Primary | ICD-10-CM

## 2021-06-16 DIAGNOSIS — M94.0 COSTOCHONDRITIS: ICD-10-CM

## 2021-06-16 LAB
ANION GAP SERPL CALCULATED.3IONS-SCNC: 8 MMOL/L (ref 4–13)
APTT PPP: 27 SECONDS (ref 23–37)
BASOPHILS # BLD AUTO: 0.02 THOUSANDS/ΜL (ref 0–0.1)
BASOPHILS NFR BLD AUTO: 0 % (ref 0–1)
BUN SERPL-MCNC: 12 MG/DL (ref 5–25)
CALCIUM SERPL-MCNC: 9.3 MG/DL (ref 8.3–10.1)
CHLORIDE SERPL-SCNC: 102 MMOL/L (ref 100–108)
CO2 SERPL-SCNC: 30 MMOL/L (ref 21–32)
CREAT SERPL-MCNC: 1.03 MG/DL (ref 0.6–1.3)
EOSINOPHIL # BLD AUTO: 0.13 THOUSAND/ΜL (ref 0–0.61)
EOSINOPHIL NFR BLD AUTO: 2 % (ref 0–6)
ERYTHROCYTE [DISTWIDTH] IN BLOOD BY AUTOMATED COUNT: 14.6 % (ref 11.6–15.1)
GFR SERPL CREATININE-BSD FRML MDRD: 66 ML/MIN/1.73SQ M
GLUCOSE SERPL-MCNC: 259 MG/DL (ref 65–140)
HCT VFR BLD AUTO: 41.9 % (ref 34.8–46.1)
HGB BLD-MCNC: 13.2 G/DL (ref 11.5–15.4)
IMM GRANULOCYTES # BLD AUTO: 0.02 THOUSAND/UL (ref 0–0.2)
IMM GRANULOCYTES NFR BLD AUTO: 0 % (ref 0–2)
INR PPP: 0.98 (ref 0.84–1.19)
LYMPHOCYTES # BLD AUTO: 1.99 THOUSANDS/ΜL (ref 0.6–4.47)
LYMPHOCYTES NFR BLD AUTO: 36 % (ref 14–44)
MCH RBC QN AUTO: 26.7 PG (ref 26.8–34.3)
MCHC RBC AUTO-ENTMCNC: 31.5 G/DL (ref 31.4–37.4)
MCV RBC AUTO: 85 FL (ref 82–98)
MONOCYTES # BLD AUTO: 0.51 THOUSAND/ΜL (ref 0.17–1.22)
MONOCYTES NFR BLD AUTO: 9 % (ref 4–12)
NEUTROPHILS # BLD AUTO: 2.94 THOUSANDS/ΜL (ref 1.85–7.62)
NEUTS SEG NFR BLD AUTO: 53 % (ref 43–75)
NRBC BLD AUTO-RTO: 0 /100 WBCS
PLATELET # BLD AUTO: 235 THOUSANDS/UL (ref 149–390)
PMV BLD AUTO: 10.5 FL (ref 8.9–12.7)
POTASSIUM SERPL-SCNC: 3.9 MMOL/L (ref 3.5–5.3)
PROTHROMBIN TIME: 12.8 SECONDS (ref 11.6–14.5)
RBC # BLD AUTO: 4.94 MILLION/UL (ref 3.81–5.12)
SODIUM SERPL-SCNC: 140 MMOL/L (ref 136–145)
TROPONIN I SERPL-MCNC: <0.02 NG/ML
WBC # BLD AUTO: 5.61 THOUSAND/UL (ref 4.31–10.16)

## 2021-06-16 PROCEDURE — 85025 COMPLETE CBC W/AUTO DIFF WBC: CPT | Performed by: EMERGENCY MEDICINE

## 2021-06-16 PROCEDURE — 71045 X-RAY EXAM CHEST 1 VIEW: CPT

## 2021-06-16 PROCEDURE — 84484 ASSAY OF TROPONIN QUANT: CPT | Performed by: EMERGENCY MEDICINE

## 2021-06-16 PROCEDURE — 99285 EMERGENCY DEPT VISIT HI MDM: CPT | Performed by: EMERGENCY MEDICINE

## 2021-06-16 PROCEDURE — 85730 THROMBOPLASTIN TIME PARTIAL: CPT | Performed by: EMERGENCY MEDICINE

## 2021-06-16 PROCEDURE — 85610 PROTHROMBIN TIME: CPT | Performed by: EMERGENCY MEDICINE

## 2021-06-16 PROCEDURE — 93005 ELECTROCARDIOGRAM TRACING: CPT

## 2021-06-16 PROCEDURE — 80048 BASIC METABOLIC PNL TOTAL CA: CPT | Performed by: EMERGENCY MEDICINE

## 2021-06-16 PROCEDURE — 96374 THER/PROPH/DIAG INJ IV PUSH: CPT

## 2021-06-16 PROCEDURE — 36415 COLL VENOUS BLD VENIPUNCTURE: CPT | Performed by: EMERGENCY MEDICINE

## 2021-06-16 PROCEDURE — 99285 EMERGENCY DEPT VISIT HI MDM: CPT

## 2021-06-16 RX ORDER — KETOROLAC TROMETHAMINE 30 MG/ML
15 INJECTION, SOLUTION INTRAMUSCULAR; INTRAVENOUS ONCE
Status: COMPLETED | OUTPATIENT
Start: 2021-06-16 | End: 2021-06-16

## 2021-06-16 RX ORDER — METHOCARBAMOL 750 MG/1
750 TABLET, FILM COATED ORAL 3 TIMES DAILY PRN
Qty: 30 TABLET | Refills: 0 | Status: SHIPPED | OUTPATIENT
Start: 2021-06-16 | End: 2021-07-08 | Stop reason: ALTCHOICE

## 2021-06-16 RX ORDER — METHOCARBAMOL 500 MG/1
500 TABLET, FILM COATED ORAL ONCE
Status: COMPLETED | OUTPATIENT
Start: 2021-06-16 | End: 2021-06-16

## 2021-06-16 RX ADMIN — KETOROLAC TROMETHAMINE 15 MG: 30 INJECTION, SOLUTION INTRAMUSCULAR at 06:19

## 2021-06-16 RX ADMIN — METHOCARBAMOL 500 MG: 500 TABLET ORAL at 06:19

## 2021-06-16 NOTE — ED PROVIDER NOTES
History  Chief Complaint   Patient presents with    Chest Pain     patient her in ED Sat with chest pain and was DC'd,  today having pain when moving or taking a breath     This is a 59-year-old female with history of hypertension, hyperlipidemia, diabetes, chronic pain who presents with chest pain  Starting on Saturday, the patient started to experience a substernal/right-sided chest pain described as sharp, nonradiating, constant, associated with nausea without vomiting  Pain is exacerbated with movement and deep inspiration  She has been taking Tylenol with mild relief  She has never experienced this type of pain before  Denies any inciting event  Denies any associated diaphoresis, lightheadedness, vomiting  No exertional component  Patient was seen on Saturday for the same complaint with a normal workup  Denies fever/chills, nausea/vomiting, lightheadedness/dizziness, numbness/weakness, headache, change in vision, URI symptoms, neck pain, palpitations, shortness of breath, cough, back pain, flank pain, abdominal pain, diarrhea, hematochezia, melena, dysuria, hematuria, abnormal vaginal discharge/bleeding  Prior to Admission Medications   Prescriptions Last Dose Informant Patient Reported? Taking? ALPRAZolam (XANAX) 0 5 mg tablet 6/15/2021 at Unknown time  No Yes   Sig: Take 1 tablet (0 5 mg total) by mouth as needed for anxiety (Take 1 ablet 30 minutes prior to your imaging - MRI of the brain   Can take 1 more tablet right before your imaging )   Blood Glucose Calibration (ACCU-CHEK COMPACT PLUS CONTROL) SOLN 6/15/2021 at Unknown time Self No Yes   Sig: by In Vitro route daily   Blood Glucose Monitoring Suppl (ACCU-CHEK SHAEN PLUS) w/Device KIT 6/15/2021 at Unknown time Self No Yes   Sig: by Does not apply route 2 (two) times a day   Insulin Pen Needle (B-D UF III MINI PEN NEEDLES) 31G X 5 MM MISC 6/15/2021 at Unknown time Self No Yes   Sig: Inject under the skin daily   Januvia 100 MG tablet 6/15/2021 at Unknown time  No Yes   Sig: take 1 tablet by mouth daily   Lancets (ACCU-CHEK MULTICLIX) lancets 6/15/2021 at Unknown time Self No Yes   Sig: Test bid   Polyvinyl Alcohol (LIQUID TEARS OP) 6/15/2021 at Unknown time Self Yes Yes   Sig: Apply to eye   RA PAIN RELIEF ACETAMINOPHEN 325 MG tablet 6/15/2021 at Unknown time Self Yes Yes   Sig: take 1-2 tablets by mouth every 6 hours if needed for MODERATE pain   albuterol (PROVENTIL HFA,VENTOLIN HFA) 90 mcg/act inhaler 6/15/2021 at Unknown time  No Yes   Sig: Inhale 2 puffs every 6 (six) hours as needed for wheezing or shortness of breath   amLODIPine (NORVASC) 10 mg tablet 6/15/2021 at Unknown time  No Yes   Sig: take 1 tablet by mouth once daily   aspirin 81 MG tablet 6/15/2021 at Unknown time Self Yes Yes   Sig: Take 81 mg by mouth daily     beclomethasone (Qvar) 80 MCG/ACT inhaler 6/15/2021 at Unknown time  No Yes   Sig: Inhale 2 puffs 2 (two) times a day   busPIRone (BUSPAR) 5 mg tablet 6/15/2021 at Unknown time Self No Yes   Sig: take 1 tablet by mouth three times a day   ciclopirox (PENLAC) 8 % solution 6/15/2021 at Unknown time  Yes Yes   Sig: APPLY TO AFFECTED NAIL ONCE A DAY, THEN REMOVE ONCE A WEEK WITH NAIL POLISH REMOVER   escitalopram (LEXAPRO) 10 mg tablet 6/15/2021 at Unknown time  No Yes   Sig: take 1 tablet by mouth every morning   glucose blood (Accu-Chek Guide) test strip 6/15/2021 at Unknown time Self No Yes   Sig: Use as instructed - twice daily   hydrocortisone 0 5 % cream 6/15/2021 at Unknown time Self No Yes   Sig: Apply topically 2 (two) times a day   insulin glargine (Lantus SoloStar) 100 units/mL injection pen 6/15/2021 at Unknown time Self No Yes   Sig: Inject 40 Units under the skin daily   insulin regular (HumuLIN R,NovoLIN R) 100 units/mL injection 6/15/2021 at Unknown time Self No Yes   Si units at breakfast 5 units at lunch 6 units at supper   pantoprazole (PROTONIX) 40 mg tablet 6/15/2021 at Unknown time  No Yes   Sig: Take 1 tablet (40 mg total) by mouth daily   potassium chloride (Klor-Con) 10 mEq tablet 6/15/2021 at Unknown time  No Yes   Sig: take 1 tablet by mouth once daily   pregabalin (LYRICA) 50 mg capsule   No No   Sig: Take 1 capsule (50 mg total) by mouth 3 (three) times a day   simvastatin (ZOCOR) 20 mg tablet 6/15/2021 at Unknown time  No Yes   Sig: Take 2 tablets (40 mg total) by mouth daily at bedtime   torsemide (DEMADEX) 5 MG tablet 6/15/2021 at Unknown time  No Yes   Sig: take 1 tablet by mouth every morning      Facility-Administered Medications: None       Past Medical History:   Diagnosis Date    Abnormal ECG     last assessed: 5/15/17    Abnormal mammogram     last assessed: 7/11/17    Abnormal stress test     last assesed: 7/24/17    Anemia     Anxiety     Arthritis     Asthma     Back problem     Breast cyst     Chest pain     last assessed: 7/24/17    Chronic pain disorder     Cyst of left breast     last assessed: 8/18/17    Depression     Depression     resolved: 1/2/18    Diabetes mellitus ([facility] [address] )     Hiatal hernia     last assessed: 11/7/17    Hyperlipidemia     Hypertension     Keloid of skin     last assessed: 11/2/16    Neuropathy     Psychiatric disorder     anxiety    Stroke [facility])     TIA 2005    Tuberculosis     as a child        Past Surgical History:   Procedure Laterality Date    ARM WOUND REPAIR / CLOSURE      CARPAL TUNNEL RELEASE      CARPAL TUNNEL RELEASE Left     CATARACT EXTRACTION Bilateral     2015    COLONOSCOPY      CYST REMOVAL      right upper arm   EYE SURGERY      cataract removaql    FL LUMBAR PUNCTURE DIAGNOSTIC  8/15/2019    MT ESOPHAGOGASTRODUODENOSCOPY TRANSORAL DIAGNOSTIC N/A 1/5/2018    Procedure: ESOPHAGOGASTRODUODENOSCOPY (EGD);   Surgeon: Alli Foley DO;  Location: [facility] MAIN OR;  Service: Gastroenterology    MT WRIST E[address] Ku LIG Left 4/19/2019    Procedure: ENDOSCOPIC CARPAL TUNNEL RELEASE;  Surgeon: Adrienne Turpin MD;  Location: [facility] MAIN OR;  Service: [address] THYROID BIOPSY  4/15/2019       Family History   Problem Relation Age of Onset    Heart disease Mother     Diabetes Mother     Arthritis Mother     Heart attack Mother     Hypertension Mother     Kidney disease Father     Hypertension Father     Arthritis Sister     Cancer Maternal Grandmother     Stroke Maternal Grandmother     Arthritis Paternal Grandmother     Cancer Paternal Grandmother     Heart attack Paternal Grandmother     Stroke Maternal Aunt     Heart attack Maternal Uncle     Cancer Family         spinal column    Coronary artery disease Family     Glaucoma Family     Rheum arthritis Family      I have reviewed and agree with the history as documented  E-Cigarette/Vaping    E-Cigarette Use Never User      E-Cigarette/Vaping Substances    Nicotine No     THC No     CBD No     Flavoring No     Other No     Unknown No      Social History     Tobacco Use    Smoking status: Never Smoker    Smokeless tobacco: Never Used   Vaping Use    Vaping Use: Never used   Substance Use Topics    Alcohol use: Not Currently     Alcohol/week: 0 0 standard drinks    Drug use: No       Review of Systems   Constitutional: Negative for chills and fever  HENT: Negative for rhinorrhea, sore throat and trouble swallowing  Eyes: Negative for photophobia and visual disturbance  Respiratory: Negative for cough, chest tightness and shortness of breath  Cardiovascular: Positive for chest pain  Negative for palpitations and leg swelling  Gastrointestinal: Positive for nausea  Negative for abdominal pain, blood in stool, diarrhea and vomiting  Endocrine: Negative for polyuria  Genitourinary: Negative for dysuria, flank pain, hematuria, vaginal bleeding and vaginal discharge  Musculoskeletal: Negative for back pain and neck pain  Skin: Negative for color change and rash     Allergic/Immunologic: Negative for immunocompromised state  Neurological: Negative for dizziness, weakness, light-headedness, numbness and headaches  All other systems reviewed and are negative  Physical Exam  Physical Exam  Constitutional:       General: She is not in acute distress  Appearance: Normal appearance  She is well-developed  HENT:      Mouth/Throat:      Pharynx: Uvula midline  Eyes:      Conjunctiva/sclera: Conjunctivae normal       Pupils: Pupils are equal, round, and reactive to light  Neck:      Thyroid: No thyroid mass or thyromegaly  Trachea: Trachea normal    Cardiovascular:      Rate and Rhythm: Normal rate and regular rhythm  Pulses: Normal pulses  Heart sounds: Normal heart sounds  No murmur heard  Pulmonary:      Effort: Pulmonary effort is normal       Breath sounds: Normal breath sounds  Chest:      Chest wall: Tenderness present  No crepitus  Comments: Tenderness over anterior chest wall, especially on the right  Abdominal:      General: Bowel sounds are normal       Palpations: Abdomen is soft  Tenderness: There is no abdominal tenderness  There is no guarding or rebound  Musculoskeletal:      Comments: Pain is exacerbated by passive range of motion of the right shoulder  Skin:     General: Skin is warm and dry  Neurological:      Mental Status: She is alert  Psychiatric:         Speech: Speech normal          Behavior: Behavior normal  Behavior is cooperative  Thought Content:  Thought content normal          Vital Signs  ED Triage Vitals [06/16/21 0559]   Temperature Pulse Respirations Blood Pressure SpO2   97 9 °F (36 6 °C) 72 18 (!) 193/93 97 %      Temp Source Heart Rate Source Patient Position - Orthostatic VS BP Location FiO2 (%)   Tympanic Monitor Sitting Right arm --      Pain Score       5           Vitals:    06/16/21 0559   BP: (!) 193/93   Pulse: 72   Patient Position - Orthostatic VS: Sitting         Visual Acuity      ED Medications  Medications   ketorolac (TORADOL) injection 15 mg (15 mg Intravenous Given 6/16/21 0619)   methocarbamol (ROBAXIN) tablet 500 mg (500 mg Oral Given 6/16/21 0619)       Diagnostic Studies  Results Reviewed     Procedure Component Value Units Date/Time    Troponin I [[id number]]  (Normal) Collected: 06/16/21 0616    Lab Status: Final result Specimen: Blood from Arm, Right Updated: 06/16/21 0644     Troponin I <0 02 ng/mL     Protime-INR [[id number]]  (Normal) Collected: 06/16/21 0616    Lab Status: Final result Specimen: Blood from Arm, Right Updated: 06/16/21 0637     Protime 12 8 seconds      INR 0 98    APTT [[id number]]  (Normal) Collected: 06/16/21 0616    Lab Status: Final result Specimen: Blood from Arm, Right Updated: 06/16/21 0637     PTT 27 seconds     Basic metabolic panel [[id number]]  (Abnormal) Collected: 06/16/21 0616    Lab Status: Final result Specimen: Blood from Arm, Right Updated: 06/16/21 4998     Sodium 140 mmol/L      Potassium 3 9 mmol/L      Chloride 102 mmol/L      CO2 30 mmol/L      ANION GAP 8 mmol/L      BUN 12 mg/dL      Creatinine 1 03 mg/dL      Glucose 259 mg/dL      Calcium 9 3 mg/dL      eGFR 66 ml/min/1 73sq m     Narrative:      [place] guidelines for Chronic Kidney Disease (CKD):     Stage 1 with normal or high GFR (GFR > 90 mL/min/1 73 square meters)    Stage 2 Mild CKD (GFR = 60-89 mL/min/1 73 square meters)    Stage 3A Moderate CKD (GFR = 45-59 mL/min/1 73 square meters)    Stage 3B Moderate CKD (GFR = 30-44 mL/min/1 73 square meters)    Stage 4 Severe CKD (GFR = 15-29 mL/min/1 73 square meters)    Stage 5 End Stage CKD (GFR <15 mL/min/1 73 square meters)  Note: GFR calculation is accurate only with a steady state creatinine    CBC and differential [[id number]]  (Abnormal) Collected: 06/16/21 0616    Lab Status: Final result Specimen: Blood from Arm, Right Updated: 06/16/21 0627     WBC 5 61 Thousand/uL      RBC 4 94 Million/uL Hemoglobin 13 2 g/dL      Hematocrit 41 9 %      MCV 85 fL      MCH 26 7 pg      MCHC 31 5 g/dL      RDW 14 6 %      MPV 10 5 fL      Platelets 363 Thousands/uL      nRBC 0 /100 WBCs      Neutrophils Relative 53 %      Immat GRANS % 0 %      Lymphocytes Relative 36 %      Monocytes Relative 9 %      Eosinophils Relative 2 %      Basophils Relative 0 %      Neutrophils Absolute 2 94 Thousands/µL      Immature Grans Absolute 0 02 Thousand/uL      Lymphocytes Absolute 1 99 Thousands/µL      Monocytes Absolute 0 51 Thousand/µL      Eosinophils Absolute 0 13 Thousand/µL      Basophils Absolute 0 02 Thousands/µL                  XR chest 1 view portable   ED Interpretation by Mitch Preciado MD (06/16 0527)   No acute cardiopulmonary disease as interpreted by myself  Procedures  ECG 12 Lead Documentation Only    Date/Time: 6/16/2021 6:34 AM  Performed by: Mitch Preciado MD  Authorized by: Micth Preciado MD     ECG reviewed by me, the ED Provider: yes    Patient location:  ED  Previous ECG:     Previous ECG:  Compared to current    Comparison ECG info:  4/14/20    Similarity:  No change    Comparison to cardiac monitor: Yes    Interpretation:     Interpretation: non-specific    Rate:     ECG rate:  65    ECG rate assessment: normal    Rhythm:     Rhythm: sinus rhythm    Ectopy:     Ectopy: none    QRS:     QRS axis:  Left    QRS intervals:  Normal  Conduction:     Conduction: normal    ST segments:     ST segments:  Normal  T waves:     T waves: non-specific    Comments:      Although the T-wave inversions in III and AVF were not present on her previous EKG, all of her EKGs dating back to 2018 show the same T-wave inversions               ED Course             HEART Risk Score      Most Recent Value   Heart Score Risk Calculator   History  0 Filed at: 06/16/2021 0613   ECG  0 Filed at: 06/16/2021 5093   Age  2 Filed at: 06/16/2021 0728   Risk Factors  2 Filed at: 06/16/2021 0870   Troponin  0 Filed at: 06/16/2021 5266   HEART Score  4 Filed at: 06/16/2021 4298                                    [facility]  Number of Diagnoses or Management Options  Diagnosis management comments: Likely costochondritis/musculoskeletal chest pain  Will check labs, EKG, chest x-ray  Toradol and Robaxin for pain  Disposition  Final diagnoses:   Atypical chest pain   Costochondritis     Time reflects when diagnosis was documented in both MDM as applicable and the Disposition within this note     Time User Action Codes Description Comment    6/16/2021  6:39 AM [place] Add [R07 89] Atypical chest pain     6/16/2021  6:39 AM [place] Add [M94 0] Costochondritis       ED Disposition     ED Disposition Condition Date/Time Comment    Discharge Stable Wed Jun 16, 2021  6:39 AM Mamta Leigh discharge to home/self care  Follow-up Information     Follow up With Specialties Details Why Contact Info Additional Information    Author DO [facility] Schedule an appointment as soon as possible for a visit   [address]  [facility] [address] [facility] Department Emergency Medicine Go to  If symptoms worsen [place] [id number]  [facility] Department[address], [address]          Patient's Medications   Discharge Prescriptions    METHOCARBAMOL (ROBAXIN) 750 MG TABLET    Take 1 tablet (750 mg total) by mouth 3 (three) times a day as needed for muscle spasms       Start Date: 6/16/2021 End Date: --       Order Dose: 750 mg       Quantity: 30 tablet    Refills: 0     No discharge procedures on file      PDMP Review     None          ED Provider  Electronically Signed by           Ramos Velez MD  06/16/21 6840

## 2021-06-16 NOTE — ED NOTES
Patient given dc instructions with verbalizing understanding of such    Patient and daughter ambulated out of the ER without any further complaints or questions     Jose A Meredith RN  06/16/21 9164

## 2021-06-21 LAB
ATRIAL RATE: 65 BPM
P AXIS: 36 DEGREES
PR INTERVAL: 218 MS
QRS AXIS: -39 DEGREES
QRSD INTERVAL: 106 MS
QT INTERVAL: 434 MS
QTC INTERVAL: 451 MS
T WAVE AXIS: -26 DEGREES
VENTRICULAR RATE: 65 BPM

## 2021-06-21 PROCEDURE — 93010 ELECTROCARDIOGRAM REPORT: CPT | Performed by: INTERNAL MEDICINE

## 2021-07-08 ENCOUNTER — OFFICE VISIT (OUTPATIENT)
Dept: FAMILY MEDICINE CLINIC | Facility: CLINIC | Age: 66
End: 2021-07-08
Payer: COMMERCIAL

## 2021-07-08 VITALS
WEIGHT: 244 LBS | HEIGHT: 65 IN | SYSTOLIC BLOOD PRESSURE: 186 MMHG | TEMPERATURE: 97 F | BODY MASS INDEX: 40.65 KG/M2 | DIASTOLIC BLOOD PRESSURE: 90 MMHG

## 2021-07-08 DIAGNOSIS — Q01.9: ICD-10-CM

## 2021-07-08 DIAGNOSIS — Z12.31 ENCOUNTER FOR SCREENING MAMMOGRAM FOR MALIGNANT NEOPLASM OF BREAST: Primary | ICD-10-CM

## 2021-07-08 DIAGNOSIS — E11.22 TYPE 2 DIABETES MELLITUS WITH STAGE 3B CHRONIC KIDNEY DISEASE, WITH LONG-TERM CURRENT USE OF INSULIN (HCC): ICD-10-CM

## 2021-07-08 DIAGNOSIS — E78.2 MIXED HYPERLIPIDEMIA: ICD-10-CM

## 2021-07-08 DIAGNOSIS — F33.9 DEPRESSION, RECURRENT (HCC): ICD-10-CM

## 2021-07-08 DIAGNOSIS — F41.1 GENERALIZED ANXIETY DISORDER: ICD-10-CM

## 2021-07-08 DIAGNOSIS — R60.0 LEG EDEMA, LEFT: ICD-10-CM

## 2021-07-08 DIAGNOSIS — G70.00 MYASTHENIA GRAVIS (HCC): ICD-10-CM

## 2021-07-08 DIAGNOSIS — E66.01 OBESITY, MORBID (HCC): ICD-10-CM

## 2021-07-08 DIAGNOSIS — Z79.4 TYPE 2 DIABETES MELLITUS WITH STAGE 3B CHRONIC KIDNEY DISEASE, WITH LONG-TERM CURRENT USE OF INSULIN (HCC): ICD-10-CM

## 2021-07-08 DIAGNOSIS — N18.32 TYPE 2 DIABETES MELLITUS WITH STAGE 3B CHRONIC KIDNEY DISEASE, WITH LONG-TERM CURRENT USE OF INSULIN (HCC): ICD-10-CM

## 2021-07-08 DIAGNOSIS — M25.562 PAIN AND SWELLING OF LEFT KNEE: ICD-10-CM

## 2021-07-08 DIAGNOSIS — M25.462 PAIN AND SWELLING OF LEFT KNEE: ICD-10-CM

## 2021-07-08 DIAGNOSIS — R07.89 ATYPICAL CHEST PAIN: ICD-10-CM

## 2021-07-08 LAB — SL AMB POCT HEMOGLOBIN AIC: 10.3 (ref ?–6.5)

## 2021-07-08 PROCEDURE — 83036 HEMOGLOBIN GLYCOSYLATED A1C: CPT | Performed by: FAMILY MEDICINE

## 2021-07-08 PROCEDURE — 99214 OFFICE O/P EST MOD 30 MIN: CPT | Performed by: FAMILY MEDICINE

## 2021-07-08 RX ORDER — BUSPIRONE HYDROCHLORIDE 5 MG/1
5 TABLET ORAL 3 TIMES DAILY
Qty: 90 TABLET | Refills: 2 | Status: SHIPPED | OUTPATIENT
Start: 2021-07-08 | End: 2022-07-11

## 2021-07-08 RX ORDER — SIMVASTATIN 20 MG
40 TABLET ORAL
Qty: 30 TABLET | Refills: 5 | Status: SHIPPED | OUTPATIENT
Start: 2021-07-08 | End: 2021-07-13 | Stop reason: SDUPTHER

## 2021-07-08 NOTE — PROGRESS NOTES
BMI Counseling: Body mass index is 40 6 kg/m²  The BMI is above normal  Nutrition recommendations include decreasing portion sizes, encouraging healthy choices of fruits and vegetables, decreasing fast food intake, moderation in carbohydrate intake and increasing intake of lean protein  Exercise recommendations include moderate physical activity 150 minutes/week  Assessment/Plan:    Problem List Items Addressed This Visit        Other    Hyperlipidemia      Other Visit Diagnoses     Encounter for screening mammogram for malignant neoplasm of breast    -  Primary    Relevant Orders    Mammo screening bilateral w 3d & cad    Generalized anxiety disorder        Pain and swelling of left knee        Leg edema, left        Type 2 diabetes mellitus with stage 3b chronic kidney disease, with long-term current use of insulin ([facility] )               Diagnoses and all orders for this visit:    Encounter for screening mammogram for malignant neoplasm of breast  -     Mammo screening bilateral w 3d & cad; Future    Generalized anxiety disorder  -     busPIRone (BUSPAR) 5 mg tablet; Take 1 tablet (5 mg total) by mouth 3 (three) times a day    Mixed hyperlipidemia  -     simvastatin (ZOCOR) 20 mg tablet; Take 2 tablets (40 mg total) by mouth daily at bedtime    Pain and swelling of left knee    Leg edema, left    Type 2 diabetes mellitus with stage 3b chronic kidney disease, with long-term current use of insulin ([facility] )        No problem-specific Assessment & Plan notes found for this encounter  Subjective:      Patient ID: Talon Chen is a 72 y o  female  Mrs Ina Philip is here chief complaint is that she was twice in the emergency room for atypical chest pain they asked her to follow through with a stress test and echo and a cardiology visit but she was not able to accomplish this yet from a standpoint of her diabetes it has not been well controlled and she has not been very good about getting her labs on time but she tells me she is going to turn over a new leaf so will give her slips are wheeled actually do a hemoglobin A1c today she has got edema of her left leg she has a little bit of her right leg but mostly the left leg and she has swelling of her left knee and pain from most likely arthritis since there has been no trauma she says she has been to her optometrist and has no signs of diabetic retinopathy I would like to get her started on Trulicity but she has a ill-defined thyroid mass that Dr Marcey Oppenheim ir is treating her for so on we must refrain from the Trulicity until that has definitely been ruled out as a tumor      The following portions of the patient's history were reviewed and updated as appropriate:   She has a past medical history of Abnormal ECG, Abnormal mammogram, Abnormal stress test, Anemia, Anxiety, Arthritis, Asthma, Back problem, Breast cyst, Chest pain, Chronic pain disorder, Cyst of left breast, Depression, Depression, Diabetes mellitus ([facility] ), Hiatal hernia, Hyperlipidemia, Hypertension, Keloid of skin, Neuropathy, Psychiatric disorder, Stroke ([facility] ), and Tuberculosis  ,  does not have any pertinent problems on file  ,   has a past surgical history that includes Cyst Removal; Eye surgery; Tubal ligation; Arm wound repair / closure; pr esophagogastroduodenoscopy transoral diagnostic (N/A, 1/5/2018); Cataract extraction (Bilateral); US guided thyroid biopsy (4/15/2019); pr wrist arthroscop,release xvers lig (Left, 4/19/2019); Carpal tunnel release; Carpal tunnel release (Left); FL lumbar puncture diagnostic (8/15/2019); and Colonoscopy  ,  family history includes Arthritis in her mother, paternal grandmother, and sister; Cancer in her family, maternal grandmother, and paternal grandmother; Coronary artery disease in her family; Diabetes in her mother; Glaucoma in her family;  Heart attack in her maternal uncle, mother, and paternal grandmother; Heart disease in her mother; Hypertension in her father and mother; Kidney disease in her father; Rheum arthritis in her family; Stroke in her maternal aunt and maternal grandmother  ,   reports that she has never smoked  She has never used smokeless tobacco  She reports previous alcohol use  She reports that she does not use drugs  ,  is allergic to bactrim [sulfamethoxazole-trimethoprim], percocet [oxycodone-acetaminophen], and vicodin [hydrocodone-acetaminophen]     Current Outpatient Medications   Medication Sig Dispense Refill    albuterol (PROVENTIL HFA,VENTOLIN HFA) 90 mcg/act inhaler Inhale 2 puffs every 6 (six) hours as needed for wheezing or shortness of breath 1 Inhaler 5    [START ON 1/10/2022] ALPRAZolam (XANAX) 0 5 mg tablet Take 1 tablet (0 5 mg total) by mouth as needed for anxiety (Take 1 ablet 30 minutes prior to your imaging - MRI of the brain   Can take 1 more tablet right before your imaging ) 2 tablet 0    amLODIPine (NORVASC) 10 mg tablet take 1 tablet by mouth once daily 30 tablet 5    aspirin 81 MG tablet Take 81 mg by mouth daily        Blood Glucose Calibration (ACCU-CHEK COMPACT PLUS CONTROL) SOLN by In Vitro route daily 1 each 0    Blood Glucose Monitoring Suppl (ACCU-CHEK SHANE PLUS) w/Device KIT by Does not apply route 2 (two) times a day 1 kit 0    busPIRone (BUSPAR) 5 mg tablet take 1 tablet by mouth three times a day 90 tablet 2    ciclopirox (PENLAC) 8 % solution APPLY TO AFFECTED NAIL ONCE A DAY, THEN REMOVE ONCE A WEEK WITH NAIL POLISH REMOVER      escitalopram (LEXAPRO) 10 mg tablet take 1 tablet by mouth every morning 30 tablet 2    glucose blood (Accu-Chek Guide) test strip Use as instructed - twice daily 200 each 3    hydrocortisone 0 5 % cream Apply topically 2 (two) times a day 28 35 g 1    insulin glargine (Lantus SoloStar) 100 units/mL injection pen Inject 40 Units under the skin daily 5 pen 2    Insulin Pen Needle (B-D UF III MINI PEN NEEDLES) 31G X 5 MM MISC Inject under the skin daily 100 each 3    insulin regular (HumuLIN R,NovoLIN R) 100 units/mL injection 4 units at breakfast 5 units at lunch 6 units at supper 10 mL 5    Januvia 100 MG tablet take 1 tablet by mouth daily 30 tablet 5    Lancets (ACCU-CHEK MULTICLIX) lancets Test bid 100 each 0    pantoprazole (PROTONIX) 40 mg tablet Take 1 tablet (40 mg total) by mouth daily 90 tablet 3    Polyvinyl Alcohol (LIQUID TEARS OP) Apply to eye      potassium chloride (Klor-Con) 10 mEq tablet take 1 tablet by mouth once daily 30 tablet 3    pregabalin (LYRICA) 50 mg capsule Take 1 capsule (50 mg total) by mouth 3 (three) times a day 90 capsule 1    RA PAIN RELIEF ACETAMINOPHEN 325 MG tablet take 1-2 tablets by mouth every 6 hours if needed for MODERATE pain  0    simvastatin (ZOCOR) 20 mg tablet Take 2 tablets (40 mg total) by mouth daily at bedtime 30 tablet 5    beclomethasone (Qvar) 80 MCG/ACT inhaler Inhale 2 puffs 2 (two) times a day (Patient not taking: Reported on 7/8/2021) 1 Inhaler 5    torsemide (DEMADEX) 5 MG tablet take 1 tablet by mouth every morning (Patient not taking: Reported on 7/8/2021) 30 tablet 3     No current facility-administered medications for this visit  Review of Systems   Constitutional: Negative for activity change, appetite change, diaphoresis, fatigue and fever  HENT: Negative  Negative for dental problem  Eyes: Positive for visual disturbance  Respiratory: Negative for apnea, cough, chest tightness, shortness of breath and wheezing  Cardiovascular: Positive for chest pain and leg swelling  Negative for palpitations  Gastrointestinal: Negative for abdominal distention, abdominal pain, anal bleeding, constipation, diarrhea, nausea and vomiting  Endocrine: Negative for cold intolerance, heat intolerance, polydipsia, polyphagia and polyuria          Patient has not had hemoglobin A1c recently we will draw 1 today also diet could use a little bit of improvement but at actually not too bad   Genitourinary: Negative for difficulty urinating, dysuria, flank pain, hematuria and urgency  Musculoskeletal: Negative for arthralgias, back pain, gait problem, joint swelling and myalgias  Skin: Negative for color change, rash and wound  Allergic/Immunologic: Negative for environmental allergies, food allergies and immunocompromised state  Neurological: Negative for dizziness, seizures, syncope, speech difficulty, numbness and headaches  Hematological: Negative for adenopathy  Does not bruise/bleed easily  Psychiatric/Behavioral: Negative for agitation, behavioral problems, hallucinations, sleep disturbance and suicidal ideas  Objective:  Vitals:    07/08/21 1222   BP: (!) 186/90   BP Location: Left arm   Patient Position: Sitting   Cuff Size: Standard   Temp: (!) 97 °F (36 1 °C)   TempSrc: Temporal   Weight: 111 kg (244 lb)   Height: 5' 5" (1 651 m)     Body mass index is 40 6 kg/m²  Physical Exam  Constitutional:       General: She is not in acute distress  Appearance: She is well-developed  She is obese  She is not diaphoretic  HENT:      Head: Normocephalic  Right Ear: External ear normal       Left Ear: External ear normal       Nose: Nose normal    Eyes:      General: No scleral icterus  Right eye: No discharge  Left eye: No discharge  Conjunctiva/sclera: Conjunctivae normal       Pupils: Pupils are equal, round, and reactive to light  Neck:      Thyroid: No thyromegaly  Trachea: No tracheal deviation  Cardiovascular:      Rate and Rhythm: Normal rate and regular rhythm  Heart sounds: Normal heart sounds  No murmur heard  No friction rub  No gallop  Pulmonary:      Effort: Pulmonary effort is normal  No respiratory distress  Breath sounds: Normal breath sounds  No wheezing  Abdominal:      General: Bowel sounds are normal       Palpations: Abdomen is soft  There is no mass  Tenderness: There is no abdominal tenderness  There is no guarding  Musculoskeletal:         General: No deformity  Cervical back: Normal range of motion  Right lower leg: Edema present  Left lower leg: Edema present  Lymphadenopathy:      Cervical: No cervical adenopathy  Skin:     General: Skin is warm and dry  Findings: No erythema or rash  Neurological:      Mental Status: She is alert and oriented to person, place, and time  Cranial Nerves: No cranial nerve deficit  Psychiatric:         Thought Content:  Thought content normal

## 2021-07-09 ENCOUNTER — RA CDI HCC (OUTPATIENT)
Dept: OTHER | Facility: HOSPITAL | Age: 66
End: 2021-07-09

## 2021-07-09 NOTE — PROGRESS NOTES
[place] [employer]  coding opportunities             Chart reviewed, (number of) suggestions sent to provider: 1            Number of suggestions actually used: 0         Patients insurance company: [employer]     Visit status: Patient arrived for their scheduled appointment     Provider never responded to [employer]  coding request     [facility] [employer]  coding opportunities        7/8     Chart reviewed, (number of) suggestions sent to provider: 1    E11 65  Type 2 diabetes mellitus with hyperglycemia - most recent A1c was over 10                  Patients insurance company: [employer]

## 2021-07-13 DIAGNOSIS — E78.2 MIXED HYPERLIPIDEMIA: ICD-10-CM

## 2021-07-14 RX ORDER — SIMVASTATIN 40 MG
40 TABLET ORAL
Qty: 90 TABLET | Refills: 2 | Status: SHIPPED | OUTPATIENT
Start: 2021-07-14

## 2021-07-21 ENCOUNTER — HOSPITAL ENCOUNTER (OUTPATIENT)
Dept: NON INVASIVE DIAGNOSTICS | Facility: HOSPITAL | Age: 66
Discharge: HOME | End: 2021-07-21
Attending: FAMILY MEDICINE
Payer: COMMERCIAL

## 2021-07-21 ENCOUNTER — HOSPITAL ENCOUNTER (OUTPATIENT)
Dept: RADIOLOGY | Facility: HOSPITAL | Age: 66
Discharge: HOME | End: 2021-07-21
Attending: FAMILY MEDICINE
Payer: COMMERCIAL

## 2021-07-21 DIAGNOSIS — M25.562 PAIN AND SWELLING OF LEFT KNEE: ICD-10-CM

## 2021-07-21 DIAGNOSIS — R60.0 LEG EDEMA, LEFT: ICD-10-CM

## 2021-07-21 DIAGNOSIS — M25.462 PAIN AND SWELLING OF LEFT KNEE: ICD-10-CM

## 2021-07-21 PROCEDURE — 73562 X-RAY EXAM OF KNEE 3: CPT

## 2021-07-21 PROCEDURE — 93971 EXTREMITY STUDY: CPT

## 2021-07-21 PROCEDURE — 93971 EXTREMITY STUDY: CPT | Performed by: SURGERY

## 2021-07-27 DIAGNOSIS — M71.20 SYNOVIAL CYST OF POPLITEAL SPACE, UNSPECIFIED LATERALITY: Primary | ICD-10-CM

## 2021-07-27 NOTE — RESULT ENCOUNTER NOTE
Call patient to notify normal results patient has arthritis of moderate severity but no fractures in the left knee

## 2021-07-28 ENCOUNTER — OFFICE VISIT (OUTPATIENT)
Dept: OBGYN CLINIC | Facility: CLINIC | Age: 66
End: 2021-07-28
Payer: COMMERCIAL

## 2021-07-28 ENCOUNTER — HOSPITAL ENCOUNTER (OUTPATIENT)
Dept: MAMMOGRAPHY | Facility: HOSPITAL | Age: 66
Discharge: HOME | End: 2021-07-28
Attending: FAMILY MEDICINE
Payer: COMMERCIAL

## 2021-07-28 VITALS — WEIGHT: 244 LBS | HEIGHT: 65 IN | BODY MASS INDEX: 40.65 KG/M2

## 2021-07-28 VITALS
DIASTOLIC BLOOD PRESSURE: 81 MMHG | HEIGHT: 65 IN | HEART RATE: 76 BPM | SYSTOLIC BLOOD PRESSURE: 154 MMHG | WEIGHT: 232 LBS | BODY MASS INDEX: 38.65 KG/M2

## 2021-07-28 DIAGNOSIS — M17.12 ARTHRITIS OF LEFT KNEE: Primary | ICD-10-CM

## 2021-07-28 DIAGNOSIS — M71.20 SYNOVIAL CYST OF POPLITEAL SPACE, UNSPECIFIED LATERALITY: ICD-10-CM

## 2021-07-28 DIAGNOSIS — Z12.31 ENCOUNTER FOR SCREENING MAMMOGRAM FOR MALIGNANT NEOPLASM OF BREAST: ICD-10-CM

## 2021-07-28 PROCEDURE — 99213 OFFICE O/P EST LOW 20 MIN: CPT | Performed by: ORTHOPAEDIC SURGERY

## 2021-07-28 PROCEDURE — 77067 SCR MAMMO BI INCL CAD: CPT

## 2021-07-28 PROCEDURE — 77063 BREAST TOMOSYNTHESIS BI: CPT

## 2021-07-28 NOTE — PROGRESS NOTES
72 y o female presents for evaluation of left knee and popliteal cyst diagnosed with venous duplex study 07/21/2021  Also moderate left knee tricompartmental arthritis diagnosed with x-rays 07/21/2021  She is diabetic and is on insulin  She notes knee pain for while  She also notes swelling about her left leg and that is what prompted the diagnostic investigation by her PCP  She was never tested for rheumatoid arthritis but states her sister and other family members had it  She notes most of her pain about her knee is in the posterior aspect  She denies locking  Review of Systems  Review of systems negative unless otherwise specified in HPI    Past Medical History  Past Medical History:   Diagnosis Date    Abnormal ECG     last assessed: 5/15/17    Abnormal mammogram     last assessed: 7/11/17    Abnormal stress test     last assesed: 7/24/17    Anemia     Anxiety     Arthritis     Asthma     Back problem     Breast cyst     Chest pain     last assessed: 7/24/17    Chronic pain disorder     Cyst of left breast     last assessed: 8/18/17    Depression     Depression     resolved: 1/2/18    Diabetes mellitus ([address] )     Hiatal hernia     last assessed: 11/7/17    Hyperlipidemia     Hypertension     Keloid of skin     last assessed: 11/2/16    Neuropathy     Psychiatric disorder     anxiety    Stroke [facility])     TIA 2005    Tuberculosis     as a child      Past Surgical History  Past Surgical History:   Procedure Laterality Date    ARM WOUND REPAIR / CLOSURE      CARPAL TUNNEL RELEASE      CARPAL TUNNEL RELEASE Left     CATARACT EXTRACTION Bilateral     2015    COLONOSCOPY      CYST REMOVAL      right upper arm   EYE SURGERY      cataract removaql    FL LUMBAR PUNCTURE DIAGNOSTIC  8/15/2019    TX ESOPHAGOGASTRODUODENOSCOPY TRANSORAL DIAGNOSTIC N/A 1/5/2018    Procedure: ESOPHAGOGASTRODUODENOSCOPY (EGD);   Surgeon: Timothy Liu DO;  Location: [facility] MAIN OR;  Service: Gastroenterology    HI WRIST [employer] LIG Left 4/19/2019    Procedure: ENDOSCOPIC CARPAL TUNNEL RELEASE;  Surgeon: Alejandra Marsh MD;  Location: [facility] MAIN OR;  Service: Orthopedics    TUBAL LIGATION     [place] [address] THYROID BIOPSY  4/15/2019     Current Medications  Current Outpatient Medications on File Prior to Visit   Medication Sig Dispense Refill    albuterol (PROVENTIL HFA,VENTOLIN HFA) 90 mcg/act inhaler Inhale 2 puffs every 6 (six) hours as needed for wheezing or shortness of breath 1 Inhaler 5    [START ON 1/10/2022] ALPRAZolam (XANAX) 0 5 mg tablet Take 1 tablet (0 5 mg total) by mouth as needed for anxiety (Take 1 ablet 30 minutes prior to your imaging - MRI of the brain   Can take 1 more tablet right before your imaging ) 2 tablet 0    amLODIPine (NORVASC) 10 mg tablet take 1 tablet by mouth once daily 30 tablet 5    aspirin 81 MG tablet Take 81 mg by mouth daily        Blood Glucose Calibration (ACCU-CHEK COMPACT PLUS CONTROL) SOLN by In Vitro route daily 1 each 0    Blood Glucose Monitoring Suppl (ACCU-CHEK SHANE PLUS) w/Device KIT by Does not apply route 2 (two) times a day 1 kit 0    busPIRone (BUSPAR) 5 mg tablet Take 1 tablet (5 mg total) by mouth 3 (three) times a day 90 tablet 2    ciclopirox (PENLAC) 8 % solution APPLY TO AFFECTED NAIL ONCE A DAY, THEN REMOVE ONCE A WEEK WITH NAIL POLISH REMOVER      Dapagliflozin Propanediol (Farxiga) 10 MG TABS 1/2 tablet daily for the 1st month then increase to a full tablet daily in the morning 30 tablet 3    escitalopram (LEXAPRO) 10 mg tablet take 1 tablet by mouth every morning 30 tablet 2    glucose blood (Accu-Chek Guide) test strip Use as instructed - twice daily 200 each 3    hydrocortisone 0 5 % cream Apply topically 2 (two) times a day 28 35 g 1    insulin glargine (Lantus SoloStar) 100 units/mL injection pen Inject 40 Units under the skin daily 5 pen 2    Insulin Pen Needle (B-D UF III MINI PEN NEEDLES) 31G X 5 MM MISC Inject under the skin daily 100 each 3    insulin regular (HumuLIN R,NovoLIN R) 100 units/mL injection 4 units at breakfast 5 units at lunch 6 units at supper 10 mL 5    Januvia 100 MG tablet take 1 tablet by mouth daily 30 tablet 5    Lancets (ACCU-CHEK MULTICLIX) lancets Test bid 100 each 0    pantoprazole (PROTONIX) 40 mg tablet Take 1 tablet (40 mg total) by mouth daily 90 tablet 3    Polyvinyl Alcohol (LIQUID TEARS OP) Apply to eye      potassium chloride (Klor-Con) 10 mEq tablet take 1 tablet by mouth once daily 30 tablet 3    RA PAIN RELIEF ACETAMINOPHEN 325 MG tablet take 1-2 tablets by mouth every 6 hours if needed for MODERATE pain  0    simvastatin (ZOCOR) 40 mg tablet Take 1 tablet (40 mg total) by mouth daily at bedtime 90 tablet 2    torsemide (DEMADEX) 5 MG tablet take 1 tablet by mouth every morning 30 tablet 3    beclomethasone (Qvar) 80 MCG/ACT inhaler Inhale 2 puffs 2 (two) times a day (Patient not taking: Reported on 7/28/2021) 1 Inhaler 5    pregabalin (LYRICA) 50 mg capsule Take 1 capsule (50 mg total) by mouth 3 (three) times a day 90 capsule 1     No current facility-administered medications on file prior to visit  Recent Labs [facility])  0   Lab Value Date/Time    HCT 41 9 06/16/2021 0616    HGB 13 2 06/16/2021 0616    WBC 5 61 06/16/2021 0616    INR 0 98 06/16/2021 0616    ESR 32 (H) 08/12/2019 1924    CRP 13 2 (H) 08/12/2019 1924    HGBA1C 10 3 (A) 07/08/2021 1328    HGBA1C 11 4 (H) 12/07/2020 0955     Physical exam  Body mass index is 38 61 kg/m²  · General: Awake, Alert, Oriented  · Eyes: Pupils equal, round and reactive to light  · Heart: regular rate and rhythm  · Lungs: No audible wheezing  · Abdomen: soft  left Knee exam  · Lower extremity pitting edema to the left leg  No significant left knee effusion    Positive medial joint line tenderness and popliteal fossa tenderness with palpation of edge of Baker cyst   Mild patellar facet tenderness laterally  No gross ligamentous laxity  Grossly neurovascular intact  Full extension flexion 125°  Quad strength 4+/5  Procedure  None today    Imaging  07/21/2021 venous duplex noted of left popliteal cyst 3 1 x 2 9 x 2 2 cm  Previous x-rays same date note subchondral sclerosis, medial joint space narrowing, patella osteophyte formation on sunrise view  1  Arthritis of left knee    2  Synovial cyst of popliteal space, unspecified laterality      Assessment:  left knee bakers cyst with moderate arthritis but symptoms more pertaining to the posterior aspect of the knee in a patient with diabetes and on insulin  Plan:  Treatment options were discussed with the patient including weight loss, ice, physical therapy, cortisone injection, viscosupplementation and Baker cyst aspiration  As her symptoms are more posterior, we will send her for ultrasound-guided Baker cyst aspiration cortisone injection  See the patient back in approximately 2 months  Start her in therapy  Ice 20 minutes once or twice a day as needed for pain or discomfort  Tylenol as needed  She is given a pamphlet on viscosupplementation and if she wants proceed with these she will either call us or we will discuss at her follow-up appointment bring these in to be given at a later time  Patient will have rheumatoid testing through her PCP  This note was created with voice recognition software

## 2021-07-28 NOTE — PATIENT INSTRUCTIONS
Treatment options were discussed with the patient including weight loss, ice, physical therapy, cortisone injection, viscosupplementation and Baker cyst aspiration  As her symptoms are more posterior, we will send her for ultrasound-guided Baker cyst aspiration cortisone injection  See the patient back in approximately 2 months  Start her in therapy  Ice 20 minutes once or twice a day as needed for pain or discomfort  Tylenol as needed  She is given a pamphlet on viscosupplementation and if she wants proceed with these she will either call us or we will discuss at her follow-up appointment bring these in to be given at a later time  Patient will have rheumatoid testing through her PCP

## 2021-08-06 ENCOUNTER — TELEPHONE (OUTPATIENT)
Dept: FAMILY MEDICINE CLINIC | Facility: CLINIC | Age: 66
End: 2021-08-06

## 2021-08-06 ENCOUNTER — TELEPHONE (OUTPATIENT)
Dept: OBGYN CLINIC | Facility: HOSPITAL | Age: 66
End: 2021-08-06

## 2021-08-06 NOTE — TELEPHONE ENCOUNTER
Saurav Levi, [facility] Procedure scheduling, needing order for aspiration of cyst left knee  Ph [phone]  Can not schedule without

## 2021-08-06 NOTE — TELEPHONE ENCOUNTER
CC: Pain Left side under ribs & in back x 1 week - No SOB - Constant pain  Worse when laying down - No relief w/ Tylenol

## 2021-08-06 NOTE — TELEPHONE ENCOUNTER
Believes this was already ordered under [facility] limited and procedure may have been done already    However to satisfy inbox message a new order for fl inj was placed

## 2021-08-10 ENCOUNTER — HOSPITAL ENCOUNTER (EMERGENCY)
Facility: HOSPITAL | Age: 66
Discharge: HOME | End: 2021-08-11
Attending: EMERGENCY MEDICINE | Admitting: EMERGENCY MEDICINE
Payer: COMMERCIAL

## 2021-08-10 ENCOUNTER — APPOINTMENT (EMERGENCY)
Dept: CT IMAGING | Facility: HOSPITAL | Age: 66
End: 2021-08-10
Payer: COMMERCIAL

## 2021-08-10 ENCOUNTER — APPOINTMENT (EMERGENCY)
Dept: RADIOLOGY | Facility: HOSPITAL | Age: 66
End: 2021-08-10
Payer: COMMERCIAL

## 2021-08-10 DIAGNOSIS — R93.89 ABNORMAL CT SCAN: ICD-10-CM

## 2021-08-10 DIAGNOSIS — R60.9 DEPENDENT EDEMA: ICD-10-CM

## 2021-08-10 DIAGNOSIS — I10 ESSENTIAL HYPERTENSION: ICD-10-CM

## 2021-08-10 DIAGNOSIS — R10.12 LEFT UPPER QUADRANT ABDOMINAL PAIN: Primary | ICD-10-CM

## 2021-08-10 LAB
ALBUMIN SERPL BCP-MCNC: 3.4 G/DL (ref 3.5–5)
ALP SERPL-CCNC: 103 U/L (ref 46–116)
ALT SERPL W P-5'-P-CCNC: 21 U/L (ref 12–78)
ANION GAP SERPL CALCULATED.3IONS-SCNC: 6 MMOL/L (ref 4–13)
AST SERPL W P-5'-P-CCNC: 18 U/L (ref 5–45)
BASOPHILS # BLD AUTO: 0.02 THOUSANDS/ΜL (ref 0–0.1)
BASOPHILS NFR BLD AUTO: 1 % (ref 0–1)
BILIRUB DIRECT SERPL-MCNC: 0.13 MG/DL (ref 0–0.2)
BILIRUB SERPL-MCNC: 0.28 MG/DL (ref 0.2–1)
BUN SERPL-MCNC: 19 MG/DL (ref 5–25)
CALCIUM SERPL-MCNC: 9.3 MG/DL (ref 8.3–10.1)
CHLORIDE SERPL-SCNC: 100 MMOL/L (ref 100–108)
CO2 SERPL-SCNC: 31 MMOL/L (ref 21–32)
CREAT SERPL-MCNC: 1.35 MG/DL (ref 0.6–1.3)
EOSINOPHIL # BLD AUTO: 0.05 THOUSAND/ΜL (ref 0–0.61)
EOSINOPHIL NFR BLD AUTO: 1 % (ref 0–6)
ERYTHROCYTE [DISTWIDTH] IN BLOOD BY AUTOMATED COUNT: 13.9 % (ref 11.6–15.1)
GFR SERPL CREATININE-BSD FRML MDRD: 48 ML/MIN/1.73SQ M
GLUCOSE SERPL-MCNC: 331 MG/DL (ref 65–140)
HCT VFR BLD AUTO: 42.9 % (ref 34.8–46.1)
HGB BLD-MCNC: 13.4 G/DL (ref 11.5–15.4)
IMM GRANULOCYTES # BLD AUTO: 0.01 THOUSAND/UL (ref 0–0.2)
IMM GRANULOCYTES NFR BLD AUTO: 0 % (ref 0–2)
LIPASE SERPL-CCNC: 120 U/L (ref 73–393)
LYMPHOCYTES # BLD AUTO: 1.27 THOUSANDS/ΜL (ref 0.6–4.47)
LYMPHOCYTES NFR BLD AUTO: 31 % (ref 14–44)
MAGNESIUM SERPL-MCNC: 1.9 MG/DL (ref 1.6–2.6)
MCH RBC QN AUTO: 26.5 PG (ref 26.8–34.3)
MCHC RBC AUTO-ENTMCNC: 31.2 G/DL (ref 31.4–37.4)
MCV RBC AUTO: 85 FL (ref 82–98)
MONOCYTES # BLD AUTO: 0.3 THOUSAND/ΜL (ref 0.17–1.22)
MONOCYTES NFR BLD AUTO: 7 % (ref 4–12)
NEUTROPHILS # BLD AUTO: 2.43 THOUSANDS/ΜL (ref 1.85–7.62)
NEUTS SEG NFR BLD AUTO: 60 % (ref 43–75)
NRBC BLD AUTO-RTO: 0 /100 WBCS
PLATELET # BLD AUTO: 234 THOUSANDS/UL (ref 149–390)
PMV BLD AUTO: 10.5 FL (ref 8.9–12.7)
POTASSIUM SERPL-SCNC: 3.6 MMOL/L (ref 3.5–5.3)
PROT SERPL-MCNC: 8 G/DL (ref 6.4–8.2)
RBC # BLD AUTO: 5.05 MILLION/UL (ref 3.81–5.12)
SODIUM SERPL-SCNC: 137 MMOL/L (ref 136–145)
TROPONIN I SERPL-MCNC: <0.02 NG/ML
WBC # BLD AUTO: 4.08 THOUSAND/UL (ref 4.31–10.16)

## 2021-08-10 PROCEDURE — 84484 ASSAY OF TROPONIN QUANT: CPT | Performed by: EMERGENCY MEDICINE

## 2021-08-10 PROCEDURE — G1004 CDSM NDSC: HCPCS

## 2021-08-10 PROCEDURE — 93005 ELECTROCARDIOGRAM TRACING: CPT

## 2021-08-10 PROCEDURE — 71046 X-RAY EXAM CHEST 2 VIEWS: CPT

## 2021-08-10 PROCEDURE — 83690 ASSAY OF LIPASE: CPT | Performed by: EMERGENCY MEDICINE

## 2021-08-10 PROCEDURE — 74177 CT ABD & PELVIS W/CONTRAST: CPT

## 2021-08-10 PROCEDURE — 81001 URINALYSIS AUTO W/SCOPE: CPT | Performed by: EMERGENCY MEDICINE

## 2021-08-10 PROCEDURE — 85025 COMPLETE CBC W/AUTO DIFF WBC: CPT | Performed by: EMERGENCY MEDICINE

## 2021-08-10 PROCEDURE — 80076 HEPATIC FUNCTION PANEL: CPT | Performed by: EMERGENCY MEDICINE

## 2021-08-10 PROCEDURE — 99285 EMERGENCY DEPT VISIT HI MDM: CPT | Performed by: EMERGENCY MEDICINE

## 2021-08-10 PROCEDURE — 80048 BASIC METABOLIC PNL TOTAL CA: CPT | Performed by: EMERGENCY MEDICINE

## 2021-08-10 PROCEDURE — 99284 EMERGENCY DEPT VISIT MOD MDM: CPT

## 2021-08-10 PROCEDURE — 36415 COLL VENOUS BLD VENIPUNCTURE: CPT | Performed by: EMERGENCY MEDICINE

## 2021-08-10 PROCEDURE — 83735 ASSAY OF MAGNESIUM: CPT | Performed by: EMERGENCY MEDICINE

## 2021-08-10 RX ADMIN — IOHEXOL 100 ML: 350 INJECTION, SOLUTION INTRAVENOUS at 21:49

## 2021-08-11 ENCOUNTER — RA CDI HCC (OUTPATIENT)
Dept: OTHER | Facility: HOSPITAL | Age: 66
End: 2021-08-11

## 2021-08-11 VITALS
DIASTOLIC BLOOD PRESSURE: 91 MMHG | TEMPERATURE: 97.5 F | BODY MASS INDEX: 40.5 KG/M2 | SYSTOLIC BLOOD PRESSURE: 188 MMHG | HEART RATE: 70 BPM | OXYGEN SATURATION: 96 % | RESPIRATION RATE: 20 BRPM | WEIGHT: 243.39 LBS

## 2021-08-11 LAB
ATRIAL RATE: 79 BPM
BACTERIA UR QL AUTO: ABNORMAL /HPF
BILIRUB UR QL STRIP: NEGATIVE
CLARITY UR: ABNORMAL
COLOR UR: YELLOW
GLUCOSE UR STRIP-MCNC: ABNORMAL MG/DL
HGB UR QL STRIP.AUTO: ABNORMAL
KETONES UR STRIP-MCNC: NEGATIVE MG/DL
LEUKOCYTE ESTERASE UR QL STRIP: ABNORMAL
NITRITE UR QL STRIP: NEGATIVE
NON-SQ EPI CELLS URNS QL MICRO: ABNORMAL /HPF
P AXIS: 51 DEGREES
PH UR STRIP.AUTO: 6 [PH]
PR INTERVAL: 212 MS
PROT UR STRIP-MCNC: ABNORMAL MG/DL
QRS AXIS: -49 DEGREES
QRSD INTERVAL: 104 MS
QT INTERVAL: 386 MS
QTC INTERVAL: 442 MS
RBC #/AREA URNS AUTO: ABNORMAL /HPF
SP GR UR STRIP.AUTO: <=1.005 (ref 1–1.03)
T WAVE AXIS: -12 DEGREES
UROBILINOGEN UR QL STRIP.AUTO: 0.2 E.U./DL
VENTRICULAR RATE: 79 BPM
WBC #/AREA URNS AUTO: ABNORMAL /HPF

## 2021-08-11 PROCEDURE — 93010 ELECTROCARDIOGRAM REPORT: CPT | Performed by: INTERNAL MEDICINE

## 2021-08-11 RX ORDER — POTASSIUM CHLORIDE 750 MG/1
TABLET, FILM COATED, EXTENDED RELEASE ORAL
Qty: 30 TABLET | Refills: 3 | Status: SHIPPED | OUTPATIENT
Start: 2021-08-11

## 2021-08-11 RX ORDER — TORSEMIDE 5 MG/1
TABLET ORAL
Qty: 30 TABLET | Refills: 3 | Status: SHIPPED | OUTPATIENT
Start: 2021-08-11

## 2021-08-11 NOTE — DISCHARGE INSTRUCTIONS
Thank you for visiting the Emergency Department today  Your blood work was normal   Your EKG was normal   Your chest x-ray was normal   Your vital signs were normal   Your CT scan had an incidental finding of a prominent left-sided ovary and they cannot rule out the possibility of a ovarian mass or a benign mass called a fibroid  The recommendation at this time is to proceed with an outpatient nonemergent ultrasound of the pelvis to better evaluate this area  It is unclear if this is the cause agree symptoms at this time  Please follow-up with your primary care provider as soon as possible to schedule follow-up imaging and for further management of her symptoms  Please use Tylenol and or Motrin as needed for discomfort  Drink plenty of fluids  Return to the ER immediately if her symptoms worsen we have additional concerns

## 2021-08-11 NOTE — ED PROVIDER NOTES
History  Chief Complaint   Patient presents with    Abdominal Pain     L sided abdominal pain starting saturday, now radiating around to her back  History provided by:  Patient  Abdominal Pain  Pain location:  L flank, LUQ and LLQ  Pain quality: aching    Pain radiates to:  L flank  Pain severity:  Moderate  Onset quality:  Gradual  Duration:  5 days  Timing:  Constant  Progression:  Waxing and waning  Chronicity:  New  Context: not alcohol use, not eating, not previous surgeries, not recent illness, not recent travel, not sick contacts, not suspicious food intake and not trauma    Relieved by:  Nothing  Worsened by:  Nothing  Ineffective treatments:  None tried  Associated symptoms: constipation    Associated symptoms: no anorexia, no belching, no chest pain, no chills, no cough, no diarrhea, no dysuria, no fever, no hematochezia, no hematuria, no melena, no nausea, no shortness of breath, no sore throat and no vomiting    Risk factors: being elderly and obesity    Risk factors: has not had multiple surgeries and no recent hospitalization      66-year-old female with past medical history significant for insulin-dependent diabetes, morbid obesity, anxiety, hypertension, chronic back pain, chronic constipation, hiatal hernia, diabetic neuropathy, asthma, goiter, history of myasthenia gravis, history of cerebral meninges seal who presents to the ER today for evaluation of left-sided abdominal pain  Prior to Admission Medications   Prescriptions Last Dose Informant Patient Reported? Taking? ALPRAZolam (XANAX) 0 5 mg tablet   No No   Sig: Take 1 tablet (0 5 mg total) by mouth as needed for anxiety (Take 1 ablet 30 minutes prior to your imaging - MRI of the brain   Can take 1 more tablet right before your imaging )   Blood Glucose Calibration (ACCU-CHEK COMPACT PLUS CONTROL) SOLN  Self No No   Sig: by In Vitro route daily   Blood Glucose Monitoring Suppl (ACCU-CHEK SHANE PLUS) w/Device KIT  Self No No Sig: by Does not apply route 2 (two) times a day   Dapagliflozin Propanediol (Farxiga) 10 MG TABS   No No   Si/2 tablet daily for the 1st month then increase to a full tablet daily in the morning   Insulin Pen Needle (B-D UF III MINI PEN NEEDLES) 31G X 5 MM MISC  Self No No   Sig: Inject under the skin daily   Januvia 100 MG tablet   No No   Sig: take 1 tablet by mouth daily   Lancets (ACCU-CHEK MULTICLIX) lancets  Self No No   Sig: Test bid   Polyvinyl Alcohol (LIQUID TEARS OP)  Self Yes No   Sig: Apply to eye   RA PAIN RELIEF ACETAMINOPHEN 325 MG tablet  Self Yes No   Sig: take 1-2 tablets by mouth every 6 hours if needed for MODERATE pain   albuterol (PROVENTIL HFA,VENTOLIN HFA) 90 mcg/act inhaler   No No   Sig: Inhale 2 puffs every 6 (six) hours as needed for wheezing or shortness of breath   amLODIPine (NORVASC) 10 mg tablet   No No   Sig: take 1 tablet by mouth once daily   aspirin 81 MG tablet  Self Yes No   Sig: Take 81 mg by mouth daily     beclomethasone (Qvar) 80 MCG/ACT inhaler   No No   Sig: Inhale 2 puffs 2 (two) times a day   Patient not taking: Reported on 2021   busPIRone (BUSPAR) 5 mg tablet   No No   Sig: Take 1 tablet (5 mg total) by mouth 3 (three) times a day   ciclopirox (PENLAC) 8 % solution   Yes No   Sig: APPLY TO AFFECTED NAIL ONCE A DAY, THEN REMOVE ONCE A WEEK WITH NAIL POLISH REMOVER   escitalopram (LEXAPRO) 10 mg tablet   No No   Sig: take 1 tablet by mouth every morning   glucose blood (Accu-Chek Guide) test strip  Self No No   Sig: Use as instructed - twice daily   hydrocortisone 0 5 % cream  Self No No   Sig: Apply topically 2 (two) times a day   insulin glargine (Lantus SoloStar) 100 units/mL injection pen  Self No No   Sig: Inject 40 Units under the skin daily   insulin regular (HumuLIN R,NovoLIN R) 100 units/mL injection  Self No No   Si units at breakfast 5 units at lunch 6 units at supper   pantoprazole (PROTONIX) 40 mg tablet   No No   Sig: Take 1 tablet (40 mg total) by mouth daily   pregabalin (LYRICA) 50 mg capsule   No No   Sig: Take 1 capsule (50 mg total) by mouth 3 (three) times a day   simvastatin (ZOCOR) 40 mg tablet   No No   Sig: Take 1 tablet (40 mg total) by mouth daily at bedtime      Facility-Administered Medications: None       Past Medical History:   Diagnosis Date    Abnormal ECG     last assessed: 5/15/17    Abnormal mammogram     last assessed: 17    Abnormal stress test     last assesed: 17    Anemia     Anxiety     Arthritis     Asthma     Back problem     Breast cyst     Chest pain     last assessed: 17    Chronic pain disorder     Cyst of left breast     last assessed: 17    Depression     Depression     resolved: 18    Diabetes mellitus ([address] )     Hiatal hernia     last assessed: 17    Hyperlipidemia     Hypertension     Keloid of skin     last assessed: 16    Neuropathy     Psychiatric disorder     anxiety    Stroke [facility])     TIA     Tuberculosis     as a child        Past Surgical History:   Procedure Laterality Date    ARM WOUND REPAIR / CLOSURE      CARPAL TUNNEL RELEASE      CARPAL TUNNEL RELEASE Left     CATARACT EXTRACTION Bilateral         COLONOSCOPY      CYST REMOVAL      right upper arm   EYE SURGERY      cataract removaql    FL LUMBAR PUNCTURE DIAGNOSTIC  8/15/2019    UT ESOPHAGOGASTRODUODENOSCOPY TRANSORAL DIAGNOSTIC N/A 2018    Procedure: ESOPHAGOGASTRODUODENOSCOPY (EGD);   Surgeon: Louretta Apgar, DO;  Location: [facility] MAIN OR;  Service: Gastroenterology    UT WRIST [employer] LIG Left 2019    Procedure: ENDOSCOPIC CARPAL TUNNEL RELEASE;  Surgeon: Deedee Sánchez MD;  Location[facility] MA[facility] OR;  Service: Orthopedics    [facility] [address] THYROID BIOPSY  4/15/2019       Family History   Problem Relation Age of Onset    Heart disease Mother     Diabetes Mother     Arthritis Mother     Heart attack Mother     Hypertension Mother  Kidney disease Father     Hypertension Father     Kidney cancer Father     Arthritis Sister     Stroke Maternal Grandmother     Stomach cancer Maternal Grandmother     Arthritis Paternal Grandmother     Heart attack Paternal Grandmother     Stroke Maternal Aunt     Heart attack Maternal Uncle     Cancer Family         spinal column    Coronary artery disease Family     Glaucoma Family     Rheum arthritis Family     No Known Problems Maternal Grandfather     No Known Problems Paternal Grandfather     No Known Problems Daughter     No Known Problems Daughter     No Known Problems Daughter     No Known Problems Daughter     Liver cancer Maternal Aunt     No Known Problems Maternal Aunt     No Known Problems Maternal Aunt     No Known Problems Paternal Aunt      I have reviewed and agree with the history as documented  E-Cigarette/Vaping    E-Cigarette Use Never User      E-Cigarette/Vaping Substances    Nicotine No     THC No     CBD No     Flavoring No     Other No     Unknown No      Social History     Tobacco Use    Smoking status: Never Smoker    Smokeless tobacco: Never Used   Vaping Use    Vaping Use: Never used   Substance Use Topics    Alcohol use: Not Currently     Alcohol/week: 0 0 standard drinks    Drug use: No       Review of Systems   Constitutional: Negative for chills and fever  HENT: Negative for ear pain and sore throat  Eyes: Negative for pain and visual disturbance  Respiratory: Negative for cough and shortness of breath  Cardiovascular: Negative for chest pain and palpitations  Gastrointestinal: Positive for abdominal pain and constipation  Negative for anorexia, diarrhea, hematochezia, melena, nausea and vomiting  Genitourinary: Positive for flank pain  Negative for dysuria, enuresis and hematuria  Musculoskeletal: Negative for arthralgias and back pain  Skin: Negative for color change and rash     Neurological: Negative for seizures and syncope  All other systems reviewed and are negative  Physical Exam  Physical Exam  Vitals and nursing note reviewed  Constitutional:       General: She is not in acute distress  Appearance: She is well-developed  She is obese  She is not ill-appearing or diaphoretic  HENT:      Head: Normocephalic and atraumatic  Eyes:      Conjunctiva/sclera: Conjunctivae normal    Cardiovascular:      Rate and Rhythm: Normal rate and regular rhythm  Heart sounds: No murmur heard  Pulmonary:      Effort: Pulmonary effort is normal  No respiratory distress  Breath sounds: Normal breath sounds  Abdominal:      Palpations: Abdomen is soft  Tenderness: There is abdominal tenderness  Hernia: No hernia is present  Comments: Abdomen is soft, nondistended  There is left upper quadrant abdominal tenderness without left-sided CVA tenderness  There is no discrete left lower quadrant, right lower quadrant, right upper quadrant tenderness  There is no rebound or guarding  There is no right-sided CVA tenderness  There is no suprapubic tenderness  No masses appreciated  Musculoskeletal:      Cervical back: Neck supple  Skin:     General: Skin is warm and dry  Neurological:      Mental Status: She is alert           Vital Signs  ED Triage Vitals [08/10/21 2053]   Temperature Pulse Respirations Blood Pressure SpO2   97 5 °F (36 4 °C) 83 18 (!) 181/87 95 %      Temp Source Heart Rate Source Patient Position - Orthostatic VS BP Location FiO2 (%)   Temporal Monitor Lying Left arm --      Pain Score       7           Vitals:    08/10/21 2053 08/10/21 2230 08/11/21 0030 08/11/21 0038   BP: (!) 181/87 152/80 (!) 188/91 (!) 188/91   Pulse: 83 71 69 70   Patient Position - Orthostatic VS: Lying Lying  Sitting         Visual Acuity      ED Medications  Medications   iohexol (OMNIPAQUE) 350 MG/ML injection (MULTI-DOSE) 100 mL (100 mL Intravenous Given 8/10/21 2149)       Diagnostic Studies  Results Reviewed     Procedure Component Value Units Date/Time    Urine Microscopic [[id number]]  (Abnormal) Collected: 08/10/21 2327    Lab Status: Final result Specimen: Urine, Clean Catch Updated: 08/11/21 0016     RBC, UA 1-2 /hpf      WBC, UA 0-1 /hpf      Epithelial Cells Innumerable /hpf      Bacteria, UA Occasional /hpf     UA w Reflex to Microscopic w Reflex to Culture [[id number]]  (Abnormal) Collected: 08/10/21 2327    Lab Status: Final result Specimen: Urine, Clean Catch Updated: 08/11/21 0002     Color, UA Yellow     Clarity, UA Slightly Cloudy     Specific Gravity, UA <=1 005     pH, UA 6 0     Leukocytes, UA Elevated glucose may cause decreased leukocyte values   See urine microscopic for [facility] result/     Nitrite, UA Negative     Protein,  (2+) mg/dl      Glucose, UA >=1000 (1%) mg/dl      Ketones, UA Negative mg/dl      Urobilinogen, UA 0 2 E U /dl      Bilirubin, UA Negative     Blood, UA Small    Troponin I [[id number]]  (Normal) Collected: 08/10/21 2059    Lab Status: Final result Specimen: Blood from Arm, Left Updated: 08/10/21 2136     Troponin I <0 02 ng/mL     Lipase [[id number]]  (Normal) Collected: 08/10/21 2059    Lab Status: Final result Specimen: Blood from Arm, Left Updated: 08/10/21 2133     Lipase 120 u/L     Hepatic function panel [[id number]]  (Abnormal) Collected: 08/10/21 2059    Lab Status: Final result Specimen: Blood from Arm, Left Updated: 08/10/21 2133     Total Bilirubin 0 28 mg/dL      Bilirubin, Direct 0 13 mg/dL      Alkaline Phosphatase 103 U/L      AST 18 U/L      ALT 21 U/L      Total Protein 8 0 g/dL      Albumin 3 4 g/dL     Basic metabolic panel [[id number]]  (Abnormal) Collected: 08/10/21 2059    Lab Status: Final result Specimen: Blood from Arm, Left Updated: 08/10/21 2133     Sodium 137 mmol/L      Potassium 3 6 mmol/L      Chloride 100 mmol/L      CO2 31 mmol/L      ANION GAP 6 mmol/L      BUN 19 mg/dL      Creatinine 1 35 mg/dL      Glucose 331 mg/dL      Calcium 9 3 mg/dL      eGFR 48 ml/min/1 73sq m     Narrative:      [place] guidelines for Chronic Kidney Disease (CKD):     Stage 1 with normal or high GFR (GFR > 90 mL/min/1 73 square meters)    Stage 2 Mild CKD (GFR = 60-89 mL/min/1 73 square meters)    Stage 3A Moderate CKD (GFR = 45-59 mL/min/1 73 square meters)    Stage 3B Moderate CKD (GFR = 30-44 mL/min/1 73 square meters)    Stage 4 Severe CKD (GFR = 15-29 mL/min/1 73 square meters)    Stage 5 End Stage CKD (GFR <15 mL/min/1 73 square meters)  Note: GFR calculation is accurate only with a steady state creatinine    Magnesium [[id number]]  (Normal) Collected: 08/10/21 2059    Lab Status: Final result Specimen: Blood from Arm, Left Updated: 08/10/21 2133     Magnesium 1 9 mg/dL     CBC and differential [[id number]]  (Abnormal) Collected: 08/10/21 2059    Lab Status: Final result Specimen: Blood from Arm, Left Updated: 08/10/21 2109     WBC 4 08 Thousand/uL      RBC 5 05 Million/uL      Hemoglobin 13 4 g/dL      Hematocrit 42 9 %      MCV 85 fL      MCH 26 5 pg      MCHC 31 2 g/dL      RDW 13 9 %      MPV 10 5 fL      Platelets 866 Thousands/uL      nRBC 0 /100 WBCs      Neutrophils Relative 60 %      Immat GRANS % 0 %      Lymphocytes Relative 31 %      Monocytes Relative 7 %      Eosinophils Relative 1 %      Basophils Relative 1 %      Neutrophils Absolute 2 43 Thousands/µL      Immature Grans Absolute 0 01 Thousand/uL      Lymphocytes Absolute 1 27 Thousands/µL      Monocytes Absolute 0 30 Thousand/µL      Eosinophils Absolute 0 05 Thousand/µL      Basophils Absolute 0 02 Thousands/µL                  CT Abdomen pelvis with contrast   Final Result by Claudio Thomas MD (08/10 2300)      Prominent left ovary with suspected uterine mass/fibroid, correlate with nonemergent outpatient pelvic ultrasound              Workstation performed: [id number]         XR chest 2 views   Final Result by Lucie Polanco MD (30/03 1363)      No acute cardiopulmonary findings or significant change from prior  Workstation performed: [id number]                    Procedures  Procedures         ED Course  ED Course as of Aug 12 0639   Tue Aug 10, 2021   2102 ECG interpreted by myself  EKG dated 08/10/2021 at 8:56 p m  Demonstrates sinus rhythm at 79 beats per minute, first-degree AV block with MD interval 212ms, left anterior fascicular block, QRS 104ms, QTC 442ms, no STEMI  Nonspecific ST abnormality      2111 History of mild chronic leukopenia   WBC(!): 4 08   2111 Known history of hypertension  Blood Pressure(!): 181/87   2204 Abdominal labs unremarkable  Mild elevation in creatinine without criteria for acute kidney injury  Hyperglycemia in setting of insulin-dependent diabetes  No anion gap  2347 CT scan abdomen pelvis with no acute abnormalities  There is findings of prominent left ovary with suspected uterine mass/fibroid  Recommendation is for nonemergent outpatient ultrasound to follow-up on this  On the patient's pain is on the left side and this may explain her symptoms she is tolerating p o  And appears well at this time with unremarkable blood work  She will be referred to her primary care provider for further management and follow-up imaging  She is instructed on Tylenol Motrin at this time  She is given strict return precautions for poor p o  Tolerance, intractable nausea vomiting, severe pain, fevers chills, vaginal bleeding or any other concerns  She is agreeable to the plan at this time will discharge  Wed Aug 11, 2021   0008 Nitrite, UA: Negative   0008 Leukocytes, UA(!): Elevated glucose may cause decreased leukocyte values   See urine microscopic for [facility] result/   0022 Epithelial Cells(!): Innumerable             HEART Risk Score      Most Recent Value   Heart Score Risk Calculator   History  0 Filed at: 08/10/2021 2119   ECG  1 Filed at: 08/10/2021 2119   Age  2 Filed at: 08/10/2021 2119   Risk Factors 1 Filed at: 08/10/2021 2119   Troponin  0 Filed at: 08/10/2021 2119   HEART Score  4 Filed at: 08/10/2021 2119                                    MDM  Number of Diagnoses or Management Options     Amount and/or Complexity of Data Reviewed  Clinical lab tests: ordered and reviewed  Tests in the radiology section of CPT®: ordered and reviewed  Tests in the medicine section of CPT®: ordered and reviewed  Review and summarize past medical records: yes  Independent visualization of images, tracings, or specimens: yes    Risk of Complications, Morbidity, and/or Mortality  Presenting problems: moderate  Diagnostic procedures: moderate  Management options: moderate    Patient Progress  Patient progress: stable    70-year-old female without prior abdominal surgeries with a history of obesity and constipation presenting for evaluation of 5 days of gradual onset left-sided abdominal pain  No acute infectious symptoms, no urinary symptoms, patient reports several days without bowel movement however this is normal for the patient  Denies pain with straining or blood in stool  Denies a history of diverticulitis or similar symptoms like this in the past   She reports she was advised to come to the ER by her PCP for evaluation as her pain is persisted for several days  Given her age and comorbidities will proceed with CT scan imaging as well as abdominal labs and limited cardiac workup with x-ray an EKG given that her complaints involve the left flank  She has tenderness in the abdomen which did not appear surgical on my assessment which makes kidney stone less likely however will evaluate with CT scan of the LEs  If no significant findings patient does appear to be tolerating p o  And is not in acute distress lab discussion about risks and benefits of discharge for outpatient management and outpatient follow-up verses continued treatment and workup inpatient    At this time I anticipate outpatient management    Disposition  Final diagnoses:   Left upper quadrant abdominal pain   Abnormal CT scan - Prominent left ovary with probable ovarian mass/fibroid recommend nonemergent outpatient pelvic ultrasound     Time reflects when diagnosis was documented in both MDM as applicable and the Disposition within this note     Time User Action Codes Description Comment    8/10/2021 11:49 PM Altamease Parent Add [R10 12] Left upper quadrant abdominal pain     8/10/2021 11:49 PM Altamease Parent Add [R93 89] Abnormal CT scan     8/10/2021 11:49 PM Altamease Parent Modify [R93 89] Abnormal CT scan Prominent left ovary with probable ovarian mass/fibroid recommend nonemergent outpatient pelvic ultrasound      ED Disposition     ED Disposition Condition Date/Time Comment    Discharge Stable Wed Aug 11, 2021 12:22 AM Kerry Bella discharge to home/self care  Follow-up Information     Follow up With Specialties Details Why Contact Info Additional Information    Remington Stephen,  Family Medicine Schedule an appointment as soon as possible for a visit For repeat imaging of your left ovary  [address] Emergency Medicine Go to  If symptoms worsen [place] [phone]  [address] [facility] Emergency Department, [place] [address], [facility] [employer] [place], [place], [address]          Discharge Medication List as of 8/11/2021 12:23 AM      CONTINUE these medications which have NOT CHANGED    Details   albuterol (PROVENTIL HFA,VENTOLIN HFA) 90 mcg/act inhaler Inhale 2 puffs every 6 (six) hours as needed for wheezing or shortness of breath, Starting Thu 2/4/2021, Normal      ALPRAZolam (XANAX) 0 5 mg tablet Take 1 tablet (0 5 mg total) by mouth as needed for anxiety (Take 1 ablet 30 minutes prior to your imaging - MRI of the brain   Can take 1 more tablet right before your imaging ), Starting Mon 1/10/2022, Normal      amLODIPine (NORVASC) 10 mg tablet take 1 tablet by mouth once daily, Normal      aspirin 81 MG tablet Take 81 mg by mouth daily  , Starting Wed 6/28/2017, Historical Med      beclomethasone (Qvar) 80 MCG/ACT inhaler Inhale 2 puffs 2 (two) times a day, Starting Thu 2/4/2021, Normal      Blood Glucose Calibration (ACCU-CHEK COMPACT PLUS CONTROL) SOLN by In Vitro route daily, Starting Fri 8/16/2019, Print      Blood Glucose Monitoring Suppl (ACCU-CHEK SHANE PLUS) w/Device KIT by Does not apply route 2 (two) times a day, Starting Tue 7/30/2019, Normal      busPIRone (BUSPAR) 5 mg tablet Take 1 tablet (5 mg total) by mouth 3 (three) times a day, Starting Thu 7/8/2021, Normal      ciclopirox (PENLAC) 8 % solution APPLY TO AFFECTED NAIL ONCE A DAY, THEN REMOVE ONCE A WEEK WITH NAIL POLISH REMOVER, Historical Med      Dapagliflozin Propanediol (Farxiga) 10 MG TABS 1/2 tablet daily for the 1st month then increase to a full tablet daily in the morning, Normal      escitalopram (LEXAPRO) 10 mg tablet take 1 tablet by mouth every morning, Normal      glucose blood (Accu-Chek Guide) test strip Use as instructed - twice daily, Normal      hydrocortisone 0 5 % cream Apply topically 2 (two) times a day, Starting Tue 3/24/2020, Normal      insulin glargine (Lantus SoloStar) 100 units/mL injection pen Inject 40 Units under the skin daily, Starting Tue 12/8/2020, No Print      Insulin Pen Needle (B-D UF III MINI PEN NEEDLES) 31G X 5 MM MISC Inject under the skin daily, Starting Fri 1/18/2019, Normal      insulin regular (HumuLIN R,NovoLIN R) 100 units/mL injection 4 units at breakfast 5 units at lunch 6 units at supper, Normal      Januvia 100 MG tablet take 1 tablet by mouth daily, Normal      Lancets (ACCU-CHEK MULTICLIX) lancets Test bid, Normal      pantoprazole (PROTONIX) 40 mg tablet Take 1 tablet (40 mg total) by mouth daily, Starting Tue 12/22/2020, Normal      Polyvinyl Alcohol (LIQUID TEARS OP) Apply to eye, Historical Med      pregabalin (LYRICA) 50 mg capsule Take 1 capsule (50 mg total) by mouth 3 (three) times a day, Starting Thu 1/14/2021, Until Thu 7/8/2021, Normal      RA PAIN RELIEF ACETAMINOPHEN 325 MG tablet take 1-2 tablets by mouth every 6 hours if needed for MODERATE pain, Historical Med      simvastatin (ZOCOR) 40 mg tablet Take 1 tablet (40 mg total) by mouth daily at bedtime, Starting Wed 7/14/2021, Normal      potassium chloride (Klor-Con) 10 mEq tablet take 1 tablet by mouth once daily, Normal      torsemide (DEMADEX) 5 MG tablet take 1 tablet by mouth every morning, Normal           No discharge procedures on file      PDMP Review     None          ED Provider  Electronically Signed by           Alysha Almeida DO  08/12/21 2407

## 2021-08-11 NOTE — PROGRESS NOTES
[employer]  coding opportunities             Chart Reviewed * (Number of) Inbasket suggestions sent to Provider: 1            Number of suggestions used: 0         Patients insurance company: [employer]     Visit status: Patient arrived for their scheduled appointment     Provider never responded to [employer]  coding request         [facility] [employer]  coding opportunities        8/19     Chart Reviewed * (Number of) Inbasket suggestions sent to Provider: 1    E11 65  Type 2 diabetes mellitus with hyperglycemia - most recent A1c was over 10                          Patients insurance company: [employer]

## 2021-08-19 ENCOUNTER — OFFICE VISIT (OUTPATIENT)
Dept: FAMILY MEDICINE CLINIC | Facility: CLINIC | Age: 66
End: 2021-08-19
Payer: COMMERCIAL

## 2021-08-19 ENCOUNTER — TELEPHONE (OUTPATIENT)
Dept: FAMILY MEDICINE CLINIC | Facility: CLINIC | Age: 66
End: 2021-08-19

## 2021-08-19 VITALS
DIASTOLIC BLOOD PRESSURE: 70 MMHG | TEMPERATURE: 97 F | BODY MASS INDEX: 38.15 KG/M2 | SYSTOLIC BLOOD PRESSURE: 148 MMHG | WEIGHT: 229 LBS | HEIGHT: 65 IN

## 2021-08-19 DIAGNOSIS — Z79.4 TYPE 2 DIABETES MELLITUS WITH COMPLICATION, WITH LONG-TERM CURRENT USE OF INSULIN (HCC): Primary | ICD-10-CM

## 2021-08-19 DIAGNOSIS — Z13.5 ENCOUNTER FOR SCREENING FOR DIABETIC RETINOPATHY: ICD-10-CM

## 2021-08-19 DIAGNOSIS — I10 ESSENTIAL HYPERTENSION: ICD-10-CM

## 2021-08-19 DIAGNOSIS — E04.2 MULTINODULAR GOITER: ICD-10-CM

## 2021-08-19 DIAGNOSIS — E11.8 TYPE 2 DIABETES MELLITUS WITH COMPLICATION, WITH LONG-TERM CURRENT USE OF INSULIN (HCC): Primary | ICD-10-CM

## 2021-08-19 DIAGNOSIS — N83.8 LEFT TUBO-OVARIAN MASS: ICD-10-CM

## 2021-08-19 PROCEDURE — 99214 OFFICE O/P EST MOD 30 MIN: CPT | Performed by: FAMILY MEDICINE

## 2021-08-19 NOTE — PROGRESS NOTES
Assessment/Plan:    Problem List Items Addressed This Visit        Endocrine    Type 2 diabetes mellitus with complication, with long-term current use of insulin ([address] ) - Primary    Multinodular goiter       Cardiovascular and Mediastinum    Essential hypertension      Other Visit Diagnoses     Left tubo-ovarian mass               Diagnoses and all orders for this visit:    Type 2 diabetes mellitus with complication, with long-term current use of insulin ([address] )    Multinodular goiter    Essential hypertension    Left tubo-ovarian mass        No problem-specific Assessment & Plan notes found for this encounter  Subjective:      Patient ID: Madiha Rodríguez is a 72 y o  female  Mrs Sidney Rodriguez is here today chief complaint is that she was seen in the emergency room for left upper quadrant abdominal pain she had a CT scan done which showed a fibroid uteri and also she had a left ovarian mass versus cyst patient also had some constipation and other than that there was no other condition that would explain her left-sided abdominal pain her kidney was fine there was no abnormality there she is aware of fibroids but has never previously been diagnosed with a left ovarian mass her sugars have been fairly well controlled she has lost weight she is watching her diet very closely she tolerated the initial dose of [address] and now will be increased to the full dose      The following portions of the patient's history were reviewed and updated as appropriate:   She has a past medical history of Abnormal ECG, Abnormal mammogram, Abnormal stress test, Anemia, Anxiety, Arthritis, Asthma, Back problem, Breast cyst, Chest pain, Chronic pain disorder, Cyst of left breast, Depression, Depression, Diabetes mellitus ([address] ), Hiatal hernia, Hyperlipidemia, Hypertension, Keloid of skin, Neuropathy, Psychiatric disorder, Stroke ([address] ), and Tuberculosis  ,  does not have any pertinent problems on file  ,   has a past surgical history that includes Cyst Removal; Eye surgery; Tubal ligation; Arm wound repair / closure; pr esophagogastroduodenoscopy transoral diagnostic (N/A, 1/5/2018); Cataract extraction (Bilateral); US guided thyroid biopsy (4/15/2019); pr wrist arthroscop,release xvers lig (Left, 4/19/2019); Carpal tunnel release; Carpal tunnel release (Left); [place] lumbar puncture diagnostic (8/15/2019); and Colonoscopy  ,  family history includes Arthritis in her mother, paternal grandmother, and sister; Cancer in her family; Coronary artery disease in her family; Diabetes in her mother; Glaucoma in her family; Heart attack in her maternal uncle, mother, and paternal grandmother; Heart disease in her mother; Hypertension in her father and mother; Kidney cancer in her father; Kidney disease in her father; Liver cancer in her maternal aunt; No Known Problems in her daughter, daughter, daughter, daughter, maternal aunt, maternal aunt, maternal grandfather, paternal aunt, and paternal grandfather; Rheum arthritis in her family; Stomach cancer in her maternal grandmother; Stroke in her maternal aunt and maternal grandmother  ,   reports that she has never smoked  She has never used smokeless tobacco  She reports previous alcohol use  She reports that she does not use drugs  ,  is allergic to bactrim [sulfamethoxazole-trimethoprim], percocet [oxycodone-acetaminophen], and vicodin [hydrocodone-acetaminophen]     Current Outpatient Medications   Medication Sig Dispense Refill    albuterol (PROVENTIL HFA,VENTOLIN HFA) 90 mcg/act inhaler Inhale 2 puffs every 6 (six) hours as needed for wheezing or shortness of breath 1 Inhaler 5    [START ON 1/10/2022] ALPRAZolam (XANAX) 0 5 mg tablet Take 1 tablet (0 5 mg total) by mouth as needed for anxiety (Take 1 ablet 30 minutes prior to your imaging - MRI of the brain   Can take 1 more tablet right before your imaging ) 2 tablet 0    amLODIPine (NORVASC) 10 mg tablet take 1 tablet by mouth once daily 30 tablet 5    aspirin 81 MG tablet Take 81 mg by mouth daily        beclomethasone (Qvar) 80 MCG/ACT inhaler Inhale 2 puffs 2 (two) times a day 1 Inhaler 5    Blood Glucose Calibration (ACCU-CHEK COMPACT PLUS CONTROL) SOLN by In Vitro route daily 1 each 0    Blood Glucose Monitoring Suppl (ACCU-CHEK SHANE PLUS) w/Device KIT by Does not apply route 2 (two) times a day 1 kit 0    busPIRone (BUSPAR) 5 mg tablet Take 1 tablet (5 mg total) by mouth 3 (three) times a day 90 tablet 2    ciclopirox (PENLAC) 8 % solution APPLY TO AFFECTED NAIL ONCE A DAY, THEN REMOVE ONCE A WEEK WITH NAIL POLISH REMOVER      Dapagliflozin Propanediol (Farxiga) 10 MG TABS 1/2 tablet daily for the 1st month then increase to a full tablet daily in the morning 30 tablet 3    escitalopram (LEXAPRO) 10 mg tablet take 1 tablet by mouth every morning 30 tablet 2    glucose blood (Accu-Chek Guide) test strip Use as instructed - twice daily 200 each 3    hydrocortisone 0 5 % cream Apply topically 2 (two) times a day 28 35 g 1    insulin glargine (Lantus SoloStar) 100 units/mL injection pen Inject 40 Units under the skin daily 5 pen 2    Insulin Pen Needle (B-D UF III MINI PEN NEEDLES) 31G X 5 MM MISC Inject under the skin daily 100 each 3    insulin regular (HumuLIN R,NovoLIN R) 100 units/mL injection 4 units at breakfast 5 units at lunch 6 units at supper 10 mL 5    Januvia 100 MG tablet take 1 tablet by mouth daily 30 tablet 5    Lancets (ACCU-CHEK MULTICLIX) lancets Test bid 100 each 0    pantoprazole (PROTONIX) 40 mg tablet Take 1 tablet (40 mg total) by mouth daily 90 tablet 3    Polyvinyl Alcohol (LIQUID TEARS OP) Apply to eye      potassium chloride (Klor-Con) 10 mEq tablet take 1 tablet by mouth once daily 30 tablet 3    RA PAIN RELIEF ACETAMINOPHEN 325 MG tablet take 1-2 tablets by mouth every 6 hours if needed for MODERATE pain  0    simvastatin (ZOCOR) 40 mg tablet Take 1 tablet (40 mg total) by mouth daily at bedtime 90 tablet 2    torsemide (DEMADEX) 5 MG tablet take 1 tablet by mouth every morning 30 tablet 3    pregabalin (LYRICA) 50 mg capsule Take 1 capsule (50 mg total) by mouth 3 (three) times a day 90 capsule 1     No current facility-administered medications for this visit  Review of Systems   Constitutional: Positive for activity change  Negative for appetite change, diaphoresis, fatigue and fever  HENT: Positive for hearing loss  Negative for dental problem  Eyes: Negative  Respiratory: Negative for apnea, cough, chest tightness, shortness of breath and wheezing  Cardiovascular: Negative for chest pain, palpitations and leg swelling  Gastrointestinal: Negative for abdominal distention, anal bleeding, constipation, diarrhea, nausea and vomiting  Endocrine: Negative for cold intolerance, heat intolerance, polydipsia, polyphagia and polyuria  Genitourinary: Negative for difficulty urinating, dysuria, flank pain, hematuria and urgency  Musculoskeletal: Positive for arthralgias  Negative for back pain, gait problem, joint swelling and myalgias  Skin: Negative for color change, rash and wound  Allergic/Immunologic: Negative for environmental allergies, food allergies and immunocompromised state  Neurological: Negative for dizziness, seizures, syncope, speech difficulty, numbness and headaches  Hematological: Negative for adenopathy  Does not bruise/bleed easily  Psychiatric/Behavioral: Negative for agitation, behavioral problems, hallucinations, sleep disturbance and suicidal ideas  Objective:  Vitals:    08/19/21 1258   BP: 148/70   BP Location: Left arm   Patient Position: Sitting   Cuff Size: Large   Temp: (!) 97 °F (36 1 °C)   TempSrc: Temporal   Weight: 104 kg (229 lb)   Height: 5' 5" (1 651 m)     Body mass index is 38 11 kg/m²  Physical Exam  Constitutional:       General: She is not in acute distress  Appearance: She is well-developed  She is obese  She is not diaphoretic  HENT:      Head: Normocephalic  Right Ear: External ear normal       Left Ear: External ear normal       Nose: Nose normal    Eyes:      General: No scleral icterus  Right eye: No discharge  Left eye: No discharge  Conjunctiva/sclera: Conjunctivae normal       Pupils: Pupils are equal, round, and reactive to light  Neck:      Thyroid: No thyromegaly  Trachea: No tracheal deviation  Cardiovascular:      Rate and Rhythm: Normal rate and regular rhythm  Heart sounds: Normal heart sounds  No murmur heard  No friction rub  No gallop  Pulmonary:      Effort: Pulmonary effort is normal  No respiratory distress  Breath sounds: Normal breath sounds  No wheezing  Abdominal:      General: Bowel sounds are normal       Palpations: Abdomen is soft  There is no mass  Tenderness: There is no abdominal tenderness  There is no guarding  Musculoskeletal:         General: No deformity  Cervical back: Normal range of motion  Lymphadenopathy:      Cervical: No cervical adenopathy  Skin:     General: Skin is warm and dry  Findings: No erythema or rash  Neurological:      Mental Status: She is alert and oriented to person, place, and time  Cranial Nerves: No cranial nerve deficit  Psychiatric:         Thought Content:  Thought content normal

## 2021-08-26 ENCOUNTER — EVALUATION (OUTPATIENT)
Dept: PHYSICAL THERAPY | Facility: CLINIC | Age: 66
End: 2021-08-26
Payer: COMMERCIAL

## 2021-08-26 DIAGNOSIS — G89.29 CHRONIC PAIN OF LEFT KNEE: Primary | ICD-10-CM

## 2021-08-26 DIAGNOSIS — M25.562 CHRONIC PAIN OF LEFT KNEE: Primary | ICD-10-CM

## 2021-08-26 PROCEDURE — 97110 THERAPEUTIC EXERCISES: CPT | Performed by: PHYSICAL THERAPIST

## 2021-08-26 PROCEDURE — 97161 PT EVAL LOW COMPLEX 20 MIN: CPT | Performed by: PHYSICAL THERAPIST

## 2021-08-26 NOTE — PROGRESS NOTES
PT Evaluation     Today's date: 2021  Patient name: Naa Abraham  : 1955  MRN: [id number]  Referring provider: Payton Santana PA-C  Dx:   Encounter Diagnosis     ICD-10-CM    1  Chronic pain of left knee  M25 562     G89 29                   Assessment  Assessment details: Naa Abraham is a 77 y o  female who presents with deficits in L LE strength and function  Due to these impairments, patient has difficulty performing standing, walking, and climbing stairs  Patient's clinical presentation is consistent with their referring diagnosis of Chronic pain of left knee  Patient has been educated in what is Chondromalacia Patella  Patient would benefit from skilled physical therapy services to address their aforementioned functional limitations and progress towards prior level of function and independence with home exercise program      Impairments: abnormal muscle firing, abnormal or restricted ROM, activity intolerance, impaired physical strength, lacks appropriate home exercise program and pain with function  Understanding of Dx/Px/POC: good   Prognosis: good    Goals  Short Term Goals:   1  Initiate and advance HEP  2  (-) Juan David test on Left  3  Improve L knee extension strength    Long Term Goals:    1  Indep with HEP  2  Improve stair climbing  3   Improve standing tolerance    Plan  Patient would benefit from: skilled PT  Planned modality interventions: cryotherapy, electrical stimulation/Russian stimulation and thermotherapy: hydrocollator packs  Planned therapy interventions: joint mobilization, manual therapy, patient education, postural training, activity modification, abdominal trunk stabilization, body mechanics training, flexibility, functional ROM exercises, graded exercise, home exercise program, neuromuscular re-education, strengthening, stretching, therapeutic activities, therapeutic exercise, motor coordination training, muscle pump exercises, gait training, balance/weight bearing training, ADL training and [place]ton taping  Frequency: 2x week  Duration in weeks: 8  Treatment plan discussed with: patient        Subjective Evaluation    History of Present Illness  Mechanism of injury: Pt reports insidious onset of L knee pain and swelling beginning in 2021  Pain  Current pain ratin  At best pain ratin  At worst pain ratin  Quality: grinding  Aggravating factors: stair climbing, walking and sitting  Progression: worsening          Objective     Active Range of Motion   Left Hip   Flexion: 94 degrees   Extension: -13 degrees   External rotation (90/90): [facility]  Internal rotation (90/90): WFL    Right Hip   Flexion: 90 degrees   Extension: -15 degrees   External rotation (90/90): Imogene/[facility]  Internal rotation (90/90): WFL  Left Knee   Flexion: 121 degrees   Extension: WFL    Right Knee   Flexion: 123 degrees   Extension: WFL  Left Ankle/Foot   Dorsiflexion (ke): WFL  Plantar flexion: WFL  Inversion: WFL  Eversion: WFL    Right Ankle/Foot   Dorsiflexion (ke): WFL  Plantar flexion: WFL  Inversion: WFL  Eversion: WFL    Patellar Static Positioning     Additional Patellar Static Positioning Details  L Medial Rotation     Strength/Myotome Testing     Left Hip   Planes of Motion   Flexion: 4-  Extension: 2-  Abduction: 2  External rotation: 3+  Internal rotation: 3+    Right Hip   Planes of Motion   Flexion: 4+  Extension: 2-  Abduction: 2  External rotation: 3+  Internal rotation: 3+    Left Knee   Flexion: 4-  Prone flexion: 4-    Right Knee   Flexion: 4-  Prone flexion: 4    Left Ankle/Foot   Dorsiflexion: 4  Plantar flexion: 4  Inversion: 4  Eversion: 4+    Right Ankle/Foot   Dorsiflexion: 4+  Plantar flexion: 5  Inversion: 4  Eversion: 4    Tests     Left Hip   Positive Ely's  90/90 SLR: Hip flexion: -51  Right Hip   Positive Ely's  90/90 SLR: Hip flexion: -52       Additional Tests Details  [place]: (+) Bilat             Precautions: DM2, Asthma, HTN      Manuals  Neuro Re-Ed                                                                        Ther Ex         Treadmill         ITB stretch 30" hold 3x        Hamstring Stretch 30" hold 3x        VMO step ups         Side steps         Hip 4-way         Standing Hamstring curls                                                      Ther Activity         Mini squats         Step ups         Gait Training                           Modalities

## 2021-09-08 ENCOUNTER — OFFICE VISIT (OUTPATIENT)
Dept: PHYSICAL THERAPY | Facility: CLINIC | Age: 66
End: 2021-09-08
Payer: COMMERCIAL

## 2021-09-08 ENCOUNTER — HOSPITAL ENCOUNTER (OUTPATIENT)
Dept: ULTRASOUND IMAGING | Facility: HOSPITAL | Age: 66
Discharge: HOME | End: 2021-09-08
Payer: COMMERCIAL

## 2021-09-08 DIAGNOSIS — G89.29 CHRONIC PAIN OF LEFT KNEE: Primary | ICD-10-CM

## 2021-09-08 DIAGNOSIS — E04.2 MULTINODULAR GOITER: ICD-10-CM

## 2021-09-08 DIAGNOSIS — M25.562 CHRONIC PAIN OF LEFT KNEE: Primary | ICD-10-CM

## 2021-09-08 DIAGNOSIS — E04.1 THYROID NODULE: ICD-10-CM

## 2021-09-08 PROCEDURE — 97140 MANUAL THERAPY 1/> REGIONS: CPT

## 2021-09-08 PROCEDURE — 76536 US EXAM OF HEAD AND NECK: CPT

## 2021-09-08 PROCEDURE — 97110 THERAPEUTIC EXERCISES: CPT

## 2021-09-08 NOTE — PROGRESS NOTES
Daily Note     Today's date: 2021  Patient name: Km Jean-Baptiste  : 1955  MRN: [id number]  Referring provider: Martine Cai PA-C  Dx:   Encounter Diagnosis     ICD-10-CM    1  Chronic pain of left knee  M25 562     G89 29                   Subjective: "My knee is a little better  The back of my leg is sore from stretching "      Objective: See treatment diary below      Assessment: Tolerated treatment fair  Patient was able to tolerate treatment with minimal symptoms present  Plan: Continue per plan of care        Precautions: DM2, Asthma, HTN      Manuals        MFR R ITB  10'       PF Mobs  5'                         Neuro Re-Ed                                                                        Ther Ex         Treadmill  4' 0 9 mph       ITB stretch 30" hold 3x        Hamstring Stretch 30" hold 3x 3x       VMO step ups         Side steps  6x 10 ft       Hip 4-way           Standing Hamstring curls  20x ea       Quad Sets  20x                                           Ther Activity         Mini squats  15x       Step ups  15x 4" Step       Gait Training                           Modalities

## 2021-09-10 ENCOUNTER — OFFICE VISIT (OUTPATIENT)
Dept: PHYSICAL THERAPY | Facility: CLINIC | Age: 66
End: 2021-09-10
Payer: COMMERCIAL

## 2021-09-10 DIAGNOSIS — G89.29 CHRONIC PAIN OF LEFT KNEE: Primary | ICD-10-CM

## 2021-09-10 DIAGNOSIS — M25.562 CHRONIC PAIN OF LEFT KNEE: Primary | ICD-10-CM

## 2021-09-10 PROCEDURE — 97110 THERAPEUTIC EXERCISES: CPT | Performed by: PHYSICAL THERAPIST

## 2021-09-10 PROCEDURE — 97140 MANUAL THERAPY 1/> REGIONS: CPT | Performed by: PHYSICAL THERAPIST

## 2021-09-10 NOTE — PROGRESS NOTES
Daily Note     Today's date: 9/10/2021  Patient name: Rahul Jiménez  : 1955  MRN: [id number]  Referring provider: Volodymyr Diop PA-C  Dx:   Encounter Diagnosis     ICD-10-CM    1  Chronic pain of left knee  M25 562     G89 29                   Subjective: Pt reports she is trying to be more active at home and feels like that is helping her move better      Objective: See treatment diary below      Assessment: Tolerated treatment well  Patient would benefit from continued PT      Plan: Progress treatment as tolerated         Precautions: DM2, Asthma, HTN      Manuals 8/26 9/8 9/10      MFR R ITB  10' 12'      PF Mobs  5' 3'                        Neuro Re-Ed                                                                        Ther Ex         Treadmill  4' 0 9 mph 6' 1 0 mph      ITB stretch 30" hold 3x  3x      Hamstring Stretch 30" hold 3x 3x 3x ea      VMO step ups         Side steps  6x 10 ft 6x 10 ft      Hip 4-way           Standing Hamstring curls  20x ea 20x ea      Quad Sets  20x 30x      SLR on R                                    Ther Activity         Mini squats  15x 20x      Step ups  15x 4" Step 20x 4" step      Gait Training                           Modalities Quality 130: Documentation Of Current Medications In The Medical Record: Current Medications Documented Detail Level: Detailed

## 2021-09-15 ENCOUNTER — OFFICE VISIT (OUTPATIENT)
Dept: PHYSICAL THERAPY | Facility: CLINIC | Age: 66
End: 2021-09-15
Payer: COMMERCIAL

## 2021-09-15 DIAGNOSIS — G89.29 CHRONIC PAIN OF LEFT KNEE: Primary | ICD-10-CM

## 2021-09-15 DIAGNOSIS — M25.562 CHRONIC PAIN OF LEFT KNEE: Primary | ICD-10-CM

## 2021-09-15 PROCEDURE — 97140 MANUAL THERAPY 1/> REGIONS: CPT

## 2021-09-15 PROCEDURE — 97110 THERAPEUTIC EXERCISES: CPT

## 2021-09-15 NOTE — PROGRESS NOTES
Daily Note     Today's date: 9/15/2021  Patient name: Aamir Aranda  : 1955  MRN: [id number]  Referring provider: Austin Bellamy PA-C  Dx:   Encounter Diagnosis     ICD-10-CM    1  Chronic pain of left knee  M25 562     G89 29                   Subjective: patient c/o increased pain in her left low back radiating down her leg to her ankle                         Pain in:7/10       Objective: See treatment diary below      Assessment: Patient tolerated treatment fair  Slight exacerbation of sx with program  Patient able to complete all tasks assigned with fair execution  Patient would benefit from continued therapy to decrease pain and increase strength and flexibility to improve LOF  Plan: Continue per plan of care  Progress treatment as tolerated         Precautions: DM2, Asthma, HTN      Manuals 8/26 9/8 9/10 9/15     MFR L ITB  10' 12' 10'     PF Mobs  5' 3'                        Neuro Re-Ed                                                                        Ther Ex         Treadmill  4' 0 9 mph 6' 1 0 mph 6' 0 7 mph     ITB stretch 30" hold 3x  3x 3x     Hamstring Stretch 30" hold 3x 3x 3x ea 3x     VMO step ups         Side steps  6x 10 ft 6x 10 ft 6x 10 feet       Hip 4-way           Standing Hamstring curls  20x ea 20x ea 20x     Quad Sets  20x 30x 30x     SLR on R                                    Ther Activity         Mini squats  15x 20x 30x     Step ups  15x 4" Step 20x 4" step 20x 4" step      Gait Training                           Modalities

## 2021-09-28 ENCOUNTER — OFFICE VISIT (OUTPATIENT)
Dept: OTOLARYNGOLOGY | Facility: CLINIC | Age: 66
End: 2021-09-28
Payer: COMMERCIAL

## 2021-09-28 VITALS
OXYGEN SATURATION: 98 % | SYSTOLIC BLOOD PRESSURE: 110 MMHG | HEIGHT: 65 IN | WEIGHT: 225 LBS | DIASTOLIC BLOOD PRESSURE: 80 MMHG | BODY MASS INDEX: 37.49 KG/M2 | HEART RATE: 89 BPM | TEMPERATURE: 97.6 F

## 2021-09-28 DIAGNOSIS — E04.2 MULTINODULAR GOITER: Primary | ICD-10-CM

## 2021-09-28 PROCEDURE — 99213 OFFICE O/P EST LOW 20 MIN: CPT | Performed by: OTOLARYNGOLOGY

## 2021-09-28 NOTE — PROGRESS NOTES
Leann Xiong is a 77 y  o female who presents for re-evaluation of thyroid nodule  She had her thyroid biopsied last year which returned with [place] [address]  She had repeat ultrasound done which demonstrated no significant interval growth in thyroid nodules though the prominent left lobe nodule did have some interval growth  I had referred for repeat biopsy and even spoke with her daughter to coordinate but she once again forgot to have this done  Past Medical History:   Diagnosis Date    Abnormal ECG     last assessed: 5/15/17    Abnormal mammogram     last assessed: 7/11/17    Abnormal stress test     last assesed: 7/24/17    Anemia     Anxiety     Arthritis     Asthma     Back problem     Breast cyst     Chest pain     last assessed: 7/24/17    Chronic pain disorder     Cyst of left breast     last assessed: 8/18/17    Depression     Depression     resolved: 1/2/18    Diabetes mellitus ([address] )     Hiatal hernia     last assessed: 11/7/17    Hyperlipidemia     Hypertension     Keloid of skin     last assessed: 11/2/16    Neuropathy     Psychiatric disorder     anxiety    Stroke [facility])     TIA 2005    Tuberculosis     as a child        /80 (BP Location: Left arm, Cuff Size: Large)   Pulse 89   Temp 97 6 °F (36 4 °C) (Temporal)   Ht 5' 5" (1 651 m)   Wt 102 kg (225 lb)   SpO2 98%   BMI 37 44 kg/m²       Physical Exam   Constitutional: Oriented to person, place, and time  Well-developed and well-nourished, no apparent distress, non-toxic appearance  Cooperative, able to hear and answer questions without difficulty  Voice: Normal voice quality  Head: Normocephalic, atraumatic  No scars, masses or lesions  Face: Symmetric, no edema, no sinus tenderness  Eyes: Vision grossly intact, extra-ocular movement intact  Ears: External ear normal   Bilateral tympanic membranes are intact with intact normal landmarks  No post-auricular erythema or tenderness    Nose: Septum midline, nares clear  Mucosa moist, turbinates well appearing  No crusting, polyps or discharge evident  Oral cavity: Dentition intact  Mucosa moist, lips normal   Tongue mobile, floor of mouth normal   Hard palate unremarkable  No masses or lesions  Oropharynx: Uvula is midline, soft palate normal   Unremarkable oropharyngeal inlet  Tonsils unremarkable  Posterior pharyngeal wall clear  No masses or lesions  Salivary glands:  Parotid glands and submandibular glands symmetric, no enlargement or tenderness  Neck: Normal laryngeal elevation with swallow  Trachea midline  No masses or lesions  No palpable adenopathy  Thyroid: Without tenderness  Large, firm approx  4 5 cm nodule in left thyroid lobe  Pulmonary/Chest: Normal effort and rate  No respiratory distress  Musculoskeletal: Normal range of motion  Neurological: Cranial nerves 2-12 intact  Skin: Skin is warm and dry  Psychiatric: Normal mood and affect  A/P:  Unfortunately Irais Royal for got to have her biopsy done once again  She admits that she has been under great deal of stress lately and has been quite forgetful  Reviewing the ultrasound of her thyroid, the nodule of concern has remained quite stable over the last year though there was some interval growth  I would still like for her to have a biopsy done and she is agreeable to this  I asked her to coordinate with her daughter in order to remember to get this done  She does seem to have some symptoms of mass effect though management may change depending on the results of biopsy (lobectomy vs total thyroid)  Follow-up after the biopsy to review results, sooner with any additional concerns

## 2021-09-28 NOTE — PROGRESS NOTES
Review of Systems   Constitutional: Negative  HENT: Positive for trouble swallowing  Eyes: Negative  Respiratory: Negative  Cardiovascular: Negative  Gastrointestinal: Negative  Endocrine: Negative  Genitourinary: Negative  Musculoskeletal: Positive for neck pain  Skin: Negative  Allergic/Immunologic: Negative  Neurological: Positive for headaches  Hematological: Negative  Psychiatric/Behavioral: Negative

## 2021-09-29 ENCOUNTER — APPOINTMENT (OUTPATIENT)
Dept: PHYSICAL THERAPY | Facility: CLINIC | Age: 66
End: 2021-09-29
Payer: COMMERCIAL

## 2021-10-06 ENCOUNTER — TELEPHONE (OUTPATIENT)
Dept: FAMILY MEDICINE CLINIC | Facility: CLINIC | Age: 66
End: 2021-10-06

## 2021-10-07 DIAGNOSIS — B34.9 VIRAL INFECTION, UNSPECIFIED: Primary | ICD-10-CM

## 2021-10-07 PROCEDURE — U0003 INFECTIOUS AGENT DETECTION BY NUCLEIC ACID (DNA OR RNA); SEVERE ACUTE RESPIRATORY SYNDROME CORONAVIRUS 2 (SARS-COV-2) (CORONAVIRUS DISEASE [COVID-19]), AMPLIFIED PROBE TECHNIQUE, MAKING USE OF HIGH THROUGHPUT TECHNOLOGIES AS DESCRIBED BY CMS-2020-01-R: HCPCS | Performed by: FAMILY MEDICINE

## 2021-10-07 PROCEDURE — U0005 INFEC AGEN DETEC AMPLI PROBE: HCPCS | Performed by: FAMILY MEDICINE

## 2021-10-11 ENCOUNTER — TELEMEDICINE (OUTPATIENT)
Dept: FAMILY MEDICINE CLINIC | Facility: CLINIC | Age: 66
End: 2021-10-11
Payer: COMMERCIAL

## 2021-10-11 VITALS — WEIGHT: 225 LBS | BODY MASS INDEX: 37.49 KG/M2 | HEIGHT: 65 IN

## 2021-10-11 DIAGNOSIS — U07.1 COVID-19: Primary | ICD-10-CM

## 2021-10-11 PROCEDURE — 99214 OFFICE O/P EST MOD 30 MIN: CPT | Performed by: FAMILY MEDICINE

## 2021-10-11 PROCEDURE — 1160F RVW MEDS BY RX/DR IN RCRD: CPT | Performed by: FAMILY MEDICINE

## 2021-10-11 PROCEDURE — 3008F BODY MASS INDEX DOCD: CPT | Performed by: FAMILY MEDICINE

## 2021-10-12 ENCOUNTER — HOSPITAL ENCOUNTER (EMERGENCY)
Facility: HOSPITAL | Age: 66
Discharge: HOME | End: 2021-10-12
Attending: EMERGENCY MEDICINE | Admitting: EMERGENCY MEDICINE
Payer: COMMERCIAL

## 2021-10-12 ENCOUNTER — APPOINTMENT (EMERGENCY)
Dept: RADIOLOGY | Facility: HOSPITAL | Age: 66
End: 2021-10-12
Payer: COMMERCIAL

## 2021-10-12 ENCOUNTER — APPOINTMENT (EMERGENCY)
Dept: CT IMAGING | Facility: HOSPITAL | Age: 66
End: 2021-10-12
Payer: COMMERCIAL

## 2021-10-12 VITALS
SYSTOLIC BLOOD PRESSURE: 170 MMHG | DIASTOLIC BLOOD PRESSURE: 79 MMHG | OXYGEN SATURATION: 96 % | WEIGHT: 224.87 LBS | TEMPERATURE: 97.9 F | HEIGHT: 65 IN | HEART RATE: 64 BPM | RESPIRATION RATE: 13 BRPM | BODY MASS INDEX: 37.47 KG/M2

## 2021-10-12 DIAGNOSIS — K44.9 HIATAL HERNIA: ICD-10-CM

## 2021-10-12 DIAGNOSIS — E04.1 LEFT THYROID NODULE: ICD-10-CM

## 2021-10-12 DIAGNOSIS — E11.65 HYPERGLYCEMIA DUE TO DIABETES MELLITUS (HCC): ICD-10-CM

## 2021-10-12 DIAGNOSIS — U07.1 COVID-19 VIRUS INFECTION: Primary | ICD-10-CM

## 2021-10-12 LAB
ALBUMIN SERPL BCP-MCNC: 2.4 G/DL (ref 3.5–5)
ALP SERPL-CCNC: 124 U/L (ref 46–116)
ALT SERPL W P-5'-P-CCNC: 23 U/L (ref 12–78)
ANION GAP SERPL CALCULATED.3IONS-SCNC: 7 MMOL/L (ref 4–13)
APTT PPP: 29 SECONDS (ref 23–37)
AST SERPL W P-5'-P-CCNC: 21 U/L (ref 5–45)
BACTERIA UR QL AUTO: ABNORMAL /HPF
BASOPHILS # BLD AUTO: 0.02 THOUSANDS/ΜL (ref 0–0.1)
BASOPHILS NFR BLD AUTO: 0 % (ref 0–1)
BILIRUB SERPL-MCNC: 0.35 MG/DL (ref 0.2–1)
BILIRUB UR QL STRIP: NEGATIVE
BUN SERPL-MCNC: 14 MG/DL (ref 5–25)
CALCIUM ALBUM COR SERPL-MCNC: 10.4 MG/DL (ref 8.3–10.1)
CALCIUM SERPL-MCNC: 9.1 MG/DL (ref 8.3–10.1)
CHLORIDE SERPL-SCNC: 101 MMOL/L (ref 100–108)
CK SERPL-CCNC: 71 U/L (ref 26–192)
CLARITY UR: ABNORMAL
CO2 SERPL-SCNC: 31 MMOL/L (ref 21–32)
COLOR UR: ABNORMAL
CREAT SERPL-MCNC: 1.21 MG/DL (ref 0.6–1.3)
CRP SERPL QL: 97.8 MG/L
D DIMER PPP FEU-MCNC: 3.63 UG/ML FEU
EOSINOPHIL # BLD AUTO: 0.06 THOUSAND/ΜL (ref 0–0.61)
EOSINOPHIL NFR BLD AUTO: 1 % (ref 0–6)
ERYTHROCYTE [DISTWIDTH] IN BLOOD BY AUTOMATED COUNT: 13.8 % (ref 11.6–15.1)
GFR SERPL CREATININE-BSD FRML MDRD: 54 ML/MIN/1.73SQ M
GLUCOSE SERPL-MCNC: 214 MG/DL (ref 65–140)
GLUCOSE UR STRIP-MCNC: ABNORMAL MG/DL
HCT VFR BLD AUTO: 39.9 % (ref 34.8–46.1)
HGB BLD-MCNC: 12.5 G/DL (ref 11.5–15.4)
HGB UR QL STRIP.AUTO: ABNORMAL
IMM GRANULOCYTES # BLD AUTO: 0.03 THOUSAND/UL (ref 0–0.2)
IMM GRANULOCYTES NFR BLD AUTO: 0 % (ref 0–2)
INR PPP: 1.05 (ref 0.84–1.19)
KETONES UR STRIP-MCNC: NEGATIVE MG/DL
LACTATE SERPL-SCNC: 0.9 MMOL/L (ref 0.5–2)
LEUKOCYTE ESTERASE UR QL STRIP: ABNORMAL
LIPASE SERPL-CCNC: 97 U/L (ref 73–393)
LYMPHOCYTES # BLD AUTO: 1.27 THOUSANDS/ΜL (ref 0.6–4.47)
LYMPHOCYTES NFR BLD AUTO: 17 % (ref 14–44)
MAGNESIUM SERPL-MCNC: 1.8 MG/DL (ref 1.6–2.6)
MCH RBC QN AUTO: 26.3 PG (ref 26.8–34.3)
MCHC RBC AUTO-ENTMCNC: 31.3 G/DL (ref 31.4–37.4)
MCV RBC AUTO: 84 FL (ref 82–98)
MONOCYTES # BLD AUTO: 0.64 THOUSAND/ΜL (ref 0.17–1.22)
MONOCYTES NFR BLD AUTO: 9 % (ref 4–12)
NEUTROPHILS # BLD AUTO: 5.26 THOUSANDS/ΜL (ref 1.85–7.62)
NEUTS SEG NFR BLD AUTO: 73 % (ref 43–75)
NITRITE UR QL STRIP: NEGATIVE
NON-SQ EPI CELLS URNS QL MICRO: ABNORMAL /HPF
NRBC BLD AUTO-RTO: 0 /100 WBCS
NT-PROBNP SERPL-MCNC: 173 PG/ML
PH UR STRIP.AUTO: 6 [PH]
PLATELET # BLD AUTO: 367 THOUSANDS/UL (ref 149–390)
PMV BLD AUTO: 10.3 FL (ref 8.9–12.7)
POTASSIUM SERPL-SCNC: 3.6 MMOL/L (ref 3.5–5.3)
PROCALCITONIN SERPL-MCNC: <0.05 NG/ML
PROT SERPL-MCNC: 7.9 G/DL (ref 6.4–8.2)
PROT UR STRIP-MCNC: ABNORMAL MG/DL
PROTHROMBIN TIME: 13.2 SECONDS (ref 11.6–14.5)
RBC # BLD AUTO: 4.76 MILLION/UL (ref 3.81–5.12)
RBC #/AREA URNS AUTO: ABNORMAL /HPF
SODIUM SERPL-SCNC: 139 MMOL/L (ref 136–145)
SP GR UR STRIP.AUTO: <=1.005 (ref 1–1.03)
TROPONIN I SERPL-MCNC: <0.02 NG/ML
UROBILINOGEN UR QL STRIP.AUTO: 1 E.U./DL
WBC # BLD AUTO: 7.28 THOUSAND/UL (ref 4.31–10.16)
WBC #/AREA URNS AUTO: ABNORMAL /HPF

## 2021-10-12 PROCEDURE — 83735 ASSAY OF MAGNESIUM: CPT | Performed by: PHYSICIAN ASSISTANT

## 2021-10-12 PROCEDURE — 83690 ASSAY OF LIPASE: CPT | Performed by: PHYSICIAN ASSISTANT

## 2021-10-12 PROCEDURE — 85025 COMPLETE CBC W/AUTO DIFF WBC: CPT | Performed by: PHYSICIAN ASSISTANT

## 2021-10-12 PROCEDURE — 96374 THER/PROPH/DIAG INJ IV PUSH: CPT

## 2021-10-12 PROCEDURE — 71045 X-RAY EXAM CHEST 1 VIEW: CPT

## 2021-10-12 PROCEDURE — 93005 ELECTROCARDIOGRAM TRACING: CPT

## 2021-10-12 PROCEDURE — 86140 C-REACTIVE PROTEIN: CPT | Performed by: PHYSICIAN ASSISTANT

## 2021-10-12 PROCEDURE — 87040 BLOOD CULTURE FOR BACTERIA: CPT | Performed by: PHYSICIAN ASSISTANT

## 2021-10-12 PROCEDURE — 84484 ASSAY OF TROPONIN QUANT: CPT | Performed by: PHYSICIAN ASSISTANT

## 2021-10-12 PROCEDURE — 99285 EMERGENCY DEPT VISIT HI MDM: CPT | Performed by: PHYSICIAN ASSISTANT

## 2021-10-12 PROCEDURE — 80053 COMPREHEN METABOLIC PANEL: CPT | Performed by: PHYSICIAN ASSISTANT

## 2021-10-12 PROCEDURE — 85379 FIBRIN DEGRADATION QUANT: CPT | Performed by: PHYSICIAN ASSISTANT

## 2021-10-12 PROCEDURE — 71275 CT ANGIOGRAPHY CHEST: CPT

## 2021-10-12 PROCEDURE — 85730 THROMBOPLASTIN TIME PARTIAL: CPT | Performed by: PHYSICIAN ASSISTANT

## 2021-10-12 PROCEDURE — 83880 ASSAY OF NATRIURETIC PEPTIDE: CPT | Performed by: PHYSICIAN ASSISTANT

## 2021-10-12 PROCEDURE — 96361 HYDRATE IV INFUSION ADD-ON: CPT

## 2021-10-12 PROCEDURE — 84145 PROCALCITONIN (PCT): CPT | Performed by: PHYSICIAN ASSISTANT

## 2021-10-12 PROCEDURE — 85610 PROTHROMBIN TIME: CPT | Performed by: PHYSICIAN ASSISTANT

## 2021-10-12 PROCEDURE — G1004 CDSM NDSC: HCPCS

## 2021-10-12 PROCEDURE — 82550 ASSAY OF CK (CPK): CPT | Performed by: PHYSICIAN ASSISTANT

## 2021-10-12 PROCEDURE — 36415 COLL VENOUS BLD VENIPUNCTURE: CPT | Performed by: PHYSICIAN ASSISTANT

## 2021-10-12 PROCEDURE — 83605 ASSAY OF LACTIC ACID: CPT | Performed by: PHYSICIAN ASSISTANT

## 2021-10-12 PROCEDURE — 81001 URINALYSIS AUTO W/SCOPE: CPT | Performed by: PHYSICIAN ASSISTANT

## 2021-10-12 PROCEDURE — 99284 EMERGENCY DEPT VISIT MOD MDM: CPT

## 2021-10-12 RX ORDER — ONDANSETRON 2 MG/ML
4 INJECTION INTRAMUSCULAR; INTRAVENOUS ONCE
Status: COMPLETED | OUTPATIENT
Start: 2021-10-12 | End: 2021-10-12

## 2021-10-12 RX ADMIN — IOHEXOL 100 ML: 350 INJECTION, SOLUTION INTRAVENOUS at 13:12

## 2021-10-12 RX ADMIN — SODIUM CHLORIDE 1000 ML: 0.9 INJECTION, SOLUTION INTRAVENOUS at 12:09

## 2021-10-12 RX ADMIN — ONDANSETRON 4 MG: 2 INJECTION INTRAMUSCULAR; INTRAVENOUS at 12:10

## 2021-10-13 LAB
ATRIAL RATE: 71 BPM
P AXIS: 54 DEGREES
PR INTERVAL: 194 MS
QRS AXIS: -38 DEGREES
QRSD INTERVAL: 114 MS
QT INTERVAL: 408 MS
QTC INTERVAL: 443 MS
T WAVE AXIS: -14 DEGREES
VENTRICULAR RATE: 71 BPM

## 2021-10-13 PROCEDURE — 93010 ELECTROCARDIOGRAM REPORT: CPT | Performed by: INTERNAL MEDICINE

## 2021-10-17 LAB
BACTERIA BLD CULT: NORMAL
BACTERIA BLD CULT: NORMAL

## 2021-10-18 DIAGNOSIS — E11.8 TYPE 2 DIABETES MELLITUS WITH COMPLICATION, WITH LONG-TERM CURRENT USE OF INSULIN (HCC): ICD-10-CM

## 2021-10-18 DIAGNOSIS — Z79.4 TYPE 2 DIABETES MELLITUS WITH COMPLICATION, WITH LONG-TERM CURRENT USE OF INSULIN (HCC): ICD-10-CM

## 2021-10-18 DIAGNOSIS — E11.49 OTHER DIABETIC NEUROLOGICAL COMPLICATION ASSOCIATED WITH TYPE 2 DIABETES MELLITUS (HCC): ICD-10-CM

## 2021-10-18 RX ORDER — INSULIN GLARGINE 100 [IU]/ML
40 INJECTION, SOLUTION SUBCUTANEOUS DAILY
Qty: 15 ML | Refills: 1 | Status: SHIPPED | OUTPATIENT
Start: 2021-10-18 | End: 2022-07-11

## 2021-10-18 RX ORDER — PREGABALIN 50 MG/1
50 CAPSULE ORAL 3 TIMES DAILY
Qty: 90 CAPSULE | Refills: 1 | Status: SHIPPED | OUTPATIENT
Start: 2021-10-18 | End: 2021-12-20 | Stop reason: SDUPTHER

## 2021-11-04 ENCOUNTER — RA CDI HCC (OUTPATIENT)
Dept: OTHER | Facility: HOSPITAL | Age: 66
End: 2021-11-04

## 2021-11-16 ENCOUNTER — APPOINTMENT (OUTPATIENT)
Dept: RADIOLOGY | Facility: MEDICAL CENTER | Age: 66
End: 2021-11-16
Payer: COMMERCIAL

## 2021-11-16 ENCOUNTER — APPOINTMENT (OUTPATIENT)
Dept: LAB | Facility: MEDICAL CENTER | Age: 66
End: 2021-11-16
Payer: COMMERCIAL

## 2021-11-16 ENCOUNTER — OFFICE VISIT (OUTPATIENT)
Dept: FAMILY MEDICINE CLINIC | Facility: CLINIC | Age: 66
End: 2021-11-16
Payer: COMMERCIAL

## 2021-11-16 ENCOUNTER — OFFICE VISIT (OUTPATIENT)
Dept: OTOLARYNGOLOGY | Facility: CLINIC | Age: 66
End: 2021-11-16
Payer: COMMERCIAL

## 2021-11-16 VITALS
BODY MASS INDEX: 36.65 KG/M2 | WEIGHT: 220 LBS | OXYGEN SATURATION: 96 % | SYSTOLIC BLOOD PRESSURE: 130 MMHG | HEART RATE: 68 BPM | TEMPERATURE: 97.2 F | DIASTOLIC BLOOD PRESSURE: 80 MMHG | HEIGHT: 65 IN

## 2021-11-16 VITALS
TEMPERATURE: 96.9 F | DIASTOLIC BLOOD PRESSURE: 88 MMHG | BODY MASS INDEX: 36.79 KG/M2 | HEIGHT: 65 IN | WEIGHT: 220.8 LBS | SYSTOLIC BLOOD PRESSURE: 184 MMHG

## 2021-11-16 DIAGNOSIS — J45.40 MODERATE PERSISTENT ASTHMA WITHOUT COMPLICATION: ICD-10-CM

## 2021-11-16 DIAGNOSIS — R49.9 HOARSENESS OR CHANGING VOICE: ICD-10-CM

## 2021-11-16 DIAGNOSIS — R05.9 COUGH: ICD-10-CM

## 2021-11-16 DIAGNOSIS — Z23 NEED FOR INFLUENZA VACCINATION: Primary | ICD-10-CM

## 2021-11-16 DIAGNOSIS — E04.2 MULTINODULAR GOITER: ICD-10-CM

## 2021-11-16 DIAGNOSIS — E11.8 TYPE 2 DIABETES MELLITUS WITH COMPLICATION, WITH LONG-TERM CURRENT USE OF INSULIN (HCC): ICD-10-CM

## 2021-11-16 DIAGNOSIS — G70.00 MYASTHENIA GRAVIS (HCC): ICD-10-CM

## 2021-11-16 DIAGNOSIS — I10 ESSENTIAL HYPERTENSION: ICD-10-CM

## 2021-11-16 DIAGNOSIS — Z79.4 TYPE 2 DIABETES MELLITUS WITH COMPLICATION, WITH LONG-TERM CURRENT USE OF INSULIN (HCC): ICD-10-CM

## 2021-11-16 DIAGNOSIS — E04.1 THYROID NODULE: Primary | ICD-10-CM

## 2021-11-16 DIAGNOSIS — E11.42 DIABETIC POLYNEUROPATHY ASSOCIATED WITH TYPE 2 DIABETES MELLITUS (HCC): ICD-10-CM

## 2021-11-16 LAB
ANION GAP SERPL CALCULATED.3IONS-SCNC: 2 MMOL/L (ref 4–13)
BASOPHILS # BLD AUTO: 0.03 THOUSANDS/ΜL (ref 0–0.1)
BASOPHILS NFR BLD AUTO: 1 % (ref 0–1)
BUN SERPL-MCNC: 14 MG/DL (ref 5–25)
CALCIUM SERPL-MCNC: 9.1 MG/DL (ref 8.3–10.1)
CHLORIDE SERPL-SCNC: 105 MMOL/L (ref 100–108)
CO2 SERPL-SCNC: 30 MMOL/L (ref 21–32)
CREAT SERPL-MCNC: 1.18 MG/DL (ref 0.6–1.3)
EOSINOPHIL # BLD AUTO: 0.26 THOUSAND/ΜL (ref 0–0.61)
EOSINOPHIL NFR BLD AUTO: 6 % (ref 0–6)
ERYTHROCYTE [DISTWIDTH] IN BLOOD BY AUTOMATED COUNT: 15.5 % (ref 11.6–15.1)
EST. AVERAGE GLUCOSE BLD GHB EST-MCNC: 220 MG/DL
GFR SERPL CREATININE-BSD FRML MDRD: 56 ML/MIN/1.73SQ M
GLUCOSE SERPL-MCNC: 306 MG/DL (ref 65–140)
HBA1C MFR BLD: 9.3 %
HCT VFR BLD AUTO: 38 % (ref 34.8–46.1)
HGB BLD-MCNC: 11.8 G/DL (ref 11.5–15.4)
IMM GRANULOCYTES # BLD AUTO: 0 THOUSAND/UL (ref 0–0.2)
IMM GRANULOCYTES NFR BLD AUTO: 0 % (ref 0–2)
LYMPHOCYTES # BLD AUTO: 1.33 THOUSANDS/ΜL (ref 0.6–4.47)
LYMPHOCYTES NFR BLD AUTO: 32 % (ref 14–44)
MCH RBC QN AUTO: 26.7 PG (ref 26.8–34.3)
MCHC RBC AUTO-ENTMCNC: 31.1 G/DL (ref 31.4–37.4)
MCV RBC AUTO: 86 FL (ref 82–98)
MONOCYTES # BLD AUTO: 0.45 THOUSAND/ΜL (ref 0.17–1.22)
MONOCYTES NFR BLD AUTO: 11 % (ref 4–12)
NEUTROPHILS # BLD AUTO: 2.11 THOUSANDS/ΜL (ref 1.85–7.62)
NEUTS SEG NFR BLD AUTO: 50 % (ref 43–75)
NRBC BLD AUTO-RTO: 0 /100 WBCS
PLATELET # BLD AUTO: 240 THOUSANDS/UL (ref 149–390)
PMV BLD AUTO: 10.4 FL (ref 8.9–12.7)
POTASSIUM SERPL-SCNC: 4.6 MMOL/L (ref 3.5–5.3)
RBC # BLD AUTO: 4.42 MILLION/UL (ref 3.81–5.12)
SODIUM SERPL-SCNC: 137 MMOL/L (ref 136–145)
TSH SERPL DL<=0.05 MIU/L-ACNC: 1.11 UIU/ML (ref 0.36–3.74)
WBC # BLD AUTO: 4.18 THOUSAND/UL (ref 4.31–10.16)

## 2021-11-16 PROCEDURE — 3046F HEMOGLOBIN A1C LEVEL >9.0%: CPT | Performed by: OTOLARYNGOLOGY

## 2021-11-16 PROCEDURE — G0008 ADMIN INFLUENZA VIRUS VAC: HCPCS | Performed by: FAMILY MEDICINE

## 2021-11-16 PROCEDURE — 36415 COLL VENOUS BLD VENIPUNCTURE: CPT

## 2021-11-16 PROCEDURE — 3079F DIAST BP 80-89 MM HG: CPT | Performed by: OTOLARYNGOLOGY

## 2021-11-16 PROCEDURE — 80048 BASIC METABOLIC PNL TOTAL CA: CPT

## 2021-11-16 PROCEDURE — 3008F BODY MASS INDEX DOCD: CPT | Performed by: FAMILY MEDICINE

## 2021-11-16 PROCEDURE — 83036 HEMOGLOBIN GLYCOSYLATED A1C: CPT

## 2021-11-16 PROCEDURE — 1160F RVW MEDS BY RX/DR IN RCRD: CPT | Performed by: OTOLARYNGOLOGY

## 2021-11-16 PROCEDURE — 90662 IIV NO PRSV INCREASED AG IM: CPT | Performed by: FAMILY MEDICINE

## 2021-11-16 PROCEDURE — 85025 COMPLETE CBC W/AUTO DIFF WBC: CPT

## 2021-11-16 PROCEDURE — 71046 X-RAY EXAM CHEST 2 VIEWS: CPT

## 2021-11-16 PROCEDURE — 84443 ASSAY THYROID STIM HORMONE: CPT

## 2021-11-16 PROCEDURE — 99214 OFFICE O/P EST MOD 30 MIN: CPT | Performed by: FAMILY MEDICINE

## 2021-11-16 PROCEDURE — 99213 OFFICE O/P EST LOW 20 MIN: CPT | Performed by: OTOLARYNGOLOGY

## 2021-11-16 PROCEDURE — 3075F SYST BP GE 130 - 139MM HG: CPT | Performed by: OTOLARYNGOLOGY

## 2021-11-17 DIAGNOSIS — E11.9 TYPE 2 DIABETES MELLITUS TREATED WITH INSULIN (HCC): Primary | ICD-10-CM

## 2021-11-17 DIAGNOSIS — Z79.4 TYPE 2 DIABETES MELLITUS TREATED WITH INSULIN (HCC): Primary | ICD-10-CM

## 2021-11-18 ENCOUNTER — HOSPITAL ENCOUNTER (OUTPATIENT)
Dept: ULTRASOUND IMAGING | Facility: HOSPITAL | Age: 66
Discharge: HOME | End: 2021-11-18
Attending: FAMILY MEDICINE
Payer: COMMERCIAL

## 2021-11-18 DIAGNOSIS — N83.8 LEFT TUBO-OVARIAN MASS: ICD-10-CM

## 2021-11-18 PROCEDURE — 76856 US EXAM PELVIC COMPLETE: CPT

## 2021-11-18 PROCEDURE — 76830 TRANSVAGINAL US NON-OB: CPT

## 2021-11-19 ENCOUNTER — TELEPHONE (OUTPATIENT)
Dept: FAMILY MEDICINE CLINIC | Facility: CLINIC | Age: 66
End: 2021-11-19

## 2021-11-29 ENCOUNTER — TELEPHONE (OUTPATIENT)
Dept: HEMATOLOGY ONCOLOGY | Facility: CLINIC | Age: 66
End: 2021-11-29

## 2021-12-03 ENCOUNTER — TELEPHONE (OUTPATIENT)
Dept: HEMATOLOGY ONCOLOGY | Facility: CLINIC | Age: 66
End: 2021-12-03

## 2021-12-16 ENCOUNTER — TELEPHONE (OUTPATIENT)
Dept: DIABETES SERVICES | Facility: CLINIC | Age: 66
End: 2021-12-16

## 2021-12-20 DIAGNOSIS — E11.49 OTHER DIABETIC NEUROLOGICAL COMPLICATION ASSOCIATED WITH TYPE 2 DIABETES MELLITUS (HCC): ICD-10-CM

## 2021-12-20 RX ORDER — PREGABALIN 50 MG/1
50 CAPSULE ORAL 3 TIMES DAILY
Qty: 90 CAPSULE | Refills: 1 | Status: ON HOLD | OUTPATIENT
Start: 2021-12-20 | End: 2022-07-11

## 2022-01-25 ENCOUNTER — HOSPITAL ENCOUNTER (OUTPATIENT)
Dept: MRI IMAGING | Facility: HOSPITAL | Age: 67
Discharge: HOME | End: 2022-01-25
Payer: COMMERCIAL

## 2022-01-25 DIAGNOSIS — D49.6 NEOPLASM OF BRAIN (HCC): ICD-10-CM

## 2022-01-25 PROCEDURE — 70553 MRI BRAIN STEM W/O & W/DYE: CPT

## 2022-01-25 PROCEDURE — A9585 GADOBUTROL INJECTION: HCPCS | Performed by: PHYSICIAN ASSISTANT

## 2022-01-25 PROCEDURE — G1004 CDSM NDSC: HCPCS

## 2022-01-25 RX ADMIN — GADOBUTROL 10 ML: 604.72 INJECTION INTRAVENOUS at 14:17

## 2022-01-31 ENCOUNTER — TELEPHONE (OUTPATIENT)
Dept: NEUROSURGERY | Facility: CLINIC | Age: 67
End: 2022-01-31

## 2022-02-08 DIAGNOSIS — K21.9 GASTROESOPHAGEAL REFLUX DISEASE WITHOUT ESOPHAGITIS: ICD-10-CM

## 2022-02-08 RX ORDER — PANTOPRAZOLE SODIUM 40 MG/1
TABLET, DELAYED RELEASE ORAL
Qty: 90 TABLET | Refills: 3 | Status: ON HOLD | OUTPATIENT
Start: 2022-02-08 | End: 2022-07-11 | Stop reason: SDUPTHER

## 2022-03-07 ENCOUNTER — OFFICE VISIT (OUTPATIENT)
Dept: NEUROSURGERY | Facility: CLINIC | Age: 67
End: 2022-03-07
Payer: COMMERCIAL

## 2022-03-07 VITALS
SYSTOLIC BLOOD PRESSURE: 168 MMHG | TEMPERATURE: 97.9 F | RESPIRATION RATE: 16 BRPM | HEART RATE: 76 BPM | BODY MASS INDEX: 35.7 KG/M2 | HEIGHT: 65 IN | DIASTOLIC BLOOD PRESSURE: 93 MMHG | WEIGHT: 214.3 LBS

## 2022-03-07 DIAGNOSIS — Q01.9: Primary | ICD-10-CM

## 2022-03-07 DIAGNOSIS — R93.89 ABNORMAL CT SCAN: ICD-10-CM

## 2022-03-07 PROBLEM — D49.6 NEOPLASM OF BRAIN (HCC): Status: RESOLVED | Noted: 2021-01-25 | Resolved: 2022-03-07

## 2022-03-07 PROCEDURE — 1160F RVW MEDS BY RX/DR IN RCRD: CPT | Performed by: NEUROLOGICAL SURGERY

## 2022-03-07 PROCEDURE — 99213 OFFICE O/P EST LOW 20 MIN: CPT | Performed by: NEUROLOGICAL SURGERY

## 2022-03-07 PROCEDURE — 3080F DIAST BP >= 90 MM HG: CPT | Performed by: NEUROLOGICAL SURGERY

## 2022-03-07 PROCEDURE — 3008F BODY MASS INDEX DOCD: CPT | Performed by: NEUROLOGICAL SURGERY

## 2022-03-07 PROCEDURE — 3077F SYST BP >= 140 MM HG: CPT | Performed by: NEUROLOGICAL SURGERY

## 2022-03-07 NOTE — PROGRESS NOTES
Neurosurgery Office Note  Sheela Wong 77 y o  female MRN: [id number]      Assessment/Plan      Diagnoses and all orders for this visit:    Cerebral meningocele ([address] )    Abnormal CT scan          Discussion:    Pain Score: 0-No pain    Patient seen today in follow-up, after incidentally being found with bilateral petrous apex meningocele is a  This was found on CT orbits done for exophthalmos, as part of a workup for concern for Graves disease  In the last year, patient has had no profound changes neurologically  She did contract COVID late 2021, and is recovering from that  She is to undergo thyroid procedure later next week, to assess her thyroid nodules  She has mentions some occipital headaches since having COVID, but these are relatively mild, and her successfully/adequately treated with sleep or Tylenol  The 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 are unchanged and stable  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, and not clinically relevant  I did  her on rhinorrhea, should this develop should contact us  I suggested she follow up on an as-needed basis  She is in agreement  03/07/22 Metrics: [id number]; VAS 75        CHIEF COMPLAINT    Chief Complaint   Patient presents with    Follow-up     1 yr shared pa / jose g silvestre  mri brain sl miners       HISTORY    History of Present Illness     77y o  year old female     HPI    See Discussion    REVIEW OF SYSTEMS    Review of Systems   Constitutional: Negative  HENT: Positive for tinnitus (bilateral ears, comes and goes)  Eyes: Negative  Respiratory: Negative  Cardiovascular: Negative  Gastrointestinal: Positive for constipation  Endocrine: Negative  Genitourinary: Negative  Musculoskeletal: Positive for arthralgias (Aches in both arms)  Skin: Negative  Allergic/Immunologic: Negative      Neurological: Positive for tremors (left hand), weakness (bilateral legs), light-headedness (when moves to fast) and headaches (back of head)  Negative for dizziness, seizures, syncope, speech difficulty and numbness  Hematological: Negative  Psychiatric/Behavioral: Positive for confusion (mild)  Negative for decreased concentration           Meds/Allergies     Current Outpatient Medications   Medication Sig Dispense Refill    albuterol (PROVENTIL HFA,VENTOLIN HFA) 90 mcg/act inhaler Inhale 2 puffs every 6 (six) hours as needed for wheezing or shortness of breath 1 Inhaler 5    amLODIPine (NORVASC) 10 mg tablet take 1 tablet by mouth once daily 30 tablet 5    aspirin 81 MG tablet Take 81 mg by mouth daily        Blood Glucose Calibration (ACCU-CHEK COMPACT PLUS CONTROL) SOLN by In Vitro route daily 1 each 0    Blood Glucose Monitoring Suppl (ACCU-CHEK SHANE PLUS) w/Device KIT by Does not apply route 2 (two) times a day 1 kit 0    busPIRone (BUSPAR) 5 mg tablet Take 1 tablet (5 mg total) by mouth 3 (three) times a day 90 tablet 2    ciclopirox (PENLAC) 8 % solution APPLY TO AFFECTED NAIL ONCE A DAY, THEN REMOVE ONCE A WEEK WITH NAIL POLISH REMOVER      Dapagliflozin Propanediol (Farxiga) 10 MG TABS 1/2 tablet daily for the 1st month then increase to a full tablet daily in the morning (Patient taking differently: Take 10 mg by mouth in the morning 1/2 tablet daily for the 1st month then increase to a full tablet daily in the morning ) 30 tablet 3    escitalopram (LEXAPRO) 10 mg tablet take 1 tablet by mouth every morning 30 tablet 2    glucose blood (Accu-Chek Guide) test strip Use as instructed - twice daily 200 each 3    hydrocortisone 0 5 % cream Apply topically 2 (two) times a day 28 35 g 1    insulin glargine (Lantus SoloStar) 100 units/mL injection pen Inject 40 Units under the skin daily 15 mL 1    Insulin Pen Needle (B-D UF III MINI PEN NEEDLES) 31G X 5 MM MISC Inject under the skin daily 100 each 3    insulin regular (HumuLIN R,NovoLIN R) 100 units/mL injection 4 units at breakfast 5 units at lunch 6 units at supper 10 mL 5    Januvia 100 MG tablet take 1 tablet by mouth daily 30 tablet 5    Lancets (ACCU-CHEK MULTICLIX) lancets Test bid 100 each 0    pantoprazole (PROTONIX) 40 mg tablet take 1 tablet by mouth once daily 90 tablet 3    Polyvinyl Alcohol (LIQUID TEARS OP) Apply to eye      potassium chloride (Klor-Con) 10 mEq tablet take 1 tablet by mouth once daily 30 tablet 3    RA PAIN RELIEF ACETAMINOPHEN 325 MG tablet take 1-2 tablets by mouth every 6 hours if needed for MODERATE pain  0    simvastatin (ZOCOR) 40 mg tablet Take 1 tablet (40 mg total) by mouth daily at bedtime 90 tablet 2    torsemide (DEMADEX) 5 MG tablet take 1 tablet by mouth every morning 30 tablet 3    ALPRAZolam (XANAX) 0 5 mg tablet Take 1 tablet (0 5 mg total) by mouth as needed for anxiety (Take 1 ablet 30 minutes prior to your imaging - MRI of the brain  Can take 1 more tablet right before your imaging ) (Patient not taking: Reported on 11/16/2021 ) 2 tablet 0    beclomethasone (Qvar) 80 MCG/ACT inhaler Inhale 2 puffs 2 (two) times a day (Patient not taking: Reported on 11/16/2021 ) 1 Inhaler 5    pregabalin (LYRICA) 50 mg capsule Take 1 capsule (50 mg total) by mouth 3 (three) times a day 90 capsule 1     No current facility-administered medications for this visit         Allergies   Allergen Reactions    Bactrim [Sulfamethoxazole-Trimethoprim] Shortness Of Breath    Percocet [Oxycodone-Acetaminophen] GI Intolerance    Vicodin [Hydrocodone-Acetaminophen] GI Intolerance       PAST HISTORY    Past Medical History:   Diagnosis Date    Abnormal ECG     last assessed: 5/15/17    Abnormal mammogram     last assessed: 7/11/17    Abnormal stress test     last assesed: 7/24/17    Anemia     Anxiety     Arthritis     Asthma     Back problem     Breast cyst     Chest pain     last assessed: 7/24/17    Chronic pain disorder     Cyst of left breast     last assessed: 8/18/17    Depression     Depression     resolved: 1/2/18    Diabetes mellitus ([facility] )     Hiatal hernia     last assessed: 11/7/17    Hyperlipidemia     Hypertension     Keloid of skin     last assessed: 11/2/16    Neuropathy     Psychiatric disorder     anxiety    Stroke [facility])     TIA 2005    Tuberculosis     as a child        Past Surgical History:   Procedure Laterality Date    ARM WOUND REPAIR / CLOSURE      CARPAL TUNNEL RELEASE      CARPAL TUNNEL RELEASE Left     CATARACT EXTRACTION Bilateral     2015    COLONOSCOPY      CYST REMOVAL      right upper arm   EYE SURGERY      cataract removaql    FL LUMBAR PUNCTURE DIAGNOSTIC  8/15/2019    PA ESOPHAGOGASTRODUODENOSCOPY TRANSORAL DIAGNOSTIC N/A 1/5/2018    Procedure: ESOPHAGOGASTRODUODENOSCOPY (EGD);   Surgeon: Danie Quintero DO;  Location: [facility] MAIN OR;  Service: Gastroenterology    PA WRIST Azam Jose LIG Left 4/19/2019    Procedure: ENDOSCOPIC CARPAL TUNNEL RELEASE;  Surgeon: Gaby Dickey MD;  Location: [address] [place] [address] MAIN OR;  Service: Orthopedics   [address] THYROID BIOPSY  4/15/2019       Social History     Tobacco Use    Smoking status: Never Smoker    Smokeless tobacco: Never Used   Vaping Use    Vaping Use: Never used   Substance Use Topics    Alcohol use: Not Currently     Alcohol/week: 0 0 standard drinks    Drug use: No       Family History   Problem Relation Age of Onset    Heart disease Mother     Diabetes Mother     Arthritis Mother     Heart attack Mother     Hypertension Mother     Kidney disease Father     Hypertension Father     Kidney cancer Father     Arthritis Sister     Stroke Maternal Grandmother     Stomach cancer Maternal Grandmother     Arthritis Paternal Grandmother     Heart attack Paternal Grandmother     Stroke Maternal Aunt     Heart attack Maternal Uncle     Cancer Family         spinal column    Coronary artery disease Family     Glaucoma Family     Rheum arthritis Family     No Known Problems Maternal Grandfather     No Known Problems Paternal Grandfather     No Known Problems Daughter     No Known Problems Daughter     No Known Problems Daughter     No Known Problems Daughter     Liver cancer Maternal Aunt     No Known Problems Maternal Aunt     No Known Problems Maternal Aunt     No Known Problems Paternal Aunt          The following portions of the patient's history were reviewed in this encounter and updated as appropriate: Past medical, surgical, family, and social history, as well as medications, allergies, and review of systems  EXAM    Vitals:Blood pressure 168/93, pulse 76, temperature 97 9 °F (36 6 °C), resp  rate 16, height 5' 5" (1 651 m), weight 97 2 kg (214 lb 4 8 oz)  ,Body mass index is 35 66 kg/m²  Physical Exam  Vitals reviewed  Constitutional:       Appearance: Normal appearance  She is well-developed  She is obese  HENT:      Head: Normocephalic  Eyes:      General: No scleral icterus  Extraocular Movements: EOM normal    Cardiovascular:      Rate and Rhythm: Normal rate  Pulmonary:      Effort: Pulmonary effort is normal    Abdominal:      Palpations: Abdomen is soft  Musculoskeletal:      Cervical back: Neck supple  Skin:     General: Skin is warm and dry  Neurological:      Mental Status: She is alert and oriented to person, place, and time  GCS: GCS eye subscore is 4  GCS verbal subscore is 5  GCS motor subscore is 6  Psychiatric:         Behavior: Behavior normal          Neurologic Exam     Mental Status   Oriented to person, place, and time  Cranial Nerves     CN III, IV, VI   Extraocular motions are normal          MEDICAL DECISION MAKING    Imaging Studies:     MRI brain    I have personally reviewed pertinent reports  PLEASE NOTE:  This encounter may have been completed utilizing the [employer] Direct Speech Voice Recognition Software   Grammatical errors, random word insertions, pronoun errors and incomplete sentences are occasional consequences of the system due to software limitations, ambient noise and hardware issues  These may be missed by proof reading prior to affixing electronic signature  Any questions or concerns about the content, text or information contained within the body of this dictation should be directly addressed to the advanced practitioner or physician for clarification

## 2022-04-20 DIAGNOSIS — Z79.4 TYPE 2 DIABETES MELLITUS WITH COMPLICATION, WITH LONG-TERM CURRENT USE OF INSULIN (HCC): Primary | ICD-10-CM

## 2022-04-20 DIAGNOSIS — E11.8 TYPE 2 DIABETES MELLITUS WITH COMPLICATION, WITH LONG-TERM CURRENT USE OF INSULIN (HCC): Primary | ICD-10-CM

## 2022-05-04 ENCOUNTER — RA CDI HCC (OUTPATIENT)
Dept: OTHER | Facility: HOSPITAL | Age: 67
End: 2022-05-04

## 2022-05-04 NOTE — PROGRESS NOTES
[address]  coding opportunities          Chart Reviewed number of suggestions sent to Provider: 2    E11 22  E11 65     Patients Insurance     Medicare Insurance: [facility] Advantage

## 2022-05-13 ENCOUNTER — RA CDI HCC (OUTPATIENT)
Dept: OTHER | Facility: HOSPITAL | Age: 67
End: 2022-05-13

## 2022-05-13 NOTE — PROGRESS NOTES
[facility]  coding opportunities          Chart Reviewed number of suggestions sent to Provider: 1    E11 65     Patients Insurance     Medicare Insurance: [facility]

## 2022-05-18 ENCOUNTER — TELEPHONE (OUTPATIENT)
Dept: FAMILY MEDICINE CLINIC | Facility: CLINIC | Age: 67
End: 2022-05-18

## 2022-07-07 ENCOUNTER — TELEPHONE (OUTPATIENT)
Dept: ADMINISTRATIVE | Facility: OTHER | Age: 67
End: 2022-07-07

## 2022-07-07 NOTE — TELEPHONE ENCOUNTER
Upon review of the In Basket request and the patient's chart, initial outreach has been made via telephone call, please see Contacts section for details       Thank you  Richard Salinas

## 2022-07-07 NOTE — TELEPHONE ENCOUNTER
----- Message from [facility] sent at 7/7/2022  9:46 AM EDT -----  Regarding: DM FOOT EXAM  07/07/22 9:47 AM    Hello, our patient Quinten Capellan has had Diabetic Foot Exam completed/performed  Please assist in updating the patient chart by making an External outreach to Dr Debby Del Real facility located in [place], [place]  The date of service is within the past year      Thank you,  Zulma Carson   [facility]

## 2022-07-07 NOTE — TELEPHONE ENCOUNTER
Upon review of the In Basket request we were able to locate, review, and update the patient chart as requested for Diabetic Foot Exam     Any additional questions or concerns should be emailed to the Practice Liaisons via [email or web address] email, please do not reply via In Basket      Thank you  Manpreet Myers

## 2022-07-08 ENCOUNTER — APPOINTMENT (EMERGENCY)
Dept: CT IMAGING | Facility: HOSPITAL | Age: 67
DRG: 103 | End: 2022-07-08
Payer: COMMERCIAL

## 2022-07-08 ENCOUNTER — APPOINTMENT (EMERGENCY)
Dept: RADIOLOGY | Facility: HOSPITAL | Age: 67
DRG: 103 | End: 2022-07-08
Payer: COMMERCIAL

## 2022-07-08 ENCOUNTER — HOSPITAL ENCOUNTER (INPATIENT)
Facility: HOSPITAL | Age: 67
LOS: 2 days | Discharge: HOME | DRG: 103 | End: 2022-07-11
Attending: EMERGENCY MEDICINE | Admitting: INTERNAL MEDICINE
Payer: COMMERCIAL

## 2022-07-08 DIAGNOSIS — R94.31 ABNORMAL EKG: ICD-10-CM

## 2022-07-08 DIAGNOSIS — G44.89 OTHER HEADACHE SYNDROME: Primary | ICD-10-CM

## 2022-07-08 DIAGNOSIS — N18.32 TYPE 2 DIABETES MELLITUS WITH STAGE 3B CHRONIC KIDNEY DISEASE, WITH LONG-TERM CURRENT USE OF INSULIN (HCC): ICD-10-CM

## 2022-07-08 DIAGNOSIS — I10 ESSENTIAL HYPERTENSION: ICD-10-CM

## 2022-07-08 DIAGNOSIS — Z79.4 TYPE 2 DIABETES MELLITUS WITH STAGE 3B CHRONIC KIDNEY DISEASE, WITH LONG-TERM CURRENT USE OF INSULIN (HCC): ICD-10-CM

## 2022-07-08 DIAGNOSIS — N17.9 AKI (ACUTE KIDNEY INJURY) (HCC): ICD-10-CM

## 2022-07-08 DIAGNOSIS — E11.8 TYPE 2 DIABETES MELLITUS WITH COMPLICATION, WITH LONG-TERM CURRENT USE OF INSULIN (HCC): ICD-10-CM

## 2022-07-08 DIAGNOSIS — E11.22 TYPE 2 DIABETES MELLITUS WITH STAGE 3B CHRONIC KIDNEY DISEASE, WITH LONG-TERM CURRENT USE OF INSULIN (HCC): ICD-10-CM

## 2022-07-08 DIAGNOSIS — I16.0 HYPERTENSIVE URGENCY: ICD-10-CM

## 2022-07-08 DIAGNOSIS — K21.9 GASTROESOPHAGEAL REFLUX DISEASE WITHOUT ESOPHAGITIS: ICD-10-CM

## 2022-07-08 DIAGNOSIS — R93.0 ABNORMAL CT OF THE HEAD: ICD-10-CM

## 2022-07-08 DIAGNOSIS — Z79.4 TYPE 2 DIABETES MELLITUS WITH COMPLICATION, WITH LONG-TERM CURRENT USE OF INSULIN (HCC): ICD-10-CM

## 2022-07-08 DIAGNOSIS — E11.49 OTHER DIABETIC NEUROLOGICAL COMPLICATION ASSOCIATED WITH TYPE 2 DIABETES MELLITUS (HCC): ICD-10-CM

## 2022-07-08 DIAGNOSIS — E78.5 DYSLIPIDEMIA: ICD-10-CM

## 2022-07-08 DIAGNOSIS — N18.30 STAGE 3 CHRONIC KIDNEY DISEASE, UNSPECIFIED WHETHER STAGE 3A OR 3B CKD (HCC): ICD-10-CM

## 2022-07-08 DIAGNOSIS — K59.00 CONSTIPATION, UNSPECIFIED CONSTIPATION TYPE: ICD-10-CM

## 2022-07-08 LAB
2HR DELTA HS TROPONIN: 0 NG/L
4HR DELTA HS TROPONIN: 1 NG/L
ALBUMIN SERPL BCP-MCNC: 2.7 G/DL (ref 3.5–5)
ALP SERPL-CCNC: 94 U/L (ref 46–116)
ALT SERPL W P-5'-P-CCNC: 21 U/L (ref 12–78)
ANION GAP SERPL CALCULATED.3IONS-SCNC: 5 MMOL/L (ref 4–13)
AST SERPL W P-5'-P-CCNC: 16 U/L (ref 5–45)
ATRIAL RATE: 73 BPM
BASOPHILS # BLD AUTO: 0.01 THOUSANDS/ΜL (ref 0–0.1)
BASOPHILS NFR BLD AUTO: 0 % (ref 0–1)
BILIRUB SERPL-MCNC: 0.51 MG/DL (ref 0.2–1)
BUN SERPL-MCNC: 16 MG/DL (ref 5–25)
CALCIUM ALBUM COR SERPL-MCNC: 10.1 MG/DL (ref 8.3–10.1)
CALCIUM SERPL-MCNC: 9.1 MG/DL (ref 8.3–10.1)
CARDIAC TROPONIN I PNL SERPL HS: 8 NG/L
CARDIAC TROPONIN I PNL SERPL HS: 8 NG/L
CARDIAC TROPONIN I PNL SERPL HS: 9 NG/L
CHLORIDE SERPL-SCNC: 100 MMOL/L (ref 100–108)
CO2 SERPL-SCNC: 33 MMOL/L (ref 21–32)
CREAT SERPL-MCNC: 1.39 MG/DL (ref 0.6–1.3)
EOSINOPHIL # BLD AUTO: 0.02 THOUSAND/ΜL (ref 0–0.61)
EOSINOPHIL NFR BLD AUTO: 0 % (ref 0–6)
ERYTHROCYTE [DISTWIDTH] IN BLOOD BY AUTOMATED COUNT: 13.7 % (ref 11.6–15.1)
GFR SERPL CREATININE-BSD FRML MDRD: 39 ML/MIN/1.73SQ M
GLUCOSE SERPL-MCNC: 157 MG/DL (ref 65–140)
GLUCOSE SERPL-MCNC: 184 MG/DL (ref 65–140)
GLUCOSE SERPL-MCNC: 215 MG/DL (ref 65–140)
GLUCOSE SERPL-MCNC: 231 MG/DL (ref 65–140)
HCT VFR BLD AUTO: 40.1 % (ref 34.8–46.1)
HGB BLD-MCNC: 13 G/DL (ref 11.5–15.4)
IMM GRANULOCYTES # BLD AUTO: 0.02 THOUSAND/UL (ref 0–0.2)
IMM GRANULOCYTES NFR BLD AUTO: 0 % (ref 0–2)
LIPASE SERPL-CCNC: 110 U/L (ref 73–393)
LYMPHOCYTES # BLD AUTO: 0.98 THOUSANDS/ΜL (ref 0.6–4.47)
LYMPHOCYTES NFR BLD AUTO: 13 % (ref 14–44)
MCH RBC QN AUTO: 27.3 PG (ref 26.8–34.3)
MCHC RBC AUTO-ENTMCNC: 32.4 G/DL (ref 31.4–37.4)
MCV RBC AUTO: 84 FL (ref 82–98)
MONOCYTES # BLD AUTO: 0.39 THOUSAND/ΜL (ref 0.17–1.22)
MONOCYTES NFR BLD AUTO: 5 % (ref 4–12)
NEUTROPHILS # BLD AUTO: 6.03 THOUSANDS/ΜL (ref 1.85–7.62)
NEUTS SEG NFR BLD AUTO: 82 % (ref 43–75)
NRBC BLD AUTO-RTO: 0 /100 WBCS
P AXIS: 51 DEGREES
PLATELET # BLD AUTO: 260 THOUSANDS/UL (ref 149–390)
PMV BLD AUTO: 10.7 FL (ref 8.9–12.7)
POTASSIUM SERPL-SCNC: 3.8 MMOL/L (ref 3.5–5.3)
PR INTERVAL: 208 MS
PROT SERPL-MCNC: 7.4 G/DL (ref 6.4–8.2)
QRS AXIS: -46 DEGREES
QRSD INTERVAL: 106 MS
QT INTERVAL: 428 MS
QTC INTERVAL: 471 MS
RBC # BLD AUTO: 4.76 MILLION/UL (ref 3.81–5.12)
SODIUM SERPL-SCNC: 138 MMOL/L (ref 136–145)
T WAVE AXIS: -33 DEGREES
VENTRICULAR RATE: 73 BPM
WBC # BLD AUTO: 7.45 THOUSAND/UL (ref 4.31–10.16)

## 2022-07-08 PROCEDURE — 3066F NEPHROPATHY DOC TX: CPT | Performed by: INTERNAL MEDICINE

## 2022-07-08 PROCEDURE — 82948 REAGENT STRIP/BLOOD GLUCOSE: CPT

## 2022-07-08 PROCEDURE — 83036 HEMOGLOBIN GLYCOSYLATED A1C: CPT | Performed by: INTERNAL MEDICINE

## 2022-07-08 PROCEDURE — 70450 CT HEAD/BRAIN W/O DYE: CPT

## 2022-07-08 PROCEDURE — 94664 DEMO&/EVAL PT USE INHALER: CPT

## 2022-07-08 PROCEDURE — 71046 X-RAY EXAM CHEST 2 VIEWS: CPT

## 2022-07-08 PROCEDURE — 84484 ASSAY OF TROPONIN QUANT: CPT | Performed by: PHYSICIAN ASSISTANT

## 2022-07-08 PROCEDURE — 99285 EMERGENCY DEPT VISIT HI MDM: CPT

## 2022-07-08 PROCEDURE — 96361 HYDRATE IV INFUSION ADD-ON: CPT

## 2022-07-08 PROCEDURE — 80053 COMPREHEN METABOLIC PANEL: CPT | Performed by: PHYSICIAN ASSISTANT

## 2022-07-08 PROCEDURE — 96374 THER/PROPH/DIAG INJ IV PUSH: CPT

## 2022-07-08 PROCEDURE — 93005 ELECTROCARDIOGRAM TRACING: CPT

## 2022-07-08 PROCEDURE — 94760 N-INVAS EAR/PLS OXIMETRY 1: CPT

## 2022-07-08 PROCEDURE — G1004 CDSM NDSC: HCPCS

## 2022-07-08 PROCEDURE — 99285 EMERGENCY DEPT VISIT HI MDM: CPT | Performed by: PHYSICIAN ASSISTANT

## 2022-07-08 PROCEDURE — 93010 ELECTROCARDIOGRAM REPORT: CPT | Performed by: INTERNAL MEDICINE

## 2022-07-08 PROCEDURE — 85025 COMPLETE CBC W/AUTO DIFF WBC: CPT | Performed by: PHYSICIAN ASSISTANT

## 2022-07-08 PROCEDURE — 83690 ASSAY OF LIPASE: CPT | Performed by: PHYSICIAN ASSISTANT

## 2022-07-08 PROCEDURE — 96375 TX/PRO/DX INJ NEW DRUG ADDON: CPT

## 2022-07-08 PROCEDURE — 36415 COLL VENOUS BLD VENIPUNCTURE: CPT | Performed by: PHYSICIAN ASSISTANT

## 2022-07-08 PROCEDURE — 99220 PR INITIAL OBSERVATION CARE/DAY 70 MINUTES: CPT | Performed by: INTERNAL MEDICINE

## 2022-07-08 RX ORDER — ASPIRIN 81 MG/1
81 TABLET, CHEWABLE ORAL DAILY
Status: DISCONTINUED | OUTPATIENT
Start: 2022-07-09 | End: 2022-07-11 | Stop reason: HOSPADM

## 2022-07-08 RX ORDER — ALBUTEROL SULFATE 90 UG/1
2 AEROSOL, METERED RESPIRATORY (INHALATION) EVERY 6 HOURS PRN
Status: DISCONTINUED | OUTPATIENT
Start: 2022-07-08 | End: 2022-07-11 | Stop reason: HOSPADM

## 2022-07-08 RX ORDER — TORSEMIDE 10 MG/1
5 TABLET ORAL EVERY MORNING
Status: DISCONTINUED | OUTPATIENT
Start: 2022-07-09 | End: 2022-07-11 | Stop reason: HOSPADM

## 2022-07-08 RX ORDER — ONDANSETRON 2 MG/ML
4 INJECTION INTRAMUSCULAR; INTRAVENOUS EVERY 6 HOURS PRN
Status: DISCONTINUED | OUTPATIENT
Start: 2022-07-08 | End: 2022-07-11 | Stop reason: HOSPADM

## 2022-07-08 RX ORDER — SODIUM CHLORIDE 9 MG/ML
125 INJECTION, SOLUTION INTRAVENOUS CONTINUOUS
Status: DISCONTINUED | OUTPATIENT
Start: 2022-07-08 | End: 2022-07-09

## 2022-07-08 RX ORDER — PRAVASTATIN SODIUM 40 MG
40 TABLET ORAL
Refills: 2 | Status: DISCONTINUED | OUTPATIENT
Start: 2022-07-08 | End: 2022-07-10

## 2022-07-08 RX ORDER — ENOXAPARIN SODIUM 100 MG/ML
40 INJECTION SUBCUTANEOUS DAILY
Status: DISCONTINUED | OUTPATIENT
Start: 2022-07-09 | End: 2022-07-11 | Stop reason: HOSPADM

## 2022-07-08 RX ORDER — AMLODIPINE BESYLATE 10 MG/1
10 TABLET ORAL DAILY
Status: DISCONTINUED | OUTPATIENT
Start: 2022-07-09 | End: 2022-07-11 | Stop reason: HOSPADM

## 2022-07-08 RX ORDER — INSULIN GLARGINE 100 [IU]/ML
20 INJECTION, SOLUTION SUBCUTANEOUS
Refills: 1 | Status: DISCONTINUED | OUTPATIENT
Start: 2022-07-08 | End: 2022-07-10

## 2022-07-08 RX ORDER — ONDANSETRON 2 MG/ML
4 INJECTION INTRAMUSCULAR; INTRAVENOUS ONCE
Status: COMPLETED | OUTPATIENT
Start: 2022-07-08 | End: 2022-07-08

## 2022-07-08 RX ORDER — HYDRALAZINE HYDROCHLORIDE 20 MG/ML
10 INJECTION INTRAMUSCULAR; INTRAVENOUS ONCE
Status: COMPLETED | OUTPATIENT
Start: 2022-07-08 | End: 2022-07-08

## 2022-07-08 RX ORDER — INSULIN LISPRO 100 [IU]/ML
1-6 INJECTION, SOLUTION INTRAVENOUS; SUBCUTANEOUS
Status: DISCONTINUED | OUTPATIENT
Start: 2022-07-09 | End: 2022-07-11 | Stop reason: HOSPADM

## 2022-07-08 RX ORDER — AMLODIPINE BESYLATE 10 MG/1
10 TABLET ORAL ONCE
Status: COMPLETED | OUTPATIENT
Start: 2022-07-08 | End: 2022-07-08

## 2022-07-08 RX ORDER — POLYVINYL ALCOHOL 14 MG/ML
1 SOLUTION/ DROPS OPHTHALMIC
Status: DISCONTINUED | OUTPATIENT
Start: 2022-07-08 | End: 2022-07-11 | Stop reason: HOSPADM

## 2022-07-08 RX ORDER — INSULIN LISPRO 100 [IU]/ML
1-5 INJECTION, SOLUTION INTRAVENOUS; SUBCUTANEOUS
Status: DISCONTINUED | OUTPATIENT
Start: 2022-07-08 | End: 2022-07-11 | Stop reason: HOSPADM

## 2022-07-08 RX ORDER — PANTOPRAZOLE SODIUM 40 MG/1
40 TABLET, DELAYED RELEASE ORAL DAILY
Status: DISCONTINUED | OUTPATIENT
Start: 2022-07-09 | End: 2022-07-11 | Stop reason: HOSPADM

## 2022-07-08 RX ORDER — ACETAMINOPHEN 325 MG/1
975 TABLET ORAL EVERY 6 HOURS PRN
Status: DISCONTINUED | OUTPATIENT
Start: 2022-07-08 | End: 2022-07-11 | Stop reason: HOSPADM

## 2022-07-08 RX ORDER — ACETAMINOPHEN 325 MG/1
650 TABLET ORAL ONCE
Status: COMPLETED | OUTPATIENT
Start: 2022-07-08 | End: 2022-07-08

## 2022-07-08 RX ORDER — PREGABALIN 50 MG/1
50 CAPSULE ORAL 3 TIMES DAILY
Status: DISCONTINUED | OUTPATIENT
Start: 2022-07-08 | End: 2022-07-11 | Stop reason: HOSPADM

## 2022-07-08 RX ORDER — INSULIN LISPRO 100 [IU]/ML
4 INJECTION, SOLUTION INTRAVENOUS; SUBCUTANEOUS
Status: DISCONTINUED | OUTPATIENT
Start: 2022-07-08 | End: 2022-07-11 | Stop reason: HOSPADM

## 2022-07-08 RX ADMIN — ACETAMINOPHEN 650 MG: 325 TABLET, FILM COATED ORAL at 15:14

## 2022-07-08 RX ADMIN — HYDRALAZINE HYDROCHLORIDE 10 MG: 20 INJECTION, SOLUTION INTRAMUSCULAR; INTRAVENOUS at 15:14

## 2022-07-08 RX ADMIN — SODIUM CHLORIDE 1000 ML: 0.9 INJECTION, SOLUTION INTRAVENOUS at 13:42

## 2022-07-08 RX ADMIN — AMLODIPINE BESYLATE 10 MG: 10 TABLET ORAL at 17:23

## 2022-07-08 RX ADMIN — ONDANSETRON 4 MG: 2 INJECTION INTRAMUSCULAR; INTRAVENOUS at 13:42

## 2022-07-08 RX ADMIN — INSULIN LISPRO 1 UNITS: 100 INJECTION, SOLUTION INTRAVENOUS; SUBCUTANEOUS at 22:01

## 2022-07-08 RX ADMIN — PRAVASTATIN SODIUM 40 MG: 40 TABLET ORAL at 20:13

## 2022-07-08 RX ADMIN — INSULIN LISPRO 4 UNITS: 100 INJECTION, SOLUTION INTRAVENOUS; SUBCUTANEOUS at 20:02

## 2022-07-08 RX ADMIN — INSULIN GLARGINE 20 UNITS: 100 INJECTION, SOLUTION SUBCUTANEOUS at 22:01

## 2022-07-08 RX ADMIN — PREGABALIN 50 MG: 50 CAPSULE ORAL at 20:13

## 2022-07-08 RX ADMIN — SODIUM CHLORIDE 125 ML/HR: 0.9 INJECTION, SOLUTION INTRAVENOUS at 19:58

## 2022-07-08 NOTE — ASSESSMENT & PLAN NOTE
Blood pressure improved, resume home Norvasc and Demadex, patient has not been on her prescription medications in several months

## 2022-07-08 NOTE — RESPIRATORY THERAPY NOTE
RT Protocol Note  Jarad Nobles 77 y o  female MRN: [id number]  Unit/Bed#: 973-15 Encounter: [id number]    Assessment    Principal Problem:    Other headache syndrome  Active Problems:    Type 2 diabetes mellitus with complication, with long-term current use of insulin (HCC)    Depression    Essential hypertension    Mild persistent asthma without complication    Myasthenia gravis ([facility] )    Chronic kidney disease, stage 3 unspecified ([place] [address] )      Home Pulmonary Medications:  Proventil MDI       Past Medical History:   Diagnosis Date    Abnormal ECG     last assessed: 5/15/17    Abnormal mammogram     last assessed: 7/11/17    Abnormal stress test     last assesed: 7/24/17    Anemia     Anxiety     Arthritis     Asthma     Back problem     Breast cyst     Chest pain     last assessed: 7/24/17    Chronic pain disorder     Cyst of left breast     last assessed: 8/18/17    Depression     Depression     resolved: 1/2/18    Diabetes mellitus ([place] [address] )     Hiatal hernia     last assessed: 11/7/17    Hyperlipidemia     Hypertension     Keloid of skin     last assessed: 11/2/16    Neuropathy     Psychiatric disorder     anxiety    Stroke [facility])     TIA 2005    Tuberculosis     as a child      Social History     Socioeconomic History    Marital status:       Spouse name: None    Number of children: None    Years of education: None    Highest education level: None   Occupational History    Occupation: housewife/homemaker   Tobacco Use    Smoking status: Never Smoker    Smokeless tobacco: Never Used   Vaping Use    Vaping Use: Never used   Substance and Sexual Activity    Alcohol use: Not Currently     Alcohol/week: 0 0 standard drinks    Drug use: No    Sexual activity: Not Currently     Partners: Male     Comment:  passed 2019 due to cancer   Other Topics Concern    None   Social History Narrative    Always uses seatbelts    No living will     Social Determinants of Health Financial Resource Strain: Not on file   Food Insecurity: Not on file   Transportation Needs: Not on file   Physical Activity: Not on file   Stress: Not on file   Social Connections: Not on file   Intimate Partner Violence: Not on file   Housing Stability: Not on file       Subjective         Objective    Physical Exam:   Assessment Type: Assess only  General Appearance: Alert, Awake  Respiratory Pattern: Normal  Chest Assessment: Chest expansion symmetrical  Bilateral Breath Sounds: Diminished  Cough: None  O2 Device: ra    Vitals:  Blood pressure 169/82, pulse 72, temperature 98 °F (36 7 °C), resp  rate 16, height 5' 5" (1 651 m), weight 90 2 kg (198 lb 13 7 oz), SpO2 98 %  Imaging and other studies: I have personally reviewed pertinent reports  O2 Device: ra     Plan    Respiratory Plan: Home Bronchodilator Patient pathway      Will keep mdi as ordered

## 2022-07-08 NOTE — ED PROVIDER NOTES
History  Chief Complaint   Patient presents with    Chest Pain     Patient reports multiple episodes of left sided chest pain over the past few days  Patient also has headache and vomiting since last night  Patient is a 71-year-old female past medical history of hypertension, diabetes, asthma, myasthenia gravis, meningocele who presents with intermittent chest pain and headache for approximately 1 week  Patient describes an aching pain in the left side of her chest that is nonradiating with no known aggravating or alleviating factors  Patient denies any current chest pain and denies any exertional chest pain, associated shortness of breath, FULLER, palpitations, leg pain or swelling, recent travel, known sick contacts  Patient also notes gradual onset, mild aching, throbbing headache that is similar to her prior headaches, but states it is on the left side instead of the right side of her head  She states the headaches are worsened with movement of her head, and resolve without intervention or with Tylenol  No Tylenol taken today  She denies any associated vision changes, dizziness, neck pain or stiffness, numbness, tingling, focal weakness  She does note intermittent lightheadedness, but not specifically associated with headache  Last night patient started with nausea and a few episodes of nonbloody, nonbilious vomiting  She denies any associated abdominal pain, association with the chest pain, history of abdominal surgeries, diarrhea, constipation, dysuria, hematuria, urinary frequency urgency, new foods or medications, known sick contacts  Patient does admit that she has not been taking her medications for several months because she ran out and has not followed up for refills  Prior to Admission Medications   Prescriptions Last Dose Informant Patient Reported? Taking?    ALPRAZolam (XANAX) 0 5 mg tablet  Self No No   Sig: Take 1 tablet (0 5 mg total) by mouth as needed for anxiety (Take 1 ablet 30 minutes prior to your imaging - MRI of the brain   Can take 1 more tablet right before your imaging )   Patient not taking: Reported on 2021    Blood Glucose Calibration (ACCU-CHEK COMPACT PLUS CONTROL) SOLN  Self No No   Sig: by In Vitro route daily   Blood Glucose Monitoring Suppl (ACCU-CHEK SHANE PLUS) w/Device KIT  Self No No   Sig: by Does not apply route 2 (two) times a day   Dapagliflozin Propanediol (Farxiga) 10 MG TABS  Self No No   Si/2 tablet daily for the 1st month then increase to a full tablet daily in the morning   Patient taking differently: Take 10 mg by mouth in the morning 1/2 tablet daily for the 1st month then increase to a full tablet daily in the morning    Insulin Pen Needle (B-D UF III MINI PEN NEEDLES) 31G X 5 MM MISC  Self No No   Sig: Inject under the skin daily   Januvia 100 MG tablet  Self No No   Sig: take 1 tablet by mouth daily   Lancets (ACCU-CHEK MULTICLIX) lancets  Self No No   Sig: Test bid   Polyvinyl Alcohol (LIQUID TEARS OP)  Self Yes No   Sig: Apply to eye   RA PAIN RELIEF ACETAMINOPHEN 325 MG tablet  Self Yes No   Sig: take 1-2 tablets by mouth every 6 hours if needed for MODERATE pain   albuterol (PROVENTIL HFA,VENTOLIN HFA) 90 mcg/act inhaler  Self No No   Sig: Inhale 2 puffs every 6 (six) hours as needed for wheezing or shortness of breath   amLODIPine (NORVASC) 10 mg tablet  Self No No   Sig: take 1 tablet by mouth once daily   aspirin 81 MG tablet  Self Yes No   Sig: Take 81 mg by mouth daily     beclomethasone (Qvar) 80 MCG/ACT inhaler  Self No No   Sig: Inhale 2 puffs 2 (two) times a day   Patient not taking: Reported on 2021    busPIRone (BUSPAR) 5 mg tablet  Self No No   Sig: Take 1 tablet (5 mg total) by mouth 3 (three) times a day   ciclopirox (PENLAC) 8 % solution  Self Yes No   Sig: APPLY TO AFFECTED NAIL ONCE A DAY, THEN REMOVE ONCE A WEEK WITH NAIL POLISH REMOVER   escitalopram (LEXAPRO) 10 mg tablet  Self No No   Sig: take 1 tablet by mouth every morning   glucose blood (Accu-Chek Guide) test strip  Self No No   Sig: Use as instructed - twice daily   hydrocortisone 0 5 % cream  Self No No   Sig: Apply topically 2 (two) times a day   insulin glargine (Lantus SoloStar) 100 units/mL injection pen  Self No No   Sig: Inject 40 Units under the skin daily   insulin regular (HumuLIN R,NovoLIN R) 100 units/mL injection  Self No No   Si units at breakfast 5 units at lunch 6 units at supper   pantoprazole (PROTONIX) 40 mg tablet   No No   Sig: take 1 tablet by mouth once daily   potassium chloride (Klor-Con) 10 mEq tablet  Self No No   Sig: take 1 tablet by mouth once daily   pregabalin (LYRICA) 50 mg capsule   No No   Sig: Take 1 capsule (50 mg total) by mouth 3 (three) times a day   simvastatin (ZOCOR) 40 mg tablet  Self No No   Sig: Take 1 tablet (40 mg total) by mouth daily at bedtime   torsemide (DEMADEX) 5 MG tablet  Self No No   Sig: take 1 tablet by mouth every morning      Facility-Administered Medications: None       Past Medical History:   Diagnosis Date    Abnormal ECG     last assessed: 5/15/17    Abnormal mammogram     last assessed: 17    Abnormal stress test     last assesed: 17    Anemia     Anxiety     Arthritis     Asthma     Back problem     Breast cyst     Chest pain     last assessed: 17    Chronic pain disorder     Cyst of left breast     last assessed: 17    Depression     Depression     resolved: 18    Diabetes mellitus ([address] )     Hiatal hernia     last assessed: 17    Hyperlipidemia     Hypertension     Keloid of skin     last assessed: 16    Neuropathy     Psychiatric disorder     anxiety    Stroke [facility])     TIA     Tuberculosis     as a child        Past Surgical History:   Procedure Laterality Date    ARM WOUND REPAIR / CLOSURE      CARPAL TUNNEL RELEASE      CARPAL TUNNEL RELEASE Left     CATARACT EXTRACTION Bilateral     2015    COLONOSCOPY      CYST REMOVAL right upper arm   EYE SURGERY      cataract removaql    [place] LUMBAR PUNCTURE DIAGNOSTIC  8/15/2019    [place] ESOPHAGOGASTRODUODENOSCOPY TRANSORAL DIAGNOSTIC N/A 1/5/2018    Procedure: ESOPHAGOGASTRODUODENOSCOPY (EGD); Surgeon: Frantz Shoemaker DO;  Location: [facility] MAIN OR;  Service: Gastroenterology    [place] WRIST Erinn Endow LIG Left 4/19/2019    Procedure: ENDOSCOPIC CARPAL TUNNEL RELEASE;  Surgeon: Giovanny Martin MD;  Location: [address] [place] [address] MAIN OR;  Service: Orthopedics    TUBAL LIGATION      US GUIDED THYROID BIOPSY  4/15/2019       Family History   Problem Relation Age of Onset    Heart disease Mother     Diabetes Mother     Arthritis Mother     Heart attack Mother     Hypertension Mother     Kidney disease Father     Hypertension Father     Kidney cancer Father     Arthritis Sister     Stroke Maternal Grandmother     Stomach cancer Maternal Grandmother     Arthritis Paternal Grandmother     Heart attack Paternal Grandmother     Stroke Maternal Aunt     Heart attack Maternal Uncle     Cancer Family         spinal column    Coronary artery disease Family     Glaucoma Family     Rheum arthritis Family     No Known Problems Maternal Grandfather     No Known Problems Paternal Grandfather     No Known Problems Daughter     No Known Problems Daughter     No Known Problems Daughter     No Known Problems Daughter     Liver cancer Maternal Aunt     No Known Problems Maternal Aunt     No Known Problems Maternal Aunt     No Known Problems Paternal Aunt      I have reviewed and agree with the history as documented      E-Cigarette/Vaping    E-Cigarette Use Never User      E-Cigarette/Vaping Substances    Nicotine No     THC No     CBD No     Flavoring No     Other No     Unknown No      Social History     Tobacco Use    Smoking status: Never Smoker    Smokeless tobacco: Never Used   Vaping Use    Vaping Use: Never used   Substance Use Topics    Alcohol use: Not Currently Alcohol/week: 0 0 standard drinks    Drug use: No       Review of Systems   Constitutional: Negative for chills, diaphoresis and fever  HENT: Negative for congestion, ear pain, rhinorrhea and sore throat  Eyes: Negative for visual disturbance  Respiratory: Negative for cough, shortness of breath, wheezing and stridor  Cardiovascular: Positive for chest pain  Negative for palpitations and leg swelling  Gastrointestinal: Positive for nausea and vomiting  Negative for abdominal pain, blood in stool, constipation and diarrhea  Genitourinary: Negative for difficulty urinating, dysuria, frequency, hematuria and urgency  Musculoskeletal: Negative for myalgias, neck pain and neck stiffness  Skin: Negative for color change, pallor and rash  Neurological: Positive for light-headedness and headaches  Negative for dizziness, weakness and numbness  All other systems reviewed and are negative  Physical Exam  Physical Exam  Vitals and nursing note reviewed  Constitutional:       General: She is awake  She is not in acute distress  Appearance: She is well-developed  She is not ill-appearing, toxic-appearing or diaphoretic  HENT:      Head: Normocephalic and atraumatic  Right Ear: External ear normal       Left Ear: External ear normal       Nose: Nose normal       Mouth/Throat:      Pharynx: Oropharynx is clear  Uvula midline  No oropharyngeal exudate  Eyes:      Extraocular Movements: Extraocular movements intact  Conjunctiva/sclera: Conjunctivae normal       Pupils: Pupils are equal, round, and reactive to light  Cardiovascular:      Rate and Rhythm: Normal rate and regular rhythm  Pulses: Normal pulses  Heart sounds: Normal heart sounds, S1 normal and S2 normal    Pulmonary:      Effort: Pulmonary effort is normal  No respiratory distress  Breath sounds: Normal breath sounds  No stridor  No decreased breath sounds, wheezing, rhonchi or rales     Chest: Abdominal:      General: Bowel sounds are normal  There is no distension  Palpations: Abdomen is soft  Tenderness: There is no abdominal tenderness  Musculoskeletal:         General: Normal range of motion  Cervical back: Normal range of motion and neck supple  Right lower leg: No edema  Left lower leg: No edema  Comments: Neurovascularly intact, 5/5 strength in bilateral upper and lower extremities  Full active ROM of all extremities  Lymphadenopathy:      Cervical: No cervical adenopathy  Skin:     General: Skin is warm and dry  Capillary Refill: Capillary refill takes less than 2 seconds  Neurological:      General: No focal deficit present  Mental Status: She is alert and oriented to person, place, and time  GCS: GCS eye subscore is 4  GCS verbal subscore is 5  GCS motor subscore is 6  Cranial Nerves: Cranial nerves are intact  Sensory: Sensation is intact  Motor: Motor function is intact  Coordination: Coordination is intact  Psychiatric:         Behavior: Behavior is cooperative           Vital Signs  ED Triage Vitals [07/08/22 1310]   Temperature Pulse Respirations Blood Pressure SpO2   98 3 °F (36 8 °C) 83 20 (!) 223/105 97 %      Temp Source Heart Rate Source Patient Position - Orthostatic VS BP Location FiO2 (%)   Temporal Monitor Lying Left arm --      Pain Score       5           Vitals:    07/08/22 1730 07/08/22 1745 07/08/22 1800 07/08/22 1852   BP: 153/72  148/65 169/82   Pulse: 77 71 74    Patient Position - Orthostatic VS: Sitting            Visual Acuity      ED Medications  Medications   acetaminophen (TYLENOL) tablet 975 mg (has no administration in time range)   albuterol (PROVENTIL HFA,VENTOLIN HFA) inhaler 2 puff (has no administration in time range)   amLODIPine (NORVASC) tablet 10 mg (has no administration in time range)   pantoprazole (PROTONIX) EC tablet 40 mg (has no administration in time range)   aspirin chewable tablet 81 mg (has no administration in time range)   pravastatin (PRAVACHOL) tablet 40 mg (has no administration in time range)   insulin glargine (LANTUS) subcutaneous injection 20 Units 0 2 mL (has no administration in time range)   polyvinyl alcohol (LIQUIFILM TEARS) 1 4 % ophthalmic solution 1 drop (has no administration in time range)   pregabalin (LYRICA) capsule 50 mg (has no administration in time range)   torsemide (DEMADEX) tablet 5 mg (has no administration in time range)   enoxaparin (LOVENOX) subcutaneous injection 40 mg (has no administration in time range)   insulin lispro (HumaLOG) 100 units/mL subcutaneous injection 1-6 Units (has no administration in time range)   insulin lispro (HumaLOG) 100 units/mL subcutaneous injection 1-5 Units (has no administration in time range)   insulin lispro (HumaLOG) 100 units/mL subcutaneous injection 4 Units (has no administration in time range)   sodium chloride 0 9 % infusion (has no administration in time range)   ondansetron (ZOFRAN) injection 4 mg (has no administration in time range)   sodium chloride 0 9 % bolus 1,000 mL (0 mL Intravenous Stopped 7/8/22 1522)   ondansetron (ZOFRAN) injection 4 mg (4 mg Intravenous Given 7/8/22 1342)   hydrALAZINE (APRESOLINE) injection 10 mg (10 mg Intravenous Given 7/8/22 1514)   acetaminophen (TYLENOL) tablet 650 mg (650 mg Oral Given 7/8/22 1514)   amLODIPine (NORVASC) tablet 10 mg (10 mg Oral Given 7/8/22 1723)       Diagnostic Studies  Results Reviewed     Procedure Component Value Units Date/Time    HS Troponin I 4hr [[id number]] Collected: 07/08/22 1838    Lab Status:  In process Specimen: Blood from Arm, Left Updated: 07/08/22 1846    Hemoglobin A1c w/EAG Estimation (Orders if not completed within the last 90 days) [[id number]]     Lab Status: No result Specimen: Blood     HS Troponin I 2hr [[id number]]  (Normal) Collected: 07/08/22 1604    Lab Status: Final result Specimen: Blood from Arm, Left Updated: 07/08/22 1632     hs TnI 2hr 8 ng/L      Delta 2hr hsTnI 0 ng/L     Fingerstick Glucose (POCT) [[id number]]  (Abnormal) Collected: 07/08/22 1413    Lab Status: Final result Updated: 07/08/22 1416     POC Glucose 215 mg/dl     Lipase [[id number]]  (Normal) Collected: 07/08/22 1342    Lab Status: Final result Specimen: Blood from Arm, Left Updated: 07/08/22 1410     Lipase 110 u/L     Comprehensive metabolic panel [[id number]]  (Abnormal) Collected: 07/08/22 1342    Lab Status: Final result Specimen: Blood from Arm, Left Updated: 07/08/22 1410     Sodium 138 mmol/L      Potassium 3 8 mmol/L      Chloride 100 mmol/L      CO2 33 mmol/L      ANION GAP 5 mmol/L      BUN 16 mg/dL      Creatinine 1 39 mg/dL      Glucose 231 mg/dL      Calcium 9 1 mg/dL      Corrected Calcium 10 1 mg/dL      AST 16 U/L      ALT 21 U/L      Alkaline Phosphatase 94 U/L      Total Protein 7 4 g/dL      Albumin 2 7 g/dL      Total Bilirubin 0 51 mg/dL      eGFR 39 ml/min/1 73sq m     Narrative:      [place] guidelines for Chronic Kidney Disease (CKD):     Stage 1 with normal or high GFR (GFR > 90 mL/min/1 73 square meters)    Stage 2 Mild CKD (GFR = 60-89 mL/min/1 73 square meters)    Stage 3A Moderate CKD (GFR = 45-59 mL/min/1 73 square meters)    Stage 3B Moderate CKD (GFR = 30-44 mL/min/1 73 square meters)    Stage 4 Severe CKD (GFR = 15-29 mL/min/1 73 square meters)    Stage 5 End Stage CKD (GFR <15 mL/min/1 73 square meters)  Note: GFR calculation is accurate only with a steady state creatinine    HS Troponin 0hr (reflex protocol) [[id number]]  (Normal) Collected: 07/08/22 1342    Lab Status: Final result Specimen: Blood from Arm, Left Updated: 07/08/22 1410     hs TnI 0hr 8 ng/L     CBC and differential [[id number]]  (Abnormal) Collected: 07/08/22 1342    Lab Status: Final result Specimen: Blood from Arm, Left Updated: 07/08/22 1348     WBC 7 45 Thousand/uL      RBC 4 76 Million/uL      Hemoglobin 13 0 g/dL Hematocrit 40 1 %      MCV 84 fL      MCH 27 3 pg      MCHC 32 4 g/dL      RDW 13 7 %      MPV 10 7 fL      Platelets 384 Thousands/uL      nRBC 0 /100 WBCs      Neutrophils Relative 82 %      Immat GRANS % 0 %      Lymphocytes Relative 13 %      Monocytes Relative 5 %      Eosinophils Relative 0 %      Basophils Relative 0 %      Neutrophils Absolute 6 03 Thousands/µL      Immature Grans Absolute 0 02 Thousand/uL      Lymphocytes Absolute 0 98 Thousands/µL      Monocytes Absolute 0 39 Thousand/µL      Eosinophils Absolute 0 02 Thousand/µL      Basophils Absolute 0 01 Thousands/µL                  XR chest 2 views   ED Interpretation by Dwain Burt PA-C (07/08 1434)   No acute pathology noted      Final Result by Claudia Nielson MD (07/08 1432)      No acute cardiopulmonary disease  Workstation performed: [id number]         CT head without contrast   Final Result by Arianna Nieves MD (07/08 1419)      No acute intracranial abnormality  Chronic microangiopathic changes  Appearance of the extra-axial spaces raise the question of chronic intracranial hypertension                    Workstation performed: [id number]                    Procedures  ECG 12 Lead Documentation Only    Date/Time: 7/8/2022 1:37 PM  Performed by: Dwain Burt PA-C  Authorized by: Dwain Burt PA-C     Indications / Diagnosis:  Chest pain  ECG reviewed by me, the ED Provider: yes (Also reviewed be Dr Emmanuel Hanson)    Patient location:  ED  Previous ECG:     Previous ECG:  Compared to current    Comparison ECG info:  [id number]  Rate:     ECG rate:  73    ECG rate assessment: normal    Rhythm:     Rhythm: sinus rhythm    Ectopy:     Ectopy: none    QRS:     QRS axis:  Left  Conduction:     Conduction: abnormal      Abnormal conduction: LAFB    ST segments:     ST segments:  Non-specific    Elevation:  V2  T waves:     T waves: non-specific and inverted      Inverted:  V4, V5, V6, aVF, III and II (Inverted T-waves present on prior, possibly deepening depression)  Other findings:     Other findings: LVH    ECG 12 Lead Documentation Only    Date/Time: 7/8/2022 2:30 PM  Performed by: Kate Rosenbaum PA-C  Authorized by: Kate Rosenbaum PA-C     Indications / Diagnosis:  Chest pain  Patient location:  ED  Previous ECG:     Previous ECG:  Compared to current    Comparison ECG info:  Earlier today    Comparison to previous ECG: similar to prior    Rate:     ECG rate:  62    ECG rate assessment: normal    Rhythm:     Rhythm: sinus rhythm    Ectopy:     Ectopy: none    QRS:     QRS axis:  Left  Conduction:     Conduction: abnormal      Abnormal conduction: incomplete LBBB    ST segments:     ST segments:  Non-specific  T waves:     T waves: non-specific and inverted      Inverted:  V4, V5, V6, aVF, II and III  Other findings:     Other findings: LVH    ECG 12 Lead Documentation Only    Date/Time: 7/8/2022 4:35 PM  Performed by: Kate Rosenbaum PA-C  Authorized by: Kate Rosenbaum PA-C     Indications / Diagnosis:  Chest pain  ECG reviewed by me, the ED Provider: yes (also reviewed by Dr Miranda Florentino)    Patient location:  ED  Previous ECG:     Previous ECG:  Compared to current    Comparison ECG info:  Earlier today    Similarity:  No change  Rate:     ECG rate:  77    ECG rate assessment: normal    Rhythm:     Rhythm: sinus rhythm    Ectopy:     Ectopy: none    QRS:     QRS axis:  Left  Conduction:     Conduction: normal    ST segments:     ST segments:  Normal  T waves:     T waves: inverted      Inverted:  V6, V5, V4, aVF, III and II  Other findings:     Other findings: LVH               ED Course  ED Course as of 07/08/22 1853   Fri Jul 08, 2022   1417 hs TnI 0hr: 8  Will get delta trop, EKG   1417 Glucose, Random(!): 231   1417 BUN: 16   1417 Anion Gap: 5   1417 Creatinine(!): 1 39  PADMINI from prior, 1 18   1426 CT head without contrast  IMPRESSION:     No acute intracranial abnormality        Chronic microangiopathic changes      Appearance of the extra-axial spaces raise the question of chronic intracranial hypertension   1530 Reviewed all results with patient, answered questions  Patient noted resolution of headache and nausea, has not had any repeat episodes of chest pain  Patient agreeable plan for delta troponin, EKG    1653 Spoke with MARK, Dr Shad Wallace, reviewed case and ED course  He is agreeable to admission for obs  Requesting norvasc 10mg now, will order             HEART Risk Score    Flowsheet Row Most Recent Value   Heart Score Risk Calculator    History 0 Filed at: 07/08/2022 1458   ECG 1 Filed at: 07/08/2022 1458   Age 2 Filed at: 07/08/2022 1458   Risk Factors 1 Filed at: 07/08/2022 1458   Troponin 0 Filed at: 07/08/2022 1458   HEART Score 4 Filed at: 07/08/2022 1458                        SBIRT 20yo+    Flowsheet Row Most Recent Value   SBIRT (23 yo +)    In order to provide better care to our patients, we are screening all of our patients for alcohol and drug use  Would it be okay to ask you these screening questions? Yes Filed at: 07/08/2022 1333   Initial Alcohol Screen: US AUDIT-C     1  How often do you have a drink containing alcohol? 0 Filed at: 07/08/2022 1333   2  How many drinks containing alcohol do you have on a typical day you are drinking? 0 Filed at: 07/08/2022 1333   3a  Male UNDER 65: How often do you have five or more drinks on one occasion? 0 Filed at: 07/08/2022 1333   3b  FEMALE Any Age, or MALE 65+: How often do you have 4 or more drinks on one occassion? 0 Filed at: 07/08/2022 1333   Audit-C Score 0 Filed at: 07/08/2022 1333   TALAT: How many times in the past year have you    Used an illegal drug or used a prescription medication for non-medical reasons?  Never Filed at: 07/08/2022 1333                    MDM    Disposition  Final diagnoses:   Hypertensive urgency   Abnormal EKG   PADMINI (acute kidney injury) ([facility] [address] )   Abnormal CT of the head     Time reflects when diagnosis was documented in both MDM as applicable and the Disposition within this note     Time User Action Codes Description Comment    7/8/2022  4:54 PM Debby EncarnacionBonny Add [I16 0] Hypertensive urgency     7/8/2022  4:54 PM Edenilson Wang Add [R94 31] Abnormal EKG     7/8/2022  4:54 PM Bonny Novak Add [N17 9] PADMINI (acute kidney injury) ([address] )     7/8/2022  6:53 PM Bonny Novak Add [R93 0] Abnormal CT of the head       ED Disposition     ED Disposition   Admit    Condition   Stable    Date/Time   Fri Jul 8, 2022  4:54 PM    Comment   Case was discussed with Dr Kecia Yusuf and the patient's admission status was agreed to be Admission Status: observation status to the service of Dr Kecia Yusuf  Follow-up Information    None         Current Discharge Medication List      CONTINUE these medications which have NOT CHANGED    Details   albuterol (PROVENTIL HFA,VENTOLIN HFA) 90 mcg/act inhaler Inhale 2 puffs every 6 (six) hours as needed for wheezing or shortness of breath  Qty: 1 Inhaler, Refills: 5    Comments: Substitution to a formulary equivalent within the same pharmaceutical class is authorized  Associated Diagnoses: Moderate persistent asthma with acute exacerbation      ALPRAZolam (XANAX) 0 5 mg tablet Take 1 tablet (0 5 mg total) by mouth as needed for anxiety (Take 1 ablet 30 minutes prior to your imaging - MRI of the brain   Can take 1 more tablet right before your imaging )  Qty: 2 tablet, Refills: 0    Associated Diagnoses: Neoplasm of brain (HCC)      amLODIPine (NORVASC) 10 mg tablet take 1 tablet by mouth once daily  Qty: 30 tablet, Refills: 5    Associated Diagnoses: Essential hypertension      aspirin 81 MG tablet Take 81 mg by mouth daily        beclomethasone (Qvar) 80 MCG/ACT inhaler Inhale 2 puffs 2 (two) times a day  Qty: 1 Inhaler, Refills: 5    Associated Diagnoses: Mild persistent asthma without complication      Blood Glucose Calibration (ACCU-CHEK COMPACT PLUS CONTROL) SOLN by In Vitro route daily  Qty: 1 each, Refills: 0    Associated Diagnoses: Type 2 diabetes mellitus with complication, with long-term current use of insulin ([facility])      Blood Glucose Monitoring Suppl (ACCU-CHEK SHANE PLUS) w/Device KIT by Does not apply route 2 (two) times a day  Qty: 1 kit, Refills: 0    Associated Diagnoses: Type 2 diabetes mellitus without complication, without long-term current use of insulin ([facility])      busPIRone (BUSPAR) 5 mg tablet Take 1 tablet (5 mg total) by mouth 3 (three) times a day  Qty: 90 tablet, Refills: 2    Associated Diagnoses: Generalized anxiety disorder      ciclopirox (PENLAC) 8 % solution APPLY TO AFFECTED NAIL ONCE A DAY, THEN REMOVE ONCE A WEEK WITH NAIL POLISH REMOVER      Dapagliflozin Propanediol (Farxiga) 10 MG TABS 1/2 tablet daily for the 1st month then increase to a full tablet daily in the morning  Qty: 30 tablet, Refills: 3    Associated Diagnoses: Type 2 diabetes mellitus with stage 3b chronic kidney disease, with long-term current use of insulin ([facility])      escitalopram (LEXAPRO) 10 mg tablet take 1 tablet by mouth every morning  Qty: 30 tablet, Refills: 2    Associated Diagnoses: Generalized anxiety disorder      glucose blood (Accu-Chek Guide) test strip Use as instructed - twice daily  Qty: 200 each, Refills: 3    Associated Diagnoses: Type 2 diabetes mellitus with complication, with long-term current use of insulin ([facility])      hydrocortisone 0 5 % cream Apply topically 2 (two) times a day  Qty: 28 35 g, Refills: 1    Associated Diagnoses: Dermatitis      insulin glargine (Lantus SoloStar) 100 units/mL injection pen Inject 40 Units under the skin daily  Qty: 15 mL, Refills: 1    Associated Diagnoses: Type 2 diabetes mellitus with complication, with long-term current use of insulin ([facility])      Insulin Pen Needle (B-D UF III MINI PEN NEEDLES) 31G X 5 MM MISC Inject under the skin daily  Qty: 100 each, Refills: 3    Associated Diagnoses: Type 2 diabetes mellitus without complication, with long-term current use of insulin ([facility])      insulin regular (HumuLIN R,NovoLIN R) 100 units/mL injection 4 units at breakfast 5 units at lunch 6 units at supper  Qty: 10 mL, Refills: 5    Associated Diagnoses: Type 2 diabetes mellitus with complication, with long-term current use of insulin ([facility])      Januvia 100 MG tablet take 1 tablet by mouth daily  Qty: 30 tablet, Refills: 5    Associated Diagnoses: Type 2 diabetes mellitus with diabetic nephropathy, with long-term current use of insulin ([facility])      Lancets (ACCU-CHEK MULTICLIX) lancets Test bid  Qty: 100 each, Refills: 0    Associated Diagnoses: Type 2 diabetes mellitus without complication, with long-term current use of insulin ([facility])      pantoprazole (PROTONIX) 40 mg tablet take 1 tablet by mouth once daily  Qty: 90 tablet, Refills: 3    Associated Diagnoses: Gastroesophageal reflux disease without esophagitis      Polyvinyl Alcohol (LIQUID TEARS OP) Apply to eye      potassium chloride (Klor-Con) 10 mEq tablet take 1 tablet by mouth once daily  Qty: 30 tablet, Refills: 3    Associated Diagnoses: Essential hypertension      pregabalin (LYRICA) 50 mg capsule Take 1 capsule (50 mg total) by mouth 3 (three) times a day  Qty: 90 capsule, Refills: 1    Associated Diagnoses: Other diabetic neurological complication associated with type 2 diabetes mellitus ([facility])      RA PAIN RELIEF ACETAMINOPHEN 325 MG tablet take 1-2 tablets by mouth every 6 hours if needed for MODERATE pain  Refills: 0      simvastatin (ZOCOR) 40 mg tablet Take 1 tablet (40 mg total) by mouth daily at bedtime  Qty: 90 tablet, Refills: 2    Associated Diagnoses: Mixed hyperlipidemia      torsemide (DEMADEX) 5 MG tablet take 1 tablet by mouth every morning  Qty: 30 tablet, Refills: 3    Associated Diagnoses: Dependent edema             No discharge procedures on file      PDMP Review     None          ED Provider  Electronically Signed by           Kobe Cruz PA-C  07/08/22 2065

## 2022-07-08 NOTE — H&P
[id number] 1955, 77 y o  female MRN: [id number]  Unit/Bed#: 876-99 Encounter: [id number]  Primary Care Provider: Kyle Byrd DO   Date and time admitted to hospital: 7/8/2022  1:05 PM    * Other headache syndrome  Assessment & Plan  Headache, associated with nausea and vomiting  May be related to hypertension    Essential hypertension  Assessment & Plan  Blood pressure improved, resume home Norvasc and Demadex, patient has not been on her prescription medications in several months    Chronic kidney disease, stage 3 unspecified [facility])  Assessment & Plan  Lab Results   Component Value Date    EGFR 39 07/08/2022    EGFR 56 11/16/2021    EGFR 54 10/12/2021    CREATININE 1 39 (H) 07/08/2022    CREATININE 1 18 11/16/2021    CREATININE 1 21 10/12/2021   Creatinine mildly elevated above baseline, not elevated enough to be consistent with acute kidney injury    Patient with poor p o  intake, give IV fluid, follow up a m  labs    Type 2 diabetes mellitus with complication, with long-term current use of insulin ([facility])  Assessment & Plan  Lab Results   Component Value Date    HGBA1C 9 3 (H) 11/16/2021       Recent Labs     07/08/22  1413   POCGLU 215*       Blood Sugar Average: Last 72 hrs:  (P) 215     Check hemoglobin A1c, basal bolus insulin therapy, Lantus and Humalog    Depression  Assessment & Plan  Patient has been off of Lexapro and BuSpar for many months, she does not want to restart this medication  Mild persistent asthma without complication  Assessment & Plan  Continue albuterol HFA p r n  Myasthenia gravis [facility])  Assessment & Plan  Patient is not on medication for this      Chief Complaint: No pain    History of Present Illness:  Erick Robert is a 77 y o  female with a PMH as noted below who presents with worsening/severe headache, throbbing    Patient has been having her headache on and off for several weeks, yesterday evening headache became persistent, associated with nausea and vomiting throughout the night, patient did not sleep well  Limited p o  intake, mild lightheadedness and dizziness today  Patient reported some chest discomfort, no fever no chills, no history of trauma, no falls  At the time my examination headache is markedly improved, blood pressure was markedly elevated on arrival, also improved  Review of Systems:  Review of Systems   All other systems reviewed and are negative  Past Medical and Surgical History:   Past Medical History:   Diagnosis Date    Abnormal ECG     last assessed: 5/15/17    Abnormal mammogram     last assessed: 7/11/17    Abnormal stress test     last assesed: 7/24/17    Anemia     Anxiety     Arthritis     Asthma     Back problem     Breast cyst     Chest pain     last assessed: 7/24/17    Chronic pain disorder     Cyst of left breast     last assessed: 8/18/17    Depression     Depression     resolved: 1/2/18    Diabetes mellitus ([facility] [address] )     Hiatal hernia     last assessed: 11/7/17    Hyperlipidemia     Hypertension     Keloid of skin     last assessed: 11/2/16    Neuropathy     Psychiatric disorder     anxiety    Stroke [facility])     TIA 2005    Tuberculosis     as a child        Past Surgical History:   Procedure Laterality Date    ARM WOUND REPAIR / CLOSURE      CARPAL TUNNEL RELEASE      CARPAL TUNNEL RELEASE Left     CATARACT EXTRACTION Bilateral     2015    COLONOSCOPY      CYST REMOVAL      right upper arm   EYE SURGERY      cataract removaql    FL LUMBAR PUNCTURE DIAGNOSTIC  8/15/2019    VT ESOPHAGOGASTRODUODENOSCOPY TRANSORAL DIAGNOSTIC N/A 1/5/2018    Procedure: ESOPHAGOGASTRODUODENOSCOPY (EGD);   Surgeon: Sherri Helton DO;  Location: [facility] MAIN OR;  Service: Gastroenterology    VT WRIST [place] Tyra LIG Left 4/19/2019    Procedure: ENDOSCOPIC CARPAL TUNNEL RELEASE;  Surgeon: Dinora Amato MD;  Location: [address] [place] [address] MAIN OR;  Service: Orthopedics   [facility] TUBAL LIGATION      US GUIDED THYROID BIOPSY  4/15/2019       Meds/Allergies:  Prior to Admission medications    Medication Sig Start Date End Date Taking? Authorizing Provider   albuterol (PROVENTIL HFA,VENTOLIN HFA) 90 mcg/act inhaler Inhale 2 puffs every 6 (six) hours as needed for wheezing or shortness of breath 2/4/21   [facility] Hence, DO   ALPRAZolam [place]) 0 5 mg tablet Take 1 tablet (0 5 mg total) by mouth as needed for anxiety (Take 1 ablet 30 minutes prior to your imaging - MRI of the brain   Can take 1 more tablet right before your imaging )  Patient not taking: Reported on 11/16/2021  1/10/22   Chris Crouch PA-C   amLODIPine (NORVASC) 10 mg tablet take 1 tablet by mouth once daily 2/8/21   [facility] Hence, DO   aspirin 81 MG tablet Take 81 mg by mouth daily   6/28/17   Historical Provider, MD   beclomethasone (Qvar) 80 MCG/ACT inhaler Inhale 2 puffs 2 (two) times a day  Patient not taking: Reported on 11/16/2021 2/4/21   [facility] Hence, DO   Blood Glucose Calibration (ACCU-CHEK COMPACT PLUS CONTROL) SOLN by In Vitro route daily 8/16/19   [facility], DO   Blood Glucose Monitoring Suppl (ACCU-CHEK SHANE PLUS) w/Device KIT by Does not apply route 2 (two) times a day 7/30/19   [facility] Hence, DO   busPIRone (BUSPAR) 5 mg tablet Take 1 tablet (5 mg total) by mouth 3 (three) times a day 7/8/21   [facility] Hence, DO   ciclopirox (PENLAC) 8 % solution APPLY TO AFFECTED NAIL ONCE A DAY, THEN REMOVE ONCE A WEEK WITH NAIL POLISH REMOVER 1/18/21   Historical Provider, MD   Dapagliflozin Propanediol (Farxiga) 10 MG TABS 1/2 tablet daily for the 1st month then increase to a full tablet daily in the morning  Patient taking differently: Take 10 mg by mouth in the morning 1/2 tablet daily for the 1st month then increase to a full tablet daily in the morning  7/9/21   [facility] Hence, DO   escitalopram (LEXAPRO) 10 mg tablet take 1 tablet by mouth every morning 5/26/21   Louise Hence, DO   glucose blood (Accu-Chek Guide) test strip Use as instructed - twice daily 7/23/20   Rosa Hays, DO   hydrocortisone 0 5 % cream Apply topically 2 (two) times a day 3/24/20   Rosa Hays, DO   insulin glargine (Lantus SoloStar) 100 units/mL injection pen Inject 40 Units under the skin daily 10/18/21   Rosa Hays, DO   Insulin Pen Needle (B-D UF III MINI PEN NEEDLES) 31G X 5 MM MISC Inject under the skin daily 1/18/19   Rosa Hays, DO   insulin regular (HumuLIN R,NovoLIN R) 100 units/mL injection 4 units at breakfast 5 units at lunch 6 units at supper 12/1/20   Rosa Hays, DO   Januvia 100 MG tablet take 1 tablet by mouth daily 5/26/21   Rosa Hays, DO   Lancets (ACCU-CHEK MULTICLIX) lancets Test bid 5/28/19   Rosa Hays, DO   pantoprazole (PROTONIX) 40 mg tablet take 1 tablet by mouth once daily 2/8/22   Lauri Lopez PA-C   Polyvinyl Alcohol (LIQUID TEARS OP) Apply to eye    Historical Provider, MD   potassium chloride (Klor-Con) 10 mEq tablet take 1 tablet by mouth once daily 8/11/21   Rosa Hays, DO   pregabalin (LYRICA) 50 mg capsule Take 1 capsule (50 mg total) by mouth 3 (three) times a day 12/20/21 1/19/22  Rosa Hays, DO   RA PAIN RELIEF ACETAMINOPHEN 325 MG tablet take 1-2 tablets by mouth every 6 hours if needed for MODERATE pain 6/19/19   Historical Provider, MD   simvastatin (ZOCOR) 40 mg tablet Take 1 tablet (40 mg total) by mouth daily at bedtime 7/14/21   Rosa Hays, DO   torsemide [facility]) 5 MG tablet take 1 tablet by mouth every morning 8/11/21   Rosa Hays, DO     I have reviewed home medications with patient personally  Allergies: Allergies   Allergen Reactions    Bactrim [Sulfamethoxazole-Trimethoprim] Shortness Of Breath    Percocet [Oxycodone-Acetaminophen] GI Intolerance    Vicodin [Hydrocodone-Acetaminophen] GI Intolerance       Social History:  Marital Status:     Substance Use History:   Social History     Substance and Sexual Activity   Alcohol Use Not Currently    Alcohol/week: 0 0 standard drinks     Social History     Tobacco Use   Smoking Status Never Smoker   Smokeless Tobacco Never Used     Social History     Substance and Sexual Activity   Drug Use No       Family History:  Family History   Problem Relation Age of Onset    Heart disease Mother     Diabetes Mother     Arthritis Mother     Heart attack Mother     Hypertension Mother     Kidney disease Father     Hypertension Father     Kidney cancer Father     Arthritis Sister     Stroke Maternal Grandmother     Stomach cancer Maternal Grandmother     Arthritis Paternal Grandmother     Heart attack Paternal Grandmother     Stroke Maternal Aunt     Heart attack Maternal Uncle     Cancer Family         spinal column    Coronary artery disease Family     Glaucoma Family     Rheum arthritis Family     No Known Problems Maternal Grandfather     No Known Problems Paternal Grandfather     No Known Problems Daughter     No Known Problems Daughter     No Known Problems Daughter     No Known Problems Daughter     Liver cancer Maternal Aunt     No Known Problems Maternal Aunt     No Known Problems Maternal Aunt     No Known Problems Paternal Aunt        Physical Exam:     Vitals:   Blood Pressure: 148/65 (07/08/22 1800)  Pulse: 74 (07/08/22 1800)  Temperature: 98 3 °F (36 8 °C) (07/08/22 1310)  Temp Source: Temporal (07/08/22 1310)  Respirations: 18 (07/08/22 1800)  Height: 5' 5" (165 1 cm) (07/08/22 1310)  Weight - Scale: 91 5 kg (201 lb 11 5 oz) (07/08/22 1310)  SpO2: 97 % (07/08/22 1800)    Physical Exam  Vitals and nursing note reviewed  Constitutional:       General: She is not in acute distress  Appearance: She is normal weight  She is not ill-appearing, toxic-appearing or diaphoretic  HENT:      Head: Normocephalic and atraumatic  Right Ear: External ear normal       Left Ear: External ear normal    Eyes:      Comments: 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   Cardiovascular:      Rate and Rhythm: Normal rate  Pulmonary:      Effort: Pulmonary effort is normal       Breath sounds: Normal breath sounds  Musculoskeletal:         General: Normal range of motion  Cervical back: Normal range of motion  Right lower leg: No edema  Left lower leg: No edema  Skin:     General: Skin is warm and dry  Neurological:      General: No focal deficit present  Mental Status: She is alert and oriented to person, place, and time  Mental status is at baseline  Psychiatric:         Mood and Affect: Mood normal          Behavior: Behavior normal          Thought Content: Thought content normal          Judgment: Judgment normal           Additional Data:     Lab Results:  Results from last 7 days   Lab Units 07/08/22  1342   WBC Thousand/uL 7 45   HEMOGLOBIN g/dL 13 0   HEMATOCRIT % 40 1   PLATELETS Thousands/uL 260   NEUTROS PCT % 82*   LYMPHS PCT % 13*   MONOS PCT % 5   EOS PCT % 0     Results from last 7 days   Lab Units 07/08/22  1342   SODIUM mmol/L 138   POTASSIUM mmol/L 3 8   CHLORIDE mmol/L 100   CO2 mmol/L 33*   BUN mg/dL 16   CREATININE mg/dL 1 39*   ANION GAP mmol/L 5   CALCIUM mg/dL 9 1   ALBUMIN g/dL 2 7*   TOTAL BILIRUBIN mg/dL 0 51   ALK PHOS U/L 94   ALT U/L 21   AST U/L 16   GLUCOSE RANDOM mg/dL 231*         Results from last 7 days   Lab Units 07/08/22  1413   POC GLUCOSE mg/dl 215*               Imaging: Reviewed radiology reports from this admission including: chest xray and chest CT scan  XR chest 2 views   ED Interpretation by Radu Urbina PA-C (07/08 1434)   No acute pathology noted      Final Result by Michael Oro MD (07/08 1432)      No acute cardiopulmonary disease  Workstation performed: [id number]         CT head without contrast   Final Result by Renea Primrose, MD (07/08 1419)      No acute intracranial abnormality  Chronic microangiopathic changes  Appearance of the extra-axial spaces raise the question of chronic intracranial hypertension  Workstation performed: [id number]             EKG and Other Studies Reviewed on Admission:   · EKG: NSR  HR 73 beats per minute  ** Please Note: This note has been constructed using a voice recognition system   **

## 2022-07-08 NOTE — ED NOTES
Patient reports that she has not been taking her medications due to transportation issues to the doctor     Waleska Parker RN  07/08/22 0375

## 2022-07-08 NOTE — ASSESSMENT & PLAN NOTE
Patient has been off of Lexapro and BuSpar for many months, she does not want to restart this medication

## 2022-07-08 NOTE — ASSESSMENT & PLAN NOTE
Lab Results   Component Value Date    HGBA1C 9 3 (H) 11/16/2021       Recent Labs     07/08/22  1413   POCGLU 215*       Blood Sugar Average: Last 72 hrs:  (P) 215     Check hemoglobin A1c, basal bolus insulin therapy, Lantus and Humalog

## 2022-07-08 NOTE — ASSESSMENT & PLAN NOTE
Lab Results   Component Value Date    EGFR 39 07/08/2022    EGFR 56 11/16/2021    EGFR 54 10/12/2021    CREATININE 1 39 (H) 07/08/2022    CREATININE 1 18 11/16/2021    CREATININE 1 21 10/12/2021   Creatinine mildly elevated above baseline, I elevated enough to be consistent with acute kidney injury    Patient with poor p o  intake, give IV fluid, follow up a m  labs

## 2022-07-08 NOTE — PLAN OF CARE
Problem: Potential for Falls  Goal: Patient will remain free of falls  Description: INTERVENTIONS:  - Educate patient/family on patient safety including physical limitations  - Instruct patient to call for assistance with activity   - Consult OT/PT to assist with strengthening/mobility   - Keep Call bell within reach  - Keep bed low and locked with side rails adjusted as appropriate  - Keep care items and personal belongings within reach  - Initiate and maintain comfort rounds  - Make Fall Risk Sign visible to staff  - Offer Toileting every 2 Hours, in advance of need  - Initiate/Maintain bed/chair alarm  - Obtain necessary fall risk management equipment:   - Apply yellow socks and bracelet for high fall risk patients  - Consider moving patient to room near nurses station  Outcome: Progressing     Problem: Nutrition/Hydration-ADULT  Goal: Nutrient/Hydration intake appropriate for improving, restoring or maintaining nutritional needs  Description: Monitor and assess patient's nutrition/hydration status for malnutrition  Collaborate with interdisciplinary team and initiate plan and interventions as ordered  Monitor patient's weight and dietary intake as ordered or per policy  Utilize nutrition screening tool and intervene as necessary  Determine patient's food preferences and provide high-protein, high-caloric foods as appropriate       INTERVENTIONS:  - Monitor oral intake, urinary output, labs, and treatment plans  - Assess nutrition and hydration status and recommend course of action  - Evaluate amount of meals eaten  - Assist patient with eating if necessary   - Allow adequate time for meals  - Recommend/ encourage appropriate diets, oral nutritional supplements, and vitamin/mineral supplements  - Order, calculate, and assess calorie counts as needed  - Recommend, monitor, and adjust tube feedings and TPN/PPN based on assessed needs  - Assess need for intravenous fluids  - Provide specific nutrition/hydration education as appropriate  - Include patient/family/caregiver in decisions related to nutrition  Outcome: Progressing     Problem: PAIN - ADULT  Goal: Verbalizes/displays adequate comfort level or baseline comfort level  Description: Interventions:  - Encourage patient to monitor pain and request assistance  - Assess pain using appropriate pain scale  - Administer analgesics based on type and severity of pain and evaluate response  - Implement non-pharmacological measures as appropriate and evaluate response  - Consider cultural and social influences on pain and pain management  - Notify physician/advanced practitioner if interventions unsuccessful or patient reports new pain  Outcome: Progressing     Problem: INFECTION - ADULT  Goal: Absence or prevention of progression during hospitalization  Description: INTERVENTIONS:  - Assess and monitor for signs and symptoms of infection  - Monitor lab/diagnostic results  - Monitor all insertion sites, i e  indwelling lines  - [place] appropriate cooling/warming therapies per order  - Administer medications as ordered  - Instruct and encourage patient and family to use good hand hygiene technique  - Identify and instruct in appropriate isolation precautions for identified infection/condition  Outcome: Progressing     Problem: SAFETY ADULT  Goal: Patient will remain free of falls  Description: INTERVENTIONS:  - Educate patient/family on patient safety including physical limitations  - Instruct patient to call for assistance with activity   - Consult OT/PT to assist with strengthening/mobility   - Keep Call bell within reach  - Keep bed low and locked with side rails adjusted as appropriate  - Keep care items and personal belongings within reach  - Initiate and maintain comfort rounds  - Make Fall Risk Sign visible to staff  - Offer Toileting every 2 Hours, in advance of need  - Initiate/Maintain bed/chair alarm  - Obtain necessary fall risk management equipment:   - Apply yellow socks and bracelet for high fall risk patients  - Consider moving patient to room near nurses station  Outcome: Progressing  Goal: Maintain or return to baseline ADL function  Description: INTERVENTIONS:  -  Assess patient's ability to carry out ADLs; assess patient's baseline for ADL function and identify physical deficits which impact ability to perform ADLs (bathing, care of mouth/teeth, toileting, grooming, dressing, etc )  - Assess/evaluate cause of self-care deficits   - Assess range of motion  - Assess patient's mobility; develop plan if impaired  - Assess patient's need for assistive devices and provide as appropriate  - Encourage maximum independence but intervene and supervise when necessary  - Involve family in performance of ADLs  - Assess for home care needs following discharge   - Consider OT consult to assist with ADL evaluation and planning for discharge  - Provide patient education as appropriate  Outcome: Progressing  Goal: Maintains/Returns to pre admission functional level  Description: INTERVENTIONS:  - Perform BMAT or MOVE assessment daily    - Set and communicate daily mobility goal to care team and patient/family/caregiver  - Collaborate with rehabilitation services on mobility goals if consulted  - Perform Range of Motion 3 times a day  - Reposition patient every 2 hours    - Dangle patient 3  times a day  - Stand patient 3  times a day  - Ambulate patient 3  times a day  - Out of bed to chair 3  times a day   - Out of bed for meals 3  times a day  - Out of bed for toileting  - Record patient progress and toleration of activity level   Outcome: Progressing     Problem: DISCHARGE PLANNING  Goal: Discharge to home or other facility with appropriate resources  Description: INTERVENTIONS:  - Identify barriers to discharge w/patient and caregiver  - Arrange for needed discharge resources and transportation as appropriate  - Identify discharge learning needs (meds, wound care, etc )  - Refer to Case Management Department for coordinating discharge planning if the patient needs post-hospital services based on physician/advanced practitioner order or complex needs related to functional status, cognitive ability, or social support system  Outcome: Progressing     Problem: Knowledge Deficit  Goal: Patient/family/caregiver demonstrates understanding of disease process, treatment plan, medications, and discharge instructions  Description: Complete learning assessment and assess knowledge base    Interventions:  - Provide teaching at level of understanding  - Provide teaching via preferred learning methods  Outcome: Progressing     Problem: GASTROINTESTINAL - ADULT  Goal: Minimal or absence of nausea and/or vomiting  Description: INTERVENTIONS:  - Administer IV fluids if ordered to ensure adequate hydration  - Maintain NPO status until nausea and vomiting are resolved  - Nasogastric tube if ordered  - Administer ordered antiemetic medications as needed  - Provide nonpharmacologic comfort measures as appropriate  - Advance diet as tolerated, if ordered  - Consider nutrition services referral to assist patient with adequate nutrition and appropriate food choices  Outcome: Progressing  Goal: Maintains adequate nutritional intake  Description: INTERVENTIONS:  - Monitor percentage of each meal consumed  - Identify factors contributing to decreased intake, treat as appropriate  - Assist with meals as needed  - Monitor I&O, weight, and lab values if indicated  - Obtain nutrition services referral as needed  Outcome: Progressing  Goal: Oral mucous membranes remain intact  Description: INTERVENTIONS  - Assess oral mucosa and hygiene practices  - Implement preventative oral hygiene regimen  - Implement oral medicated treatments as ordered  - Initiate Nutrition services referral as needed  Outcome: Progressing     Problem: METABOLIC, FLUID AND ELECTROLYTES - ADULT  Goal: Electrolytes maintained within normal limits  Description: INTERVENTIONS:  - Monitor labs and assess patient for signs and symptoms of electrolyte imbalances  - Administer electrolyte replacement as ordered  - Monitor response to electrolyte replacements, including repeat lab results as appropriate  - Instruct patient on fluid and nutrition as appropriate  Outcome: Progressing  Goal: Fluid balance maintained  Description: INTERVENTIONS:  - Monitor labs   - Monitor I/O and WT  - Instruct patient on fluid and nutrition as appropriate  - Assess for signs & symptoms of volume excess or deficit  Outcome: Progressing  Goal: Glucose maintained within target range  Description: INTERVENTIONS:  - Monitor Blood Glucose as ordered  - Assess for signs and symptoms of hyperglycemia and hypoglycemia  - Administer ordered medications to maintain glucose within target range  - Assess nutritional intake and initiate nutrition service referral as needed  Outcome: Progressing

## 2022-07-08 NOTE — ED NOTES
Patient being transported to radiology via [id number] [facility] [place]  07/08/22 [id number]

## 2022-07-09 PROBLEM — E04.1 THYROID NODULE: Status: ACTIVE | Noted: 2022-07-09

## 2022-07-09 PROBLEM — R11.2 NAUSEA AND VOMITING: Status: ACTIVE | Noted: 2022-07-09

## 2022-07-09 LAB
ANION GAP SERPL CALCULATED.3IONS-SCNC: 6 MMOL/L (ref 4–13)
BASOPHILS # BLD AUTO: 0.01 THOUSANDS/ΜL (ref 0–0.1)
BASOPHILS NFR BLD AUTO: 0 % (ref 0–1)
BUN SERPL-MCNC: 14 MG/DL (ref 5–25)
CALCIUM SERPL-MCNC: 8.1 MG/DL (ref 8.3–10.1)
CHLORIDE SERPL-SCNC: 105 MMOL/L (ref 100–108)
CHOLEST SERPL-MCNC: 298 MG/DL
CO2 SERPL-SCNC: 30 MMOL/L (ref 21–32)
CREAT SERPL-MCNC: 1.16 MG/DL (ref 0.6–1.3)
EOSINOPHIL # BLD AUTO: 0.06 THOUSAND/ΜL (ref 0–0.61)
EOSINOPHIL NFR BLD AUTO: 1 % (ref 0–6)
ERYTHROCYTE [DISTWIDTH] IN BLOOD BY AUTOMATED COUNT: 13.9 % (ref 11.6–15.1)
EST. AVERAGE GLUCOSE BLD GHB EST-MCNC: 194 MG/DL
GFR SERPL CREATININE-BSD FRML MDRD: 49 ML/MIN/1.73SQ M
GLUCOSE SERPL-MCNC: 103 MG/DL (ref 65–140)
GLUCOSE SERPL-MCNC: 111 MG/DL (ref 65–140)
GLUCOSE SERPL-MCNC: 169 MG/DL (ref 65–140)
GLUCOSE SERPL-MCNC: 89 MG/DL (ref 65–140)
GLUCOSE SERPL-MCNC: 96 MG/DL (ref 65–140)
HBA1C MFR BLD: 8.4 %
HCT VFR BLD AUTO: 37.1 % (ref 34.8–46.1)
HDLC SERPL-MCNC: 42 MG/DL
HGB BLD-MCNC: 11.7 G/DL (ref 11.5–15.4)
IMM GRANULOCYTES # BLD AUTO: 0 THOUSAND/UL (ref 0–0.2)
IMM GRANULOCYTES NFR BLD AUTO: 0 % (ref 0–2)
LDLC SERPL CALC-MCNC: 236 MG/DL (ref 0–100)
LYMPHOCYTES # BLD AUTO: 1.93 THOUSANDS/ΜL (ref 0.6–4.47)
LYMPHOCYTES NFR BLD AUTO: 38 % (ref 14–44)
MAGNESIUM SERPL-MCNC: 1.7 MG/DL (ref 1.6–2.6)
MCH RBC QN AUTO: 27 PG (ref 26.8–34.3)
MCHC RBC AUTO-ENTMCNC: 31.5 G/DL (ref 31.4–37.4)
MCV RBC AUTO: 86 FL (ref 82–98)
MONOCYTES # BLD AUTO: 0.43 THOUSAND/ΜL (ref 0.17–1.22)
MONOCYTES NFR BLD AUTO: 8 % (ref 4–12)
NEUTROPHILS # BLD AUTO: 2.7 THOUSANDS/ΜL (ref 1.85–7.62)
NEUTS SEG NFR BLD AUTO: 53 % (ref 43–75)
NONHDLC SERPL-MCNC: 256 MG/DL
NRBC BLD AUTO-RTO: 0 /100 WBCS
PHOSPHATE SERPL-MCNC: 3.3 MG/DL (ref 2.3–4.1)
PLATELET # BLD AUTO: 226 THOUSANDS/UL (ref 149–390)
PMV BLD AUTO: 11.1 FL (ref 8.9–12.7)
POTASSIUM SERPL-SCNC: 3.6 MMOL/L (ref 3.5–5.3)
RBC # BLD AUTO: 4.34 MILLION/UL (ref 3.81–5.12)
SODIUM SERPL-SCNC: 141 MMOL/L (ref 136–145)
TRIGL SERPL-MCNC: 102 MG/DL
WBC # BLD AUTO: 5.13 THOUSAND/UL (ref 4.31–10.16)

## 2022-07-09 PROCEDURE — 80061 LIPID PANEL: CPT | Performed by: INTERNAL MEDICINE

## 2022-07-09 PROCEDURE — 84100 ASSAY OF PHOSPHORUS: CPT | Performed by: INTERNAL MEDICINE

## 2022-07-09 PROCEDURE — 80048 BASIC METABOLIC PNL TOTAL CA: CPT | Performed by: INTERNAL MEDICINE

## 2022-07-09 PROCEDURE — 83735 ASSAY OF MAGNESIUM: CPT | Performed by: INTERNAL MEDICINE

## 2022-07-09 PROCEDURE — 99232 SBSQ HOSP IP/OBS MODERATE 35: CPT | Performed by: NURSE PRACTITIONER

## 2022-07-09 PROCEDURE — 85025 COMPLETE CBC W/AUTO DIFF WBC: CPT | Performed by: INTERNAL MEDICINE

## 2022-07-09 PROCEDURE — 3052F HG A1C>EQUAL 8.0%<EQUAL 9.0%: CPT | Performed by: INTERNAL MEDICINE

## 2022-07-09 PROCEDURE — 82948 REAGENT STRIP/BLOOD GLUCOSE: CPT

## 2022-07-09 RX ORDER — CALCIUM CARBONATE 200(500)MG
500 TABLET,CHEWABLE ORAL DAILY PRN
Status: DISCONTINUED | OUTPATIENT
Start: 2022-07-09 | End: 2022-07-11 | Stop reason: HOSPADM

## 2022-07-09 RX ORDER — SODIUM CHLORIDE 9 MG/ML
75 INJECTION, SOLUTION INTRAVENOUS CONTINUOUS
Status: DISCONTINUED | OUTPATIENT
Start: 2022-07-09 | End: 2022-07-10

## 2022-07-09 RX ADMIN — PREGABALIN 50 MG: 50 CAPSULE ORAL at 17:13

## 2022-07-09 RX ADMIN — SODIUM CHLORIDE 125 ML/HR: 0.9 INJECTION, SOLUTION INTRAVENOUS at 12:00

## 2022-07-09 RX ADMIN — SODIUM CHLORIDE 75 ML/HR: 0.9 INJECTION, SOLUTION INTRAVENOUS at 21:28

## 2022-07-09 RX ADMIN — PREGABALIN 50 MG: 50 CAPSULE ORAL at 21:30

## 2022-07-09 RX ADMIN — INSULIN LISPRO 4 UNITS: 100 INJECTION, SOLUTION INTRAVENOUS; SUBCUTANEOUS at 08:56

## 2022-07-09 RX ADMIN — INSULIN LISPRO 4 UNITS: 100 INJECTION, SOLUTION INTRAVENOUS; SUBCUTANEOUS at 17:14

## 2022-07-09 RX ADMIN — ASPIRIN 81 MG CHEWABLE TABLET 81 MG: 81 TABLET CHEWABLE at 08:56

## 2022-07-09 RX ADMIN — INSULIN LISPRO 1 UNITS: 100 INJECTION, SOLUTION INTRAVENOUS; SUBCUTANEOUS at 12:00

## 2022-07-09 RX ADMIN — PREGABALIN 50 MG: 50 CAPSULE ORAL at 08:56

## 2022-07-09 RX ADMIN — INSULIN LISPRO 4 UNITS: 100 INJECTION, SOLUTION INTRAVENOUS; SUBCUTANEOUS at 12:00

## 2022-07-09 RX ADMIN — TORSEMIDE 5 MG: 10 TABLET ORAL at 12:00

## 2022-07-09 RX ADMIN — INSULIN GLARGINE 20 UNITS: 100 INJECTION, SOLUTION SUBCUTANEOUS at 21:30

## 2022-07-09 RX ADMIN — AMLODIPINE BESYLATE 10 MG: 10 TABLET ORAL at 08:56

## 2022-07-09 RX ADMIN — ENOXAPARIN SODIUM 40 MG: 40 INJECTION SUBCUTANEOUS at 08:56

## 2022-07-09 RX ADMIN — PRAVASTATIN SODIUM 40 MG: 40 TABLET ORAL at 17:13

## 2022-07-09 RX ADMIN — PANTOPRAZOLE SODIUM 40 MG: 40 TABLET, DELAYED RELEASE ORAL at 08:56

## 2022-07-09 RX ADMIN — ACETAMINOPHEN 975 MG: 325 TABLET, FILM COATED ORAL at 02:58

## 2022-07-09 NOTE — ASSESSMENT & PLAN NOTE
· Blood pressure improved,   · Resume home Norvasc and Demadex, patient has not been on her prescription medications in several months  · IV fluids reduced to 75 mL an hour  · Recheck BMP in a m

## 2022-07-09 NOTE — ASSESSMENT & PLAN NOTE
Lab Results   Component Value Date    EGFR 49 07/09/2022    EGFR 39 07/08/2022    EGFR 56 11/16/2021    CREATININE 1 16 07/09/2022    CREATININE 1 39 (H) 07/08/2022    CREATININE 1 18 11/16/2021   Creatinine mildly elevated above baseline,  elevated enough to be consistent with acute kidney injury  · Patient with poor p o  intake, improving with IV fluids will reduce rate to 75 an hour for tonight - since patient continues to have some slight headache and slowly improving intake  · Improved at baseline

## 2022-07-09 NOTE — ASSESSMENT & PLAN NOTE
Lab Results   Component Value Date    HGBA1C 9 3 (H) 11/16/2021       Recent Labs     07/08/22  1922 07/08/22  2155 07/09/22  0746 07/09/22  1048   POCGLU 157* 184* 103 169*       Blood Sugar Average: Last 72 hrs:  (P) 165 6     ·  A1c, ordered now 8 4 with improvement   · basal bolus insulin therapy, Lantus and Humalog

## 2022-07-09 NOTE — CASE MANAGEMENT
Case Management Assessment    Patient name Lucrecia Carr  Location [address] [id number] MRN [id number]  : 1955 Date 2022       Current Admission Date: 2022  Current Admission Diagnosis:Other headache syndrome   Patient Active Problem List    Diagnosis Date Noted    Other headache syndrome 2022    Chronic kidney disease, stage 3 unspecified ([facility] ) 2022    Abnormal CT scan 08/10/2021    Myasthenia gravis ([address] ) 2021    Depression, recurrent ([facility] ) 2021    Cerebral meningocele ([facility] ) 2021    Obesity, morbid ([facility] [facility] ) 2020    UTI (urinary tract infection) 2020    Dyspnea 2020    Subcortical microvascular ischemic occlusive disease 2019    Sixth nerve palsy of right eye     Binocular vision disorder with diplopia 2019    Carpal tunnel syndrome on left 2019    Numbness in both hands 2019    Carpal tunnel syndrome of left wrist 2019    Multinodular goiter 2019    Hoarseness or changing voice 2019    Diabetic neuropathy ([facility] ) 2018    Chronic constipation 2017    Cyst of left breast 2017    Impaired hearing 2017    Cerebral atherosclerosis 05/15/2017    Hyperlipidemia 2017    Moderate persistent asthma without complication     Lumbar pain 10/18/2016    Type 2 diabetes mellitus with complication, with long-term current use of insulin ([facility] [address] [facility] ) 10/18/2016    Hiatal hernia 10/18/2016    Mild persistent asthma without complication     Chronic back pain 2016    Depression 2016    Essential hypertension 2016    Neuropathy, diabetic ([facility] [facility] ) 2016      LOS (days): 0  Geometric Mean LOS (GMLOS) (days):   Days to GMLOS:     OBJECTIVE:              Current admission status: Observation       Preferred Pharmacy:   [facility] [address], [address] [place] [id number]  Phone: [phone] Fax: [phone]    Primary Care Provider: Jazzy Elliott DO    Primary Insurance: [facility]  Secondary Insurance: [address]    ASSESSMENT:  Adela Rojas Proxies     Marin Hernandez N  [address] Representative - Daughter   Primary Phone: [phone] (Mobile)               Advance Directives  Does patient have a [address]?: No  Was patient offered paperwork?: Yes (provided)  Does patient currently have a Health Care decision maker?: Yes, please see Health Care Proxy section  Does patient have Advance Directives?: No  Was patient offered paperwork?: Yes (provided)  Primary Contact: jorje hernandez-daughter         Readmission [facility]  30 Day Readmission: No    Patient Information  Admitted from[de-identified] Home  Mental Status: Alert  During Assessment patient was accompanied by: Not accompanied during assessment  Assessment information provided by[de-identified] Patient  Primary Caregiver: Self  Support Systems: Self, Daughter  [place] of Residence: [facility] do you live in?: [place] entry access options   Select all that apply : Stairs  Number of steps to enter home : 4  Do the steps have railings?: Yes  Type of Current Residence: [facility]  In the last 12 months, was there a time when you were not able to pay the mortgage or rent on time?: No  In the last 12 months, how many places have you lived?: 1  In the last 12 months, was there a time when you did not have a steady place to sleep or slept in a shelter (including now)?: No  Homeless/housing insecurity resource given?: N/A  Living Arrangements: Lives w/ Daughter  Is patient a ?: No    Activities of Daily Living Prior to Admission  Functional Status: Independent  Completes ADLs independently?: Yes  Ambulates independently?: Yes  Does patient use assisted devices?: No  Does patient currently own DME?: No  Does patient have a history of Outpatient Therapy (PT/OT)?: No  Does the patient have a history of Short-Term Rehab?: No  Does patient have a history of HHC?: No  Does patient currently have [facility]?: No         Patient Information Continued  Income Source: SSI/SSD  Does patient have prescription coverage?: Yes  Within the past 12 months, you worried that your food would run out before you got the money to buy more : Never true  Within the past 12 months, the food you bought just didn't last and you didn't have money to get more : Never true  Food insecurity resource given?: N/A  Does patient receive dialysis treatments?: No  Does patient have a history of substance abuse?: No  Does patient have a history of Mental Health Diagnosis?: Yes  Is patient receiving treatment for mental health?: No  Treatment options were provided  (patient states she has some depression and feeling down, she will like to discuss with PCP  will provide patient with Jac[place]  services phone number on avs)  Has patient received inpatient treatment related to mental health in the last 2 years?: No         Means of Transportation  Means of Transport to Appts[de-identified] Drives Self  In the past 12 months, has lack of transportation kept you from medical appointments or from getting medications?: Yes (patient states her car was no working and that is why she didnt go to the doctors  will place referral to area on aging for available community services and she would like to see if she can get set up with sts share ride program)  In the past 12 months, has lack of transportation kept you from meetings, work, or from getting things needed for daily living?: Yes  Was application for public transport provided?: Yes  Cm met with the patient to evaluate the patients prior function and living situation and any barriers to d/c and form a safe d/c plan  Cm also evaluated the patient for any services in the home or needs for services  CM also discussed with patient that their preferences will be taken into account/consideration   Pt under observations status for headache possibly related to hypertension  Pt states she has been unable to get to [facility] due to transportation issues  Will provide patient with ST[facility] application and will put referral in to area on aging Monday to see if any additional services available  Pt also interested in mental health services  Provided patient with numbers on AVS and provided generic living will and poa forms she was interested in  CM to follow for any additional discharge needs

## 2022-07-09 NOTE — ASSESSMENT & PLAN NOTE
Status post thyroid biopsy in 2020  · Follows with ENT Dr Alverto Ross  · Biopsy obtained at that time returned with [place] [facility]   · She then returned for repeat ultrasound demonstrating no significant interval growth in thyroid nodules  However prominent left lobe nodule did have some interval growth  · Dr Cori Chan had referred repeat biopsy and even spoke with the daughter to coordinate but patient forgot to have this done  Patient had followed up in the office on 11/16/2021 for a follow-up visit of a biopsy that was meant to be obtained 11/1 but patient had been diagnosed with COVID on 10/11 and patient reports she was too fatigued to have the procedure done  · Procedure at been rescheduled for 01/03/2022  This was not done  Educated patient on follow-up    Placed in discharge instructions

## 2022-07-09 NOTE — UTILIZATION REVIEW
Initial Clinical Review    WAS OBSERVATION 07/08/2022 @ 7830, CONVERTED TO INPATIENT ADMISSION 07/09/2022 @ 1512, DUE TO CONTINUED STAY REQUIRED TO CARE FOR PATIENT WITH    Headache Syndrome  Continue IV fld  Admission: Date/Time/Statement:   Admission Orders (From admission, onward)     Ordered        07/09/22 1512  Inpatient Admission  Once            07/08/22 1655  Place in Observation  Once                      Orders Placed This Encounter   Procedures    Inpatient Admission     Standing Status:   Standing     Number of Occurrences:   1     Order Specific Question:   Level of Care     Answer:   Med Surg [16]     Order Specific Question:   Estimated length of stay     Answer:   More than 2 Midnights     Order Specific Question:   Certification     Answer:   I certify that inpatient services are medically necessary for this patient for a duration of greater than two midnights  See H&P and MD Progress Notes for additional information about the patient's course of treatment  ED Arrival Information     Expected   -    Arrival   7/8/2022 13:03    Acuity   Urgent            Means of arrival   Walk-In    Escorted by   Family Member    Service   Hospitalist    Admission type   Urgent            Arrival complaint   Vomiting, Headache, Chills, Fever, Dizziness           Chief Complaint   Patient presents with    Chest Pain     Patient reports multiple episodes of left sided chest pain over the past few days  Patient also has headache and vomiting since last night  Initial Presentation: 77 y o  female, presented to the ED @ [address] [facility], from home via walk in with family member  Admitted as Observation due to Headache Syndrome  Date: 07/08/2022  Presents with worsening/severe headache, throbbing  Patient has been having her headache on and off for several weeks, yesterday evening headache became persistent, associated with nausea and vomiting throughout the night, patient did not sleep well  Limited PO intake reports lightheaded & dizzy, also reports chest discomfort  Patient does admit that she has not been taking her medications for several months because she ran out and has not followed up for refills  Resume home medications Norvasc & demadex  Start IV flds  Day 1: 07/09/2022  Patient does admit that she has not been taking her medications for several months because she ran out and has not followed up for refills  Current CT head obtained 07/08/2022 demonstrates appearance of extra-axial spaces radiologist questioning intracranial hypertension  Continue IV flds  Encourage PO intake  Follow up AM labs  VTE Pharmacologic Prophylaxis:   High Risk (Score >/= 5) - Pharmacological DVT Prophylaxis Ordered: enoxaparin (Lovenox)  Sequential Compression Devices Ordered  ED Triage Vitals [07/08/22 1310]   Temperature Pulse Respirations Blood Pressure SpO2   98 3 °F (36 8 °C) 83 20 (!) 223/105 97 %      Temp Source Heart Rate Source Patient Position - Orthostatic VS BP Location FiO2 (%)   Temporal Monitor Lying Left arm --      Pain Score       5          Wt Readings from Last 1 Encounters:   07/08/22 90 2 kg (198 lb 13 7 oz)     Additional Vital Signs:   Date/Time Temp Pulse Resp BP MAP (mmHg) SpO2 O2 Device Patient Position - Orthostatic VS   07/09/22 14:34:32 97 6 °F (36 4 °C) 65 18 157/80 106 97 % -- --   07/09/22 11:57:15 -- 66 -- 155/79 104 96 % -- --   07/09/22 07:48:08 97 9 °F (36 6 °C) 61 18 158/85 109 96 % -- --   07/09/22 02:52:15 98 1 °F (36 7 °C) 59 18 164/86 112 97 % -- Sitting   07/08/22 1941 -- 72 16 -- -- 98 % None (Room air) --   07/08/22 18:52:31 98 °F (36 7 °C) -- 17 169/82 111 -- -- --   07/08/22 1800 -- 74 18 148/65 94 97 % -- --   07/08/22 1745 -- 71 19 -- -- 98 % -- --   07/08/22 1730 -- 77  15  153/72  104  97 %  None (Room air) Sitting   Pulse: Simultaneous filing  User may not have seen previous data  at 07/08/22 6270   Resp: Simultaneous filing   User may not have seen previous data  at 22 1730   BP: Simultaneous filing  User may not have seen previous data  at 22 1730   MAP (mmHg): Simultaneous filing  User may not have seen previous data  at 22 1730   SpO2: Simultaneous filing  User may not have seen previous data  at 22 1730   22 1723 -- -- -- 159/78 -- -- -- --   22 1715 -- 74 18 -- -- 96 % -- --   22 1700 -- 78 18 159/78 112 97 % None (Room air) Sitting   22 1645 -- 76 18 -- -- 95 % -- --   22 1630 -- 74 18 163/78 112 96 % None (Room air) Sitting   22 1600 -- 81 28 Abnormal  167/85 118 97 % None (Room air) Sitting   22 1530 -- 73 18 208/87 Abnormal  125 93 % None (Room air) Sitting   22 1514 -- -- -- 211/95 Abnormal  -- -- -- --   22 1500 -- 63 20 211/95 Abnormal  137 96 % None (Room air) Sitting   22 1415 -- 63 20 226/103 Abnormal  139 98 % None (Room air) Lying   22 1330 -- -- -- -- -- -- None (Room air) --   22 1312 -- -- -- -- -- 95 % -- --     Date and Time Eye Opening Best Verbal Response Best Motor Response [place] Coma Scale Score   22 0258 4 5 6 15   22 1330 4 5 6 15       2022 @ 1314  EC, NSR  Pertinent Labs/Diagnostic Test Results:   XR chest 2 views   ED Interpretation by Olga Morrison PA-C ([id number])   No acute pathology noted      Final Result by Roxy Montgomery MD ([id number])   No acute cardiopulmonary disease  CT head without contrast   Final Result by Belen Soto MD ( 141)   No acute intracranial abnormality  Chronic microangiopathic changes  Appearance of the extra-axial spaces raise the question of chronic intracranial hypertension          Results from last 7 days   Lab Units 22  0520 22  1342   WBC Thousand/uL 5 13 7 45   HEMOGLOBIN g/dL 11 7 13 0   HEMATOCRIT % 37 1 40 1   PLATELETS Thousands/uL 226 260   NEUTROS ABS Thousands/µL 2 70 6 03     Results from last 7 days   Lab Units 07/09/22  0520 07/08/22  1342   SODIUM mmol/L 141 138   POTASSIUM mmol/L 3 6 3 8   CHLORIDE mmol/L 105 100   CO2 mmol/L 30 33*   ANION GAP mmol/L 6 5   BUN mg/dL 14 16   CREATININE mg/dL 1 16 1 39*   EGFR ml/min/1 73sq m 49 39   CALCIUM mg/dL 8 1* 9 1   MAGNESIUM mg/dL 1 7  --    PHOSPHORUS mg/dL 3 3  --      Results from last 7 days   Lab Units 07/08/22  1342   AST U/L 16   ALT U/L 21   ALK PHOS U/L 94   TOTAL PROTEIN g/dL 7 4   ALBUMIN g/dL 2 7*   TOTAL BILIRUBIN mg/dL 0 51     Results from last 7 days   Lab Units 07/09/22  1617 07/09/22  1048 07/09/22  0746 07/08/22  2155 07/08/22  1922 07/08/22  1413   POC GLUCOSE mg/dl 89 169* 103 184* 157* 215*     Results from last 7 days   Lab Units 07/09/22  0520 07/08/22  1342   GLUCOSE RANDOM mg/dL 111 231*     Results from last 7 days   Lab Units 07/08/22  1838 07/08/22  1604 07/08/22  1342   HS TNI 0HR ng/L  --   --  8   HS TNI 2HR ng/L  --  8  --    HSTNI D2 ng/L  --  0  --    HS TNI 4HR ng/L 9  --   --    HSTNI D4 ng/L 1  --   --      Results from last 7 days   Lab Units 07/08/22  1342   LIPASE u/L 110     ED Treatment:   Medication Administration from 07/08/2022 1303 to 07/08/2022 1849       Date/Time Order Dose Route Action     07/08/2022 1342 sodium chloride 0 9 % bolus 1,000 mL 1,000 mL Intravenous New Bag     07/08/2022 1342 ondansetron (ZOFRAN) injection 4 mg 4 mg Intravenous Given     07/08/2022 1514 hydrALAZINE (APRESOLINE) injection 10 mg 10 mg Intravenous Given     07/08/2022 1514 acetaminophen (TYLENOL) tablet 650 mg 650 mg Oral Given     07/08/2022 1723 amLODIPine (NORVASC) tablet 10 mg 10 mg Oral Given        Past Medical History:   Diagnosis Date    Abnormal ECG     last assessed: 5/15/17    Abnormal mammogram     last assessed: 7/11/17    Abnormal stress test     last assesed: 7/24/17    Anemia     Anxiety     Arthritis     Asthma     Back problem     Breast cyst     Chest pain     last assessed: 7/24/17    Chronic pain disorder     Cyst of left breast     last assessed: 8/18/17    Depression     Depression     resolved: 1/2/18    Diabetes mellitus ([facility] )     Hiatal hernia     last assessed: 11/7/17    Hyperlipidemia     Hypertension     Keloid of skin     last assessed: 11/2/16    Neuropathy     Psychiatric disorder     anxiety    Stroke [facility])     TIA 2005    Tuberculosis     as a child      Present on Admission:   Depression   Essential hypertension   Mild persistent asthma without complication   Myasthenia gravis ([facility] )   Other headache syndrome   Chronic kidney disease, stage 3 unspecified (HCC)   Nausea and vomiting      Admitting Diagnosis: Chest pain [R07 9]  Abnormal EKG [R94 31]  PADMINI (acute kidney injury) ([place] [address] ) [N17 9]  Hypertensive urgency [I16 0]  Age/Sex: 77 y o  female  Admission Orders:  Jorge Luis/CHO controlled diet  Up & OOB as tolerated / Activities as tolerated  Telemetry  Andrew SCDs    Scheduled Medications:  amLODIPine, 10 mg, Oral, Daily  aspirin, 81 mg, Oral, Daily  enoxaparin, 40 mg, Subcutaneous, Daily  insulin glargine, 20 Units, Subcutaneous, HS  insulin lispro, 1-5 Units, Subcutaneous, HS  insulin lispro, 1-6 Units, Subcutaneous, TID AC  insulin lispro, 4 Units, Subcutaneous, TID With Meals  pantoprazole, 40 mg, Oral, Daily  pravastatin, 40 mg, Oral, Daily With Dinner  pregabalin, 50 mg, Oral, TID  torsemide, 5 mg, Oral, QAM      Continuous IV Infusions:  sodium chloride, 75 mL/hr, Intravenous, Continuous      PRN Meds:  acetaminophen, 975 mg, Oral, Q6H PRN     X 1 dose 7/9  albuterol, 2 puff, Inhalation, Q6H PRN  calcium carbonate, 500 mg, Oral, Daily PRN  ondansetron, 4 mg, Intravenous, Q6H PRN  polyvinyl alcohol, 1 drop, Both Eyes, Q3H PRN        IP CONSULT TO CASE MANAGEMENT    Network Utilization Review Department  ATTENTION: Please call with any questions or concerns to [phone] and carefully listen to the prompts so that you are directed to the right person   All voicemails are confidential   Audrey kaiser requests for admission clinical reviews, approved or denied determinations and any other requests to dedicated fax number below belonging to the campus where the patient is receiving treatment   List of dedicated fax numbers for the Facilities:  [address] [place] [address] DENIALS (Administrative/Medical Necessity) [phone]   [address] (Maternity/NICU/Pediatrics) [phone] [address] [place] [address] Medical [facility] [address] [id number] [address] [facility] [facility] [place] [address] [phone]

## 2022-07-09 NOTE — PROGRESS NOTES
[address] [facility] [address] [place]  Progress Note - Erick Robert 1955, 77 y o  female MRN: [id number]  Unit/Bed#: 391-91 Encounter: [id number]  Primary Care Provider: Kyle Byrd DO   Date and time admitted to hospital: 7/8/2022  1:05 PM    * Other headache syndrome  Assessment & Plan  Pt noted to have somewhat of a chronic headache that worsened two day prior to admission  She reports this headache is a little different as it is on the left side instead of the right  Reports this started after she contracted COVID late 2021  Does report photophobia, denies any dizziness lightheadedness, visual changes numbness or tingling  · May be related to hypertension  · Patient with history meningocele  · Recently seen 03/07/2022 by Neurosurgery Dr Zachary Durán - for 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  · Per Neurosurgery these remained stable and they have 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  However if patient should develop rhinorrhea she needs to contact Neurosurgery  · Now with need to only follow-up as needed  · Current CT head obtained 07/08/2022 demonstrates appearance of extra-axial spaces radiologist questioning intracranial hypertension  · Headache is resolving - suspect migraine headache probable discharge in a m  Thyroid nodule  Assessment & Plan  Status post thyroid biopsy in 2020  · Follows with ENT Dr Pavithra Holbrook  · Biopsy obtained at that time returned with [place] 1   · She then returned for repeat ultrasound demonstrating no significant interval growth in thyroid nodules  However prominent left lobe nodule did have some interval growth  · Dr Ronni Ramos had referred repeat biopsy and even spoke with the daughter to coordinate but patient forgot to have this done    Patient had followed up in the office on 11/16/2021 for a follow-up visit of a biopsy that was meant to be obtained 11/1 but patient had been diagnosed with COVID on 10/11 and patient reports she was too fatigued to have the procedure done  · Procedure at been rescheduled for 01/03/2022  This was not done  Educated patient on follow-up  Placed in discharge instructions     Nausea and vomiting  Assessment & Plan  Likely secondary to migrainous headache  · IV fluid repletion, headache this morning but now improved  · Patient now with improved p o  Intake  · Obtain lab work in a m / if tolerating breakfast okay for discharge  · Patient does report that she is due for colonoscopy/EGD - since she has symptoms of severe reflux - sees Dr Lala Jose with GI  · Call to schedule follow-up    Chronic kidney disease, stage 3 unspecified [facility])  Assessment & Plan  Lab Results   Component Value Date    EGFR 49 07/09/2022    EGFR 39 07/08/2022    EGFR 56 11/16/2021    CREATININE 1 16 07/09/2022    CREATININE 1 39 (H) 07/08/2022    CREATININE 1 18 11/16/2021   Creatinine mildly elevated above baseline,  elevated enough to be consistent with acute kidney injury  · Patient with poor p o  intake, improving with IV fluids will reduce rate to 75 an hour for tonight - since patient continues to have some slight headache and slowly improving intake  · Improved at baseline     Myasthenia gravis ([address] )  Assessment & Plan  · Patient is not on medication for this    Mild persistent asthma without complication  Assessment & Plan  · Not in exacerbation   · Continue albuterol HFA p r n  Essential hypertension  Assessment & Plan  · Blood pressure improved,   · Resume home Norvasc and Demadex, patient has not been on her prescription medications in several months  · IV fluids reduced to 75 mL an hour  · Recheck BMP in a m  Depression  Assessment & Plan  ·  Lexapro and BuSpar had been dcd by pt ,  she does not want to restart this medication      Type 2 diabetes mellitus with complication, with long-term current use of insulin [facility])  Assessment & Plan  Lab Results   Component Value Date    HGBA1C 9 3 (H) 11/16/2021 Recent Labs     22  1922 22  2155 22  0746 22  1048   POCGLU 157* 184* 103 169*       Blood Sugar Average: Last 72 hrs:  (P) 165 6     ·  A1c, ordered now 8 4 with improvement   · basal bolus insulin therapy, Lantus and Humalog      VTE Pharmacologic Prophylaxis:   High Risk (Score >/= 5) - Pharmacological DVT Prophylaxis Ordered: enoxaparin (Lovenox)  Sequential Compression Devices Ordered  Patient Centered Rounds: I performed bedside rounds with nursing staff today  Discussions with Specialists or Other Care Team Provider:nursing     Education and Discussions with Family / Patient: Updated  (daughter) via phone  Time Spent for Care: 45 minutes  More than 50% of total time spent on counseling and coordination of care as described above  Current Length of Stay: 0 day(s)  Current Patient Status: Inpatient   Certification Statement: The patient will continue to require additional inpatient hospital stay due to ongoing need for hydration headache symptoms   Discharge Plan: Anticipate discharge tomorrow to home  Code Status: Level 1 - Full Code    Subjective:   Patient is lying in bed upon arrival to room  She is on her cellphone with grandchild  Patient then focuses on myself reports that she had headache this morning again  She discusses the fact that she has had chronic headache for a while it does come and go  She does report photosensitivity  She has been able to eat a little today    Objective:     Vitals:   Temp (24hrs), Av 9 °F (36 6 °C), Min:97 6 °F (36 4 °C), Max:98 1 °F (36 7 °C)    Temp:  [97 6 °F (36 4 °C)-98 1 °F (36 7 °C)] 97 6 °F (36 4 °C)  HR:  [59-78] 65  Resp:  [15-19] 18  BP: (148-169)/(65-86) 157/80  SpO2:  [95 %-98 %] 97 %  Body mass index is 33 09 kg/m²  Input and Output Summary (last 24 hours):      Intake/Output Summary (Last 24 hours) at 2022 1632  Last data filed at 2022 1236  Gross per 24 hour   Intake 1360 ml Output --   Net 1360 ml       Physical Exam:   Physical Exam  Constitutional:       General: She is not in acute distress  Appearance: She is obese  She is not ill-appearing, toxic-appearing or diaphoretic  HENT:      Head: Normocephalic and atraumatic  Nose: No congestion  Mouth/Throat:      Pharynx: Oropharynx is clear  No oropharyngeal exudate  Eyes:      General:         Right eye: No discharge  Left eye: No discharge  Conjunctiva/sclera: Conjunctivae normal    Cardiovascular:      Rate and Rhythm: Normal rate  Heart sounds: No murmur heard  No gallop  Pulmonary:      Effort: No respiratory distress  Breath sounds: No stridor  No wheezing, rhonchi or rales  Chest:      Chest wall: No tenderness  Abdominal:      General: There is no distension  Palpations: There is no mass  Tenderness: There is no abdominal tenderness  There is no guarding or rebound  Hernia: No hernia is present  Musculoskeletal:         General: No swelling, tenderness, deformity or signs of injury  Right lower leg: No edema  Left lower leg: No edema  Skin:     Coloration: Skin is not jaundiced or pale  Findings: No erythema, lesion or rash  Neurological:      Mental Status: She is alert and oriented to person, place, and time     Psychiatric:         Behavior: Behavior normal           Additional Data:     Labs:  Results from last 7 days   Lab Units 07/09/22  0520   WBC Thousand/uL 5 13   HEMOGLOBIN g/dL 11 7   HEMATOCRIT % 37 1   PLATELETS Thousands/uL 226   NEUTROS PCT % 53   LYMPHS PCT % 38   MONOS PCT % 8   EOS PCT % 1     Results from last 7 days   Lab Units 07/09/22  0520 07/08/22  1342   SODIUM mmol/L 141 138   POTASSIUM mmol/L 3 6 3 8   CHLORIDE mmol/L 105 100   CO2 mmol/L 30 33*   BUN mg/dL 14 16   CREATININE mg/dL 1 16 1 39*   ANION GAP mmol/L 6 5   CALCIUM mg/dL 8 1* 9 1   ALBUMIN g/dL  --  2 7*   TOTAL BILIRUBIN mg/dL  --  0 51   ALK PHOS U/L  -- 94   ALT U/L  --  21   AST U/L  --  16   GLUCOSE RANDOM mg/dL 111 231*         Results from last 7 days   Lab Units 07/09/22  1617 07/09/22  1048 07/09/22  0746 07/08/22  2155 07/08/22  1922 07/08/22  1413   POC GLUCOSE mg/dl 89 169* 103 184* 157* 215*     Results from last 7 days   Lab Units 07/08/22 2001   HEMOGLOBIN A1C % 8 4*           Lines/Drains:  Invasive Devices  Report    Peripheral Intravenous Line  Duration           Peripheral IV 07/08/22 Left Forearm 1 day                      Imaging: Reviewed radiology reports from this admission including: xray(s) ct head     Recent Cultures (last 7 days):         Last 24 Hours Medication List:   Current Facility-Administered Medications   Medication Dose Route Frequency Provider Last Rate    acetaminophen  975 mg Oral Q6H PRN Nani Big, DO      albuterol  2 puff Inhalation Q6H PRN [facility] Amanuel, DO      amLODIPine  10 mg Oral Daily [place]      aspirin  81 mg Oral Daily [facility], DO      calcium carbonate  500 mg Oral Daily PRN JUAN Enciso      enoxaparin  40 mg Subcutaneous Daily [facility], DO      insulin glargine  20 Units Subcutaneous HS [facility], DO      insulin lispro  1-5 Units Subcutaneous HS [facility], DO      insulin lispro  1-6 Units Subcutaneous TID [facility], DO      insulin lispro  4 Units Subcutaneous TID With Meals [facility] Amanuel, DO      ondansetron  4 mg Intravenous Q6H PRN [facility] dunes, DO      pantoprazole  40 mg Oral Daily [facility], DO      polyvinyl alcohol  1 drop Both Eyes Q3H PRN [facility] Amanuel, DO      pravastatin  40 mg Oral Daily With [employer] Amanuel, DO      pregabalin  50 mg Oral TID [facility] Amanuel, DO      sodium chloride  125 mL/hr Intravenous Continuous [facility],  mL/hr (07/09/22 1200)    torsemide  5 mg Oral QAM Nani Moran, DO          Today, Patient Was Seen By: JUAN Gaxiola    **Please Note: This note may have been constructed using a voice recognition system  **

## 2022-07-09 NOTE — ASSESSMENT & PLAN NOTE
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

## 2022-07-09 NOTE — PLAN OF CARE
Problem: Potential for Falls  Goal: Patient will remain free of falls  Description: INTERVENTIONS:  - Educate patient/family on patient safety including physical limitations  - Instruct patient to call for assistance with activity (independent)  - Consult OT/PT to assist with strengthening/mobility   - Keep Call bell within reach  - Keep bed low and locked with side rails adjusted as appropriate  - Keep care items and personal belongings within reach  - Initiate and maintain comfort rounds  - Make Fall Risk Sign visible to staff (moderate fall risk)  - Offer Toileting every 1-2 Hours, in advance of need  - Obtain necessary fall risk management equipment: nonskid footwear, call bell within reach  Outcome: Progressing     Problem: Nutrition/Hydration-ADULT  Goal: Nutrient/Hydration intake appropriate for improving, restoring or maintaining nutritional needs  Description: Monitor and assess patient's nutrition/hydration status for malnutrition  Collaborate with interdisciplinary team and initiate plan and interventions as ordered  Monitor patient's weight and dietary intake as ordered or per policy  Utilize nutrition screening tool and intervene as necessary  Determine patient's food preferences and provide high-protein, high-caloric foods as appropriate       INTERVENTIONS:  - Monitor oral intake, urinary output, labs, and treatment plans  - Assess nutrition and hydration status and recommend course of action  - Evaluate amount of meals eaten  - Assist patient with eating if necessary   - Allow adequate time for meals  - Recommend/ encourage appropriate diets, oral nutritional supplements, and vitamin/mineral supplements  - Provide specific nutrition/hydration education as appropriate  - Include patient/family/caregiver in decisions related to nutrition  Outcome: Progressing     Problem: PAIN - ADULT  Goal: Verbalizes/displays adequate comfort level or baseline comfort level  Description: Interventions:  - Encourage patient to monitor pain and request assistance  - Assess pain using appropriate pain scale (0-110 pain scale)  - Administer analgesics based on type and severity of pain and evaluate response  - Implement non-pharmacological measures as appropriate and evaluate response  - Consider cultural and social influences on pain and pain management  - Notify physician/advanced practitioner if interventions unsuccessful or patient reports new pain  Outcome: Progressing     Problem: INFECTION - ADULT  Goal: Absence or prevention of progression during hospitalization  Description: INTERVENTIONS:  - Assess and monitor for signs and symptoms of infection  - Monitor lab/diagnostic results  - Monitor all insertion sites, i e  indwelling lines  - Administer medications as ordered  - Instruct and encourage patient and family to use good hand hygiene technique  Outcome: Progressing     Problem: SAFETY ADULT  Goal: Patient will remain free of falls  Description: INTERVENTIONS:  - Educate patient/family on patient safety including physical limitations  - Instruct patient to call for assistance with activity (independent)  - Consult OT/PT to assist with strengthening/mobility   - Keep Call bell within reach  - Keep bed low and locked with side rails adjusted as appropriate  - Keep care items and personal belongings within reach  - Initiate and maintain comfort rounds  - Make Fall Risk Sign visible to staff (moderate fall risk)  - Offer Toileting every 1-2 Hours, in advance of need  - Obtain necessary fall risk management equipment: nonskid footwear, call bell within reach  Outcome: Progressing  Goal: Maintain or return to baseline ADL function  Description: INTERVENTIONS:  - Educate patient/family on patient safety including physical limitations  - Instruct patient to call for assistance with activity (independent)  - Consult OT/PT to assist with strengthening/mobility   - Keep Call bell within reach  - Keep bed low and locked with side rails adjusted as appropriate  - Keep care items and personal belongings within reach  - Initiate and maintain comfort rounds  - Make Fall Risk Sign visible to staff (moderate fall risk)  - Offer Toileting every 1-2 Hours, in advance of need  - Obtain necessary fall risk management equipment: nonskid footwear, call bell within reach  Outcome: Progressing  Goal: Maintains/Returns to pre admission functional level  Description: INTERVENTIONS:  -  Assess patient's ability to carry out ADLs; (independent))  - Assess/evaluate cause of self-care deficits   - Assess range of motion  - Assess patient's mobility; (independent)  - Assess patient's need for assistive devices and provide as appropriate  - Encourage maximum independence but intervene and supervise when necessary  - Assess for home care needs following discharge   - Consider OT consult to assist with ADL evaluation and planning for discharge  - Provide patient education as appropriate  Outcome: Progressing     Problem: DISCHARGE PLANNING  Goal: Discharge to home or other facility with appropriate resources  Description: INTERVENTIONS:  - Identify barriers to discharge w/patient and caregiver  - Arrange for needed discharge resources and transportation as appropriate  - Identify discharge learning needs (meds, wound care, etc )  - Refer to Case Management Department for coordinating discharge planning if the patient needs post-hospital services based on physician/advanced practitioner order or complex needs related to functional status, cognitive ability, or social support system  Outcome: Progressing     Problem: Knowledge Deficit  Goal: Patient/family/caregiver demonstrates understanding of disease process, treatment plan, medications, and discharge instructions  Description: Complete learning assessment and assess knowledge base    Interventions:  - Provide teaching at level of understanding  - Provide teaching via preferred learning methods  Outcome: Progressing     Problem: GASTROINTESTINAL - ADULT  Goal: Minimal or absence of nausea and/or vomiting  Description: INTERVENTIONS:  - Administer IV fluids if ordered to ensure adequate hydration  - Maintain NPO status until nausea and vomiting are resolved  - Nasogastric tube if ordered  - Administer ordered antiemetic medications as needed  - Provide nonpharmacologic comfort measures as appropriate  - Advance diet as tolerated, if ordered  - Consider nutrition services referral to assist patient with adequate nutrition and appropriate food choices  Outcome: Completed  Goal: Maintains adequate nutritional intake  Description: INTERVENTIONS:  - Monitor percentage of each meal consumed  - Identify factors contributing to decreased intake, treat as appropriate  - Assist with meals as needed  - Monitor I&O, weight, and lab values if indicated  - Obtain nutrition services referral as needed  Outcome: Progressing  Goal: Oral mucous membranes remain intact  Description: INTERVENTIONS  - Assess oral mucosa and hygiene practices  - Implement preventative oral hygiene regimen  - Implement oral medicated treatments as ordered  - Initiate Nutrition services referral as needed  Outcome: Progressing     Problem: METABOLIC, FLUID AND ELECTROLYTES - ADULT  Goal: Electrolytes maintained within normal limits  Description: INTERVENTIONS:  - Monitor labs and assess patient for signs and symptoms of electrolyte imbalances  - Administer electrolyte replacement as ordered  - Monitor response to electrolyte replacements, including repeat lab results as appropriate  - Instruct patient on fluid and nutrition as appropriate  Outcome: Progressing  Goal: Fluid balance maintained  Description: INTERVENTIONS:  - Monitor labs   - Monitor I/O and WT  - Instruct patient on fluid and nutrition as appropriate  - Assess for signs & symptoms of volume excess or deficit  Outcome: Progressing  Goal: Glucose maintained within target range  Description: INTERVENTIONS:  - Monitor Blood Glucose as ordered  - Assess for signs and symptoms of hyperglycemia and hypoglycemia  - Administer ordered medications to maintain glucose within target range  - Assess nutritional intake and initiate nutrition service referral as needed  Outcome: Progressing

## 2022-07-09 NOTE — ASSESSMENT & PLAN NOTE
Likely secondary to migrainous headache  · IV fluid repletion, headache this morning but now improved  · Patient now with improved p o   Intake  · Obtain lab work in a m / if tolerating breakfast okay for discharge  · Patient does report that she is due for colonoscopy/EGD - since she has symptoms of severe reflux - sees Dr Aaron Huston with GI  · Call to schedule follow-up

## 2022-07-10 PROBLEM — R11.2 NAUSEA AND VOMITING: Status: RESOLVED | Noted: 2022-07-09 | Resolved: 2022-07-10

## 2022-07-10 LAB
ANION GAP SERPL CALCULATED.3IONS-SCNC: 6 MMOL/L (ref 4–13)
BUN SERPL-MCNC: 16 MG/DL (ref 5–25)
CALCIUM SERPL-MCNC: 8.4 MG/DL (ref 8.3–10.1)
CHLORIDE SERPL-SCNC: 105 MMOL/L (ref 100–108)
CO2 SERPL-SCNC: 29 MMOL/L (ref 21–32)
CREAT SERPL-MCNC: 1.19 MG/DL (ref 0.6–1.3)
GFR SERPL CREATININE-BSD FRML MDRD: 47 ML/MIN/1.73SQ M
GLUCOSE SERPL-MCNC: 103 MG/DL (ref 65–140)
GLUCOSE SERPL-MCNC: 63 MG/DL (ref 65–140)
GLUCOSE SERPL-MCNC: 74 MG/DL (ref 65–140)
GLUCOSE SERPL-MCNC: 77 MG/DL (ref 65–140)
GLUCOSE SERPL-MCNC: 84 MG/DL (ref 65–140)
GLUCOSE SERPL-MCNC: 86 MG/DL (ref 65–140)
POTASSIUM SERPL-SCNC: 3.2 MMOL/L (ref 3.5–5.3)
SODIUM SERPL-SCNC: 140 MMOL/L (ref 136–145)

## 2022-07-10 PROCEDURE — 80048 BASIC METABOLIC PNL TOTAL CA: CPT | Performed by: NURSE PRACTITIONER

## 2022-07-10 PROCEDURE — 82948 REAGENT STRIP/BLOOD GLUCOSE: CPT

## 2022-07-10 PROCEDURE — 99232 SBSQ HOSP IP/OBS MODERATE 35: CPT | Performed by: INTERNAL MEDICINE

## 2022-07-10 RX ORDER — INSULIN GLARGINE 100 [IU]/ML
15 INJECTION, SOLUTION SUBCUTANEOUS
Status: DISCONTINUED | OUTPATIENT
Start: 2022-07-10 | End: 2022-07-10

## 2022-07-10 RX ORDER — POTASSIUM CHLORIDE 20 MEQ/1
40 TABLET, EXTENDED RELEASE ORAL ONCE
Status: COMPLETED | OUTPATIENT
Start: 2022-07-10 | End: 2022-07-10

## 2022-07-10 RX ORDER — LISINOPRIL 20 MG/1
20 TABLET ORAL DAILY
Status: DISCONTINUED | OUTPATIENT
Start: 2022-07-10 | End: 2022-07-11 | Stop reason: HOSPADM

## 2022-07-10 RX ORDER — INSULIN GLARGINE 100 [IU]/ML
12 INJECTION, SOLUTION SUBCUTANEOUS
Status: DISCONTINUED | OUTPATIENT
Start: 2022-07-10 | End: 2022-07-11 | Stop reason: HOSPADM

## 2022-07-10 RX ORDER — ATORVASTATIN CALCIUM 40 MG/1
80 TABLET, FILM COATED ORAL
Status: DISCONTINUED | OUTPATIENT
Start: 2022-07-10 | End: 2022-07-11 | Stop reason: HOSPADM

## 2022-07-10 RX ADMIN — TORSEMIDE 5 MG: 10 TABLET ORAL at 08:45

## 2022-07-10 RX ADMIN — ATORVASTATIN CALCIUM 80 MG: 40 TABLET, FILM COATED ORAL at 17:35

## 2022-07-10 RX ADMIN — ASPIRIN 81 MG CHEWABLE TABLET 81 MG: 81 TABLET CHEWABLE at 08:44

## 2022-07-10 RX ADMIN — INSULIN LISPRO 4 UNITS: 100 INJECTION, SOLUTION INTRAVENOUS; SUBCUTANEOUS at 12:08

## 2022-07-10 RX ADMIN — INSULIN LISPRO 4 UNITS: 100 INJECTION, SOLUTION INTRAVENOUS; SUBCUTANEOUS at 17:36

## 2022-07-10 RX ADMIN — POTASSIUM CHLORIDE 40 MEQ: 1500 TABLET, EXTENDED RELEASE ORAL at 12:08

## 2022-07-10 RX ADMIN — PANTOPRAZOLE SODIUM 40 MG: 40 TABLET, DELAYED RELEASE ORAL at 08:44

## 2022-07-10 RX ADMIN — INSULIN LISPRO 4 UNITS: 100 INJECTION, SOLUTION INTRAVENOUS; SUBCUTANEOUS at 08:44

## 2022-07-10 RX ADMIN — PREGABALIN 50 MG: 50 CAPSULE ORAL at 08:45

## 2022-07-10 RX ADMIN — LISINOPRIL 20 MG: 20 TABLET ORAL at 13:27

## 2022-07-10 RX ADMIN — PREGABALIN 50 MG: 50 CAPSULE ORAL at 22:07

## 2022-07-10 RX ADMIN — ENOXAPARIN SODIUM 40 MG: 40 INJECTION SUBCUTANEOUS at 08:45

## 2022-07-10 RX ADMIN — PREGABALIN 50 MG: 50 CAPSULE ORAL at 17:36

## 2022-07-10 RX ADMIN — AMLODIPINE BESYLATE 10 MG: 10 TABLET ORAL at 08:45

## 2022-07-10 NOTE — ASSESSMENT & PLAN NOTE
Lab Results   Component Value Date    EGFR 47 07/10/2022    EGFR 49 07/09/2022    EGFR 39 07/08/2022    CREATININE 1 19 07/10/2022    CREATININE 1 16 07/09/2022    CREATININE 1 39 (H) 07/08/2022   Creatinine mildly elevated above baseline,  hot elevated enough to be consistent with acute kidney injury    · Improved at baseline

## 2022-07-10 NOTE — UTILIZATION REVIEW
Continued Stay Review    Date 2:      07/10/2022                    Current Patient Class: Inpatient  Current Level of Care: Med/Surg    HPI:66 y o  female initially admitted on  07/08/2022    Assessment/Plan: Headache Syndrome  Analgesia scheduled and PRN  Resume norvasc & menadex  Follow AM Labs  Ad Lisinopril  Monitor & trend VS hussein BP        Vital Signs: /82   Pulse 66   Temp (!) 97 2 °F (36 2 °C) (Oral)   Resp 16   Ht 5' 5" (1 651 m)   Wt 90 2 kg (198 lb 13 7 oz)   SpO2 97%   BMI 33 09 kg/m²     Date and Time Eye Opening Best Verbal Response Best Motor Response Ducor Coma Scale Score   07/09/22 0258 4 5 6 15   07/08/22 1330 4 5 6 15     Pertinent Labs/Diagnostic Results:        Current CT head obtained 07/08/2022 demonstrates appearance of extra-axial spaces radiologist questioning intracranial hypertension    Results from last 7 days   Lab Units 07/09/22  0520 07/08/22  1342   WBC Thousand/uL 5 13 7 45   HEMOGLOBIN g/dL 11 7 13 0   HEMATOCRIT % 37 1 40 1   PLATELETS Thousands/uL 226 260   NEUTROS ABS Thousands/µL 2 70 6 03     Results from last 7 days   Lab Units 07/10/22  0428 07/09/22  0520 07/08/22  1342   SODIUM mmol/L 140 141 138   POTASSIUM mmol/L 3 2* 3 6 3 8   CHLORIDE mmol/L 105 105 100   CO2 mmol/L 29 30 33*   ANION GAP mmol/L 6 6 5   BUN mg/dL 16 14 16   CREATININE mg/dL 1 19 1 16 1 39*   EGFR ml/min/1 73sq m 47 49 39   CALCIUM mg/dL 8 4 8 1* 9 1   MAGNESIUM mg/dL  --  1 7  --    PHOSPHORUS mg/dL  --  3 3  --      Results from last 7 days   Lab Units 07/08/22  1342   AST U/L 16   ALT U/L 21   ALK PHOS U/L 94   TOTAL PROTEIN g/dL 7 4   ALBUMIN g/dL 2 7*   TOTAL BILIRUBIN mg/dL 0 51     Results from last 7 days   Lab Units 07/10/22  1104 07/10/22  0733 07/10/22  0650 07/09/22  2122 07/09/22  1617 07/09/22  1048 07/09/22  0746 07/08/22  2155 07/08/22 1922 07/08/22  1413   POC GLUCOSE mg/dl 84 86 63* 96 89 169* 103 184* 157* 215*     Results from last 7 days   Lab Units 07/10/22  0428 07/09/22  0520 07/08/22  1342   GLUCOSE RANDOM mg/dL 74 111 231*     Results from last 7 days   Lab Units 07/08/22 2001   HEMOGLOBIN A1C % 8 4*   EAG mg/dl 194     Results from last 7 days   Lab Units 07/08/22  1838 07/08/22  1604 07/08/22  1342   HS TNI 0HR ng/L  --   --  8   HS TNI 2HR ng/L  --  8  --    HSTNI D2 ng/L  --  0  --    HS TNI 4HR ng/L 9  --   --    HSTNI D4 ng/L 1  --   --      Results from last 7 days   Lab Units 07/08/22  1342   LIPASE u/L 110     Medications:   Scheduled Medications:  amLODIPine, 10 mg, Oral, Daily  aspirin, 81 mg, Oral, Daily  atorvastatin, 80 mg, Oral, Daily With Dinner  enoxaparin, 40 mg, Subcutaneous, Daily  insulin glargine, 12 Units, Subcutaneous, HS  insulin lispro, 1-5 Units, Subcutaneous, HS  insulin lispro, 1-6 Units, Subcutaneous, TID AC  insulin lispro, 4 Units, Subcutaneous, TID With Meals  lisinopril, 20 mg, Oral, Daily  pantoprazole, 40 mg, Oral, Daily  pregabalin, 50 mg, Oral, TID  torsemide, 5 mg, Oral, QAM      Continuous IV Infusions:  sodium chloride 0 9 % infusion  Rate: 75 mL/hr Dose: 75 mL/hr  Freq: Continuous Route: IV  Indications of Use: IV Hydration  Last Dose: Stopped (07/10/22 1019)  Start: 07/09/22 1645 End: 07/10/22 1011  sodium chloride 0 9 % infusion  Rate: 125 mL/hr Dose: 125 mL/hr  Freq: Continuous Route: IV  Last Dose: Stopped (07/09/22 1714)  Start: 07/08/22 1845 End: 07/09/22 1633    PRN Meds:  acetaminophen, 975 mg, Oral, Q6H PRN   X 1 dose 7/9  albuterol, 2 puff, Inhalation, Q6H PRN  calcium carbonate, 500 mg, Oral, Daily PRN  ondansetron, 4 mg, Intravenous, Q6H PRN  polyvinyl alcohol, 1 drop, Both Eyes, Q3H PRN        Discharge Plan: D    Network Utilization Review Department  ATTENTION: Please call with any questions or concerns to [phone] and carefully listen to the prompts so that you are directed to the right person   All voicemails are confidential   Lino Wood all requests for admission clinical reviews, approved or denied determinations and any other requests to dedicated fax number below belonging to the campus where the patient is receiving treatment   List of dedicated fax numbers for the Facilities:  [address] DENIALS (Administrative/Medical Necessity) [phone]   [address] (Maternity/NICU/Pediatrics) [phone] [address]  [address] [facility] [place] [address] [id number] [place] [address] [address] [facility] [phone]

## 2022-07-10 NOTE — PLAN OF CARE
Problem: Potential for Falls  Goal: Patient will remain free of falls  Description: INTERVENTIONS:  - Educate patient/family on patient safety including physical limitations  - Instruct patient to call for assistance with activity (independent)  - Consult OT/PT to assist with strengthening/mobility   - Keep Call bell within reach  - Keep bed low and locked with side rails adjusted as appropriate  - Keep care items and personal belongings within reach  - Initiate and maintain comfort rounds  - Make Fall Risk Sign visible to staff (moderate fall risk)  - Offer Toileting every 1-2 Hours, in advance of need  - Obtain necessary fall risk management equipment: nonskid footwear, call bell within reach  Outcome: Progressing     Problem: Nutrition/Hydration-ADULT  Goal: Nutrient/Hydration intake appropriate for improving, restoring or maintaining nutritional needs  Description: Monitor and assess patient's nutrition/hydration status for malnutrition  Collaborate with interdisciplinary team and initiate plan and interventions as ordered  Monitor patient's weight and dietary intake as ordered or per policy  Utilize nutrition screening tool and intervene as necessary  Determine patient's food preferences and provide high-protein, high-caloric foods as appropriate       INTERVENTIONS:  - Monitor oral intake, urinary output, labs, and treatment plans  - Assess nutrition and hydration status and recommend course of action  - Evaluate amount of meals eaten  - Assist patient with eating if necessary   - Allow adequate time for meals  - Recommend/ encourage appropriate diets, oral nutritional supplements, and vitamin/mineral supplements  - Provide specific nutrition/hydration education as appropriate  - Include patient/family/caregiver in decisions related to nutrition  Outcome: Progressing     Problem: PAIN - ADULT  Goal: Verbalizes/displays adequate comfort level or baseline comfort level  Description: Interventions:  - Encourage patient to monitor pain and request assistance  - Assess pain using appropriate pain scale (0-110 pain scale)  - Administer analgesics based on type and severity of pain and evaluate response  - Implement non-pharmacological measures as appropriate and evaluate response  - Consider cultural and social influences on pain and pain management  - Notify physician/advanced practitioner if interventions unsuccessful or patient reports new pain  Outcome: Progressing     Problem: INFECTION - ADULT  Goal: Absence or prevention of progression during hospitalization  Description: INTERVENTIONS:  - Assess and monitor for signs and symptoms of infection  - Monitor lab/diagnostic results  - Monitor all insertion sites, i e  indwelling lines  - Administer medications as ordered  - Instruct and encourage patient and family to use good hand hygiene technique  Outcome: Progressing     Problem: SAFETY ADULT  Goal: Patient will remain free of falls  Description: INTERVENTIONS:  - Educate patient/family on patient safety including physical limitations  - Instruct patient to call for assistance with activity (independent)  - Consult OT/PT to assist with strengthening/mobility   - Keep Call bell within reach  - Keep bed low and locked with side rails adjusted as appropriate  - Keep care items and personal belongings within reach  - Initiate and maintain comfort rounds  - Make Fall Risk Sign visible to staff (moderate fall risk)  - Offer Toileting every 1-2 Hours, in advance of need  - Obtain necessary fall risk management equipment: nonskid footwear, call bell within reach  Outcome: Progressing  Goal: Maintain or return to baseline ADL function  Description: INTERVENTIONS:  - Educate patient/family on patient safety including physical limitations  - Instruct patient to call for assistance with activity (independent)  - Consult OT/PT to assist with strengthening/mobility   - Keep Call bell within reach  - Keep bed low and locked with side rails adjusted as appropriate  - Keep care items and personal belongings within reach  - Initiate and maintain comfort rounds  - Make Fall Risk Sign visible to staff (moderate fall risk)  - Offer Toileting every 1-2 Hours, in advance of need  - Obtain necessary fall risk management equipment: nonskid footwear, call bell within reach  Outcome: Progressing  Goal: Maintains/Returns to pre admission functional level  Description: INTERVENTIONS:  -  Assess patient's ability to carry out ADLs; (independent))  - Assess/evaluate cause of self-care deficits   - Assess range of motion  - Assess patient's mobility; (independent)  - Assess patient's need for assistive devices and provide as appropriate  - Encourage maximum independence but intervene and supervise when necessary  - Assess for home care needs following discharge   - Consider OT consult to assist with ADL evaluation and planning for discharge  - Provide patient education as appropriate  Outcome: Progressing     Problem: DISCHARGE PLANNING  Goal: Discharge to home or other facility with appropriate resources  Description: INTERVENTIONS:  - Identify barriers to discharge w/patient and caregiver  - Arrange for needed discharge resources and transportation as appropriate  - Identify discharge learning needs (meds, wound care, etc )  - Refer to Case Management Department for coordinating discharge planning if the patient needs post-hospital services based on physician/advanced practitioner order or complex needs related to functional status, cognitive ability, or social support system  Outcome: Progressing     Problem: Knowledge Deficit  Goal: Patient/family/caregiver demonstrates understanding of disease process, treatment plan, medications, and discharge instructions  Description: Complete learning assessment and assess knowledge base    Interventions:  - Provide teaching at level of understanding  - Provide teaching via preferred learning methods  Outcome: Progressing     Problem: GASTROINTESTINAL - ADULT  Goal: Maintains adequate nutritional intake  Description: INTERVENTIONS:  - Monitor percentage of each meal consumed  - Identify factors contributing to decreased intake, treat as appropriate  - Assist with meals as needed  - Monitor I&O, weight, and lab values if indicated  - Obtain nutrition services referral as needed  Outcome: Progressing  Goal: Oral mucous membranes remain intact  Description: INTERVENTIONS  - Assess oral mucosa and hygiene practices  - Implement preventative oral hygiene regimen  - Implement oral medicated treatments as ordered  - Initiate Nutrition services referral as needed  Outcome: Progressing     Problem: METABOLIC, FLUID AND ELECTROLYTES - ADULT  Goal: Electrolytes maintained within normal limits  Description: INTERVENTIONS:  - Monitor labs and assess patient for signs and symptoms of electrolyte imbalances  - Administer electrolyte replacement as ordered  - Monitor response to electrolyte replacements, including repeat lab results as appropriate  - Instruct patient on fluid and nutrition as appropriate  Outcome: Progressing  Goal: Fluid balance maintained  Description: INTERVENTIONS:  - Monitor labs   - Monitor I/O and WT  - Instruct patient on fluid and nutrition as appropriate  - Assess for signs & symptoms of volume excess or deficit  Outcome: Progressing  Goal: Glucose maintained within target range  Description: INTERVENTIONS:  - Monitor Blood Glucose as ordered  - Assess for signs and symptoms of hyperglycemia and hypoglycemia  - Administer ordered medications to maintain glucose within target range  - Assess nutritional intake and initiate nutrition service referral as needed  Outcome: Progressing

## 2022-07-10 NOTE — PROGRESS NOTES
[facility] [address]  Progress Note - Media Yanna 1955, 77 y o  female MRN: [id number]  Unit/Bed#: 658-99 Encounter: [id number]  Primary Care Provider: Osito Smallwood DO   Date and time admitted to hospital: 7/8/2022  1:05 PM    * Other headache syndrome  Assessment & Plan  Pt noted to have somewhat of a chronic headache that worsened two day prior to admission  She reports this headache is a little different as it is on the left side instead of the right  Reports this started after she contracted COVID late 2021  Does report photophobia, denies any dizziness lightheadedness, visual changes numbness or tingling  · May be related to hypertension  · Patient with history meningocele  · Recently seen 03/07/2022 by Neurosurgery Dr Kemi Danielson - for 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  · Per Neurosurgery these remained stable and they have 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    However if patient should develop rhinorrhea she needs to contact Neurosurgery  · Follow-up as needed   · Current CT head obtained 07/08/2022 demonstrates appearance of extra-axial spaces radiologist questioning intracranial hypertension  · Headache has resolved, suspected migraine    Essential hypertension  Assessment & Plan  · Blood pressure improved,   · Resume home Norvasc and Demadex, patient has not been on her prescription medications in several months  · DC IV fluid  · Follow up a m  labs    Add lisinopril 20 mg daily    Chronic kidney disease, stage 3 unspecified [facility])  Assessment & Plan  Lab Results   Component Value Date    EGFR 47 07/10/2022    EGFR 49 07/09/2022    EGFR 39 07/08/2022    CREATININE 1 19 07/10/2022    CREATININE 1 16 07/09/2022    CREATININE 1 39 (H) 07/08/2022   Creatinine mildly elevated above baseline,  hot elevated enough to be consistent with acute kidney injury    · Improved at baseline     Type 2 diabetes mellitus with complication, with long-term current use of insulin [facility])  Assessment & Plan  Lab Results   Component Value Date    HGBA1C 8 4 (H) 2022       Recent Labs     22  2122 07/10/22  0650 07/10/22  0733 07/10/22  1104   POCGLU 96 63* 86 84       Blood Sugar Average: Last 72 hrs:  (P) 124 6     ·  A1c, ordered now 8 4 with improvement   · basal bolus insulin therapy, Lantus and Humalog    Decrease Lantus to 12 units q h s , continue Humalog 4 units with meals    Depression  Assessment & Plan  ·  Lexapro and BuSpar had been dcd by pt ,  she does not want to restart this medication  Mild persistent asthma without complication  Assessment & Plan  · Not in exacerbation   · Continue albuterol HFA p r n  Myasthenia gravis [facility])  Assessment & Plan  · Patient is not on medication for this    Thyroid nodule  Assessment & Plan  Status post thyroid biopsy in   · Follows with ENT Dr Emeka Giang  · Biopsy obtained at that time returned with [place] [facility]   · She then returned for repeat ultrasound demonstrating no significant interval growth in thyroid nodules  However prominent left lobe nodule did have some interval growth  · Dr Patrice Dalton had referred repeat biopsy and even spoke with the daughter to coordinate but patient forgot to have this done  Patient had followed up in the office on 2021 for a follow-up visit of a biopsy that was meant to be obtained  but patient had been diagnosed with COVID on 10/11 and patient reports she was too fatigued to have the procedure done  · Procedure at been rescheduled for 2022  This was not done  Educated patient on follow-up  Placed in discharge instructions         Code Status: Level 1 - Full Code    Subjective:   Patient overall feeling better, is slightly dizzy today, no specific deficits, no numbness no tingling  Patient ambulating without difficulty, headache resolved  No shortness of breath      Objective:     Vitals:   Temp (24hrs), Av 6 °F (36 4 °C), Min:97 2 °F (36 2 °C), Max:97 9 °F (36 6 °C)    Temp:  [97 2 °F (36 2 °C)-97 9 °F (36 6 °C)] 97 2 °F (36 2 °C)  HR:  [56-73] 73  Resp:  [16-18] 16  BP: (157-164)/(80-84) 164/83  SpO2:  [95 %-98 %] 95 %  Body mass index is 33 09 kg/m²  Input and Output Summary (last 24 hours): Intake/Output Summary (Last 24 hours) at 7/10/2022 1234  Last data filed at 7/10/2022 1019  Gross per 24 hour   Intake 2724 58 ml   Output --   Net 2724 58 ml       Physical Exam:   Physical Exam  Constitutional:       General: She is not in acute distress  Appearance: She is obese  She is not ill-appearing, toxic-appearing or diaphoretic  HENT:      Head: Normocephalic and atraumatic  Right Ear: External ear normal       Left Ear: External ear normal    Musculoskeletal:         General: Normal range of motion  Cervical back: Normal range of motion  Right lower leg: No edema  Left lower leg: No edema  Skin:     General: Skin is warm and dry  Neurological:      General: No focal deficit present  Mental Status: She is alert and oriented to person, place, and time  Mental status is at baseline  Cranial Nerves: No cranial nerve deficit  Sensory: No sensory deficit  Motor: No weakness  Coordination: Coordination normal       Gait: Gait normal    Psychiatric:         Mood and Affect: Mood normal          Behavior: Behavior normal          Thought Content:  Thought content normal          Judgment: Judgment normal           Additional Data:     Labs:  Results from last 7 days   Lab Units 07/09/22  0520   WBC Thousand/uL 5 13   HEMOGLOBIN g/dL 11 7   HEMATOCRIT % 37 1   PLATELETS Thousands/uL 226   NEUTROS PCT % 53   LYMPHS PCT % 38   MONOS PCT % 8   EOS PCT % 1     Results from last 7 days   Lab Units 07/10/22  0428 07/09/22  0520 07/08/22  1342   SODIUM mmol/L 140   < > 138   POTASSIUM mmol/L 3 2*   < > 3 8   CHLORIDE mmol/L 105   < > 100   CO2 mmol/L 29   < > 33*   BUN mg/dL 16   < > 16 CREATININE mg/dL 1 19   < > 1 39*   ANION GAP mmol/L 6   < > 5   CALCIUM mg/dL 8 4   < > 9 1   ALBUMIN g/dL  --   --  2 7*   TOTAL BILIRUBIN mg/dL  --   --  0 51   ALK PHOS U/L  --   --  94   ALT U/L  --   --  21   AST U/L  --   --  16   GLUCOSE RANDOM mg/dL 74   < > 231*    < > = values in this interval not displayed  Results from last 7 days   Lab Units 07/10/22  1104 07/10/22  0733 07/10/22  0650 07/09/22  2122 07/09/22  1617 07/09/22  1048 07/09/22  0746 07/08/22  2155 07/08/22  1922 07/08/22  1413   POC GLUCOSE mg/dl 84 86 63* 96 89 169* 103 184* 157* 215*     Results from last 7 days   Lab Units 07/08/22 2001   HEMOGLOBIN A1C % 8 4*           Lines/Drains:  Invasive Devices  Report    Peripheral Intravenous Line  Duration           Peripheral IV 07/08/22 Left Forearm 1 day                      Imaging: No pertinent imaging reviewed      Recent Cultures (last 7 days):         Last 24 Hours Medication List:   Current Facility-Administered Medications   Medication Dose Route Frequency Provider Last Rate    acetaminophen  975 mg Oral Q6H PRN [facility] Amanuel, DO      albuterol  2 puff Inhalation Q6H PRN [facility], DO      amLODIPine  10 mg Oral Daily [place], [place]      aspirin  81 mg Oral Daily [place]      atorvastatin  80 mg Oral Daily [employer] Amanuel, DO      calcium carbonate  500 mg Oral Daily PRN Raymond Betters, CRNP      enoxaparin  40 mg Subcutaneous Daily [place]      insulin glargine  12 Units Subcutaneous HS [facility], DO      insulin lispro  1-5 Units Subcutaneous HS [facility], DO      insulin lispro  1-6 Units Subcutaneous TID [facility]      insulin lispro  4 Units Subcutaneous TID With Meals [facility], DO      lisinopril  20 mg Oral Daily [facility], [place]      ondansetron  4 mg Intravenous Q6H PRN Barney Vital DO      pantoprazole  40 mg Oral Daily Malathi Hernandez Bobby Zaragoza DO      polyvinyl alcohol  1 drop Both Eyes Q3H PRN Ervin fleming DO      pregabalin  50 mg Oral TID Ervin fleming DO      torsemide  5 mg Oral QAM Magali Charles DO          Today, Patient Was Seen By: Magali Charles DO    **Please Note: This note may have been constructed using a voice recognition system  **

## 2022-07-10 NOTE — ASSESSMENT & PLAN NOTE
· Blood pressure improved,   · Resume home Norvasc and Demadex, patient has not been on her prescription medications in several months  · DC IV fluid  · Follow up a m  labs    Add lisinopril 20 mg daily

## 2022-07-10 NOTE — ASSESSMENT & PLAN NOTE
Status post thyroid biopsy in 2020  · Follows with ENT Dr Emeka Giang  · Biopsy obtained at that time returned with [place] [facility]   · She then returned for repeat ultrasound demonstrating no significant interval growth in thyroid nodules  However prominent left lobe nodule did have some interval growth  · Dr Patrice Dalton had referred repeat biopsy and even spoke with the daughter to coordinate but patient forgot to have this done  Patient had followed up in the office on 11/16/2021 for a follow-up visit of a biopsy that was meant to be obtained 11/1 but patient had been diagnosed with COVID on 10/11 and patient reports she was too fatigued to have the procedure done  · Procedure at been rescheduled for 01/03/2022  This was not done  Educated patient on follow-up    Placed in discharge instructions

## 2022-07-10 NOTE — ASSESSMENT & PLAN NOTE
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

## 2022-07-10 NOTE — ASSESSMENT & PLAN NOTE
Lab Results   Component Value Date    HGBA1C 8 4 (H) 07/08/2022       Recent Labs     07/09/22 2122 07/10/22  0650 07/10/22  0733 07/10/22  1104   POCGLU 96 63* 86 84       Blood Sugar Average: Last 72 hrs:  (P) 124 6     ·  A1c, ordered now 8 4 with improvement   · basal bolus insulin therapy, Lantus and Humalog    Decrease Lantus to 12 units q h s , continue Humalog 4 units with meals Problem: Patient Care Overview  Goal: Plan of Care Review  Outcome: Ongoing (interventions implemented as appropriate)  Patient is AAOx4. Vital signs stable. No falls throughout shift. Patient ambulates independently. No complaints of pain. 2D Echo and Carotid Ultrasound today. Plan to discharge patient home today.

## 2022-07-11 ENCOUNTER — TRANSITIONAL CARE MANAGEMENT (OUTPATIENT)
Dept: FAMILY MEDICINE CLINIC | Facility: CLINIC | Age: 67
End: 2022-07-11

## 2022-07-11 VITALS
OXYGEN SATURATION: 96 % | DIASTOLIC BLOOD PRESSURE: 82 MMHG | HEART RATE: 82 BPM | TEMPERATURE: 97.7 F | BODY MASS INDEX: 33.13 KG/M2 | RESPIRATION RATE: 19 BRPM | SYSTOLIC BLOOD PRESSURE: 153 MMHG | HEIGHT: 65 IN | WEIGHT: 198.85 LBS

## 2022-07-11 LAB
ATRIAL RATE: 62 BPM
ATRIAL RATE: 77 BPM
GLUCOSE SERPL-MCNC: 146 MG/DL (ref 65–140)
GLUCOSE SERPL-MCNC: 91 MG/DL (ref 65–140)
P AXIS: 13 DEGREES
P AXIS: 42 DEGREES
PR INTERVAL: 192 MS
PR INTERVAL: 206 MS
QRS AXIS: -35 DEGREES
QRS AXIS: -38 DEGREES
QRSD INTERVAL: 104 MS
QRSD INTERVAL: 108 MS
QT INTERVAL: 444 MS
QT INTERVAL: 486 MS
QTC INTERVAL: 493 MS
QTC INTERVAL: 502 MS
T WAVE AXIS: -52 DEGREES
T WAVE AXIS: -53 DEGREES
VENTRICULAR RATE: 62 BPM
VENTRICULAR RATE: 77 BPM

## 2022-07-11 PROCEDURE — 99239 HOSP IP/OBS DSCHRG MGMT >30: CPT | Performed by: INTERNAL MEDICINE

## 2022-07-11 PROCEDURE — 93010 ELECTROCARDIOGRAM REPORT: CPT | Performed by: INTERNAL MEDICINE

## 2022-07-11 PROCEDURE — 82948 REAGENT STRIP/BLOOD GLUCOSE: CPT

## 2022-07-11 RX ORDER — PREGABALIN 50 MG/1
50 CAPSULE ORAL 3 TIMES DAILY
Qty: 90 CAPSULE | Refills: 0 | Status: SHIPPED | OUTPATIENT
Start: 2022-07-11 | End: 2022-07-21

## 2022-07-11 RX ORDER — ATORVASTATIN CALCIUM 80 MG/1
80 TABLET, FILM COATED ORAL
Qty: 30 TABLET | Refills: 0 | Status: SHIPPED | OUTPATIENT
Start: 2022-07-11

## 2022-07-11 RX ORDER — LISINOPRIL 20 MG/1
20 TABLET ORAL DAILY
Qty: 30 TABLET | Refills: 0 | Status: SHIPPED | OUTPATIENT
Start: 2022-07-12

## 2022-07-11 RX ORDER — AMLODIPINE BESYLATE 10 MG/1
10 TABLET ORAL DAILY
Qty: 30 TABLET | Refills: 0 | Status: SHIPPED | OUTPATIENT
Start: 2022-07-11 | End: 2022-09-19 | Stop reason: SDUPTHER

## 2022-07-11 RX ORDER — POLYETHYLENE GLYCOL 3350 17 G/17G
17 POWDER, FOR SOLUTION ORAL DAILY PRN
Qty: 30 EACH | Refills: 0 | Status: SHIPPED | OUTPATIENT
Start: 2022-07-11

## 2022-07-11 RX ORDER — POLYETHYLENE GLYCOL 3350 17 G/17G
17 POWDER, FOR SOLUTION ORAL DAILY
Status: DISCONTINUED | OUTPATIENT
Start: 2022-07-11 | End: 2022-07-11 | Stop reason: HOSPADM

## 2022-07-11 RX ORDER — PANTOPRAZOLE SODIUM 40 MG/1
40 TABLET, DELAYED RELEASE ORAL DAILY
Qty: 90 TABLET | Refills: 0 | Status: SHIPPED | OUTPATIENT
Start: 2022-07-11

## 2022-07-11 RX ADMIN — POLYETHYLENE GLYCOL 3350 17 G: 17 POWDER, FOR SOLUTION ORAL at 10:24

## 2022-07-11 RX ADMIN — AMLODIPINE BESYLATE 10 MG: 10 TABLET ORAL at 08:46

## 2022-07-11 RX ADMIN — ENOXAPARIN SODIUM 40 MG: 40 INJECTION SUBCUTANEOUS at 08:46

## 2022-07-11 RX ADMIN — LISINOPRIL 20 MG: 20 TABLET ORAL at 08:46

## 2022-07-11 RX ADMIN — PANTOPRAZOLE SODIUM 40 MG: 40 TABLET, DELAYED RELEASE ORAL at 08:46

## 2022-07-11 RX ADMIN — TORSEMIDE 5 MG: 10 TABLET ORAL at 08:52

## 2022-07-11 RX ADMIN — ASPIRIN 81 MG CHEWABLE TABLET 81 MG: 81 TABLET CHEWABLE at 08:46

## 2022-07-11 RX ADMIN — PREGABALIN 50 MG: 50 CAPSULE ORAL at 08:46

## 2022-07-11 NOTE — NURSING NOTE
No change in status - reviewed AVS with patient - no questions at this time  Patient left in stable condition

## 2022-07-11 NOTE — PLAN OF CARE
Problem: Potential for Falls  Goal: Patient will remain free of falls  Description: INTERVENTIONS:  - Educate patient/family on patient safety including physical limitations  - Instruct patient to call for assistance with activity (independent)  - Consult OT/PT to assist with strengthening/mobility   - Keep Call bell within reach  - Keep bed low and locked with side rails adjusted as appropriate  - Keep care items and personal belongings within reach  - Initiate and maintain comfort rounds  - Make Fall Risk Sign visible to staff (moderate fall risk)  - Offer Toileting every 1-2 Hours, in advance of need  - Obtain necessary fall risk management equipment: nonskid footwear, call bell within reach  Outcome: Adequate for Discharge     Problem: Nutrition/Hydration-ADULT  Goal: Nutrient/Hydration intake appropriate for improving, restoring or maintaining nutritional needs  Description: Monitor and assess patient's nutrition/hydration status for malnutrition  Collaborate with interdisciplinary team and initiate plan and interventions as ordered  Monitor patient's weight and dietary intake as ordered or per policy  Utilize nutrition screening tool and intervene as necessary  Determine patient's food preferences and provide high-protein, high-caloric foods as appropriate       INTERVENTIONS:  - Monitor oral intake, urinary output, labs, and treatment plans  - Assess nutrition and hydration status and recommend course of action  - Evaluate amount of meals eaten  - Assist patient with eating if necessary   - Allow adequate time for meals  - Recommend/ encourage appropriate diets, oral nutritional supplements, and vitamin/mineral supplements  - Provide specific nutrition/hydration education as appropriate  - Include patient/family/caregiver in decisions related to nutrition  Outcome: Adequate for Discharge     Problem: PAIN - ADULT  Goal: Verbalizes/displays adequate comfort level or baseline comfort level  Description: Interventions:  - Encourage patient to monitor pain and request assistance  - Assess pain using appropriate pain scale (0-110 pain scale)  - Administer analgesics based on type and severity of pain and evaluate response  - Implement non-pharmacological measures as appropriate and evaluate response  - Consider cultural and social influences on pain and pain management  - Notify physician/advanced practitioner if interventions unsuccessful or patient reports new pain  Outcome: Adequate for Discharge     Problem: INFECTION - ADULT  Goal: Absence or prevention of progression during hospitalization  Description: INTERVENTIONS:  - Assess and monitor for signs and symptoms of infection  - Monitor lab/diagnostic results  - Monitor all insertion sites, i e  indwelling lines  - Administer medications as ordered  - Instruct and encourage patient and family to use good hand hygiene technique  Outcome: Adequate for Discharge     Problem: SAFETY ADULT  Goal: Patient will remain free of falls  Description: INTERVENTIONS:  - Educate patient/family on patient safety including physical limitations  - Instruct patient to call for assistance with activity (independent)  - Consult OT/PT to assist with strengthening/mobility   - Keep Call bell within reach  - Keep bed low and locked with side rails adjusted as appropriate  - Keep care items and personal belongings within reach  - Initiate and maintain comfort rounds  - Make Fall Risk Sign visible to staff (moderate fall risk)  - Offer Toileting every 1-2 Hours, in advance of need  - Obtain necessary fall risk management equipment: nonskid footwear, call bell within reach  Outcome: Adequate for Discharge  Goal: Maintain or return to baseline ADL function  Description: INTERVENTIONS:  - Educate patient/family on patient safety including physical limitations  - Instruct patient to call for assistance with activity (independent)  - Consult OT/PT to assist with strengthening/mobility   - Keep Call bell within reach  - Keep bed low and locked with side rails adjusted as appropriate  - Keep care items and personal belongings within reach  - Initiate and maintain comfort rounds  - Make Fall Risk Sign visible to staff (moderate fall risk)  - Offer Toileting every 1-2 Hours, in advance of need  - Obtain necessary fall risk management equipment: nonskid footwear, call bell within reach  Outcome: Adequate for Discharge  Goal: Maintains/Returns to pre admission functional level  Description: INTERVENTIONS:  -  Assess patient's ability to carry out ADLs; (independent))  - Assess/evaluate cause of self-care deficits   - Assess range of motion  - Assess patient's mobility; (independent)  - Assess patient's need for assistive devices and provide as appropriate  - Encourage maximum independence but intervene and supervise when necessary  - Assess for home care needs following discharge   - Consider OT consult to assist with ADL evaluation and planning for discharge  - Provide patient education as appropriate  Outcome: Adequate for Discharge     Problem: DISCHARGE PLANNING  Goal: Discharge to home or other facility with appropriate resources  Description: INTERVENTIONS:  - Identify barriers to discharge w/patient and caregiver  - Arrange for needed discharge resources and transportation as appropriate  - Identify discharge learning needs (meds, wound care, etc )  - Refer to Case Management Department for coordinating discharge planning if the patient needs post-hospital services based on physician/advanced practitioner order or complex needs related to functional status, cognitive ability, or social support system  Outcome: Adequate for Discharge     Problem: Knowledge Deficit  Goal: Patient/family/caregiver demonstrates understanding of disease process, treatment plan, medications, and discharge instructions  Description: Complete learning assessment and assess knowledge base    Interventions:  - Provide teaching at level of understanding  - Provide teaching via preferred learning methods  Outcome: Adequate for Discharge     Problem: GASTROINTESTINAL - ADULT  Goal: Maintains adequate nutritional intake  Description: INTERVENTIONS:  - Monitor percentage of each meal consumed  - Identify factors contributing to decreased intake, treat as appropriate  - Assist with meals as needed  - Monitor I&O, weight, and lab values if indicated  - Obtain nutrition services referral as needed  Outcome: Adequate for Discharge  Goal: Oral mucous membranes remain intact  Description: INTERVENTIONS  - Assess oral mucosa and hygiene practices  - Implement preventative oral hygiene regimen  - Implement oral medicated treatments as ordered  - Initiate Nutrition services referral as needed  Outcome: Adequate for Discharge     Problem: METABOLIC, FLUID AND ELECTROLYTES - ADULT  Goal: Electrolytes maintained within normal limits  Description: INTERVENTIONS:  - Monitor labs and assess patient for signs and symptoms of electrolyte imbalances  - Administer electrolyte replacement as ordered  - Monitor response to electrolyte replacements, including repeat lab results as appropriate  - Instruct patient on fluid and nutrition as appropriate  Outcome: Adequate for Discharge  Goal: Fluid balance maintained  Description: INTERVENTIONS:  - Monitor labs   - Monitor I/O and WT  - Instruct patient on fluid and nutrition as appropriate  - Assess for signs & symptoms of volume excess or deficit  Outcome: Adequate for Discharge  Goal: Glucose maintained within target range  Description: INTERVENTIONS:  - Monitor Blood Glucose as ordered  - Assess for signs and symptoms of hyperglycemia and hypoglycemia  - Administer ordered medications to maintain glucose within target range  - Assess nutritional intake and initiate nutrition service referral as needed  Outcome: Adequate for Discharge

## 2022-07-11 NOTE — CASE MANAGEMENT
Case Management Discharge Planning Note    Patient name Alisa Sepulveda  Location [address] 318/298-11 MRN [id number]  : 1955 Date 2022       Current Admission Date: 2022  Current Admission Diagnosis:Other headache syndrome   Patient Active Problem List    Diagnosis Date Noted    Thyroid nodule 2022    Other headache syndrome 2022    Chronic kidney disease, stage 3 unspecified ([address] ) 2022    Abnormal CT scan 08/10/2021    Myasthenia gravis ([address] ) 2021    Depression, recurrent ([address] ) 2021    Cerebral meningocele ([address] ) 2021    Obesity, morbid ([address] ) 2020    UTI (urinary tract infection) 2020    Dyspnea 2020    Subcortical microvascular ischemic occlusive disease 2019    Sixth nerve palsy of right eye     Binocular vision disorder with diplopia 2019    Carpal tunnel syndrome on left 2019    Numbness in both hands 2019    Carpal tunnel syndrome of left wrist 2019    Multinodular goiter 2019    Hoarseness or changing voice 2019    Diabetic neuropathy ([address] ) 2018    Chronic constipation 2017    Cyst of left breast 2017    Impaired hearing 2017    Cerebral atherosclerosis 05/15/2017    Hyperlipidemia 2017    Moderate persistent asthma without complication     Lumbar pain 10/18/2016    Type 2 diabetes mellitus with complication, with long-term current use of insulin ([address] ) 10/18/2016    Hiatal hernia 10/18/2016    Mild persistent asthma without complication     Chronic back pain 2016    Depression 2016    Essential hypertension 2016    Neuropathy, diabetic ([address] ) 2016      LOS (days): 2  Geometric Mean LOS (GMLOS) (days): 2 30  Days to GMLOS:0 4     OBJECTIVE:  Risk of Unplanned Readmission Score: 13 78         Current admission status: Inpatient   Preferred Pharmacy:   [facility] [address] [facility] [address] [facility] [address]  Phone: [phone] Fax: [phone]    Primary Care Provider: Efrain Sewell DO    Primary Insurance: [employer] [facility] REP  Secondary Insurance: Matilde[employer] Gila Mcclendon DETAILS:    Discharge planning discussed with[de-identified] patient        CM contacted family/caregiver?: Yes (daughter at bedside)  Were Treatment Team discharge recommendations reviewed with patient/caregiver?: Yes  Did patient/caregiver verbalize understanding of patient care needs?: Yes  Were patient/caregiver advised of the risks associated with not following Treatment Team discharge recommendations?: Yes    Contacts  Contact Method: In Person  Reason/Outcome: Discharge [address]         Is the patient interested in [address] at discharge?: No    DME Referral Provided  Referral made for DME?: No    Other Referral/Resources/Interventions Provided:  Referral Comments: referral placed to area on aging for additional community services, financial assistance  patient already provided with STS application, living will, mental health services for [facility]    Would you like to participate in our [facility] service program?  : No - Declined    Treatment Team Recommendation: Home  Discharge Destination Plan[de-identified] Home     IMM Given (Date):: 07/11/22    Discussed with patient preferences on discharge;understanding how to manage health at home; purpose of taking medications; importance of follow up care/appointments; and symptoms to watch out for once discharged home  Pt instructed that a script was sent for a glucometer  If it is not covered by her insurance they do carry at [address] for 20$  Pt agreeable to [facility] on aging referral for evaluation to see if she qualifies for any additional services  Daughter at bedside to transport home

## 2022-07-11 NOTE — UTILIZATION REVIEW
Inpatient Admission Authorization Request   NOTIFICATION OF INPATIENT ADMISSION/INPATIENT AUTHORIZATION REQUEST   SERVICING FACILITY:   [address] [place] [address]  [address]  Tax ID:  [id number]  NPI: [id number]  Place of Service: Inpatient [facility]  [address]  Place of Service Code: 24     ATTENDING PROVIDER:  Attending Name and NPI#: Srinath Davila [[id number]]  Address: [address]  Phone: [phone]     UTILIZATION REVIEW CONTACT:  Louie Robin Utilization   Network Utilization Review Department  Phone: [phone]  Fax [phone]  Email: [email or web address]     PHYSICIAN ADVISORY SERVICES:  FOR MYEX-TN-FEBA REVIEW - MEDICAL NECESSITY DENIAL  Phone: [phone]  Fax: [phone]  Email: [email or web address]     TYPE OF REQUEST:  Inpatient Status     ADMISSION INFORMATION:  ADMISSION DATE/TIME: 7/9/22  3:12 PM  PATIENT DIAGNOSIS CODE/DESCRIPTION:  Chest pain [R07 9]  Abnormal EKG [R94 31]  PADMINI (acute kidney injury) ([facility] ) [N17 9]  Hypertensive urgency [I16 0]  DISCHARGE DATE/TIME: No discharge date for patient encounter  IMPORTANT INFORMATION:  Please contact Louie Gruber (Caroline Kingfisher) directly with any questions or concerns regarding this request  Department voicemails are confidential     Send requests for admission clinical reviews, concurrent reviews, approvals, and administrative denials due to lack of clinical to fax [phone]

## 2022-07-12 PROCEDURE — 4010F ACE/ARB THERAPY RXD/TAKEN: CPT | Performed by: INTERNAL MEDICINE

## 2022-07-12 NOTE — ASSESSMENT & PLAN NOTE
Lab Results   Component Value Date    HGBA1C 8 4 (H) 07/08/2022       Recent Labs     07/10/22  1503 07/10/22  2157 07/11/22  0658 07/11/22  1056   POCGLU 103 77 91 146*       Blood Sugar Average: Last 72 hrs:  (P) 516 [id number]     ·  A1c, ordered now 8 4 with improvement   Patient was not on outpatient therapy  Discharge off of insulin, patient had some mild hypoglycemia while hospitalized, patient given diabetic Education for dietary intake, will follow up with diabetic educator as an outpatient, endocrinology eval recommended    Please refer to medication reconciliation form for detailed list of diabetic meds at discharge

## 2022-07-12 NOTE — ASSESSMENT & PLAN NOTE
Status post thyroid biopsy in 2020  · Follows with ENT Dr Fei Mcfadden  · Biopsy obtained at that time returned with [place] [facility]   · She then returned for repeat ultrasound demonstrating no significant interval growth in thyroid nodules  However prominent left lobe nodule did have some interval growth  · Dr Bettina Jose had referred repeat biopsy and even spoke with the daughter to coordinate but patient forgot to have this done  Patient had followed up in the office on 11/16/2021 for a follow-up visit of a biopsy that was meant to be obtained 11/1 but patient had been diagnosed with COVID on 10/11 and patient reports she was too fatigued to have the procedure done  · Procedure at been rescheduled for 01/03/2022  This was not done  Educated patient on follow-up    Placed in discharge instructions

## 2022-07-12 NOTE — ASSESSMENT & PLAN NOTE
Lab Results   Component Value Date    EGFR 47 07/10/2022    EGFR 49 07/09/2022    EGFR 39 07/08/2022    CREATININE 1 19 07/10/2022    CREATININE 1 16 07/09/2022    CREATININE 1 39 (H) 07/08/2022   Creatinine mildly elevated above baseline on admission,  elevated enough to be consistent with acute kidney injury      Outpatient follow-up with Nephrology recommended    · Improved at baseline

## 2022-07-12 NOTE — DISCHARGE SUMMARY
[facility] [address]  Discharge- Rocky Gusman 1955, 77 y o  female MRN: [id number]  Unit/Bed#: 513-01 Encounter: [id number]  Primary Care Provider: Linda Bellamy DO   Date and time admitted to hospital: 7/8/2022  1:05 PM    * Other headache syndrome  Assessment & Plan  · Headache resolved, suspected migraine  · HA not likely related to [address]  hypertension  Assessment & Plan    Patient had not been taking her medications as prescribed, continue Norvasc, Demadex, lisinopril 20 mg daily added  Chronic kidney disease, stage 3 unspecified [facility])  Assessment & Plan  Lab Results   Component Value Date    EGFR 47 07/10/2022    EGFR 49 07/09/2022    EGFR 39 07/08/2022    CREATININE 1 19 07/10/2022    CREATININE 1 16 07/09/2022    CREATININE 1 39 (H) 07/08/2022   Creatinine mildly elevated above baseline on admission,  elevated enough to be consistent with acute kidney injury  Outpatient follow-up with Nephrology recommended    · Improved at baseline     Dyslipidemia  Assessment & Plan  Markedly elevated LDL, patient started on Lipitor 80 mg daily    Type 2 diabetes mellitus with complication, with long-term current use of insulin [facility])  Assessment & Plan  Lab Results   Component Value Date    HGBA1C 8 4 (H) 07/08/2022       Recent Labs     07/10/22  1503 07/10/22  2157 07/11/22  0658 07/11/22  1056   POCGLU 103 77 91 146*       Blood Sugar Average: Last 72 hrs:  (P) 033 [id number]     ·  A1c, ordered now 8 4 with improvement   Patient was not on outpatient therapy  Discharge off of insulin, patient had some mild hypoglycemia while hospitalized, patient given diabetic Education for dietary intake, will follow up with diabetic educator as an outpatient, endocrinology eval recommended    Please refer to medication reconciliation form for detailed list of diabetic meds at discharge      Depression  Assessment & Plan  ·  Lexapro and BuSpar had been dcd by pt ,  she does not want to restart this medication  Mild persistent asthma without complication  Assessment & Plan  · Not in exacerbation   · Continue albuterol HFA p r n  Myasthenia gravis ([address] )  Assessment & Plan  · Patient is not on medication for this, patient is not clear if she truly has a diagnosis of this    Thyroid nodule  Assessment & Plan  Status post thyroid biopsy in 2020  · Follows with ENT Dr Reza Meeks  · Biopsy obtained at that time returned with [place] [facility]   · She then returned for repeat ultrasound demonstrating no significant interval growth in thyroid nodules  However prominent left lobe nodule did have some interval growth  · Dr Dominique Granados had referred repeat biopsy and even spoke with the daughter to coordinate but patient forgot to have this done  Patient had followed up in the office on 11/16/2021 for a follow-up visit of a biopsy that was meant to be obtained 11/1 but patient had been diagnosed with COVID on 10/11 and patient reports she was too fatigued to have the procedure done  · Procedure at been rescheduled for 01/03/2022  This was not done  Educated patient on follow-up    Placed in discharge instructions       Medical Problems             Resolved Problems  Date Reviewed: 7/12/2022          Resolved    Nausea and vomiting 7/10/2022     Resolved by  Elvin Car DO              Discharging Physician / Practitioner: Elvin Car DO  PCP: Giuliana Nazario DO  Admission Date:   Admission Orders (From admission, onward)     Ordered        07/09/22 1512  Inpatient Admission  Once            07/08/22 1655  Place in Observation  Once                      Discharge Date: 7/11/22    Reason for Admission: See H and P    Condition at Discharge: good    Discharge Day Visit / Exam:   Subjective:  No pain, feeling better  Vitals: Blood Pressure: 153/82 (07/11/22 0847)  Pulse: 82 (07/11/22 0847)  Temperature: 97 7 °F (36 5 °C) (07/11/22 0700)  Temp Source: Oral (07/10/22 0653)  Respirations: 19 (07/11/22 0700)  Height: 5' 5" (165 1 cm) (07/08/22 1310)  Weight - Scale: 90 2 kg (198 lb 13 7 oz) (07/08/22 1902)  SpO2: 96 % (07/11/22 0847)  Exam:   Physical Exam  Vitals and nursing note reviewed  Constitutional:       General: She is not in acute distress  Appearance: Normal appearance  She is not ill-appearing, toxic-appearing or diaphoretic  HENT:      Head: Normocephalic and atraumatic  Right Ear: External ear normal       Left Ear: External ear normal    Cardiovascular:      Rate and Rhythm: Normal rate  Pulmonary:      Effort: Pulmonary effort is normal  No respiratory distress  Breath sounds: Normal breath sounds  No wheezing  Neurological:      Mental Status: She is alert  Discussion with Family: Patient declined call to   Discharge instructions/Information to patient and family:   See after visit summary for information provided to patient and family  Provisions for Follow-Up Care:  See after visit summary for information related to follow-up care and any pertinent home health orders  Disposition:   Home    Planned Readmission: no     Discharge Statement:  I spent 35 minutes discharging the patient  This time was spent on the day of discharge  I had direct contact with the patient on the day of discharge  Greater than 50% of the total time was spent examining patient, answering all patient questions, arranging and discussing plan of care with patient as well as directly providing post-discharge instructions  Additional time then spent on discharge activities  Discharge Medications:  See after visit summary for reconciled discharge medications provided to patient and/or family        **Please Note: This note may have been constructed using a voice recognition system**

## 2022-07-12 NOTE — ASSESSMENT & PLAN NOTE
Patient had not been taking her medications as prescribed, continue Norvasc, Demadex, lisinopril 20 mg daily added

## 2022-07-15 ENCOUNTER — TELEPHONE (OUTPATIENT)
Dept: GASTROENTEROLOGY | Facility: CLINIC | Age: 67
End: 2022-07-15

## 2022-07-15 ENCOUNTER — TELEPHONE (OUTPATIENT)
Dept: PSYCHIATRY | Facility: CLINIC | Age: 67
End: 2022-07-15

## 2022-07-15 ENCOUNTER — OFFICE VISIT (OUTPATIENT)
Dept: FAMILY MEDICINE CLINIC | Facility: CLINIC | Age: 67
End: 2022-07-15
Payer: COMMERCIAL

## 2022-07-15 VITALS
HEIGHT: 65 IN | BODY MASS INDEX: 34.32 KG/M2 | HEART RATE: 60 BPM | SYSTOLIC BLOOD PRESSURE: 168 MMHG | TEMPERATURE: 96.8 F | WEIGHT: 206 LBS | OXYGEN SATURATION: 99 % | DIASTOLIC BLOOD PRESSURE: 80 MMHG

## 2022-07-15 DIAGNOSIS — E11.319 POORLY CONTROLLED TYPE 2 DIABETES MELLITUS WITH RETINOPATHY (HCC): ICD-10-CM

## 2022-07-15 DIAGNOSIS — Z76.89 ENCOUNTER FOR SUPPORT AND COORDINATION OF TRANSITION OF CARE: ICD-10-CM

## 2022-07-15 DIAGNOSIS — E11.43 GASTROPARESIS DIABETICORUM (HCC): ICD-10-CM

## 2022-07-15 DIAGNOSIS — Z79.4 TYPE 2 DIABETES MELLITUS WITH COMPLICATION, WITH LONG-TERM CURRENT USE OF INSULIN (HCC): ICD-10-CM

## 2022-07-15 DIAGNOSIS — N83.8 OVARIAN MASS, LEFT: ICD-10-CM

## 2022-07-15 DIAGNOSIS — Z78.0 MENOPAUSE: Primary | ICD-10-CM

## 2022-07-15 DIAGNOSIS — E11.8 TYPE 2 DIABETES MELLITUS WITH COMPLICATION, WITH LONG-TERM CURRENT USE OF INSULIN (HCC): ICD-10-CM

## 2022-07-15 DIAGNOSIS — E11.8 DIABETIC FOOT (HCC): ICD-10-CM

## 2022-07-15 DIAGNOSIS — K31.84 GASTROPARESIS DIABETICORUM (HCC): ICD-10-CM

## 2022-07-15 DIAGNOSIS — Z23 NEED FOR PNEUMOCOCCAL VACCINATION: ICD-10-CM

## 2022-07-15 DIAGNOSIS — F33.1 MODERATE EPISODE OF RECURRENT MAJOR DEPRESSIVE DISORDER (HCC): ICD-10-CM

## 2022-07-15 DIAGNOSIS — E66.01 OBESITY, MORBID (HCC): ICD-10-CM

## 2022-07-15 DIAGNOSIS — E11.65 POORLY CONTROLLED TYPE 2 DIABETES MELLITUS WITH RETINOPATHY (HCC): ICD-10-CM

## 2022-07-15 LAB
LEFT EYE DIABETIC RETINOPATHY: ABNORMAL
LEFT EYE IMAGE QUALITY: ABNORMAL
LEFT EYE MACULAR EDEMA: ABNORMAL
LEFT EYE OTHER RETINOPATHY: ABNORMAL
RIGHT EYE DIABETIC RETINOPATHY: ABNORMAL
RIGHT EYE IMAGE QUALITY: ABNORMAL
RIGHT EYE MACULAR EDEMA: ABNORMAL
RIGHT EYE OTHER RETINOPATHY: ABNORMAL
SEVERITY (EYE EXAM): ABNORMAL

## 2022-07-15 PROCEDURE — 90677 PCV20 VACCINE IM: CPT | Performed by: FAMILY MEDICINE

## 2022-07-15 PROCEDURE — 99496 TRANSJ CARE MGMT HIGH F2F 7D: CPT | Performed by: FAMILY MEDICINE

## 2022-07-15 PROCEDURE — 92250 FUNDUS PHOTOGRAPHY W/I&R: CPT | Performed by: FAMILY MEDICINE

## 2022-07-15 PROCEDURE — 2024F 7 FLD RTA PHOTO EVC RTNOPTHY: CPT | Performed by: INTERNAL MEDICINE

## 2022-07-15 PROCEDURE — G0009 ADMIN PNEUMOCOCCAL VACCINE: HCPCS | Performed by: FAMILY MEDICINE

## 2022-07-15 PROCEDURE — 1111F DSCHRG MED/CURRENT MED MERGE: CPT | Performed by: FAMILY MEDICINE

## 2022-07-15 NOTE — PROGRESS NOTES
Assessment/Plan:      Diagnoses and all orders for this visit:    Menopause  -     DXA bone density spine hip and pelvis; Future    Encounter for support and coordination of transition of care  -     Ambulatory Referral to Social Work Care Management Program; Future    Type 2 diabetes mellitus with complication, with long-term current use of insulin ([facility] )  -     IRIS Diabetic eye exam    Poorly controlled type 2 diabetes mellitus with retinopathy ([place] [address] )  -     Ambulatory Referral to Endocrinology; Future  -     Ambulatory Referral to Podiatry; Future    Gastroparesis diabeticorum ([place] [address] )  -     Ambulatory Referral to Gastroenterology; Future    Diabetic foot ([place] [address] )  -     Ambulatory Referral to Podiatry; Future    Moderate episode of recurrent major depressive disorder ([place] [address] )  -     Ambulatory Ref[place] to [place]; Future    Need for pneumococcal vaccination  -     Pneumococcal Conjugate Vaccine 20-valent (Pcv20)    Obesity, morbid ([place] [address] )    Ovarian mass, left  -     Ambulatory Referral to Gynecology; Future         Subjective:     Patient ID: Pham Whitlock is a 77 y o  female  Mrs Olga Mcintosh was hospitalized with poorly controlled diabetes nausea headaches depression thyroid disease and was stabilized and then discharged from the hospital but she needs a lot of hospital follow-up she has not been really good about keeping her appointments but a lot of that was due to transportation issues were going to put a consultation on for social service so that when she has appointments she has a transportation available she needs to be referred to endocrinology because of her thyroid and her diabetes also she needs diabetic foot care she needs a follow-up with Gastroenterology I believe she probably has diabetic gastroparesis and she needs diabetic eye care      Review of Systems   Constitutional: Positive for activity change and fatigue  Negative for appetite change, diaphoresis and fever  HENT: Negative  Eyes: Positive for visual disturbance  Diabetic eye disease   Respiratory: Negative for apnea, cough, chest tightness, shortness of breath and wheezing  Cardiovascular: Negative for chest pain, palpitations and leg swelling  Gastrointestinal: Positive for nausea  Negative for abdominal distention, abdominal pain, anal bleeding, constipation, diarrhea and vomiting  Endocrine: Negative for cold intolerance, heat intolerance, polydipsia, polyphagia and polyuria  Patient has type 2 diabetes which has been poorly controlled due to noncompliance 2nd issue is that she has thyroid disease and needs follow-up with endocrinology   Genitourinary: Negative for difficulty urinating, dysuria, flank pain, hematuria and urgency  Musculoskeletal: Positive for arthralgias  Negative for back pain, gait problem, joint swelling and myalgias  Skin: Negative for color change, rash and wound  Allergic/Immunologic: Negative for environmental allergies, food allergies and immunocompromised state  Neurological: Positive for numbness  Negative for dizziness, seizures, syncope, speech difficulty and headaches  Hematological: Negative for adenopathy  Does not bruise/bleed easily  Psychiatric/Behavioral: Positive for dysphoric mood  Negative for agitation, behavioral problems, hallucinations, sleep disturbance and suicidal ideas  The patient is nervous/anxious  Objective:     Physical Exam  Constitutional:       General: She is not in acute distress  Appearance: She is well-developed  She is obese  She is ill-appearing  She is not diaphoretic  HENT:      Head: Normocephalic  Right Ear: External ear normal       Left Ear: External ear normal       Nose: Nose normal    Eyes:      General: No scleral icterus  Right eye: No discharge  Left eye: No discharge  Conjunctiva/sclera: Conjunctivae normal       Pupils: Pupils are equal, round, and reactive to light     Neck: Thyroid: No thyromegaly  Trachea: No tracheal deviation  Cardiovascular:      Rate and Rhythm: Normal rate and regular rhythm  Heart sounds: Normal heart sounds  No murmur heard  No friction rub  No gallop  Pulmonary:      Effort: Pulmonary effort is normal  No respiratory distress  Breath sounds: Normal breath sounds  No wheezing  Abdominal:      General: Bowel sounds are normal       Palpations: Abdomen is soft  There is no mass  Tenderness: There is no abdominal tenderness  There is no guarding  Musculoskeletal:         General: No deformity  Cervical back: Normal range of motion  Lymphadenopathy:      Cervical: No cervical adenopathy  Skin:     General: Skin is warm and dry  Findings: No erythema or rash  Neurological:      Mental Status: She is alert and oriented to person, place, and time  Cranial Nerves: No cranial nerve deficit  Psychiatric:         Thought Content: Thought content normal            Vitals:    07/15/22 0906   BP: 168/80   BP Location: Left arm   Patient Position: Sitting   Cuff Size: Standard   Pulse: 60   Temp: (!) 96 8 °F (36 °C)   TempSrc: Temporal   SpO2: 99%   Weight: 93 4 kg (206 lb)   Height: 5' 5" (1 651 m)       The following portions of the patient's history were reviewed and updated as appropriate:   She  has a past medical history of Abnormal ECG, Abnormal mammogram, Abnormal stress test, Anemia, Anxiety, Arthritis, Asthma, Back problem, Breast cyst, Chest pain, Chronic pain disorder, Cyst of left breast, Depression, Depression, Diabetes mellitus ([address] ), Hiatal hernia, Hyperlipidemia, Hypertension, Keloid of skin, Neuropathy, Psychiatric disorder, Stroke ([address] ), and Tuberculosis    She   Patient Active Problem List    Diagnosis Date Noted    Thyroid nodule 07/09/2022    Other headache syndrome 07/08/2022    Chronic kidney disease, stage 3 unspecified ([address] ) 07/08/2022    Abnormal CT scan 08/10/2021    Myasthenia gravis ([facility] ) 07/08/2021    Depression, recurrent ([address] ) 07/08/2021    Cerebral meningocele ([facility] ) 01/25/2021    Obesity, morbid ([address] ) 12/01/2020    UTI (urinary tract infection) 04/14/2020    Dyspnea 04/14/2020    Subcortical microvascular ischemic occlusive disease 08/14/2019    Sixth nerve palsy of right eye     Binocular vision disorder with diplopia 08/12/2019    Carpal tunnel syndrome on left 04/05/2019    Numbness in both hands 04/05/2019    Carpal tunnel syndrome of left wrist 04/05/2019    Multinodular goiter 02/01/2019    Hoarseness or changing voice 02/01/2019    Diabetic neuropathy ([facility] ) 01/02/2018    Chronic constipation 11/07/2017    Cyst of left breast 07/20/2017    Impaired hearing 06/02/2017    Cerebral atherosclerosis 05/15/2017    Dyslipidemia 04/13/2017    Moderate persistent asthma without complication 01/44/4414    Lumbar pain 10/18/2016    Type 2 diabetes mellitus with hyperglycemia, without long-term current use of insulin ([facility] ) 10/18/2016    Hiatal hernia 10/18/2016    Mild persistent asthma without complication 30/22/9107    Chronic back pain 09/06/2016    Depression 09/06/2016    Essential hypertension 09/06/2016    Neuropathy, diabetic ([facility] ) 09/06/2016     She  has a past surgical history that includes Cyst Removal; Eye surgery; Tubal ligation; Arm wound repair / closure; pr esophagogastroduodenoscopy transoral diagnostic (N/A, 1/5/2018); Cataract extraction (Bilateral); US guided thyroid biopsy (4/15/2019); pr wrist arthroscop,release xvers lig (Left, 4/19/2019); Carpal tunnel release; Carpal tunnel release (Left); FL lumbar puncture diagnostic (8/15/2019); and Colonoscopy  Her family history includes Arthritis in her mother, paternal grandmother, and sister; Cancer in her family; Coronary artery disease in her family; Diabetes in her mother; Glaucoma in her family;  Heart attack in her maternal uncle, mother, and paternal grandmother; Heart disease in her mother; Hypertension in her father and mother; Kidney cancer in her father; Kidney disease in her father; Liver cancer in her maternal aunt; No Known Problems in her daughter, daughter, daughter, daughter, maternal aunt, maternal aunt, maternal grandfather, paternal aunt, and paternal grandfather; Rheum arthritis in her family; Stomach cancer in her maternal grandmother; Stroke in her maternal aunt and maternal grandmother  She  reports that she has never smoked  She has never used smokeless tobacco  She reports previous alcohol use  She reports that she does not use drugs    Current Outpatient Medications   Medication Sig Dispense Refill    albuterol (PROVENTIL HFA,VENTOLIN HFA) 90 mcg/act inhaler Inhale 2 puffs every 6 (six) hours as needed for wheezing or shortness of breath 1 Inhaler 5    amLODIPine (NORVASC) 10 mg tablet Take 1 tablet (10 mg total) by mouth daily 30 tablet 0    aspirin 81 MG tablet Take 81 mg by mouth daily        atorvastatin (LIPITOR) 80 mg tablet Take 1 tablet (80 mg total) by mouth daily with dinner 30 tablet 0    Blood Glucose Calibration (ACCU-CHEK COMPACT PLUS CONTROL) SOLN by In Vitro route daily 1 each 0    Blood Glucose Monitoring Suppl (ACCU-CHEK SHANE PLUS) w/Device KIT by Does not apply route 2 (two) times a day 1 kit 0    Dapagliflozin Propanediol (Farxiga) 10 MG TABS Take 1 tablet (10 mg total) by mouth in the morning 1/2 tablet daily for the 1st month then increase to a full tablet daily in the morning 30 tablet 0    hydrocortisone 0 5 % cream Apply topically 2 (two) times a day 28 35 g 1    Insulin Pen Needle (B-D UF III MINI PEN NEEDLES) 31G X 5 MM MISC Inject under the skin daily 100 each 3    Lancets (ACCU-CHEK MULTICLIX) lancets Test bid 100 each 0    lisinopril (ZESTRIL) 20 mg tablet Take 1 tablet (20 mg total) by mouth daily 30 tablet 0    pantoprazole (PROTONIX) 40 mg tablet Take 1 tablet (40 mg total) by mouth daily 90 tablet 0    polyethylene glycol (MIRALAX) 17 g packet Take 17 g by mouth daily as needed (constipation) 30 each 0    Polyvinyl Alcohol (LIQUID TEARS OP) Apply to eye      potassium chloride (Klor-Con) 10 mEq tablet take 1 tablet by mouth once daily 30 tablet 3    pregabalin (LYRICA) 50 mg capsule Take 1 capsule (50 mg total) by mouth 3 (three) times a day for 10 days 90 capsule 0    RA PAIN RELIEF ACETAMINOPHEN 325 MG tablet take 1-2 tablets by mouth every 6 hours if needed for MODERATE pain  0    simvastatin (ZOCOR) 40 mg tablet Take 1 tablet (40 mg total) by mouth daily at bedtime 90 tablet 2    torsemide (DEMADEX) 5 MG tablet take 1 tablet by mouth every morning 30 tablet 3    Blood Glucose Monitoring Suppl (OneTouch Verio Reflect) w/Device KIT Check blood sugars twice daily  Please substitute with appropriate alternative as covered by patient's insurance  Dx: E11 65 1 kit 0    glucose blood (OneTouch Verio) test strip Check blood sugars twice daily  Please substitute with appropriate alternative as covered by patient's insurance  Dx: E11 65 200 each 3    OneTouch Delica Lancets 33P MISC Check blood sugars twice daily  Please substitute with appropriate alternative as covered by patient's insurance  Dx: E11 65 200 each 3    sitaGLIPtin (Januvia) 100 mg tablet Take 1 tablet (100 mg total) by mouth daily 30 tablet 5     No current facility-administered medications for this visit       Current Outpatient Medications on File Prior to Visit   Medication Sig    albuterol (PROVENTIL HFA,VENTOLIN HFA) 90 mcg/act inhaler Inhale 2 puffs every 6 (six) hours as needed for wheezing or shortness of breath    amLODIPine (NORVASC) 10 mg tablet Take 1 tablet (10 mg total) by mouth daily    aspirin 81 MG tablet Take 81 mg by mouth daily      atorvastatin (LIPITOR) 80 mg tablet Take 1 tablet (80 mg total) by mouth daily with dinner    Blood Glucose Calibration (ACCU-CHEK COMPACT PLUS CONTROL) SOLN by In Vitro route daily    Blood Glucose Monitoring Suppl (ACCU-CHEK SHANE PLUS) w/Device KIT by Does not apply route 2 (two) times a day    Dapagliflozin Propanediol (Farxiga) 10 MG TABS Take 1 tablet (10 mg total) by mouth in the morning 1/2 tablet daily for the 1st month then increase to a full tablet daily in the morning    hydrocortisone 0 5 % cream Apply topically 2 (two) times a day    Insulin Pen Needle (B-D UF III MINI PEN NEEDLES) 31G X 5 MM MISC Inject under the skin daily    Lancets (ACCU-CHEK MULTICLIX) lancets Test bid    lisinopril (ZESTRIL) 20 mg tablet Take 1 tablet (20 mg total) by mouth daily    pantoprazole (PROTONIX) 40 mg tablet Take 1 tablet (40 mg total) by mouth daily    polyethylene glycol (MIRALAX) 17 g packet Take 17 g by mouth daily as needed (constipation)    Polyvinyl Alcohol (LIQUID TEARS OP) Apply to eye    potassium chloride (Klor-Con) 10 mEq tablet take 1 tablet by mouth once daily    pregabalin (LYRICA) 50 mg capsule Take 1 capsule (50 mg total) by mouth 3 (three) times a day for 10 days    RA PAIN RELIEF ACETAMINOPHEN 325 MG tablet take 1-2 tablets by mouth every 6 hours if needed for MODERATE pain    simvastatin (ZOCOR) 40 mg tablet Take 1 tablet (40 mg total) by mouth daily at bedtime    torsemide (DEMADEX) 5 MG tablet take 1 tablet by mouth every morning     No current facility-administered medications on file prior to visit  She is allergic to bactrim [sulfamethoxazole-trimethoprim], percocet [oxycodone-acetaminophen], and vicodin [hydrocodone-acetaminophen]       Transitional Care Management Review:  [facility] is a 77 y o  female here for TCM follow up       During the TCM phone call patient stated:    TCM Call (since 6/14/2022)     Date and time call was made  7/11/2022  3:11 PM    Hospital care reviewed  Records reviewed    Patient was hospitialized at  [address] [place] [address]    Date of Admission  07/10/22    Date of discharge  07/11/22    Diagnosis  Headache syndrone, CKD, DMII    Disposition  Home; Home health services    Were the patients medications reviewed and updated  No    Current Symptoms  Fatigue      TCM Call (since 6/14/2022)     Post hospital issues  Reduced activity    Scheduled for follow up?   Yes    Patients specialists  --  [facility] - Dr Saritha Sweeney, ENT - Dr Alisha Chandler    Did you obtain your prescribed medications  Yes    Do you need help managing your prescriptions or medications  No    I have advised the patient to call PCP with any new or worsening symptoms  Tyler Junior,     Counseling  Patient              Giuliana Nazario, DO

## 2022-07-18 ENCOUNTER — CONSULT (OUTPATIENT)
Dept: ENDOCRINOLOGY | Facility: CLINIC | Age: 67
End: 2022-07-18
Payer: COMMERCIAL

## 2022-07-18 VITALS
WEIGHT: 202.4 LBS | SYSTOLIC BLOOD PRESSURE: 140 MMHG | DIASTOLIC BLOOD PRESSURE: 70 MMHG | BODY MASS INDEX: 33.72 KG/M2 | HEIGHT: 65 IN | HEART RATE: 74 BPM

## 2022-07-18 DIAGNOSIS — E04.2 MULTINODULAR GOITER: ICD-10-CM

## 2022-07-18 DIAGNOSIS — E11.65 TYPE 2 DIABETES MELLITUS WITH HYPERGLYCEMIA, WITHOUT LONG-TERM CURRENT USE OF INSULIN (HCC): ICD-10-CM

## 2022-07-18 DIAGNOSIS — I10 ESSENTIAL HYPERTENSION: ICD-10-CM

## 2022-07-18 DIAGNOSIS — Z79.4 TYPE 2 DIABETES MELLITUS TREATED WITH INSULIN (HCC): Primary | ICD-10-CM

## 2022-07-18 DIAGNOSIS — E78.5 DYSLIPIDEMIA: ICD-10-CM

## 2022-07-18 DIAGNOSIS — E11.9 TYPE 2 DIABETES MELLITUS TREATED WITH INSULIN (HCC): Primary | ICD-10-CM

## 2022-07-18 PROCEDURE — 99204 OFFICE O/P NEW MOD 45 MIN: CPT | Performed by: STUDENT IN AN ORGANIZED HEALTH CARE EDUCATION/TRAINING PROGRAM

## 2022-07-18 RX ORDER — LANCETS 33 GAUGE
EACH MISCELLANEOUS
Qty: 200 EACH | Refills: 3 | Status: SHIPPED | OUTPATIENT
Start: 2022-07-18

## 2022-07-18 RX ORDER — BLOOD-GLUCOSE METER
KIT MISCELLANEOUS
Qty: 1 KIT | Refills: 0 | Status: SHIPPED | OUTPATIENT
Start: 2022-07-18

## 2022-07-18 RX ORDER — BLOOD SUGAR DIAGNOSTIC
STRIP MISCELLANEOUS
Qty: 200 EACH | Refills: 3 | Status: SHIPPED | OUTPATIENT
Start: 2022-07-18

## 2022-07-18 NOTE — ASSESSMENT & PLAN NOTE
LDL extremely elevated on last check  Will confirm compliance with statin at next visit   If LDL remains uncontrolled, may consider intensification of statin +/- addition of ezetimibe or PCSK9i

## 2022-07-18 NOTE — ASSESSMENT & PLAN NOTE
Will plan to update thyroid US  Given size of left sided nodule, there is a concern about the possibility of a false negative FNA biopsy  As such, thyroid surgery can be considered, which may also help reduce the burden imposed by monitoring this nodule  I discussed this option with Radha Garcia  We will plan to revisit her thyroid US and continue our discussion at a future time

## 2022-07-18 NOTE — PATIENT INSTRUCTIONS
Labs in 3-months    Thyroid US before next visit    Continue Farxiga and Januvia    Check sugars once to twice daily    Best times to monitor are on waking, before meals or bedtime  Ok to alternate different times of day    Goal Blood Sugars:   Premeal , even better <110  2hr after a meal <180, even better <140  A1C <7%, even better <6 5%      Aim for 45g carbohydrates with meals  15-20g with snacks    Goal exercise is 30 minutes daily, most days of the week    Please have labs done before next visit    Bring your meter or logbook with you each visit    Return appointment 3 months

## 2022-07-18 NOTE — ASSESSMENT & PLAN NOTE
Felipebradly Josh presents today for evaluation of longstanding uncontrolled diabetes  We discussed the pathophysiology of diabetes and its associations with negative health outcomes  I counseled the patient on various goals of diabetes management, including healthy lifestyle and behaviors, glycemic targets, preventative health care, and the role of pharmacotherapies  Today I provided [employer] for glucometer and strips  I also provided her instructions for at least twice daily fingersticks at scattering times and keep her blood sugar log in order to submit numbers for my review on a biweekly basis  She had required a considerable amount of insulin prior to her hospitalization so I will be curious to see what her glucose numbers  If insulin therapy is not demanded based on her levels, we may consider the use of a drug within the GLP/SGLT2 classes  If GLP1 is considered, then that would replace [place] and [place]  She will be referred to CDE for medical nutrition therapy counseling   Plan to RTC in several months with labs

## 2022-07-18 NOTE — PROGRESS NOTES
Rosendo Padilla 77 y o  female MRN: [id number]    Encounter: [id number]      Assessment/Plan     Problem List Items Addressed This Visit        Endocrine    Type 2 diabetes mellitus with hyperglycemia, without long-term current use of insulin ([address] ) - Primary     Jon Burdick presents today for evaluation of longstanding uncontrolled diabetes  We discussed the pathophysiology of diabetes and its associations with negative health outcomes  I counseled the patient on various goals of diabetes management, including healthy lifestyle and behaviors, glycemic targets, preventative health care, and the role of pharmacotherapies  Today I provided [employer] for glucometer and strips  I also provided her instructions for at least twice daily fingersticks at scattering times and keep her blood sugar log in order to submit numbers for my review on a biweekly basis  She had required a considerable amount of insulin prior to her hospitalization so I will be curious to see what her glucose numbers  If insulin therapy is not demanded based on her levels, we may consider the use of a drug within the GLP/SGLT2 classes  If GLP1 is considered, then that would replace [place] and [place]  She will be referred to CDE for medical nutrition therapy counseling  Plan to RTC in several months with labs         Relevant Medications    Blood Glucose Monitoring Suppl (OneTouch Verio Reflect) w/Device KIT    glucose blood (OneTouch Verio) test strip    OneTouch Delica Lancets 66N MISC    Multinodular goiter     Will plan to update thyroid US  Given size of left sided nodule, there is a concern about the possibility of a false negative FNA biopsy  As such, thyroid surgery can be considered, which may also help reduce the burden imposed by monitoring this nodule  I discussed this option with Jon Burdick  We will plan to revisit her thyroid US and continue our discussion at a future time            Relevant Orders    US thyroid    T4, free Lab Collect TSH, 3rd generation Lab Collect       Cardiovascular and Mediastinum    Essential hypertension     Continue present therapy  Other    Dyslipidemia     LDL extremely elevated on last check  Will confirm compliance with statin at next visit  If LDL remains uncontrolled, may consider intensification of statin +/- addition of ezetimibe or PCSK9i             CC: Diabetes, thyroid nodules    History of Present Illness     HPI:    Mack Ramos presents today for evaluation of diabetes and thyroid nodule  She is joined today by her daughter who is assisting with elements of the history  Diabetes is longstanding perhaps of 18 years' duration  Mack Ramos does have a history of gestational diabetes  She does have a history of being on insulin, but this was stopped following a recent hospital admission earlier this month when her blood sugars were found to be very tightly controlled  Her hospital presentation pertain to symptoms of a headache  Mack Ramos denies complications of diabetes  She reports being up-to-date on her foot exam  She follows with Dr Brodie Wells for her foot care  She is due for an updated eye exam  She has followed with [facility] in the past      For diabetes she takes Farxiga 10 mg daily and Januvia 100 mg daily  She had been on Lantus 40 units nightly prior to her hospitalization  She was also on Humalog but she can not recall what dosage she was taking  Presently she does not have any blood sugar data  She states she has a meter but no strips available  She denies any hyperglycemic symptoms  She is not completed diabetes education in the past     For hypertension Mack Ramos takes amlodipine 10 mg daily, lisinopril 20 mg daily, and torsemide 5 mg daily  For hyperlipidemia she takes simvastatin 40 mg daily  For thyroid nodule, she reports this was discovered incidentally 4-5 years ago  She has several thyroid nodules    The dominant nodule exceeds 5 cm in its largest dimension  This nodule was previously selected for FNA biopsy which returned [place] 1  She was planned for repeat a biopsy of this nodule, however health events forced a halt to that plan  She denies any family history of thyroid cancer or thyroid disorder  She denies any compressive symptoms  She does report a history of having tuberculosis in her teens and did have some imaging done during that evaluation  She denies any hyper- or hypothyroid symptoms  Review of Systems   Constitutional: Negative for fever and unexpected weight change  HENT: Negative for trouble swallowing and voice change  Respiratory: Negative for cough and shortness of breath  Cardiovascular: Negative for chest pain and palpitations  Gastrointestinal: Negative for nausea and vomiting  Endocrine: Negative for heat intolerance, polydipsia and polyuria  Neurological: Negative for tremors and weakness  Psychiatric/Behavioral: Negative for agitation and behavioral problems         Historical Information   Past Medical History:   Diagnosis Date    Abnormal ECG     last assessed: 5/15/17    Abnormal mammogram     last assessed: 7/11/17    Abnormal stress test     last assesed: 7/24/17    Anemia     Anxiety     Arthritis     Asthma     Back problem     Breast cyst     Chest pain     last assessed: 7/24/17    Chronic pain disorder     Cyst of left breast     last assessed: 8/18/17    Depression     Depression     resolved: 1/2/18    Diabetes mellitus ([facility] [address] )     Hiatal hernia     last assessed: 11/7/17    Hyperlipidemia     Hypertension     Keloid of skin     last assessed: 11/2/16    Neuropathy     Psychiatric disorder     anxiety    Stroke [facility])     TIA 2005    Tuberculosis     as a child      Past Surgical History:   Procedure Laterality Date    ARM WOUND REPAIR / CLOSURE      CARPAL TUNNEL RELEASE      CARPAL TUNNEL RELEASE Left     CATARACT EXTRACTION Bilateral     2015    COLONOSCOPY      CYST REMOVAL      right upper arm   EYE SURGERY      cataract removaql    FL LUMBAR PUNCTURE DIAGNOSTIC  8/15/2019    NH ESOPHAGOGASTRODUODENOSCOPY TRANSORAL DIAGNOSTIC N/A 1/5/2018    Procedure: ESOPHAGOGASTRODUODENOSCOPY (EGD);   Surgeon: Fiona Lam DO;  Location: [facility] MAIN OR;  Service: Gastroenterology    NH WRIST Volney Saltness LIG Left 4/19/2019    Procedure: ENDOSCOPIC CARPAL TUNNEL RELEASE;  Surgeon: Dorothea Alvarez MD;  [facility] MA[facility] OR;  Service: Orthopedics    [facility] [address] THYROID BIOPSY  4/15/2019     Social History   Social History     Substance and Sexual Activity   Alcohol Use Not Currently    Alcohol/week: 0 0 standard drinks     Social History     Substance and Sexual Activity   Drug Use No     Social History     Tobacco Use   Smoking Status Never Smoker   Smokeless Tobacco Never Used     Family History:   Family History   Problem Relation Age of Onset    Heart disease Mother     Diabetes Mother     Arthritis Mother     Heart attack Mother     Hypertension Mother     Kidney disease Father     Hypertension Father     Kidney cancer Father     Arthritis Sister     Stroke Maternal Grandmother     Stomach cancer Maternal Grandmother     Arthritis Paternal Grandmother     Heart attack Paternal Grandmother     Stroke Maternal Aunt     Heart attack Maternal Uncle     Cancer Family         spinal column    Coronary artery disease Family     Glaucoma Family     Rheum arthritis Family     No Known Problems Maternal Grandfather     No Known Problems Paternal Grandfather     No Known Problems Daughter     No Known Problems Daughter     No Known Problems Daughter     No Known Problems Daughter     Liver cancer Maternal Aunt     No Known Problems Maternal Aunt     No Known Problems Maternal Aunt     No Known Problems Paternal Aunt        Meds/Allergies   Current Outpatient Medications   Medication Sig Dispense Refill    albuterol (PROVENTIL HFA,VENTOLIN HFA) 90 mcg/act inhaler Inhale 2 puffs every 6 (six) hours as needed for wheezing or shortness of breath 1 Inhaler 5    amLODIPine (NORVASC) 10 mg tablet Take 1 tablet (10 mg total) by mouth daily 30 tablet 0    aspirin 81 MG tablet Take 81 mg by mouth daily        atorvastatin (LIPITOR) 80 mg tablet Take 1 tablet (80 mg total) by mouth daily with dinner 30 tablet 0    Blood Glucose Calibration (ACCU-CHEK COMPACT PLUS CONTROL) SOLN by In Vitro route daily 1 each 0    Blood Glucose Monitoring Suppl (ACCU-CHEK SHANE PLUS) w/Device KIT by Does not apply route 2 (two) times a day 1 kit 0    Blood Glucose Monitoring Suppl (OneTouch Verio Reflect) w/Device KIT Check blood sugars twice daily  Please substitute with appropriate alternative as covered by patient's insurance  Dx: E11 65 1 kit 0    Dapagliflozin Propanediol (Farxiga) 10 MG TABS Take 1 tablet (10 mg total) by mouth in the morning 1/2 tablet daily for the 1st month then increase to a full tablet daily in the morning 30 tablet 0    glucose blood (OneTouch Verio) test strip Check blood sugars twice daily  Please substitute with appropriate alternative as covered by patient's insurance  Dx: E11 65 200 each 3    Insulin Pen Needle (B-D UF III MINI PEN NEEDLES) 31G X 5 MM MISC Inject under the skin daily 100 each 3    Januvia 100 MG tablet take 1 tablet by mouth daily 30 tablet 5    Lancets (ACCU-CHEK MULTICLIX) lancets Test bid 100 each 0    lisinopril (ZESTRIL) 20 mg tablet Take 1 tablet (20 mg total) by mouth daily 30 tablet 0    OneTouch Delica Lancets 08W MISC Check blood sugars twice daily  Please substitute with appropriate alternative as covered by patient's insurance   Dx: E11 65 200 each 3    pantoprazole (PROTONIX) 40 mg tablet Take 1 tablet (40 mg total) by mouth daily 90 tablet 0    polyethylene glycol (MIRALAX) 17 g packet Take 17 g by mouth daily as needed (constipation) 30 each 0    Polyvinyl Alcohol (LIQUID TEARS OP) Apply to eye      potassium chloride (Klor-Con) 10 mEq tablet take 1 tablet by mouth once daily 30 tablet 3    pregabalin (LYRICA) 50 mg capsule Take 1 capsule (50 mg total) by mouth 3 (three) times a day for 10 days 90 capsule 0    RA PAIN RELIEF ACETAMINOPHEN 325 MG tablet take 1-2 tablets by mouth every 6 hours if needed for MODERATE pain  0    simvastatin (ZOCOR) 40 mg tablet Take 1 tablet (40 mg total) by mouth daily at bedtime 90 tablet 2    torsemide (DEMADEX) 5 MG tablet take 1 tablet by mouth every morning 30 tablet 3    hydrocortisone 0 5 % cream Apply topically 2 (two) times a day (Patient not taking: Reported on 7/18/2022) 28 35 g 1     No current facility-administered medications for this visit  Allergies   Allergen Reactions    Bactrim [Sulfamethoxazole-Trimethoprim] Shortness Of Breath    Percocet [Oxycodone-Acetaminophen] GI Intolerance    Vicodin [Hydrocodone-Acetaminophen] GI Intolerance       Objective   Vitals: Blood pressure 140/70, pulse 74, height 5' 5" (1 651 m), weight 91 8 kg (202 lb 6 4 oz)  Physical Exam  Vitals reviewed  Constitutional:       General: She is not in acute distress  HENT:      Head: Normocephalic and atraumatic  Eyes:      General: No scleral icterus  Conjunctiva/sclera: Conjunctivae normal    Neck:      Thyroid: Thyroid mass (left hemithyroid) present  Cardiovascular:      Rate and Rhythm: Normal rate and regular rhythm  Pulmonary:      Effort: Pulmonary effort is normal  No respiratory distress  Abdominal:      Palpations: Abdomen is soft  Tenderness: There is no abdominal tenderness  Musculoskeletal:      Right lower leg: No edema  Left lower leg: No edema  Lymphadenopathy:      Cervical: No cervical adenopathy  Skin:     General: Skin is warm and dry  Neurological:      General: No focal deficit present  Mental Status: She is alert     Psychiatric:         Mood and Affect: Mood normal          Behavior: Behavior normal  The history was obtained from the review of the chart, patient and family  Lab Results:   Lab Results   Component Value Date/Time    Hemoglobin A1C 8 4 (H) 07/08/2022 08:01 PM    Hemoglobin A1C 9 3 (H) 11/16/2021 02:18 PM    WBC 5 13 07/09/2022 05:20 AM    WBC 7 45 07/08/2022 01:42 PM    WBC 4 18 (L) 11/16/2021 02:18 PM    Hemoglobin 11 7 07/09/2022 05:20 AM    Hemoglobin 13 0 07/08/2022 01:42 PM    Hemoglobin 11 8 11/16/2021 02:18 PM    Hematocrit 37 1 07/09/2022 05:20 AM    Hematocrit 40 1 07/08/2022 01:42 PM    Hematocrit 38 0 11/16/2021 02:18 PM    MCV 86 07/09/2022 05:20 AM    MCV 84 07/08/2022 01:42 PM    MCV 86 11/16/2021 02:18 PM    Platelets 340 19/33/9247 05:20 AM    Platelets 228 97/85/3676 01:42 PM    Platelets 432 68/62/8960 02:18 PM    BUN 16 07/10/2022 04:28 AM    BUN 14 07/09/2022 05:20 AM    BUN 16 07/08/2022 01:42 PM    Potassium 3 2 (L) 07/10/2022 04:28 AM    Potassium 3 6 07/09/2022 05:20 AM    Potassium 3 8 07/08/2022 01:42 PM    Chloride 105 07/10/2022 04:28 AM    Chloride 105 07/09/2022 05:20 AM    Chloride 100 07/08/2022 01:42 PM    CO2 29 07/10/2022 04:28 AM    CO2 30 07/09/2022 05:20 AM    CO2 33 (H) 07/08/2022 01:42 PM    Creatinine 1 19 07/10/2022 04:28 AM    Creatinine 1 16 07/09/2022 05:20 AM    Creatinine 1 39 (H) 07/08/2022 01:42 PM    AST 16 07/08/2022 01:42 PM    AST 21 10/12/2021 12:07 PM    AST 18 08/10/2021 08:59 PM    ALT 21 07/08/2022 01:42 PM    ALT 23 10/12/2021 12:07 PM    ALT 21 08/10/2021 08:59 PM    Albumin 2 7 (L) 07/08/2022 01:42 PM    Albumin 2 4 (L) 10/12/2021 12:07 PM    Albumin 3 4 (L) 08/10/2021 08:59 PM    HDL, Direct 42 (L) 07/09/2022 05:20 AM    Triglycerides 102 07/09/2022 05:20 AM     Lab Results   Component Value Date    TSG0NHUJKRTV 1 110 11/16/2021    V5RBNTW 1 00 08/16/2018    E7AMTEB 10 8 08/15/2019       4/15/2019  Final Diagnosis   A & B   Thyroid, left mid-lower pole: (Thin prep and smear preparations)  Non-diagnostic ([place] Category I) - See note  Insufficient follicular epithelial cells for evaluation  Specimen is nearly entirely colloid with infrequent lymphocytes, neutrophils & macrophages  Imaging Studies: I have personally reviewed pertinent reports  9/8/2021  THYROID ULTRASOUND     INDICATION:    E04 2: Nontoxic multinodular goiter  E04 1: Nontoxic single thyroid nodule      COMPARISON:  Compared with 8/31/2020     TECHNIQUE:   Ultrasound of the thyroid was performed with a high frequency linear transducer in transverse and sagittal planes including volumetric imaging sweeps as well as traditional still imaging technique      FINDINGS:  Thyroid parenchyma is diffusely heterogeneous in echotexture      Right lobe:  5 7 x 1 4 x 2 3 cm  Volume of 9 1 mL  Left lobe:  7 0 x 3 0 x 3 9 cm  Volume of 39 mL  Isthmus:  0 4 cm      Nodule #1  Image 21  Right anterolateral lower pole nodule measuring 1 2 x 0 8 x 0 7 cm compared to 1 1 x 0 7 x 0 9 cm with no significant change from prior  COMPOSITION:  2 points, solid or almost completely solid   ECHOGENICITY:  2 points, hypoechoic  SHAPE:  0 points, wider-than-tall  MARGIN: 0 points, smooth  ECHOGENIC FOCI:  0 points, none or large comet-tail artifacts  TI-RADS Classification: TR 4 (4-6 points), FNA if > 1 5 cm  Follow if > 1cm      Nodule #2  Image 24  RIGHT lower pole nodule measuring 1 0 x 1 1 x 0 6 cm compared to 0 9 x 0 6 x 1 cm with no significant change from prior  COMPOSITION:  2 points, solid or almost completely solid   ECHOGENICITY:  2 points, hypoechoic  SHAPE:  0 points, wider-than-tall  MARGIN: 0 points, smooth  ECHOGENIC FOCI:  0 points, none or large comet-tail artifacts  TI-RADS Classification: TR 4 (4-6 points), FNA if > 1 5 cm  Follow if > 1cm      Nodule #3  Image 50  Left lower to interpolar nodule measuring 5 0 x 3 0 x 4 6 cm compared to 4 6 x 2 9 x 4 5 cm  Given differences in measuring technique, no significant change from prior  COMPOSITION:  2 points, solid or almost completely solid   ECHOGENICITY:  1 point, hyperechoic or isoechoic  SHAPE:  0 points, wider-than-tall  MARGIN: 0 points, smooth  ECHOGENIC FOCI:  0 points, none or large comet-tail artifacts  TI-RADS Classification: TR 3 (3 points), FNA if >2 5 cm  Follow if >1 5 cm  This nodule has had a previous benign biopsy  As it is stable, no further sampling recommended at this time  Further surveillance at 2 year intervals could be considered to   confirm continued stability for a period of greater than 5 years            IMPRESSION:     These are stable with previous non-malignant biopsy results as above  Based on 2015 SERINA guidelines, additional followup ultrasound should be performed in 12 to 24 months         Portions of the record may have been created with voice recognition software  Occasional wrong word or "sound a like" substitutions may have occurred due to the inherent limitations of voice recognition software  Read the chart carefully and recognize, using context, where substitutions have occurred

## 2022-07-19 ENCOUNTER — TELEPHONE (OUTPATIENT)
Dept: PSYCHIATRY | Facility: CLINIC | Age: 67
End: 2022-07-19

## 2022-07-21 ENCOUNTER — PATIENT OUTREACH (OUTPATIENT)
Dept: FAMILY MEDICINE CLINIC | Facility: CLINIC | Age: 67
End: 2022-07-21

## 2022-07-21 NOTE — PROGRESS NOTES
Referral received from patient's PCP to offer assistance to patient for various needs  Reviewed case with PCP via phone  MARLIN LANDIS reviewed chart  MARLIN LANDIS outreached patient ([phone])  Patient did not answer  MARLIN LANDIS left message asking patient to return call

## 2022-07-22 ENCOUNTER — CONSULT (OUTPATIENT)
Dept: GASTROENTEROLOGY | Facility: CLINIC | Age: 67
End: 2022-07-22
Payer: COMMERCIAL

## 2022-07-22 VITALS
HEART RATE: 74 BPM | SYSTOLIC BLOOD PRESSURE: 136 MMHG | WEIGHT: 203.2 LBS | TEMPERATURE: 97.3 F | HEIGHT: 66 IN | DIASTOLIC BLOOD PRESSURE: 76 MMHG | BODY MASS INDEX: 32.66 KG/M2

## 2022-07-22 DIAGNOSIS — E11.43 GASTROPARESIS DIABETICORUM (HCC): ICD-10-CM

## 2022-07-22 DIAGNOSIS — E11.21 TYPE 2 DIABETES MELLITUS WITH DIABETIC NEPHROPATHY, WITH LONG-TERM CURRENT USE OF INSULIN (HCC): ICD-10-CM

## 2022-07-22 DIAGNOSIS — Z79.4 TYPE 2 DIABETES MELLITUS WITH DIABETIC NEPHROPATHY, WITH LONG-TERM CURRENT USE OF INSULIN (HCC): ICD-10-CM

## 2022-07-22 DIAGNOSIS — K31.84 GASTROPARESIS DIABETICORUM (HCC): ICD-10-CM

## 2022-07-22 PROCEDURE — 3078F DIAST BP <80 MM HG: CPT | Performed by: INTERNAL MEDICINE

## 2022-07-22 PROCEDURE — 3075F SYST BP GE 130 - 139MM HG: CPT | Performed by: INTERNAL MEDICINE

## 2022-07-22 PROCEDURE — 1160F RVW MEDS BY RX/DR IN RCRD: CPT | Performed by: INTERNAL MEDICINE

## 2022-07-22 PROCEDURE — 99213 OFFICE O/P EST LOW 20 MIN: CPT | Performed by: INTERNAL MEDICINE

## 2022-07-22 NOTE — PATIENT INSTRUCTIONS
Called and scheduled patient for her NM Gastric Emptying study @ [id number] [facility]

## 2022-07-22 NOTE — PROGRESS NOTES
[facility] - Outpatient Follow-up Note  Rahul Raines 77 y o  female MRN: [id number]  Encounter: [id number]          ASSESSMENT AND PLAN:      1  Gastroparesis diabeticorum ([facility] )  -     Ambulatory Referral to Gastroenterology    This is a 14-year-old black female with diabetes had an episode of nausea vomiting two weeks ago  She currently is asymptomatic and therefore I think that is unlikely that she has gastroparesis or if she does that this probably quite mild  I will order a gastric emptying study to ascertain her gastric functioning  Most likely if she has mild gastroparesis she would not need any active treatment at this time but it would be something we could monitor  Thank you so much for referring this patient   ______________________________________________________________________    SUBJECTIVE:  Kendal Irene is a 14-year-old black female with a history of diabetes  She was recently admitted to hospital after an episode of severe headache associated with some nausea and vomiting  She had run out of her medications because of transportation issues  She was admitted and then she was discharged in good condition  Currently she is not having any nausea or vomiting  REVIEW OF SYSTEMS IS OTHERWISE NEGATIVE        Historical Information   Past Medical History:   Diagnosis Date    Abnormal ECG     last assessed: 5/15/17    Abnormal mammogram     last assessed: 7/11/17    Abnormal stress test     last assesed: 7/24/17    Anemia     Anxiety     Arthritis     Asthma     Back problem     Breast cyst     Chest pain     last assessed: 7/24/17    Chronic pain disorder     Cyst of left breast     last assessed: 8/18/17    Depression     Depression     resolved: 1/2/18    Diabetes mellitus ([facility] )     Hiatal hernia     last assessed: 11/7/17    Hyperlipidemia     Hypertension     Keloid of skin     last assessed: 11/2/16    Neuropathy     Psychiatric disorder anxiety    Stroke [facility])     TIA 2005    Tuberculosis     as a child      Past Surgical History:   Procedure Laterality Date    ARM WOUND REPAIR / CLOSURE      CARPAL TUNNEL RELEASE      CARPAL TUNNEL RELEASE Left     CATARACT EXTRACTION Bilateral     2015    COLONOSCOPY      CYST REMOVAL      right upper arm   EYE SURGERY      cataract removaql    FL LUMBAR PUNCTURE DIAGNOSTIC  8/15/2019    NH ESOPHAGOGASTRODUODENOSCOPY TRANSORAL DIAGNOSTIC N/A 1/5/2018    Procedure: ESOPHAGOGASTRODUODENOSCOPY (EGD);   Surgeon: Marshall Burrows DO;  Location: [facility] MAIN OR;  Service: Gastroenterology    NH WRIST Silver Heman LIG Left 4/19/2019    Procedure: ENDOSCOPIC CARPAL TUNNEL RELEASE;  Surgeon: Adria Greco MD;  Location: [address] [place] [address] MAIN OR;  Service: Orthopedics    TUBAL LIGATION      US GUIDED THYROID BIOPSY  4/15/2019     Social History   Social History     Substance and Sexual Activity   Alcohol Use Not Currently    Alcohol/week: 0 0 standard drinks     Social History     Substance and Sexual Activity   Drug Use No     Social History     Tobacco Use   Smoking Status Never Smoker   Smokeless Tobacco Never Used     Family History   Problem Relation Age of Onset    Heart disease Mother     Diabetes Mother     Arthritis Mother     Heart attack Mother     Hypertension Mother     Kidney disease Father     Hypertension Father     Kidney cancer Father     Arthritis Sister     Stroke Maternal Grandmother     Stomach cancer Maternal Grandmother     Arthritis Paternal Grandmother     Heart attack Paternal Grandmother     Stroke Maternal Aunt     Heart attack Maternal Uncle     Cancer Family         spinal column    Coronary artery disease Family     Glaucoma Family     Rheum arthritis Family     No Known Problems Maternal Grandfather     No Known Problems Paternal Grandfather     No Known Problems Daughter     No Known Problems Daughter     No Known Problems Daughter     No Known Problems Daughter     Liver cancer Maternal Aunt     No Known Problems Maternal Aunt     No Known Problems Maternal Aunt     No Known Problems Paternal Aunt        Meds/Allergies       Current Outpatient Medications:     albuterol (PROVENTIL HFA,VENTOLIN HFA) 90 mcg/act inhaler    amLODIPine (NORVASC) 10 mg tablet    aspirin 81 MG tablet    atorvastatin (LIPITOR) 80 mg tablet    Blood Glucose Calibration (ACCU-CHEK COMPACT PLUS CONTROL) SOLN    Blood Glucose Monitoring Suppl (ACCU-CHEK SHANE PLUS) w/Device KIT    Blood Glucose Monitoring Suppl (OneTouch Verio Reflect) w/Device KIT    Dapagliflozin Propanediol (Farxiga) 10 MG TABS    glucose blood (OneTouch Verio) test strip    hydrocortisone 0 5 % cream    Insulin Pen Needle (B-D UF III MINI PEN NEEDLES) 31G X 5 MM MISC    Januvia 100 MG tablet    Lancets (ACCU-CHEK MULTICLIX) lancets    lisinopril (ZESTRIL) 20 mg tablet    OneTouch Delica Lancets 40E MISC    pantoprazole (PROTONIX) 40 mg tablet    polyethylene glycol (MIRALAX) 17 g packet    Polyvinyl Alcohol (LIQUID TEARS OP)    potassium chloride (Klor-Con) 10 mEq tablet    RA PAIN RELIEF ACETAMINOPHEN 325 MG tablet    simvastatin (ZOCOR) 40 mg tablet    torsemide (DEMADEX) 5 MG tablet    pregabalin (LYRICA) 50 mg capsule    Allergies   Allergen Reactions    Bactrim [Sulfamethoxazole-Trimethoprim] Shortness Of Breath    Percocet [Oxycodone-Acetaminophen] GI Intolerance    Vicodin [Hydrocodone-Acetaminophen] GI Intolerance           Objective     Blood pressure 136/76, pulse 74, temperature (!) 97 3 °F (36 3 °C), temperature source Tympanic, height 5' 5 5" (1 664 m), weight 92 2 kg (203 lb 3 2 oz)  Body mass index is 33 3 kg/m²  PHYSICAL EXAM:      General Appearance:   Alert, cooperative, no distress   HEENT:   Normocephalic, atraumatic, anicteric       Neck:  Supple, symmetrical, trachea midline   Lungs:   Clear to auscultation bilaterally; no rales, rhonchi or wheezing; respirations unlabored    Heart[de-identified]   Regular rate and rhythm; no murmur, rub, or gallop  Abdomen:   Soft, non-tender, non-distended; normal bowel sounds; no masses, no organomegaly    Genitalia:   Deferred    Rectal:   Deferred    Extremities:  No cyanosis, clubbing or edema    Pulses:  2+ and symmetric    Skin:  No jaundice, rashes, or lesions    Lymph nodes:  No palpable cervical lymphadenopathy        Lab Results:   No visits with results within 1 Day(s) from this visit  Latest known visit with results is:   Office Visit on 07/15/2022   Component Date Value    Severity 07/15/2022 ALERT (A)    Right Eye Diabetic Retin* 07/15/2022 Moderate (A)    Right Eye Macular Edema 07/15/2022 None     Right Eye Other Retinopa* 07/15/2022 None     Right Eye Image Quality 07/15/2022 Gradable Image     Left Eye Diabetic Retino* 07/15/2022 Moderate (A)    Left Eye Macular Edema 07/15/2022 None     Left Eye Other Retinopat* 07/15/2022 None     Left Eye Image Quality 07/15/2022 Gradable Image     Result 07/15/2022 Retinal Study Result for [address] [place] [address] Result 07/15/2022       Result 07/15/2022  bradly Nava 78 y/o, F (: 1955, MRN: [id number])     Result 07/15/2022  presented to [facility] on 07- for a retinal imaging study of the left and right eyes   Result 07/15/2022       Result 07/15/2022  Based on the findings of the study, the following is recommended for [address] [place] [address] Result 07/15/2022  Appointment Needed Within One Month: Schedule an appointment with a retina specialist for further evaluation within 4 weeks       Result 07/15/2022       Result 07/15/2022  Interpreting Provider's Comments:  No comments provided     Result 07/15/2022  Diagnoses Present: E118 - Type 2 diabetes mellitus with unspecified complications     Result 07/15/2022 Z794 - Long term (current) use of insulin     Result 07/15/2022 [id number] - Diabetes mellitus due to underlying condition with moderate nonproliferative diabetic retinopathy without macular edema Bilateral     Result 07/15/2022       Result 07/15/2022  Right eye findings: Diabetic Retinopathy: Moderate     Result 07/15/2022 Negative for Macular Edema     Result 07/15/2022       Result 07/15/2022  Left eye findings: Diabetic Retinopathy: Moderate     Result 07/15/2022 Negative for Macular Edema     Result 07/15/2022       Result 07/15/2022       Result 07/15/2022  This result was electronically signed by Gen Martel MD, NPI: [id number], Taxonomy: [id number] on 07- 17:11 [facility]   Result 07/15/2022       Result 07/15/2022 NOTE:  Any pathology noted on this diabetic retinal evaluation should be confirmed by an appropriate ophthalmic examination  Radiology Results:   XR chest 2 views    Result Date: 7/8/2022  Narrative: CHEST INDICATION:   chest pain  COMPARISON:  Chest radiograph November 16, 2021 EXAM PERFORMED/VIEWS:  XR CHEST PA & LATERAL FINDINGS: Cardiomediastinal silhouette appears unremarkable  The lungs are clear  No pneumothorax or pleural effusion  Osseous structures appear within normal limits for patient age  Impression: No acute cardiopulmonary disease  Workstation performed: [id number]     CT head without contrast    Result Date: 7/8/2022  Narrative: CT BRAIN - WITHOUT CONTRAST INDICATION: Headaches  COMPARISON:  MRI of the brain from January 25, 2022, head CT and CT angiography of the head and neck from August 12, 2019  TECHNIQUE:  CT examination of the brain was performed  In addition to axial images, sagittal and coronal 2D reformatted images were created and submitted for interpretation  Radiation dose length product (DLP) for this visit:  872 17 mGy-cm     This examination, like all CT scans performed in the [facility], was performed utilizing techniques to minimize radiation dose exposure, including the use of iterative  reconstruction and automated exposure control  IMAGE QUALITY:  Diagnostic  FINDINGS: PARENCHYMA:  No intracranial mass, mass effect or midline shift  No CT signs of acute infarction  No acute parenchymal hemorrhage  Chronic infarction in the right lateral thalamus  Mild periventricular, subcortical and deep cerebral white matter chronic microangiopathic changes  VENTRICLES AND EXTRA-AXIAL SPACES:  Low normal to slightly small but stable ventricular size  Mildly expanded and partially CSF replaced sella  Prominent basal meningoceles in the petrous apices/Meckel's cave  VISUALIZED ORBITS AND PARANASAL SINUSES:  Unremarkable  CALVARIUM AND EXTRACRANIAL SOFT TISSUES:  Normal      Impression: No acute intracranial abnormality  Chronic microangiopathic changes  Appearance of the extra-axial spaces raise the question of chronic intracranial hypertension   Workstation performed: [id number]

## 2022-07-26 ENCOUNTER — TELEPHONE (OUTPATIENT)
Dept: OBGYN CLINIC | Age: 67
End: 2022-07-26

## 2022-07-27 ENCOUNTER — TELEPHONE (OUTPATIENT)
Dept: PSYCHIATRY | Facility: CLINIC | Age: 67
End: 2022-07-27

## 2022-07-28 ENCOUNTER — PATIENT OUTREACH (OUTPATIENT)
Dept: FAMILY MEDICINE CLINIC | Facility: CLINIC | Age: 67
End: 2022-07-28

## 2022-07-28 DIAGNOSIS — Z74.8 ASSISTANCE WITH TRANSPORTATION: Primary | ICD-10-CM

## 2022-07-28 NOTE — PROGRESS NOTES
MARLIN LANDIS attempted second outreach to patient ([phone]) and patient answered the phone  Patient was agreeable to outreach  Patient stated she was talking to PCP about transportation  Stated she has trouble getting to appointments because she did not have transportation  Stated she has a car, but it is down  Patient has [facility] and [place]  Shared that she has no concerns in regard to affording medical care or supplies  Stated that her lot rent continues to increase every year  She owns the trailer she lives in She receives [employer] benefits and [employer]  Water is included in her rent  Patient receives partial disability and SSI d/t her 's death  Patient stated she needs refills on her asthma medication and medication for fungus on her toe  Patient's daughter will start working over night  Stated she is not afraid to be by herself  Stated she is able to get around the house by herself  She is able to do some house work  Takes her medications on her own  Family takes her to the grocery store  She is able to get out of the house on her own  Discussed application for STS and patient is open  Discussed referral to [facility] for assistance with this and patient is agreeable  MARLIN LANDIS called [employer] and completed referral for patient to discuss meals

## 2022-08-03 ENCOUNTER — PATIENT OUTREACH (OUTPATIENT)
Dept: FAMILY MEDICINE CLINIC | Facility: CLINIC | Age: 67
End: 2022-08-03

## 2022-08-03 NOTE — PROGRESS NOTES
Chart Review/ Outgoing Call  8/3/2022    CM  received a referral from Dee Mi  Referral states the patient needs assistance with transportation  [facility] completed chart review  [facility] contacted patient to introduce self, describe role, and discuss an application for transportation through [facility]  Patient did not answer call  [facility] left a detailed message with contact information and requested a return call  [facility] will follow  Next outreach is scheduled for 8/10/2022

## 2022-08-11 ENCOUNTER — PATIENT OUTREACH (OUTPATIENT)
Dept: FAMILY MEDICINE CLINIC | Facility: CLINIC | Age: 67
End: 2022-08-11

## 2022-08-11 NOTE — PROGRESS NOTES
Chart Review/ Outgoing Call  8/11/2022    [facility] completed chart review  [facility] contacted patient (2nd attempt) to introduce self, describe role, and discuss an application for transportation through [facility]  Patient did not answer call  [facility] left a detailed message with contact information and requested a return call  [facility] will follow      Next outreach is scheduled for 8/18/2022

## 2022-08-30 ENCOUNTER — PATIENT OUTREACH (OUTPATIENT)
Dept: FAMILY MEDICINE CLINIC | Facility: CLINIC | Age: 67
End: 2022-08-30

## 2022-08-30 NOTE — LETTER
[phone]    Re: Ilene Nyhan to [facility]ach   8/30/2022       Dear Kendal Irene,    We tried to reach you by phone on 8/03/22, 8/11/22 and 8/30/22 and was unfortunately unable to reach you  It is important that you contact me as soon as possible at: [phone] if you would like to continue with case management services       Sincerely,         Lupe De Jesus, ELOISAW

## 2022-08-30 NOTE — PROGRESS NOTES
MARLIN LANDIS received message from  OC stating patient has not returned outreach attempts  UTR letter sent  Case will be closed in two weeks if patient does not return communication

## 2022-08-30 NOTE — PROGRESS NOTES
Chart Review/ Outgoing Call  8/30/2022    [facility] completed chart review  [facility] contacted patient (3rd attempt) to introduce self, describe role, and discuss an application for transportation through [facility]  Patient did not answer call  [facility] could not leave patient a message, mailbox is full  [facility] informed [facility] about the patient being unable to reach to complete transportation application  Marek Travis informed [facility] she will send a letter to the patient  [facility] will wait 2 weeks for patient to respond to letter  If patient does not contact [facility] to complete application CMOC will close the referral on 9/14/2022

## 2022-08-31 ENCOUNTER — VBI (OUTPATIENT)
Dept: ADMINISTRATIVE | Facility: OTHER | Age: 67
End: 2022-08-31

## 2022-08-31 NOTE — TELEPHONE ENCOUNTER
08/31/22 10:53 AM     See documentation in the [employer] SmartForm       [address] [place] [address]

## 2022-09-12 ENCOUNTER — RA CDI HCC (OUTPATIENT)
Dept: OTHER | Facility: HOSPITAL | Age: 67
End: 2022-09-12

## 2022-09-12 NOTE — PROGRESS NOTES
[facility]  coding opportunities          Chart Reviewed number of suggestions sent to Provider: 1  E11 [id number]     Patients Insurance     Medicare Insurance: [employer]

## 2022-09-13 ENCOUNTER — PATIENT OUTREACH (OUTPATIENT)
Dept: FAMILY MEDICINE CLINIC | Facility: CLINIC | Age: 67
End: 2022-09-13

## 2022-09-13 NOTE — PROGRESS NOTES
MARLIN LANDIS sent UTR letter to patient  Patient has not returned [facility]'s outreach attempts  Case will be closed in two weeks if patient does not outreach

## 2022-09-13 NOTE — LETTER
[phone]    Re: Angela Murcia to Reach   9/13/2022       Dear Michelle Leon,    We tried to reach you by phone several times and have been unfortunately unable to reach you  It is important that you contact me as soon as possible at: [phone] if you are still in need of Outpatient Care Management services       Sincerely,         Shemar Thorpe LCSW

## 2022-09-19 ENCOUNTER — OFFICE VISIT (OUTPATIENT)
Dept: FAMILY MEDICINE CLINIC | Facility: CLINIC | Age: 67
End: 2022-09-19
Payer: COMMERCIAL

## 2022-09-19 VITALS
OXYGEN SATURATION: 97 % | TEMPERATURE: 97.2 F | WEIGHT: 211 LBS | DIASTOLIC BLOOD PRESSURE: 88 MMHG | HEIGHT: 66 IN | HEART RATE: 71 BPM | SYSTOLIC BLOOD PRESSURE: 172 MMHG | BODY MASS INDEX: 33.91 KG/M2

## 2022-09-19 DIAGNOSIS — I10 ESSENTIAL HYPERTENSION: ICD-10-CM

## 2022-09-19 DIAGNOSIS — E11.22 TYPE 2 DIABETES MELLITUS WITH STAGE 3B CHRONIC KIDNEY DISEASE, UNSPECIFIED WHETHER LONG TERM INSULIN USE (HCC): ICD-10-CM

## 2022-09-19 DIAGNOSIS — Z78.0 MENOPAUSE: ICD-10-CM

## 2022-09-19 DIAGNOSIS — Z23 NEED FOR INFLUENZA VACCINATION: ICD-10-CM

## 2022-09-19 DIAGNOSIS — Z01.419 ENCOUNTER FOR GYNECOLOGICAL EXAMINATION WITHOUT ABNORMAL FINDING: ICD-10-CM

## 2022-09-19 DIAGNOSIS — N18.32 TYPE 2 DIABETES MELLITUS WITH STAGE 3B CHRONIC KIDNEY DISEASE, UNSPECIFIED WHETHER LONG TERM INSULIN USE (HCC): ICD-10-CM

## 2022-09-19 DIAGNOSIS — Z12.31 ENCOUNTER FOR SCREENING MAMMOGRAM FOR MALIGNANT NEOPLASM OF BREAST: Primary | ICD-10-CM

## 2022-09-19 DIAGNOSIS — R60.9 DEPENDENT EDEMA: ICD-10-CM

## 2022-09-19 DIAGNOSIS — N83.209 CYST OF OVARY, UNSPECIFIED LATERALITY: ICD-10-CM

## 2022-09-19 PROCEDURE — 90662 IIV NO PRSV INCREASED AG IM: CPT | Performed by: FAMILY MEDICINE

## 2022-09-19 PROCEDURE — G0008 ADMIN INFLUENZA VIRUS VAC: HCPCS | Performed by: FAMILY MEDICINE

## 2022-09-19 PROCEDURE — 3079F DIAST BP 80-89 MM HG: CPT | Performed by: FAMILY MEDICINE

## 2022-09-19 PROCEDURE — 3077F SYST BP >= 140 MM HG: CPT | Performed by: FAMILY MEDICINE

## 2022-09-19 PROCEDURE — 0503F POSTPARTUM CARE VISIT: CPT | Performed by: FAMILY MEDICINE

## 2022-09-19 PROCEDURE — 1101F PT FALLS ASSESS-DOCD LE1/YR: CPT | Performed by: FAMILY MEDICINE

## 2022-09-19 PROCEDURE — 99214 OFFICE O/P EST MOD 30 MIN: CPT | Performed by: FAMILY MEDICINE

## 2022-09-19 PROCEDURE — 1160F RVW MEDS BY RX/DR IN RCRD: CPT | Performed by: FAMILY MEDICINE

## 2022-09-19 PROCEDURE — 3288F FALL RISK ASSESSMENT DOCD: CPT | Performed by: FAMILY MEDICINE

## 2022-09-19 PROCEDURE — 3066F NEPHROPATHY DOC TX: CPT | Performed by: FAMILY MEDICINE

## 2022-09-19 RX ORDER — AMLODIPINE BESYLATE 10 MG/1
10 TABLET ORAL DAILY
Qty: 30 TABLET | Refills: 0 | Status: SHIPPED | OUTPATIENT
Start: 2022-09-19

## 2022-09-19 RX ORDER — TORSEMIDE 5 MG/1
5 TABLET ORAL EVERY MORNING
Qty: 30 TABLET | Refills: 3 | Status: SHIPPED | OUTPATIENT
Start: 2022-09-19

## 2022-09-19 RX ORDER — POTASSIUM CHLORIDE 750 MG/1
10 TABLET, FILM COATED, EXTENDED RELEASE ORAL DAILY
Qty: 30 TABLET | Refills: 3 | Status: SHIPPED | OUTPATIENT
Start: 2022-09-19

## 2022-09-19 NOTE — PATIENT INSTRUCTIONS
Type 2 Diabetes Management for Adults   AMBULATORY CARE:   Type 2 diabetes  is a disease that affects how your body uses glucose (sugar)  Either your body cannot make enough insulin, or it cannot use the insulin correctly  It is important to keep diabetes controlled to prevent damage to your heart, blood vessels, and other organs  Have someone call your local emergency number (911 in the [address] [facility]) if:   · You cannot be woken  · You have signs of diabetic ketoacidosis:     ? confusion, fatigue    ? vomiting    ? rapid heartbeat    ? fruity smelling breath    ? extreme thirst    ? dry mouth and skin    · You have any of the following signs of a heart attack:      ? Squeezing, pressure, or pain in your chest    ? You may  also have any of the following:     § Discomfort or pain in your back, neck, jaw, stomach, or arm    § Shortness of breath    § Nausea or vomiting    § Lightheadedness or a sudden cold sweat    · You have any of the following signs of a stroke:      ? Numbness or drooping on one side of your face     ? Weakness in an arm or leg    ? Confusion or difficulty speaking    ? Dizziness, a severe headache, or vision loss    Call your doctor or diabetes care team if:   · You have a sore or wound that will not heal     · You have a change in the amount you urinate  · Your blood sugar levels are higher than your target goals  · You often have lower blood sugar levels than your target goals  · Your skin is red, dry, warm, or swollen  · You have trouble coping with diabetes, or you feel anxious or depressed  · You have questions or concerns about your condition or care  What you need to know about high blood sugar levels:  High blood sugar levels may not cause any symptoms  You may feel more thirsty or urinate more often than usual  Over time, high blood sugar levels can damage your nerves, blood vessels, tissues, and organs   The following can increase your blood sugar levels:  · Large meals or large amounts of carbohydrates at one time    · Less physical activity    · Stress    · Illness    · A lower dose of medicine or insulin, or a late dose    What you need to know about low blood sugar levels: You can prevent symptoms such as shakiness, dizziness, irritability, or confusion by preventing your blood sugar levels from going too low  · Treat a low blood sugar level right away  ? Drink 4 ounces of juice or have 1 tube of glucose gel  ? Check your blood sugar level again 10 to 15 minutes later  ? When the level goes back to normal, eat a meal or snack to prevent another decrease  · Keep glucose gel, raisins, or hard candy with you at all times to treat a low blood sugar level  · Your blood sugar level can get too low if you take diabetes medicine or insulin and do not eat enough food  · If you use insulin, check your blood sugar level before you exercise  ? If your blood sugar level is below 100 mg/dL, eat 4 crackers or 2 ounces of raisins, or drink 4 ounces of juice  ? Check your level every 30 minutes if you exercise more than 1 hour  ? You may need a snack during or after exercise  What you can do to manage your blood sugar levels:   · Check your blood sugar levels as directed and as needed  Several items are available to use to check your levels  You may need to check by testing a drop of blood in a glucose monitor  You may instead be given a continuous glucose monitoring (CGM) device  The device is worn at all times  The CGM checks your blood sugar level every 5 minutes  It sends results to an electronic device such as a smart phone  A CGM can be used with or without an insulin pump  Talk with your provider to find out which method is best for you  The goal for blood sugar levels before meals  is between 80 and 130 mg/dL and 2 hours after eating  is lower than 180 mg/dL  · Make healthy food choices    Work with a dietitian to develop a meal plan that works for you and your schedule  A dietitian can help you learn how to eat the right amount of carbohydrates during your meals and snacks  Carbohydrates can raise your blood sugar level if you eat too many at one time  Examples of foods that contain carbohydrates are breads, cereals, rice, pasta, and sweets  · Get regular physical activity  Physical activity can help you get to your target blood sugar level goal and manage your weight  Get at least 150 minutes of moderate to vigorous aerobic physical activity each week  Do not miss more than 2 days in a row  Do not sit longer than 30 minutes at a time  Your healthcare provider can help you create an activity plan  The plan can include the best activities for you and can help you build your strength and endurance  · Maintain a healthy weight  Ask your healthcare provider what a healthy weight is for you  Ask him or her to help you create a safe weight loss plan if you are overweight  · Take your diabetes medicine or insulin as directed  You may need diabetes medicine, insulin, or both to help control your blood sugar levels  Your healthcare provider will teach you how and when to take your diabetes medicine or insulin  You will also be taught about side effects oral diabetes medicine can cause  Insulin may be injected, or given through a pump or pen  You and your care team will discuss which method is best for you  ? An insulin pump  is an implanted device that gives your insulin 24 hours a day  An insulin pump prevents the need for multiple insulin injections in a day  ? An insulin pen  is a device prefilled with the right amount of insulin  ? You and your family members will be taught how to draw up and give insulin  if this is the best method for you  Your education team will also teach you how to dispose of needles and syringes  ? You will learn how much insulin you need  and when to give it   You will be taught when not to give insulin  You will also be taught what to do if your blood sugar level drops too low  This may happen if you take insulin and do not eat the right amount of carbohydrates  Other things you can do to manage type 2 diabetes:   · Wear medical alert identification  Wear medical alert jewelry or carry a card that says you have diabetes  Ask your provider where to get these items  · Do not smoke  Nicotine and other chemicals in cigarettes and cigars can cause lung and blood vessel damage  It also makes it more difficult to manage your diabetes  Ask your provider for information if you currently smoke and need help to quit  Do not use e-cigarettes or smokeless tobacco in place of cigarettes or to help you quit  They still contain nicotine  · Check your feet each day for cuts, scratches, calluses, or other wounds  Look for redness and swelling, and feel for warmth  Wear shoes that fit well  Check your shoes for rocks or other objects that can hurt your feet  Do not walk barefoot or wear shoes without socks  Wear cotton socks to help keep your feet dry  · Ask about vaccines you may need  You have a higher risk for serious illness if you get the flu, pneumonia, COVID-19, or hepatitis  Ask your provider if you should get vaccines to prevent these or other diseases, and when to get the vaccines  · Talk to your care team if you become stressed about diabetes care  Sometimes being able to fit diabetes care into your life can cause increased stress  The stress can cause you not to take care of yourself properly  Your care team can help by offering tips about self-care  Your care team may suggest you talk to a mental health provider  The provider can listen and offer help with self-care issues  Follow up with your doctor or diabetes care team as directed: You may need to have blood tests done before your follow-up visit   The test results will show if changes need to be made in your treatment or self-care  Write down your questions so you remember to ask them during your visits  Talk to your provider if you cannot afford your medicine  © Copyright [employer] 2022 Information is for End User's use only and may not be sold, redistributed or otherwise used for commercial purposes  All illustrations and images included in CareNotes® are the copyrighted property of [employer]  or Katheryn Chavez  The above information is an  only  It is not intended as medical advice for individual conditions or treatments  Talk to your doctor, nurse or pharmacist before following any medical regimen to see if it is safe and effective for you  Foot Care for People with Diabetes   AMBULATORY CARE:   What you need to know about foot care:   · Foot care helps protect your feet and prevent foot ulcers or sores  Long-term high blood sugar levels can damage the blood vessels and nerves in your legs and feet  This damage makes it hard to feel pressure, pain, temperature, and touch  You may not be able to feel a cut or sore, or shoes that are too tight  Foot care is needed to prevent serious problems, such as an infection or amputation  · Diabetes may cause your toes to become crooked or curved under  These changes may affect the way you walk and can lead to increased pressure on your foot  The pressure can decrease blood flow to your feet  Lack of blood flow increases your risk for a foot ulcer  Do not ignore small problems, such as dry skin or small wounds  These can become life-threatening over time without proper care  Call your care team provider if:   · Your feet become numb, weak, or hard to move  · You have pus draining from a sore on your foot  · You have a wound on your foot that gets bigger, deeper, or does not heal      · You see blisters, cuts, scratches, calluses, or sores on your foot  · You have a fever, and your feet become red, warm, and swollen  · Your toenails become thick, curled, or yellow  · You find it hard to check your feet because your vision is poor  · You have questions or concerns about your condition or care  How to care for your feet:   · Check your feet each day  Look at your whole foot, including the bottom, and between and under your toes  Check for wounds, corns, and calluses  Use a mirror to see the bottom of your feet  The skin on your feet may be shiny, tight, or darker than normal  Your feet may also be cold and pale  Feel your feet by running your hands along the tops, bottoms, sides, and between your toes  Redness, swelling, and warmth are signs of blood flow problems that can lead to a foot ulcer  Do not try to remove corns or calluses yourself  · Wash your feet each day with soap and warm water  Do not use hot water, because this can injure your foot  Dry your feet gently with a towel after you wash them  Dry between and under your toes  · Apply lotion or a moisturizer on your dry feet  Ask your care team provider what lotions are best to use  Do not put lotion or moisturizer between your toes  Moisture between your toes could lead to skin breakdown  · Cut your toenails correctly  File or cut your toenails straight across  Use a soft brush to clean around your toenails  If your toenails are very thick, you may need to have a care team provider or specialist cut them  · Protect your feet  Do not walk barefoot or wear your shoes without socks  Check your shoes for rocks or other objects that can hurt your feet  Wear cotton socks to help keep your feet dry  Wear socks without toe seams, or wear them with the seams inside out  Change your socks each day  Do not wear socks that are dirty or damp  · Wear shoes that fit well  Wear shoes that do not rub against any area of your feet  Your shoes should be ½ to ¾ inch (1 to 2 centimeters) longer than your feet   Your shoes should also have extra space around the widest part of your feet  Walking or athletic shoes with laces or straps that adjust are best  Ask your care team provider for help to choose shoes that fit you best  Ask him or her if you need to wear an insert, orthotic, or bandage on your feet  · Go to your follow-up visits  Your care team provider will do a foot exam at least once a year  You may need a foot exam more often if you have nerve damage, foot deformities, or ulcers  He will check for nerve damage and how well you can feel your feet  He will check your shoes to see if they fit well  · Do not smoke  Smoking can damage your blood vessels and put you at increased risk for foot ulcers  Ask your care team provider for information if you currently smoke and need help to quit  E-cigarettes or smokeless tobacco still contain nicotine  Talk to your care team provider before you use these products  Follow up with your diabetes care team provider or foot specialist as directed: You will need to have your feet checked at least once a year  You may need a foot exam more often if you have nerve damage, foot deformities, or ulcers  Write down your questions so you remember to ask them during your visits  © [employer] 2022 Information is for End User's use only and may not be sold, redistributed or otherwise used for commercial purposes  All illustrations and images included in CareNotes® are the copyrighted property of [employer]  or [facility][note 3] Giselle Eddy   The above information is an  only  It is not intended as medical advice for individual conditions or treatments  Talk to your doctor, nurse or pharmacist before following any medical regimen to see if it is safe and effective for you  Type 2 Diabetes Management for Adults   AMBULATORY CARE:   Type 2 diabetes  is a disease that affects how your body uses glucose (sugar)  Either your body cannot make enough insulin, or it cannot use the insulin correctly  It is important to keep diabetes controlled to prevent damage to your heart, blood vessels, and other organs  Have someone call your local emergency number (911 in the [address] [facility] [address]) if:   · You cannot be woken  · You have signs of diabetic ketoacidosis:     ? confusion, fatigue    ? vomiting    ? rapid heartbeat    ? fruity smelling breath    ? extreme thirst    ? dry mouth and skin    · You have any of the following signs of a heart attack:      ? Squeezing, pressure, or pain in your chest    ? You may  also have any of the following:     § Discomfort or pain in your back, neck, jaw, stomach, or arm    § Shortness of breath    § Nausea or vomiting    § Lightheadedness or a sudden cold sweat    · You have any of the following signs of a stroke:      ? Numbness or drooping on one side of your face     ? Weakness in an arm or leg    ? Confusion or difficulty speaking    ? Dizziness, a severe headache, or vision loss    Call your doctor or diabetes care team if:   · You have a sore or wound that will not heal     · You have a change in the amount you urinate  · Your blood sugar levels are higher than your target goals  · You often have lower blood sugar levels than your target goals  · Your skin is red, dry, warm, or swollen  · You have trouble coping with diabetes, or you feel anxious or depressed  · You have questions or concerns about your condition or care  What you need to know about high blood sugar levels:  High blood sugar levels may not cause any symptoms  You may feel more thirsty or urinate more often than usual  Over time, high blood sugar levels can damage your nerves, blood vessels, tissues, and organs  The following can increase your blood sugar levels:  · Large meals or large amounts of carbohydrates at one time    · Less physical activity    · Stress    · Illness    · A lower dose of medicine or insulin, or a late dose    What you need to know about low blood sugar levels:   You can prevent symptoms such as shakiness, dizziness, irritability, or confusion by preventing your blood sugar levels from going too low  · Treat a low blood sugar level right away  ? Drink 4 ounces of juice or have 1 tube of glucose gel  ? Check your blood sugar level again 10 to 15 minutes later  ? When the level goes back to normal, eat a meal or snack to prevent another decrease  · Keep glucose gel, raisins, or hard candy with you at all times to treat a low blood sugar level  · Your blood sugar level can get too low if you take diabetes medicine or insulin and do not eat enough food  · If you use insulin, check your blood sugar level before you exercise  ? If your blood sugar level is below 100 mg/dL, eat 4 crackers or 2 ounces of raisins, or drink 4 ounces of juice  ? Check your level every 30 minutes if you exercise more than 1 hour  ? You may need a snack during or after exercise  What you can do to manage your blood sugar levels:   · Check your blood sugar levels as directed and as needed  Several items are available to use to check your levels  You may need to check by testing a drop of blood in a glucose monitor  You may instead be given a continuous glucose monitoring (CGM) device  The device is worn at all times  The CGM checks your blood sugar level every 5 minutes  It sends results to an electronic device such as a smart phone  A CGM can be used with or without an insulin pump  Talk with your provider to find out which method is best for you  The goal for blood sugar levels before meals  is between 80 and 130 mg/dL and 2 hours after eating  is lower than 180 mg/dL  · Make healthy food choices  Work with a dietitian to develop a meal plan that works for you and your schedule  A dietitian can help you learn how to eat the right amount of carbohydrates during your meals and snacks   Carbohydrates can raise your blood sugar level if you eat too many at one time  Examples of foods that contain carbohydrates are breads, cereals, rice, pasta, and sweets  · Get regular physical activity  Physical activity can help you get to your target blood sugar level goal and manage your weight  Get at least 150 minutes of moderate to vigorous aerobic physical activity each week  Do not miss more than 2 days in a row  Do not sit longer than 30 minutes at a time  Your healthcare provider can help you create an activity plan  The plan can include the best activities for you and can help you build your strength and endurance  · Maintain a healthy weight  Ask your healthcare provider what a healthy weight is for you  Ask him or her to help you create a safe weight loss plan if you are overweight  · Take your diabetes medicine or insulin as directed  You may need diabetes medicine, insulin, or both to help control your blood sugar levels  Your healthcare provider will teach you how and when to take your diabetes medicine or insulin  You will also be taught about side effects oral diabetes medicine can cause  Insulin may be injected, or given through a pump or pen  You and your care team will discuss which method is best for you  ? An insulin pump  is an implanted device that gives your insulin 24 hours a day  An insulin pump prevents the need for multiple insulin injections in a day  ? An insulin pen  is a device prefilled with the right amount of insulin  ? You and your family members will be taught how to draw up and give insulin  if this is the best method for you  Your education team will also teach you how to dispose of needles and syringes  ? You will learn how much insulin you need  and when to give it  You will be taught when not to give insulin  You will also be taught what to do if your blood sugar level drops too low  This may happen if you take insulin and do not eat the right amount of carbohydrates      Other things you can do to manage type 2 diabetes:   · Wear medical alert identification  Wear medical alert jewelry or carry a card that says you have diabetes  Ask your provider where to get these items  · Do not smoke  Nicotine and other chemicals in cigarettes and cigars can cause lung and blood vessel damage  It also makes it more difficult to manage your diabetes  Ask your provider for information if you currently smoke and need help to quit  Do not use e-cigarettes or smokeless tobacco in place of cigarettes or to help you quit  They still contain nicotine  · Check your feet each day for cuts, scratches, calluses, or other wounds  Look for redness and swelling, and feel for warmth  Wear shoes that fit well  Check your shoes for rocks or other objects that can hurt your feet  Do not walk barefoot or wear shoes without socks  Wear cotton socks to help keep your feet dry  · Ask about vaccines you may need  You have a higher risk for serious illness if you get the flu, pneumonia, COVID-19, or hepatitis  Ask your provider if you should get vaccines to prevent these or other diseases, and when to get the vaccines  · Talk to your care team if you become stressed about diabetes care  Sometimes being able to fit diabetes care into your life can cause increased stress  The stress can cause you not to take care of yourself properly  Your care team can help by offering tips about self-care  Your care team may suggest you talk to a mental health provider  The provider can listen and offer help with self-care issues  Follow up with your doctor or diabetes care team as directed: You may need to have blood tests done before your follow-up visit  The test results will show if changes need to be made in your treatment or self-care  Write down your questions so you remember to ask them during your visits  Talk to your provider if you cannot afford your medicine    © Copyright [address] 2022 Information is for End User's use only and may not be sold, redistributed or otherwise used for commercial purposes  All illustrations and images included in CareNotes® are the copyrighted property of [employer]  or Katheryn Chavez  The above information is an  only  It is not intended as medical advice for individual conditions or treatments  Talk to your doctor, nurse or pharmacist before following any medical regimen to see if it is safe and effective for you  Foot Care for People with Diabetes   AMBULATORY CARE:   What you need to know about foot care:   · Foot care helps protect your feet and prevent foot ulcers or sores  Long-term high blood sugar levels can damage the blood vessels and nerves in your legs and feet  This damage makes it hard to feel pressure, pain, temperature, and touch  You may not be able to feel a cut or sore, or shoes that are too tight  Foot care is needed to prevent serious problems, such as an infection or amputation  · Diabetes may cause your toes to become crooked or curved under  These changes may affect the way you walk and can lead to increased pressure on your foot  The pressure can decrease blood flow to your feet  Lack of blood flow increases your risk for a foot ulcer  Do not ignore small problems, such as dry skin or small wounds  These can become life-threatening over time without proper care  Call your care team provider if:   · Your feet become numb, weak, or hard to move  · You have pus draining from a sore on your foot  · You have a wound on your foot that gets bigger, deeper, or does not heal      · You see blisters, cuts, scratches, calluses, or sores on your foot  · You have a fever, and your feet become red, warm, and swollen  · Your toenails become thick, curled, or yellow  · You find it hard to check your feet because your vision is poor  · You have questions or concerns about your condition or care      How to care for your feet: · Check your feet each day  Look at your whole foot, including the bottom, and between and under your toes  Check for wounds, corns, and calluses  Use a mirror to see the bottom of your feet  The skin on your feet may be shiny, tight, or darker than normal  Your feet may also be cold and pale  Feel your feet by running your hands along the tops, bottoms, sides, and between your toes  Redness, swelling, and warmth are signs of blood flow problems that can lead to a foot ulcer  Do not try to remove corns or calluses yourself  · Wash your feet each day with soap and warm water  Do not use hot water, because this can injure your foot  Dry your feet gently with a towel after you wash them  Dry between and under your toes  · Apply lotion or a moisturizer on your dry feet  Ask your care team provider what lotions are best to use  Do not put lotion or moisturizer between your toes  Moisture between your toes could lead to skin breakdown  · Cut your toenails correctly  File or cut your toenails straight across  Use a soft brush to clean around your toenails  If your toenails are very thick, you may need to have a care team provider or specialist cut them  · Protect your feet  Do not walk barefoot or wear your shoes without socks  Check your shoes for rocks or other objects that can hurt your feet  Wear cotton socks to help keep your feet dry  Wear socks without toe seams, or wear them with the seams inside out  Change your socks each day  Do not wear socks that are dirty or damp  · Wear shoes that fit well  Wear shoes that do not rub against any area of your feet  Your shoes should be ½ to ¾ inch (1 to 2 centimeters) longer than your feet  Your shoes should also have extra space around the widest part of your feet   Walking or athletic shoes with laces or straps that adjust are best  Ask your care team provider for help to choose shoes that fit you best  Ask him or her if you need to wear an insert, orthotic, or bandage on your feet  · Go to your follow-up visits  Your care team provider will do a foot exam at least once a year  You may need a foot exam more often if you have nerve damage, foot deformities, or ulcers  He will check for nerve damage and how well you can feel your feet  He will check your shoes to see if they fit well  · Do not smoke  Smoking can damage your blood vessels and put you at increased risk for foot ulcers  Ask your care team provider for information if you currently smoke and need help to quit  E-cigarettes or smokeless tobacco still contain nicotine  Talk to your care team provider before you use these products  Follow up with your diabetes care team provider or foot specialist as directed: You will need to have your feet checked at least once a year  You may need a foot exam more often if you have nerve damage, foot deformities, or ulcers  Write down your questions so you remember to ask them during your visits  © [employer] 2022 Information is for End User's use only and may not be sold, redistributed or otherwise used for commercial purposes  All illustrations and images included in CareNotes® are the copyrighted property of A D A M , Inc  or [facility] Giselle Eddy   The above information is an  only  It is not intended as medical advice for individual conditions or treatments  Talk to your doctor, nurse or pharmacist before following any medical regimen to see if it is safe and effective for you

## 2022-09-19 NOTE — PROGRESS NOTES
Assessment/Plan:    Problem List Items Addressed This Visit        Cardiovascular and Mediastinum    Essential hypertension      Other Visit Diagnoses     Encounter for screening mammogram for malignant neoplasm of breast    -  Primary    Relevant Orders    Mammo screening bilateral w 3d & cad    Need for influenza vaccination        Relevant Orders    influenza vaccine, high-dose, PF 0 7 mL (FLUZONE HIGH-DOSE) (Completed)    Encounter for gynecological examination without abnormal finding        Relevant Orders    Ambulatory Referral to Gynecology    Menopause        Relevant Orders    DXA bone density spine hip and pelvis    Dependent edema        Cyst of ovary, unspecified laterality               Diagnoses and all orders for this visit:    Encounter for screening mammogram for malignant neoplasm of breast  -     Mammo screening bilateral w 3d & cad; Future    Need for influenza vaccination  -     influenza vaccine, high-dose, PF 0 7 mL (FLUZONE HIGH-DOSE)    Encounter for gynecological examination without abnormal finding  -     Ambulatory Referral to Gynecology; Future    Menopause  -     DXA bone density spine hip and pelvis; Future    Essential hypertension    Dependent edema    Cyst of ovary, unspecified laterality        No problem-specific Assessment & Plan notes found for this encounter  Subjective:      Patient ID: Yany Mon is a 79 y o  female  Mrs Haider Binder is here today chief complaint is that she has an atypical type of chest pain it sounds very much like a musculoskeletal costal chondritis type of pain in occurred shortly after she had a bout of COVID she was coughing very forcefully if she turns her thorax she has pain her pain can be easily reproduced with palpation of the sternum and the costosternal cartilages she also is being followed for hypertension blood pressure is up slightly today also she has diabetes she goes to Dr Leslie How she had prescriptions for a mammogram which she was unable to get to but now thinks that she has solved her transportation issues so she is going to be going there she goes to Dr Mariola martinez for her diabetic foot care      The following portions of the patient's history were reviewed and updated as appropriate:   She has a past medical history of Abnormal ECG, Abnormal mammogram, Abnormal stress test, Anemia, Anxiety, Arthritis, Asthma, Back problem, Breast cyst, Chest pain, Chronic pain disorder, Cyst of left breast, Depression, Depression, Diabetes mellitus ([address] ), Hiatal hernia, Hyperlipidemia, Hypertension, Keloid of skin, Neuropathy, Psychiatric disorder, Stroke ([facility] [address] ), and Tuberculosis  ,  does not have any pertinent problems on file  ,   has a past surgical history that includes Cyst Removal; Eye surgery; Tubal ligation; Arm wound repair / closure; pr esophagogastroduodenoscopy transoral diagnostic (N/A, 1/5/2018); Cataract extraction (Bilateral); US guided thyroid biopsy (4/15/2019); pr wrist arthroscop,release xvers lig (Left, 4/19/2019); Carpal tunnel release; Carpal tunnel release (Left); [place] lumbar puncture diagnostic (8/15/2019); and Colonoscopy  ,  family history includes Arthritis in her mother, paternal grandmother, and sister; Cancer in her family; Coronary artery disease in her family; Diabetes in her mother; Glaucoma in her family; Heart attack in her maternal uncle, mother, and paternal grandmother; Heart disease in her mother; Hypertension in her father and mother; Kidney cancer in her father; Kidney disease in her father; Liver cancer in her maternal aunt; No Known Problems in her daughter, daughter, daughter, daughter, maternal aunt, maternal aunt, maternal grandfather, paternal aunt, and paternal grandfather; Rheum arthritis in her family; Stomach cancer in her maternal grandmother; Stroke in her maternal aunt and maternal grandmother  ,   reports that she has never smoked   She has never used smokeless tobacco  She reports previous alcohol use  She reports that she does not use drugs  ,  is allergic to bactrim [sulfamethoxazole-trimethoprim], percocet [oxycodone-acetaminophen], and vicodin [hydrocodone-acetaminophen]     Current Outpatient Medications   Medication Sig Dispense Refill    albuterol (PROVENTIL HFA,VENTOLIN HFA) 90 mcg/act inhaler Inhale 2 puffs every 6 (six) hours as needed for wheezing or shortness of breath 1 Inhaler 5    amLODIPine (NORVASC) 10 mg tablet Take 1 tablet (10 mg total) by mouth daily 30 tablet 0    aspirin 81 MG tablet Take 81 mg by mouth daily        atorvastatin (LIPITOR) 80 mg tablet Take 1 tablet (80 mg total) by mouth daily with dinner 30 tablet 0    Blood Glucose Calibration (ACCU-CHEK COMPACT PLUS CONTROL) SOLN by In Vitro route daily 1 each 0    Blood Glucose Monitoring Suppl (ACCU-CHEK SHANE PLUS) w/Device KIT by Does not apply route 2 (two) times a day 1 kit 0    Blood Glucose Monitoring Suppl (OneTouch Verio Reflect) w/Device KIT Check blood sugars twice daily  Please substitute with appropriate alternative as covered by patient's insurance  Dx: E11 65 1 kit 0    Dapagliflozin Propanediol (Farxiga) 10 MG TABS Take 1 tablet (10 mg total) by mouth in the morning 1/2 tablet daily for the 1st month then increase to a full tablet daily in the morning 30 tablet 0    glucose blood (OneTouch Verio) test strip Check blood sugars twice daily  Please substitute with appropriate alternative as covered by patient's insurance  Dx: E11 65 200 each 3    hydrocortisone 0 5 % cream Apply topically 2 (two) times a day 28 35 g 1    Insulin Pen Needle (B-D UF III MINI PEN NEEDLES) 31G X 5 MM MISC Inject under the skin daily 100 each 3    Lancets (ACCU-CHEK MULTICLIX) lancets Test bid 100 each 0    lisinopril (ZESTRIL) 20 mg tablet Take 1 tablet (20 mg total) by mouth daily 30 tablet 0    OneTouch Delica Lancets 73Z MISC Check blood sugars twice daily   Please substitute with appropriate alternative as covered by patient's insurance  Dx: E11 65 200 each 3    pantoprazole (PROTONIX) 40 mg tablet Take 1 tablet (40 mg total) by mouth daily 90 tablet 0    polyethylene glycol (MIRALAX) 17 g packet Take 17 g by mouth daily as needed (constipation) 30 each 0    Polyvinyl Alcohol (LIQUID TEARS OP) Apply to eye      potassium chloride (Klor-Con) 10 mEq tablet take 1 tablet by mouth once daily 30 tablet 3    RA PAIN RELIEF ACETAMINOPHEN 325 MG tablet take 1-2 tablets by mouth every 6 hours if needed for MODERATE pain  0    simvastatin (ZOCOR) 40 mg tablet Take 1 tablet (40 mg total) by mouth daily at bedtime 90 tablet 2    sitaGLIPtin (Januvia) 100 mg tablet Take 1 tablet (100 mg total) by mouth daily 30 tablet 5    torsemide (DEMADEX) 5 MG tablet take 1 tablet by mouth every morning 30 tablet 3    pregabalin (LYRICA) 50 mg capsule Take 1 capsule (50 mg total) by mouth 3 (three) times a day for 10 days 90 capsule 0     No current facility-administered medications for this visit  Review of Systems   Constitutional: Positive for fatigue  Negative for activity change, appetite change, diaphoresis and fever  HENT: Negative  Eyes: Positive for visual disturbance  Respiratory: Negative for apnea, cough, chest tightness, shortness of breath and wheezing  Cardiovascular: Positive for chest pain  Negative for palpitations and leg swelling  Gastrointestinal: Negative for abdominal distention, abdominal pain, anal bleeding, constipation, diarrhea, nausea and vomiting  Endocrine: Negative for cold intolerance, heat intolerance, polydipsia, polyphagia and polyuria  Genitourinary: Negative for difficulty urinating, dysuria, flank pain, hematuria and urgency  Mild female stress incontinence   Musculoskeletal: Negative for arthralgias, back pain, gait problem, joint swelling and myalgias  Skin: Negative for color change, rash and wound     Allergic/Immunologic: Negative for environmental allergies, food allergies and immunocompromised state  Neurological: Positive for light-headedness  Negative for dizziness, seizures, syncope, speech difficulty, numbness and headaches  Hematological: Negative for adenopathy  Does not bruise/bleed easily  Psychiatric/Behavioral: Negative for agitation, behavioral problems, hallucinations, sleep disturbance and suicidal ideas  Objective:  Vitals:    09/19/22 0911   BP: (!) 172/88   BP Location: Left arm   Patient Position: Sitting   Cuff Size: Large   Pulse: 71   Temp: (!) 97 2 °F (36 2 °C)   SpO2: 97%   Weight: 95 7 kg (211 lb)   Height: 5' 5 5" (1 664 m)     Body mass index is 34 58 kg/m²  Physical Exam  Constitutional:       General: She is not in acute distress  Appearance: She is well-developed  She is not diaphoretic  HENT:      Head: Normocephalic  Right Ear: External ear normal       Left Ear: External ear normal       Nose: Nose normal    Eyes:      General: No scleral icterus  Right eye: No discharge  Left eye: No discharge  Conjunctiva/sclera: Conjunctivae normal       Pupils: Pupils are equal, round, and reactive to light  Neck:      Thyroid: No thyromegaly  Trachea: No tracheal deviation  Cardiovascular:      Rate and Rhythm: Normal rate and regular rhythm  Pulses: no weak pulses          Dorsalis pedis pulses are 2+ on the right side and 2+ on the left side  Posterior tibial pulses are 2+ on the right side and 2+ on the left side  Heart sounds: Normal heart sounds  No murmur heard  No friction rub  No gallop  Pulmonary:      Effort: Pulmonary effort is normal  No respiratory distress  Breath sounds: Normal breath sounds  No wheezing  Abdominal:      General: Bowel sounds are normal       Palpations: Abdomen is soft  There is no mass  Tenderness: There is no abdominal tenderness  There is no guarding  Musculoskeletal:         General: No deformity        Cervical back: Normal range of motion  Feet:      Right foot:      Skin integrity: No ulcer, skin breakdown, erythema, warmth, callus or dry skin  Left foot:      Skin integrity: No ulcer, skin breakdown, erythema, warmth, callus or dry skin  Lymphadenopathy:      Cervical: No cervical adenopathy  Skin:     General: Skin is warm and dry  Findings: No erythema or rash  Neurological:      Mental Status: She is alert and oriented to person, place, and time  Cranial Nerves: No cranial nerve deficit  Psychiatric:         Thought Content: Thought content normal        Patient's shoes and socks removed  Right Foot/Ankle   Right Foot Inspection  Skin Exam: skin normal and skin intact  No dry skin, no warmth, no callus, no erythema, no maceration, no abnormal color, no pre-ulcer, no ulcer and no callus  Toe Exam: ROM and strength within normal limits  Sensory   Vibration: intact  Proprioception: intact  Monofilament testing: intact    Vascular  Capillary refills: < 3 seconds  The right DP pulse is 2+  The right PT pulse is 2+  Left Foot/Ankle  Left Foot Inspection  Skin Exam: skin normal and skin intact  No dry skin, no warmth, no erythema, no maceration, normal color, no pre-ulcer, no ulcer and no callus  Toe Exam: ROM and strength within normal limits  Sensory   Vibration: intact  Proprioception: intact  Monofilament testing: intact    Vascular  Capillary refills: < 3 seconds  The left DP pulse is 2+  The left PT pulse is 2+       Assign Risk Category  No deformity present  No loss of protective sensation  No weak pulses  Risk: 0

## 2022-09-27 ENCOUNTER — PATIENT OUTREACH (OUTPATIENT)
Dept: FAMILY MEDICINE CLINIC | Facility: CLINIC | Age: 67
End: 2022-09-27

## 2022-09-27 NOTE — PROGRESS NOTES
SW CM resolved case at this time d/t lack of communication  SW CM will remain available for any future needs

## 2022-10-03 ENCOUNTER — ANNUAL EXAM (OUTPATIENT)
Dept: OBGYN CLINIC | Facility: CLINIC | Age: 67
End: 2022-10-03
Payer: COMMERCIAL

## 2022-10-03 VITALS
WEIGHT: 211 LBS | DIASTOLIC BLOOD PRESSURE: 90 MMHG | HEIGHT: 66 IN | BODY MASS INDEX: 33.91 KG/M2 | SYSTOLIC BLOOD PRESSURE: 170 MMHG

## 2022-10-03 DIAGNOSIS — N83.8 OVARIAN MASS, LEFT: ICD-10-CM

## 2022-10-03 DIAGNOSIS — L29.2 VULVAR ITCHING: ICD-10-CM

## 2022-10-03 DIAGNOSIS — N85.8 UTERINE MASS: ICD-10-CM

## 2022-10-03 DIAGNOSIS — N83.209 CYST OF OVARY, UNSPECIFIED LATERALITY: ICD-10-CM

## 2022-10-03 DIAGNOSIS — Z01.419 ENCOUNTER FOR WELL WOMAN EXAM WITH ROUTINE GYNECOLOGICAL EXAM: Primary | ICD-10-CM

## 2022-10-03 PROCEDURE — G0476 HPV COMBO ASSAY CA SCREEN: HCPCS | Performed by: OBSTETRICS & GYNECOLOGY

## 2022-10-03 PROCEDURE — G0101 CA SCREEN;PELVIC/BREAST EXAM: HCPCS | Performed by: OBSTETRICS & GYNECOLOGY

## 2022-10-03 PROCEDURE — G0145 SCR C/V CYTO,THINLAYER,RESCR: HCPCS | Performed by: OBSTETRICS & GYNECOLOGY

## 2022-10-03 RX ORDER — NYSTATIN AND TRIAMCINOLONE ACETONIDE 100000; 1 [USP'U]/G; MG/G
OINTMENT TOPICAL 2 TIMES DAILY
Qty: 30 G | Refills: 0 | Status: SHIPPED | OUTPATIENT
Start: 2022-10-03

## 2022-10-03 NOTE — PROGRESS NOTES
[facility]  [address] [place], [place]    ASSESSMENT/PLAN: Talon Chen is a 79 y o  [id number] who presents for annual gynecologic exam     Encounter for routine gynecologic examination  - Routine well woman exam completed today  - Cervical Cancer screening, pap done today; prior abnormal pap smears  - Breast Cancer Screening: Last Mammogram 07/28/2021, mammo scheduled  - Colorectal cancer screening was not ordered  - The following were reviewed in today's visit: breast self exam and mammography screening ordered  - vulvar itching, script for mycolog sent  - uterine mass on ultrasound 11/21    Additional problems addressed at this visit:  1  Encounter for well woman exam with routine gynecological exam  -     Liquid-based pap, screening    2  Ovarian mass, left  -     Ambulatory Referral to Gynecology    3  Cyst of ovary, unspecified laterality  -     Ambulatory Referral to Gynecology    4  Uterine mass  -     US pelvis complete w transvaginal; Future; Expected date: 10/03/2022        CC:  Annual Gynecologic Examination    HPI: Talon Chen is a 79 y o  [id number] who presents for annual gynecologic examination  HPI  Patient presents for annual exam, she reports multiple moles and skin tags on breasts bilaterally  She states there is itching when these arise  She also reports vulvar itching  Unsure if it is associated with her soap  Last November, she had an ultrasound which showed a stripe of 8 mm and a cluster of cysts  We discussed performing an endometrial biopsy, but patient declined  She states she had AUB after a biopsy in the past that required hospitalization and a D&C     The following portions of the patient's history were reviewed and updated as appropriate: allergies, current medications, past family history, past medical history, obstetric history, gynecologic history, past social history, past surgical history and problem list     Review of Systems Constitutional: Negative  HENT: Negative  Eyes: Negative  Respiratory: Negative  Cardiovascular: Negative  Gastrointestinal: Negative  Genitourinary:        Vulvar itching     Musculoskeletal: Negative  All other systems reviewed and are negative  Objective:  /90 (BP Location: Left arm, Patient Position: Sitting, Cuff Size: Adult)   Ht 5' 5 5" (1 664 m)   Wt 95 7 kg (211 lb)   BMI 34 58 kg/m²    Physical Exam  Vitals reviewed  Constitutional:       General: She is not in acute distress  Appearance: She is well-developed  HENT:      Head: Normocephalic and atraumatic  Nose: Nose normal    Cardiovascular:      Rate and Rhythm: Normal rate  Pulmonary:      Effort: Pulmonary effort is normal  No respiratory distress  Chest:   Breasts: Breasts are symmetrical       Right: Normal  No mass, nipple discharge, skin change, tenderness, axillary adenopathy or supraclavicular adenopathy  Left: Normal  No mass, nipple discharge, skin change, tenderness, axillary adenopathy or supraclavicular adenopathy  Abdominal:      General: There is no distension  Palpations: Abdomen is soft  There is no mass  Tenderness: There is no abdominal tenderness  There is no guarding or rebound  Genitourinary:     General: Normal vulva  Exam position: Lithotomy position  Labia:         Right: No lesion  Left: No lesion  Urethra: No prolapse (urethral meatus normal)  Vagina: Normal  No vaginal discharge, erythema or bleeding  Cervix: Normal       Uterus: Normal        Adnexa: Right adnexa normal and left adnexa normal    Musculoskeletal:         General: Normal range of motion  Cervical back: Normal range of motion  Lymphadenopathy:      Upper Body:      Right upper body: No supraclavicular, axillary or pectoral adenopathy  Left upper body: No supraclavicular, axillary or pectoral adenopathy        Lower Body: No right inguinal adenopathy  No left inguinal adenopathy  Skin:     General: Skin is warm and dry  Neurological:      Mental Status: She is alert and oriented to person, place, and time  Psychiatric:         Behavior: Behavior normal          Thought Content:  Thought content normal          Judgment: Judgment normal              Pedro Rocha  [facility]  10/03/22 11:18 AM

## 2022-10-05 LAB
HPV HR 12 DNA CVX QL NAA+PROBE: NEGATIVE
HPV16 DNA CVX QL NAA+PROBE: NEGATIVE
HPV18 DNA CVX QL NAA+PROBE: NEGATIVE

## 2022-10-11 LAB
LAB AP GYN PRIMARY INTERPRETATION: NORMAL
Lab: NORMAL

## 2022-10-17 ENCOUNTER — HOSPITAL ENCOUNTER (OUTPATIENT)
Dept: ULTRASOUND IMAGING | Facility: HOSPITAL | Age: 67
Discharge: HOME | End: 2022-10-17
Attending: OBSTETRICS & GYNECOLOGY
Payer: COMMERCIAL

## 2022-10-17 DIAGNOSIS — N85.8 UTERINE MASS: ICD-10-CM

## 2022-10-17 PROCEDURE — 76856 US EXAM PELVIC COMPLETE: CPT

## 2022-10-17 PROCEDURE — 76830 TRANSVAGINAL US NON-OB: CPT

## 2022-10-28 ENCOUNTER — HOSPITAL ENCOUNTER (OUTPATIENT)
Dept: NUCLEAR MEDICINE | Facility: HOSPITAL | Age: 67
Discharge: HOME | End: 2022-10-28
Attending: INTERNAL MEDICINE
Payer: COMMERCIAL

## 2022-10-28 DIAGNOSIS — E11.43 GASTROPARESIS DIABETICORUM (HCC): ICD-10-CM

## 2022-10-28 DIAGNOSIS — K31.84 GASTROPARESIS DIABETICORUM (HCC): ICD-10-CM

## 2022-10-28 PROCEDURE — 78264 GASTRIC EMPTYING IMG STUDY: CPT

## 2022-10-28 PROCEDURE — A9541 TC99M SULFUR COLLOID: HCPCS

## 2022-10-28 PROCEDURE — G1004 CDSM NDSC: HCPCS

## 2022-11-01 ENCOUNTER — DOCTOR'S OFFICE (OUTPATIENT)
Dept: URBAN - NONMETROPOLITAN AREA CLINIC 1 | Facility: CLINIC | Age: 67
Setting detail: OPHTHALMOLOGY
End: 2022-11-01
Payer: COMMERCIAL

## 2022-11-01 DIAGNOSIS — G70.01: ICD-10-CM

## 2022-11-01 DIAGNOSIS — H40.013: ICD-10-CM

## 2022-11-01 DIAGNOSIS — Z96.1: ICD-10-CM

## 2022-11-01 DIAGNOSIS — E05.00: ICD-10-CM

## 2022-11-01 DIAGNOSIS — E11.3513: ICD-10-CM

## 2022-11-01 LAB
LEFT EYE DIABETIC RETINOPATHY: POSITIVE
RIGHT EYE DIABETIC RETINOPATHY: POSITIVE

## 2022-11-01 PROCEDURE — 92134 CPTRZ OPH DX IMG PST SGM RTA: CPT | Performed by: OPHTHALMOLOGY

## 2022-11-01 PROCEDURE — 2022F DILAT RTA XM EVC RTNOPTHY: CPT | Performed by: FAMILY MEDICINE

## 2022-11-01 PROCEDURE — 99214 OFFICE O/P EST MOD 30 MIN: CPT | Performed by: OPHTHALMOLOGY

## 2022-11-01 ASSESSMENT — SPHEQUIV_DERIVED
OD_SPHEQUIV: -0.75
OD_SPHEQUIV: -0.5
OS_SPHEQUIV: -0.75

## 2022-11-01 ASSESSMENT — KERATOMETRY
OS_K2POWER_DIOPTERS: 46.25
OS_K1POWER_DIOPTERS: 44.25
OS_AXISANGLE_DEGREES: 032
OD_K2POWER_DIOPTERS: 45.25
OD_K1POWER_DIOPTERS: 43.50
OD_AXISANGLE_DEGREES: 088

## 2022-11-01 ASSESSMENT — REFRACTION_MANIFEST
OD_VA1: 20/25-2
OS_CYLINDER: -0.75
OD_VA2: 20/25-2
OS_AXIS: 050
OD_SPHERE: -0.25
OS_SPHERE: PLANO
OD_CYLINDER: -0.50
OS_VA1: 20/25-2
OD_AXIS: 015
OD_ADD: +2.50
OS_ADD: +2.50
OS_VA2: 20/25-2

## 2022-11-01 ASSESSMENT — VISUAL ACUITY
OS_BCVA: 20/25-2
OD_BCVA: 20/40-1

## 2022-11-01 ASSESSMENT — REFRACTION_AUTOREFRACTION
OS_SPHERE: -0.25
OD_CYLINDER: -1.50
OS_CYLINDER: -1.00
OD_SPHERE: 0.00
OS_AXIS: 023
OD_AXIS: 021

## 2022-11-01 ASSESSMENT — DRY EYES - PHYSICIAN NOTES
OS_GENERALCOMMENTS: TRACE PEE
OD_GENERALCOMMENTS: TRACE PEE

## 2022-11-01 ASSESSMENT — AXIALLENGTH_DERIVED
OD_AL: 23.5627
OS_AL: 23.2465
OD_AL: 23.466

## 2022-11-01 ASSESSMENT — CONFRONTATIONAL VISUAL FIELD TEST (CVF)
OS_FINDINGS: FULL
OD_FINDINGS: FULL

## 2022-11-01 ASSESSMENT — LID EXAM ASSESSMENTS
OS_EDEMA: LUL 3+
OD_EDEMA: RUL 3+

## 2022-11-02 ENCOUNTER — TELEPHONE (OUTPATIENT)
Dept: NEUROSURGERY | Facility: CLINIC | Age: 67
End: 2022-11-02

## 2022-11-02 NOTE — TELEPHONE ENCOUNTER
Received a call from Kylee reporting daily headaches  Noted that in her last visit with Dr Hakeem Grimes 3/7 she reported headaches that were mild and responsive to acetaminophen  She was seen for incidental bilateral petrous apex meningocele  Was discharged to PRN as she was ultimately asymptomatic of this finding  She states that over the last 2 months or so she has noticed worsening headaches and neck stiffness  Also reports that her balance is somewhat off  She denies nasal drainage or salty taste in her mouth, headaches are not positional  Is experiencing no mental status changes, ambulatory dysfunction, slurred speech, etc      She recently saw an ophthalmologist, reports diabetic retinopathy is new finding for her, will be returning in 2 weeks  She is scheduled to see PCP in Dec       Since she seems otherwise neurologically stable suggested she start with her PCP for additional work-up and advisement  Will also route this to Dr Hakeem Grimes and contact her if there is an additional input

## 2022-11-13 ENCOUNTER — HOSPITAL ENCOUNTER (EMERGENCY)
Facility: HOSPITAL | Age: 67
Discharge: HOME | End: 2022-11-13
Attending: EMERGENCY MEDICINE

## 2022-11-13 VITALS
SYSTOLIC BLOOD PRESSURE: 217 MMHG | RESPIRATION RATE: 16 BRPM | TEMPERATURE: 98 F | OXYGEN SATURATION: 98 % | WEIGHT: 212 LBS | DIASTOLIC BLOOD PRESSURE: 104 MMHG | HEART RATE: 69 BPM | BODY MASS INDEX: 34.74 KG/M2

## 2022-11-13 DIAGNOSIS — H10.9 CONJUNCTIVITIS, LEFT EYE: ICD-10-CM

## 2022-11-13 DIAGNOSIS — S05.02XA ABRASION OF LEFT CORNEA, INITIAL ENCOUNTER: Primary | ICD-10-CM

## 2022-11-13 RX ORDER — TETRACAINE HYDROCHLORIDE 5 MG/ML
2 SOLUTION OPHTHALMIC ONCE
Status: COMPLETED | OUTPATIENT
Start: 2022-11-13 | End: 2022-11-13

## 2022-11-13 RX ADMIN — TETRACAINE HYDROCHLORIDE 2 DROP: 5 SOLUTION OPHTHALMIC at 15:38

## 2022-11-13 RX ADMIN — FLUORESCEIN SODIUM 1 STRIP: 1 STRIP OPHTHALMIC at 15:38

## 2022-11-13 NOTE — ED PROVIDER NOTES
History  Chief Complaint   Patient presents with   • Eye Problem     Patient stated she had her contacts in for about 6 hours yesterday before her right eye became red and irritated  This morning she noticed discharge from that eye  Recently diagnosed with retinopathy  Patient presents for evaluation of redness and burning in the right eye since wearing contact lenses yesterday  She had a soft contact lens in for 5 her 6 hours and notes that she did “doze off” intermittently yesterday  She thinks she successfully removed both contacts but is concerned the right one is still in, due to the irritation  She has no photophobia  There is mild to moderate yellowish-grey discharge  No recent travel or similar sick contacts  Denies f/c, HA, vision change, n/v/d  Patient has an appointment scheduled with her eye doctor in 4 days  History provided by:  Patient and medical records  Eye Pain  Location:  Right  Quality:  Burning/sharp  Severity:  Moderate  Onset quality:  Gradual  Duration:  1 day  Timing:  Constant  Progression:  Worsening  Chronicity:  New  Relieved by:  None tried  Worsened by:  None tried  Ineffective treatments:  None tried  Associated symptoms: headaches    Associated symptoms: no abdominal pain, no chest pain, no congestion, no cough, no diarrhea, no fever, no loss of consciousness, no nausea, no rhinorrhea, no shortness of breath, no sore throat, no vomiting and no wheezing    Risk factors:  Diabetic retinopathy      Prior to Admission Medications   Prescriptions Last Dose Informant Patient Reported? Taking? Blood Glucose Calibration (ACCU-CHEK COMPACT PLUS CONTROL) SOLN  Self No No   Sig: by In Vitro route daily   Blood Glucose Monitoring Suppl (ACCU-CHEK SHANE PLUS) w/Device KIT  Self No No   Sig: by Does not apply route 2 (two) times a day   Blood Glucose Monitoring Suppl (OneTouch Verio Reflect) w/Device KIT   No No   Sig: Check blood sugars twice daily   Please substitute with appropriate alternative as covered by patient's insurance  Dx: E11 65   Dapagliflozin Propanediol (Farxiga) 10 MG TABS   No No   Sig: Take 1 tablet (10 mg total) by mouth in the morning 1/2 tablet daily for the 1st month then increase to a full tablet daily in the morning   Insulin Pen Needle (B-D UF III MINI PEN NEEDLES) 31G X 5 MM MISC  Self No No   Sig: Inject under the skin daily   Lancets (ACCU-CHEK MULTICLIX) lancets  Self No No   Sig: Test bid   OneTouch Delica Lancets 70Z MISC   No No   Sig: Check blood sugars twice daily  Please substitute with appropriate alternative as covered by patient's insurance  Dx: E11 65   Polyvinyl Alcohol (LIQUID TEARS OP)  Self Yes No   Sig: Apply to eye   RA PAIN RELIEF ACETAMINOPHEN 325 MG tablet  Self Yes No   Sig: take 1-2 tablets by mouth every 6 hours if needed for MODERATE pain   albuterol (PROVENTIL HFA,VENTOLIN HFA) 90 mcg/act inhaler  Self No No   Sig: Inhale 2 puffs every 6 (six) hours as needed for wheezing or shortness of breath   amLODIPine (NORVASC) 10 mg tablet   No No   Sig: Take 1 tablet (10 mg total) by mouth daily   aspirin 81 MG tablet  Self Yes No   Sig: Take 81 mg by mouth daily     atorvastatin (LIPITOR) 80 mg tablet   No No   Sig: Take 1 tablet (80 mg total) by mouth daily with dinner   glucose blood (OneTouch Verio) test strip   No No   Sig: Check blood sugars twice daily  Please substitute with appropriate alternative as covered by patient's insurance   Dx: E11 65   hydrocortisone 0 5 % cream   No No   Sig: Apply topically 2 (two) times a day   lisinopril (ZESTRIL) 20 mg tablet   No No   Sig: Take 1 tablet (20 mg total) by mouth daily   nystatin-triamcinolone (MYCOLOG-II) ointment   No No   Sig: Apply topically 2 (two) times a day   pantoprazole (PROTONIX) 40 mg tablet   No No   Sig: Take 1 tablet (40 mg total) by mouth daily   polyethylene glycol (MIRALAX) 17 g packet   No No   Sig: Take 17 g by mouth daily as needed (constipation) potassium chloride (Klor-Con) 10 mEq tablet   No No   Sig: Take 1 tablet (10 mEq total) by mouth daily   pregabalin (LYRICA) 50 mg capsule   No No   Sig: Take 1 capsule (50 mg total) by mouth 3 (three) times a day for 10 days   simvastatin (ZOCOR) 40 mg tablet  Self No No   Sig: Take 1 tablet (40 mg total) by mouth daily at bedtime   sitaGLIPtin (Januvia) 100 mg tablet   No No   Sig: Take 1 tablet (100 mg total) by mouth daily   torsemide (DEMADEX) 5 MG tablet   No No   Sig: Take 1 tablet (5 mg total) by mouth every morning      Facility-Administered Medications: None       Past Medical History:   Diagnosis Date   • Abnormal ECG     last assessed: 5/15/17   • Abnormal mammogram     last assessed: 7/11/17   • Abnormal stress test     last assesed: 7/24/17   • Anemia    • Anxiety    • Arthritis    • Asthma    • Back problem    • Breast cyst    • Chest pain     last assessed: 7/24/17   • Chronic pain disorder    • Cyst of left breast     last assessed: 8/18/17   • Depression    • Depression     resolved: 1/2/18   • Diabetes mellitus ([facility] [address] )    • Hiatal hernia     last assessed: 11/7/17   • Hyperlipidemia    • Hypertension    • Keloid of skin     last assessed: 11/2/16   • Neuropathy    • Psychiatric disorder     anxiety   • Stroke [facility])     TIA 2005   • Tuberculosis     as a child        Past Surgical History:   Procedure Laterality Date   • ARM WOUND REPAIR / CLOSURE     • CARPAL TUNNEL RELEASE     • CARPAL TUNNEL RELEASE Left    • CATARACT EXTRACTION Bilateral     2015   • COLONOSCOPY     • CYST REMOVAL      right upper arm  • EYE SURGERY      cataract removaql   • FL LUMBAR PUNCTURE DIAGNOSTIC  8/15/2019   • SD ESOPHAGOGASTRODUODENOSCOPY TRANSORAL DIAGNOSTIC N/A 1/5/2018    Procedure: ESOPHAGOGASTRODUODENOSCOPY (EGD);   Surgeon: Michelle Chaudhary DO;  Location: [facility] MAIN OR;  Service: Gastroenterology   • SD WRIST Hannah Bud LIG Left 4/19/2019    Procedure: ENDOSCOPIC CARPAL TUNNEL RELEASE;  Surgeon: Tawny Mancilla Zena Alatorre MD;  Location: [facility] MAIN OR;  Service: Orthopedics   • TUBAL LIGATION     • US GUIDED THYROID BIOPSY  4/15/2019       Family History   Problem Relation Age of Onset   • Heart disease Mother    • Diabetes Mother    • Arthritis Mother    • Heart attack Mother    • Hypertension Mother    • Kidney disease Father    • Hypertension Father    • Kidney cancer Father    • Arthritis Sister    • Stroke Maternal Grandmother    • Stomach cancer Maternal Grandmother    • Arthritis Paternal Grandmother    • Heart attack Paternal Grandmother    • Stroke Maternal Aunt    • Heart attack Maternal Uncle    • Cancer Family         spinal column   • Coronary artery disease Family    • Glaucoma Family    • Rheum arthritis Family    • No Known Problems Maternal Grandfather    • No Known Problems Paternal Grandfather    • No Known Problems Daughter    • No Known Problems Daughter    • No Known Problems Daughter    • No Known Problems Daughter    • Liver cancer Maternal Aunt    • No Known Problems Maternal Aunt    • No Known Problems Maternal Aunt    • No Known Problems Paternal Aunt      I have reviewed and agree with the history as documented  E-Cigarette/Vaping   • E-Cigarette Use Never User      E-Cigarette/Vaping Substances   • Nicotine No    • THC No    • CBD No    • Flavoring No    • Other No    • Unknown No      Social History     Tobacco Use   • Smoking status: Never Smoker   • Smokeless tobacco: Never Used   Vaping Use   • Vaping Use: Never used   Substance Use Topics   • Alcohol use: Not Currently     Alcohol/week: 0 0 standard drinks   • Drug use: No       Review of Systems   Constitutional: Negative for chills and fever  HENT: Negative for congestion, rhinorrhea, sore throat and trouble swallowing  Eyes: Positive for pain, discharge and redness  Negative for photophobia and visual disturbance  Respiratory: Negative for cough, chest tightness, shortness of breath and wheezing      Cardiovascular: Negative for chest pain, palpitations and leg swelling  Gastrointestinal: Negative for abdominal pain, diarrhea, nausea and vomiting  Genitourinary: Negative for dysuria, flank pain, frequency and urgency  Musculoskeletal: Negative for back pain, neck pain and neck stiffness  Skin: Negative for pallor  Neurological: Positive for headaches  Negative for dizziness, loss of consciousness, syncope, weakness, light-headedness and numbness  Hematological: Negative for adenopathy  Psychiatric/Behavioral: Negative for confusion  The patient is not nervous/anxious  All other systems reviewed and are negative  Physical Exam  Physical Exam  Vitals reviewed  Constitutional:       General: She is not in acute distress  Appearance: She is well-developed  She is not ill-appearing, toxic-appearing or diaphoretic  HENT:      Head: Normocephalic and atraumatic  Right Ear: External ear normal       Left Ear: External ear normal       Nose: Nose normal  No rhinorrhea  Mouth/Throat:      Mouth: Mucous membranes are moist    Eyes:      General: Lids are normal  Lids are everted, no foreign bodies appreciated  Vision grossly intact  Gaze aligned appropriately  No scleral icterus  Right eye: Discharge present  No foreign body  Left eye: No foreign body or discharge  Conjunctiva/sclera:      Right eye: Right conjunctiva is injected  Exudate present  No hemorrhage  Left eye: Left conjunctiva is not injected  No exudate or hemorrhage  Pupils: Pupils are equal, round, and reactive to light  Cardiovascular:      Rate and Rhythm: Normal rate and regular rhythm  Heart sounds: Normal heart sounds  No murmur heard  Pulmonary:      Effort: Pulmonary effort is normal  No respiratory distress  Breath sounds: Normal breath sounds  No wheezing  Chest:      Chest wall: No tenderness  Musculoskeletal:      Cervical back: Normal range of motion and neck supple        Right lower leg: No edema  Left lower leg: No edema  Skin:     General: Skin is warm and dry  Findings: No rash  Neurological:      General: No focal deficit present  Mental Status: She is alert and oriented to person, place, and time  Psychiatric:         Mood and Affect: Mood normal          Behavior: Behavior normal          Thought Content: Thought content normal          Vital Signs  ED Triage Vitals [11/13/22 1527]   Temperature Pulse Respirations Blood Pressure SpO2   98 °F (36 7 °C) 69 16 (!) 217/104 98 %      Temp Source Heart Rate Source Patient Position - Orthostatic VS BP Location FiO2 (%)   Temporal Monitor Lying Right arm --      Pain Score       5           Vitals:    11/13/22 1527   BP: (!) 217/104   Pulse: 69   Patient Position - Orthostatic VS: Lying         Visual Acuity      ED Medications  Medications   fluorescein sodium sterile ophthalmic strip 1 strip (1 strip Right Eye Given 11/13/22 1538)   tetracaine 0 5 % ophthalmic solution 2 drop (2 drops Right Eye Given by Other 11/13/22 1538)       Diagnostic Studies  Results Reviewed     None                 No orders to display              Procedures  Procedures         ED Course                                             MDM  Number of Diagnoses or Management Options  Abrasion of left cornea, initial encounter  Conjunctivitis, left eye  Diagnosis management comments: DDx:  Right corneal abrasion with or without FB  Conjunctivitis  A/P: Will check fluoroscein stain uptake, treat symptoms, refer to Optho for f/u  Amount and/or Complexity of Data Reviewed  Review and summarize past medical records: yes        Disposition  Final diagnoses:   Abrasion of left cornea, initial encounter   Conjunctivitis, left eye     Time reflects when diagnosis was documented in both MDM as applicable and the Disposition within this note     Time User Action Codes Description Comment    11/13/2022  3:45 PM [address] [place] [address], [facility] Rodolfo Elias [[id number]] Abrasion of left cornea, initial encounter     11/13/2022  3:45 PM Dang Farfan Add [H10 9] Conjunctivitis, left eye       ED Disposition     ED Disposition   Discharge    Condition   Stable    Date/Time   Sun Nov 13, 2022  3:45 PM    Comment   Elbert Alonzo discharge to home/self care  Follow-up Information     Follow up With Specialties Details Why Contact Info    Your eye doctor  Go in 4 days as scheduled           Patient's Medications   Discharge Prescriptions    GENTAMICIN (GENTAK) 0 3 % OPHTHALMIC OINTMENT    Administer 0 5 inches to the right eye 3 (three) times a day for 5 days       Start Date: 11/13/2022End Date: 11/18/2022       Order Dose: 0 5 inches       Quantity: 3 5 g    Refills: 0       No discharge procedures on file      PDMP Review     None          ED Provider  Electronically Signed by           Liliana Montoya DO  11/13/22 2323

## 2022-11-17 ENCOUNTER — DOCTOR'S OFFICE (OUTPATIENT)
Dept: URBAN - NONMETROPOLITAN AREA CLINIC 1 | Facility: CLINIC | Age: 67
Setting detail: OPHTHALMOLOGY
End: 2022-11-17
Payer: COMMERCIAL

## 2022-11-17 DIAGNOSIS — H40.013: ICD-10-CM

## 2022-11-17 DIAGNOSIS — E11.3513: ICD-10-CM

## 2022-11-17 PROCEDURE — 92235 FLUORESCEIN ANGRPH MLTIFRAME: CPT | Performed by: OPHTHALMOLOGY

## 2022-11-17 PROCEDURE — 99214 OFFICE O/P EST MOD 30 MIN: CPT | Performed by: OPHTHALMOLOGY

## 2022-11-17 PROCEDURE — 92250 FUNDUS PHOTOGRAPHY W/I&R: CPT | Performed by: OPHTHALMOLOGY

## 2022-11-17 ASSESSMENT — KERATOMETRY
OS_K1POWER_DIOPTERS: 44.25
OD_K2POWER_DIOPTERS: 45.25
OD_AXISANGLE_DEGREES: 088
OS_K2POWER_DIOPTERS: 46.25
OD_K1POWER_DIOPTERS: 43.50
OS_AXISANGLE_DEGREES: 032

## 2022-11-17 ASSESSMENT — REFRACTION_AUTOREFRACTION
OS_SPHERE: -0.25
OS_AXIS: 023
OD_SPHERE: 0.00
OD_AXIS: 021
OD_CYLINDER: -1.50
OS_CYLINDER: -1.00

## 2022-11-17 ASSESSMENT — DRY EYES - PHYSICIAN NOTES
OS_GENERALCOMMENTS: TRACE PEE
OD_GENERALCOMMENTS: TRACE PEE

## 2022-11-17 ASSESSMENT — CONFRONTATIONAL VISUAL FIELD TEST (CVF)
OS_FINDINGS: FULL
OD_FINDINGS: FULL

## 2022-11-17 ASSESSMENT — SPHEQUIV_DERIVED
OD_SPHEQUIV: -0.75
OD_SPHEQUIV: -0.5
OS_SPHEQUIV: -0.75

## 2022-11-17 ASSESSMENT — REFRACTION_MANIFEST
OD_CYLINDER: -0.50
OS_ADD: +2.50
OS_VA2: 20/25-2
OS_SPHERE: PLANO
OS_CYLINDER: -0.75
OD_SPHERE: -0.25
OD_AXIS: 015
OD_VA2: 20/25-2
OD_VA1: 20/25-2
OS_AXIS: 050
OS_VA1: 20/25-2
OD_ADD: +2.50

## 2022-11-17 ASSESSMENT — LID EXAM ASSESSMENTS
OS_EDEMA: LUL 3+
OD_EDEMA: RUL 3+

## 2022-11-17 ASSESSMENT — AXIALLENGTH_DERIVED
OD_AL: 23.466
OD_AL: 23.5627
OS_AL: 23.2465

## 2022-11-17 ASSESSMENT — VISUAL ACUITY
OS_BCVA: 20/60+1
OD_BCVA: 20/50-2

## 2022-11-21 ENCOUNTER — AMBUL SURGICAL CARE (OUTPATIENT)
Dept: URBAN - NONMETROPOLITAN AREA SURGERY 1 | Facility: SURGERY | Age: 67
Setting detail: OPHTHALMOLOGY
End: 2022-11-21
Payer: COMMERCIAL

## 2022-11-21 DIAGNOSIS — I10 ESSENTIAL HYPERTENSION: ICD-10-CM

## 2022-11-21 DIAGNOSIS — J45.41 MODERATE PERSISTENT ASTHMA WITH ACUTE EXACERBATION: ICD-10-CM

## 2022-11-21 DIAGNOSIS — E11.3511: ICD-10-CM

## 2022-11-21 PROCEDURE — G8907 PT DOC NO EVENTS ON DISCHARG: HCPCS | Performed by: OPHTHALMOLOGY

## 2022-11-21 PROCEDURE — G8918 PT W/O PREOP ORDER IV AB PRO: HCPCS | Performed by: OPHTHALMOLOGY

## 2022-11-21 PROCEDURE — 67210 TREATMENT OF RETINAL LESION: CPT | Performed by: OPHTHALMOLOGY

## 2022-11-21 RX ORDER — AMLODIPINE BESYLATE 10 MG/1
10 TABLET ORAL DAILY
Qty: 30 TABLET | Refills: 0 | Status: SHIPPED | OUTPATIENT
Start: 2022-11-21 | End: 2022-11-23 | Stop reason: SDUPTHER

## 2022-11-21 RX ORDER — ALBUTEROL SULFATE 90 UG/1
2 AEROSOL, METERED RESPIRATORY (INHALATION) EVERY 6 HOURS PRN
Qty: 18 G | Refills: 1 | Status: SHIPPED | OUTPATIENT
Start: 2022-11-21 | End: 2022-11-23 | Stop reason: SDUPTHER

## 2022-11-23 ENCOUNTER — OFFICE VISIT (OUTPATIENT)
Dept: FAMILY MEDICINE CLINIC | Facility: CLINIC | Age: 67
End: 2022-11-23

## 2022-11-23 ENCOUNTER — HOSPITAL ENCOUNTER (OUTPATIENT)
Dept: RADIOLOGY | Facility: HOSPITAL | Age: 67
Discharge: HOME | End: 2022-11-23
Attending: FAMILY MEDICINE

## 2022-11-23 ENCOUNTER — APPOINTMENT (OUTPATIENT)
Dept: LAB | Facility: HOSPITAL | Age: 67
End: 2022-11-23
Attending: FAMILY MEDICINE

## 2022-11-23 VITALS
TEMPERATURE: 96.4 F | DIASTOLIC BLOOD PRESSURE: 110 MMHG | WEIGHT: 217 LBS | HEIGHT: 66 IN | SYSTOLIC BLOOD PRESSURE: 234 MMHG | HEART RATE: 62 BPM | OXYGEN SATURATION: 98 % | BODY MASS INDEX: 34.87 KG/M2

## 2022-11-23 DIAGNOSIS — R60.0 LEG EDEMA, LEFT: ICD-10-CM

## 2022-11-23 DIAGNOSIS — N18.32 TYPE 2 DIABETES MELLITUS WITH STAGE 3B CHRONIC KIDNEY DISEASE, WITH LONG-TERM CURRENT USE OF INSULIN (HCC): ICD-10-CM

## 2022-11-23 DIAGNOSIS — R60.0 LEG EDEMA, LEFT: Primary | ICD-10-CM

## 2022-11-23 DIAGNOSIS — N18.30 STAGE 3 CHRONIC KIDNEY DISEASE, UNSPECIFIED WHETHER STAGE 3A OR 3B CKD (HCC): ICD-10-CM

## 2022-11-23 DIAGNOSIS — I10 ESSENTIAL HYPERTENSION: ICD-10-CM

## 2022-11-23 DIAGNOSIS — R07.89 ATYPICAL CHEST PAIN: ICD-10-CM

## 2022-11-23 DIAGNOSIS — E11.22 TYPE 2 DIABETES MELLITUS WITH STAGE 3B CHRONIC KIDNEY DISEASE, WITH LONG-TERM CURRENT USE OF INSULIN (HCC): ICD-10-CM

## 2022-11-23 DIAGNOSIS — R60.9 DEPENDENT EDEMA: ICD-10-CM

## 2022-11-23 DIAGNOSIS — M79.605 LEFT LEG PAIN: Primary | ICD-10-CM

## 2022-11-23 DIAGNOSIS — Z79.4 TYPE 2 DIABETES MELLITUS WITH STAGE 3B CHRONIC KIDNEY DISEASE, WITH LONG-TERM CURRENT USE OF INSULIN (HCC): ICD-10-CM

## 2022-11-23 DIAGNOSIS — J45.41 MODERATE PERSISTENT ASTHMA WITH ACUTE EXACERBATION: ICD-10-CM

## 2022-11-23 LAB — D DIMER PPP FEU-MCNC: 1.75 UG/ML FEU

## 2022-11-23 RX ORDER — POTASSIUM CHLORIDE 750 MG/1
10 TABLET, FILM COATED, EXTENDED RELEASE ORAL DAILY
Qty: 30 TABLET | Refills: 3 | Status: SHIPPED | OUTPATIENT
Start: 2022-11-23

## 2022-11-23 RX ORDER — ALBUTEROL SULFATE 90 UG/1
2 AEROSOL, METERED RESPIRATORY (INHALATION) EVERY 6 HOURS PRN
Qty: 18 G | Refills: 4 | Status: SHIPPED | OUTPATIENT
Start: 2022-11-23

## 2022-11-23 RX ORDER — TORSEMIDE 5 MG/1
5 TABLET ORAL EVERY MORNING
Qty: 30 TABLET | Refills: 3 | Status: SHIPPED | OUTPATIENT
Start: 2022-11-23

## 2022-11-23 RX ORDER — LISINOPRIL 20 MG/1
20 TABLET ORAL DAILY
Qty: 30 TABLET | Refills: 4 | Status: SHIPPED | OUTPATIENT
Start: 2022-11-23

## 2022-11-23 RX ORDER — AMLODIPINE BESYLATE 10 MG/1
10 TABLET ORAL DAILY
Qty: 30 TABLET | Refills: 4 | Status: SHIPPED | OUTPATIENT
Start: 2022-11-23

## 2022-11-23 NOTE — RESULT ENCOUNTER NOTE
Please call the patient regarding her abnormal result    Patient's D-dimer is elevated so she does need a venous Doppler of her left leg I will put the order in as an urgent order so that it can be done Friday morning

## 2022-11-28 ENCOUNTER — RX ONLY (RX ONLY)
Age: 67
End: 2022-11-28

## 2022-11-28 ENCOUNTER — DOCTOR'S OFFICE (OUTPATIENT)
Dept: URBAN - NONMETROPOLITAN AREA CLINIC 1 | Facility: CLINIC | Age: 67
Setting detail: OPHTHALMOLOGY
End: 2022-11-28
Payer: COMMERCIAL

## 2022-11-28 DIAGNOSIS — E11.3511: ICD-10-CM

## 2022-11-28 PROCEDURE — 99024 POSTOP FOLLOW-UP VISIT: CPT | Performed by: OPHTHALMOLOGY

## 2022-11-28 PROCEDURE — 67028 INJECTION EYE DRUG: CPT | Performed by: OPHTHALMOLOGY

## 2022-11-28 ASSESSMENT — SPHEQUIV_DERIVED
OS_SPHEQUIV: -0.75
OD_SPHEQUIV: -0.75

## 2022-11-28 ASSESSMENT — KERATOMETRY
OD_K2POWER_DIOPTERS: 45.25
OD_AXISANGLE_DEGREES: 088
OS_K1POWER_DIOPTERS: 44.25
OS_K2POWER_DIOPTERS: 46.25
OS_AXISANGLE_DEGREES: 032
OD_K1POWER_DIOPTERS: 43.50

## 2022-11-28 ASSESSMENT — REFRACTION_AUTOREFRACTION
OS_AXIS: 023
OS_CYLINDER: -1.00
OS_SPHERE: -0.25
OD_CYLINDER: -1.50
OD_SPHERE: 0.00
OD_AXIS: 021

## 2022-11-28 ASSESSMENT — VISUAL ACUITY
OS_BCVA: 20/60+1
OD_BCVA: 20/50-2

## 2022-11-28 ASSESSMENT — AXIALLENGTH_DERIVED
OS_AL: 23.2465
OD_AL: 23.5627

## 2022-12-08 ENCOUNTER — RX ONLY (RX ONLY)
Age: 67
End: 2022-12-08

## 2022-12-08 ENCOUNTER — DOCTOR'S OFFICE (OUTPATIENT)
Dept: URBAN - NONMETROPOLITAN AREA CLINIC 1 | Facility: CLINIC | Age: 67
Setting detail: OPHTHALMOLOGY
End: 2022-12-08
Payer: COMMERCIAL

## 2022-12-08 DIAGNOSIS — E11.3512: ICD-10-CM

## 2022-12-08 PROCEDURE — 67028 INJECTION EYE DRUG: CPT | Performed by: OPHTHALMOLOGY

## 2022-12-08 PROCEDURE — 99024 POSTOP FOLLOW-UP VISIT: CPT | Performed by: OPHTHALMOLOGY

## 2022-12-08 ASSESSMENT — REFRACTION_AUTOREFRACTION
OS_AXIS: 023
OS_CYLINDER: -1.00
OD_SPHERE: 0.00
OD_CYLINDER: -1.50
OS_SPHERE: -0.25
OD_AXIS: 021

## 2022-12-08 ASSESSMENT — KERATOMETRY
OD_K1POWER_DIOPTERS: 43.50
OD_K2POWER_DIOPTERS: 45.25
OS_K2POWER_DIOPTERS: 46.25
OS_AXISANGLE_DEGREES: 032
OS_K1POWER_DIOPTERS: 44.25
OD_AXISANGLE_DEGREES: 088

## 2022-12-08 ASSESSMENT — SPHEQUIV_DERIVED
OD_SPHEQUIV: -0.75
OS_SPHEQUIV: -0.75

## 2022-12-08 ASSESSMENT — VISUAL ACUITY
OD_BCVA: 20/40-1
OS_BCVA: 20/60+1

## 2022-12-08 ASSESSMENT — AXIALLENGTH_DERIVED
OD_AL: 23.5627
OS_AL: 23.2465

## 2022-12-12 ENCOUNTER — AMBUL SURGICAL CARE (OUTPATIENT)
Dept: URBAN - NONMETROPOLITAN AREA SURGERY 1 | Facility: SURGERY | Age: 67
Setting detail: OPHTHALMOLOGY
End: 2022-12-12
Payer: COMMERCIAL

## 2022-12-12 ENCOUNTER — DOCTOR'S OFFICE (OUTPATIENT)
Dept: URBAN - NONMETROPOLITAN AREA CLINIC 1 | Facility: CLINIC | Age: 67
Setting detail: OPHTHALMOLOGY
End: 2022-12-12
Payer: COMMERCIAL

## 2022-12-12 DIAGNOSIS — E11.3512: ICD-10-CM

## 2022-12-12 PROBLEM — E11.3513 DM TYPE 2; BOTH PROLIFERATIVE WITH ME: Status: ACTIVE | Noted: 2022-11-01

## 2022-12-12 PROCEDURE — G8918 PT W/O PREOP ORDER IV AB PRO: HCPCS | Performed by: OPHTHALMOLOGY

## 2022-12-12 PROCEDURE — G8907 PT DOC NO EVENTS ON DISCHARG: HCPCS | Performed by: OPHTHALMOLOGY

## 2022-12-12 PROCEDURE — 67210 TREATMENT OF RETINAL LESION: CPT | Performed by: OPHTHALMOLOGY

## 2022-12-14 DIAGNOSIS — Z79.4 TYPE 2 DIABETES MELLITUS WITH COMPLICATION, WITH LONG-TERM CURRENT USE OF INSULIN (HCC): ICD-10-CM

## 2022-12-14 DIAGNOSIS — E11.9 TYPE 2 DIABETES MELLITUS WITHOUT COMPLICATION, WITH LONG-TERM CURRENT USE OF INSULIN (HCC): ICD-10-CM

## 2022-12-14 DIAGNOSIS — K21.9 GASTROESOPHAGEAL REFLUX DISEASE WITHOUT ESOPHAGITIS: ICD-10-CM

## 2022-12-14 DIAGNOSIS — L30.9 DERMATITIS: ICD-10-CM

## 2022-12-14 DIAGNOSIS — K59.00 CONSTIPATION, UNSPECIFIED CONSTIPATION TYPE: ICD-10-CM

## 2022-12-14 DIAGNOSIS — E78.5 DYSLIPIDEMIA: ICD-10-CM

## 2022-12-14 DIAGNOSIS — E11.22 TYPE 2 DIABETES MELLITUS WITH STAGE 3B CHRONIC KIDNEY DISEASE, WITH LONG-TERM CURRENT USE OF INSULIN (HCC): ICD-10-CM

## 2022-12-14 DIAGNOSIS — E11.8 TYPE 2 DIABETES MELLITUS WITH COMPLICATION, WITH LONG-TERM CURRENT USE OF INSULIN (HCC): ICD-10-CM

## 2022-12-14 DIAGNOSIS — M17.9 OSTEOARTHRITIS OF KNEE, UNSPECIFIED LATERALITY, UNSPECIFIED OSTEOARTHRITIS TYPE: ICD-10-CM

## 2022-12-14 DIAGNOSIS — N18.32 TYPE 2 DIABETES MELLITUS WITH STAGE 3B CHRONIC KIDNEY DISEASE, WITH LONG-TERM CURRENT USE OF INSULIN (HCC): ICD-10-CM

## 2022-12-14 DIAGNOSIS — J45.41 MODERATE PERSISTENT ASTHMA WITH ACUTE EXACERBATION: ICD-10-CM

## 2022-12-14 DIAGNOSIS — I67.2 CEREBRAL ATHEROSCLEROSIS: Primary | ICD-10-CM

## 2022-12-14 DIAGNOSIS — R60.9 DEPENDENT EDEMA: ICD-10-CM

## 2022-12-14 DIAGNOSIS — Z79.4 TYPE 2 DIABETES MELLITUS WITHOUT COMPLICATION, WITH LONG-TERM CURRENT USE OF INSULIN (HCC): ICD-10-CM

## 2022-12-14 DIAGNOSIS — Z79.4 TYPE 2 DIABETES MELLITUS TREATED WITH INSULIN (HCC): ICD-10-CM

## 2022-12-14 DIAGNOSIS — E11.21 TYPE 2 DIABETES MELLITUS WITH DIABETIC NEPHROPATHY, WITH LONG-TERM CURRENT USE OF INSULIN (HCC): ICD-10-CM

## 2022-12-14 DIAGNOSIS — L29.2 VULVAR ITCHING: ICD-10-CM

## 2022-12-14 DIAGNOSIS — Z79.4 TYPE 2 DIABETES MELLITUS WITH STAGE 3B CHRONIC KIDNEY DISEASE, WITH LONG-TERM CURRENT USE OF INSULIN (HCC): ICD-10-CM

## 2022-12-14 DIAGNOSIS — M79.605 LEFT LEG PAIN: ICD-10-CM

## 2022-12-14 DIAGNOSIS — I10 ESSENTIAL HYPERTENSION: ICD-10-CM

## 2022-12-14 DIAGNOSIS — N18.30 STAGE 3 CHRONIC KIDNEY DISEASE, UNSPECIFIED WHETHER STAGE 3A OR 3B CKD (HCC): ICD-10-CM

## 2022-12-14 DIAGNOSIS — E11.9 TYPE 2 DIABETES MELLITUS WITHOUT COMPLICATION, WITHOUT LONG-TERM CURRENT USE OF INSULIN (HCC): ICD-10-CM

## 2022-12-14 DIAGNOSIS — E11.9 TYPE 2 DIABETES MELLITUS TREATED WITH INSULIN (HCC): ICD-10-CM

## 2022-12-14 DIAGNOSIS — Z79.4 TYPE 2 DIABETES MELLITUS WITH DIABETIC NEPHROPATHY, WITH LONG-TERM CURRENT USE OF INSULIN (HCC): ICD-10-CM

## 2022-12-14 RX ORDER — AMLODIPINE BESYLATE 10 MG/1
10 TABLET ORAL DAILY
Qty: 30 TABLET | Refills: 4 | Status: SHIPPED | OUTPATIENT
Start: 2022-12-14

## 2022-12-14 RX ORDER — ATORVASTATIN CALCIUM 80 MG/1
80 TABLET, FILM COATED ORAL
Qty: 30 TABLET | Refills: 0 | Status: SHIPPED | OUTPATIENT
Start: 2022-12-14 | End: 2022-12-19 | Stop reason: SDUPTHER

## 2022-12-14 RX ORDER — ACETAMINOPHEN 325 MG
325 TABLET ORAL EVERY 4 HOURS PRN
Qty: 60 TABLET | Refills: 0 | Status: SHIPPED | OUTPATIENT
Start: 2022-12-14

## 2022-12-14 RX ORDER — BLOOD GLUCOSE CONTROL HIGH,LOW
EACH MISCELLANEOUS DAILY
Qty: 1 EACH | Refills: 0 | Status: SHIPPED | OUTPATIENT
Start: 2022-12-14

## 2022-12-14 RX ORDER — PEN NEEDLE, DIABETIC 31 GX5/16"
NEEDLE, DISPOSABLE MISCELLANEOUS DAILY
Qty: 100 EACH | Refills: 3 | Status: SHIPPED | OUTPATIENT
Start: 2022-12-14

## 2022-12-14 RX ORDER — POLYETHYLENE GLYCOL 3350 17 G/17G
17 POWDER, FOR SOLUTION ORAL DAILY PRN
Qty: 30 EACH | Refills: 0 | Status: SHIPPED | OUTPATIENT
Start: 2022-12-14 | End: 2022-12-21

## 2022-12-14 RX ORDER — NYSTATIN AND TRIAMCINOLONE ACETONIDE 100000; 1 [USP'U]/G; MG/G
OINTMENT TOPICAL 2 TIMES DAILY
Qty: 30 G | Refills: 0 | Status: SHIPPED | OUTPATIENT
Start: 2022-12-14

## 2022-12-14 RX ORDER — PANTOPRAZOLE SODIUM 40 MG/1
40 TABLET, DELAYED RELEASE ORAL DAILY
Qty: 90 TABLET | Refills: 0 | Status: SHIPPED | OUTPATIENT
Start: 2022-12-14

## 2022-12-14 RX ORDER — LISINOPRIL 20 MG/1
20 TABLET ORAL DAILY
Qty: 30 TABLET | Refills: 4 | Status: SHIPPED | OUTPATIENT
Start: 2022-12-14 | End: 2022-12-19 | Stop reason: SDUPTHER

## 2022-12-14 RX ORDER — LANCETS
EACH MISCELLANEOUS
Qty: 100 EACH | Refills: 0 | Status: SHIPPED | OUTPATIENT
Start: 2022-12-14

## 2022-12-14 RX ORDER — ALBUTEROL SULFATE 90 UG/1
2 AEROSOL, METERED RESPIRATORY (INHALATION) EVERY 6 HOURS PRN
Qty: 18 G | Refills: 4 | Status: SHIPPED | OUTPATIENT
Start: 2022-12-14

## 2022-12-14 RX ORDER — TORSEMIDE 5 MG/1
5 TABLET ORAL EVERY MORNING
Qty: 30 TABLET | Refills: 3 | Status: SHIPPED | OUTPATIENT
Start: 2022-12-14

## 2022-12-14 RX ORDER — BLOOD-GLUCOSE METER
KIT MISCELLANEOUS
Qty: 1 KIT | Refills: 0 | Status: SHIPPED | OUTPATIENT
Start: 2022-12-14

## 2022-12-14 RX ORDER — DIAPER,BRIEF,INFANT-TODD,DISP
EACH MISCELLANEOUS 2 TIMES DAILY
Qty: 28.35 G | Refills: 1 | Status: SHIPPED | OUTPATIENT
Start: 2022-12-14

## 2022-12-14 RX ORDER — POTASSIUM CHLORIDE 750 MG/1
10 TABLET, FILM COATED, EXTENDED RELEASE ORAL DAILY
Qty: 30 TABLET | Refills: 3 | Status: SHIPPED | OUTPATIENT
Start: 2022-12-14

## 2022-12-14 RX ORDER — BLOOD SUGAR DIAGNOSTIC
STRIP MISCELLANEOUS
Qty: 200 EACH | Refills: 3 | Status: SHIPPED | OUTPATIENT
Start: 2022-12-14

## 2022-12-14 RX ORDER — LANCETS 33 GAUGE
EACH MISCELLANEOUS
Qty: 200 EACH | Refills: 3 | Status: SHIPPED | OUTPATIENT
Start: 2022-12-14

## 2022-12-14 RX ORDER — BLOOD-GLUCOSE METER
EACH MISCELLANEOUS 2 TIMES DAILY
Qty: 1 KIT | Refills: 0 | Status: SHIPPED | OUTPATIENT
Start: 2022-12-14

## 2022-12-14 RX ORDER — ASPIRIN 81 MG/1
81 TABLET ORAL DAILY
Qty: 30 TABLET | Refills: 3 | Status: SHIPPED | OUTPATIENT
Start: 2022-12-14
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
## 2022-12-16 DIAGNOSIS — E11.65 TYPE 2 DIABETES MELLITUS WITH HYPERGLYCEMIA, WITHOUT LONG-TERM CURRENT USE OF INSULIN (HCC): Primary | ICD-10-CM

## 2022-12-16 RX ORDER — BLOOD SUGAR DIAGNOSTIC
STRIP MISCELLANEOUS
Qty: 100 STRIP | Refills: 3 | Status: SHIPPED | OUTPATIENT
Start: 2022-12-16

## 2022-12-16 RX ORDER — BLOOD-GLUCOSE METER
EACH MISCELLANEOUS DAILY
Qty: 1 KIT | Refills: 0 | Status: SHIPPED | OUTPATIENT
Start: 2022-12-16 | End: 2023-12-16

## 2022-12-16 RX ORDER — LANCETS
EACH MISCELLANEOUS DAILY
Qty: 100 EACH | Refills: 3 | Status: SHIPPED | OUTPATIENT
Start: 2022-12-16

## 2022-12-16 NOTE — TELEPHONE ENCOUNTER
ONE TOUCH VERIO  Not on patients formulary   -   Recommending  e g , Accu-Check Becky Plus, Accu-Check Guide, Accu-Chek Guide Me) or eSnips (e g  TrueMetrix, TrueTrack)       pls advise

## 2022-12-19 DIAGNOSIS — E78.5 DYSLIPIDEMIA: ICD-10-CM

## 2022-12-19 DIAGNOSIS — N18.32 TYPE 2 DIABETES MELLITUS WITH STAGE 3B CHRONIC KIDNEY DISEASE, WITH LONG-TERM CURRENT USE OF INSULIN (HCC): ICD-10-CM

## 2022-12-19 DIAGNOSIS — Z79.4 TYPE 2 DIABETES MELLITUS WITH STAGE 3B CHRONIC KIDNEY DISEASE, WITH LONG-TERM CURRENT USE OF INSULIN (HCC): ICD-10-CM

## 2022-12-19 DIAGNOSIS — N18.30 STAGE 3 CHRONIC KIDNEY DISEASE, UNSPECIFIED WHETHER STAGE 3A OR 3B CKD (HCC): ICD-10-CM

## 2022-12-19 DIAGNOSIS — E11.22 TYPE 2 DIABETES MELLITUS WITH STAGE 3B CHRONIC KIDNEY DISEASE, WITH LONG-TERM CURRENT USE OF INSULIN (HCC): ICD-10-CM

## 2022-12-19 RX ORDER — ATORVASTATIN CALCIUM 80 MG/1
80 TABLET, FILM COATED ORAL
Qty: 90 TABLET | Refills: 1 | Status: SHIPPED | OUTPATIENT
Start: 2022-12-19

## 2022-12-19 RX ORDER — LISINOPRIL 20 MG/1
20 TABLET ORAL DAILY
Qty: 90 TABLET | Refills: 1 | Status: SHIPPED | OUTPATIENT
Start: 2022-12-19

## 2022-12-20 ENCOUNTER — TELEPHONE (OUTPATIENT)
Dept: FAMILY MEDICINE CLINIC | Facility: CLINIC | Age: 67
End: 2022-12-20

## 2022-12-20 NOTE — TELEPHONE ENCOUNTER
ROSALINO Brown Having issues, needs to talk to psych  I told patient she needs to check the back of her insurance card and call to find out who she can go to

## 2022-12-21 ENCOUNTER — OFFICE VISIT (OUTPATIENT)
Dept: GASTROENTEROLOGY | Facility: CLINIC | Age: 67
End: 2022-12-21

## 2022-12-21 ENCOUNTER — TELEPHONE (OUTPATIENT)
Dept: GASTROENTEROLOGY | Facility: CLINIC | Age: 67
End: 2022-12-21

## 2022-12-21 VITALS
SYSTOLIC BLOOD PRESSURE: 160 MMHG | DIASTOLIC BLOOD PRESSURE: 88 MMHG | HEART RATE: 106 BPM | RESPIRATION RATE: 18 BRPM | OXYGEN SATURATION: 98 % | WEIGHT: 207 LBS | HEIGHT: 66 IN | BODY MASS INDEX: 33.27 KG/M2

## 2022-12-21 DIAGNOSIS — R68.81 EARLY SATIETY: ICD-10-CM

## 2022-12-21 DIAGNOSIS — Z86.010 PERSONAL HISTORY OF COLONIC POLYPS: ICD-10-CM

## 2022-12-21 DIAGNOSIS — K59.09 CHRONIC CONSTIPATION: ICD-10-CM

## 2022-12-21 DIAGNOSIS — R13.10 DYSPHAGIA, UNSPECIFIED TYPE: ICD-10-CM

## 2022-12-21 DIAGNOSIS — L29.0 ANAL PRURITUS: ICD-10-CM

## 2022-12-21 DIAGNOSIS — E01.0 THYROMEGALY: ICD-10-CM

## 2022-12-21 DIAGNOSIS — K22.2 SCHATZKI'S RING: ICD-10-CM

## 2022-12-21 DIAGNOSIS — K31.84 GASTROPARESIS: Primary | ICD-10-CM

## 2022-12-21 DIAGNOSIS — R10.13 DYSPEPSIA: ICD-10-CM

## 2022-12-21 RX ORDER — POLYETHYLENE GLYCOL 3350 17 G/17G
17 POWDER, FOR SOLUTION ORAL DAILY
Qty: 578 G | Refills: 3 | Status: SHIPPED | OUTPATIENT
Start: 2022-12-21

## 2022-12-21 NOTE — TELEPHONE ENCOUNTER
Scheduled date of EGD(as of today):01/23/2023  Physician performing EGD:Ericka  Location of EGD:[place]  Instructions reviewed with patient by:Jennifer  Clearances: PCP- diabetic

## 2022-12-21 NOTE — PATIENT INSTRUCTIONS
Please stay on the pantoprazole every morning before breakfast   You have delayed gastric emptying, likely from your diabetes, which can make you feel full easily in full for longer  The key is to eat small frequent meals, may be 6 a day, low in saturated fats and low and difficult to digest raw fibrous foods  Go see ENT and go see your diabetes specialist again  For the constipation, start on daily miralax  Increase to twice daily if needed  Have repeat endoscopy completed but you need clearance from your doctor because of your shortness of breath

## 2022-12-21 NOTE — TELEPHONE ENCOUNTER
Our mutual patient is scheduled for procedure: EGD    On: 01/23/2023     With: Dr Claudine Renteria    Physician Approving clearance: Olya Meredith DO

## 2022-12-21 NOTE — PROGRESS NOTES
[facility] - Outpatient Follow-up Note  Shawna Mckeon 79 y o  female MRN: [id number]  Encounter: [id number]    ASSESSMENT AND PLAN:       1  Gastroparesis  2  Early satiety    Pt endorses symptoms of early satiety and nausea, and she had GES which demonstrated T 1/2 of 147 minutes  We discussed possible etiologies of gastroparesis, and given her risk factors suspect poorly controlled DM played a role  Urged her to follow up with Endocrinology and work on improved blood sugar control  We reviewed dietary recs to include small frequent meals low in saturated fats and difficult to digest fibrous foods  She can continue PPI for underlying dyspepsia symptoms  Will hold off on anti-emetics/pro-kinetics at this time until dietary modifications are tried  Constipation is also likely contributing to bloating/fullness, addressed below  3  Dysphagia, unspecified type  4  Schatzki's ring  5  Dyspepsia    Pt notes chronic though worsening dysphagia to solids in sub-sternal region  She does have hx of Schatzki ring on previous EGD from 2019  Given progressive dysphagia, though adequate control of GERD, recommend repeat EGD now, with bx for EoE and eval for web/ring/sttricture, with possible dilation  Recommend dysphagia precautions at this time  She has been dealing with atypical chest pain/sob, had modest elevation in D-dimer and LE dopplers ordered, I have informed her to complete this evaluation prior to sedation and EGD, and that we will be sending a medical clearance letter to pt's PCP prior to procedure  - EGD; Future    6  Chronic constipation    Pt notes chronic constipation  Recommend high fiber diet, +/- fiber supplement  Will start on daily miralax, titrated to BID if ineffective  Suspect constipation and large fecal burden contributing to UGI symptoms     If constipation persists despite titration of OTC cathartics, consider motegrity as prescription option given underlying gastroparesis  UTD on colonoscopy for screening purposes  - polyethylene glycol (GLYCOLAX) 17 GM/SCOOP powder; Take 17 g by mouth daily Increase to twice daily if still constiptated  Dispense: 578 g; Refill: 3    7  Anal pruritus    Noted during interview, non-thrombosed hemorrhoid noted  Reviewed hygiene practices, trial Anusol cream, reviewed possible SE and caution not to use for > 1 week at a time  - hydrocortisone (ANUSOL-HC) 2 5 % rectal cream; Apply topically 2 (two) times a day Do not use for longer than 7 days  Dispense: 60 g; Refill: 1    8  Personal history of colonic polyps    Reviewed results of colonoscopy, recommend repeat in 5 years, would be due in 10/2024      9  Thyromegaly    Pt requesting new referral to ENT to follow through with bx of thyroid nodule  Referral placed today  Also stressed importance of f/u with Endocrinology  - Ambulatory Referral to Otolaryngology; Future      We will plan to follow up with pt after endoscopic evaluation  She should contact office should any new issues arise    ______________________________________________________________________    SUBJECTIVE: Patient is a 79 y o  female who presents today for follow-up regarding gastroparesis  Pmhx sig for DM2 (last A1C 8 4 in 38/6793) with complications, asthma, HTN, cerebral atherosclerosis, CKD3, HLD, depression  Pt is new to me  Here today with daughter  Previously followed with Dr Elisha Walton  Pt shares he has been dealing with lots of GI symptoms  She endorses symptoms of nausea and bloating  Feels very full after eating and for a prolonged period of time, though able to eat typical amts of food in one sitting  Sometimes eats only once daily as she will still feel full throughout the day  Endorses nausea, no emesis  Feels some heartburn/indigestion, though this is typically well-controlled on PPI  Also endorses dysphagia to solids in mid-sternal region   No dysphagia to liquids or odynophagia  No weight loss over past 6 months or so  Pt notes she is dealing with constipation as well  Will go several days without having BM  Doesn't really need to strain however  No BRBPR or melena  Feels some rectal itching, has a history of hemorrhoids  Pt does have some abdominal pain prior to defecation  Improves after defecation  Pt notes additional symptoms to include vision changes, difficulty controlling diabetes, enlarged thyroid, shortness of breath, etc      NSAIDs: none   Tobacco: none   Etoh: none     07/2022: Hb 11 7, Hct 37 1, Ply 226, MCV 86, BUN 14, Cr 1 16, A1C 8 4  10/2022: T 1/2 147 minutes; mildly delayed gastric emptying   11/2022: D Dimer: 1 75     Endoscopic history:   EGD: 10/2019: Schatzki's ring in distal esophagus, otherwise normal   Stomach, biopsy: Chronic inactive oxyntic gastritis with features of proton pump inhibitor therapy effect   Negative for Helicobacter pylori; Negative for atrophy, intestinal metaplasia, dysplasia or carcinoma  Colon: 10/2019: 4 mm sessile polyp in ascending colon, otherwise normal   Colon, ascending polyp, biopsy:  Serrated polyp, most consistent with small sessile serrated adenoma    Review of Systems   Per HPI    Historical Information   Past Medical History:   Diagnosis Date   • Abnormal ECG     last assessed: 5/15/17   • Abnormal mammogram     last assessed: 7/11/17   • Abnormal stress test     last assesed: 7/24/17   • Anemia    • Anxiety    • Arthritis    • Asthma    • Back problem    • Breast cyst    • Chest pain     last assessed: 7/24/17   • Chronic pain disorder    • Cyst of left breast     last assessed: 8/18/17   • Depression    • Depression     resolved: 1/2/18   • Diabetes mellitus ([facility] [address] )    • Hiatal hernia     last assessed: 11/7/17   • Hyperlipidemia    • Hypertension    • Keloid of skin     last assessed: 11/2/16   • Neuropathy    • Psychiatric disorder     anxiety   • Stroke [facility])     TIA 2005   • Tuberculosis     as a child Past Surgical History:   Procedure Laterality Date   • ARM WOUND REPAIR / CLOSURE     • CARPAL TUNNEL RELEASE     • CARPAL TUNNEL RELEASE Left    • CATARACT EXTRACTION Bilateral     2015   • COLONOSCOPY     • CYST REMOVAL      right upper arm  • EYE SURGERY      cataract removaql   • FL LUMBAR PUNCTURE DIAGNOSTIC  8/15/2019   • NC ESOPHAGOGASTRODUODENOSCOPY TRANSORAL DIAGNOSTIC N/A 1/5/2018    Procedure: ESOPHAGOGASTRODUODENOSCOPY (EGD);   Surgeon: Brook Saeed DO;  Location: [facility] MAIN OR;  Service: Gastroenterology   • NC WRIST Mo Mew LIG Left 4/19/2019    Procedure: ENDOSCOPIC CARPAL TUNNEL RELEASE;  Surgeon: Jp Brown MD;  Location: [facility] MAIN OR;  Service: Orthopedics   • TUBAL LIGATION     • US GUIDED THYROID BIOPSY  4/15/2019     Social History   Social History     Substance and Sexual Activity   Alcohol Use Not Currently   • Alcohol/week: 0 0 standard drinks     Social History     Substance and Sexual Activity   Drug Use No     Social History     Tobacco Use   Smoking Status Never   Smokeless Tobacco Never     Family History   Problem Relation Age of Onset   • Heart disease Mother    • Diabetes Mother    • Arthritis Mother    • Heart attack Mother    • Hypertension Mother    • Kidney disease Father    • Hypertension Father    • Kidney cancer Father    • Arthritis Sister    • Stroke Maternal Grandmother    • Stomach cancer Maternal Grandmother    • Arthritis Paternal Grandmother    • Heart attack Paternal Grandmother    • Stroke Maternal Aunt    • Heart attack Maternal Uncle    • Cancer Family         spinal column   • Coronary artery disease Family    • Glaucoma Family    • Rheum arthritis Family    • No Known Problems Maternal Grandfather    • No Known Problems Paternal Grandfather    • No Known Problems Daughter    • No Known Problems Daughter    • No Known Problems Daughter    • No Known Problems Daughter    • Liver cancer Maternal Aunt    • No Known Problems Maternal Aunt    • No Known Problems Maternal Aunt    • No Known Problems Paternal Aunt      Meds/Allergies       Current Outpatient Medications:   •  Accu-Chek FastClix Lancets MISC  •  albuterol (PROVENTIL HFA,VENTOLIN HFA) 90 mcg/act inhaler  •  amLODIPine (NORVASC) 10 mg tablet  •  aspirin (ECOTRIN LOW STRENGTH) 81 mg EC tablet  •  atorvastatin (LIPITOR) 80 mg tablet  •  Blood Glucose Calibration (Accu-Chek Compact Plus Control) SOLN  •  Blood Glucose Monitoring Suppl (Accu-Chek Becky Plus) w/Device KIT  •  Blood Glucose Monitoring Suppl (Accu-Chek Becky Plus) w/Device KIT  •  Blood Glucose Monitoring Suppl (OneTouch Verio Reflect) w/Device KIT  •  dapagliflozin (Farxiga) 10 MG tablet  •  glucose blood (Accu-Chek Becky Plus) test strip  •  glucose blood (OneTouch Verio) test strip  •  hydrocortisone 0 5 % cream  •  Insulin Pen Needle (B-D UF III MINI PEN NEEDLES) 31G X 5 MM MISC  •  Lancets (accu-chek multiclix) lancets  •  lisinopril (ZESTRIL) 20 mg tablet  •  nystatin-triamcinolone (MYCOLOG-II) ointment  •  OneTouch Delica Lancets 97H MISC  •  pantoprazole (PROTONIX) 40 mg tablet  •  polyethylene glycol (MIRALAX) 17 g packet  •  Polyvinyl Alcohol (LIQUID TEARS OP)  •  potassium chloride (Klor-Con) 10 mEq tablet  •  RA Pain Relief Acetaminophen 325 MG tablet  •  sitaGLIPtin (Januvia) 100 mg tablet  •  torsemide (DEMADEX) 5 MG tablet  •  pregabalin (LYRICA) 50 mg capsule    Allergies   Allergen Reactions   • Bactrim [Sulfamethoxazole-Trimethoprim] Shortness Of Breath   • Percocet [Oxycodone-Acetaminophen] GI Intolerance   • Vicodin [Hydrocodone-Acetaminophen] GI Intolerance     Objective     Blood pressure 160/88, pulse (!) 106, resp  rate 18, height 5' 5 5" (1 664 m), weight 93 9 kg (207 lb), SpO2 98 %  Body mass index is 33 92 kg/m²  Physical Exam  Vitals and nursing note reviewed  Constitutional:       Appearance: Normal appearance  HENT:      Head: Normocephalic and atraumatic     Eyes:      Conjunctiva/sclera: Conjunctivae normal       Pupils: Pupils are equal, round, and reactive to light  Cardiovascular:      Rate and Rhythm: Normal rate  Pulmonary:      Effort: Pulmonary effort is normal       Breath sounds: Wheezing present  Abdominal:      General: Abdomen is flat  Bowel sounds are normal  There is no distension  Palpations: Abdomen is soft  There is no mass  Tenderness: There is no abdominal tenderness  There is no guarding or rebound  Genitourinary:     Comments: Small non-thrombosed hemorrhoid at 6pm   Skin:     General: Skin is warm and dry  Coloration: Skin is not jaundiced  Findings: No bruising  Neurological:      General: No focal deficit present  Mental Status: She is alert and oriented to person, place, and time  Psychiatric:         Mood and Affect: Mood normal          Behavior: Behavior normal        Lab Results:   No visits with results within 1 Day(s) from this visit  Latest known visit with results is:   Office Visit on 11/23/2022   Component Date Value   • D-Dimer, Quant 11/23/2022 1 75 (H)      Radiology Results:   XR chest pa & lateral    Result Date: 11/23/2022  Narrative: CHEST INDICATION:   Atypical chest pain  COMPARISON:  Chest radiograph 7/8/2022  EXAM PERFORMED/VIEWS:  XR CHEST PA & LATERAL FINDINGS:  Cardiomediastinal silhouette appears unremarkable  The lungs are clear  No pneumothorax or pleural effusion  Age-appropriate degenerative changes are noted in the spine  Impression: No acute cardiopulmonary disease  Workstation performed: [id number]     Iam Vieyra PA-C    **Please note:  Dictation voice to text software may have been used in the creation of this record  Occasional wrong word or “sound alike” substitutions may have occurred due to the inherent limitations of voice recognition software  Read the chart carefully and recognize, using context, where substitutions have occurred  **

## 2022-12-22 RX ORDER — HYDROCORTISONE 25 MG/G
CREAM TOPICAL 2 TIMES DAILY
Qty: 60 G | Refills: 1 | Status: SHIPPED | OUTPATIENT
Start: 2022-12-22

## 2023-01-17 DIAGNOSIS — E11.65 TYPE 2 DIABETES MELLITUS WITH HYPERGLYCEMIA, WITHOUT LONG-TERM CURRENT USE OF INSULIN (HCC): Primary | ICD-10-CM

## 2023-01-17 DIAGNOSIS — I10 ESSENTIAL HYPERTENSION: ICD-10-CM

## 2023-01-17 DIAGNOSIS — E11.65 TYPE 2 DIABETES MELLITUS WITH HYPERGLYCEMIA, WITHOUT LONG-TERM CURRENT USE OF INSULIN (HCC): ICD-10-CM

## 2023-01-17 DIAGNOSIS — R60.9 DEPENDENT EDEMA: ICD-10-CM

## 2023-01-17 RX ORDER — DIPHENHYDRAMINE HCL 25 MG
TABLET ORAL DAILY
Qty: 1 KIT | Refills: 0 | Status: SHIPPED | OUTPATIENT
Start: 2023-01-17

## 2023-01-17 RX ORDER — POTASSIUM CHLORIDE 750 MG/1
10 TABLET, FILM COATED, EXTENDED RELEASE ORAL DAILY
Qty: 90 TABLET | Refills: 0 | Status: SHIPPED | OUTPATIENT
Start: 2023-01-17 | End: 2023-01-20 | Stop reason: SDUPTHER

## 2023-01-17 RX ORDER — DIPHENHYDRAMINE HCL 25 MG
TABLET ORAL DAILY
Qty: 1 KIT | Refills: 0 | Status: SHIPPED | OUTPATIENT
Start: 2023-01-17 | End: 2023-01-17 | Stop reason: SDUPTHER

## 2023-01-17 RX ORDER — TORSEMIDE 5 MG/1
5 TABLET ORAL EVERY MORNING
Qty: 90 TABLET | Refills: 0 | Status: SHIPPED | OUTPATIENT
Start: 2023-01-17

## 2023-01-20 DIAGNOSIS — E11.21 TYPE 2 DIABETES MELLITUS WITH DIABETIC NEPHROPATHY, WITH LONG-TERM CURRENT USE OF INSULIN (HCC): ICD-10-CM

## 2023-01-20 DIAGNOSIS — I10 ESSENTIAL HYPERTENSION: ICD-10-CM

## 2023-01-20 DIAGNOSIS — Z79.4 TYPE 2 DIABETES MELLITUS WITH DIABETIC NEPHROPATHY, WITH LONG-TERM CURRENT USE OF INSULIN (HCC): ICD-10-CM

## 2023-01-22 RX ORDER — SODIUM CHLORIDE, SODIUM LACTATE, POTASSIUM CHLORIDE, CALCIUM CHLORIDE 600; 310; 30; 20 MG/100ML; MG/100ML; MG/100ML; MG/100ML
125 INJECTION, SOLUTION INTRAVENOUS CONTINUOUS
Status: CANCELLED | OUTPATIENT
Start: 2023-01-22

## 2023-01-23 DIAGNOSIS — E11.21 TYPE 2 DIABETES MELLITUS WITH DIABETIC NEPHROPATHY, WITH LONG-TERM CURRENT USE OF INSULIN (HCC): Primary | ICD-10-CM

## 2023-01-23 DIAGNOSIS — N18.32 TYPE 2 DIABETES MELLITUS WITH STAGE 3B CHRONIC KIDNEY DISEASE, WITH LONG-TERM CURRENT USE OF INSULIN (HCC): ICD-10-CM

## 2023-01-23 DIAGNOSIS — Z79.4 TYPE 2 DIABETES MELLITUS WITH DIABETIC NEPHROPATHY, WITH LONG-TERM CURRENT USE OF INSULIN (HCC): Primary | ICD-10-CM

## 2023-01-23 DIAGNOSIS — Z79.4 TYPE 2 DIABETES MELLITUS WITH STAGE 3B CHRONIC KIDNEY DISEASE, WITH LONG-TERM CURRENT USE OF INSULIN (HCC): ICD-10-CM

## 2023-01-23 DIAGNOSIS — E11.22 TYPE 2 DIABETES MELLITUS WITH STAGE 3B CHRONIC KIDNEY DISEASE, WITH LONG-TERM CURRENT USE OF INSULIN (HCC): ICD-10-CM

## 2023-01-23 RX ORDER — POTASSIUM CHLORIDE 750 MG/1
10 TABLET, FILM COATED, EXTENDED RELEASE ORAL DAILY
Qty: 90 TABLET | Refills: 0 | Status: SHIPPED | OUTPATIENT
Start: 2023-01-23

## 2023-01-23 RX ORDER — GLUCOSAM/CHON-MSM1/C/MANG/BOSW 500-416.6
TABLET ORAL 2 TIMES DAILY
Qty: 200 EACH | Refills: 2 | Status: SHIPPED | OUTPATIENT
Start: 2023-01-23

## 2023-01-23 RX ORDER — AMLODIPINE BESYLATE 10 MG/1
10 TABLET ORAL DAILY
Qty: 30 TABLET | Refills: 4 | Status: SHIPPED | OUTPATIENT
Start: 2023-01-23 | End: 2023-01-30 | Stop reason: SDUPTHER

## 2023-01-23 RX ORDER — ISOPROPYL ALCOHOL 70 ML/100ML
SWAB TOPICAL
Qty: 200 EACH | Refills: 2 | Status: SHIPPED | OUTPATIENT
Start: 2023-01-23

## 2023-01-26 ENCOUNTER — DOCTOR'S OFFICE (OUTPATIENT)
Dept: URBAN - NONMETROPOLITAN AREA CLINIC 1 | Facility: CLINIC | Age: 68
Setting detail: OPHTHALMOLOGY
End: 2023-01-26
Payer: COMMERCIAL

## 2023-01-26 DIAGNOSIS — H40.013: ICD-10-CM

## 2023-01-26 DIAGNOSIS — E11.3413: ICD-10-CM

## 2023-01-26 DIAGNOSIS — E11.49 OTHER DIABETIC NEUROLOGICAL COMPLICATION ASSOCIATED WITH TYPE 2 DIABETES MELLITUS (HCC): ICD-10-CM

## 2023-01-26 PROCEDURE — 92134 CPTRZ OPH DX IMG PST SGM RTA: CPT | Performed by: OPHTHALMOLOGY

## 2023-01-26 PROCEDURE — 99024 POSTOP FOLLOW-UP VISIT: CPT | Performed by: OPHTHALMOLOGY

## 2023-01-26 RX ORDER — PREGABALIN 50 MG/1
50 CAPSULE ORAL 3 TIMES DAILY
Qty: 90 CAPSULE | Refills: 0 | OUTPATIENT
Start: 2023-01-26 | End: 2023-02-05

## 2023-01-26 ASSESSMENT — CONFRONTATIONAL VISUAL FIELD TEST (CVF)
OD_FINDINGS: FULL
OS_FINDINGS: FULL

## 2023-01-26 ASSESSMENT — KERATOMETRY
OD_K2POWER_DIOPTERS: 45.25
OD_AXISANGLE_DEGREES: 088
OD_K1POWER_DIOPTERS: 43.50
OS_K1POWER_DIOPTERS: 44.25
OS_K2POWER_DIOPTERS: 46.25
OS_AXISANGLE_DEGREES: 032

## 2023-01-26 ASSESSMENT — REFRACTION_AUTOREFRACTION
OD_SPHERE: 0.00
OD_CYLINDER: -1.50
OS_AXIS: 023
OD_AXIS: 021
OS_SPHERE: -0.25
OS_CYLINDER: -1.00

## 2023-01-26 ASSESSMENT — SPHEQUIV_DERIVED
OS_SPHEQUIV: -0.75
OD_SPHEQUIV: -0.75

## 2023-01-26 ASSESSMENT — DRY EYES - PHYSICIAN NOTES
OS_GENERALCOMMENTS: TRACE PEE
OD_GENERALCOMMENTS: TRACE PEE

## 2023-01-26 ASSESSMENT — VISUAL ACUITY
OD_BCVA: 20/30-1
OS_BCVA: 20/30-1

## 2023-01-26 ASSESSMENT — AXIALLENGTH_DERIVED
OD_AL: 23.5627
OS_AL: 23.2465

## 2023-01-26 ASSESSMENT — LID EXAM ASSESSMENTS
OD_EDEMA: RUL 3+
OS_EDEMA: LUL 3+

## 2023-01-30 DIAGNOSIS — E78.5 DYSLIPIDEMIA: ICD-10-CM

## 2023-01-30 DIAGNOSIS — I10 ESSENTIAL HYPERTENSION: ICD-10-CM

## 2023-01-30 DIAGNOSIS — J45.41 MODERATE PERSISTENT ASTHMA WITH ACUTE EXACERBATION: ICD-10-CM

## 2023-01-30 RX ORDER — ALBUTEROL SULFATE 90 UG/1
2 AEROSOL, METERED RESPIRATORY (INHALATION) EVERY 6 HOURS PRN
Qty: 54 G | Refills: 1 | Status: SHIPPED | OUTPATIENT
Start: 2023-01-30

## 2023-01-30 RX ORDER — ATORVASTATIN CALCIUM 80 MG/1
80 TABLET, FILM COATED ORAL
Qty: 90 TABLET | Refills: 1 | Status: SHIPPED | OUTPATIENT
Start: 2023-01-30 | End: 2023-02-10 | Stop reason: SDUPTHER

## 2023-01-30 RX ORDER — AMLODIPINE BESYLATE 10 MG/1
10 TABLET ORAL DAILY
Qty: 90 TABLET | Refills: 1 | Status: SHIPPED | OUTPATIENT
Start: 2023-01-30

## 2023-02-03 ENCOUNTER — DOCTOR'S OFFICE (OUTPATIENT)
Dept: URBAN - NONMETROPOLITAN AREA CLINIC 1 | Facility: CLINIC | Age: 68
Setting detail: OPHTHALMOLOGY
End: 2023-02-03
Payer: COMMERCIAL

## 2023-02-03 DIAGNOSIS — E11.3412: ICD-10-CM

## 2023-02-03 DIAGNOSIS — E11.3411: ICD-10-CM

## 2023-02-03 PROCEDURE — 99024 POSTOP FOLLOW-UP VISIT: CPT | Performed by: OPHTHALMOLOGY

## 2023-02-03 PROCEDURE — 67028 INJECTION EYE DRUG: CPT | Performed by: OPHTHALMOLOGY

## 2023-02-03 ASSESSMENT — REFRACTION_AUTOREFRACTION
OS_CYLINDER: -1.00
OD_SPHERE: 0.00
OS_SPHERE: -0.25
OD_AXIS: 021
OS_AXIS: 023
OD_CYLINDER: -1.50

## 2023-02-03 ASSESSMENT — VISUAL ACUITY
OS_BCVA: 20/30-1
OD_BCVA: 20/30-1

## 2023-02-03 ASSESSMENT — SPHEQUIV_DERIVED
OD_SPHEQUIV: -0.75
OS_SPHEQUIV: -0.75

## 2023-02-03 ASSESSMENT — KERATOMETRY
OD_K1POWER_DIOPTERS: 43.50
OD_AXISANGLE_DEGREES: 088
OS_K1POWER_DIOPTERS: 44.25
OS_AXISANGLE_DEGREES: 032
OD_K2POWER_DIOPTERS: 45.25
OS_K2POWER_DIOPTERS: 46.25

## 2023-02-03 ASSESSMENT — AXIALLENGTH_DERIVED
OD_AL: 23.5627
OS_AL: 23.2465

## 2023-02-06 ENCOUNTER — DOCTOR'S OFFICE (OUTPATIENT)
Dept: URBAN - NONMETROPOLITAN AREA CLINIC 1 | Facility: CLINIC | Age: 68
Setting detail: OPHTHALMOLOGY
End: 2023-02-06
Payer: COMMERCIAL

## 2023-02-06 DIAGNOSIS — E11.3412: ICD-10-CM

## 2023-02-06 PROBLEM — E11.3411 DM TYPE 2; RIGHT SEVERE WITH ME, LEFT SEVERE WITH ME: Status: ACTIVE | Noted: 2023-01-26

## 2023-02-06 PROCEDURE — 67028 INJECTION EYE DRUG: CPT | Performed by: OPHTHALMOLOGY

## 2023-02-06 PROCEDURE — 99024 POSTOP FOLLOW-UP VISIT: CPT | Performed by: OPHTHALMOLOGY

## 2023-02-06 ASSESSMENT — REFRACTION_AUTOREFRACTION
OD_SPHERE: 0.00
OS_SPHERE: -0.25
OD_CYLINDER: -1.50
OS_CYLINDER: -1.00
OS_AXIS: 023
OD_AXIS: 021

## 2023-02-06 ASSESSMENT — AXIALLENGTH_DERIVED
OD_AL: 23.5627
OS_AL: 23.2465

## 2023-02-06 ASSESSMENT — KERATOMETRY
OD_K2POWER_DIOPTERS: 45.25
OS_AXISANGLE_DEGREES: 032
OS_K2POWER_DIOPTERS: 46.25
OS_K1POWER_DIOPTERS: 44.25
OD_AXISANGLE_DEGREES: 088
OD_K1POWER_DIOPTERS: 43.50

## 2023-02-06 ASSESSMENT — VISUAL ACUITY
OS_BCVA: 20/30-1
OD_BCVA: 20/40-2

## 2023-02-06 ASSESSMENT — SPHEQUIV_DERIVED
OS_SPHEQUIV: -0.75
OD_SPHEQUIV: -0.75

## 2023-02-07 ENCOUNTER — HOSPITAL ENCOUNTER (OUTPATIENT)
Dept: GASTROENTEROLOGY | Facility: HOSPITAL | Age: 68
Setting detail: OUTPATIENT SURGERY
Discharge: HOME | End: 2023-02-07

## 2023-02-07 ENCOUNTER — ANESTHESIA EVENT (OUTPATIENT)
Dept: GASTROENTEROLOGY | Facility: HOSPITAL | Age: 68
End: 2023-02-07

## 2023-02-07 ENCOUNTER — ANESTHESIA (OUTPATIENT)
Dept: GASTROENTEROLOGY | Facility: HOSPITAL | Age: 68
End: 2023-02-07

## 2023-02-07 VITALS
TEMPERATURE: 97.2 F | BODY MASS INDEX: 34.49 KG/M2 | HEART RATE: 72 BPM | WEIGHT: 207 LBS | HEIGHT: 65 IN | DIASTOLIC BLOOD PRESSURE: 71 MMHG | RESPIRATION RATE: 20 BRPM | SYSTOLIC BLOOD PRESSURE: 131 MMHG | OXYGEN SATURATION: 96 %

## 2023-02-07 DIAGNOSIS — R13.10 DYSPHAGIA, UNSPECIFIED TYPE: ICD-10-CM

## 2023-02-07 LAB — GLUCOSE SERPL-MCNC: 131 MG/DL (ref 65–140)

## 2023-02-07 RX ORDER — PROPOFOL 10 MG/ML
INJECTION, EMULSION INTRAVENOUS AS NEEDED
Status: DISCONTINUED | OUTPATIENT
Start: 2023-02-07 | End: 2023-02-07

## 2023-02-07 RX ORDER — SODIUM CHLORIDE, SODIUM LACTATE, POTASSIUM CHLORIDE, CALCIUM CHLORIDE 600; 310; 30; 20 MG/100ML; MG/100ML; MG/100ML; MG/100ML
125 INJECTION, SOLUTION INTRAVENOUS CONTINUOUS
Status: DISCONTINUED | OUTPATIENT
Start: 2023-02-07 | End: 2023-02-11 | Stop reason: HOSPADM

## 2023-02-07 RX ORDER — LIDOCAINE HYDROCHLORIDE 20 MG/ML
INJECTION, SOLUTION EPIDURAL; INFILTRATION; INTRACAUDAL; PERINEURAL AS NEEDED
Status: DISCONTINUED | OUTPATIENT
Start: 2023-02-07 | End: 2023-02-07

## 2023-02-07 RX ADMIN — SODIUM CHLORIDE, SODIUM LACTATE, POTASSIUM CHLORIDE, AND CALCIUM CHLORIDE 125 ML/HR: .6; .31; .03; .02 INJECTION, SOLUTION INTRAVENOUS at 09:39

## 2023-02-07 RX ADMIN — PROPOFOL 50 MG: 10 INJECTION, EMULSION INTRAVENOUS at 10:03

## 2023-02-07 RX ADMIN — PROPOFOL 50 MG: 10 INJECTION, EMULSION INTRAVENOUS at 10:01

## 2023-02-07 RX ADMIN — PROPOFOL 50 MG: 10 INJECTION, EMULSION INTRAVENOUS at 10:04

## 2023-02-07 RX ADMIN — PROPOFOL 100 MG: 10 INJECTION, EMULSION INTRAVENOUS at 09:58

## 2023-02-07 RX ADMIN — PROPOFOL 50 MG: 10 INJECTION, EMULSION INTRAVENOUS at 09:59

## 2023-02-07 RX ADMIN — LIDOCAINE HYDROCHLORIDE 100 MG: 20 INJECTION, SOLUTION EPIDURAL; INFILTRATION; INTRACAUDAL; PERINEURAL at 09:58

## 2023-02-07 NOTE — ANESTHESIA PREPROCEDURE EVALUATION
Procedure:  EGD    Relevant Problems   CARDIO   (+) Essential hypertension      ENDO   (+) Type 2 diabetes mellitus with hyperglycemia, without long-term current use of insulin (HCC)      GI/HEPATIC   (+) Hiatal hernia      /RENAL   (+) Chronic kidney disease, stage 3 unspecified (HCC)      MUSCULOSKELETAL   (+) Chronic back pain   (+) Lumbar pain   (+) Myasthenia gravis (HCC)      NEURO/PSYCH   (+) Binocular vision disorder with diplopia   (+) Chronic back pain   (+) Depression   (+) Depression, recurrent (HCC)   (+) Diabetic neuropathy (HCC)   (+) Neuropathy, diabetic (HCC)   (+) Other headache syndrome      PULMONARY   (+) Dyspnea   (+) Mild persistent asthma without complication   (+) Moderate persistent asthma without complication        Physical Exam    Airway    Mallampati score: II  TM Distance: >3 FB  Neck ROM: full     Dental   No notable dental hx     Cardiovascular  Cardiovascular exam normal    Pulmonary  Pulmonary exam normal     Other Findings        Anesthesia Plan  ASA Score- 3     Anesthesia Type- IV sedation with anesthesia with ASA Monitors  Additional Monitors:   Airway Plan:           Plan Factors-Exercise tolerance (METS): >4 METS  Chart reviewed  EKG reviewed  Imaging results reviewed  Existing labs reviewed  Patient summary reviewed  Patient is not a current smoker  Induction- intravenous  Postoperative Plan-     Informed Consent- Anesthetic plan and risks discussed with patient  I personally reviewed this patient with the CRNA  Discussed and agreed on the Anesthesia Plan with the CRNA  William Thomas

## 2023-02-07 NOTE — H&P
History and Physical - [facility]  Sherrell Kimball 79 y o  female MRN: [id number]                  HPI: Sherrell Kimball is a 79y o  year old female who presents for dysphagia and dyspepsia      REVIEW OF SYSTEMS: Per the HPI, and otherwise unremarkable  Historical Information   Past Medical History:   Diagnosis Date   • Abnormal ECG     last assessed: 5/15/17   • Abnormal mammogram     last assessed: 7/11/17   • Abnormal stress test     last assesed: 7/24/17   • Anemia    • Anxiety    • Arthritis    • Asthma    • Back problem    • Breast cyst    • Chest pain     last assessed: 7/24/17   • Chronic pain disorder    • Cyst of left breast     last assessed: 8/18/17   • Depression    • Depression     resolved: 1/2/18   • Diabetes mellitus ([facility] [address] )    • Hiatal hernia     last assessed: 11/7/17   • Hyperlipidemia    • Hypertension    • Keloid of skin     last assessed: 11/2/16   • Neuropathy    • Psychiatric disorder     anxiety   • Stroke [facility])     TIA 2005   • Tuberculosis     as a child      Past Surgical History:   Procedure Laterality Date   • ARM WOUND REPAIR / CLOSURE     • CARPAL TUNNEL RELEASE     • CARPAL TUNNEL RELEASE Left    • CATARACT EXTRACTION Bilateral     2015   • COLONOSCOPY     • CYST REMOVAL      right upper arm  • EYE SURGERY      cataract removaql   • FL LUMBAR PUNCTURE DIAGNOSTIC  8/15/2019   • PA ESOPHAGOGASTRODUODENOSCOPY TRANSORAL DIAGNOSTIC N/A 1/5/2018    Procedure: ESOPHAGOGASTRODUODENOSCOPY (EGD);   Surgeon: Marie Champagne DO;  Location: [facility] MAIN OR;  Service: Gastroenterology   • PA [address] [facility] WRST SURG W/RLS TRANSVRS CARPL LIGM Left 4/19/2019    Procedure: ENDOSCOPIC CARPAL TUNNEL RELEASE;  Surgeon: Holley Mo MD;  Location: [facility] MAIN OR;  Service: Orthopedics   • TUBAL LIGATION     • [address] THYROID BIOPSY  4/15/2019     Social History   Social History     Substance and Sexual Activity   Alcohol Use Not Currently   • Alcohol/week: 0 0 standard drinks     Social History     Substance and Sexual Activity   Drug Use No     Social History     Tobacco Use   Smoking Status Never   Smokeless Tobacco Never     Family History   Problem Relation Age of Onset   • Heart disease Mother    • Diabetes Mother    • Arthritis Mother    • Heart attack Mother    • Hypertension Mother    • Kidney disease Father    • Hypertension Father    • Kidney cancer Father    • Arthritis Sister    • Stroke Maternal Grandmother    • Stomach cancer Maternal Grandmother    • Arthritis Paternal Grandmother    • Heart attack Paternal Grandmother    • Stroke Maternal Aunt    • Heart attack Maternal Uncle    • Cancer Family         spinal column   • Coronary artery disease Family    • Glaucoma Family    • Rheum arthritis Family    • No Known Problems Maternal Grandfather    • No Known Problems Paternal Grandfather    • No Known Problems Daughter    • No Known Problems Daughter    • No Known Problems Daughter    • No Known Problems Daughter    • Liver cancer Maternal Aunt    • No Known Problems Maternal Aunt    • No Known Problems Maternal Aunt    • No Known Problems Paternal Aunt        Meds/Allergies       Current Outpatient Medications:   •  albuterol (PROVENTIL HFA,VENTOLIN HFA) 90 mcg/act inhaler  •  Alcohol Swabs (DropSafe Alcohol Prep) 70 % PADS  •  amLODIPine (NORVASC) 10 mg tablet  •  aspirin (ECOTRIN LOW STRENGTH) 81 mg EC tablet  •  atorvastatin (LIPITOR) 80 mg tablet  •  Blood Glucose Monitoring Suppl (True Metrix Air Glucose Meter) w/Device KIT  •  dapagliflozin (Farxiga) 10 MG tablet  •  lisinopril (ZESTRIL) 20 mg tablet  •  pantoprazole (PROTONIX) 40 mg tablet  •  potassium chloride (Klor-Con) 10 mEq tablet  •  RA Pain Relief Acetaminophen 325 MG tablet  •  sitaGLIPtin (Januvia) 100 mg tablet  •  torsemide (DEMADEX) 5 MG tablet  •  TRUEplus Lancets 33G MISC  •  Blood Glucose Calibration (Accu-Chek Compact Plus Control) SOLN  •  glucose blood (Accu-Chek Becky Plus) test strip  •  glucose blood (OneTouch Verio) test strip  •  hydrocortisone (ANUSOL-HC) 2 5 % rectal cream  •  hydrocortisone 0 5 % cream  •  Insulin Pen Needle (B-D UF III MINI PEN NEEDLES) 31G X 5 MM MISC  •  nystatin-triamcinolone (MYCOLOG-II) ointment  •  polyethylene glycol (GLYCOLAX) 17 GM/SCOOP powder  •  Polyvinyl Alcohol (LIQUID TEARS OP)  •  pregabalin (LYRICA) 50 mg capsule    Current Facility-Administered Medications:   •  lactated ringers infusion, 125 mL/hr, Intravenous, Continuous, 125 mL/hr at 02/07/23 3181    Allergies   Allergen Reactions   • Bactrim [Sulfamethoxazole-Trimethoprim] Shortness Of Breath   • Percocet [Oxycodone-Acetaminophen] GI Intolerance   • Vicodin [Hydrocodone-Acetaminophen] GI Intolerance       Objective     BP (!) 198/94   Pulse 60   Temp (!) 97 2 °F (36 2 °C) (Temporal)   Resp 18   Ht 5' 5" (1 651 m)   Wt 93 9 kg (207 lb)   SpO2 98%   BMI 34 45 kg/m²       PHYSICAL EXAM    Gen: NAD  Head: NCAT  CV: RRR  CHEST: Clear  ABD: soft, NT/ND  EXT: no edema      ASSESSMENT/PLAN:  This is a 79y o  year old female here for EGD, and she is stable and optimized for her procedure

## 2023-02-07 NOTE — ANESTHESIA POSTPROCEDURE EVALUATION
Post-Op Assessment Note    CV Status:  Stable  Pain Score: 0    Pain management: adequate     Mental Status:  Alert   Hydration Status:  Stable   PONV Controlled:  Controlled   Airway Patency:  Patent      Post Op Vitals Reviewed: Yes      Staff: CRNA         No notable events documented      BP  137/67   Temp     Pulse  56   Resp   20   SpO2   99

## 2023-02-10 DIAGNOSIS — E78.5 DYSLIPIDEMIA: ICD-10-CM

## 2023-02-10 RX ORDER — ATORVASTATIN CALCIUM 80 MG/1
80 TABLET, FILM COATED ORAL
Qty: 90 TABLET | Refills: 1 | Status: SHIPPED | OUTPATIENT
Start: 2023-02-10

## 2023-02-21 DIAGNOSIS — E11.22 TYPE 2 DIABETES MELLITUS WITH STAGE 3B CHRONIC KIDNEY DISEASE, WITH LONG-TERM CURRENT USE OF INSULIN (HCC): ICD-10-CM

## 2023-02-21 DIAGNOSIS — N18.30 STAGE 3 CHRONIC KIDNEY DISEASE, UNSPECIFIED WHETHER STAGE 3A OR 3B CKD (HCC): ICD-10-CM

## 2023-02-21 DIAGNOSIS — Z79.4 TYPE 2 DIABETES MELLITUS WITH STAGE 3B CHRONIC KIDNEY DISEASE, WITH LONG-TERM CURRENT USE OF INSULIN (HCC): ICD-10-CM

## 2023-02-21 DIAGNOSIS — N18.32 TYPE 2 DIABETES MELLITUS WITH STAGE 3B CHRONIC KIDNEY DISEASE, WITH LONG-TERM CURRENT USE OF INSULIN (HCC): ICD-10-CM

## 2023-02-21 RX ORDER — LISINOPRIL 20 MG/1
20 TABLET ORAL DAILY
Qty: 30 TABLET | Refills: 0 | Status: SHIPPED | OUTPATIENT
Start: 2023-02-21

## 2023-03-07 ENCOUNTER — TELEPHONE (OUTPATIENT)
Dept: FAMILY MEDICINE CLINIC | Facility: CLINIC | Age: 68
End: 2023-03-07

## 2023-03-07 ENCOUNTER — TRANSITIONAL CARE MANAGEMENT (OUTPATIENT)
Dept: FAMILY MEDICINE CLINIC | Facility: CLINIC | Age: 68
End: 2023-03-07

## 2023-03-07 ENCOUNTER — TELEPHONE (OUTPATIENT)
Dept: CARDIOLOGY CLINIC | Facility: CLINIC | Age: 68
End: 2023-03-07

## 2023-03-07 DIAGNOSIS — I25.10 CORONARY ARTERY DISEASE DUE TO TYPE 2 DIABETES MELLITUS (HCC): Primary | ICD-10-CM

## 2023-03-07 DIAGNOSIS — E11.59 CORONARY ARTERY DISEASE DUE TO TYPE 2 DIABETES MELLITUS (HCC): Primary | ICD-10-CM

## 2023-03-07 NOTE — PROGRESS NOTES
Referral has been put in for Dr Theresa Sherman who is a cardiologist at the [facility] on [address] that would be closer for her since she now lives in [place]

## 2023-03-07 NOTE — TELEPHONE ENCOUNTER
Pt has another question - She was on Torsemide 5 mg & called for referral do you want her to continue this Rx?   Told to schedule TCM due to recent hospital D/C

## 2023-03-07 NOTE — TELEPHONE ENCOUNTER
Was recently in [facility] for complications with heart - Hx Cardiac cath     - Pt was told to get in contact to PCP for a Cardiology referral    -

## 2023-03-09 ENCOUNTER — RA CDI HCC (OUTPATIENT)
Dept: OTHER | Facility: HOSPITAL | Age: 68
End: 2023-03-09

## 2023-03-09 ENCOUNTER — TELEPHONE (OUTPATIENT)
Dept: CARDIOLOGY CLINIC | Facility: CLINIC | Age: 68
End: 2023-03-09

## 2023-03-09 NOTE — PROGRESS NOTES
[facility]  coding opportunities          Chart Reviewed number of suggestions sent to Provider: 4  E11 22  [id number]  E11 39  E11 40     Patients Insurance     Medicare Insurance: [facility] Advantage

## 2023-03-10 DIAGNOSIS — K21.9 GASTROESOPHAGEAL REFLUX DISEASE WITHOUT ESOPHAGITIS: ICD-10-CM

## 2023-03-14 DIAGNOSIS — N18.30 STAGE 3 CHRONIC KIDNEY DISEASE, UNSPECIFIED WHETHER STAGE 3A OR 3B CKD (HCC): ICD-10-CM

## 2023-03-14 DIAGNOSIS — N18.32 TYPE 2 DIABETES MELLITUS WITH STAGE 3B CHRONIC KIDNEY DISEASE, WITH LONG-TERM CURRENT USE OF INSULIN (HCC): ICD-10-CM

## 2023-03-14 DIAGNOSIS — Z79.4 TYPE 2 DIABETES MELLITUS WITH STAGE 3B CHRONIC KIDNEY DISEASE, WITH LONG-TERM CURRENT USE OF INSULIN (HCC): ICD-10-CM

## 2023-03-14 DIAGNOSIS — E11.22 TYPE 2 DIABETES MELLITUS WITH STAGE 3B CHRONIC KIDNEY DISEASE, WITH LONG-TERM CURRENT USE OF INSULIN (HCC): ICD-10-CM

## 2023-03-14 RX ORDER — LISINOPRIL 20 MG/1
TABLET ORAL
Qty: 30 TABLET | Refills: 0 | Status: SHIPPED | OUTPATIENT
Start: 2023-03-14

## 2023-03-27 DIAGNOSIS — E11.65 TYPE 2 DIABETES MELLITUS WITH HYPERGLYCEMIA, WITHOUT LONG-TERM CURRENT USE OF INSULIN (HCC): ICD-10-CM

## 2023-03-27 RX ORDER — DIPHENHYDRAMINE HCL 25 MG
TABLET ORAL
Qty: 1 KIT | Refills: 0 | Status: SHIPPED | OUTPATIENT
Start: 2023-03-27

## 2023-04-12 PROBLEM — R07.9 CHEST PAIN: Status: ACTIVE | Noted: 2023-04-12

## 2023-04-12 PROBLEM — R06.02 SHORTNESS OF BREATH: Status: ACTIVE | Noted: 2020-04-14

## 2023-04-12 PROBLEM — N17.0 ACUTE KIDNEY INJURY (AKI) WITH ACUTE TUBULAR NECROSIS (ATN) (HCC): Status: ACTIVE | Noted: 2023-04-12

## 2023-04-13 PROBLEM — R00.2 PALPITATIONS: Status: ACTIVE | Noted: 2023-04-13

## 2023-04-13 PROBLEM — I50.33 ACUTE ON CHRONIC DIASTOLIC HEART FAILURE (HCC): Status: ACTIVE | Noted: 2023-04-13

## 2023-04-13 PROBLEM — I25.10 CORONARY ARTERY DISEASE INVOLVING NATIVE CORONARY ARTERY OF NATIVE HEART: Status: ACTIVE | Noted: 2023-04-13

## 2023-04-13 PROBLEM — I50.30 DIASTOLIC HEART FAILURE (HCC): Status: ACTIVE | Noted: 2023-04-13

## 2023-04-13 PROBLEM — I50.33 ACUTE ON CHRONIC DIASTOLIC HEART FAILURE (HCC): Status: RESOLVED | Noted: 2023-04-13 | Resolved: 2023-04-13

## 2023-04-24 ENCOUNTER — TELEPHONE (OUTPATIENT)
Dept: PULMONOLOGY | Facility: HOSPITAL | Age: 68
End: 2023-04-24

## 2023-04-26 ENCOUNTER — PATIENT OUTREACH (OUTPATIENT)
Dept: FAMILY MEDICINE CLINIC | Facility: CLINIC | Age: 68
End: 2023-04-26

## 2023-04-26 DIAGNOSIS — E11.65 TYPE 2 DIABETES MELLITUS WITH HYPERGLYCEMIA, WITHOUT LONG-TERM CURRENT USE OF INSULIN (HCC): Primary | ICD-10-CM

## 2023-04-26 DIAGNOSIS — Z78.9 NEED FOR FOLLOW-UP BY SOCIAL WORKER: ICD-10-CM

## 2023-04-26 NOTE — PROGRESS NOTES
Outpatient Care Management Note:  Outreach call placed to Ms Osorio Harrell  Introduced myself and my role  She is agreeable to outreach  Natalie Olmos had acardiac catheterization with stent placement in February  She has a recent admission for chest discomfort, SOB and lower extremity swelling  Natalie Olmos states her breathing is at baseline but she does have lower extremity swelling if she is on her feet for an extended period of time  She is weighing herself every morning after voiding and her weight is stable  Discussed her missed appointment with her PCP  She is currently out of town staying with friends and family but plans on returning later today  She will call to reschedule once she is home  Reviewed medications and she states compliance  Per Natalie Olmos, her landlord is causing her significant stress  Natalie Olmos admits to not being able to pay her electric bill which has affected his credit rating  He is threatening eviction  She also has issues with transportation  Natalie Olmos is also interested in her daughter becoming her paid caregiver as she needs assistance with some when she is having symptoms     Discussed referrals to SW CM as well as [facility]  Natalie Olmos is agreeable to both  Denies any needs at this time  Will follow up once patient returns home

## 2023-05-01 ENCOUNTER — PATIENT OUTREACH (OUTPATIENT)
Dept: FAMILY MEDICINE CLINIC | Facility: CLINIC | Age: 68
End: 2023-05-01

## 2023-05-01 ENCOUNTER — TELEPHONE (OUTPATIENT)
Dept: FAMILY MEDICINE CLINIC | Facility: CLINIC | Age: 68
End: 2023-05-01

## 2023-05-01 NOTE — TELEPHONE ENCOUNTER
Call from [facility] at [address], [address] Well called about pt's amlodipine 10mg and that is was cancelled by us  I do not see that pt takes amlodipine 10mg since 1/2023  Let the pharmacist know this also

## 2023-05-01 NOTE — PROGRESS NOTES
[facility] received a referral from  BHARAT Momining to assist patient with a transportation application and providing information for daughter to become a paid caregiver  [facility] contacted [facility] to discuss referrals  She is agreeable to services and states she recently established an On-Track agreement with [employer] to assist with paying her electric bill  CMKATHERINE completed a paper STS application with the patient, she will need to provide a copy of her photo identification or some other proof of age documentation in order to qualify for senior ride share services  Patient expressed understanding  [facility] states she has appointments in the next 30 days which she will need transportation for so a 3-way call was completed with patient and [employer] to register for services and 30 day temporary approval    No answer, left message on voicemail with patient's contact information, reason for call, and a request for a call back to complete the registration  [employer] contact information was also given to [facility] so that she may call back if no call back from them in 48 hours  [facility] contacted [employer] [address] to initiate an assessment for patient's daughter to become a paid caregiver  Erin's information was given and an  will contact her to schedule an appointment to determine eligibility for services  [facility] will follow up with patient in two weeks for a status update on transportation services and to determine whether [employer] completed their assessment

## 2023-05-03 ENCOUNTER — PATIENT OUTREACH (OUTPATIENT)
Dept: FAMILY MEDICINE CLINIC | Facility: CLINIC | Age: 68
End: 2023-05-03

## 2023-05-03 NOTE — PROGRESS NOTES
Outpatient Care Management Note:  Follow up call attempted to Mu Hanson  Mail box is full, unable to leave message

## 2023-05-04 ENCOUNTER — PATIENT OUTREACH (OUTPATIENT)
Dept: FAMILY MEDICINE CLINIC | Facility: CLINIC | Age: 68
End: 2023-05-04

## 2023-05-04 NOTE — PROGRESS NOTES
MARLIN LANDIS reviewed chart  Pt was referred by RN BOBY d/t need for housing, caregiver and transportation needs Pt is working with [facility] to apply for STS and home health aide services  Pt stated she needs to move out of her apartment by 5/15/23  Stated she does not have any furniture  MARLIN LANDIS provided phone numbers for [employer], [employer] and [employer]  Pt stated she will call me back to discuss what those offices can offer to her  MARLIN LANDIS will f/u within two weeks and remain available for any needs

## 2023-05-08 ENCOUNTER — PATIENT OUTREACH (OUTPATIENT)
Dept: FAMILY MEDICINE CLINIC | Facility: CLINIC | Age: 68
End: 2023-05-08

## 2023-05-08 NOTE — PROGRESS NOTES
[facility] received correspondence from PCP office stating that provider will not complete certification of disability form since patient has not been seen by their office since July 2021  Chart review shows last office visit was 11/23/2022  [facility] contacted patient to explain above and confirm whether she received a call to register with [facility]  Reuben Conley states she did not receive a call to register with [facility] so [facility] offered to complete another 3-way call with Delta Walsh  Patient was agreeable  No answer from Delta Walsh so a message was left with patient's contact information and this writer's contact information with a request for a call back  Call ended with [facility]  Reuben Conley then stated she received an envelope from [employer] and stated she needed to fill it out and return it  Ning Uribe [facility] inquired again whether she registered with [employer] after last outreach and she stated she did not  CMOC gave contact information for [employer] and told Reuben Conley to contact them if she didn't receive a call back in the next 48 hours  CMOC also recommended she contact her PCP to schedule an appointment to have her form completed  Reuben Conley expressed understanding and states she will call for both today  Call ended  CMOC received a call back from Delta Walsh  They state they did speak to Reuben Conley last week and informed her that the Senior application needs to be completed and submitted to [facility] before [employer] services can be initiated  [facility] emailed senior application along with patient's photo ID to [facility] for processing  Will follow up with Reuben Conley in one week to confirm whether she scheduled a PCP appointment to have ADA/PWD disability form completed and an update on [facility]

## 2023-05-09 ENCOUNTER — PATIENT OUTREACH (OUTPATIENT)
Dept: FAMILY MEDICINE CLINIC | Facility: CLINIC | Age: 68
End: 2023-05-09

## 2023-05-09 NOTE — PROGRESS NOTES
[address] contacted [facility] to check the status of senior application  Spoke with Amado Grey, she confirms the application was processed and approved  Call ended with [place] [address] contacted Kylee with above information and to remind her to contact [employer] by 11:30am to schedule her dental appointment for tomorrow  This writer also recommended she contact her PCP to schedule a follow up to discuss completing the certification of disability form for her transportation application  She thanked this writer and asked for the contact information for scheduling  [address] provided [employer] phone number and Kylee states she will call them now  She confirmed she has the contact information for her PCP  Call ended  CMOC will follow up with Kylee in two weeks

## 2023-05-10 ENCOUNTER — RX ONLY (RX ONLY)
Age: 68
End: 2023-05-10

## 2023-05-10 ENCOUNTER — DOCTOR'S OFFICE (OUTPATIENT)
Dept: URBAN - NONMETROPOLITAN AREA CLINIC 1 | Facility: CLINIC | Age: 68
Setting detail: OPHTHALMOLOGY
End: 2023-05-10
Payer: COMMERCIAL

## 2023-05-10 DIAGNOSIS — E05.00: ICD-10-CM

## 2023-05-10 DIAGNOSIS — H40.013: ICD-10-CM

## 2023-05-10 DIAGNOSIS — G70.01: ICD-10-CM

## 2023-05-10 DIAGNOSIS — H40.011: ICD-10-CM

## 2023-05-10 PROCEDURE — 92083 EXTENDED VISUAL FIELD XM: CPT | Performed by: OPHTHALMOLOGY

## 2023-05-10 PROCEDURE — 99214 OFFICE O/P EST MOD 30 MIN: CPT | Performed by: OPHTHALMOLOGY

## 2023-05-10 PROCEDURE — 76514 ECHO EXAM OF EYE THICKNESS: CPT | Performed by: OPHTHALMOLOGY

## 2023-05-10 ASSESSMENT — REFRACTION_AUTOREFRACTION
OD_SPHERE: 0.00
OD_AXIS: 155
OS_SPHERE: -0.75
OD_CYLINDER: -1.00
OS_CYLINDER: -1.25
OS_AXIS: 031

## 2023-05-10 ASSESSMENT — PACHYMETRY
OD_CT_CORRECTION: -6
OS_CT_CORRECTION: -6
OD_CT_UM: 632
OS_CT_UM: 626

## 2023-05-10 ASSESSMENT — LID EXAM ASSESSMENTS
OS_EDEMA: LUL 2+ 3+
OD_EDEMA: RUL 2+ 3+

## 2023-05-10 ASSESSMENT — AXIALLENGTH_DERIVED
OS_AL: 23.4853
OD_AL: 23.466

## 2023-05-10 ASSESSMENT — DRY EYES - PHYSICIAN NOTES
OD_GENERALCOMMENTS: TRACE PEE
OS_GENERALCOMMENTS: TRACE PEE

## 2023-05-10 ASSESSMENT — VISUAL ACUITY
OS_BCVA: 20/40+2
OD_BCVA: 20/40-1

## 2023-05-10 ASSESSMENT — KERATOMETRY
OD_AXISANGLE_DEGREES: 088
OS_K2POWER_DIOPTERS: 46.25
OS_AXISANGLE_DEGREES: 032
OD_K1POWER_DIOPTERS: 43.50
OS_K1POWER_DIOPTERS: 44.25
OD_K2POWER_DIOPTERS: 45.25

## 2023-05-10 ASSESSMENT — SPHEQUIV_DERIVED
OS_SPHEQUIV: -1.375
OD_SPHEQUIV: -0.5

## 2023-05-10 ASSESSMENT — CONFRONTATIONAL VISUAL FIELD TEST (CVF)
OD_FINDINGS: FULL
OS_FINDINGS: FULL

## 2023-05-18 ENCOUNTER — PATIENT OUTREACH (OUTPATIENT)
Dept: FAMILY MEDICINE CLINIC | Facility: CLINIC | Age: 68
End: 2023-05-18

## 2023-05-18 NOTE — PROGRESS NOTES
"Outpatient Care Management Note:  Follow up call placed to Haroldo Bennett  Her only compliant is left shoulder pain  She describes it as a soreness on the top of her shoulder  Haroldo Bennett also states it feels \"tight\" when she lifts her arm  This is different than her cardiac pain  Denies any shortness of breath of lower extremity swelling  Lee Annlibertad Donald does have upcoming appointments with her PCP and Nephrology set up  Haroldo Bennett has resolved the issues with he landlord and is no longer at risk of being evicted  Lee Annlibertad Donald has not competed [employer] application  Made her aware she was approved for [facility] services  States she is waiting on some type of application that she needs to complete  Advised her to complete and fill it out as soon as possible  Denies any needs at this time  Encouraged to call with any questions or concerns     "

## 2023-05-18 NOTE — PROGRESS NOTES
MARLIN LANDIS reviewed chart  Pt has been approved for [employer] senior shared ride and is waiting on form for disability transportation to be completed  MARLIN LANDIS outreached pt to discuss resources  Patient did not answer  MARLIN LANDIS was unable to leave a message d/t mailbox being full  MARLIN LANDIS will f/u as needed and remain available for any needs

## 2023-05-23 ENCOUNTER — PATIENT OUTREACH (OUTPATIENT)
Dept: FAMILY MEDICINE CLINIC | Facility: CLINIC | Age: 68
End: 2023-05-23

## 2023-05-23 NOTE — PROGRESS NOTES
[address] [facility] contacted [facility] for a status update on waiver referral and transportation application  [facility] states she had an appointment with someone from [employer] and they told her she was ineligible for [employer] Whe[employer] d/t her daughter living with her and helping with her care but she did not have anyone from [facility] contact her regarding her daughter becoming her paid caregiver  A 3-way call was attempted with [facility] and [employer] [facility]  The representative was on another call so this writer's contact information was given along with patient's information with a request for a call back to discuss  [facility] states she received her new glucometer from [facility]  She did receive a call from [facility] stating she is approved for senior shared ride services but she did not receive her welcome packet d/t the post office returning the mail as they thought she no longer lived at the residence d/t her mail piling up while she was in the hospital and at home recovering  She believes this issue has been resolved  [facility] contacted [facility]  S/W Lindsey Pandey and requested the welcome packet be mailed again  Will follow up with Eunice Serrano after call back from [employer]

## 2023-05-24 DIAGNOSIS — Z79.4 TYPE 2 DIABETES MELLITUS WITH DIABETIC NEPHROPATHY, WITH LONG-TERM CURRENT USE OF INSULIN (HCC): ICD-10-CM

## 2023-05-24 DIAGNOSIS — E11.21 TYPE 2 DIABETES MELLITUS WITH DIABETIC NEPHROPATHY, WITH LONG-TERM CURRENT USE OF INSULIN (HCC): ICD-10-CM

## 2023-05-24 DIAGNOSIS — I50.33 ACUTE ON CHRONIC DIASTOLIC HEART FAILURE (HCC): ICD-10-CM

## 2023-05-24 DIAGNOSIS — K21.9 GASTROESOPHAGEAL REFLUX DISEASE WITHOUT ESOPHAGITIS: ICD-10-CM

## 2023-05-24 DIAGNOSIS — I25.10 CORONARY ARTERY DISEASE INVOLVING NATIVE CORONARY ARTERY OF NATIVE HEART: ICD-10-CM

## 2023-05-24 RX ORDER — ISOSORBIDE MONONITRATE 60 MG/1
60 TABLET, EXTENDED RELEASE ORAL DAILY
Qty: 30 TABLET | Refills: 0 | Status: SHIPPED | OUTPATIENT
Start: 2023-05-24

## 2023-05-24 RX ORDER — TRAZODONE HYDROCHLORIDE 50 MG/1
50 TABLET ORAL
Qty: 20 TABLET | Refills: 0 | Status: SHIPPED | OUTPATIENT
Start: 2023-05-24 | End: 2023-06-13

## 2023-05-24 RX ORDER — FUROSEMIDE 20 MG/1
20 TABLET ORAL EVERY OTHER DAY
Qty: 30 TABLET | Refills: 0 | Status: SHIPPED | OUTPATIENT
Start: 2023-05-24

## 2023-05-24 RX ORDER — PANTOPRAZOLE SODIUM 40 MG/1
40 TABLET, DELAYED RELEASE ORAL DAILY
Qty: 30 TABLET | Refills: 0 | Status: SHIPPED | OUTPATIENT
Start: 2023-05-24

## 2023-06-01 ENCOUNTER — PATIENT OUTREACH (OUTPATIENT)
Dept: FAMILY MEDICINE CLINIC | Facility: CLINIC | Age: 68
End: 2023-06-01

## 2023-06-01 NOTE — PROGRESS NOTES
"Outpatient Care Management Note:  Follow up call placed to Usha King  She verbalizes feeling generalized malaise and aches  Also verbalized more frequent episodes of SOB requiring her to use her inhaler more frequently  Denies any fevers but does disclose an oral abscess for the past week  She has not called her PCP regarding these symptoms  Instructed her to do so prior to her appointment on 6/7/2023  States she will call today  Usha King is weighing herself daily and her weight has been stable  She is also monitoring her BP daily  She is not checking her blood glucose levels  Discussed importance of same  States she has a new machine and she has not \"figured out\" how to use it  Usha King lives with her daughter and states she is available to assist with setting the unit up  Advised her to ask her daughter to do so  States she is taking her DM medications as ordered  Denies any other needs at this time  Encouraged to call with any questions or concerns  In basket message sent to PCP and clinical staff to make them aware of above     "

## 2023-06-02 NOTE — TELEPHONE ENCOUNTER
Okay to switch to basal are Azathioprine Counseling:  I discussed with the patient the risks of azathioprine including but not limited to myelosuppression, immunosuppression, hepatotoxicity, lymphoma, and infections.  The patient understands that monitoring is required including baseline LFTs, Creatinine, possible TPMP genotyping and weekly CBCs for the first month and then every 2 weeks thereafter.  The patient verbalized understanding of the proper use and possible adverse effects of azathioprine.  All of the patient's questions and concerns were addressed.

## 2023-06-06 ENCOUNTER — PATIENT OUTREACH (OUTPATIENT)
Dept: FAMILY MEDICINE CLINIC | Facility: CLINIC | Age: 68
End: 2023-06-06

## 2023-06-06 NOTE — PROGRESS NOTES
Outpatient Care Management Note:  Call attempted to [facility] to remind her of her PCP appointment tomorrow  Voice mail not set up  Unable to leave message  Call placed to [facility] was approved for fixed route as well as shared ride  She has not scheduled transportation for her PCP appointment tomorrow  Call placed to [facility]  Spoke with Dianna Chambers who states that [place] has not returned her paperwork for [address] [place] [address]  If the assessment was completed more than 30 days ago, a new assessment will need to be completed

## 2023-06-07 ENCOUNTER — OFFICE VISIT (OUTPATIENT)
Dept: FAMILY MEDICINE CLINIC | Facility: CLINIC | Age: 68
End: 2023-06-07
Payer: COMMERCIAL

## 2023-06-07 ENCOUNTER — LAB (OUTPATIENT)
Dept: LAB | Facility: MEDICAL CENTER | Age: 68
End: 2023-06-07
Payer: COMMERCIAL

## 2023-06-07 VITALS
OXYGEN SATURATION: 92 % | TEMPERATURE: 97.9 F | DIASTOLIC BLOOD PRESSURE: 78 MMHG | HEART RATE: 56 BPM | WEIGHT: 215.6 LBS | SYSTOLIC BLOOD PRESSURE: 162 MMHG | BODY MASS INDEX: 35.92 KG/M2 | HEIGHT: 65 IN

## 2023-06-07 DIAGNOSIS — E05.00 GRAVES DISEASE: ICD-10-CM

## 2023-06-07 DIAGNOSIS — E11.21 TYPE 2 DIABETES MELLITUS WITH DIABETIC NEPHROPATHY, WITH LONG-TERM CURRENT USE OF INSULIN (HCC): Primary | ICD-10-CM

## 2023-06-07 DIAGNOSIS — E11.3513 TYPE 2 DIABETES MELLITUS WITH BOTH EYES AFFECTED BY PROLIFERATIVE RETINOPATHY AND MACULAR EDEMA, WITHOUT LONG-TERM CURRENT USE OF INSULIN (HCC): ICD-10-CM

## 2023-06-07 DIAGNOSIS — I10 ESSENTIAL HYPERTENSION: ICD-10-CM

## 2023-06-07 DIAGNOSIS — I25.118 CORONARY ARTERY DISEASE OF NATIVE ARTERY OF NATIVE HEART WITH STABLE ANGINA PECTORIS (HCC): ICD-10-CM

## 2023-06-07 DIAGNOSIS — Q01.9: ICD-10-CM

## 2023-06-07 DIAGNOSIS — E11.22 TYPE 2 DIABETES MELLITUS WITH STAGE 3B CHRONIC KIDNEY DISEASE, UNSPECIFIED WHETHER LONG TERM INSULIN USE (HCC): ICD-10-CM

## 2023-06-07 DIAGNOSIS — Z79.4 TYPE 2 DIABETES MELLITUS TREATED WITH INSULIN (HCC): ICD-10-CM

## 2023-06-07 DIAGNOSIS — I50.33 ACUTE ON CHRONIC DIASTOLIC HEART FAILURE (HCC): ICD-10-CM

## 2023-06-07 DIAGNOSIS — Z79.4 TYPE 2 DIABETES MELLITUS WITH DIABETIC NEPHROPATHY, WITH LONG-TERM CURRENT USE OF INSULIN (HCC): Primary | ICD-10-CM

## 2023-06-07 DIAGNOSIS — J45.40 MODERATE PERSISTENT ASTHMA WITHOUT COMPLICATION: ICD-10-CM

## 2023-06-07 DIAGNOSIS — E11.65 TYPE 2 DIABETES MELLITUS WITH HYPERGLYCEMIA, WITHOUT LONG-TERM CURRENT USE OF INSULIN (HCC): ICD-10-CM

## 2023-06-07 DIAGNOSIS — E11.9 TYPE 2 DIABETES MELLITUS TREATED WITH INSULIN (HCC): ICD-10-CM

## 2023-06-07 DIAGNOSIS — Z79.4 TYPE 2 DIABETES MELLITUS WITH DIABETIC NEPHROPATHY, WITH LONG-TERM CURRENT USE OF INSULIN (HCC): ICD-10-CM

## 2023-06-07 DIAGNOSIS — N17.0 ACUTE KIDNEY INJURY (AKI) WITH ACUTE TUBULAR NECROSIS (ATN) (HCC): ICD-10-CM

## 2023-06-07 DIAGNOSIS — E11.21 TYPE 2 DIABETES MELLITUS WITH DIABETIC NEPHROPATHY, WITH LONG-TERM CURRENT USE OF INSULIN (HCC): ICD-10-CM

## 2023-06-07 DIAGNOSIS — F33.1 MODERATE EPISODE OF RECURRENT MAJOR DEPRESSIVE DISORDER (HCC): ICD-10-CM

## 2023-06-07 DIAGNOSIS — E04.2 MULTINODULAR GOITER: ICD-10-CM

## 2023-06-07 DIAGNOSIS — E66.01 OBESITY, MORBID (HCC): ICD-10-CM

## 2023-06-07 DIAGNOSIS — N18.32 TYPE 2 DIABETES MELLITUS WITH STAGE 3B CHRONIC KIDNEY DISEASE, UNSPECIFIED WHETHER LONG TERM INSULIN USE (HCC): ICD-10-CM

## 2023-06-07 DIAGNOSIS — G70.00 MYASTHENIA GRAVIS (HCC): ICD-10-CM

## 2023-06-07 LAB
ANION GAP SERPL CALCULATED.3IONS-SCNC: 1 MMOL/L (ref 4–13)
BUN SERPL-MCNC: 14 MG/DL (ref 5–25)
CALCIUM SERPL-MCNC: 9.1 MG/DL (ref 8.3–10.1)
CHLORIDE SERPL-SCNC: 111 MMOL/L (ref 96–108)
CHOLEST SERPL-MCNC: 250 MG/DL
CO2 SERPL-SCNC: 29 MMOL/L (ref 21–32)
CREAT SERPL-MCNC: 1.51 MG/DL (ref 0.6–1.3)
CREAT UR-MCNC: <13 MG/DL
EST. AVERAGE GLUCOSE BLD GHB EST-MCNC: 169 MG/DL
GFR SERPL CREATININE-BSD FRML MDRD: 35 ML/MIN/1.73SQ M
GLUCOSE P FAST SERPL-MCNC: 135 MG/DL (ref 65–99)
HBA1C MFR BLD: 7.5 %
HDLC SERPL-MCNC: 46 MG/DL
LDLC SERPL CALC-MCNC: 165 MG/DL (ref 0–100)
MICROALBUMIN UR-MCNC: 562 MG/L (ref 0–20)
MICROALBUMIN/CREAT 24H UR: >4323 MG/G CREATININE (ref 0–30)
NONHDLC SERPL-MCNC: 204 MG/DL
POTASSIUM SERPL-SCNC: 3.9 MMOL/L (ref 3.5–5.3)
SL AMB POCT HEMOGLOBIN AIC: 8.1 (ref ?–6.5)
SODIUM SERPL-SCNC: 141 MMOL/L (ref 135–147)
T4 FREE SERPL-MCNC: 0.82 NG/DL (ref 0.61–1.12)
TRIGL SERPL-MCNC: 197 MG/DL
TSH SERPL DL<=0.05 MIU/L-ACNC: 1.26 UIU/ML (ref 0.45–4.5)

## 2023-06-07 PROCEDURE — 80061 LIPID PANEL: CPT | Performed by: FAMILY MEDICINE

## 2023-06-07 PROCEDURE — 84480 ASSAY TRIIODOTHYRONINE (T3): CPT

## 2023-06-07 PROCEDURE — 36415 COLL VENOUS BLD VENIPUNCTURE: CPT | Performed by: FAMILY MEDICINE

## 2023-06-07 PROCEDURE — 83036 HEMOGLOBIN GLYCOSYLATED A1C: CPT

## 2023-06-07 PROCEDURE — 82570 ASSAY OF URINE CREATININE: CPT | Performed by: FAMILY MEDICINE

## 2023-06-07 PROCEDURE — 99215 OFFICE O/P EST HI 40 MIN: CPT | Performed by: FAMILY MEDICINE

## 2023-06-07 PROCEDURE — 83036 HEMOGLOBIN GLYCOSYLATED A1C: CPT | Performed by: FAMILY MEDICINE

## 2023-06-07 PROCEDURE — 84439 ASSAY OF FREE THYROXINE: CPT

## 2023-06-07 PROCEDURE — 80048 BASIC METABOLIC PNL TOTAL CA: CPT

## 2023-06-07 PROCEDURE — 84443 ASSAY THYROID STIM HORMONE: CPT

## 2023-06-07 PROCEDURE — 82043 UR ALBUMIN QUANTITATIVE: CPT | Performed by: FAMILY MEDICINE

## 2023-06-07 RX ORDER — CLINDAMYCIN HYDROCHLORIDE 300 MG/1
CAPSULE ORAL
COMMUNITY
Start: 2023-06-03 | End: 2023-06-24

## 2023-06-07 NOTE — PROGRESS NOTES
Assessment/Plan:    Problem List Items Addressed This Visit        Endocrine    Type 2 diabetes mellitus with hyperglycemia, without long-term current use of insulin (HCC)       Respiratory    Moderate persistent asthma without complication       Cardiovascular and Mediastinum    Essential hypertension    Coronary artery disease involving native coronary artery of native heart       Genitourinary    Acute kidney injury (PADMINI) with chronic kidney disease stage III   Other Visit Diagnoses     Type 2 diabetes mellitus with diabetic nephropathy, with long-term current use of insulin ([address] )    -  Primary    Relevant Orders    POCT hemoglobin A1c (Completed)    Graves disease               Diagnoses and all orders for this visit:    Type 2 diabetes mellitus with diabetic nephropathy, with long-term current use of insulin (HCC)  -     POCT hemoglobin A1c    Type 2 diabetes mellitus with hyperglycemia, without long-term current use of insulin (HCC)    Moderate persistent asthma without complication    Coronary artery disease of native artery of native heart with stable angina pectoris (HCC)    Essential hypertension    Acute kidney injury (PADMINI) with chronic kidney disease stage III    Graves disease        No problem-specific Assessment & Plan notes found for this encounter  Subjective:      Patient ID: Mary Clinton is a 79 y o  female      Recent hospitalization in March in [place] for a stent for a acute occlusion of a coronary artery patient subsequently was readmitted then to [address] with dye-induced nephropathy prior to discharge her kidney function was beginning to normalize patient here today for a visit needs current lab work to assess her kidney function A1c was 8 6 she has been trying to follow a diet and actually her daughter is managing her diet      The following portions of the patient's history were reviewed and updated as appropriate:   She has a past medical history of Abnormal ECG, Abnormal mammogram, Abnormal stress test, Anemia, Anxiety, Arthritis, Asthma, Back problem, Breast cyst, CAD (coronary artery disease), Chest pain, Chronic pain disorder, Cyst of left breast, Depression, Depression, Diabetes mellitus ([facility] ), Hiatal hernia, Hyperlipidemia, Hypertension, Keloid of skin, Multiple thyroid nodules, Neuropathy, Psychiatric disorder, Stroke ([facility] ), and Tuberculosis  ,  does not have any pertinent problems on file  ,   has a past surgical history that includes Cyst Removal; Eye surgery; Tubal ligation; Arm wound repair / closure; pr esophagogastroduodenoscopy transoral diagnostic (N/A, 01/05/2018); Cataract extraction (Bilateral); US guided thyroid biopsy (04/15/2019); pr ndsc wrst surg w/rls transvrs carpl ligm (Left, 04/19/2019); Carpal tunnel release; Carpal tunnel release (Left); FL lumbar puncture diagnostic (08/15/2019); Colonoscopy; and Coronary angioplasty with stent (03/01/2023)  ,  family history includes Arthritis in her mother, paternal grandmother, and sister; Cancer in her family; Coronary artery disease in her family; Diabetes in her mother; MONE disease in her mother; Glaucoma in her family; Heart attack in her maternal uncle and paternal grandmother; Heart disease in her mother; Hypertension in her father and mother; Kidney disease in her brother and father; Liver cancer in her maternal aunt; No Known Problems in her daughter, daughter, daughter, daughter, maternal aunt, maternal aunt, maternal grandfather, paternal aunt, and paternal grandfather; Rheum arthritis in her family; Stomach cancer in her father and maternal grandmother; Stroke in her maternal aunt and maternal grandmother  ,   reports that she has never smoked  She has never used smokeless tobacco  She reports that she does not currently use alcohol  She reports that she does not use drugs  ,  is allergic to bactrim [sulfamethoxazole-trimethoprim], lisinopril, vicodin [hydrocodone-acetaminophen], hydrocodone-acetaminophen, and oxycodone-acetaminophen     Current Outpatient Medications   Medication Sig Dispense Refill   • albuterol (PROVENTIL HFA,VENTOLIN HFA) 90 mcg/act inhaler Inhale 2 puffs every 6 (six) hours as needed for wheezing or shortness of breath 54 g 1   • Alcohol Swabs (DropSafe Alcohol Prep) 70 % PADS Use one pad twice daily when check blood sugar 200 each 2   • aspirin (ECOTRIN LOW STRENGTH) 81 mg EC tablet Take 1 tablet (81 mg total) by mouth daily 30 tablet 3   • atorvastatin (LIPITOR) 80 mg tablet Take 1 tablet (80 mg total) by mouth daily with dinner 90 tablet 1   • Blood Glucose Monitoring Suppl (True Metrix Air Glucose Meter) w/Device KIT USE AS DIRECTED 1 kit 0   • clindamycin (CLEOCIN) 300 MG capsule take 1 capsule by mouth every 6 hours for 7 days     • dapagliflozin (Farxiga) 10 MG tablet Take 1 tablet (10 mg total) by mouth in the morning 1/2 tablet daily for the 1st month then increase to a full tablet daily in the morning 30 tablet 4   • ezetimibe (ZETIA) 10 mg tablet Take 1 tablet (10 mg total) by mouth daily at bedtime  0   • furosemide (LASIX) 20 mg tablet Take 1 tablet (20 mg total) by mouth every other day 30 tablet 0   • gabapentin (NEURONTIN) 300 mg capsule Take 300 mg by mouth 3 (three) times a day     • isosorbide mononitrate (IMDUR) 60 mg 24 hr tablet Take 1 tablet (60 mg total) by mouth daily 30 tablet 0   • ofloxacin (OCUFLOX) 0 3 % ophthalmic solution      • pantoprazole (PROTONIX) 40 mg tablet Take 1 tablet (40 mg total) by mouth daily 30 tablet 0   • polyethylene glycol (GLYCOLAX) 17 GM/SCOOP powder Take 17 g by mouth daily Increase to twice daily if still constiptated 578 g 3   • potassium chloride (Klor-Con) 10 mEq tablet Take 1 tablet (10 mEq total) by mouth every other day 90 tablet 0   • sitaGLIPtin (Januvia) 100 mg tablet Take 1 tablet (100 mg total) by mouth daily 30 tablet 0   • ticagrelor (BRILINTA) 90 MG Take 1 tablet (90 mg total) by mouth every 12 (twelve) hours  0   • ticagrelor (BRILINTA) 90 MG Take 90 mg by mouth     • traZODone (DESYREL) 50 mg tablet Take 1 tablet (50 mg total) by mouth daily at bedtime for 20 days 20 tablet 0   • TRUEplus Lancets 33G MISC Use 2 (two) times a day 200 each 2   • glucose blood (OneTouch Verio) test strip Check blood sugars twice daily  Please substitute with appropriate alternative as covered by patient's insurance  Dx: E11 65 (Patient not taking: Reported on 6/7/2023) 200 each 3     No current facility-administered medications for this visit  Review of Systems   Constitutional: Negative for activity change, appetite change, diaphoresis, fatigue and fever  HENT: Negative  Negative for dental problem  Eyes: Positive for visual disturbance  Respiratory: Negative for apnea, cough, chest tightness, shortness of breath and wheezing  Cardiovascular: Negative for chest pain, palpitations and leg swelling  Recent coronary stent placed   Gastrointestinal: Negative for abdominal distention, abdominal pain, anal bleeding, constipation, diarrhea, nausea and vomiting  Endocrine: Negative for cold intolerance, heat intolerance, polydipsia, polyphagia and polyuria  Genitourinary: Negative for difficulty urinating, dysuria, flank pain, hematuria and urgency  Musculoskeletal: Positive for arthralgias  Negative for back pain, gait problem, joint swelling and myalgias  Skin: Negative for color change, rash and wound  Allergic/Immunologic: Negative for environmental allergies, food allergies and immunocompromised state  Neurological: Negative for dizziness, seizures, syncope, speech difficulty, numbness and headaches  Hematological: Negative for adenopathy  Does not bruise/bleed easily  Psychiatric/Behavioral: Negative for agitation, behavioral problems, hallucinations, sleep disturbance and suicidal ideas           Objective:  Vitals:    06/07/23 0951   BP: 162/78   BP Location: Left arm   Patient "Position: Sitting   Cuff Size: Standard   Pulse: 56   Temp: 97 9 °F (36 6 °C)   TempSrc: Temporal   SpO2: 92%   Weight: 97 8 kg (215 lb 9 6 oz)   Height: 5' 5\" (1 651 m)     Body mass index is 35 88 kg/m²  Physical Exam  Constitutional:       General: She is not in acute distress  Appearance: She is well-developed  She is not diaphoretic  HENT:      Head: Normocephalic  Right Ear: External ear normal       Left Ear: External ear normal       Nose: Nose normal    Eyes:      General: No scleral icterus  Right eye: No discharge  Left eye: No discharge  Conjunctiva/sclera: Conjunctivae normal       Pupils: Pupils are equal, round, and reactive to light  Neck:      Thyroid: No thyromegaly  Trachea: No tracheal deviation  Cardiovascular:      Rate and Rhythm: Normal rate and regular rhythm  Pulses: no weak pulses          Dorsalis pedis pulses are 2+ on the right side and 2+ on the left side  Posterior tibial pulses are 2+ on the right side and 2+ on the left side  Heart sounds: Normal heart sounds  No murmur heard  No friction rub  No gallop  Pulmonary:      Effort: Pulmonary effort is normal  No respiratory distress  Breath sounds: Normal breath sounds  No wheezing  Abdominal:      General: Bowel sounds are normal       Palpations: Abdomen is soft  There is no mass  Tenderness: There is no abdominal tenderness  There is no guarding  Musculoskeletal:         General: No deformity  Cervical back: Normal range of motion  Feet:      Right foot:      Skin integrity: Callus present  No ulcer, skin breakdown, erythema, warmth or dry skin  Left foot:      Skin integrity: Callus present  No ulcer, skin breakdown, erythema, warmth or dry skin  Lymphadenopathy:      Cervical: No cervical adenopathy  Skin:     General: Skin is warm and dry  Findings: No erythema or rash     Neurological:      Mental Status: She is alert and oriented " to person, place, and time  Cranial Nerves: No cranial nerve deficit  Psychiatric:         Thought Content: Thought content normal        Patient's shoes and socks removed  Right Foot/Ankle   Right Foot Inspection  Skin Exam: skin normal, skin intact, callus and callus  No dry skin, no warmth, no erythema, no maceration, no abnormal color, no pre-ulcer and no ulcer  Toe Exam: ROM and strength within normal limits  Sensory   Vibration: intact  Proprioception: intact  Monofilament testing: intact    Vascular  Capillary refills: < 3 seconds  The right DP pulse is 2+  The right PT pulse is 2+  Left Foot/Ankle  Left Foot Inspection  Skin Exam: skin normal, skin intact and callus  No dry skin, no warmth, no erythema, no maceration, normal color, no pre-ulcer and no ulcer  Toe Exam: ROM and strength within normal limits and erythema  Sensory   Vibration: intact  Proprioception: intact  Monofilament testing: intact    Vascular  Capillary refills: < 3 seconds  The left DP pulse is 2+  The left PT pulse is 2+       Assign Risk Category  No deformity present  No loss of protective sensation  No weak pulses  Risk: 0

## 2023-06-08 ENCOUNTER — PATIENT OUTREACH (OUTPATIENT)
Dept: FAMILY MEDICINE CLINIC | Facility: CLINIC | Age: 68
End: 2023-06-08

## 2023-06-08 DIAGNOSIS — E11.3513 TYPE 2 DIABETES MELLITUS WITH BOTH EYES AFFECTED BY PROLIFERATIVE RETINOPATHY AND MACULAR EDEMA, WITHOUT LONG-TERM CURRENT USE OF INSULIN (HCC): Primary | ICD-10-CM

## 2023-06-08 DIAGNOSIS — E11.65 TYPE 2 DIABETES MELLITUS WITH HYPERGLYCEMIA, WITHOUT LONG-TERM CURRENT USE OF INSULIN (HCC): Primary | ICD-10-CM

## 2023-06-08 LAB — MISCELLANEOUS LAB TEST RESULT: NORMAL

## 2023-06-08 NOTE — PROGRESS NOTES
[facility] attempted to contact patient for a status update on Aging contacting her to have her daughter become her paid caregiver  No answer, voicemail box is full, unable to leave a message  There was an option to send an SMS message so one was sent with this writer's call back number  [facility] contacted [address] [place] [address] Aging office to determine if an assessment was completed  The representative states there is no  assigned to a case for Kylee  He is forwarding the information to a supervisor and requested a call back to this writer to discuss  Will attempt to outreach again in two weeks or after the call back from Aging

## 2023-06-09 ENCOUNTER — TELEPHONE (OUTPATIENT)
Dept: ENDOCRINOLOGY | Facility: CLINIC | Age: 68
End: 2023-06-09

## 2023-06-12 ENCOUNTER — TELEPHONE (OUTPATIENT)
Dept: FAMILY MEDICINE CLINIC | Facility: CLINIC | Age: 68
End: 2023-06-12

## 2023-06-12 DIAGNOSIS — B37.31 VAGINAL MONILIASIS: Primary | ICD-10-CM

## 2023-06-12 RX ORDER — FLUCONAZOLE 150 MG/1
150 TABLET ORAL ONCE
Qty: 1 TABLET | Refills: 0 | Status: SHIPPED | OUTPATIENT
Start: 2023-06-12 | End: 2023-06-12

## 2023-06-16 ENCOUNTER — PATIENT OUTREACH (OUTPATIENT)
Dept: FAMILY MEDICINE CLINIC | Facility: CLINIC | Age: 68
End: 2023-06-16

## 2023-06-16 DIAGNOSIS — B37.31 VAGINAL MONILIASIS: Primary | ICD-10-CM

## 2023-06-16 RX ORDER — FLUCONAZOLE 150 MG/1
150 TABLET ORAL ONCE
COMMUNITY
End: 2023-06-16 | Stop reason: SDUPTHER

## 2023-06-16 RX ORDER — FLUCONAZOLE 150 MG/1
150 TABLET ORAL ONCE
Qty: 1 TABLET | Refills: 1 | Status: SHIPPED | OUTPATIENT
Start: 2023-06-16 | End: 2023-06-16

## 2023-06-16 NOTE — PROGRESS NOTES
Pt called for follow up for care management  Message left with call back information  Tres Goddard RN CM on care team for future outreach

## 2023-06-22 ENCOUNTER — PATIENT OUTREACH (OUTPATIENT)
Dept: FAMILY MEDICINE CLINIC | Facility: CLINIC | Age: 68
End: 2023-06-22

## 2023-06-22 NOTE — PROGRESS NOTES
[facility] contacted patient for a status update  John Azul states that she did not receive a call back from Aging  This writer suspects John Azul is missing/not returning the calls  A 3-way call was completed with John Azul and [employer]  The representative took Erin's information and stated she should receive a call back from  [facility]  He was currently working on another case but she believed he would be available shortly  Call ended  [facility] left message with Farrel Lunch to check if application has been returned  [facility] also attempted to contact patient's daughter, Zabrina Hackett, to discuss all of the above information as she is attempting to become Erin's caregiver  Number not in service  Will outreach John Azul again next week for a status update and see if Zabrina Hackett is available or if there is updated contact information to reach her

## 2023-06-22 NOTE — PROGRESS NOTES
LATRICE received a call back from [facility] at [address]  She explains that Michelle Zhang is no longer registered for [address] [place] [address] since the application was not returned  Another phone call will need to be made to enter her information into the system and an application will need to be completed and returned in order for Michelle Zhang to utilize their services  KATHERINE will complete this task with Michelle Vicente on next outreach

## 2023-06-23 ENCOUNTER — PATIENT OUTREACH (OUTPATIENT)
Dept: FAMILY MEDICINE CLINIC | Facility: CLINIC | Age: 68
End: 2023-06-23

## 2023-06-23 ENCOUNTER — HOSPITAL ENCOUNTER (OUTPATIENT)
Facility: HOSPITAL | Age: 68
Setting detail: OBSERVATION
Discharge: HOME | End: 2023-06-24
Attending: EMERGENCY MEDICINE | Admitting: INTERNAL MEDICINE
Payer: COMMERCIAL

## 2023-06-23 ENCOUNTER — APPOINTMENT (EMERGENCY)
Dept: RADIOLOGY | Facility: HOSPITAL | Age: 68
End: 2023-06-23
Payer: COMMERCIAL

## 2023-06-23 ENCOUNTER — APPOINTMENT (EMERGENCY)
Dept: CT IMAGING | Facility: HOSPITAL | Age: 68
End: 2023-06-23
Payer: COMMERCIAL

## 2023-06-23 ENCOUNTER — OFFICE VISIT (OUTPATIENT)
Dept: NEPHROLOGY | Facility: CLINIC | Age: 68
End: 2023-06-23
Payer: COMMERCIAL

## 2023-06-23 ENCOUNTER — TELEPHONE (OUTPATIENT)
Dept: NEPHROLOGY | Facility: CLINIC | Age: 68
End: 2023-06-23

## 2023-06-23 VITALS
HEIGHT: 65 IN | SYSTOLIC BLOOD PRESSURE: 190 MMHG | OXYGEN SATURATION: 97 % | BODY MASS INDEX: 34.92 KG/M2 | WEIGHT: 209.6 LBS | DIASTOLIC BLOOD PRESSURE: 90 MMHG | HEART RATE: 60 BPM

## 2023-06-23 DIAGNOSIS — R80.9 NEPHROTIC RANGE PROTEINURIA: ICD-10-CM

## 2023-06-23 DIAGNOSIS — I16.0 HYPERTENSIVE URGENCY: Primary | ICD-10-CM

## 2023-06-23 DIAGNOSIS — N17.0 ACUTE KIDNEY INJURY (AKI) WITH ACUTE TUBULAR NECROSIS (ATN) (HCC): ICD-10-CM

## 2023-06-23 DIAGNOSIS — N18.30 STAGE 3 CHRONIC KIDNEY DISEASE, UNSPECIFIED WHETHER STAGE 3A OR 3B CKD (HCC): ICD-10-CM

## 2023-06-23 DIAGNOSIS — E11.3513 TYPE 2 DIABETES MELLITUS WITH BOTH EYES AFFECTED BY PROLIFERATIVE RETINOPATHY AND MACULAR EDEMA, WITHOUT LONG-TERM CURRENT USE OF INSULIN (HCC): ICD-10-CM

## 2023-06-23 DIAGNOSIS — E83.42 HYPOMAGNESEMIA: ICD-10-CM

## 2023-06-23 DIAGNOSIS — R06.02 SHORTNESS OF BREATH: ICD-10-CM

## 2023-06-23 DIAGNOSIS — F41.9 ANXIETY: ICD-10-CM

## 2023-06-23 DIAGNOSIS — N18.9 CKD (CHRONIC KIDNEY DISEASE): ICD-10-CM

## 2023-06-23 DIAGNOSIS — E11.21 DIABETIC NEPHROPATHY ASSOCIATED WITH TYPE 2 DIABETES MELLITUS (HCC): ICD-10-CM

## 2023-06-23 DIAGNOSIS — R51.9 HEADACHE: ICD-10-CM

## 2023-06-23 DIAGNOSIS — I10 SEVERE HYPERTENSION: Primary | ICD-10-CM

## 2023-06-23 PROBLEM — I50.9 CHRONIC HEART FAILURE (HCC): Status: ACTIVE | Noted: 2020-04-14

## 2023-06-23 LAB
2HR DELTA HS TROPONIN: 0 NG/L
4HR DELTA HS TROPONIN: 1 NG/L
ALBUMIN SERPL BCP-MCNC: 2.6 G/DL (ref 3.5–5)
ALP SERPL-CCNC: 60 U/L (ref 34–104)
ALT SERPL W P-5'-P-CCNC: 7 U/L (ref 7–52)
ANION GAP SERPL CALCULATED.3IONS-SCNC: 11 MMOL/L
ANION GAP SERPL CALCULATED.3IONS-SCNC: 8 MMOL/L
APTT PPP: 22 SECONDS (ref 23–37)
AST SERPL W P-5'-P-CCNC: 16 U/L (ref 13–39)
ATRIAL RATE: 54 BPM
BASOPHILS # BLD AUTO: 0.03 THOUSANDS/ÂΜL (ref 0–0.1)
BASOPHILS NFR BLD AUTO: 1 % (ref 0–1)
BILIRUB SERPL-MCNC: 0.25 MG/DL (ref 0.2–1)
BNP SERPL-MCNC: 88 PG/ML (ref 0–100)
BUN SERPL-MCNC: 18 MG/DL (ref 5–25)
BUN SERPL-MCNC: 23 MG/DL (ref 5–25)
CALCIUM ALBUM COR SERPL-MCNC: 7.5 MG/DL (ref 8.3–10.1)
CALCIUM SERPL-MCNC: 6.4 MG/DL (ref 8.4–10.2)
CALCIUM SERPL-MCNC: 9.4 MG/DL (ref 8.4–10.2)
CARDIAC TROPONIN I PNL SERPL HS: 7 NG/L
CARDIAC TROPONIN I PNL SERPL HS: 7 NG/L
CARDIAC TROPONIN I PNL SERPL HS: 8 NG/L
CHLORIDE SERPL-SCNC: 105 MMOL/L (ref 96–108)
CHLORIDE SERPL-SCNC: 117 MMOL/L (ref 96–108)
CO2 SERPL-SCNC: 17 MMOL/L (ref 21–32)
CO2 SERPL-SCNC: 24 MMOL/L (ref 21–32)
CREAT SERPL-MCNC: 0.86 MG/DL (ref 0.6–1.3)
CREAT SERPL-MCNC: 1.33 MG/DL (ref 0.6–1.3)
EOSINOPHIL # BLD AUTO: 0.21 THOUSAND/ÂΜL (ref 0–0.61)
EOSINOPHIL NFR BLD AUTO: 3 % (ref 0–6)
ERYTHROCYTE [DISTWIDTH] IN BLOOD BY AUTOMATED COUNT: 15.2 % (ref 11.6–15.1)
GFR SERPL CREATININE-BSD FRML MDRD: 41 ML/MIN/1.73SQ M
GFR SERPL CREATININE-BSD FRML MDRD: 70 ML/MIN/1.73SQ M
GLUCOSE SERPL-MCNC: 121 MG/DL (ref 65–140)
GLUCOSE SERPL-MCNC: 146 MG/DL (ref 65–140)
GLUCOSE SERPL-MCNC: 170 MG/DL (ref 65–140)
GLUCOSE SERPL-MCNC: 177 MG/DL (ref 65–140)
GLUCOSE SERPL-MCNC: 99 MG/DL (ref 65–140)
HCT VFR BLD AUTO: 42.5 % (ref 34.8–46.1)
HGB BLD-MCNC: 13.4 G/DL (ref 11.5–15.4)
IMM GRANULOCYTES # BLD AUTO: 0.01 THOUSAND/UL (ref 0–0.2)
IMM GRANULOCYTES NFR BLD AUTO: 0 % (ref 0–2)
INR PPP: 0.95 (ref 0.84–1.19)
LYMPHOCYTES # BLD AUTO: 2.12 THOUSANDS/ÂΜL (ref 0.6–4.47)
LYMPHOCYTES NFR BLD AUTO: 35 % (ref 14–44)
MAGNESIUM SERPL-MCNC: 1.5 MG/DL (ref 1.9–2.7)
MCH RBC QN AUTO: 27.2 PG (ref 26.8–34.3)
MCHC RBC AUTO-ENTMCNC: 31.5 G/DL (ref 31.4–37.4)
MCV RBC AUTO: 86 FL (ref 82–98)
MONOCYTES # BLD AUTO: 0.66 THOUSAND/ÂΜL (ref 0.17–1.22)
MONOCYTES NFR BLD AUTO: 11 % (ref 4–12)
NEUTROPHILS # BLD AUTO: 3.07 THOUSANDS/ÂΜL (ref 1.85–7.62)
NEUTS SEG NFR BLD AUTO: 50 % (ref 43–75)
NRBC BLD AUTO-RTO: 0 /100 WBCS
P AXIS: 38 DEGREES
PLATELET # BLD AUTO: 234 THOUSANDS/UL (ref 149–390)
PMV BLD AUTO: 10.2 FL (ref 8.9–12.7)
POTASSIUM SERPL-SCNC: 3.1 MMOL/L (ref 3.5–5.3)
POTASSIUM SERPL-SCNC: 3.5 MMOL/L (ref 3.5–5.3)
PR INTERVAL: 208 MS
PROT SERPL-MCNC: 5.2 G/DL (ref 6.4–8.4)
PROTHROMBIN TIME: 12.9 SECONDS (ref 11.6–14.5)
QRS AXIS: -55 DEGREES
QRSD INTERVAL: 148 MS
QT INTERVAL: 494 MS
QTC INTERVAL: 468 MS
RBC # BLD AUTO: 4.93 MILLION/UL (ref 3.81–5.12)
SODIUM SERPL-SCNC: 140 MMOL/L (ref 135–147)
SODIUM SERPL-SCNC: 142 MMOL/L (ref 135–147)
T WAVE AXIS: -26 DEGREES
VENTRICULAR RATE: 54 BPM
WBC # BLD AUTO: 6.1 THOUSAND/UL (ref 4.31–10.16)

## 2023-06-23 PROCEDURE — 99223 1ST HOSP IP/OBS HIGH 75: CPT | Performed by: INTERNAL MEDICINE

## 2023-06-23 PROCEDURE — 96365 THER/PROPH/DIAG IV INF INIT: CPT

## 2023-06-23 PROCEDURE — 99213 OFFICE O/P EST LOW 20 MIN: CPT | Performed by: NURSE PRACTITIONER

## 2023-06-23 PROCEDURE — 85610 PROTHROMBIN TIME: CPT | Performed by: PHYSICIAN ASSISTANT

## 2023-06-23 PROCEDURE — 96376 TX/PRO/DX INJ SAME DRUG ADON: CPT

## 2023-06-23 PROCEDURE — 85025 COMPLETE CBC W/AUTO DIFF WBC: CPT | Performed by: PHYSICIAN ASSISTANT

## 2023-06-23 PROCEDURE — 83735 ASSAY OF MAGNESIUM: CPT | Performed by: PHYSICIAN ASSISTANT

## 2023-06-23 PROCEDURE — 93005 ELECTROCARDIOGRAM TRACING: CPT

## 2023-06-23 PROCEDURE — 71045 X-RAY EXAM CHEST 1 VIEW: CPT

## 2023-06-23 PROCEDURE — 83880 ASSAY OF NATRIURETIC PEPTIDE: CPT | Performed by: PHYSICIAN ASSISTANT

## 2023-06-23 PROCEDURE — 80053 COMPREHEN METABOLIC PANEL: CPT | Performed by: PHYSICIAN ASSISTANT

## 2023-06-23 PROCEDURE — G1004 CDSM NDSC: HCPCS

## 2023-06-23 PROCEDURE — 85730 THROMBOPLASTIN TIME PARTIAL: CPT | Performed by: PHYSICIAN ASSISTANT

## 2023-06-23 PROCEDURE — 82948 REAGENT STRIP/BLOOD GLUCOSE: CPT

## 2023-06-23 PROCEDURE — 80048 BASIC METABOLIC PNL TOTAL CA: CPT | Performed by: PHYSICIAN ASSISTANT

## 2023-06-23 PROCEDURE — 99285 EMERGENCY DEPT VISIT HI MDM: CPT

## 2023-06-23 PROCEDURE — 70450 CT HEAD/BRAIN W/O DYE: CPT

## 2023-06-23 PROCEDURE — 84484 ASSAY OF TROPONIN QUANT: CPT | Performed by: PHYSICIAN ASSISTANT

## 2023-06-23 PROCEDURE — 96375 TX/PRO/DX INJ NEW DRUG ADDON: CPT

## 2023-06-23 PROCEDURE — 36415 COLL VENOUS BLD VENIPUNCTURE: CPT | Performed by: PHYSICIAN ASSISTANT

## 2023-06-23 RX ORDER — FUROSEMIDE 20 MG/1
20 TABLET ORAL EVERY OTHER DAY
Status: DISCONTINUED | OUTPATIENT
Start: 2023-06-24 | End: 2023-06-24 | Stop reason: HOSPADM

## 2023-06-23 RX ORDER — ASPIRIN 81 MG/1
81 TABLET, CHEWABLE ORAL DAILY
Status: DISCONTINUED | OUTPATIENT
Start: 2023-06-23 | End: 2023-06-24 | Stop reason: HOSPADM

## 2023-06-23 RX ORDER — ALBUTEROL SULFATE 90 UG/1
2 AEROSOL, METERED RESPIRATORY (INHALATION) EVERY 6 HOURS PRN
Status: DISCONTINUED | OUTPATIENT
Start: 2023-06-23 | End: 2023-06-24 | Stop reason: HOSPADM

## 2023-06-23 RX ORDER — ATORVASTATIN CALCIUM 40 MG/1
80 TABLET, FILM COATED ORAL
Status: DISCONTINUED | OUTPATIENT
Start: 2023-06-23 | End: 2023-06-24 | Stop reason: HOSPADM

## 2023-06-23 RX ORDER — INSULIN LISPRO 100 [IU]/ML
1-5 INJECTION, SOLUTION INTRAVENOUS; SUBCUTANEOUS
Status: DISCONTINUED | OUTPATIENT
Start: 2023-06-23 | End: 2023-06-24 | Stop reason: HOSPADM

## 2023-06-23 RX ORDER — GABAPENTIN 300 MG/1
300 CAPSULE ORAL 3 TIMES DAILY
Status: DISCONTINUED | OUTPATIENT
Start: 2023-06-23 | End: 2023-06-24 | Stop reason: HOSPADM

## 2023-06-23 RX ORDER — ONDANSETRON 2 MG/ML
4 INJECTION INTRAMUSCULAR; INTRAVENOUS EVERY 6 HOURS PRN
Status: DISCONTINUED | OUTPATIENT
Start: 2023-06-23 | End: 2023-06-24 | Stop reason: HOSPADM

## 2023-06-23 RX ORDER — EZETIMIBE 10 MG/1
10 TABLET ORAL
Status: DISCONTINUED | OUTPATIENT
Start: 2023-06-23 | End: 2023-06-24 | Stop reason: HOSPADM

## 2023-06-23 RX ORDER — MAGNESIUM SULFATE HEPTAHYDRATE 40 MG/ML
2 INJECTION, SOLUTION INTRAVENOUS ONCE
Status: COMPLETED | OUTPATIENT
Start: 2023-06-23 | End: 2023-06-23

## 2023-06-23 RX ORDER — PANTOPRAZOLE SODIUM 40 MG/1
40 TABLET, DELAYED RELEASE ORAL DAILY
Status: DISCONTINUED | OUTPATIENT
Start: 2023-06-23 | End: 2023-06-24 | Stop reason: HOSPADM

## 2023-06-23 RX ORDER — HYDRALAZINE HYDROCHLORIDE 20 MG/ML
10 INJECTION INTRAMUSCULAR; INTRAVENOUS ONCE
Status: COMPLETED | OUTPATIENT
Start: 2023-06-23 | End: 2023-06-23

## 2023-06-23 RX ORDER — DOXAZOSIN MESYLATE 1 MG/1
2 TABLET ORAL DAILY
Status: DISCONTINUED | OUTPATIENT
Start: 2023-06-23 | End: 2023-06-24 | Stop reason: HOSPADM

## 2023-06-23 RX ORDER — HEPARIN SODIUM 5000 [USP'U]/ML
5000 INJECTION, SOLUTION INTRAVENOUS; SUBCUTANEOUS EVERY 8 HOURS SCHEDULED
Status: DISCONTINUED | OUTPATIENT
Start: 2023-06-24 | End: 2023-06-24 | Stop reason: HOSPADM

## 2023-06-23 RX ORDER — INSULIN LISPRO 100 [IU]/ML
1-6 INJECTION, SOLUTION INTRAVENOUS; SUBCUTANEOUS
Status: DISCONTINUED | OUTPATIENT
Start: 2023-06-23 | End: 2023-06-24 | Stop reason: HOSPADM

## 2023-06-23 RX ORDER — ISOSORBIDE MONONITRATE 60 MG/1
60 TABLET, EXTENDED RELEASE ORAL DAILY
Status: DISCONTINUED | OUTPATIENT
Start: 2023-06-23 | End: 2023-06-24 | Stop reason: HOSPADM

## 2023-06-23 RX ORDER — HYDRALAZINE HYDROCHLORIDE 20 MG/ML
10 INJECTION INTRAMUSCULAR; INTRAVENOUS EVERY 6 HOURS PRN
Status: DISCONTINUED | OUTPATIENT
Start: 2023-06-23 | End: 2023-06-24 | Stop reason: HOSPADM

## 2023-06-23 RX ORDER — FLUCONAZOLE 150 MG/1
TABLET ORAL
COMMUNITY
Start: 2023-06-16 | End: 2023-06-24

## 2023-06-23 RX ORDER — ONDANSETRON 2 MG/ML
4 INJECTION INTRAMUSCULAR; INTRAVENOUS ONCE
Status: COMPLETED | OUTPATIENT
Start: 2023-06-23 | End: 2023-06-23

## 2023-06-23 RX ADMIN — ATORVASTATIN CALCIUM 80 MG: 40 TABLET, FILM COATED ORAL at 21:32

## 2023-06-23 RX ADMIN — TICAGRELOR 90 MG: 90 TABLET ORAL at 21:34

## 2023-06-23 RX ADMIN — ONDANSETRON 4 MG: 2 INJECTION INTRAMUSCULAR; INTRAVENOUS at 11:13

## 2023-06-23 RX ADMIN — HYDRALAZINE HYDROCHLORIDE 10 MG: 20 INJECTION INTRAMUSCULAR; INTRAVENOUS at 10:10

## 2023-06-23 RX ADMIN — MAGNESIUM SULFATE HEPTAHYDRATE 2 G: 40 INJECTION, SOLUTION INTRAVENOUS at 10:11

## 2023-06-23 RX ADMIN — GABAPENTIN 300 MG: 300 CAPSULE ORAL at 21:33

## 2023-06-23 RX ADMIN — HYDRALAZINE HYDROCHLORIDE 10 MG: 20 INJECTION INTRAMUSCULAR; INTRAVENOUS at 11:13

## 2023-06-23 RX ADMIN — EZETIMIBE 10 MG: 10 TABLET ORAL at 21:33

## 2023-06-23 RX ADMIN — ONDANSETRON 4 MG: 2 INJECTION INTRAMUSCULAR; INTRAVENOUS at 12:35

## 2023-06-23 NOTE — PROGRESS NOTES
Nephrology   Office Follow-Up  George Sapp 79 y o  female MRN: [id number]    Encounter: [id number]        [address] was seen in the [place] office today  All diagnoses and orders for visit:     1  Severe hypertension  2  Shortness of breath  3  Anxiety  · Presents today for follow-up  Blood pressure when checked by me 200/100 mmHg left arm sitting and 180/100 mmHg right arm sitting  Patient complains of 8/10 posterior headache for at least 24 hours, shortness of breath with pressure and anxiety for 24 hours  For this reason, EMS activated to [address] [facility]  Receiving facility aware  Patient's daughter's contact information updated and daughter aware  4   Acute kidney injury  · Creatinine remains elevated above baseline at 1 5 mg/dL  Patient potentially remains in acute kidney injury versus suffering progression  As above, she will be evaluated in ER and likely have blood work drawn to evaluate kidney function  5   Stage III chronic kidney disease  · Documented baseline creatinine 1 1-1 2 mg/dL  Ultrasound was done during last hospitalization and negative for obstruction  She does have nephrotic range proteinuria  Etiology likely diabetic nephropathy and hypertensive nephrosclerosis however this is not biopsy-proven  Once stable, would recommend serological evaluation including ruling out monoclonal gammopathy  Patient is on SGLT2 inhibitor  She is not a candidate for ACE inhibitor due to lip swelling  Unclear if she is a candidate for ARB/finerenone  6  Nephrotic range proteinuria-As above  7  Suspected diabetic nephropathy  · A1c closer to 7 0%  Previously followed with endocrinology however last visit 2022  Recommend referring once stable  8  Hypertension in the setting of CKD  · See #1  Cannot have ACE due to lip swelling    Cannot have beta-blocker due to bifascicular block      HPI: George Sapp is a 79 y o  female who is here for hospital follow-up    Pertinent medical problems include stage III chronic kidney disease, type 2 diabetes mellitus with presumed diabetic nephropathy/retinopathy/neuropathy, MI status post PAUL March 2023, heart failure reduced EF 56%, hypertension, bifascicular block    Hospitalized at [facility] in April of this year for chest pain found to have acute kidney injury for which nephrology consulted attributed to EZEQUIEL plus volume shifts plus failure to autoregulate from ACE inhibitor  Patient developed lip swelling with lisinopril    Presents today for follow-up  Blood pressure when checked by me 200/100 mmHg left arm sitting and 180/100 mmHg right arm sitting  Patient complains of 8/10 posterior headache for at least 24 hours, shortness of breath with pressure and anxiety for 24 hours  For this reason, EMS activated to [address] [facility]  Receiving facility aware  Patient's daughter's contact information updated and daughter aware  ROS:   Review of Systems   Constitutional: Positive for fatigue  Negative for chills and fever  Posterior headache 8/10   HENT: Negative for ear pain and sore throat  Eyes: Negative for pain and visual disturbance  Respiratory: Positive for shortness of breath  Negative for cough  Cardiovascular: Negative for chest pain and palpitations  Gastrointestinal: Positive for abdominal pain  Negative for vomiting  Genitourinary: Negative for dysuria and hematuria  Musculoskeletal: Negative for arthralgias and back pain  Skin: Negative for color change and rash  Neurological: Positive for headaches  Negative for seizures and syncope  All other systems reviewed and are negative        Allergies: Bactrim [sulfamethoxazole-trimethoprim], Lisinopril, Vicodin [hydrocodone-acetaminophen], Hydrocodone-acetaminophen, and Oxycodone-acetaminophen    Medications:   Current Outpatient Medications:   •  albuterol (PROVENTIL HFA,VENTOLIN HFA) 90 mcg/act inhaler, Inhale 2 puffs every 6 (six) hours as needed for wheezing or shortness of breath, Disp: 54 g, Rfl: 1  •  Alcohol Swabs (DropSafe Alcohol Prep) 70 % PADS, Use one pad twice daily when check blood sugar, Disp: 200 each, Rfl: 2  •  aspirin (ECOTRIN LOW STRENGTH) 81 mg EC tablet, Take 1 tablet (81 mg total) by mouth daily, Disp: 30 tablet, Rfl: 3  •  Blood Glucose Monitoring Suppl (True Metrix Air Glucose Meter) w/Device KIT, USE AS DIRECTED, Disp: 1 kit, Rfl: 0  •  dapagliflozin (Farxiga) 10 MG tablet, Take 1 tablet (10 mg total) by mouth in the morning 1/2 tablet daily for the 1st month then increase to a full tablet daily in the morning, Disp: 30 tablet, Rfl: 4  •  ezetimibe (ZETIA) 10 mg tablet, Take 1 tablet (10 mg total) by mouth daily at bedtime, Disp: , Rfl: 0  •  furosemide (LASIX) 20 mg tablet, Take 1 tablet (20 mg total) by mouth every other day, Disp: 30 tablet, Rfl: 0  •  gabapentin (NEURONTIN) 300 mg capsule, Take 300 mg by mouth 3 (three) times a day, Disp: , Rfl:   •  isosorbide mononitrate (IMDUR) 60 mg 24 hr tablet, Take 1 tablet (60 mg total) by mouth daily, Disp: 30 tablet, Rfl: 0  •  ofloxacin (OCUFLOX) 0 3 % ophthalmic solution, , Disp: , Rfl:   •  pantoprazole (PROTONIX) 40 mg tablet, Take 1 tablet (40 mg total) by mouth daily, Disp: 30 tablet, Rfl: 0  •  polyethylene glycol (GLYCOLAX) 17 GM/SCOOP powder, Take 17 g by mouth daily Increase to twice daily if still constiptated, Disp: 578 g, Rfl: 3  •  potassium chloride (Klor-Con) 10 mEq tablet, Take 1 tablet (10 mEq total) by mouth every other day, Disp: 90 tablet, Rfl: 0  •  sitaGLIPtin (Januvia) 100 mg tablet, Take 1 tablet (100 mg total) by mouth daily, Disp: 30 tablet, Rfl: 0  •  ticagrelor (BRILINTA) 90 MG, Take 1 tablet (90 mg total) by mouth every 12 (twelve) hours, Disp: , Rfl: 0  •  TRUEplus Lancets 33G MISC, Use 2 (two) times a day, Disp: 200 each, Rfl: 2  •  atorvastatin (LIPITOR) 80 mg tablet, Take 1 tablet (80 mg total) by mouth daily with dinner (Patient not taking: Reported on 6/23/2023), Disp: 90 tablet, Rfl: 1  •  clindamycin (CLEOCIN) 300 MG capsule, take 1 capsule by mouth every 6 hours for 7 days (Patient not taking: Reported on 6/23/2023), Disp: , Rfl:   •  fluconazole (DIFLUCAN) 150 mg tablet, , Disp: , Rfl:   •  glucose blood (OneTouch Verio) test strip, Check blood sugars twice daily  Please substitute with appropriate alternative as covered by patient's insurance  Dx: E11 65 (Patient not taking: Reported on 6/7/2023), Disp: 200 each, Rfl: 3  •  ticagrelor (BRILINTA) 90 MG, Take 90 mg by mouth (Patient not taking: Reported on 6/23/2023), Disp: , Rfl:   •  traZODone (DESYREL) 50 mg tablet, Take 1 tablet (50 mg total) by mouth daily at bedtime for 20 days, Disp: 20 tablet, Rfl: 0    Past Medical History:   Diagnosis Date   • Abnormal ECG     last assessed: 5/15/17   • Abnormal mammogram     last assessed: 7/11/17   • Abnormal stress test     last assesed: 7/24/17   • Anemia    • Anxiety    • Arthritis    • Asthma    • Back problem    • Breast cyst    • CAD (coronary artery disease)    • Chest pain     last assessed: 7/24/17   • Chronic pain disorder    • Cyst of left breast     last assessed: 8/18/17   • Depression    • Depression     resolved: 1/2/18   • Diabetes mellitus ([facility] [address] )    • Hiatal hernia     last assessed: 11/7/17   • Hyperlipidemia    • Hypertension    • Keloid of skin     last assessed: 11/2/16   • Multiple thyroid nodules    • Neuropathy    • Psychiatric disorder     anxiety   • Stroke [facility])     TIA 2005   • Tuberculosis     as a child      Past Surgical History:   Procedure Laterality Date   • ARM WOUND REPAIR / CLOSURE     • CARPAL TUNNEL RELEASE     • CARPAL TUNNEL RELEASE Left    • CATARACT EXTRACTION Bilateral     2015   • COLONOSCOPY     • CORONARY ANGIOPLASTY WITH STENT PLACEMENT  03/01/2023    at [address] [place] [address]   • CYST REMOVAL      right upper arm     • EYE SURGERY      cataract removaql   • FL LUMBAR PUNCTURE "DIAGNOSTIC  08/15/2019   • [place] ESOPHAGOGASTRODUODENOSCOPY TRANSORAL DIAGNOSTIC N/A 2018    Procedure: ESOPHAGOGASTRODUODENOSCOPY (EGD); Surgeon: Светлана Stanley DO;  Location: [facility] MAIN OR;  Service: Gastroenterology   • [place] [facility],[address] 3 [address] Floors WRST SURG W/RLS TRANSVRS CARPL LIGM Left 2019    Procedure: ENDOSCOPIC CARPAL TUNNEL RELEASE;  Surgeon: Lazaro Real MD;  Location: [address] [place] [address] MAIN OR;  Service: Orthopedics   • TUBAL LIGATION     • US GUIDED THYROID BIOPSY  04/15/2019     Family History   Problem Relation Age of Onset   • Heart disease Mother    • Diabetes Mother    • Arthritis Mother    • Hypertension Mother    • MONE disease Mother    • Kidney disease Father    • Hypertension Father    • Stomach cancer Father    • Arthritis Sister    • Kidney disease Brother         age 34    • Stroke Maternal Grandmother    • Stomach cancer Maternal Grandmother    • No Known Problems Maternal Grandfather    • Arthritis Paternal Grandmother    • Heart attack Paternal Grandmother    • No Known Problems Paternal Grandfather    • No Known Problems Daughter    • No Known Problems Daughter    • No Known Problems Daughter    • No Known Problems Daughter    • Stroke Maternal Aunt    • Liver cancer Maternal Aunt    • No Known Problems Maternal Aunt    • No Known Problems Maternal Aunt    • Heart attack Maternal Uncle    • No Known Problems Paternal Aunt    • Cancer Family         spinal column   • Coronary artery disease Family    • Glaucoma Family    • Rheum arthritis Family       reports that she has never smoked  She has never used smokeless tobacco  She reports that she does not currently use alcohol  She reports that she does not use drugs  Physical Exam:   Vitals:    23 0859   BP: (!) 190/90   BP Location: Left arm   Patient Position: Sitting   Cuff Size: Large   Pulse: 60   SpO2: 97%   Weight: 95 1 kg (209 lb 9 6 oz)   Height: 5' 5\" (1 651 m)     Body mass index is 34 88 kg/m²      General: conscious, cooperative, in " "no acute distress, appears stated age  Eyes: conjunctivae pale, anicteric sclerae  ENT: lips and mucous membranes moist  Neck: supple, no JVD, no masses  Chest:  essentially clear breath sounds bilaterally, no crackles, ronchus or wheezings  CVS: S1 & S2, normal rate, regular rhythm  Abdomen: soft, non-tender, non-distended, normoactive bowel sounds, rounded  Extremities: no edema of both legs  Skin: no rash   Neuro: awake, alert, oriented       Diagnostic Data:  Lab: I have personally reviewed pertinent lab results  ,   CBC:       CMP: No results found for: \"SODIUM\", \"K\", \"CL\", \"CO2\", \"ANIONGAP\", \"BUN\", \"CREATININE\", \"GLUCOSE\", \"CALCIUM\", \"AST\", \"ALT\", \"ALKPHOS\", \"PROT\", \"BILITOT\", \"EGFR\",   PT/INR: No results found for: \"PT\", \"INR\",   Magnesium: No components found for: \"MAG\",  Phosphorous: No results found for: \"PHOS\"    There are no Patient Instructions on file for this visit  Portions of the record may have been created with voice recognition software  Occasional wrong word or \"sound a like\" substitutions may have occurred due to the inherent limitations of voice recognition software  Read the chart carefully and recognize, using context, where substitutions have occurred  If you have any questions, please contact the dictating provider    "

## 2023-06-23 NOTE — H&P
[address]  H&P  Name: George Sapp 79 y o  female I MRN: [id number]  Unit/Bed#: RM02 I Date of Admission: 6/23/2023   Date of Service: 6/23/2023 I Hospital Day: 0      Assessment/Plan   * Hypertensive urgency  Assessment & Plan  Presented from nephrology office where she was noted to have significant elevated blood pressure  Upon arrival to emergency department BP was noted to be approximately 240  Currently asymptomatic  EKG showed nonspecific EKG changes without chest pain  We will continue home Imdur and Lasix  Not on ACE inhibitor due to lip swelling  We will initiate Cardura  Continue hydralazine as needed    Coronary artery disease involving native coronary artery of native heart  Assessment & Plan  History of coronary artery disease status post PCI  Continue home aspirin and Brilinta  Not on beta-blocker due to bifascicular block  Continue statin    Chronic kidney disease, stage 3 unspecified [facility])  Assessment & Plan  Lab Results   Component Value Date    EGFR 41 06/23/2023    EGFR 70 06/23/2023    EGFR 35 06/07/2023    CREATININE 1 33 (H) 06/23/2023    CREATININE 0 86 06/23/2023    CREATININE 1 51 (H) 06/07/2023   Creatinine near baseline  Continue to monitor    Chronic heart failure ([facility] [address] )  Assessment & Plan  Wt Readings from Last 3 Encounters:   06/23/23 97 3 kg (214 lb 8 1 oz)   06/23/23 95 1 kg (209 lb 9 6 oz)   06/07/23 97 8 kg (215 lb 9 6 oz)     Appears euvolemic  We will continue Lasix               VTE Prophylaxis: Heparin  / sequential compression device   Code Status: full code  POLST: There is no POLST form on file for this patient (pre-hospital)  Discussion with family: pt    Anticipated Length of Stay:  Patient will be admitted on an Emergency basis with an anticipated length of stay of  < 2 midnights  Justification for Hospital Stay: Hypertensive urgency    Total Time for Visit, including Counseling / Coordination of Care: 60 minutes    Greater than 50% of this total time spent on direct patient counseling and coordination of care  Chief Complaint:   Headache    History of Present Illness:    Iraida Amaral is a 79 y o  female with past medical history significant coronary artery disease, hypertension, CKD who initially presented from nephrology office with headache  Currently denies any acute complaints  Was sent in from nephrology office due to elevated blood pressure  Denies chest pain, shortness of breath, fevers, chills or any other complaints  Review of Systems:    Review of Systems   Constitutional: Negative for activity change, appetite change, chills, diaphoresis, fever and unexpected weight change  HENT: Negative for congestion, facial swelling and rhinorrhea  Eyes: Negative for photophobia and visual disturbance  Respiratory: Negative for cough, shortness of breath and wheezing  Cardiovascular: Negative for chest pain and palpitations  Gastrointestinal: Negative for abdominal pain, blood in stool, constipation, diarrhea, nausea and vomiting  Genitourinary: Negative for decreased urine volume, difficulty urinating, dysuria, flank pain, frequency, hematuria and urgency  Musculoskeletal: Negative for arthralgias, back pain, joint swelling and myalgias  Neurological: Negative for dizziness, syncope, facial asymmetry, light-headedness, numbness and headaches  Psychiatric/Behavioral: Negative for confusion and decreased concentration  The patient is not nervous/anxious          Past Medical and Surgical History:     Past Medical History:   Diagnosis Date   • Abnormal ECG     last assessed: 5/15/17   • Abnormal mammogram     last assessed: 7/11/17   • Abnormal stress test     last assesed: 7/24/17   • Anemia    • Anxiety    • Arthritis    • Asthma    • Back problem    • Breast cyst    • CAD (coronary artery disease)    • Chest pain     last assessed: 7/24/17   • Chronic pain disorder    • Cyst of left breast     last assessed: 8/18/17   • Depression    • Depression     resolved: 1/2/18   • Diabetes mellitus ([address] )    • Hiatal hernia     last assessed: 11/7/17   • Hyperlipidemia    • Hypertension    • Keloid of skin     last assessed: 11/2/16   • Multiple thyroid nodules    • Neuropathy    • Psychiatric disorder     anxiety   • Stroke [facility])     TIA 2005   • Tuberculosis     as a child        Past Surgical History:   Procedure Laterality Date   • ARM WOUND REPAIR / CLOSURE     • CARPAL TUNNEL RELEASE     • CARPAL TUNNEL RELEASE Left    • CATARACT EXTRACTION Bilateral     2015   • COLONOSCOPY     • CORONARY ANGIOPLASTY WITH STENT PLACEMENT  03/01/2023    at [address] [place] [address]   • CYST REMOVAL      right upper arm  • EYE SURGERY      cataract removaql   • FL LUMBAR PUNCTURE DIAGNOSTIC  08/15/2019   • [place] ESOPHAGOGASTRODUODENOSCOPY TRANSORAL DIAGNOSTIC N/A 01/05/2018    Procedure: ESOPHAGOGASTRODUODENOSCOPY (EGD); Surgeon: Jonelle Betts DO;  Location: [facility] MAIN OR;  Service: Gastroenterology   • [place] [address] WRST SURG W/RLS TRANSVRS CARPL LIGM Left 04/19/2019    Procedure: ENDOSCOPIC CARPAL TUNNEL RELEASE;  Surgeon: Anila Wilson MD;  Location: [facility] MAIN OR;  Service: Orthopedics   • TUBAL LIGATION     • US GUIDED THYROID BIOPSY  04/15/2019       Meds/Allergies:    Prior to Admission medications    Medication Sig Start Date End Date Taking?  Authorizing Provider   albuterol (PROVENTIL HFA,VENTOLIN HFA) 90 mcg/act inhaler Inhale 2 puffs every 6 (six) hours as needed for wheezing or shortness of breath 1/30/23   Hyman Aase, DO   Alcohol Swabs (DropSafe Alcohol Prep) 70 % PADS Use one pad twice daily when check blood sugar 1/23/23   Hyman Aase, DO   aspirin (ECOTRIN LOW STRENGTH) 81 mg EC tablet Take 1 tablet (81 mg total) by mouth daily 12/14/22   Hyman Aase, DO   atorvastatin (LIPITOR) 80 mg tablet Take 1 tablet (80 mg total) by mouth daily with dinner  Patient not taking: Reported on 6/23/2023 2/10/23   Hyman Aase, DO   Blood Glucose Monitoring Suppl (True Metrix Air Glucose Meter) w/Device KIT USE AS DIRECTED 3/27/23   Sally Cook DO   clindamycin (CLEOCIN) 300 MG capsule take 1 capsule by mouth every 6 hours for 7 days  Patient not taking: Reported on 6/23/2023 6/3/23   Historical Provider, MD   dapagliflozin (Farxiga) 10 MG tablet Take 1 tablet (10 mg total) by mouth in the morning 1/2 tablet daily for the 1st month then increase to a full tablet daily in the morning 12/14/22   Sally Cook DO   ezetimibe (ZETIA) 10 mg tablet Take 1 tablet (10 mg total) by mouth daily at bedtime 4/16/23   Muna Claros MD   fluconazole (DIFLUCAN) 150 mg tablet  6/16/23   Historical Provider, MD   furosemide (LASIX) 20 mg tablet Take 1 tablet (20 mg total) by mouth every other day 5/24/23   Sally Cook DO   gabapentin (NEURONTIN) 300 mg capsule Take 300 mg by mouth 3 (three) times a day 3/3/23   Historical Provider, MD   glucose blood (OneTouch Verio) test strip Check blood sugars twice daily  Please substitute with appropriate alternative as covered by patient's insurance   Dx: E11 65  Patient not taking: Reported on 6/7/2023 12/14/22   Sally Cook DO   isosorbide mononitrate (IMDUR) 60 mg 24 hr tablet Take 1 tablet (60 mg total) by mouth daily 5/24/23   Sally Cook DO   ofloxacin (OCUFLOX) 0 3 % ophthalmic solution  4/26/23   Historical Provider, MD   pantoprazole (PROTONIX) 40 mg tablet Take 1 tablet (40 mg total) by mouth daily 5/24/23   Sally Cook DO   polyethylene glycol [facility]) 17 GM/SCOOP powder Take 17 g by mouth daily Increase to twice daily if still constiptated 12/21/22   Luz Knight PA-C   potassium chloride (Klor-Con) 10 mEq tablet Take 1 tablet (10 mEq total) by mouth every other day 4/16/23   Muna Claros MD   sitaGLIPtin (Januvia) 100 mg tablet Take 1 tablet (100 mg total) by mouth daily 5/24/23   Sally Cook DO   ticagrelor (BRILINTA) 90 MG Take 1 tablet (90 mg total) by mouth every 12 (twelve) hours 4/16/23   Muna Claros, MD   ticagrelor (BRILINTA) 90 MG Take 90 mg by mouth  Patient not taking: Reported on 2023 3/3/23   Historical Provider, MD   traZODone (DESYREL) 50 mg tablet Take 1 tablet (50 mg total) by mouth daily at bedtime for 20 days 23  Sánchez Guzman DO   TRUEplus Lancets 33G MISC Use 2 (two) times a day 23   Sánchez Guzman DO     I have reviewed home medications with patient personally  Allergies: Allergies   Allergen Reactions   • Bactrim [Sulfamethoxazole-Trimethoprim] Shortness Of Breath   • Lisinopril Angioedema   • Vicodin [Hydrocodone-Acetaminophen] GI Intolerance   • Hydrocodone-Acetaminophen Nausea Only   • Oxycodone-Acetaminophen GI Intolerance and Nausea Only       Social History:     Marital Status:     Occupation: na  Patient Pre-hospital Living Situation: home  Patient Pre-hospital Level of Mobility: independent  Patient Pre-hospital Diet Restrictions: none  Substance Use History:   Social History     Substance and Sexual Activity   Alcohol Use Not Currently   • Alcohol/week: 0 0 standard drinks of alcohol     Social History     Tobacco Use   Smoking Status Never   Smokeless Tobacco Never     Social History     Substance and Sexual Activity   Drug Use No       Family History:    Family History   Problem Relation Age of Onset   • Heart disease Mother    • Diabetes Mother    • Arthritis Mother    • Hypertension Mother    • MONE disease Mother    • Kidney disease Father    • Hypertension Father    • Stomach cancer Father    • Arthritis Sister    • Kidney disease Brother         age 34    • Stroke Maternal Grandmother    • Stomach cancer Maternal Grandmother    • No Known Problems Maternal Grandfather    • Arthritis Paternal Grandmother    • Heart attack Paternal Grandmother    • No Known Problems Paternal Grandfather    • No Known Problems Daughter    • No Known Problems Daughter    • No Known Problems Daughter    • No Known Problems Daughter    • Stroke Maternal Aunt    • "Liver cancer Maternal Aunt    • No Known Problems Maternal Aunt    • No Known Problems Maternal Aunt    • Heart attack Maternal Uncle    • No Known Problems Paternal Aunt    • Cancer Family         spinal column   • Coronary artery disease Family    • Glaucoma Family    • Rheum arthritis Family        Physical Exam:     Vitals:   Blood Pressure: (S) (!) 211/88 (06/23/23 1054)  Pulse: 67 (06/23/23 1100)  Temperature: 98 °F (36 7 °C) (06/23/23 0950)  Temp Source: Temporal (06/23/23 0950)  Respirations: 16 (06/23/23 1100)  Height: 5' 5\" (165 1 cm) (06/23/23 1000)  Weight - Scale: 97 3 kg (214 lb 8 1 oz) (06/23/23 0950)  SpO2: 98 % (06/23/23 1100)    Physical Exam      Additional Data:     Lab Results: I have personally reviewed pertinent reports  Results from last 7 days   Lab Units 06/23/23  0957   WBC Thousand/uL 6 10   HEMOGLOBIN g/dL 13 4   HEMATOCRIT % 42 5   PLATELETS Thousands/uL 234   NEUTROS PCT % 50   LYMPHS PCT % 35   MONOS PCT % 11   EOS PCT % 3     Results from last 7 days   Lab Units 06/23/23  1033 06/23/23  0957   SODIUM mmol/L 140 142   POTASSIUM mmol/L 3 5 3 1*   CHLORIDE mmol/L 105 117*   CO2 mmol/L 24 17*   BUN mg/dL 23 18   CREATININE mg/dL 1 33* 0 86   ANION GAP mmol/L 11 8   CALCIUM mg/dL 9 4 6 4*   ALBUMIN g/dL  --  2 6*   TOTAL BILIRUBIN mg/dL  --  0 25   ALK PHOS U/L  --  60   ALT U/L  --  7   AST U/L  --  16   GLUCOSE RANDOM mg/dL 121 99     Results from last 7 days   Lab Units 06/23/23  0957   INR  0 95                   Imaging: I have personally reviewed pertinent reports  CT head without contrast   Final Result by Shiv Niño MD (06/23 1055)      No acute intracranial abnormality  Workstation performed: [id number]         XR chest 1 view portable   Final Result by Shiv Niño MD (06/23 1054)      No acute cardiopulmonary disease                    Workstation performed: [id number]             EKG, Pathology, and Other Studies Reviewed on Admission:   · EKG: " reviewed    Allscripts / Epic Records Reviewed: Yes     ** Please Note: This note has been constructed using a voice recognition system   **

## 2023-06-23 NOTE — PROGRESS NOTES
Outpatient Care Management Note:  In basket observation admission alert received  Sent to ER from Nephrology office due to elevated BP

## 2023-06-23 NOTE — ASSESSMENT & PLAN NOTE
Lab Results   Component Value Date    EGFR 41 06/23/2023    EGFR 70 06/23/2023    EGFR 35 06/07/2023    CREATININE 1 33 (H) 06/23/2023    CREATININE 0 86 06/23/2023    CREATININE 1 51 (H) 06/07/2023   Creatinine near baseline  Continue to monitor

## 2023-06-23 NOTE — ED PROVIDER NOTES
"History  Chief Complaint   Patient presents with   • Hypertension     Pt having high blood pressure and severe headache  Seen by nephrologist today and sent here for further workup     79year old female with PMH CAD (h/o cardiac stent placement), HTN, DM, CKD, anxiety/depression, anemia, thyroid nodule, arthritis presents via EMS from her nephrologist's office for further evaluation of high blood pressure  Pt notes she was seen there today for a follow up appointment  She reports she was sitting in waiting room when she developed headache in the back of her head  She states this is new  Denies head injury or trauma  No reported falls  She states she saw the doctor and was told her blood pressure was severely high  She states she hasn't checked it recently but states has been okay at home after she was discharged from the hospital back in April  She reports she ate some soup yesterday which was very salty and feels this is contributing to her elevated blood pressure  She reports taking her diuretic as well as blood thinner this morning but states she has not yet taken her other meds yet today  She reports some chest tightness earlier but states this has resolved  She notes she has had some intermittent shortness of breath  She describes this as sometimes feeling like she can't take a deep breath  She notes increased anxiety  Denies fever, chills, cough, congestion or recent illness  Denies dizziness, lightheadedness, no syncope  Although pt does endorse that sometimes she feels lightheaded/dizzy if she stands up and moves around too quickly  Denies visual disturbance  She reports seeing her eye doctor within the past month and everything was \"good\"  Denies neck pain  Denies N/V/D, abdominal pain  Denies numbness, tingling, weakness  No reported aggravating or alleviating factors  No specific treatments tried         History provided by:  Patient and medical records   used: No " Hypertension  Progression:  Worsening  Chronicity:  Chronic  Context: abnormal sodium (pt notes eating soup that was salty yesterday)    Associated symptoms: anxiety, headaches and shortness of breath    Associated symptoms: no abdominal pain, no blurred vision, no chest pain, no confusion, no dizziness, no fatigue, no fever, no hematuria, no loss of consciousness, no nausea, no neck pain, no palpitations, no peripheral edema, no syncope, not vomiting and no weakness    Risk factors: cardiac disease, diabetes and kidney disease    Risk factors: no alcohol use and no tobacco use        Prior to Admission Medications   Prescriptions Last Dose Informant Patient Reported? Taking?    Alcohol Swabs (DropSafe Alcohol Prep) 70 % PADS  Self No No   Sig: Use one pad twice daily when check blood sugar   Blood Glucose Monitoring Suppl (True Metrix Air Glucose Meter) w/Device KIT  Self No No   Sig: USE AS DIRECTED   TRUEplus Lancets 33G MISC  Self No No   Sig: Use 2 (two) times a day   albuterol (PROVENTIL HFA,VENTOLIN HFA) 90 mcg/act inhaler  Self No No   Sig: Inhale 2 puffs every 6 (six) hours as needed for wheezing or shortness of breath   aspirin (ECOTRIN LOW STRENGTH) 81 mg EC tablet 6/23/2023 Self No Yes   Sig: Take 1 tablet (81 mg total) by mouth daily   atorvastatin (LIPITOR) 80 mg tablet Not Taking Self No No   Sig: Take 1 tablet (80 mg total) by mouth daily with dinner   Patient not taking: Reported on 6/23/2023   clindamycin (CLEOCIN) 300 MG capsule  Self Yes No   Sig: take 1 capsule by mouth every 6 hours for 7 days   Patient not taking: Reported on 6/23/2023   dapagliflozin (Farxiga) 10 MG tablet 6/22/2023 Self No Yes   Sig: Take 1 tablet (10 mg total) by mouth in the morning 1/2 tablet daily for the 1st month then increase to a full tablet daily in the morning   ezetimibe (ZETIA) 10 mg tablet 6/22/2023 Self No Yes   Sig: Take 1 tablet (10 mg total) by mouth daily at bedtime   fluconazole (DIFLUCAN) 150 mg tablet 6/22/2023 Self Yes Yes   furosemide (LASIX) 20 mg tablet 6/23/2023 Self No Yes   Sig: Take 1 tablet (20 mg total) by mouth every other day   gabapentin (NEURONTIN) 300 mg capsule 6/22/2023 Self Yes Yes   Sig: Take 300 mg by mouth 3 (three) times a day   glucose blood (OneTouch Verio) test strip  Self No No   Sig: Check blood sugars twice daily  Please substitute with appropriate alternative as covered by patient's insurance   Dx: E11 65   Patient not taking: Reported on 6/7/2023   isosorbide mononitrate (IMDUR) 60 mg 24 hr tablet 6/22/2023 Self No Yes   Sig: Take 1 tablet (60 mg total) by mouth daily   ofloxacin (OCUFLOX) 0 3 % ophthalmic solution  Self Yes No   pantoprazole (PROTONIX) 40 mg tablet 6/22/2023 Self No Yes   Sig: Take 1 tablet (40 mg total) by mouth daily   polyethylene glycol (GLYCOLAX) 17 GM/SCOOP powder  Self No No   Sig: Take 17 g by mouth daily Increase to twice daily if still constiptated   potassium chloride (Klor-Con) 10 mEq tablet 6/23/2023 Self No Yes   Sig: Take 1 tablet (10 mEq total) by mouth every other day   sitaGLIPtin (Januvia) 100 mg tablet 6/22/2023 Self No Yes   Sig: Take 1 tablet (100 mg total) by mouth daily   ticagrelor (BRILINTA) 90 MG 6/23/2023 Self No Yes   Sig: Take 1 tablet (90 mg total) by mouth every 12 (twelve) hours   ticagrelor (BRILINTA) 90 MG  Self Yes No   Sig: Take 90 mg by mouth   Patient not taking: Reported on 6/23/2023   traZODone (DESYREL) 50 mg tablet   No No   Sig: Take 1 tablet (50 mg total) by mouth daily at bedtime for 20 days      Facility-Administered Medications: None       Past Medical History:   Diagnosis Date   • Abnormal ECG     last assessed: 5/15/17   • Abnormal mammogram     last assessed: 7/11/17   • Abnormal stress test     last assesed: 7/24/17   • Anemia    • Anxiety    • Arthritis    • Asthma    • Back problem    • Breast cyst    • CAD (coronary artery disease)    • Chest pain     last assessed: 7/24/17   • Chronic pain disorder    • Cyst of left breast     last assessed: 17   • Depression    • Depression     resolved: 18   • Diabetes mellitus ([facility] [address] )    • Hiatal hernia     last assessed: 17   • Hyperlipidemia    • Hypertension    • Keloid of skin     last assessed: 16   • Multiple thyroid nodules    • Neuropathy    • Psychiatric disorder     anxiety   • Stroke [facility])     TIA    • Tuberculosis     as a child        Past Surgical History:   Procedure Laterality Date   • ARM WOUND REPAIR / CLOSURE     • CARPAL TUNNEL RELEASE     • CARPAL TUNNEL RELEASE Left    • CATARACT EXTRACTION Bilateral        • COLONOSCOPY     • CORONARY ANGIOPLASTY WITH STENT PLACEMENT  2023    at [address] [place] [address]   • CYST REMOVAL      right upper arm  • EYE SURGERY      cataract removaql   • FL LUMBAR PUNCTURE DIAGNOSTIC  08/15/2019   • UT ESOPHAGOGASTRODUODENOSCOPY TRANSORAL DIAGNOSTIC N/A 2018    Procedure: ESOPHAGOGASTRODUODENOSCOPY (EGD);   Surgeon: Светлана Stanley DO;  Location: [facility] OR;  Service: Gastroenterology   • UT [facility] WRST SURG W/RLS TRANSVRS CARPL LIGM Left 2019    Procedure: ENDOSCOPIC CARPAL TUNNEL RELEASE;  Surgeon: Lazaro Real MD;  Location: [address] [place] [address] MAIN OR;  Service: Orthopedics   • TUBAL LIGATION     • US GUIDED THYROID BIOPSY  04/15/2019       Family History   Problem Relation Age of Onset   • Heart disease Mother    • Diabetes Mother    • Arthritis Mother    • Hypertension Mother    • MONE disease Mother    • Kidney disease Father    • Hypertension Father    • Stomach cancer Father    • Arthritis Sister    • Kidney disease Brother         age 34    • Stroke Maternal Grandmother    • Stomach cancer Maternal Grandmother    • No Known Problems Maternal Grandfather    • Arthritis Paternal Grandmother    • Heart attack Paternal Grandmother    • No Known Problems Paternal Grandfather    • No Known Problems Daughter    • No Known Problems Daughter    • No Known Problems Daughter    • No Known Problems Daughter    • Stroke Maternal Aunt    • Liver cancer Maternal Aunt    • No Known Problems Maternal Aunt    • No Known Problems Maternal Aunt    • Heart attack Maternal Uncle    • No Known Problems Paternal Aunt    • Cancer Family         spinal column   • Coronary artery disease Family    • Glaucoma Family    • Rheum arthritis Family      I have reviewed and agree with the history as documented  E-Cigarette/Vaping   • E-Cigarette Use Never User      E-Cigarette/Vaping Substances   • Nicotine No    • THC No    • CBD No    • Flavoring No    • Other No    • Unknown No      Social History     Tobacco Use   • Smoking status: Never   • Smokeless tobacco: Never   Vaping Use   • Vaping Use: Never used   Substance Use Topics   • Alcohol use: Not Currently     Alcohol/week: 0 0 standard drinks of alcohol   • Drug use: No       Review of Systems   Constitutional: Negative  Negative for chills, fatigue and fever  HENT: Negative  Negative for congestion, rhinorrhea and sore throat  Eyes: Negative  Negative for blurred vision and visual disturbance  Respiratory: Positive for shortness of breath  Negative for cough and wheezing  Cardiovascular: Negative  Negative for chest pain, palpitations, leg swelling and syncope  Gastrointestinal: Negative  Negative for abdominal pain, constipation, diarrhea, nausea and vomiting  Genitourinary: Negative  Negative for dysuria, flank pain, frequency and hematuria  Musculoskeletal: Negative  Negative for back pain and neck pain  Skin: Negative  Negative for rash  Neurological: Positive for headaches  Negative for dizziness, loss of consciousness, syncope, facial asymmetry, speech difficulty, weakness, light-headedness and numbness  Psychiatric/Behavioral: Negative for confusion  The patient is nervous/anxious  All other systems reviewed and are negative  Physical Exam  Physical Exam  Vitals and nursing note reviewed     Constitutional:       General: She is awake  She is not in acute distress  Appearance: She is well-developed  She is obese  She is not toxic-appearing or diaphoretic  HENT:      Head: Normocephalic and atraumatic  Right Ear: Hearing and external ear normal       Left Ear: Hearing and external ear normal       Nose: Nose normal       Mouth/Throat:      Mouth: Mucous membranes are moist       Tongue: Tongue does not deviate from midline  Pharynx: Oropharynx is clear  Uvula midline  Eyes:      General: Lids are normal  No visual field deficit or scleral icterus  Extraocular Movements: Extraocular movements intact  Conjunctiva/sclera: Conjunctivae normal       Pupils: Pupils are equal, round, and reactive to light  Neck:      Trachea: Trachea and phonation normal  No tracheal deviation  Cardiovascular:      Rate and Rhythm: Normal rate and regular rhythm  Pulses: Normal pulses  Radial pulses are 2+ on the right side and 2+ on the left side  Dorsalis pedis pulses are 2+ on the right side and 2+ on the left side  Posterior tibial pulses are 2+ on the right side and 2+ on the left side  Heart sounds: Normal heart sounds, S1 normal and S2 normal  No murmur heard  Pulmonary:      Effort: Pulmonary effort is normal  No tachypnea or respiratory distress  Breath sounds: Normal breath sounds  No wheezing, rhonchi or rales  Abdominal:      General: Bowel sounds are normal  There is no distension  Palpations: Abdomen is soft  Tenderness: There is no abdominal tenderness  There is no guarding or rebound  Musculoskeletal:      Cervical back: Normal range of motion and neck supple  Right lower leg: No edema  Left lower leg: No edema  Skin:     General: Skin is warm and dry  Capillary Refill: Capillary refill takes less than 2 seconds  Findings: No rash  Neurological:      General: No focal deficit present        Mental Status: She is alert and oriented to person, place, and time  GCS: GCS eye subscore is 4  GCS verbal subscore is 5  GCS motor subscore is 6  Cranial Nerves: Cranial nerves 2-12 are intact  No cranial nerve deficit, dysarthria or facial asymmetry  Sensory: Sensation is intact  No sensory deficit  Motor: Motor function is intact  No weakness or abnormal muscle tone  Psychiatric:         Mood and Affect: Mood normal          Speech: Speech normal          Behavior: Behavior normal  Behavior is cooperative           Vital Signs  ED Triage Vitals   Temperature Pulse Respirations Blood Pressure SpO2   06/23/23 0950 06/23/23 0950 06/23/23 0950 06/23/23 0950 06/23/23 0950   98 °F (36 7 °C) 58 18 (!) 242/106 97 %      Temp Source Heart Rate Source Patient Position - Orthostatic VS BP Location FiO2 (%)   06/23/23 0950 06/23/23 1054 06/23/23 0950 06/23/23 0950 --   Temporal Monitor Sitting Right arm       Pain Score       06/23/23 0950       7           Vitals:    06/23/23 1048 06/23/23 1054 06/23/23 1100 06/23/23 1145   BP: (S) (!) 206/89 (S) (!) 211/88  160/77   Pulse: 64 71 67 85   Patient Position - Orthostatic VS: Lying Lying  Lying         Visual Acuity  Visual Acuity    Flowsheet Row Most Recent Value   L Pupil Size (mm) 3   R Pupil Size (mm) 3          ED Medications  Medications   heparin (porcine) subcutaneous injection 5,000 Units (has no administration in time range)   hydrALAZINE (APRESOLINE) injection 10 mg (has no administration in time range)   doxazosin (CARDURA) tablet 2 mg (2 mg Oral Not Given 6/23/23 1214)   ezetimibe (ZETIA) tablet 10 mg (has no administration in time range)   furosemide (LASIX) tablet 20 mg (has no administration in time range)   atorvastatin (LIPITOR) tablet 80 mg (has no administration in time range)   aspirin chewable tablet 81 mg (81 mg Oral Not Given 6/23/23 1214)   albuterol (PROVENTIL HFA,VENTOLIN HFA) inhaler 2 puff (has no administration in time range)   gabapentin (NEURONTIN) capsule 300 mg (300 mg Oral Not Given 6/23/23 1214)   isosorbide mononitrate (IMDUR) 24 hr tablet 60 mg (60 mg Oral Not Given 6/23/23 1225)   pantoprazole (PROTONIX) EC tablet 40 mg (40 mg Oral Not Given 6/23/23 1214)   ticagrelor (BRILINTA) tablet 90 mg (90 mg Oral Not Given 6/23/23 1210)   insulin lispro (HumaLOG) 100 units/mL subcutaneous injection 1-6 Units ( Subcutaneous Not Given 6/23/23 1201)   insulin lispro (HumaLOG) 100 units/mL subcutaneous injection 1-5 Units (has no administration in time range)   ondansetron (ZOFRAN) injection 4 mg (has no administration in time range)   magnesium sulfate 2 g/50 mL IVPB (premix) 2 g (0 g Intravenous Stopped 6/23/23 1041)   hydrALAZINE (APRESOLINE) injection 10 mg (10 mg Intravenous Given 6/23/23 1010)   ondansetron (ZOFRAN) injection 4 mg (4 mg Intravenous Given 6/23/23 1113)   hydrALAZINE (APRESOLINE) injection 10 mg (10 mg Intravenous Given 6/23/23 1113)       Diagnostic Studies  Results Reviewed     Procedure Component Value Units Date/Time    HS Troponin I 2hr [[id number]] Collected: 06/23/23 1213    Lab Status:  In process Specimen: Blood from Hand, Left Updated: 06/23/23 1220    HS Troponin I 4hr [[id number]]     Lab Status: No result Specimen: Blood     Basic metabolic panel [[id number]]  (Abnormal) Collected: 06/23/23 1033    Lab Status: Final result Specimen: Blood from Arm, Right Updated: 06/23/23 1102     Sodium 140 mmol/L      Potassium 3 5 mmol/L      Chloride 105 mmol/L      CO2 24 mmol/L      ANION GAP 11 mmol/L      BUN 23 mg/dL      Creatinine 1 33 mg/dL      Glucose 121 mg/dL      Calcium 9 4 mg/dL      eGFR 41 ml/min/1 73sq m     Narrative:      [place] guidelines for Chronic Kidney Disease (CKD):   •  Stage 1 with normal or high GFR (GFR > 90 mL/min/1 73 square meters)  •  Stage 2 Mild CKD (GFR = 60-89 mL/min/1 73 square meters)  •  Stage 3A Moderate CKD (GFR = 45-59 mL/min/1 73 square meters)  •  Stage 3B Moderate CKD (GFR = 30-44 mL/min/1 73 square meters)  •  Stage 4 Severe CKD (GFR = 15-29 mL/min/1 73 square meters)  •  Stage 5 End Stage CKD (GFR <15 mL/min/1 73 square meters)  Note: GFR calculation is accurate only with a steady state creatinine    Protime-INR [[id number]]  (Normal) Collected: 06/23/23 0957    Lab Status: Final result Specimen: Blood from Arm, Left Updated: 06/23/23 1042     Protime 12 9 seconds      INR 0 95    HS Troponin 0hr (reflex protocol) [[id number]]  (Normal) Collected: 06/23/23 0957    Lab Status: Final result Specimen: Blood from Hand, Left Updated: 06/23/23 1029     hs TnI 0hr 7 ng/L     APTT [[id number]]  (Abnormal) Collected: 06/23/23 0957    Lab Status: Final result Specimen: Blood from Arm, Left Updated: 06/23/23 1029     PTT 22 seconds     Comprehensive metabolic panel [[id number]]  (Abnormal) Collected: 06/23/23 0957    Lab Status: Final result Specimen: Blood from Hand, Left Updated: 06/23/23 1021     Sodium 142 mmol/L      Potassium 3 1 mmol/L      Chloride 117 mmol/L      CO2 17 mmol/L      ANION GAP 8 mmol/L      BUN 18 mg/dL      Creatinine 0 86 mg/dL      Glucose 99 mg/dL      Calcium 6 4 mg/dL      Corrected Calcium 7 5 mg/dL      AST 16 U/L      ALT 7 U/L      Alkaline Phosphatase 60 U/L      Total Protein 5 2 g/dL      Albumin 2 6 g/dL      Total Bilirubin 0 25 mg/dL      eGFR 70 ml/min/1 73sq m     Narrative:      [place] guidelines for Chronic Kidney Disease (CKD):   •  Stage 1 with normal or high GFR (GFR > 90 mL/min/1 73 square meters)  •  Stage 2 Mild CKD (GFR = 60-89 mL/min/1 73 square meters)  •  Stage 3A Moderate CKD (GFR = 45-59 mL/min/1 73 square meters)  •  Stage 3B Moderate CKD (GFR = 30-44 mL/min/1 73 square meters)  •  Stage 4 Severe CKD (GFR = 15-29 mL/min/1 73 square meters)  •  Stage 5 End Stage CKD (GFR <15 mL/min/1 73 square meters)  Note: GFR calculation is accurate only with a steady state creatinine    Magnesium [[id number]]  (Abnormal) Collected: 06/23/23 0957    Lab Status: Final result Specimen: Blood from Hand, Left Updated: 06/23/23 1021     Magnesium 1 5 mg/dL     CBC and differential [[id number]]  (Abnormal) Collected: 06/23/23 0957    Lab Status: Final result Specimen: Blood from Hand, Left Updated: 06/23/23 1004     WBC 6 10 Thousand/uL      RBC 4 93 Million/uL      Hemoglobin 13 4 g/dL      Hematocrit 42 5 %      MCV 86 fL      MCH 27 2 pg      MCHC 31 5 g/dL      RDW 15 2 %      MPV 10 2 fL      Platelets 667 Thousands/uL      nRBC 0 /100 WBCs      Neutrophils Relative 50 %      Immat GRANS % 0 %      Lymphocytes Relative 35 %      Monocytes Relative 11 %      Eosinophils Relative 3 %      Basophils Relative 1 %      Neutrophils Absolute 3 07 Thousands/µL      Immature Grans Absolute 0 01 Thousand/uL      Lymphocytes Absolute 2 12 Thousands/µL      Monocytes Absolute 0 66 Thousand/µL      Eosinophils Absolute 0 21 Thousand/µL      Basophils Absolute 0 03 Thousands/µL     B-Type Natriuretic Peptide(BNP) [[id number]] Collected: 06/23/23 0957    Lab Status: No result Specimen: Blood from Hand, Left                  CT head without contrast   Final Result by Louise Akers MD (06/23 1055)      No acute intracranial abnormality  Workstation performed: [id number]         XR chest 1 view portable   Final Result by Louise Akers MD (06/23 1054)      No acute cardiopulmonary disease                    Workstation performed: [id number]                    Procedures  ECG 12 Lead Documentation Only    Date/Time: 6/23/2023 9:58 AM    Performed by: Noemi Sanchez PA-C  Authorized by: Noemi Sanchez PA-C    Indications / Diagnosis:  HTN  ECG reviewed by me, the ED Provider: yes    Patient location:  ED  Previous ECG:     Previous ECG:  Compared to current    Comparison ECG info:  4/12/23    Similarity:  Changes noted    Comparison to cardiac monitor: Yes    Interpretation:     Interpretation: abnormal    Rate:     ECG rate:  54    ECG rate assessment: bradycardic    Rhythm:     Rhythm: sinus bradycardia    Ectopy:     Ectopy: none    Conduction:     Conduction: abnormal      Abnormal conduction: bifascicular block    ST segments:     ST segments:  Normal  T waves:     T waves: inverted      Inverted:  III, aVF, aVR, V1, V3, V4, V5 and V6  Comments:      , , QT/QTc 494/468; bifascicular block seen previously  T wave inversions now present in lateral leads  CriticalCare Time    Date/Time: 6/23/2023 11:39 AM    Performed by: Veronica Dong PA-C  Authorized by: Veronica Dong PA-C    Critical care provider statement:     Critical care time (minutes):  35    Critical care time was exclusive of:  Separately billable procedures and treating other patients    Critical care was necessary to treat or prevent imminent or life-threatening deterioration of the following conditions:  Cardiac failure (hypertensive emergency)    Critical care was time spent personally by me on the following activities:  Obtaining history from patient or surrogate, development of treatment plan with patient or surrogate, evaluation of patient's response to treatment, examination of patient, ordering and performing treatments and interventions, ordering and review of laboratory studies, ordering and review of radiographic studies, re-evaluation of patient's condition and review of old charts    I assumed direction of critical care for this patient from another provider in my specialty: no               ED Course  ED Course as of 06/23/23 1234   Fri Jun 23, 2023   0950 Prior records reviewed  Reviewed hospital admission from 4/12-4/16 - admitted at [facility] for CP, PADMINI  EKG showed RBBB and LAFB  Lisinopril discontinued due to PADMINI  She has h/o CAD with cardiac stent placement  She is on aspirin, brilinta, lipitor and zetia  She had an echo which showed EF 56%  She had a negative renal artery US    She had a CTA chest which was negative for PE, incidental thyroid nodule, small hiatal hernia  Thyroid testing from 6/7/23 was within normal limitis  Was seen by nephrology in office today and referred to ED due to hypertension  Baseline Cr per nephro notes Cr 1 1-1 2    1009 WBC: 6 10   1009 Hemoglobin: 13 4   1009 Platelet Count: 280   1020 XR chest 1 view portable  Independently viewed and interpreted by me - no acute cardiopulmonary process; pending official read  1027 Magnesium(!): 1 5  Mag infusion in process   1027 Glucose, Random: 99   1027 Creatinine: 0 86  This appears improved from prior  She has known CKD with baseline 1 1-1 2; most recent value of 1 51 two weeks ago  Since this CMP appears different from prior values (to include hypocalcemia, elevated chloride, decreased bicarb) and out of expected values, will repeat a BMP to ensure valid specimen  1028 BUN: 18   1028 Sodium: 142   1028 Potassium(!): 3 1   1028 Chloride(!): 117   1028 CO2(!): 17   1028 Anion Gap: 8   1028 CORRECTED CALCIUM(!): 7 5   1028 AST: 16   1028 ALT: 7   1028 Alkaline Phosphatase: 60   1028 Total Protein(!): 5 2   1028 Albumin(!): 2 6   1028 TOTAL BILIRUBIN: 0 25   1028 eGFR: 70   1030 PTT(!): 22   1030 hs TnI 0hr: 7  Value of 6 two months ago, value of 8 eleven months ago; not diagnostic of ACS however will require delta to exclude  1042 POCT INR: 0 95   1042 PROTIME: 12 9   1102 Creatinine(!): 1 33  Value on repeat BMP appears more consistent with prior values   1102 Potassium: 3 5   1102 Calcium: 9 4   1105 CT head without contrast  IMPRESSION:     No acute intracranial abnormality      1105 BP was initially down trending from 920M systolically to low 383W, but now back to 210s  Will speak with pt and reach out to [facility] regarding admission  1106 Pt updated on results  She reports headache is feeling improved  Denies chest pain, SOB at present  She states feeling nauseated    Noted to have several unifocal PVCs on telemetry and pt admits to having some palpitations  Recommended admission and pt agreeable  1118 TT to on call [facility] to discuss admission  1121 Discussed with Dr Rasheed Vann of [facility], accepts in obs  HEART Risk Score    Flowsheet Row Most Recent Value   Heart Score Risk Calculator    History 0 Filed at: 06/23/2023 1023   ECG 1 Filed at: 06/23/2023 1023   Age 2 Filed at: 06/23/2023 1023   Risk Factors 2 Filed at: 06/23/2023 1023   Troponin 0 Filed at: 06/23/2023 1023   HEART Score 5 Filed at: 06/23/2023 1023                        SBIRT 20yo+    Flowsheet Row Most Recent Value   Initial Alcohol Screen: US AUDIT-C     1  How often do you have a drink containing alcohol? 0 Filed at: 06/23/2023 0959   2  How many drinks containing alcohol do you have on a typical day you are drinking? 0 Filed at: 06/23/2023 0959   3a  Male UNDER 65: How often do you have five or more drinks on one occasion? 0 Filed at: 06/23/2023 0959   3b  FEMALE Any Age, or MALE 65+: How often do you have 4 or more drinks on one occassion? 0 Filed at: 06/23/2023 0959   Audit-C Score 0 Filed at: 06/23/2023 4554   TALAT: How many times in the past year have you    Used an illegal drug or used a prescription medication for non-medical reasons? Never Filed at: 06/23/2023 7354                    Medical Decision Making  80 yo female presenting for evaluation of elevation BP readings in setting of chronic hypertension  Prior records reviewed to include today's visit from nephrology  BP noted to be significant elevated here upwards of 179Z systolically  Will establish IV, check EKG, labs  Will obtain CT head in setting of headache with severe HTN  Work up obtained as noted above  EKG shows new T wave inversions compared to prior  Initial troponin not c/w ACS  CXR does not reveal pneumothorax, pleural effusion, pneumonia or vascular congestion  CT head without acute findings, no noted intracranial process  Mild hypomagnesemia which was repleted with IV    Initial metabolic panel values concerning for inaccurate specimen collection as appears off from her baseline  Repeat BMP appears more consistent with patient  CKD appears stable  No hypo or hyperglycemia  She does not appear in CHF  Recent thyroid testing was within normal limits  Given severe uncontrolled hypertension and co-morbid conditions, SLIM consulted for admission  Per review of prior records, ACE discontinued due to PADMINI during last admission, although allergies note history of angioedema due to lisinopril  Per nephro note, no beta blockers due to bifascicular block  Pt on lasix as well as imdur  Please refer to above ER course for further details/discussion  CKD (chronic kidney disease): chronic illness or injury     Details: down trending 1 33 today, improved from recent; baseline per nephrology 1 1-1 2  Headache: acute illness or injury     Details: CT head wo acute findings  non focal neuro exam   Symptomatically felt improved in ED  Hypertensive urgency: acute illness or injury     Details: SLIM consulted for admission  Hypomagnesemia: acute illness or injury     Details: repleted with IV magnesium  Amount and/or Complexity of Data Reviewed  External Data Reviewed: labs, radiology, ECG and notes  Labs: ordered  Decision-making details documented in ED Course  Radiology: ordered and independent interpretation performed  Decision-making details documented in ED Course  ECG/medicine tests: ordered and independent interpretation performed  Decision-making details documented in ED Course  Discussion of management or test interpretation with external provider(s): SL    Risk  Prescription drug management  Decision regarding hospitalization            Disposition  Final diagnoses:   Hypertensive urgency   CKD (chronic kidney disease)   Hypomagnesemia   Headache     Time reflects when diagnosis was documented in both MDM as applicable and the Disposition within this note     Time User Action Codes Description Comment    6/23/2023 11:22 AM Bobbe Manner Add [I16 0] Hypertensive urgency     6/23/2023 11:25 AM Bobbe Manner Add [N18 9] CKD (chronic kidney disease)     6/23/2023 11:26 AM Bobbe Manner Add [E83 42] Hypomagnesemia     6/23/2023 11:26 AM Bobbe Manner Add [R51 9] Headache       ED Disposition     ED Disposition   Admit    Condition   Stable    Date/Time   Fri Jun 23, 2023 11:22 AM    Comment   Case was discussed with Dr Alena Sahu and the patient's admission status was agreed to be Admission Status: observation status to the service of Dr Alena Sahu  Follow-up Information    None         Patient's Medications   Discharge Prescriptions    No medications on file       No discharge procedures on file      PDMP Review     None          ED Provider  Electronically Signed by           Rinku Reynaga PA-C  06/23/23 1669

## 2023-06-23 NOTE — CASE MANAGEMENT
Case Management Assessment & Discharge Planning Note    Patient name Yony Hunt /[id number] MRN [id number]  : 1955 Date 2023       Current Admission Date: 2023  Current Admission Diagnosis:Hypertensive urgency   Patient Active Problem List    Diagnosis Date Noted   • Hypertensive urgency 2023   • Coronary artery disease involving native coronary artery of native heart 2023   • Palpitations 2023   • Chest pain 2023   • Acute kidney injury (PADMINI) with chronic kidney disease stage III 2023   • Thyroid nodule 2022   • Other headache syndrome 2022   • Chronic kidney disease, stage 3 unspecified ([address] ) 2022   • Abnormal CT scan 08/10/2021   • Myasthenia gravis ([address] ) 2021   • Depression, recurrent ([address] ) 2021   • Cerebral meningocele ([facility] ) 2021   • Obesity, morbid ([address] ) 2020   • UTI (urinary tract infection) 2020   • Chronic heart failure ([address] ) 2020   • Subcortical microvascular ischemic occlusive disease 2019   • Sixth nerve palsy of right eye    • Binocular vision disorder with diplopia 2019   • Carpal tunnel syndrome on left 2019   • Numbness in both hands 2019   • Carpal tunnel syndrome of left wrist 2019   • Multinodular goiter 2019   • Hoarseness or changing voice 2019   • 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 proliferative retinopathy and macular edema, without long-term current use of insulin ([facility] )    • Diabetic neuropathy ([facility] ) 2018   • Chronic constipation 2017   • Cyst of left breast 2017   • Impaired hearing 2017   • Cerebral atherosclerosis 05/15/2017   • Dyslipidemia 2017   • Moderate persistent asthma without complication    • Lumbar pain 10/18/2016   • Hiatal hernia 10/18/2016   • Mild persistent asthma without complication    • Chronic back pain 2016   • Depression 09/06/2016   • Essential hypertension 09/06/2016   • Neuropathy, diabetic ([address] ) 09/06/2016      LOS (days): 0  Geometric Mean LOS (GMLOS) (days):   Days to [address]:     OBJECTIVE:              Current admission status: Observation       Preferred Pharmacy:   [address] [place] [address]  [address], [address]  [facility] [address]  Phone: [phone] Fax: [phone]    [address]IR[address] [address] [place], [address]  [facility]  [address] [place] [address]  Phone: [phone] Fax: [phone]    Primary Care Provider: Joshua Choi DO    Primary Insurance: [facility]  Secondary Insurance: [facility]    ASSESSMENT:  [address] Proxies     Cruz, [facility]  [facility] [address] Representative - Daughter   Primary Phone: [phone] ([place])               Advance Directives  Does patient have a [address] [place] [address] Avenue?: No  Was patient offered paperwork?: Yes (declines)  Does patient currently have a Health Care decision maker?: Yes, please see Health Care Proxy section  Does patient have Advance Directives?: No  Was patient offered paperwork?: Yes (declines)  Primary Contact: Renee Jiménez (Daughter)   [phone] ([place])         Readmission Root Cause  30 Day Readmission: No    Patient Information  Admitted from[de-identified] Home  Mental Status: Alert  During Assessment patient was accompanied by: Not accompanied during assessment  Assessment information provided by[de-identified] Patient  Primary Caregiver: Self  Support Systems: Daughter  [place] of Residence: [facility]  do you live in?: [address] [facility] [address] entry access options   Select all that apply : No steps to enter home  Type of Current Residence: 2 story home  Upon entering residence, is there a bedroom on the main floor (no further steps)?: No  A bedroom is located on the following floor levels of residence (select all that apply):: 2nd Floor  Upon entering residence, is there a bathroom on the main floor (no further steps)?: No  Indicate which floors of current residence have a bathroom (select all the apply):: 2nd Floor  Number of steps to 2nd floor from main floor: One Flight  In the last 12 months, was there a time when you were not able to pay the mortgage or rent on time?: No  In the last 12 months, how many places have you lived?: 1  In the last 12 months, was there a time when you did not have a steady place to sleep or slept in a shelter (including now)?: No  Homeless/housing insecurity resource given?: N/A  Living Arrangements: Lives w/ Daughter  Is patient a ?: No    Activities of Daily Living Prior to Admission  Functional Status: Independent  Completes ADLs independently?: Yes  Ambulates independently?: Yes  Does patient use assisted devices?: Yes  Assisted Devices (DME) used: Straight Cane  Does patient currently own DME?: Yes  What DME does the patient currently own?: Straight Cane  Does patient have a history of Outpatient Therapy (PT/OT)?: No  Does the patient have a history of Short-Term Rehab?: No  Does patient have a history of HHC?: No  Does patient currently have [facility]?: No         Patient Information Continued  Income Source: SSI/SSD  Does patient have prescription coverage?: Yes  Within the past 12 months, you worried that your food would run out before you got the money to buy more : Never true  Within the past 12 months, the food you bought just didn't last and you didn't have money to get more : Never true  Food insecurity resource given?: N/A  Does patient receive dialysis treatments?: No  Does patient have a history of substance abuse?: No  Does patient have a history of Mental Health Diagnosis?: Yes (anxiety; depression)  Is patient receiving treatment for mental health?: No  Patient declined treatment information    Has patient received inpatient treatment related to mental health in the last 2 years?: No         Means of Transportation  Means of Transport to [facility] [place]de[place]] Family transport (daughter)  In the past 12 months, has lack of transportation kept you from medical appointments or from getting medications?: No  In the past 12 months, has lack of transportation kept you from meetings, work, or from getting things needed for daily living?: No  Was application for public transport provided?: N/A        DISCHARGE DETAILS:    Discharge planning discussed with[de-identified] patient        CM contacted family/caregiver?: No- see comments (declines at this time)  Were Treatment Team discharge recommendations reviewed with patient/caregiver?: Yes  Did patient/caregiver verbalize understanding of patient care needs?: Yes  Were patient/caregiver advised of the risks associated with not following Treatment Team discharge recommendations?: Yes         [address]         Is the patient interested in [place] [address] at discharge?: No    DME Referral Provided  Referral made for DME?: No         Would you like to participate in our [facility] [address] service program?  : No - Declined       Discharge Destination Plan[de-identified] Home (with OP follow up)  Transport at Discharge : Family (daughter)      Plans at this time are home on dc with OP follow up  Cm will follow and assist in dc planning

## 2023-06-23 NOTE — ASSESSMENT & PLAN NOTE
Presented from nephrology office where she was noted to have significant elevated blood pressure  Upon arrival to emergency department BP was noted to be approximately 240  Currently asymptomatic  EKG showed nonspecific EKG changes without chest pain  We will continue home Imdur and Lasix  Not on ACE inhibitor due to lip swelling  We will initiate Cardura  Continue hydralazine as needed

## 2023-06-23 NOTE — PLAN OF CARE
Problem: PAIN - ADULT  Goal: Verbalizes/displays adequate comfort level or baseline comfort level  Description: Interventions:  - Encourage patient to monitor pain and request assistance  - Assess pain using appropriate pain scale  - Administer analgesics based on type and severity of pain and evaluate response  - Implement non-pharmacological measures as appropriate and evaluate response  - Consider cultural and social influences on pain and pain management  - Notify physician/advanced practitioner if interventions unsuccessful or patient reports new pain  Outcome: Progressing     Problem: INFECTION - ADULT  Goal: Absence or prevention of progression during hospitalization  Description: INTERVENTIONS:  - Assess and monitor for signs and symptoms of infection  - Monitor lab/diagnostic results  - Monitor all insertion sites, i e  indwelling lines, tubes, and drains  - Monitor endotracheal if appropriate and nasal secretions for changes in amount and color  - [place] appropriate cooling/warming therapies per order  - Administer medications as ordered  - Instruct and encourage patient and family to use good hand hygiene technique  - Identify and instruct in appropriate isolation precautions for identified infection/condition  Outcome: Progressing  Goal: Absence of fever/infection during neutropenic period  Description: INTERVENTIONS:  - Monitor WBC    Outcome: Progressing     Problem: Knowledge Deficit  Goal: Patient/family/caregiver demonstrates understanding of disease process, treatment plan, medications, and discharge instructions  Description: Complete learning assessment and assess knowledge base    Interventions:  - Provide teaching at level of understanding  - Provide teaching via preferred learning methods  Outcome: Progressing

## 2023-06-23 NOTE — ED NOTES
Pt ambulatory to bathroom, steady gait       Luciano Hardwick, [facility]  06/23/23 1015

## 2023-06-23 NOTE — ASSESSMENT & PLAN NOTE
History of coronary artery disease status post PCI  Continue home aspirin and Brilinta  Not on beta-blocker due to bifascicular block  Continue statin

## 2023-06-23 NOTE — ASSESSMENT & PLAN NOTE
Wt Readings from Last 3 Encounters:   06/23/23 97 3 kg (214 lb 8 1 oz)   06/23/23 95 1 kg (209 lb 9 6 oz)   06/07/23 97 8 kg (215 lb 9 6 oz)     Appears euvolemic  We will continue Lasix

## 2023-06-24 VITALS
BODY MASS INDEX: 34.34 KG/M2 | HEART RATE: 63 BPM | SYSTOLIC BLOOD PRESSURE: 119 MMHG | WEIGHT: 206.13 LBS | OXYGEN SATURATION: 97 % | HEIGHT: 65 IN | RESPIRATION RATE: 15 BRPM | TEMPERATURE: 97.6 F | DIASTOLIC BLOOD PRESSURE: 73 MMHG

## 2023-06-24 LAB
ANION GAP SERPL CALCULATED.3IONS-SCNC: 9 MMOL/L
BASOPHILS # BLD AUTO: 0.03 THOUSANDS/ÂΜL (ref 0–0.1)
BASOPHILS NFR BLD AUTO: 1 % (ref 0–1)
BUN SERPL-MCNC: 22 MG/DL (ref 5–25)
CALCIUM SERPL-MCNC: 9.3 MG/DL (ref 8.4–10.2)
CHLORIDE SERPL-SCNC: 104 MMOL/L (ref 96–108)
CO2 SERPL-SCNC: 26 MMOL/L (ref 21–32)
CREAT SERPL-MCNC: 1.47 MG/DL (ref 0.6–1.3)
EOSINOPHIL # BLD AUTO: 0.08 THOUSAND/ÂΜL (ref 0–0.61)
EOSINOPHIL NFR BLD AUTO: 1 % (ref 0–6)
ERYTHROCYTE [DISTWIDTH] IN BLOOD BY AUTOMATED COUNT: 15.2 % (ref 11.6–15.1)
GFR SERPL CREATININE-BSD FRML MDRD: 36 ML/MIN/1.73SQ M
GLUCOSE SERPL-MCNC: 120 MG/DL (ref 65–140)
GLUCOSE SERPL-MCNC: 123 MG/DL (ref 65–140)
GLUCOSE SERPL-MCNC: 251 MG/DL (ref 65–140)
HCT VFR BLD AUTO: 38.4 % (ref 34.8–46.1)
HGB BLD-MCNC: 12.7 G/DL (ref 11.5–15.4)
IMM GRANULOCYTES # BLD AUTO: 0.03 THOUSAND/UL (ref 0–0.2)
IMM GRANULOCYTES NFR BLD AUTO: 1 % (ref 0–2)
LYMPHOCYTES # BLD AUTO: 1.82 THOUSANDS/ÂΜL (ref 0.6–4.47)
LYMPHOCYTES NFR BLD AUTO: 31 % (ref 14–44)
MCH RBC QN AUTO: 27.4 PG (ref 26.8–34.3)
MCHC RBC AUTO-ENTMCNC: 33.1 G/DL (ref 31.4–37.4)
MCV RBC AUTO: 83 FL (ref 82–98)
MONOCYTES # BLD AUTO: 0.49 THOUSAND/ÂΜL (ref 0.17–1.22)
MONOCYTES NFR BLD AUTO: 8 % (ref 4–12)
NEUTROPHILS # BLD AUTO: 3.49 THOUSANDS/ÂΜL (ref 1.85–7.62)
NEUTS SEG NFR BLD AUTO: 58 % (ref 43–75)
NRBC BLD AUTO-RTO: 0 /100 WBCS
PLATELET # BLD AUTO: 241 THOUSANDS/UL (ref 149–390)
PMV BLD AUTO: 10.3 FL (ref 8.9–12.7)
POTASSIUM SERPL-SCNC: 3.6 MMOL/L (ref 3.5–5.3)
RBC # BLD AUTO: 4.63 MILLION/UL (ref 3.81–5.12)
SODIUM SERPL-SCNC: 139 MMOL/L (ref 135–147)
WBC # BLD AUTO: 5.94 THOUSAND/UL (ref 4.31–10.16)

## 2023-06-24 PROCEDURE — 82948 REAGENT STRIP/BLOOD GLUCOSE: CPT

## 2023-06-24 PROCEDURE — 80048 BASIC METABOLIC PNL TOTAL CA: CPT | Performed by: INTERNAL MEDICINE

## 2023-06-24 PROCEDURE — 99239 HOSP IP/OBS DSCHRG MGMT >30: CPT | Performed by: NURSE PRACTITIONER

## 2023-06-24 PROCEDURE — 85025 COMPLETE CBC W/AUTO DIFF WBC: CPT | Performed by: INTERNAL MEDICINE

## 2023-06-24 RX ORDER — DOXAZOSIN 2 MG/1
2 TABLET ORAL DAILY
Qty: 30 TABLET | Refills: 0 | Status: SHIPPED | OUTPATIENT
Start: 2023-06-24 | End: 2023-07-24

## 2023-06-24 RX ADMIN — ASPIRIN 81 MG 81 MG: 81 TABLET ORAL at 08:05

## 2023-06-24 RX ADMIN — INSULIN LISPRO 3 UNITS: 100 INJECTION, SOLUTION INTRAVENOUS; SUBCUTANEOUS at 12:00

## 2023-06-24 RX ADMIN — PANTOPRAZOLE SODIUM 40 MG: 40 TABLET, DELAYED RELEASE ORAL at 08:05

## 2023-06-24 RX ADMIN — ISOSORBIDE MONONITRATE 60 MG: 60 TABLET, EXTENDED RELEASE ORAL at 08:05

## 2023-06-24 RX ADMIN — TICAGRELOR 90 MG: 90 TABLET ORAL at 08:05

## 2023-06-24 RX ADMIN — GABAPENTIN 300 MG: 300 CAPSULE ORAL at 08:05

## 2023-06-24 RX ADMIN — FUROSEMIDE 20 MG: 20 TABLET ORAL at 08:05

## 2023-06-24 RX ADMIN — DOXAZOSIN 2 MG: 1 TABLET ORAL at 10:02

## 2023-06-24 NOTE — CASE MANAGEMENT
Case Management Discharge Planning Note    Patient name Yancy Elliott  Location /[id number] MRN [id number]  : 1955 Date 2023       Current Admission Date: 2023  Current Admission Diagnosis:Hypertensive urgency   Patient Active Problem List    Diagnosis Date Noted   • Hypertensive urgency 2023   • Coronary artery disease involving native coronary artery of native heart 2023   • Palpitations 2023   • Chest pain 2023   • Acute kidney injury (PADMINI) with chronic kidney disease stage III 2023   • Thyroid nodule 2022   • Other headache syndrome 2022   • Chronic kidney disease, stage 3 unspecified ([address] ) 2022   • Abnormal CT scan 08/10/2021   • Myasthenia gravis ([address] ) 2021   • Depression, recurrent ([address] ) 2021   • Cerebral meningocele ([facility] ) 2021   • Obesity, morbid ([address] ) 2020   • UTI (urinary tract infection) 2020   • Chronic heart failure ([address] ) 2020   • Subcortical microvascular ischemic occlusive disease 2019   • Sixth nerve palsy of right eye    • Binocular vision disorder with diplopia 2019   • Carpal tunnel syndrome on left 2019   • Numbness in both hands 2019   • Carpal tunnel syndrome of left wrist 2019   • Multinodular goiter 2019   • Hoarseness or changing voice 2019   • 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 proliferative retinopathy and macular edema, without long-term current use of insulin ([facility] )    • Diabetic neuropathy ([facility] ) 2018   • Chronic constipation 2017   • Cyst of left breast 2017   • Impaired hearing 2017   • Cerebral atherosclerosis 05/15/2017   • Dyslipidemia 2017   • Moderate persistent asthma without complication    • Lumbar pain 10/18/2016   • Hiatal hernia 10/18/2016   • Mild persistent asthma without complication    • Chronic back pain 2016   • Depression 2016   • Essential hypertension 09/06/2016   • Neuropathy, diabetic ([facility] ) 09/06/2016      LOS (days): 0  Geometric Mean LOS (GMLOS) (days):   Days to GMLOS:     OBJECTIVE:            Current admission status: Observation   Preferred Pharmacy:   [facility], [address]  Critical access hospital [address]  Phone: [phone] Fax: [phone] Jesika Lomax, [address]  [facility] [address]  Phone: [phone] Fax: [phone]    Primary Care Provider: Rekha Camacho DO    Primary Insurance: [facility]  Secondary Insurance: Luz Elena Mendes    DISCHARGE DETAILS:  Pt is being dc'd home on this date with OP follow up after dc  Discharge Destination Plan[de-identified] Home  Transport at Discharge : [address] by [facility] and Unit #):  [place]  ETA of Transport (Date): 06/24/23  ETA of Transport (Time): 1265

## 2023-06-24 NOTE — ASSESSMENT & PLAN NOTE
Wt Readings from Last 3 Encounters:   06/23/23 93 5 kg (206 lb 2 1 oz)   06/23/23 95 1 kg (209 lb 9 6 oz)   06/07/23 97 8 kg (215 lb 9 6 oz)     · Appears euvolemic  · Continue Lasix 20 mg QOD PO

## 2023-06-24 NOTE — UTILIZATION REVIEW
Initial Clinical Review    Admission: Date/Time/Statement:   Admission Orders (From admission, onward)     Ordered        06/23/23 1127  Place in Observation  Once                      Orders Placed This Encounter   Procedures   • Place in Observation     Standing Status:   Standing     Number of Occurrences:   1     Order Specific Question:   Level of Care     Answer:   Med Surg [16]     ED Arrival Information     Expected   6/23/2023     Arrival   6/23/2023 09:48    Acuity   Emergent            Means of arrival   Ambulance    Escorted by   [facility]    Service   Hospitalist    Admission type   Emergency            Arrival complaint   hypertensive urgency, CP, HA           Chief Complaint   Patient presents with   • Hypertension     Pt having high blood pressure and severe headache  Seen by nephrologist today and sent here for further workup       Initial Presentation: 79 y o  female to the ED from Nephrology office via EMs with complaints of elevated bp, severe headaches  H/O CAD (h/o cardiac stent placement), HTN, DM, CKD, anxiety/depression, anemia, thyroid nodule, arthritis   Arrives nervous, anxious, bp 242/106  EKG shows no changes  Start Cardura  Continue hydralazine  Continue brilinta, asa       Date:     Day 2:      ED Triage Vitals   Temperature Pulse Respirations Blood Pressure SpO2   06/23/23 0950 06/23/23 0950 06/23/23 0950 06/23/23 0950 06/23/23 0950   98 °F (36 7 °C) 58 18 (!) 242/106 97 %      Temp Source Heart Rate Source Patient Position - Orthostatic VS BP Location FiO2 (%)   06/23/23 0950 06/23/23 1054 06/23/23 0950 06/23/23 0950 --   Temporal Monitor Sitting Right arm       Pain Score       06/23/23 0950       7          Wt Readings from Last 1 Encounters:   06/23/23 93 5 kg (206 lb 2 1 oz)     Additional Vital Signs:   Time Temp Pulse Resp BP MAP (mmHg) SpO2 O2 Device Patient Position - Orthostatic VS   06/24/23 06:35:10 98 2 °F (36 8 °C) 64 18 147/80 102 96 % -- --   06/23/23 21:58:27 98 7 °F (37 1 °C) 79 18 169/97 121 93 % None (Room air) Lying   06/23/23 1957 -- -- -- -- -- 98 % None (Room air) --   06/23/23 14:15:25 98 2 °F (36 8 °C) 73 19 165/80 108 97 % None (Room air) Sitting   06/23/23 1145 -- 85 16 160/77 110 98 % None (Room air) Lying   06/23/23 1100 -- 67 16 -- 127 98 % -- --   06/23/23 1054 -- 71 16 211/88 Abnormal   -- 97 % None (Room air) Lying   06/23/23 1048 -- 64 18 206/89 Abnormal   128 98 % None (Room air) Lying   06/23/23 1030 -- 66 16 205/86 Abnormal  123 97 % None (Room air) Lying   06/23/23 1000 -- 58 22 229/102 Abnormal   147 96 % None (Room air) Lying   06/23/23 0950 98 °F (36 7 °C) 58 18 242/106 Abnormal  -- 97 % None (Room air) Sitting       Pertinent Labs/Diagnostic Test Results:     CT head without contrast   Final Result by Marguerite Pagan MD (06/23 1055)      No acute intracranial abnormality  Workstation performed: [id number]         XR chest 1 view portable   Final Result by Marguerite Pagan MD (06/23 1054)      No acute cardiopulmonary disease                    Workstation performed: [id number]               Results from last 7 days   Lab Units 06/24/23  0449 06/23/23  0957   WBC Thousand/uL 5 94 6 10   HEMOGLOBIN g/dL 12 7 13 4   HEMATOCRIT % 38 4 42 5   PLATELETS Thousands/uL 241 234   NEUTROS ABS Thousands/µL 3 49 3 07         Results from last 7 days   Lab Units 06/24/23  0703 06/23/23  1033 06/23/23  0957   SODIUM mmol/L 139 140 142   POTASSIUM mmol/L 3 6 3 5 3 1*   CHLORIDE mmol/L 104 105 117*   CO2 mmol/L 26 24 17*   ANION GAP mmol/L 9 11 8   BUN mg/dL 22 23 18   CREATININE mg/dL 1 47* 1 33* 0 86   EGFR ml/min/1 73sq m 36 41 70   CALCIUM mg/dL 9 3 9 4 6 4*   MAGNESIUM mg/dL  --   --  1 5*     Results from last 7 days   Lab Units 06/23/23  0957   AST U/L 16   ALT U/L 7   ALK PHOS U/L 60   TOTAL PROTEIN g/dL 5 2*   ALBUMIN g/dL 2 6*   TOTAL BILIRUBIN mg/dL 0 25     Results from last 7 days   Lab Units 06/24/23  0724 06/23/23 2046 06/23/23  1550 06/23/23  1204   POC GLUCOSE mg/dl 120 146* 177* 170*     Results from last 7 days   Lab Units 06/24/23  0703 06/23/23  1033 06/23/23  0957   GLUCOSE RANDOM mg/dL 123 121 99       Results from last 7 days   Lab Units 06/23/23  1438 06/23/23  1213 06/23/23  0957   HS TNI 0HR ng/L  --   --  7   HS TNI 2HR ng/L  --  7  --    HSTNI D2 ng/L  --  0  --    HS TNI 4HR ng/L 8  --   --    HSTNI D4 ng/L 1  --   --          Results from last 7 days   Lab Units 06/23/23  0957   PROTIME seconds 12 9   INR  0 95   PTT seconds 22*             Results from last 7 days   Lab Units 06/23/23  0957   BNP pg/mL 88       ED Treatment:   Medication Administration from 06/23/2023 2307 to 06/23/2023 1407       Date/Time Order Dose Route Action     06/23/2023 1011 EDT magnesium sulfate 2 g/50 mL IVPB (premix) 2 g 2 g Intravenous New Bag     06/23/2023 1010 EDT hydrALAZINE (APRESOLINE) injection 10 mg 10 mg Intravenous Given     06/23/2023 1113 EDT ondansetron (ZOFRAN) injection 4 mg 4 mg Intravenous Given     06/23/2023 1113 EDT hydrALAZINE (APRESOLINE) injection 10 mg 10 mg Intravenous Given     06/23/2023 1235 EDT ondansetron (ZOFRAN) injection 4 mg 4 mg Intravenous Given        Past Medical History:   Diagnosis Date   • Abnormal ECG     last assessed: 5/15/17   • Abnormal mammogram     last assessed: 7/11/17   • Abnormal stress test     last assesed: 7/24/17   • Anemia    • Anxiety    • Arthritis    • Asthma    • Back problem    • Breast cyst    • CAD (coronary artery disease)    • Chest pain     last assessed: 7/24/17   • Chronic pain disorder    • Cyst of left breast     last assessed: 8/18/17   • Depression    • Depression     resolved: 1/2/18   • Diabetes mellitus ([facility] [address] )    • Hiatal hernia     last assessed: 11/7/17   • Hyperlipidemia    • Hypertension    • Keloid of skin     last assessed: 11/2/16   • Multiple thyroid nodules    • Neuropathy    • Psychiatric disorder     anxiety   • Stroke [facility])     TIA 2005   • Tuberculosis     as a child      Present on Admission:  • Chronic heart failure ([facility] [address] )  • Coronary artery disease involving native coronary artery of native heart  • Chronic kidney disease, stage 3 unspecified (HCC)      Admitting Diagnosis: Hypomagnesemia [E83 42]  Hypertension [I10]  CKD (chronic kidney disease) [N18 9]  Hypertensive urgency [I16 0]  Headache [R51 9]  Age/Sex: 79 y o  female  Admission Orders:  Scheduled Medications:  aspirin, 81 mg, Oral, Daily  atorvastatin, 80 mg, Oral, Daily With Dinner  [facility] Hold] doxazosin, 2 mg, Oral, Daily  ezetimibe, 10 mg, Oral, HS  furosemide, 20 mg, Oral, Every Other Day  gabapentin, 300 mg, Oral, TID  [MAR Hold] heparin (porcine), 5,000 Units, Subcutaneous, Q8H [address]  [MAR Hold] insulin lispro, 1-5 Units, Subcutaneous, HS  [MAR Hold] insulin lispro, 1-6 Units, Subcutaneous, TID AC  isosorbide mononitrate, 60 mg, Oral, Daily  pantoprazole, 40 mg, Oral, Daily  ticagrelor, 90 mg, Oral, Q12H [address]      Continuous IV Infusions:     PRN Meds:  albuterol, 2 puff, Inhalation, Q6H PRN  [MAR Hold] hydrALAZINE, 10 mg, Intravenous, Q6H PRN  [MAR Hold] ondansetron, 4 mg, Intravenous, Q6H PRN        None    Network Utilization Review Department  ATTENTION: Please call with any questions or concerns to [phone] and carefully listen to the prompts so that you are directed to the right person  All voicemails are confidential   Irene Condai all requests for admission clinical reviews, approved or denied determinations and any other requests to dedicated fax number below belonging to the campus where the patient is receiving treatment   List of dedicated fax numbers for the Facilities:  [address] DENIALS (Administrative/Medical Necessity) [phone]   [address] [place] [address] (Maternity/NICU/Pediatrics) Debora Mckinney [address] Micah Javier  [address] [place] [address] Caren Ryan [facility] [address] [facility] [place] [address] 588 [facility] [phone]

## 2023-06-24 NOTE — ASSESSMENT & PLAN NOTE
Lab Results   Component Value Date    EGFR 36 06/24/2023    EGFR 41 06/23/2023    EGFR 70 06/23/2023    CREATININE 1 47 (H) 06/24/2023    CREATININE 1 33 (H) 06/23/2023    CREATININE 0 86 06/23/2023     · Creatinine around baseline  · Monitor periodically as an outpatient

## 2023-06-24 NOTE — ASSESSMENT & PLAN NOTE
· Kimberly CP  · History of CAD status post PCI  · Continue home aspirin and Brilinta  · Not on beta-blocker due to bifascicular block  · Continue statin

## 2023-06-24 NOTE — ASSESSMENT & PLAN NOTE
· Presented from nephrology office where she was noted to have significant elevated blood pressure  · Upon arrival to emergency department BP was noted to be approximately 259 systolic  · Currently asymptomatic  · EKG showed nonspecific EKG changes without chest pain  · Continue home Imdur and Lasix  · Not on ACE inhibitor due to lip swelling  · Cardura 2 mg daily initiated-continue  · Follow up as an outpatient

## 2023-06-24 NOTE — DISCHARGE INSTR - AVS FIRST PAGE
New medication for blood pressure Cardura 2 mg daily  Continue to watch your diet as you are already doing and we discussed  Please follow-up with your primary care in a week  Come back to the hospital at any time for new or worsening concerns

## 2023-06-24 NOTE — PLAN OF CARE
Problem: PAIN - ADULT  Goal: Verbalizes/displays adequate comfort level or baseline comfort level  Description: Interventions:  - Encourage patient to monitor pain and request assistance  - Assess pain using appropriate pain scale  - Administer analgesics based on type and severity of pain and evaluate response  - Implement non-pharmacological measures as appropriate and evaluate response  - Consider cultural and social influences on pain and pain management  - Notify physician/advanced practitioner if interventions unsuccessful or patient reports new pain  Outcome: Progressing     Problem: INFECTION - ADULT  Goal: Absence or prevention of progression during hospitalization  Description: INTERVENTIONS:  - Assess and monitor for signs and symptoms of infection  - Monitor lab/diagnostic results  - Monitor all insertion sites, i e  indwelling lines, tubes, and drains  - Monitor endotracheal if appropriate and nasal secretions for changes in amount and color  - Lulu appropriate cooling/warming therapies per order  - Administer medications as ordered  - Instruct and encourage patient and family to use good hand hygiene technique  - Identify and instruct in appropriate isolation precautions for identified infection/condition  Outcome: Progressing  Goal: Absence of fever/infection during neutropenic period  Description: INTERVENTIONS:  - Monitor WBC    Outcome: Progressing     Problem: Knowledge Deficit  Goal: Patient/family/caregiver demonstrates understanding of disease process, treatment plan, medications, and discharge instructions  Description: Complete learning assessment and assess knowledge base    Interventions:  - Provide teaching at level of understanding  - Provide teaching via preferred learning methods  Outcome: Progressing

## 2023-06-24 NOTE — PLAN OF CARE
Problem: Potential for Falls  Goal: Patient will remain free of falls  Description: INTERVENTIONS:  - Educate patient/family on patient safety including physical limitations  - Instruct patient to call for assistance with activity   - Consult OT/PT to assist with strengthening/mobility   - Keep Call bell within reach  - Keep bed low and locked with side rails adjusted as appropriate  - Keep care items and personal belongings within reach  - Initiate and maintain comfort rounds  - Make Fall Risk Sign visible to staff  - Offer Toileting every 2 Hours, in advance of need  - Initiate/Maintain bed/chair alarm  - Obtain necessary fall risk management equipment: call bell  - Apply yellow socks and bracelet for high fall risk patients  - Consider moving patient to room near nurses station  Outcome: Progressing     Problem: PAIN - ADULT  Goal: Verbalizes/displays adequate comfort level or baseline comfort level  Description: Interventions:  - Encourage patient to monitor pain and request assistance  - Assess pain using appropriate pain scale  - Administer analgesics based on type and severity of pain and evaluate response  - Implement non-pharmacological measures as appropriate and evaluate response  - Consider cultural and social influences on pain and pain management  - Notify physician/advanced practitioner if interventions unsuccessful or patient reports new pain  Outcome: Progressing     Problem: INFECTION - ADULT  Goal: Absence or prevention of progression during hospitalization  Description: INTERVENTIONS:  - Assess and monitor for signs and symptoms of infection  - Monitor lab/diagnostic results  - Monitor all insertion sites, i e  indwelling lines, tubes, and drains  - Monitor endotracheal if appropriate and nasal secretions for changes in amount and color  - Sparks appropriate cooling/warming therapies per order  - Administer medications as ordered  - Instruct and encourage patient and family to use good hand hygiene technique  - Identify and instruct in appropriate isolation precautions for identified infection/condition  Outcome: Progressing  Goal: Absence of fever/infection during neutropenic period  Description: INTERVENTIONS:  - Monitor WBC    Outcome: Progressing     Problem: SAFETY ADULT  Goal: Patient will remain free of falls  Description: INTERVENTIONS:  - Educate patient/family on patient safety including physical limitations  - Instruct patient to call for assistance with activity   - Consult OT/PT to assist with strengthening/mobility   - Keep Call bell within reach  - Keep bed low and locked with side rails adjusted as appropriate  - Keep care items and personal belongings within reach  - Initiate and maintain comfort rounds  - Make Fall Risk Sign visible to staff  - Offer Toileting every 2 Hours, in advance of need  - Initiate/Maintain bed/chair alarm  - Obtain necessary fall risk management equipment: call bell   - Apply yellow socks and bracelet for high fall risk patients  - Consider moving patient to room near nurses station  Outcome: Progressing  Goal: Maintain or return to baseline ADL function  Description: INTERVENTIONS:  -  Assess patient's ability to carry out ADLs; assess patient's baseline for ADL function and identify physical deficits which impact ability to perform ADLs (bathing, care of mouth/teeth, toileting, grooming, dressing, etc )  - Assess/evaluate cause of self-care deficits   - Assess range of motion  - Assess patient's mobility; develop plan if impaired  - Assess patient's need for assistive devices and provide as appropriate  - Encourage maximum independence but intervene and supervise when necessary  - Involve family in performance of ADLs  - Assess for home care needs following discharge   - Consider OT consult to assist with ADL evaluation and planning for discharge  - Provide patient education as appropriate  Outcome: Progressing  Goal: Maintains/Returns to pre admission functional level  Description: INTERVENTIONS:  - Perform BMAT or MOVE assessment daily    - Set and communicate daily mobility goal to care team and patient/family/caregiver  - Collaborate with rehabilitation services on mobility goals if consulted  - Perform Range of Motion 3 times a day  - Reposition patient every 2 hours  - Dangle patient 3 times a day  - Stand patient 3 times a day  - Ambulate patient 3 times a day  - Out of bed to chair 3 times a day   - Out of bed for meals 3 times a day  - Out of bed for toileting  - Record patient progress and toleration of activity level   Outcome: Progressing     Problem: DISCHARGE PLANNING  Goal: Discharge to home or other facility with appropriate resources  Description: INTERVENTIONS:  - Identify barriers to discharge w/patient and caregiver  - Arrange for needed discharge resources and transportation as appropriate  - Identify discharge learning needs (meds, wound care, etc )  - Arrange for interpretive services to assist at discharge as needed  - Refer to Case Management Department for coordinating discharge planning if the patient needs post-hospital services based on physician/advanced practitioner order or complex needs related to functional status, cognitive ability, or social support system  Outcome: Progressing     Problem: Knowledge Deficit  Goal: Patient/family/caregiver demonstrates understanding of disease process, treatment plan, medications, and discharge instructions  Description: Complete learning assessment and assess knowledge base    Interventions:  - Provide teaching at level of understanding  - Provide teaching via preferred learning methods  Outcome: Progressing

## 2023-06-24 NOTE — DISCHARGE SUMMARY
[facility] [facility] [address] [place]  Discharge- Jerica Trippield 1955, 79 y o  female MRN: [id number]  Unit/Bed#: 427-01 Encounter: [id number]  Primary Care Provider: Demetra Trejo DO   Date and time admitted to hospital: 6/23/2023  9:49 AM    * Hypertensive urgency  Assessment & Plan  · Presented from nephrology office where she was noted to have significant elevated blood pressure  · Upon arrival to emergency department BP was noted to be approximately 586 systolic  · Currently asymptomatic  · EKG showed nonspecific EKG changes without chest pain  · Continue home Imdur and Lasix  · Not on ACE inhibitor due to lip swelling  · Cardura 2 mg daily initiated-continue  · Follow up as an outpatient    Coronary artery disease involving native coronary artery of native heart  Assessment & Plan  · Kimberly RAMIRES  · History of CAD status post PCI  · Continue home aspirin and Brilinta  · Not on beta-blocker due to bifascicular block  · Continue statin    Chronic kidney disease, stage 3 unspecified [facility])  Assessment & Plan  Lab Results   Component Value Date    EGFR 36 06/24/2023    EGFR 41 06/23/2023    EGFR 70 06/23/2023    CREATININE 1 47 (H) 06/24/2023    CREATININE 1 33 (H) 06/23/2023    CREATININE 0 86 06/23/2023     · Creatinine around baseline  · Monitor periodically as an outpatient    Chronic heart failure [facility])  Assessment & Plan  Wt Readings from Last 3 Encounters:   06/23/23 93 5 kg (206 lb 2 1 oz)   06/23/23 95 1 kg (209 lb 9 6 oz)   06/07/23 97 8 kg (215 lb 9 6 oz)     · Appears euvolemic  · Continue Lasix 20 mg QOD PO        Medical Problems     Resolved Problems  Date Reviewed: 6/24/2023   None       Discharging Physician / Practitioner: JUAN Downey  PCP: Demetra Trejo DO  Admission Date:   Admission Orders (From admission, onward)     Ordered        06/23/23 1127  Place in Observation  Once                      Discharge Date: 06/24/23    Consultations During Hospital Stay:  · None    Procedures "Performed:   · None    Significant Findings / Test Results:   · Chest x-ray 6/23/2023: No acute cardiopulmonary disease  · CT head 6/23/2023: No acute intracranial abnormality    Complications:  None apparent    Reason for Admission: Hypertensive urgency    Hospital Course:   Alisa Chanel is a 79 y o  female patient who originally presented to the hospital on 6/23/2023 due to headache and high blood pressure  She was sent from her nephrologist office and found to have high blood pressure arrival to ER  She said that she had some salty soup and she is also under stress at home with the neighbors but trying to move  She received 2 doses of IV hydralazine with improved blood pressure  She no longer has a headache  She understands to follow-up with her primary care in about a week and we discussed stress reduction techniques and sodium restriction  Please see full patient chart for additional details  Condition at Discharge: stable    Discharge Day Visit / Exam:   Subjective: Patient seen and examined  She denies headache, visual changes, shortness of breath, chest pain, back pain, dizziness, headedness, or syncope  Vitals: Blood Pressure: 147/80 (06/24/23 0635)  Pulse: 64 (06/24/23 0635)  Temperature: 98 2 °F (36 8 °C) (06/24/23 0635)  Temp Source: Oral (06/23/23 2158)  Respirations: 18 (06/24/23 0635)  Height: 5' 5\" (165 1 cm) (06/23/23 1415)  Weight - Scale: 93 5 kg (206 lb 2 1 oz) (06/23/23 1415)  SpO2: 96 % (06/24/23 5757)    Exam:   Physical Exam  Vitals and nursing note reviewed  Constitutional:       General: She is not in acute distress  Appearance: She is not ill-appearing  HENT:      Head: Normocephalic and atraumatic  Nose: Nose normal       Mouth/Throat:      Mouth: Mucous membranes are moist       Pharynx: Oropharynx is clear  Eyes:      Extraocular Movements: Extraocular movements intact  Pupils: Pupils are equal, round, and reactive to light     Cardiovascular:      " Rate and Rhythm: Normal rate and regular rhythm  Pulses: Normal pulses  Pulmonary:      Effort: Pulmonary effort is normal  No respiratory distress  Breath sounds: Normal breath sounds  Abdominal:      General: Bowel sounds are normal       Palpations: Abdomen is soft  Tenderness: There is no abdominal tenderness  Musculoskeletal:      Cervical back: Neck supple  Right lower leg: No edema  Left lower leg: No edema  Skin:     General: Skin is warm and dry  Capillary Refill: Capillary refill takes less than 2 seconds  Neurological:      General: No focal deficit present  Mental Status: She is alert and oriented to person, place, and time  Psychiatric:         Mood and Affect: Mood normal          Behavior: Behavior normal           Discussion with Family: Attempted to update  (daughter) via phone  Left voicemail  Discharge instructions/Information to patient and family:   See after visit summary for information provided to patient and family  Provisions for Follow-Up Care:  See after visit summary for information related to follow-up care and any pertinent home health orders  Disposition:   Home    Planned Readmission: No     Discharge Statement:  I spent 55 minutes discharging the patient  This time was spent on the day of discharge  I had direct contact with the patient on the day of discharge  Greater than 50% of the total time was spent examining patient, answering all patient questions, arranging and discussing plan of care with patient as well as directly providing post-discharge instructions  Additional time then spent on discharge activities  Discharge Medications:  See after visit summary for reconciled discharge medications provided to patient and/or family        **Please Note: This note may have been constructed using a voice recognition system**

## 2023-06-26 ENCOUNTER — TRANSITIONAL CARE MANAGEMENT (OUTPATIENT)
Dept: FAMILY MEDICINE CLINIC | Facility: CLINIC | Age: 68
End: 2023-06-26

## 2023-06-26 ENCOUNTER — PATIENT OUTREACH (OUTPATIENT)
Dept: FAMILY MEDICINE CLINIC | Facility: CLINIC | Age: 68
End: 2023-06-26

## 2023-06-26 LAB
ATRIAL RATE: 54 BPM
P AXIS: 38 DEGREES
PR INTERVAL: 208 MS
QRS AXIS: -55 DEGREES
QRSD INTERVAL: 148 MS
QT INTERVAL: 494 MS
QTC INTERVAL: 468 MS
T WAVE AXIS: -26 DEGREES
VENTRICULAR RATE: 54 BPM

## 2023-06-26 PROCEDURE — 93010 ELECTROCARDIOGRAM REPORT: CPT | Performed by: INTERNAL MEDICINE

## 2023-06-26 NOTE — PROGRESS NOTES
Outpatient Care Management Note:  Call attempted to [facility] after recent hospitalization  Message left for patient to please return call  Contact information left on message

## 2023-06-26 NOTE — UTILIZATION REVIEW
NOTIFICATION OF OBSERVATION ADMISSION   AUTHORIZATION REQUEST   SERVICING FACILITY:   [address] [place] [address]  [place] [address], [place], [address]  Tax ID:  [id number]  NPI: [id number] ATTENDING PROVIDER:  Attending Name and NPI#: Srinivasan Morton [[id number]]  Address: [facility]  [place] [address]  Phone: [phone]     ADMISSION INFORMATION:  Place of Service: On [address] [place] [address] Code: 22 CPT Code:   Admitting Diagnosis Code/Description:  Hypomagnesemia [E83 42]  Hypertension [I10]  CKD (chronic kidney disease) [N18 9]  Hypertensive urgency [I16 0]  Headache [R51 9]  Observation Admission Date/Time: [id number]  Discharge Date/Time: 6/24/2023  3:29 PM     UTILIZATION REVIEW CONTACT:  Chava Jovel Utilization   Network Utilization Review Department  Phone: [phone]  Fax [phone]  Email: [email or web address]  Contact for approvals/pending authorizations, clinical reviews, and discharge  PHYSICIAN ADVISORY SERVICES:  Medical Necessity Denial & Orjx-ss-Qeiu Review  Phone: [phone]  Fax: [phone]  Email: [email or web address]

## 2023-06-29 ENCOUNTER — PATIENT OUTREACH (OUTPATIENT)
Dept: FAMILY MEDICINE CLINIC | Facility: CLINIC | Age: 68
End: 2023-06-29

## 2023-06-29 NOTE — PROGRESS NOTES
[facility] contacted [facility] for a status check  [facility] states she is looking to move from her currently apartment since she does not like the neighbors and does not feel the landlord keeps up on maintenance  [facility] offered to assist with housing applications but [facility] states she already has a section 8 application for [employer] completed and will mail it in  She also completed applications for housing in [facility] area  No assistance is needed with applying for housing at this time  [facility] has not received any contact from [employer] regarding her daughter becoming her paid caregiver  [facility] requested to speak with Erin's daughter, Isabel Chesterrobertvioleta, for further information  Isabel Ham confirms there has been no correspondence from [facility] aside from the eligibility evaluation for MOWs, which [facility] was not eligible for  [facility] whether a referral was entered previously  She is agreeable  The representative confirms there is no previous referral in the system so one was initiated  The initial assessment is scheduled for 7/6/23 between [id number] with enrollment janett Shah  [facility] also contacted Nmarata  and s/w Chary Lakhani  This writer registered [facility] for 30-day temporary approval and she will be mailed an application to be returned within the 30 days in order to continue services  Will outreach [facility] after her PAIEB evaluation to review next steps and assist with documentation collection as needed

## 2023-07-03 ENCOUNTER — PATIENT OUTREACH (OUTPATIENT)
Dept: FAMILY MEDICINE CLINIC | Facility: CLINIC | Age: 68
End: 2023-07-03

## 2023-07-03 NOTE — PROGRESS NOTES
[facility] received a voicemail from Lukas. The message was hard to understand and sounded muffled so [facility] called back to assess for needs. Lukas was unsure whether she had an appointment this week. [facility] reviewed her upcoming appointments - PCP on 7/5/23 @ 1100, PAIEB on 7/6/23 between [id number], and nephrology on 7/19/23 @ 1030. This writer also reminded Lukas she would need to call [facility] today in order to schedule a ride for Wednesday since their office is closed tomorrow for the holiday. She expressed understanding and requested the number for [facility] to call for the ride, [facility] provided this. Will outreach next week after the [address] appointment to assist as needed.

## 2023-07-03 NOTE — PROGRESS NOTES
Outpatient Care Management Note:  Follow up call attempted to [facility]. Message left for patient to please return call. Contact information left on message. Recent note from CM OC reviewed. If no response to call, will close complex care management episode as [facility] has not returned multiple calls.

## 2023-07-07 ENCOUNTER — PATIENT OUTREACH (OUTPATIENT)
Dept: FAMILY MEDICINE CLINIC | Facility: CLINIC | Age: 68
End: 2023-07-07

## 2023-07-07 NOTE — PROGRESS NOTES
Outpatient Care Management Note:  Nahun Love has not returned several prior calls from this CM. Closing complex care management episode at this time. Remains open with SW CM and CM OC. Should future care management needs arise, a new episode can be opened.

## 2023-07-10 ENCOUNTER — PATIENT OUTREACH (OUTPATIENT)
Dept: FAMILY MEDICINE CLINIC | Facility: CLINIC | Age: 68
End: 2023-07-10

## 2023-07-10 NOTE — PROGRESS NOTES
Grazyna Gonzales contacted [facility] for a status update on PAIEB referral.     [facility] states she completed her visit with the Enrollment  but is unsure of the status. Grazyna Gonzales offered a 3-way call with [facility] and patient is agreeable. S/W Nicol Niño, she reviewed Erin's file and states the physician certification form (uploaded today) has been received and the [id number] was completed during the visit with the EB. They are waiting on the functional assessment forms from [facility]. Call ended with Paige Sam. [facility] confirms she hasn't been contacted by [facility] at the time of this call. Grazyna Gonzales informed her they will be calling to schedule an in-home visit and she expressed understanding. Grazyna Gonzales noted that [facility] rescheduled her PCP appointment from last week and reminded [facility] to call for transportation services for the next appointment on 7/14/23. She states she will call to schedule the ride. CMOC will contact [facility] next week to confirm she was able to schedule her in-home visit with Aging or assist with this process if needed.

## 2023-07-11 ENCOUNTER — PATIENT OUTREACH (OUTPATIENT)
Dept: FAMILY MEDICINE CLINIC | Facility: CLINIC | Age: 68
End: 2023-07-11

## 2023-07-11 NOTE — PROGRESS NOTES
MARLIN LANDIS reviewed case for hand off with Elaine ISAAC CM.  This MARLIN LANDIS removed from care team.

## 2023-07-12 ENCOUNTER — TELEPHONE (OUTPATIENT)
Dept: LAB | Facility: HOSPITAL | Age: 68
End: 2023-07-12

## 2023-07-12 ENCOUNTER — TELEPHONE (OUTPATIENT)
Dept: NEPHROLOGY | Facility: CLINIC | Age: 68
End: 2023-07-12

## 2023-07-12 NOTE — TELEPHONE ENCOUNTER
Spoke to pt, aware to complete labs prior to appt. Pt requested mobile lab, called them and they will reach out.

## 2023-07-17 ENCOUNTER — PATIENT OUTREACH (OUTPATIENT)
Dept: FAMILY MEDICINE CLINIC | Facility: CLINIC | Age: 68
End: 2023-07-17

## 2023-07-17 NOTE — PROGRESS NOTES
[facility] contacted Pilar Diaz for a status update on the waiver services referral.     On last outreach, Pilar Diaz was waiting for contact from [facility] to schedule her functional assessment. [facility] connected with Pilar Diaz but could not understand her and the call was disconnected x2. This writer attempted to contact Erin's daughter/EC, [facility]. No answer, left message on voicemail with reason for call, this writer's contact information, and a request for a call back to discuss. Also left a reminder that Pilar Diaz has an appt with nephrology on Wednesday, 7/19/23, and will need to contact [facility] for transportation if they have not done so already. [facility] contacted [facility] for a status update on the functional assessment. The representative was unable to confirm if a functional assessment was completed but informed this writer that the  is [facility]. CMOC was transferred to her extension and left a voicemail with this writer's contact information, Erin's information, and a request for a call back with an update. Will outreach again in one week if no contact prior. 1 or 2

## 2023-07-19 ENCOUNTER — OFFICE VISIT (OUTPATIENT)
Dept: NEPHROLOGY | Facility: CLINIC | Age: 68
End: 2023-07-19
Payer: COMMERCIAL

## 2023-07-19 ENCOUNTER — PATIENT OUTREACH (OUTPATIENT)
Dept: FAMILY MEDICINE CLINIC | Facility: CLINIC | Age: 68
End: 2023-07-19

## 2023-07-19 ENCOUNTER — APPOINTMENT (OUTPATIENT)
Dept: LAB | Facility: MEDICAL CENTER | Age: 68
End: 2023-07-19
Payer: COMMERCIAL

## 2023-07-19 VITALS
HEIGHT: 65 IN | OXYGEN SATURATION: 98 % | HEART RATE: 67 BPM | BODY MASS INDEX: 35.22 KG/M2 | WEIGHT: 211.4 LBS | DIASTOLIC BLOOD PRESSURE: 94 MMHG | SYSTOLIC BLOOD PRESSURE: 194 MMHG

## 2023-07-19 DIAGNOSIS — E05.00 GRAVES DISEASE: ICD-10-CM

## 2023-07-19 DIAGNOSIS — N17.0 ACUTE KIDNEY INJURY (AKI) WITH ACUTE TUBULAR NECROSIS (ATN) (HCC): ICD-10-CM

## 2023-07-19 DIAGNOSIS — G70.00 MYASTHENIA GRAVIS (HCC): ICD-10-CM

## 2023-07-19 DIAGNOSIS — R80.9 NEPHROTIC RANGE PROTEINURIA: ICD-10-CM

## 2023-07-19 DIAGNOSIS — G44.89 OTHER HEADACHE SYNDROME: ICD-10-CM

## 2023-07-19 DIAGNOSIS — I10 SEVERE HYPERTENSION: Primary | ICD-10-CM

## 2023-07-19 DIAGNOSIS — E78.00 PURE HYPERCHOLESTEROLEMIA: ICD-10-CM

## 2023-07-19 DIAGNOSIS — N18.32 STAGE 3B CHRONIC KIDNEY DISEASE (HCC): ICD-10-CM

## 2023-07-19 DIAGNOSIS — I16.0 HYPERTENSIVE URGENCY: ICD-10-CM

## 2023-07-19 DIAGNOSIS — I12.9 HYPERTENSIVE NEPHROPATHY: ICD-10-CM

## 2023-07-19 LAB
ALBUMIN SERPL BCP-MCNC: 3.2 G/DL (ref 3.5–5)
ALP SERPL-CCNC: 95 U/L (ref 46–116)
ALT SERPL W P-5'-P-CCNC: 23 U/L (ref 12–78)
ANION GAP SERPL CALCULATED.3IONS-SCNC: 3 MMOL/L
AST SERPL W P-5'-P-CCNC: 17 U/L (ref 5–45)
BASOPHILS # BLD AUTO: 0.02 THOUSANDS/ÂΜL (ref 0–0.1)
BASOPHILS NFR BLD AUTO: 0 % (ref 0–1)
BILIRUB SERPL-MCNC: 0.33 MG/DL (ref 0.2–1)
BUN SERPL-MCNC: 17 MG/DL (ref 5–25)
CALCIUM ALBUM COR SERPL-MCNC: 9.8 MG/DL (ref 8.3–10.1)
CALCIUM SERPL-MCNC: 9.2 MG/DL (ref 8.3–10.1)
CHLORIDE SERPL-SCNC: 111 MMOL/L (ref 96–108)
CO2 SERPL-SCNC: 29 MMOL/L (ref 21–32)
CREAT SERPL-MCNC: 1.46 MG/DL (ref 0.6–1.3)
EOSINOPHIL # BLD AUTO: 0.08 THOUSAND/ÂΜL (ref 0–0.61)
EOSINOPHIL NFR BLD AUTO: 2 % (ref 0–6)
ERYTHROCYTE [DISTWIDTH] IN BLOOD BY AUTOMATED COUNT: 15.7 % (ref 11.6–15.1)
GFR SERPL CREATININE-BSD FRML MDRD: 36 ML/MIN/1.73SQ M
GLUCOSE P FAST SERPL-MCNC: 112 MG/DL (ref 65–99)
HCT VFR BLD AUTO: 40 % (ref 34.8–46.1)
HGB BLD-MCNC: 12.3 G/DL (ref 11.5–15.4)
IMM GRANULOCYTES # BLD AUTO: 0.01 THOUSAND/UL (ref 0–0.2)
IMM GRANULOCYTES NFR BLD AUTO: 0 % (ref 0–2)
LYMPHOCYTES # BLD AUTO: 1.69 THOUSANDS/ÂΜL (ref 0.6–4.47)
LYMPHOCYTES NFR BLD AUTO: 36 % (ref 14–44)
MAGNESIUM SERPL-MCNC: 2.3 MG/DL (ref 1.6–2.6)
MCH RBC QN AUTO: 25.7 PG (ref 26.8–34.3)
MCHC RBC AUTO-ENTMCNC: 30.8 G/DL (ref 31.4–37.4)
MCV RBC AUTO: 84 FL (ref 82–98)
MONOCYTES # BLD AUTO: 0.43 THOUSAND/ÂΜL (ref 0.17–1.22)
MONOCYTES NFR BLD AUTO: 9 % (ref 4–12)
NEUTROPHILS # BLD AUTO: 2.51 THOUSANDS/ÂΜL (ref 1.85–7.62)
NEUTS SEG NFR BLD AUTO: 53 % (ref 43–75)
NRBC BLD AUTO-RTO: 0 /100 WBCS
PHOSPHATE SERPL-MCNC: 3.4 MG/DL (ref 2.3–4.1)
PLATELET # BLD AUTO: 200 THOUSANDS/UL (ref 149–390)
PMV BLD AUTO: 10.5 FL (ref 8.9–12.7)
POTASSIUM SERPL-SCNC: 3.5 MMOL/L (ref 3.5–5.3)
PROT SERPL-MCNC: 7.6 G/DL (ref 6.4–8.4)
RBC # BLD AUTO: 4.78 MILLION/UL (ref 3.81–5.12)
SODIUM SERPL-SCNC: 143 MMOL/L (ref 135–147)
T4 FREE SERPL-MCNC: 0.84 NG/DL (ref 0.61–1.12)
TSH SERPL DL<=0.05 MIU/L-ACNC: 1.51 UIU/ML (ref 0.45–4.5)
WBC # BLD AUTO: 4.74 THOUSAND/UL (ref 4.31–10.16)

## 2023-07-19 PROCEDURE — 84165 PROTEIN E-PHORESIS SERUM: CPT

## 2023-07-19 PROCEDURE — 84100 ASSAY OF PHOSPHORUS: CPT

## 2023-07-19 PROCEDURE — 36415 COLL VENOUS BLD VENIPUNCTURE: CPT

## 2023-07-19 PROCEDURE — 84443 ASSAY THYROID STIM HORMONE: CPT

## 2023-07-19 PROCEDURE — 84439 ASSAY OF FREE THYROXINE: CPT

## 2023-07-19 PROCEDURE — 82384 ASSAY THREE CATECHOLAMINES: CPT

## 2023-07-19 PROCEDURE — 82088 ASSAY OF ALDOSTERONE: CPT

## 2023-07-19 PROCEDURE — 85025 COMPLETE CBC W/AUTO DIFF WBC: CPT

## 2023-07-19 PROCEDURE — 99214 OFFICE O/P EST MOD 30 MIN: CPT | Performed by: INTERNAL MEDICINE

## 2023-07-19 PROCEDURE — 80053 COMPREHEN METABOLIC PANEL: CPT

## 2023-07-19 PROCEDURE — 84244 ASSAY OF RENIN: CPT

## 2023-07-19 PROCEDURE — 83735 ASSAY OF MAGNESIUM: CPT

## 2023-07-19 RX ORDER — FUROSEMIDE 20 MG/1
20 TABLET ORAL DAILY
Qty: 90 TABLET | Refills: 3 | Status: SHIPPED | OUTPATIENT
Start: 2023-07-19 | End: 2023-07-19 | Stop reason: SDUPTHER

## 2023-07-19 RX ORDER — DOXAZOSIN MESYLATE 4 MG/1
4 TABLET ORAL
Qty: 90 TABLET | Refills: 3 | Status: SHIPPED | OUTPATIENT
Start: 2023-07-19

## 2023-07-19 RX ORDER — CARVEDILOL 3.12 MG/1
3.12 TABLET ORAL 2 TIMES DAILY WITH MEALS
Qty: 60 TABLET | Refills: 2 | Status: SHIPPED | OUTPATIENT
Start: 2023-07-19 | End: 2023-07-24 | Stop reason: SDUPTHER

## 2023-07-19 RX ORDER — FUROSEMIDE 20 MG/1
20 TABLET ORAL DAILY
Qty: 90 TABLET | Refills: 3 | Status: SHIPPED | OUTPATIENT
Start: 2023-07-19

## 2023-07-19 NOTE — PROGRESS NOTES
CMOC received a call back from patient's daughter, Yana Fontaine. Yana Fontaine confirms Lukas completed the functional assessment with [employer]. CMOC explained that the referral will now be sent to [facility] for financial determination. They should receive correspondence from [facility] in the next few weeks containing a list of documentation needed to complete the waiver referral for processing. Yana Fontaine expressed understanding and will look for the correspondence in the mail. Yana Fontaine confirmed Lukas went to her nephrology appointment today. Will outreach again in two weeks for a status update on [facility] correspondence and assist with documentation collection as needed.

## 2023-07-19 NOTE — ASSESSMENT & PLAN NOTE
Resolved but will increase Cardura/doxazosin to 4 mg at bedtime and add a low-dose of carvedilol 3.125 mg twice a day to prevent surges in blood pressure  She will check her blood pressure regularly in the morning on arising and occasionally in the afternoon until she sees a pattern  I explained that blood pressure control is tantamount to preventing her from progressing to renal failure. She has a mother who is on dialysis due to diabetic nephropathy.   She needs to slow things down by controlling her blood pressure

## 2023-07-19 NOTE — ASSESSMENT & PLAN NOTE
Lab Results   Component Value Date    EGFR 36 06/24/2023    EGFR 41 06/23/2023    EGFR 70 06/23/2023    CREATININE 1.47 (H) 06/24/2023    CREATININE 1.33 (H) 06/23/2023    CREATININE 0.86 06/23/2023   Baseline creatinine has been 1.3 to 1.4 mg/dL with previously normal levels. She does have hypertensive most likely nephrosclerosis in view of erratic nature of her blood pressure  She is also quite salt sensitive and she had pizza last night and her blood pressure was elevated. She does check her blood pressure at home and it runs in the 140 range  She does have a normal kidney ultrasound. She has nephrotic range proteinuria. Using a diagram I explained glomerular function and structure where she is losing the protein. She cannot take an ACE inhibitor due to angioedema. She is taking Farxiga 10 mg successfully.   Would be afraid to try finerenone due to possible related angioedema although I will look into this  She does avoid NSAIDs and only uses Tylenol  We will arrange kidney smart education to teach her something about kidney function and diet  She will go for scheduled labs

## 2023-07-19 NOTE — PATIENT INSTRUCTIONS
Explained the mechanism of protein loss in your urine  You can decrease the protein loss by getting your blood pressure down to goal less than 130/80  Increase Cardura/doxazosin to 4 mg at bedtime  Take Lasix 20 mg every day with potassium  Start carvedilol 3.125 mcg twice a day with food. Check your pulse and your blood pressure. Carvedilol can slow your pulse let me know  [facility].   Go to [employer] on the bottom and then go to my messages and email me your blood pressures

## 2023-07-19 NOTE — PROGRESS NOTES
[facility]    Name: Darci Lundberg  YOB: 1955      Assessment/Plan:           Problem List Items Addressed This Visit        Cardiovascular and Mediastinum    Hypertensive urgency     Resolved but will increase Cardura/doxazosin to 4 mg at bedtime and add a low-dose of carvedilol 3.125 mg twice a day to prevent surges in blood pressure  She will check her blood pressure regularly in the morning on arising and occasionally in the afternoon until she sees a pattern  I explained that blood pressure control is tantamount to preventing her from progressing to renal failure. She has a mother who is on dialysis due to diabetic nephropathy. She needs to slow things down by controlling her blood pressure         Relevant Medications    furosemide (LASIX) 20 mg tablet       Nervous and Auditory    Myasthenia gravis ([address])     Being worked up            Genitourinary    Chronic kidney disease, stage 3 unspecified ([address])     Lab Results   Component Value Date    EGFR 36 06/24/2023    EGFR 41 06/23/2023    EGFR 70 06/23/2023    CREATININE 1.47 (H) 06/24/2023    CREATININE 1.33 (H) 06/23/2023    CREATININE 0.86 06/23/2023   Baseline creatinine has been 1.3 to 1.4 mg/dL with previously normal levels. She does have hypertensive most likely nephrosclerosis in view of erratic nature of her blood pressure  She is also quite salt sensitive and she had pizza last night and her blood pressure was elevated. She does check her blood pressure at home and it runs in the 140 range  She does have a normal kidney ultrasound. She has nephrotic range proteinuria. Using a diagram I explained glomerular function and structure where she is losing the protein. She cannot take an ACE inhibitor due to angioedema. She is taking Farxiga 10 mg successfully.   Would be afraid to try finerenone due to possible related angioedema although I will look into this  She does avoid NSAIDs and only uses Tylenol  We will arrange kidney smart education to teach her something about kidney function and diet  She will go for scheduled labs         Relevant Medications    furosemide (LASIX) 20 mg tablet    Other Relevant Orders    Ambulatory Referral to CKD Education Program    Basic metabolic panel    Acute kidney injury (PADMINI) with chronic kidney disease stage III    Relevant Medications    furosemide (LASIX) 20 mg tablet       Other    Hyperlipidemia     Taking atorvastatin 80 and Zetia 10 mg with   Needs to follow a low fat diet         Other headache syndrome     Has a headache with uncontrolled blood pressure        Other Visit Diagnoses     Severe hypertension    -  Primary    Relevant Medications    furosemide (LASIX) 20 mg tablet    Other Relevant Orders    CATECHOLAMINES, FRACTION, UPRIGHT, PLASMA            Subjective:      Patient ID: Aurora Pennington is a 79 y.o. female. Referred by Dr Yung Owens    HPI has history of hypertensive urgency , DM-2 for 22 years with right retinopathy, Graves disease, allergy to lisinopril (angioedema), dyslipidemia, was told she has myasthenia gravis (not confirmed)  Her sugars are well controlled  Blood pressure at home in the 140 range. She has had hypertension for 24 years and her parents were both hypertensive    The following portions of the patient's history were reviewed and updated as appropriate: allergies, current medications, past family history, past medical history, past social history, past surgical history and problem list.    Review of Systems   Constitutional: Positive for chills. Negative for fatigue. Christopher Baez gives her chills   HENT: Negative for hearing loss. Eyes: Positive for visual disturbance. Bilateral bleeding and had injections in Feb-March 2023 with improvement. Had laser surgery in both eyes   Respiratory: Negative for cough and shortness of breath.     Cardiovascular: Negative for chest pain, palpitations and leg swelling. Gastrointestinal: Negative for abdominal pain, blood in stool and constipation. Genitourinary: Negative for decreased urine volume, difficulty urinating, dysuria, hematuria and urgency. Foamy urine   Musculoskeletal: Positive for arthralgias. Knee pain   Skin: Negative. Neurological: Positive for headaches. Negative for weakness. With hypertension - not recently   Hematological: Does not bruise/bleed easily. Psychiatric/Behavioral: Negative for dysphoric mood. Social History     Socioeconomic History   • Marital status:      Spouse name: None   • Number of children: None   • Years of education: None   • Highest education level: None   Occupational History   • Occupation: housewife/homemaker   Tobacco Use   • Smoking status: Never   • Smokeless tobacco: Never   Vaping Use   • Vaping Use: Never used   Substance and Sexual Activity   • Alcohol use: Not Currently     Alcohol/week: 0.0 standard drinks of alcohol   • Drug use: No   • Sexual activity: Not Currently     Partners: Male     Comment:  passed 2019 due to cancer   Other Topics Concern   • None   Social History Narrative    Always uses seatbelts    No living will     Social Determinants of Health     Financial Resource Strain: Not on file   Food Insecurity: No Food Insecurity (6/23/2023)    Hunger Vital Sign    • Worried About Running Out of Food in the Last Year: Never true    • Ran Out of Food in the Last Year: Never true   Transportation Needs: No Transportation Needs (6/23/2023)    PRAPARE - Transportation    • Lack of Transportation (Medical): No    • Lack of Transportation (Non-Medical): No   Recent Concern: Transportation Needs - Unmet Transportation Needs (4/14/2023)    PRAPARE - Transportation    • Lack of Transportation (Medical):  Yes    • Lack of Transportation (Non-Medical): Yes   Physical Activity: Not on file   Stress: Not on file   Social Connections: Not on file   Intimate Partner Violence: Not on file   Housing Stability: Low Risk  (6/23/2023)    Housing Stability Vital Sign    • Unable to Pay for Housing in the Last Year: No    • Number of Places Lived in the Last Year: 1    • Unstable Housing in the Last Year: No     Past Medical History:   Diagnosis Date   • Abnormal ECG     last assessed: 5/15/17   • Abnormal mammogram     last assessed: 7/11/17   • Abnormal stress test     last assesed: 7/24/17   • Anemia    • Anxiety    • Arthritis    • Asthma    • Back problem    • Breast cyst    • CAD (coronary artery disease)    • Chest pain     last assessed: 7/24/17   • Chronic pain disorder    • Cyst of left breast     last assessed: 8/18/17   • Depression    • Depression     resolved: 1/2/18   • Diabetes mellitus ([address])    • Hiatal hernia     last assessed: 11/7/17   • Hyperlipidemia    • Hypertension    • Keloid of skin     last assessed: 11/2/16   • Multiple thyroid nodules    • Neuropathy    • Psychiatric disorder     anxiety   • Stroke [facility])     TIA 2005   • Tuberculosis     as a child      Past Surgical History:   Procedure Laterality Date   • ARM WOUND REPAIR / CLOSURE     • CARPAL TUNNEL RELEASE     • CARPAL TUNNEL RELEASE Left    • CATARACT EXTRACTION Bilateral     2015   • COLONOSCOPY     • CORONARY ANGIOPLASTY WITH STENT PLACEMENT  03/01/2023    at [address] [place] [address]   • CYST REMOVAL      right upper arm. • EYE SURGERY      cataract removaql   • [place] LUMBAR PUNCTURE DIAGNOSTIC  08/15/2019   • [place] ESOPHAGOGASTRODUODENOSCOPY TRANSORAL DIAGNOSTIC N/A 01/05/2018    Procedure: ESOPHAGOGASTRODUODENOSCOPY (EGD);   Surgeon: Shailesh Haddad DO;  Location: [facility] MAIN OR;  Service: Gastroenterology   • [place] [address] WRST SURG W/RLS TRANSVRS CARPL LIGM Left 04/19/2019    Procedure: ENDOSCOPIC CARPAL TUNNEL RELEASE;  Surgeon: Kenny Guerra MD;  Location: [facility] MAIN OR;  Service: Orthopedics   • TUBAL LIGATION     • US GUIDED THYROID BIOPSY  04/15/2019       Current Outpatient Medications:   • albuterol (PROVENTIL HFA,VENTOLIN HFA) 90 mcg/act inhaler, Inhale 2 puffs every 6 (six) hours as needed for wheezing or shortness of breath, Disp: 54 g, Rfl: 1  •  Alcohol Swabs (DropSafe Alcohol Prep) 70 % PADS, Use one pad twice daily when check blood sugar, Disp: 200 each, Rfl: 2  •  aspirin (ECOTRIN LOW STRENGTH) 81 mg EC tablet, Take 1 tablet (81 mg total) by mouth daily, Disp: 30 tablet, Rfl: 3  •  atorvastatin (LIPITOR) 80 mg tablet, Take 1 tablet (80 mg total) by mouth daily with dinner, Disp: 90 tablet, Rfl: 1  •  Blood Glucose Monitoring Suppl (True Metrix Air Glucose Meter) w/Device KIT, USE AS DIRECTED, Disp: 1 kit, Rfl: 0  •  dapagliflozin (Farxiga) 10 MG tablet, Take 1 tablet (10 mg total) by mouth in the morning 1/2 tablet daily for the 1st month then increase to a full tablet daily in the morning, Disp: 30 tablet, Rfl: 4  •  doxazosin (CARDURA) 2 mg tablet, Take 1 tablet (2 mg total) by mouth daily, Disp: 30 tablet, Rfl: 0  •  ezetimibe (ZETIA) 10 mg tablet, Take 1 tablet (10 mg total) by mouth daily at bedtime, Disp: , Rfl: 0  •  furosemide (LASIX) 20 mg tablet, Take 1 tablet (20 mg total) by mouth daily, Disp: 90 tablet, Rfl: 3  •  gabapentin (NEURONTIN) 300 mg capsule, Take 300 mg by mouth 3 (three) times a day, Disp: , Rfl:   •  isosorbide mononitrate (IMDUR) 60 mg 24 hr tablet, Take 1 tablet (60 mg total) by mouth daily, Disp: 30 tablet, Rfl: 0  •  ofloxacin (OCUFLOX) 0.3 % ophthalmic solution, , Disp: , Rfl:   •  pantoprazole (PROTONIX) 40 mg tablet, Take 1 tablet (40 mg total) by mouth daily, Disp: 30 tablet, Rfl: 0  •  polyethylene glycol (GLYCOLAX) 17 GM/SCOOP powder, Take 17 g by mouth daily Increase to twice daily if still constiptated, Disp: 578 g, Rfl: 3  •  potassium chloride (Klor-Con) 10 mEq tablet, Take 1 tablet (10 mEq total) by mouth every other day, Disp: 90 tablet, Rfl: 0  •  sitaGLIPtin (Januvia) 100 mg tablet, Take 1 tablet (100 mg total) by mouth daily, Disp: 30 tablet, Rfl: 0  •  ticagrelor (BRILINTA) 90 MG, Take 1 tablet (90 mg total) by mouth every 12 (twelve) hours, Disp: , Rfl: 0  •  TRUEplus Lancets 33G MISC, Use 2 (two) times a day, Disp: 200 each, Rfl: 2    Lab Results   Component Value Date    SODIUM 139 06/24/2023    K 3.6 06/24/2023     06/24/2023    CO2 26 06/24/2023    AGAP 9 06/24/2023    BUN 22 06/24/2023    CREATININE 1.47 (H) 06/24/2023    GLUC 123 06/24/2023    GLUF 135 (H) 06/07/2023    CALCIUM 9.3 06/24/2023    AST 16 06/23/2023    ALT 7 06/23/2023    ALKPHOS 60 06/23/2023    TP 5.2 (L) 06/23/2023    TBILI 0.25 06/23/2023    EGFR 36 06/24/2023     Lab Results   Component Value Date    WBC 5.94 06/24/2023    HGB 12.7 06/24/2023    HCT 38.4 06/24/2023    MCV 83 06/24/2023     06/24/2023     Lab Results   Component Value Date    CHOLESTEROL 250 (H) 06/07/2023    CHOLESTEROL 298 (H) 07/09/2022    CHOLESTEROL 206 (H) 12/07/2020     Lab Results   Component Value Date    HDL 46 (L) 06/07/2023    HDL 42 (L) 07/09/2022    HDL 42 12/07/2020     Lab Results   Component Value Date    LDLCALC 165 (H) 06/07/2023    LDLCALC 236 (H) 07/09/2022    LDLCALC 137 (H) 12/07/2020     Lab Results   Component Value Date    TRIG 197 (H) 06/07/2023    TRIG 102 07/09/2022    TRIG 133 12/07/2020     No results found for: "CHOLHDL"  Lab Results   Component Value Date    JDF2AQSEWANC 1.261 06/07/2023     Lab Results   Component Value Date    CALCIUM 9.3 06/24/2023    PHOS 3.3 07/09/2022     No results found for: "SPEP", "UPEP"  No results found for: "Cavanaugh Hamper", "[id number]"    MAC 6/1/23   4.2 g/g creat which was an increase  cm. Volume 143.3 mL     Right kidney  Normal echogenicity and contour. No mass is identified. No hydronephrosis. No shadowing calculi. No perinephric fluid collections.     Left kidney  Normal echogenicity and contour. No mass is identified. No hydronephrosis. No shadowing calculi.   No perinephric fluid collections.     URETERS:  Nonvisualized.     BLADDER:   Normally distended. No focal thickening or mass lesions. Bilateral ureteral jets detected.        Objective:      BP (!) 194/94 (BP Location: Left arm, Patient Position: Sitting, Cuff Size: Large)   Pulse 67   Ht 5' 5" (1.651 m)   Wt 95.9 kg (211 lb 6.4 oz)   SpO2 98%   BMI 35.18 kg/m²   144/82 end       Physical Exam  Constitutional:       General: She is not in acute distress. Appearance: Normal appearance. She is not toxic-appearing. HENT:      Head: Normocephalic and atraumatic. Right Ear: External ear normal.      Left Ear: External ear normal.   Eyes:      Extraocular Movements: Extraocular movements intact. Conjunctiva/sclera: Conjunctivae normal.      Pupils: Pupils are equal, round, and reactive to light. Cardiovascular:      Rate and Rhythm: Normal rate and regular rhythm. Heart sounds: No murmur heard. Pulmonary:      Effort: Pulmonary effort is normal.      Breath sounds: Normal breath sounds. Abdominal:      General: Bowel sounds are normal.      Palpations: Abdomen is soft. Tenderness: There is no abdominal tenderness. Musculoskeletal:      Right lower leg: No edema. Left lower leg: No edema. Skin:     General: Skin is warm and dry. Neurological:      Mental Status: She is alert. Psychiatric:         Mood and Affect: Mood normal.         Behavior: Behavior normal.         Thought Content:  Thought content normal.         Judgment: Judgment normal.

## 2023-07-19 NOTE — ASSESSMENT & PLAN NOTE
Lab Results   Component Value Date    HGBA1C 7.5 (H) 06/07/2023    has had bilateral injections and laser treatment with improved

## 2023-07-21 DIAGNOSIS — J45.41 MODERATE PERSISTENT ASTHMA WITH ACUTE EXACERBATION: ICD-10-CM

## 2023-07-21 LAB
ALBUMIN SERPL ELPH-MCNC: 3.69 G/DL (ref 3.2–5.1)
ALBUMIN SERPL ELPH-MCNC: 51.2 % (ref 48–70)
ALPHA1 GLOB SERPL ELPH-MCNC: 0.3 G/DL (ref 0.15–0.47)
ALPHA1 GLOB SERPL ELPH-MCNC: 4.1 % (ref 1.8–7)
ALPHA2 GLOB SERPL ELPH-MCNC: 1 G/DL (ref 0.42–1.04)
ALPHA2 GLOB SERPL ELPH-MCNC: 13.9 % (ref 5.9–14.9)
BETA GLOB ABNORMAL SERPL ELPH-MCNC: 0.4 G/DL (ref 0.31–0.57)
BETA1 GLOB SERPL ELPH-MCNC: 5.6 % (ref 4.7–7.7)
BETA2 GLOB SERPL ELPH-MCNC: 6.8 % (ref 3.1–7.9)
BETA2+GAMMA GLOB SERPL ELPH-MCNC: 0.49 G/DL (ref 0.2–0.58)
GAMMA GLOB ABNORMAL SERPL ELPH-MCNC: 1.32 G/DL (ref 0.4–1.66)
GAMMA GLOB SERPL ELPH-MCNC: 18.4 % (ref 6.9–22.3)
IGG/ALB SER: 1.05 {RATIO} (ref 1.1–1.8)
PROT PATTERN SERPL ELPH-IMP: ABNORMAL
PROT SERPL-MCNC: 7.2 G/DL (ref 6.4–8.2)

## 2023-07-21 PROCEDURE — 84166 PROTEIN E-PHORESIS/URINE/CSF: CPT | Performed by: STUDENT IN AN ORGANIZED HEALTH CARE EDUCATION/TRAINING PROGRAM

## 2023-07-21 PROCEDURE — 84165 PROTEIN E-PHORESIS SERUM: CPT | Performed by: STUDENT IN AN ORGANIZED HEALTH CARE EDUCATION/TRAINING PROGRAM

## 2023-07-21 RX ORDER — ALBUTEROL SULFATE 90 UG/1
2 AEROSOL, METERED RESPIRATORY (INHALATION) EVERY 6 HOURS PRN
Qty: 54 G | Refills: 1 | Status: SHIPPED | OUTPATIENT
Start: 2023-07-21

## 2023-07-22 PROCEDURE — 86335 IMMUNFIX E-PHORSIS/URINE/CSF: CPT | Performed by: STUDENT IN AN ORGANIZED HEALTH CARE EDUCATION/TRAINING PROGRAM

## 2023-07-22 PROCEDURE — 84166 PROTEIN E-PHORESIS/URINE/CSF: CPT | Performed by: STUDENT IN AN ORGANIZED HEALTH CARE EDUCATION/TRAINING PROGRAM

## 2023-07-23 LAB
ALDOST SERPL-MCNC: 6.5 NG/DL (ref 0–30)
ALDOST/RENIN PLAS-RTO: 6.6 {RATIO} (ref 0–30)
RENIN PLAS-CCNC: 0.98 NG/ML/HR (ref 0.17–5.38)

## 2023-07-24 ENCOUNTER — PATIENT OUTREACH (OUTPATIENT)
Dept: FAMILY MEDICINE CLINIC | Facility: CLINIC | Age: 68
End: 2023-07-24

## 2023-07-24 DIAGNOSIS — I10 SEVERE HYPERTENSION: ICD-10-CM

## 2023-07-24 NOTE — PROGRESS NOTES
CMOC received a call from [facility] daughter/caregiver, Alka Mckenzie. Alak Osullivanster states she received a letter from [facility] stating Shobha Guerra was denied for services. She reviewed the letter and states she was denied d/t not being medically eligible according to her functional assessment and/or physician certification form information. This writer inquired whether there was an appeal form included with the letter and Alka Mckenzie confirms one was included. [address] [place] [address] offered to complete a 3-way call to [facility] for more information and Alka Mckenzie was agreeable. S/W Tiffanie Hunt from [facility], she reviewed Erin's file and confirms the rationale for the denial is d/t information received from the aging assessment and phys. certification form. Tiffanie Hunt explains the appeal form needs to be completed and post marked for return within 30 days from the date listed in the letter. Call ended with [facility]. [address] [place] [address] offered assistance with completing the appeal form and asked Alka Mckenzie if she would like to review and complete the form during this call. Alka Mckenzie expressed she feels comfortable completing the form and will mail it back this week. This writer requested a call back if any further assistance is needed for this task, Alka Mckenzie expressed understanding. Shobha Guerra got on the call and requested information on housing options. She expressed she no longer wants to stay in her current living situation, her landlord is not making necessary repairs and says she should "get out" when she the discusses repairs with him. Her landlord is receiving ERAP payments for Erin's rent. Previously, Shobha Guerra has submitted housing applications for [facility] and [facility]. [address] [place] [address] inquired about the status of these applications. Shobha Guerra explains that both entities need copies of she and Ca's birth certificates and social security cards to process the applications.    Shobha Guerra has her birth certificate and is waiting for her social security card to arrive in the mail. Phoenix Marlow has her social security card but needs to order a copy of her birth certificate from [address] [place] [address] records and states she would have financial difficulty with the fees. [id number] [address] recommended [employer] to discuss her [employer] as they oversee the program in [place] and offered to complete a call with them. Reny Pettit stated she would like to call them on her own and requested the contact information. [facility] supplied this number and the contact information for [address] to call and inquire about having fees waived for the birth certificate. Call ended. Will outreach again next week for a status update and to assist as needed.

## 2023-07-25 RX ORDER — CARVEDILOL 3.12 MG/1
3.12 TABLET ORAL 2 TIMES DAILY WITH MEALS
Qty: 60 TABLET | Refills: 2 | Status: SHIPPED | OUTPATIENT
Start: 2023-07-25

## 2023-07-26 LAB
DOPAMINE 24H UR-MRATE: <30 PG/ML (ref 0–48)
EPINEPH PLAS-MCNC: <15 PG/ML (ref 0–62)
NOREPINEPH PLAS-MCNC: 454 PG/ML (ref 0–874)

## 2023-08-01 ENCOUNTER — PATIENT OUTREACH (OUTPATIENT)
Dept: FAMILY MEDICINE CLINIC | Facility: CLINIC | Age: 68
End: 2023-08-01

## 2023-08-01 NOTE — PROGRESS NOTES
[facility] contacted [facility] for status updates on her waiver services appeal and her housing situation. [facility] states her daughter, Narcisa Crisostomo, hasn't submitted the waiver appeal form yet. They decided to leave home and stay with a friend for a few days after their ceiling fell down on the second floor. She expects to be going back home on the 3rd or 4th and will complete the form and mail it in. [facility] has also not contacted [address] [place] [address] action regarding ERAP and her living situation d/t the same reason. She states she will complete this task when she returns home as well. [facility] will outreach next week to assess for any further needs.

## 2023-08-08 ENCOUNTER — PATIENT OUTREACH (OUTPATIENT)
Dept: FAMILY MEDICINE CLINIC | Facility: CLINIC | Age: 68
End: 2023-08-08

## 2023-08-08 NOTE — PROGRESS NOTES
[facility] attempted to contact [facility] for a status update on the waiver services appeal form submission and her housing situation. No answer, left message on her voicemail explaining reason for call and if no call back was received by end of day Friday, 8/11/23, that the referral will be closed. Gave this writer's contact information. Previously, [facility] assisted [facility] with obtaining transportation services and applying for waiver services. She was denied waiver services d/t not being determined medically eligible. Her daughter, Siddhartha Turcios, intended to submit the appeal form included with the denial letter. [facility] has already applied for housing through [facility] area but her social security card and Ca's birth certificate are needed to process those applications. [facility] previously offered assistance but [facility] didn't feel she needed assistance at this time. No further outreaches will be made and the referral will be closed end of day 8/11/23 if no contact prior.

## 2023-08-11 NOTE — PROGRESS NOTES
No contact received by end of day 8/11/2023. Referral closed and no further outreaches will be made.

## 2023-08-15 ENCOUNTER — PATIENT OUTREACH (OUTPATIENT)
Dept: FAMILY MEDICINE CLINIC | Facility: CLINIC | Age: 68
End: 2023-08-15

## 2023-08-15 NOTE — PROGRESS NOTES
SW CM had received message from [facility] indicating that [facility] referral would be closed if no response from patient by 8/11. CMOC had not received response and closed. [facility] had assisted with obtaining transportation services. [facility] had also assisted with Waiver application process but patient had been denied. [facility] had lost contact with patient prior to finding out outcome of appeal of that denial.      CM will close referral due to lost communication with patient.  CM will remain available for future psychosocial support as needed.

## 2023-08-28 DIAGNOSIS — K21.9 GASTROESOPHAGEAL REFLUX DISEASE WITHOUT ESOPHAGITIS: ICD-10-CM

## 2023-08-28 DIAGNOSIS — I10 ESSENTIAL HYPERTENSION: ICD-10-CM

## 2023-08-28 RX ORDER — POTASSIUM CHLORIDE 750 MG/1
10 TABLET, FILM COATED, EXTENDED RELEASE ORAL EVERY OTHER DAY
Qty: 90 TABLET | Refills: 0 | OUTPATIENT
Start: 2023-08-28

## 2023-08-28 RX ORDER — PANTOPRAZOLE SODIUM 40 MG/1
40 TABLET, DELAYED RELEASE ORAL DAILY
Qty: 30 TABLET | Refills: 0 | OUTPATIENT
Start: 2023-08-28

## 2023-08-28 NOTE — TELEPHONE ENCOUNTER
Patient needs an appointment she has not been seen in several months has missed numerous appointments

## 2023-09-07 ENCOUNTER — OFFICE VISIT (OUTPATIENT)
Dept: FAMILY MEDICINE CLINIC | Facility: CLINIC | Age: 68
End: 2023-09-07
Payer: COMMERCIAL

## 2023-09-07 ENCOUNTER — APPOINTMENT (OUTPATIENT)
Dept: LAB | Facility: MEDICAL CENTER | Age: 68
End: 2023-09-07
Payer: COMMERCIAL

## 2023-09-07 VITALS
HEIGHT: 65 IN | OXYGEN SATURATION: 98 % | DIASTOLIC BLOOD PRESSURE: 82 MMHG | WEIGHT: 218 LBS | SYSTOLIC BLOOD PRESSURE: 170 MMHG | BODY MASS INDEX: 36.32 KG/M2 | HEART RATE: 76 BPM | TEMPERATURE: 99 F

## 2023-09-07 DIAGNOSIS — I25.10 CORONARY ARTERY DISEASE INVOLVING NATIVE CORONARY ARTERY OF NATIVE HEART: ICD-10-CM

## 2023-09-07 DIAGNOSIS — N18.32 TYPE 2 DIABETES MELLITUS WITH STAGE 3B CHRONIC KIDNEY DISEASE, WITH LONG-TERM CURRENT USE OF INSULIN (HCC): ICD-10-CM

## 2023-09-07 DIAGNOSIS — Z79.4 TYPE 2 DIABETES MELLITUS WITH DIABETIC NEPHROPATHY, WITH LONG-TERM CURRENT USE OF INSULIN (HCC): ICD-10-CM

## 2023-09-07 DIAGNOSIS — N18.32 STAGE 3B CHRONIC KIDNEY DISEASE (HCC): ICD-10-CM

## 2023-09-07 DIAGNOSIS — E78.5 DYSLIPIDEMIA: ICD-10-CM

## 2023-09-07 DIAGNOSIS — K21.9 GASTROESOPHAGEAL REFLUX DISEASE WITHOUT ESOPHAGITIS: Primary | ICD-10-CM

## 2023-09-07 DIAGNOSIS — Z23 NEED FOR INFLUENZA VACCINATION: ICD-10-CM

## 2023-09-07 DIAGNOSIS — I10 SEVERE HYPERTENSION: ICD-10-CM

## 2023-09-07 DIAGNOSIS — Z79.4 TYPE 2 DIABETES MELLITUS WITH STAGE 3B CHRONIC KIDNEY DISEASE, WITH LONG-TERM CURRENT USE OF INSULIN (HCC): ICD-10-CM

## 2023-09-07 DIAGNOSIS — E11.22 TYPE 2 DIABETES MELLITUS WITH STAGE 3B CHRONIC KIDNEY DISEASE, WITH LONG-TERM CURRENT USE OF INSULIN (HCC): ICD-10-CM

## 2023-09-07 DIAGNOSIS — E11.21 TYPE 2 DIABETES MELLITUS WITH DIABETIC NEPHROPATHY, WITH LONG-TERM CURRENT USE OF INSULIN (HCC): ICD-10-CM

## 2023-09-07 DIAGNOSIS — E11.42 DIABETIC POLYNEUROPATHY ASSOCIATED WITH TYPE 2 DIABETES MELLITUS (HCC): ICD-10-CM

## 2023-09-07 LAB
ANION GAP SERPL CALCULATED.3IONS-SCNC: 11 MMOL/L
BUN SERPL-MCNC: 19 MG/DL (ref 5–25)
CALCIUM SERPL-MCNC: 9 MG/DL (ref 8.4–10.2)
CHLORIDE SERPL-SCNC: 106 MMOL/L (ref 96–108)
CHOLEST SERPL-MCNC: 270 MG/DL
CO2 SERPL-SCNC: 26 MMOL/L (ref 21–32)
CREAT SERPL-MCNC: 1.18 MG/DL (ref 0.6–1.3)
GFR SERPL CREATININE-BSD FRML MDRD: 47 ML/MIN/1.73SQ M
GLUCOSE P FAST SERPL-MCNC: 115 MG/DL (ref 65–99)
HDLC SERPL-MCNC: 46 MG/DL
LDLC SERPL CALC-MCNC: 198 MG/DL (ref 0–100)
NONHDLC SERPL-MCNC: 224 MG/DL
POTASSIUM SERPL-SCNC: 4 MMOL/L (ref 3.5–5.3)
SODIUM SERPL-SCNC: 143 MMOL/L (ref 135–147)
TRIGL SERPL-MCNC: 131 MG/DL

## 2023-09-07 PROCEDURE — 36415 COLL VENOUS BLD VENIPUNCTURE: CPT | Performed by: FAMILY MEDICINE

## 2023-09-07 PROCEDURE — 90662 IIV NO PRSV INCREASED AG IM: CPT | Performed by: FAMILY MEDICINE

## 2023-09-07 PROCEDURE — G0008 ADMIN INFLUENZA VIRUS VAC: HCPCS | Performed by: FAMILY MEDICINE

## 2023-09-07 PROCEDURE — G0439 PPPS, SUBSEQ VISIT: HCPCS | Performed by: FAMILY MEDICINE

## 2023-09-07 PROCEDURE — 80048 BASIC METABOLIC PNL TOTAL CA: CPT | Performed by: FAMILY MEDICINE

## 2023-09-07 PROCEDURE — 80061 LIPID PANEL: CPT | Performed by: FAMILY MEDICINE

## 2023-09-07 RX ORDER — ATORVASTATIN CALCIUM 80 MG/1
80 TABLET, FILM COATED ORAL
Qty: 90 TABLET | Refills: 3 | Status: SHIPPED | OUTPATIENT
Start: 2023-09-07

## 2023-09-07 RX ORDER — FUROSEMIDE 20 MG/1
20 TABLET ORAL DAILY
Qty: 90 TABLET | Refills: 3 | Status: SHIPPED | OUTPATIENT
Start: 2023-09-07

## 2023-09-07 RX ORDER — CARVEDILOL 3.12 MG/1
3.12 TABLET ORAL 2 TIMES DAILY WITH MEALS
Qty: 180 TABLET | Refills: 3 | Status: SHIPPED | OUTPATIENT
Start: 2023-09-07

## 2023-09-07 RX ORDER — GABAPENTIN 300 MG/1
300 CAPSULE ORAL 3 TIMES DAILY
Qty: 270 CAPSULE | Refills: 3 | Status: SHIPPED | OUTPATIENT
Start: 2023-09-07

## 2023-09-07 RX ORDER — ISOSORBIDE MONONITRATE 60 MG/1
60 TABLET, EXTENDED RELEASE ORAL DAILY
Qty: 90 TABLET | Refills: 3 | Status: SHIPPED | OUTPATIENT
Start: 2023-09-07

## 2023-09-07 RX ORDER — FAMOTIDINE 40 MG/1
40 TABLET, FILM COATED ORAL
Qty: 90 TABLET | Refills: 3 | Status: SHIPPED | OUTPATIENT
Start: 2023-09-07 | End: 2024-09-01

## 2023-09-07 NOTE — PATIENT INSTRUCTIONS
Medicare Preventive Visit Patient Instructions  Thank you for completing your Welcome to Medicare Visit or Medicare Annual Wellness Visit today. Your next wellness visit will be due in one year (9/7/2024). The screening/preventive services that you may require over the next 5-10 years are detailed below. Some tests may not apply to you based off risk factors and/or age. Screening tests ordered at today's visit but not completed yet may show as past due. Also, please note that scanned in results may not display below. Preventive Screenings:  Service Recommendations Previous Testing/Comments   Colorectal Cancer Screening  * Colonoscopy    * Fecal Occult Blood Test (FOBT)/Fecal Immunochemical Test (FIT)  * Fecal DNA/Cologuard Test  * Flexible Sigmoidoscopy Age: 43-73 years old   Colonoscopy: every 10 years (may be performed more frequently if at higher risk)  OR  FOBT/FIT: every 1 year  OR  Cologuard: every 3 years  OR  Sigmoidoscopy: every 5 years  Screening may be recommended earlier than age 39 if at higher risk for colorectal cancer. Also, an individualized decision between you and your healthcare provider will decide whether screening between the ages of 77-80 would be appropriate. Colonoscopy: 10/29/2019  FOBT/FIT: Not on file  Cologuard: Not on file  Sigmoidoscopy: Not on file    Screening Current     Breast Cancer Screening Age: 36 years old  Frequency: every 1-2 years  Not required if history of left and right mastectomy Mammogram: 07/28/2021        Cervical Cancer Screening Between the ages of 21-29, pap smear recommended once every 3 years. Between the ages of 32-69, can perform pap smear with HPV co-testing every 5 years.    Recommendations may differ for women with a history of total hysterectomy, cervical cancer, or abnormal pap smears in past. Pap Smear: 10/03/2022    Screening Not Indicated   Hepatitis C Screening Once for adults born between 1945 and 1965  More frequently in patients at high risk for Hepatitis C Hep C Antibody: 03/24/2020    Screening Current   Diabetes Screening 1-2 times per year if you're at risk for diabetes or have pre-diabetes Fasting glucose: 112 mg/dL (7/19/2023)  A1C: 7.5 % (6/7/2023)  Screening Not Indicated  History Diabetes   Cholesterol Screening Once every 5 years if you don't have a lipid disorder. May order more often based on risk factors. Lipid panel: 06/07/2023    Screening Not Indicated  History Lipid Disorder     Other Preventive Screenings Covered by Medicare:  1. Abdominal Aortic Aneurysm (AAA) Screening: covered once if your at risk. You're considered to be at risk if you have a family history of AAA. 2. Lung Cancer Screening: covers low dose CT scan once per year if you meet all of the following conditions: (1) Age 48-67; (2) No signs or symptoms of lung cancer; (3) Current smoker or have quit smoking within the last 15 years; (4) You have a tobacco smoking history of at least 20 pack years (packs per day multiplied by number of years you smoked); (5) You get a written order from a healthcare provider. 3. Glaucoma Screening: covered annually if you're considered high risk: (1) You have diabetes OR (2) Family history of glaucoma OR (3)  aged 48 and older OR (3)  American aged 72 and older  3. Osteoporosis Screening: covered every 2 years if you meet one of the following conditions: (1) You're estrogen deficient and at risk for osteoporosis based off medical history and other findings; (2) Have a vertebral abnormality; (3) On glucocorticoid therapy for more than 3 months; (4) Have primary hyperparathyroidism; (5) On osteoporosis medications and need to assess response to drug therapy. · Last bone density test (DXA Scan): Not on file. 5. HIV Screening: covered annually if you're between the age of 14-79. Also covered annually if you are younger than 13 and older than 72 with risk factors for HIV infection.  For pregnant patients, it is covered up to 3 times per pregnancy. Immunizations:  Immunization Recommendations   Influenza Vaccine Annual influenza vaccination during flu season is recommended for all persons aged >= 6 months who do not have contraindications   Pneumococcal Vaccine   * Pneumococcal conjugate vaccine = PCV13 (Prevnar 13), PCV15 (Vaxneuvance), PCV20 (Prevnar 20)  * Pneumococcal polysaccharide vaccine = PPSV23 (Pneumovax) Adults 20-63 years old: 1-3 doses may be recommended based on certain risk factors  Adults 72 years old: 1-2 doses may be recommended based off what pneumonia vaccine you previously received   Hepatitis B Vaccine 3 dose series if at intermediate or high risk (ex: diabetes, end stage renal disease, liver disease)   Tetanus (Td) Vaccine - COST NOT COVERED BY MEDICARE PART B Following completion of primary series, a booster dose should be given every 10 years to maintain immunity against tetanus. Td may also be given as tetanus wound prophylaxis. Tdap Vaccine - COST NOT COVERED BY MEDICARE PART B Recommended at least once for all adults. For pregnant patients, recommended with each pregnancy. Shingles Vaccine (Shingrix) - COST NOT COVERED BY MEDICARE PART B  2 shot series recommended in those aged 48 and above     Health Maintenance Due:      Topic Date Due   • Breast Cancer Screening: Mammogram  07/28/2022   • Colorectal Cancer Screening  10/29/2024   • Hepatitis C Screening  Completed     Immunizations Due:      Topic Date Due   • COVID-19 Vaccine (1) Never done   • Influenza Vaccine (1) 09/01/2023     Advance Directives   What are advance directives? Advance directives are legal documents that state your wishes and plans for medical care. These plans are made ahead of time in case you lose your ability to make decisions for yourself. Advance directives can apply to any medical decision, such as the treatments you want, and if you want to donate organs. What are the types of advance directives?   There are many types of advance directives, and each state has rules about how to use them. You may choose a combination of any of the following:  · Living will: This is a written record of the treatment you want. You can also choose which treatments you do not want, which to limit, and which to stop at a certain time. This includes surgery, medicine, IV fluid, and tube feedings. · Durable power of  for healthcare [facility]): This is a written record that states who you want to make healthcare choices for you when you are unable to make them for yourself. This person, called a proxy, is usually a family member or a friend. You may choose more than 1 proxy. · Do not resuscitate (DNR) order:  A DNR order is used in case your heart stops beating or you stop breathing. It is a request not to have certain forms of treatment, such as CPR. A DNR order may be included in other types of advance directives. · Medical directive: This covers the care that you want if you are in a coma, near death, or unable to make decisions for yourself. You can list the treatments you want for each condition. Treatment may include pain medicine, surgery, blood transfusions, dialysis, IV or tube feedings, and a ventilator (breathing machine). · Values history: This document has questions about your views, beliefs, and how you feel and think about life. This information can help others choose the care that you would choose. Why are advance directives important? An advance directive helps you control your care. Although spoken wishes may be used, it is better to have your wishes written down. Spoken wishes can be misunderstood, or not followed. Treatments may be given even if you do not want them. An advance directive may make it easier for your family to make difficult choices about your care.    Weight Management   Why it is important to manage your weight:  Being overweight increases your risk of health conditions such as heart disease, high blood pressure, type 2 diabetes, and certain types of cancer. It can also increase your risk for osteoarthritis, sleep apnea, and other respiratory problems. Aim for a slow, steady weight loss. Even a small amount of weight loss can lower your risk of health problems. How to lose weight safely:  A safe and healthy way to lose weight is to eat fewer calories and get regular exercise. You can lose up about 1 pound a week by decreasing the number of calories you eat by 500 calories each day. Healthy meal plan for weight management:  A healthy meal plan includes a variety of foods, contains fewer calories, and helps you stay healthy. A healthy meal plan includes the following:  · Eat whole-grain foods more often. A healthy meal plan should contain fiber. Fiber is the part of grains, fruits, and vegetables that is not broken down by your body. Whole-grain foods are healthy and provide extra fiber in your diet. Some examples of whole-grain foods are whole-wheat breads and pastas, oatmeal, brown rice, and bulgur. · Eat a variety of vegetables every day. Include dark, leafy greens such as spinach, kale, desiree greens, and mustard greens. Eat yellow and orange vegetables such as carrots, sweet potatoes, and winter squash. · Eat a variety of fruits every day. Choose fresh or canned fruit (canned in its own juice or light syrup) instead of juice. Fruit juice has very little or no fiber. · Eat low-fat dairy foods. Drink fat-free (skim) milk or 1% milk. Eat fat-free yogurt and low-fat cottage cheese. Try low-fat cheeses such as mozzarella and other reduced-fat cheeses. · Choose meat and other protein foods that are low in fat. Choose beans or other legumes such as split peas or lentils. Choose fish, skinless poultry (chicken or turkey), or lean cuts of red meat (beef or pork). Before you cook meat or poultry, cut off any visible fat. · Use less fat and oil. Try baking foods instead of frying them. Add less fat, such as margarine, sour cream, regular salad dressing and mayonnaise to foods. Eat fewer high-fat foods. Some examples of high-fat foods include french fries, doughnuts, ice cream, and cakes. · Eat fewer sweets. Limit foods and drinks that are high in sugar. This includes candy, cookies, regular soda, and sweetened drinks. Exercise:  Exercise at least 30 minutes per day on most days of the week. Some examples of exercise include walking, biking, dancing, and swimming. You can also fit in more physical activity by taking the stairs instead of the elevator or parking farther away from stores. Ask your healthcare provider about the best exercise plan for you. © Copyright [employer] 2018 Information is for End User's use only and may not be sold, redistributed or otherwise used for commercial purposes.  All illustrations and images included in CareNotes® are the copyrighted property of [employer] Inc[employer] or [address] [place] [address]

## 2023-09-08 NOTE — RESULT ENCOUNTER NOTE
Please call the patient regarding her abnormal result.   Cholesterol is absolutely horrible as soon as the patient gets her refill on her atorvastatin she needs to get back on that medicine and never stop it

## 2023-10-25 DIAGNOSIS — I10 SEVERE HYPERTENSION: ICD-10-CM

## 2023-10-25 DIAGNOSIS — Z79.4 TYPE 2 DIABETES MELLITUS WITH STAGE 3B CHRONIC KIDNEY DISEASE, WITH LONG-TERM CURRENT USE OF INSULIN (HCC): ICD-10-CM

## 2023-10-25 DIAGNOSIS — N18.32 STAGE 3B CHRONIC KIDNEY DISEASE (HCC): ICD-10-CM

## 2023-10-25 DIAGNOSIS — Z79.4 TYPE 2 DIABETES MELLITUS WITH DIABETIC NEPHROPATHY, WITH LONG-TERM CURRENT USE OF INSULIN (HCC): ICD-10-CM

## 2023-10-25 DIAGNOSIS — N18.32 TYPE 2 DIABETES MELLITUS WITH STAGE 3B CHRONIC KIDNEY DISEASE, WITH LONG-TERM CURRENT USE OF INSULIN (HCC): ICD-10-CM

## 2023-10-25 DIAGNOSIS — E11.42 DIABETIC POLYNEUROPATHY ASSOCIATED WITH TYPE 2 DIABETES MELLITUS (HCC): ICD-10-CM

## 2023-10-25 DIAGNOSIS — J45.41 MODERATE PERSISTENT ASTHMA WITH ACUTE EXACERBATION: ICD-10-CM

## 2023-10-25 DIAGNOSIS — K21.9 GASTROESOPHAGEAL REFLUX DISEASE WITHOUT ESOPHAGITIS: ICD-10-CM

## 2023-10-25 DIAGNOSIS — E11.22 TYPE 2 DIABETES MELLITUS WITH STAGE 3B CHRONIC KIDNEY DISEASE, WITH LONG-TERM CURRENT USE OF INSULIN (HCC): ICD-10-CM

## 2023-10-25 DIAGNOSIS — I25.10 CORONARY ARTERY DISEASE INVOLVING NATIVE CORONARY ARTERY OF NATIVE HEART: ICD-10-CM

## 2023-10-25 DIAGNOSIS — E11.21 TYPE 2 DIABETES MELLITUS WITH DIABETIC NEPHROPATHY, WITH LONG-TERM CURRENT USE OF INSULIN (HCC): ICD-10-CM

## 2023-10-25 DIAGNOSIS — E78.5 DYSLIPIDEMIA: ICD-10-CM

## 2023-10-25 RX ORDER — ISOSORBIDE MONONITRATE 60 MG/1
60 TABLET, EXTENDED RELEASE ORAL DAILY
Qty: 90 TABLET | Refills: 3 | Status: ON HOLD | OUTPATIENT
Start: 2023-10-25 | End: 2023-11-02 | Stop reason: SDUPTHER

## 2023-10-25 RX ORDER — ALBUTEROL SULFATE 90 UG/1
2 AEROSOL, METERED RESPIRATORY (INHALATION) EVERY 6 HOURS PRN
Qty: 54 G | Refills: 1 | Status: SHIPPED | OUTPATIENT
Start: 2023-10-25

## 2023-10-25 RX ORDER — GABAPENTIN 300 MG/1
300 CAPSULE ORAL 3 TIMES DAILY
Qty: 270 CAPSULE | Refills: 3 | Status: SHIPPED | OUTPATIENT
Start: 2023-10-25

## 2023-10-25 RX ORDER — FUROSEMIDE 20 MG/1
20 TABLET ORAL DAILY
Qty: 90 TABLET | Refills: 3 | Status: ON HOLD | OUTPATIENT
Start: 2023-10-25 | End: 2023-11-02 | Stop reason: SDUPTHER

## 2023-10-25 RX ORDER — ATORVASTATIN CALCIUM 80 MG/1
80 TABLET, FILM COATED ORAL
Qty: 90 TABLET | Refills: 3 | Status: SHIPPED | OUTPATIENT
Start: 2023-10-25

## 2023-10-25 RX ORDER — FAMOTIDINE 40 MG/1
40 TABLET, FILM COATED ORAL
Qty: 90 TABLET | Refills: 3 | Status: SHIPPED | OUTPATIENT
Start: 2023-10-25 | End: 2023-11-02

## 2023-10-25 RX ORDER — CARVEDILOL 3.12 MG/1
3.12 TABLET ORAL 2 TIMES DAILY WITH MEALS
Qty: 180 TABLET | Refills: 3 | Status: SHIPPED | OUTPATIENT
Start: 2023-10-25 | End: 2023-11-02

## 2023-10-25 NOTE — TELEPHONE ENCOUNTER
Change of pharmacy from [facility] to [facility]

## 2023-10-30 ENCOUNTER — APPOINTMENT (OUTPATIENT)
Dept: RADIOLOGY | Facility: HOSPITAL | Age: 68
DRG: 305 | End: 2023-10-30
Payer: COMMERCIAL

## 2023-10-30 ENCOUNTER — APPOINTMENT (EMERGENCY)
Dept: RADIOLOGY | Facility: HOSPITAL | Age: 68
DRG: 305 | End: 2023-10-30
Payer: COMMERCIAL

## 2023-10-30 ENCOUNTER — APPOINTMENT (EMERGENCY)
Dept: CT IMAGING | Facility: HOSPITAL | Age: 68
DRG: 305 | End: 2023-10-30
Payer: COMMERCIAL

## 2023-10-30 ENCOUNTER — HOSPITAL ENCOUNTER (INPATIENT)
Facility: HOSPITAL | Age: 68
LOS: 2 days | Discharge: HOME | DRG: 305 | End: 2023-11-02
Attending: EMERGENCY MEDICINE | Admitting: FAMILY MEDICINE
Payer: COMMERCIAL

## 2023-10-30 DIAGNOSIS — I16.0 HYPERTENSIVE URGENCY: ICD-10-CM

## 2023-10-30 DIAGNOSIS — I25.118 CORONARY ARTERY DISEASE OF NATIVE ARTERY OF NATIVE HEART WITH STABLE ANGINA PECTORIS (HCC): ICD-10-CM

## 2023-10-30 DIAGNOSIS — N18.32 STAGE 3B CHRONIC KIDNEY DISEASE (HCC): ICD-10-CM

## 2023-10-30 DIAGNOSIS — R07.9 CHEST PAIN: Primary | ICD-10-CM

## 2023-10-30 DIAGNOSIS — M25.511 ACUTE PAIN OF RIGHT SHOULDER: ICD-10-CM

## 2023-10-30 DIAGNOSIS — I25.10 CORONARY ARTERY DISEASE INVOLVING NATIVE CORONARY ARTERY OF NATIVE HEART: ICD-10-CM

## 2023-10-30 LAB
2HR DELTA HS TROPONIN: 4 NG/L
4HR DELTA HS TROPONIN: 5 NG/L
ALBUMIN SERPL BCP-MCNC: 3.5 G/DL (ref 3.5–5)
ALP SERPL-CCNC: 106 U/L (ref 34–104)
ALT SERPL W P-5'-P-CCNC: 10 U/L (ref 7–52)
ANION GAP SERPL CALCULATED.3IONS-SCNC: 8 MMOL/L
APTT PPP: 27 SECONDS (ref 23–37)
AST SERPL W P-5'-P-CCNC: 17 U/L (ref 13–39)
ATRIAL RATE: 69 BPM
ATRIAL RATE: 80 BPM
ATRIAL RATE: 81 BPM
BASE EX.OXY STD BLDV CALC-SCNC: 95.1 % (ref 60–80)
BASE EXCESS BLDV CALC-SCNC: 2.1 MMOL/L
BASOPHILS # BLD AUTO: 0.03 THOUSANDS/ÂΜL (ref 0–0.1)
BASOPHILS NFR BLD AUTO: 1 % (ref 0–1)
BETA-HYDROXYBUTYRATE: 0 MMOL/L
BILIRUB SERPL-MCNC: 0.4 MG/DL (ref 0.2–1)
BNP SERPL-MCNC: 124 PG/ML (ref 0–100)
BUN SERPL-MCNC: 15 MG/DL (ref 5–25)
CALCIUM SERPL-MCNC: 8.7 MG/DL (ref 8.4–10.2)
CARDIAC TROPONIN I PNL SERPL HS: 11 NG/L
CARDIAC TROPONIN I PNL SERPL HS: 15 NG/L
CARDIAC TROPONIN I PNL SERPL HS: 16 NG/L
CHLORIDE SERPL-SCNC: 107 MMOL/L (ref 96–108)
CO2 SERPL-SCNC: 25 MMOL/L (ref 21–32)
CREAT SERPL-MCNC: 1.24 MG/DL (ref 0.6–1.3)
D DIMER PPP FEU-MCNC: 2.3 UG/ML FEU
EOSINOPHIL # BLD AUTO: 0.1 THOUSAND/ÂΜL (ref 0–0.61)
EOSINOPHIL NFR BLD AUTO: 2 % (ref 0–6)
ERYTHROCYTE [DISTWIDTH] IN BLOOD BY AUTOMATED COUNT: 15.5 % (ref 11.6–15.1)
EST. AVERAGE GLUCOSE BLD GHB EST-MCNC: 157 MG/DL
GFR SERPL CREATININE-BSD FRML MDRD: 44 ML/MIN/1.73SQ M
GLUCOSE SERPL-MCNC: 110 MG/DL (ref 65–140)
GLUCOSE SERPL-MCNC: 139 MG/DL (ref 65–140)
GLUCOSE SERPL-MCNC: 142 MG/DL (ref 65–140)
GLUCOSE SERPL-MCNC: 143 MG/DL (ref 65–140)
HBA1C MFR BLD: 7.1 %
HCO3 BLDV-SCNC: 26.7 MMOL/L (ref 24–30)
HCT VFR BLD AUTO: 42.9 % (ref 34.8–46.1)
HGB BLD-MCNC: 13.5 G/DL (ref 11.5–15.4)
IMM GRANULOCYTES # BLD AUTO: 0.02 THOUSAND/UL (ref 0–0.2)
IMM GRANULOCYTES NFR BLD AUTO: 0 % (ref 0–2)
INR PPP: 0.95 (ref 0.84–1.19)
LYMPHOCYTES # BLD AUTO: 1.53 THOUSANDS/ÂΜL (ref 0.6–4.47)
LYMPHOCYTES NFR BLD AUTO: 26 % (ref 14–44)
MCH RBC QN AUTO: 26.4 PG (ref 26.8–34.3)
MCHC RBC AUTO-ENTMCNC: 31.5 G/DL (ref 31.4–37.4)
MCV RBC AUTO: 84 FL (ref 82–98)
MONOCYTES # BLD AUTO: 0.5 THOUSAND/ÂΜL (ref 0.17–1.22)
MONOCYTES NFR BLD AUTO: 9 % (ref 4–12)
NEUTROPHILS # BLD AUTO: 3.63 THOUSANDS/ÂΜL (ref 1.85–7.62)
NEUTS SEG NFR BLD AUTO: 62 % (ref 43–75)
NRBC BLD AUTO-RTO: 0 /100 WBCS
O2 CT BLDV-SCNC: 18.5 ML/DL
P AXIS: 36 DEGREES
P AXIS: 40 DEGREES
P AXIS: 55 DEGREES
PCO2 BLDV: 41.6 MM HG (ref 42–50)
PH BLDV: 7.43 [PH] (ref 7.3–7.4)
PLATELET # BLD AUTO: 220 THOUSANDS/UL (ref 149–390)
PLATELET # BLD AUTO: 231 THOUSANDS/UL (ref 149–390)
PMV BLD AUTO: 10.3 FL (ref 8.9–12.7)
PMV BLD AUTO: 10.4 FL (ref 8.9–12.7)
PO2 BLDV: 97.5 MM HG (ref 35–45)
POTASSIUM SERPL-SCNC: 3.7 MMOL/L (ref 3.5–5.3)
PR INTERVAL: 228 MS
PR INTERVAL: 234 MS
PR INTERVAL: 242 MS
PROT SERPL-MCNC: 7.1 G/DL (ref 6.4–8.4)
PROTHROMBIN TIME: 13 SECONDS (ref 11.6–14.5)
QRS AXIS: -65 DEGREES
QRS AXIS: -65 DEGREES
QRS AXIS: -66 DEGREES
QRSD INTERVAL: 138 MS
QRSD INTERVAL: 140 MS
QRSD INTERVAL: 140 MS
QT INTERVAL: 400 MS
QT INTERVAL: 416 MS
QT INTERVAL: 460 MS
QTC INTERVAL: 464 MS
QTC INTERVAL: 479 MS
QTC INTERVAL: 492 MS
RBC # BLD AUTO: 5.12 MILLION/UL (ref 3.81–5.12)
SODIUM SERPL-SCNC: 140 MMOL/L (ref 135–147)
T WAVE AXIS: -17 DEGREES
T WAVE AXIS: -44 DEGREES
T WAVE AXIS: 0 DEGREES
VENTRICULAR RATE: 69 BPM
VENTRICULAR RATE: 80 BPM
VENTRICULAR RATE: 81 BPM
WBC # BLD AUTO: 5.81 THOUSAND/UL (ref 4.31–10.16)

## 2023-10-30 PROCEDURE — 85610 PROTHROMBIN TIME: CPT | Performed by: EMERGENCY MEDICINE

## 2023-10-30 PROCEDURE — 85025 COMPLETE CBC W/AUTO DIFF WBC: CPT | Performed by: EMERGENCY MEDICINE

## 2023-10-30 PROCEDURE — 96374 THER/PROPH/DIAG INJ IV PUSH: CPT

## 2023-10-30 PROCEDURE — 93005 ELECTROCARDIOGRAM TRACING: CPT

## 2023-10-30 PROCEDURE — 71275 CT ANGIOGRAPHY CHEST: CPT

## 2023-10-30 PROCEDURE — 82805 BLOOD GASES W/O2 SATURATION: CPT | Performed by: EMERGENCY MEDICINE

## 2023-10-30 PROCEDURE — 99285 EMERGENCY DEPT VISIT HI MDM: CPT | Performed by: EMERGENCY MEDICINE

## 2023-10-30 PROCEDURE — 73030 X-RAY EXAM OF SHOULDER: CPT

## 2023-10-30 PROCEDURE — 83036 HEMOGLOBIN GLYCOSYLATED A1C: CPT | Performed by: INTERNAL MEDICINE

## 2023-10-30 PROCEDURE — 36415 COLL VENOUS BLD VENIPUNCTURE: CPT | Performed by: EMERGENCY MEDICINE

## 2023-10-30 PROCEDURE — 85730 THROMBOPLASTIN TIME PARTIAL: CPT | Performed by: EMERGENCY MEDICINE

## 2023-10-30 PROCEDURE — G1004 CDSM NDSC: HCPCS

## 2023-10-30 PROCEDURE — 85049 AUTOMATED PLATELET COUNT: CPT | Performed by: INTERNAL MEDICINE

## 2023-10-30 PROCEDURE — 99223 1ST HOSP IP/OBS HIGH 75: CPT | Performed by: INTERNAL MEDICINE

## 2023-10-30 PROCEDURE — 84484 ASSAY OF TROPONIN QUANT: CPT | Performed by: EMERGENCY MEDICINE

## 2023-10-30 PROCEDURE — 82010 KETONE BODYS QUAN: CPT | Performed by: EMERGENCY MEDICINE

## 2023-10-30 PROCEDURE — 80053 COMPREHEN METABOLIC PANEL: CPT | Performed by: EMERGENCY MEDICINE

## 2023-10-30 PROCEDURE — 3051F HG A1C>EQUAL 7.0%<8.0%: CPT | Performed by: FAMILY MEDICINE

## 2023-10-30 PROCEDURE — 83880 ASSAY OF NATRIURETIC PEPTIDE: CPT | Performed by: EMERGENCY MEDICINE

## 2023-10-30 PROCEDURE — 71045 X-RAY EXAM CHEST 1 VIEW: CPT

## 2023-10-30 PROCEDURE — 94664 DEMO&/EVAL PT USE INHALER: CPT

## 2023-10-30 PROCEDURE — 82948 REAGENT STRIP/BLOOD GLUCOSE: CPT

## 2023-10-30 PROCEDURE — 99285 EMERGENCY DEPT VISIT HI MDM: CPT

## 2023-10-30 PROCEDURE — 85379 FIBRIN DEGRADATION QUANT: CPT | Performed by: EMERGENCY MEDICINE

## 2023-10-30 RX ORDER — DOXAZOSIN MESYLATE 1 MG/1
2 TABLET ORAL DAILY
Status: DISCONTINUED | OUTPATIENT
Start: 2023-10-30 | End: 2023-11-02 | Stop reason: HOSPADM

## 2023-10-30 RX ORDER — FUROSEMIDE 20 MG/1
20 TABLET ORAL DAILY
Status: DISCONTINUED | OUTPATIENT
Start: 2023-10-30 | End: 2023-11-02 | Stop reason: HOSPADM

## 2023-10-30 RX ORDER — INSULIN LISPRO 100 [IU]/ML
1-5 INJECTION, SOLUTION INTRAVENOUS; SUBCUTANEOUS
Status: DISCONTINUED | OUTPATIENT
Start: 2023-10-30 | End: 2023-11-02 | Stop reason: HOSPADM

## 2023-10-30 RX ORDER — ALBUTEROL SULFATE 90 UG/1
2 AEROSOL, METERED RESPIRATORY (INHALATION) EVERY 6 HOURS PRN
Status: DISCONTINUED | OUTPATIENT
Start: 2023-10-30 | End: 2023-11-02 | Stop reason: HOSPADM

## 2023-10-30 RX ORDER — POLYETHYLENE GLYCOL 3350 17 G/17G
17 POWDER, FOR SOLUTION ORAL DAILY PRN
Status: DISCONTINUED | OUTPATIENT
Start: 2023-10-30 | End: 2023-11-02 | Stop reason: HOSPADM

## 2023-10-30 RX ORDER — LOSARTAN POTASSIUM 50 MG/1
50 TABLET ORAL DAILY
Status: DISCONTINUED | OUTPATIENT
Start: 2023-10-30 | End: 2023-10-30

## 2023-10-30 RX ORDER — HYDRALAZINE HYDROCHLORIDE 20 MG/ML
5 INJECTION INTRAMUSCULAR; INTRAVENOUS EVERY 6 HOURS PRN
Status: DISCONTINUED | OUTPATIENT
Start: 2023-10-30 | End: 2023-11-02 | Stop reason: HOSPADM

## 2023-10-30 RX ORDER — ACETAMINOPHEN 325 MG/1
650 TABLET ORAL ONCE
Status: COMPLETED | OUTPATIENT
Start: 2023-10-30 | End: 2023-10-30

## 2023-10-30 RX ORDER — CARVEDILOL 6.25 MG/1
6.25 TABLET ORAL 2 TIMES DAILY WITH MEALS
Status: DISCONTINUED | OUTPATIENT
Start: 2023-10-30 | End: 2023-10-30

## 2023-10-30 RX ORDER — POTASSIUM CHLORIDE 750 MG/1
10 TABLET, EXTENDED RELEASE ORAL
Status: DISCONTINUED | OUTPATIENT
Start: 2023-10-31 | End: 2023-11-02 | Stop reason: HOSPADM

## 2023-10-30 RX ORDER — ENOXAPARIN SODIUM 100 MG/ML
40 INJECTION SUBCUTANEOUS DAILY
Status: DISCONTINUED | OUTPATIENT
Start: 2023-10-30 | End: 2023-11-02 | Stop reason: HOSPADM

## 2023-10-30 RX ORDER — ACETAMINOPHEN 325 MG/1
975 TABLET ORAL EVERY 6 HOURS SCHEDULED
Status: DISCONTINUED | OUTPATIENT
Start: 2023-10-30 | End: 2023-11-02 | Stop reason: HOSPADM

## 2023-10-30 RX ORDER — ISOSORBIDE MONONITRATE 60 MG/1
60 TABLET, EXTENDED RELEASE ORAL DAILY
Status: DISCONTINUED | OUTPATIENT
Start: 2023-10-30 | End: 2023-11-02 | Stop reason: HOSPADM

## 2023-10-30 RX ORDER — INSULIN LISPRO 100 [IU]/ML
1-6 INJECTION, SOLUTION INTRAVENOUS; SUBCUTANEOUS
Status: DISCONTINUED | OUTPATIENT
Start: 2023-10-30 | End: 2023-11-02 | Stop reason: HOSPADM

## 2023-10-30 RX ORDER — LABETALOL HYDROCHLORIDE 5 MG/ML
10 INJECTION, SOLUTION INTRAVENOUS ONCE
Status: COMPLETED | OUTPATIENT
Start: 2023-10-30 | End: 2023-10-30

## 2023-10-30 RX ORDER — GABAPENTIN 300 MG/1
300 CAPSULE ORAL 3 TIMES DAILY
Status: DISCONTINUED | OUTPATIENT
Start: 2023-10-30 | End: 2023-11-02 | Stop reason: HOSPADM

## 2023-10-30 RX ORDER — ATORVASTATIN CALCIUM 40 MG/1
80 TABLET, FILM COATED ORAL
Status: DISCONTINUED | OUTPATIENT
Start: 2023-10-30 | End: 2023-11-02 | Stop reason: HOSPADM

## 2023-10-30 RX ORDER — CARVEDILOL 12.5 MG/1
12.5 TABLET ORAL 2 TIMES DAILY WITH MEALS
Status: DISCONTINUED | OUTPATIENT
Start: 2023-10-30 | End: 2023-11-01

## 2023-10-30 RX ADMIN — ENOXAPARIN SODIUM 40 MG: 40 INJECTION SUBCUTANEOUS at 13:36

## 2023-10-30 RX ADMIN — Medication 10 MG: at 07:50

## 2023-10-30 RX ADMIN — Medication 2 G: at 14:30

## 2023-10-30 RX ADMIN — TICAGRELOR 90 MG: 90 TABLET ORAL at 21:57

## 2023-10-30 RX ADMIN — Medication 2 G: at 17:02

## 2023-10-30 RX ADMIN — ATORVASTATIN CALCIUM 80 MG: 40 TABLET, FILM COATED ORAL at 17:01

## 2023-10-30 RX ADMIN — ISOSORBIDE MONONITRATE 60 MG: 60 TABLET, EXTENDED RELEASE ORAL at 13:35

## 2023-10-30 RX ADMIN — Medication 2 G: at 21:58

## 2023-10-30 RX ADMIN — ACETAMINOPHEN 650 MG: 325 TABLET, FILM COATED ORAL at 07:49

## 2023-10-30 RX ADMIN — IOHEXOL 85 ML: 350 INJECTION, SOLUTION INTRAVENOUS at 08:57

## 2023-10-30 RX ADMIN — ACETAMINOPHEN 975 MG: 325 TABLET, FILM COATED ORAL at 17:01

## 2023-10-30 RX ADMIN — ACETAMINOPHEN 975 MG: 325 TABLET, FILM COATED ORAL at 13:35

## 2023-10-30 RX ADMIN — DOXAZOSIN 2 MG: 1 TABLET ORAL at 13:35

## 2023-10-30 RX ADMIN — GABAPENTIN 300 MG: 300 CAPSULE ORAL at 21:57

## 2023-10-30 RX ADMIN — GABAPENTIN 300 MG: 300 CAPSULE ORAL at 17:01

## 2023-10-30 RX ADMIN — CARVEDILOL 12.5 MG: 12.5 TABLET, FILM COATED ORAL at 17:01

## 2023-10-30 RX ADMIN — FUROSEMIDE 20 MG: 20 TABLET ORAL at 13:35

## 2023-10-30 NOTE — ASSESSMENT & PLAN NOTE
Lab Results   Component Value Date    EGFR 44 10/30/2023    EGFR 47 09/07/2023    EGFR 36 07/19/2023    CREATININE 1.24 10/30/2023    CREATININE 1.18 09/07/2023    CREATININE 1.46 (H) 07/19/2023   Baseline Creatinine between 1.1-1.4. Creatinine at baseline. Avoid hypotension and nephrotoxic medications.

## 2023-10-30 NOTE — CASE MANAGEMENT
Case Management Assessment & Discharge Planning Note    Patient name Chino Ferrer  Location /-01 MRN [id number]  : 1955 Date 10/30/2023       Current Admission Date: 10/30/2023  Current Admission Diagnosis:Chest pain   Patient Active Problem List    Diagnosis Date Noted    Acute pain of right shoulder 10/30/2023    Hypertensive urgency 2023    Coronary artery disease involving native coronary artery of native heart 2023    Palpitations 2023    Chest pain 2023    Acute kidney injury (PADMINI) with chronic kidney disease stage III 2023    Thyroid nodule 2022    Other headache syndrome 2022    Chronic kidney disease, stage 3 unspecified ([address]) 2022    Abnormal CT scan 08/10/2021    Myasthenia gravis ([address]) 2021    Depression, recurrent ([address]) 2021    Cerebral meningocele ([address]) 2021    Obesity, morbid ([address]) 2020    UTI (urinary tract infection) 2020    Chronic heart failure ([address]) 2020    Subcortical microvascular ischemic occlusive disease 2019    Sixth nerve palsy of right eye     Binocular vision disorder with diplopia 2019    Carpal tunnel syndrome on left 2019    Numbness in both hands 2019    Carpal tunnel syndrome of left wrist 2019    Multinodular goiter 2019    Hoarseness or changing voice 2019    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 proliferative retinopathy and macular edema, without long-term current use of insulin ([address])     Diabetic neuropathy ([address]) 2018    Chronic constipation 2017    Cyst of left breast 2017    Impaired hearing 2017    Cerebral atherosclerosis 05/15/2017    Hyperlipidemia 2017    Moderate persistent asthma without complication     Lumbar pain 10/18/2016    Hiatal hernia 10/18/2016    Mild persistent asthma without complication     Chronic back pain 2016    Depression 09/06/2016    Essential hypertension 09/06/2016    Neuropathy, diabetic ([address]) 09/06/2016      LOS (days): 0  Geometric Mean LOS (GMLOS) (days):   Days to GMLOS:     OBJECTIVE:              Current admission status: Observation  Referral Reason: Other (discharge planning)    Preferred Pharmacy:   [address] [facility], [address] [facility]  [address] [facility] [address]  Phone: [phone] Fax: [phone] #[phone] Zara Moore [address]  [address] [facility] [address]  [address] [place] [address]  Phone: [phone] Fax: [phone]    Primary Care Provider: Ro Carpenter DO    Primary Insurance: [facility]  Secondary Insurance: [place]    ASSESSMENT:  Scott, [address] Representative - Daughter   Primary Phone: [phone] (Mobile)                 Advance Directives  Does patient have a [address]?: No  Was patient offered paperwork?: No (declined)  Does patient currently have a Health Care decision maker?: Yes, please see Health Care Proxy section  Does patient have Advance Directives?: No  Was patient offered paperwork?: No (declined)  Primary Contact: Eliud Sosa (Daughter) [phone]       CM met with patient at the bedside,baseline information  was obtained. CM discussed the role of CM in helping the patient develop a discharge plan and assist the patient in carry out their plan. Readmission Root Cause  30 Day Readmission: No    Patient Information  Admitted from[de-identified] Home  Mental Status: Alert, Confused (Pt was in and out of conversation, stated she was sleepy)  During Assessment patient was accompanied by: Not accompanied during assessment  Assessment information provided by[de-identified] Patient  Primary Caregiver: Self  Support Systems: Daughter  [place] of Residence: [address] [place] [address]. do you live in?: [address] [facility] entry access options.  Select all that apply.: No steps to enter home  Type of Current Residence: 2 story home  Upon entering residence, is there a bedroom on the main floor (no further steps)?: No  A bedroom is located on the following floor levels of residence (select all that apply):: 2nd Floor  Upon entering residence, is there a bathroom on the main floor (no further steps)?: No  Indicate which floors of current residence have a bathroom (select all the apply):: 2nd Floor  Number of steps to 2nd floor from main floor: One Flight  In the last 12 months, was there a time when you were not able to pay the mortgage or rent on time?: No  In the last 12 months, how many places have you lived?: 1  In the last 12 months, was there a time when you did not have a steady place to sleep or slept in a shelter (including now)?: No  Homeless/housing insecurity resource given?: No  Living Arrangements: Lives w/ Daughter  Is patient a ?: No    Activities of Daily Living Prior to Admission  Functional Status: Independent  Completes ADLs independently?: Yes  Ambulates independently?: Yes  Does patient use assisted devices?: Yes  Assisted Devices (DME) used: Straight Cane  Does patient currently own DME?: Yes  What DME does the patient currently own?: Straight Cane  Does patient have a history of Outpatient Therapy (PT/OT)?: No  Does the patient have a history of Short-Term Rehab?: No  Does patient have a history of HHC?: No  Does patient currently have [address]?: No         Patient Information Continued  Income Source: SSI/SSD  Does patient have prescription coverage?: Yes  Within the past 12 months, you worried that your food would run out before you got the money to buy more.: Never true  Within the past 12 months, the food you bought just didn't last and you didn't have money to get more.: Never true  Food insecurity resource given?: No  Does patient receive dialysis treatments?: No  Does patient have a history of substance abuse?: No  Does patient have a history of Mental Health Diagnosis?: No         Means of Transportation  Means of Transport to Appts[de-identified] Public Transportation - Bus  In the past 12 months, has lack of transportation kept you from medical appointments or from getting medications?: No  In the past 12 months, has lack of transportation kept you from meetings, work, or from getting things needed for daily living?: No  Was application for public transport provided?: No (declined)        DISCHARGE DETAILS:    Discharge planning discussed with[de-identified] Patient  Freedom of Choice: Yes  Comments - Freedom of Choice: CM discussed options for home health pending dc, pt felt it was unnessesary right now  CM contacted family/caregiver?: No- see comments (pt declined)  Were Treatment Team discharge recommendations reviewed with patient/caregiver?: Yes  Did patient/caregiver verbalize understanding of patient care needs?: Yes  Were patient/caregiver advised of the risks associated with not following Treatment Team discharge recommendations?: Yes         [address]         Is the patient interested in [address] at discharge?: No (States she doesnt think shell need it)    DME Referral Provided  Referral made for DME?: No    Other Referral/Resources/Interventions Provided:  Interventions: None Indicated  Referral Comments: As of now no interventions were indicated         Treatment Team Recommendation: Home  Discharge Destination Plan[de-identified] Home  Transport at Discharge : Public Bus         CM met with patient, pt indicated no need for HHC at the moment, stating she does not know if she'll need it. Pt stated she will need public transport when DC due to not have a vehicle at the moment. Cm will follow the case to discharge plan.

## 2023-10-30 NOTE — H&P
[address] [facility]  H&P  Name: Miranda Tavares 76 y.o. female I MRN: [id number]  Unit/Bed#: -01 I Date of Admission: 10/30/2023   Date of Service: 10/30/2023 I Hospital Day: 0      Assessment/Plan   Chest pain  Assessment & Plan  Left-sided chest pain which started this morning at around 6 AM.  Described the pain to moderate to severe in intensity nonradiating associated with palpitations  Showed bifascicular block with  T wave inversions in inferior lead. IMELDA score on admission is 4  High-sensitivity troponin x2 negative  Continue with telemetry monitoring and serial cardiac enzymes  Continue with aspirin, Brilinta, statin, beta-blocker  Cardiology consultation will be obtained. Further ischemic evaluation per cardiology      Acute pain of right shoulder  Assessment & Plan  Complains of pain for the last 1 day in the right shoulder radiating to the scapular region. Denies any traumatic injury or fall. Follow-up on x-rays of the shoulder. Tylenol, Voltaren gel, Lidoderm patch. Physical therapy and orthopedic evaluation will be obtained. Hypertensive urgency  Assessment & Plan  /120-->199/103. Resume outpatient dose of Cardura Imdur and increase carvedilol to 6.25twice daily. Continue with low-salt diet. As needed IV hydralazine for SBP greater than 180. She has history of angioedema with ACE inhibitor and subsequently is not on ARB as well. Coronary artery disease involving native coronary artery of native heart  Assessment & Plan  History of coronary artery disease with PCI in March 2023. Maintain on aspirin Brilinta beta-blockers and statin. Will resume. Presents with chest pain. Follow-up on serial cardiac enzymes and telemetry.     Chronic kidney disease, stage 3 unspecified [facility])  Assessment & Plan  Lab Results   Component Value Date    EGFR 44 10/30/2023    EGFR 47 09/07/2023    EGFR 36 07/19/2023    CREATININE 1.24 10/30/2023    CREATININE 1.18 09/07/2023    CREATININE 1.46 (H) 07/19/2023   Baseline Creatinine between 1.1-1.4. Creatinine at baseline. Avoid hypotension and nephrotoxic medications. 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 proliferative retinopathy and macular edema, without long-term current use of insulin ([facility])  Assessment & Plan  Lab Results   Component Value Date    HGBA1C 7.5 (H) 06/07/2023       No results for input(s): "POCGLU" in the last 72 hours. Blood Sugar Average: Last 72 hrs:  Start sliding scale insulin coverage and hold oral hypoglycemic medications. Continue to monitor blood glucose levels closely. VTE Pharmacologic Prophylaxis: VTE Score: 4 High Risk (Score >/= 5) - Pharmacological DVT Prophylaxis Ordered: enoxaparin (Lovenox). Sequential Compression Devices Ordered. Code Status: Level 1 - Full Code   Discussion with family: Patient declined call to . Anticipated Length of Stay: Patient will be admitted on an inpatient basis with an anticipated length of stay of greater than 2 midnights secondary to rule out acute coronary syndrome. Total Time Spent on Date of Encounter in care of patient: 75 mins. This time was spent on one or more of the following: performing physical exam; counseling and coordination of care; obtaining or reviewing history; documenting in the medical record; reviewing/ordering tests, medications or procedures; communicating with other healthcare professionals and discussing with patient's family/caregivers. Chief Complaint: Right shoulder pain and chest pain. History of Present Illness:  Rosa Caruso is a 76 y.o. female with a PMH of coronary artery disease, hypertension, type 2 diabetes, chronic kidney disease stage III, peripheral neuropathy who presents with right-sided shoulder pain and left-sided chest pain. States that the shoulder pain started 2 days ago but denies any trauma.   Pain is moderate to severe intensity worsening with movement and radiating to the scapular region. She denies any abdominal pain nausea or vomiting. Subsequently this morning at 6 AM she started having left-sided chest pain along with palpitations and diaphoresis. Denied any shortness of breath. She does have history of coronary artery disease s/p stenting in [facility] in March 2023. Subsequently has not followed up with a cardiologist.  Has had admission in June 2023 for hypertensive urgency. Blood pressure was also elevated today upon presentation. Patient attributes that to stress as she is at risk of being evicted from her home. She reports compliance with her antihypertensive medications however states that she has missed some doses of Brilinta recently. .    Review of Systems:  Review of Systems   Constitutional:  Positive for activity change, diaphoresis and fatigue. HENT: Negative. Eyes: Negative. Respiratory: Negative. Cardiovascular:  Positive for chest pain and palpitations. Negative for leg swelling. Gastrointestinal: Negative. Endocrine: Negative. Genitourinary: Negative. Musculoskeletal:  Positive for back pain and joint swelling. Allergic/Immunologic: Negative. Neurological: Negative. Hematological: Negative. Psychiatric/Behavioral:  The patient is nervous/anxious.         Past Medical and Surgical History:   Past Medical History:   Diagnosis Date    Abnormal ECG     last assessed: 5/15/17    Abnormal mammogram     last assessed: 7/11/17    Abnormal stress test     last assesed: 7/24/17    Anemia     Anxiety     Arthritis     Asthma     Back problem     Breast cyst     CAD (coronary artery disease)     Chest pain     last assessed: 7/24/17    Chronic pain disorder     Cyst of left breast     last assessed: 8/18/17    Depression     Depression     resolved: 1/2/18    Diabetes mellitus ([address])     Hiatal hernia     last assessed: 11/7/17    Hyperlipidemia     Hypertension     Keloid of skin     last assessed: 11/2/16    Multiple thyroid nodules     Neuropathy     Psychiatric disorder     anxiety    Stroke [facility])     TIA 2005    Tuberculosis     as a child        Past Surgical History:   Procedure Laterality Date    ARM WOUND REPAIR / CLOSURE      CARPAL TUNNEL RELEASE      CARPAL TUNNEL RELEASE Left     CATARACT EXTRACTION Bilateral     2015    COLONOSCOPY      CORONARY ANGIOPLASTY WITH STENT PLACEMENT  03/01/2023    at [address]      right upper arm. EYE SURGERY      cataract removaql    [place] LUMBAR PUNCTURE DIAGNOSTIC  08/15/2019    [place] ESOPHAGOGASTRODUODENOSCOPY TRANSORAL DIAGNOSTIC N/A 01/05/2018    Procedure: ESOPHAGOGASTRODUODENOSCOPY (EGD); Surgeon: Loni Yu DO;  Location: [facility] MAIN OR;  Service: Gastroenterology    [place] [address] WRST SURG W/RLS TRANSVRS CARPL LIGM Left 04/19/2019    Procedure: ENDOSCOPIC CARPAL TUNNEL RELEASE;  Surgeon: Brittany Bishop MD;  Location: [facility] MAIN OR;  Service: [address]  THYROID BIOPSY  04/15/2019       Meds/Allergies:  Prior to Admission medications    Medication Sig Start Date End Date Taking?  Authorizing Provider   albuterol (PROVENTIL HFA,VENTOLIN HFA) 90 mcg/act inhaler Inhale 2 puffs every 6 (six) hours as needed for wheezing or shortness of breath 10/25/23  Yes Tyree Estrada DO   aspirin (ECOTRIN LOW STRENGTH) 81 mg EC tablet Take 1 tablet (81 mg total) by mouth daily 12/14/22  Yes Tyree Estrada DO   atorvastatin (LIPITOR) 80 mg tablet Take 1 tablet (80 mg total) by mouth daily with dinner 10/25/23  Yes Tyree Estrada DO   carvedilol (COREG) 3.125 mg tablet Take 1 tablet (3.125 mg total) by mouth 2 (two) times a day with meals 10/25/23  Yes Tyree Estrada DO   dapagliflozin (Farxiga) 10 MG tablet Take 1 tablet (10 mg total) by mouth in the morning 1/2 tablet daily for the 1st month then increase to a full tablet daily in the morning 10/25/23  Yes Tyree Estrada DO   furosemide (LASIX) 20 mg tablet Take 1 tablet (20 mg total) by mouth daily 10/25/23  Yes Karolina Mock DO   gabapentin (NEURONTIN) 300 mg capsule Take 1 capsule (300 mg total) by mouth 3 (three) times a day 10/25/23  Yes Karolina Mock DO   isosorbide mononitrate (IMDUR) 60 mg 24 hr tablet Take 1 tablet (60 mg total) by mouth daily 10/25/23  Yes Karolina Mock DO   polyethylene glycol [facility]) 17 GM/SCOOP powder Take 17 g by mouth daily Increase to twice daily if still constiptated 12/21/22  Yes Alexandra Owens PA-C   sitaGLIPtin (Januvia) 100 mg tablet Take 1 tablet (100 mg total) by mouth daily 10/25/23  Yes Karolina Mock DO   ticagrelor (BRILINTA) 90 MG Take 1 tablet (90 mg total) by mouth every 12 (twelve) hours 4/16/23  Yes Peyman Ham MD   Alcohol Swabs (DropSafe Alcohol Prep) 70 % PADS Use one pad twice daily when check blood sugar 1/23/23   Karolina Mock DO   Blood Glucose Monitoring Suppl (True Metrix Air Glucose Meter) w/Device KIT USE AS DIRECTED 3/27/23   Karolina Mock DO   famotidine (PEPCID) 40 MG tablet Take 1 tablet (40 mg total) by mouth daily at bedtime  Patient not taking: Reported on 10/30/2023 10/25/23 10/19/24  Karolina Mock DO   ofloxacin (OCUFLOX) 0.3 % ophthalmic solution  4/26/23   Historical Provider, MD   potassium chloride (Klor-Con) 10 mEq tablet Take 1 tablet (10 mEq total) by mouth every other day  Patient not taking: Reported on 9/7/2023 4/16/23   Peyman Ham MD   TRUEplus Lancets 33G MISC Use 2 (two) times a day 1/23/23   Karolina Mock DO     I have reviewed home medications with patient personally. Allergies: Allergies   Allergen Reactions    Bactrim [Sulfamethoxazole-Trimethoprim] Shortness Of Breath    Lisinopril Angioedema    Vicodin [Hydrocodone-Acetaminophen] GI Intolerance    Hydrocodone-Acetaminophen Nausea Only    Oxycodone-Acetaminophen GI Intolerance and Nausea Only       Social History:  Marital Status:       Substance Use History:   Social History     Substance and Sexual Activity   Alcohol Use Not Currently    Alcohol/week: 0.0 standard drinks of alcohol     Social History     Tobacco Use   Smoking Status Never   Smokeless Tobacco Never     Social History     Substance and Sexual Activity   Drug Use No       Family History:  Family History   Problem Relation Age of Onset    Heart disease Mother     Diabetes Mother     Arthritis Mother     Hypertension Mother     MONE disease Mother     Kidney disease Father     Hypertension Father     Stomach cancer Father     Arthritis Sister     Kidney disease Brother         age 34     Stroke Maternal Grandmother     Stomach cancer Maternal Grandmother     No Known Problems Maternal Grandfather     Arthritis Paternal Grandmother     Heart attack Paternal Grandmother     No Known Problems Paternal Grandfather     No Known Problems Daughter     No Known Problems Daughter     No Known Problems Daughter     No Known Problems Daughter     Stroke Maternal Aunt     Liver cancer Maternal Aunt     No Known Problems Maternal Aunt     No Known Problems Maternal Aunt     Heart attack Maternal Uncle     No Known Problems Paternal Aunt     Cancer Family         spinal column    Coronary artery disease Family     Glaucoma Family     Rheum arthritis Family        Physical Exam:     Vitals:   Blood Pressure: (!) 199/103 (10/30/23 1122)  Pulse: 62 (10/30/23 1120)  Temperature: (!) 97.2 °F (36.2 °C) (10/30/23 1120)  Temp Source: Temporal (10/30/23 0723)  Respirations: 18 (10/30/23 1120)  Height: 5' 6" (167.6 cm) (10/30/23 0723)  Weight - Scale: 92.9 kg (204 lb 12.9 oz) (10/30/23 1116)  SpO2: 96 % (10/30/23 1120)    Physical Exam  Constitutional:       Appearance: She is obese. She is ill-appearing. HENT:      Head: Normocephalic. Nose: Nose normal.      Mouth/Throat:      Mouth: Mucous membranes are moist.   Eyes:      Extraocular Movements: Extraocular movements intact. Pupils: Pupils are equal, round, and reactive to light.    Cardiovascular:      Rate and Rhythm: Normal rate and regular rhythm. Pulses: Normal pulses. Pulmonary:      Effort: Pulmonary effort is normal.      Breath sounds: Normal breath sounds. Abdominal:      General: Abdomen is flat. Bowel sounds are normal.   Musculoskeletal:      Right lower leg: No edema. Left lower leg: No edema. Comments: Rt shoulder tenderness with range of motion. Tenderness extending to the right lateral aspect of the neck. Neurological:      General: No focal deficit present. Mental Status: She is alert. Mental status is at baseline. Additional Data:     Lab Results:  Results from last 7 days   Lab Units 10/30/23  0748   WBC Thousand/uL 5.81   HEMOGLOBIN g/dL 13.5   HEMATOCRIT % 42.9   PLATELETS Thousands/uL 231   NEUTROS PCT % 62   LYMPHS PCT % 26   MONOS PCT % 9   EOS PCT % 2     Results from last 7 days   Lab Units 10/30/23  0748   SODIUM mmol/L 140   POTASSIUM mmol/L 3.7   CHLORIDE mmol/L 107   CO2 mmol/L 25   BUN mg/dL 15   CREATININE mg/dL 1.24   ANION GAP mmol/L 8   CALCIUM mg/dL 8.7   ALBUMIN g/dL 3.5   TOTAL BILIRUBIN mg/dL 0.40   ALK PHOS U/L 106*   ALT U/L 10   AST U/L 17   GLUCOSE RANDOM mg/dL 142*     Results from last 7 days   Lab Units 10/30/23  0748   INR  0.95                   Lines/Drains:  Invasive Devices       Peripheral Intravenous Line  Duration             Peripheral IV 10/30/23 Distal;Right;Ventral (anterior) Forearm <1 day                        Imaging: No pertinent imaging reviewed. CTA ed chest pe study   Final Result by Zachariah Padilla MD (10/30 1053)      No PE or acute pathology. Stable, nonemergent findings above. Workstation performed: [id number]         XR chest portable   Final Result by Zachariah Padilla MD (10/30 0303)   No acute cardiopulmonary findings.                   Workstation performed: [id number]         XR shoulder 2+ vw right    (Results Pending)       EKG and Other Studies Reviewed on Admission:   EKG:  Bifascicular block with T wave inversions in the inferior leads. ** Please Note: This note has been constructed using a voice recognition system.  **

## 2023-10-30 NOTE — PLAN OF CARE
Problem: PAIN - ADULT  Goal: Verbalizes/displays adequate comfort level or baseline comfort level  Description: Interventions:  - Encourage patient to monitor pain and request assistance  - Assess pain using appropriate pain scale  - Administer analgesics based on type and severity of pain and evaluate response  - Implement non-pharmacological measures as appropriate and evaluate response  - Consider cultural and social influences on pain and pain management  - Notify physician/advanced practitioner if interventions unsuccessful or patient reports new pain  10/30/2023 1833 by Fela Villalta RN  Outcome: Progressing  10/30/2023 1446 by Fela Villalta RN  Outcome: Progressing     Problem: SAFETY ADULT  Goal: Maintain or return to baseline ADL function  Description: INTERVENTIONS:  -  Assess patient's ability to carry out ADLs; assess patient's baseline for ADL function and identify physical deficits which impact ability to perform ADLs (bathing, care of mouth/teeth, toileting, grooming, dressing, etc.)  - Assess/evaluate cause of self-care deficits   - Assess range of motion  - Assess patient's mobility; develop plan if impaired  - Assess patient's need for assistive devices and provide as appropriate  - Encourage maximum independence but intervene and supervise when necessary  - Involve family in performance of ADLs  - Assess for home care needs following discharge   - Consider OT consult to assist with ADL evaluation and planning for discharge  - Provide patient education as appropriate  Outcome: Progressing

## 2023-10-30 NOTE — ASSESSMENT & PLAN NOTE
Lab Results   Component Value Date    HGBA1C 7.5 (H) 06/07/2023       No results for input(s): "POCGLU" in the last 72 hours. Blood Sugar Average: Last 72 hrs:  Start sliding scale insulin coverage and hold oral hypoglycemic medications. Continue to monitor blood glucose levels closely.

## 2023-10-30 NOTE — PROGRESS NOTES
Pastoral Care Progress Note    10/30/2023  Patient: Jeanine Fonseca : 1955  Admission Date & Time: 10/30/2023 0720  MRN: [id number] CSN: [id number]  [id number] in consult with RN initiated support visit to pt. Pt received CH upright in bed, no family present. Pt described her support system as her daughter. Pt shared that they live together and have done so for a long time. Daughter of pt is currently unemployed, no longer has a car. Alessandra shared that family is experiencing food insecurity as a result of living distant from reasonably priced food, no vehicle and poor storage conditions in the home. Alessandra shared the family is able to shop only at corner stores near their neighborhood. Pt shared she has been denied Meals on Wheels. Alessandra shared that the family is currently under pressure from their landlord to move out. Alessandra shared that family is challenged in finding new housing, particularly because they lack transportation. Pt shared that her current housing is not ideal and presents social challenges from "unruly and disrespectful" neighbors. Pt has been counseled to bring in law enforcement to aid keeping the peace. Greater family network is supportive attests Alessandra, but involved with their own responsibilities. Pt shared that these circumstances are what led to her noncompliance with her medication as there was no food in the house, no further food stamps to spend, no transportation. She attests that she cannot take her medication without food because it makes her very sick. [address] provided empathetic listening, discussion of resources that pt has attempted to use.  has shared information with CM. [address] remains available.                  Chaplaincy Interventions Utilized:   Empowerment: Encouraged assertiveness and Provided anxiety containment    Exploration: Explored relational needs & resources and Facilitated story telling    Collaboration: Consulted with interdisciplinary team    Relationship Building: Listened empathically      Chaplaincy Outcomes Achieved:  Expressed intermediate hope    Spiritual Coping Strategies Utilized:   Connectedness     10/30/23 1500   Clinical Encounter Type   Visited With Patient   Routine Visit Introduction   Referral From Nurse

## 2023-10-30 NOTE — ED NOTES
Hayden txt sent to 2M charge RN, Pt transported to unit, no s/s of distress, Vs stable, A&Ox4, ID band in place.       Crow Connor RN  10/30/23 6197

## 2023-10-30 NOTE — ASSESSMENT & PLAN NOTE
History of coronary artery disease with PCI in March 2023. Maintain on aspirin Brilinta beta-blockers and statin. Will resume. Presents with chest pain. Follow-up on serial cardiac enzymes and telemetry.

## 2023-10-30 NOTE — ASSESSMENT & PLAN NOTE
/120-->199/103. Resume outpatient dose of Cardura Imdur and increase carvedilol to 6.25twice daily. Continue with low-salt diet. As needed IV hydralazine for SBP greater than 180. She has history of angioedema with ACE inhibitor and subsequently is not on ARB as well.

## 2023-10-30 NOTE — ED PROVIDER NOTES
History  Chief Complaint   Patient presents with    Chest Pain     C/P ONSET THIS AM, APPROX 6AM.   Endorses increased stress lately. Took 81mg ASA approx 6am with some relief. History of stent placement in March 21    76year-old female via EMS called by daughter presents from home describing right arm squeezing discomfort that began after midnight this morning with fairly cute onset retrosternal discomfort, took a baby aspirin and improved, resolved retrosternal discomfort, notes right arm still uncomfortable. She also describes a headache, mild to moderate occipitally. Notes she is under a lot of stress, was not hungry yesterday and does not take medications when not eating. Past medical history includes CAD with stenting, currently taking Brilinta, CKD, hypertension, myasthenia gravis. Feels a little weak in general      History provided by:  Patient  Chest Pain  Pain location:  Substernal area  Pain quality: aching and pressure    Pain radiates to:  Upper back  Pain radiates to the back: yes    Pain severity:  Mild  Onset quality:  Gradual  Timing:  Constant  Progression:  Improving  Chronicity:  New  Relieved by:  Aspirin  Worsened by: Movement  Associated symptoms: no fever and no shortness of breath    Risk factors: coronary artery disease        Prior to Admission Medications   Prescriptions Last Dose Informant Patient Reported? Taking?    Alcohol Swabs (DropSafe Alcohol Prep) 70 % PADS  Self No No   Sig: Use one pad twice daily when check blood sugar   Blood Glucose Monitoring Suppl (True Metrix Air Glucose Meter) w/Device KIT  Self No No   Sig: USE AS DIRECTED   TRUEplus Lancets 33G MISC  Self No No   Sig: Use 2 (two) times a day   albuterol (PROVENTIL HFA,VENTOLIN HFA) 90 mcg/act inhaler   No No   Sig: Inhale 2 puffs every 6 (six) hours as needed for wheezing or shortness of breath   aspirin (ECOTRIN LOW STRENGTH) 81 mg EC tablet 10/30/2023 Self No Yes   Sig: Take 1 tablet (81 mg total) by mouth daily   atorvastatin (LIPITOR) 80 mg tablet 10/28/2023  No No   Sig: Take 1 tablet (80 mg total) by mouth daily with dinner   carvedilol (COREG) 3.125 mg tablet 10/30/2023  No Yes   Sig: Take 1 tablet (3.125 mg total) by mouth 2 (two) times a day with meals   dapagliflozin (Farxiga) 10 MG tablet 10/29/2023  No Yes   Sig: Take 1 tablet (10 mg total) by mouth in the morning 1/2 tablet daily for the 1st month then increase to a full tablet daily in the morning   famotidine (PEPCID) 40 MG tablet Not Taking  No No   Sig: Take 1 tablet (40 mg total) by mouth daily at bedtime   Patient not taking: Reported on 10/30/2023   furosemide (LASIX) 20 mg tablet 10/29/2023  No Yes   Sig: Take 1 tablet (20 mg total) by mouth daily   gabapentin (NEURONTIN) 300 mg capsule 10/30/2023  No Yes   Sig: Take 1 capsule (300 mg total) by mouth 3 (three) times a day   isosorbide mononitrate (IMDUR) 60 mg 24 hr tablet 10/29/2023  No Yes   Sig: Take 1 tablet (60 mg total) by mouth daily   ofloxacin (OCUFLOX) 0.3 % ophthalmic solution  Self Yes No   Patient not taking: Reported on 9/7/2023   polyethylene glycol (GLYCOLAX) 17 GM/SCOOP powder  Self No No   Sig: Take 17 g by mouth daily Increase to twice daily if still constiptated   potassium chloride (Klor-Con) 10 mEq tablet  Self No No   Sig: Take 1 tablet (10 mEq total) by mouth every other day   Patient not taking: Reported on 9/7/2023   sitaGLIPtin (Januvia) 100 mg tablet 10/29/2023  No Yes   Sig: Take 1 tablet (100 mg total) by mouth daily   ticagrelor (BRILINTA) 90 MG 10/30/2023 Self No Yes   Sig: Take 1 tablet (90 mg total) by mouth every 12 (twelve) hours      Facility-Administered Medications: None       Past Medical History:   Diagnosis Date    Abnormal ECG     last assessed: 5/15/17    Abnormal mammogram     last assessed: 7/11/17    Abnormal stress test     last assesed: 7/24/17    Anemia     Anxiety     Arthritis     Asthma     Back problem     Breast cyst     CAD (coronary artery disease)     Chest pain     last assessed: 17    Chronic pain disorder     Cyst of left breast     last assessed: 17    Depression     Depression     resolved: 18    Diabetes mellitus ([address] [facility])     Hiatal hernia     last assessed: 17    Hyperlipidemia     Hypertension     Keloid of skin     last assessed: 16    Multiple thyroid nodules     Neuropathy     Psychiatric disorder     anxiety    Stroke [facility])     TIA 2005    Tuberculosis     as a child        Past Surgical History:   Procedure Laterality Date    ARM WOUND REPAIR / [address] [facility] Left     CATARACT EXTRACTION Bilateral         COLONOSCOPY      CORONARY ANGIOPLASTY WITH STENT PLACEMENT  2023    at [address] [place]      right upper arm. EYE SURGERY      cataract removaql    [place] LUMBAR PUNCTURE DIAGNOSTIC  08/15/2019    [place] ESOPHAGOGASTRODUODENOSCOPY TRANSORAL DIAGNOSTIC N/A 2018    Procedure: ESOPHAGOGASTRODUODENOSCOPY (EGD);   Surgeon: Rhonda Newton DO;  Location: [facility] MAIN OR;  Service: Gastroenterology    [place] [address] WRST SURG W/RLS TRANSVRS CARPL LIGM Left 2019    Procedure: ENDOSCOPIC CARPAL TUNNEL RELEASE;  Surgeon: Jacob Lopez MD;  Location: [facility] MAIN OR;  Service: Orthopedics    TUBAL LIGATION      US GUIDED THYROID BIOPSY  04/15/2019       Family History   Problem Relation Age of Onset    Heart disease Mother     Diabetes Mother     Arthritis Mother     Hypertension Mother     MONE disease Mother     Kidney disease Father     Hypertension Father     Stomach cancer Father     Arthritis Sister     Kidney disease Brother         age 34     Stroke Maternal Grandmother     Stomach cancer Maternal Grandmother     No Known Problems Maternal Grandfather     Arthritis Paternal Grandmother     Heart attack Paternal Grandmother     No Known Problems Paternal Grandfather     No Known Problems Daughter     No Known Problems Daughter     No Known Problems Daughter     No Known Problems Daughter     Stroke Maternal Aunt     Liver cancer Maternal Aunt     No Known Problems Maternal Aunt     No Known Problems Maternal Aunt     Heart attack Maternal Uncle     No Known Problems Paternal Aunt     Cancer Family         spinal column    Coronary artery disease Family     Glaucoma Family     Rheum arthritis Family      I have reviewed and agree with the history as documented. E-Cigarette/Vaping    E-Cigarette Use Never User      E-Cigarette/Vaping Substances    Nicotine No     THC No     CBD No     Flavoring No     Other No     Unknown No      Social History     Tobacco Use    Smoking status: Never    Smokeless tobacco: Never   Vaping Use    Vaping Use: Never used   Substance Use Topics    Alcohol use: Not Currently     Alcohol/week: 0.0 standard drinks of alcohol    Drug use: No       Review of Systems   Constitutional:  Negative for fever. Respiratory:  Negative for shortness of breath. Cardiovascular:  Positive for chest pain. All other systems reviewed and are negative. Physical Exam  Physical Exam  Vitals and nursing note reviewed. Constitutional:       Comments: Pleasant, comfortable-appearing   HENT:      Head: Normocephalic and atraumatic. Mouth/Throat:      Mouth: Mucous membranes are moist.      Pharynx: Oropharynx is clear. Eyes:      Conjunctiva/sclera: Conjunctivae normal.      Pupils: Pupils are equal, round, and reactive to light. Cardiovascular:      Rate and Rhythm: Normal rate and regular rhythm. Heart sounds: Normal heart sounds. Pulmonary:      Effort: Pulmonary effort is normal.      Breath sounds: Normal breath sounds. Abdominal:      General: Bowel sounds are normal. There is no distension. Palpations: Abdomen is soft. Tenderness: There is no abdominal tenderness. Musculoskeletal:         General: No deformity. Cervical back: Neck supple. Skin:     General: Skin is warm and dry. Neurological:      General: No focal deficit present. Mental Status: She is alert and oriented to person, place, and time. Cranial Nerves: No cranial nerve deficit. Coordination: Coordination normal.   Psychiatric:         Behavior: Behavior normal.         Thought Content: Thought content normal.         Judgment: Judgment normal.         Vital Signs  ED Triage Vitals [10/30/23 0723]   Temperature Pulse Respirations Blood Pressure SpO2   97.8 °F (36.6 °C) 82 18 (!) 256/120 100 %      Temp Source Heart Rate Source Patient Position - Orthostatic VS BP Location FiO2 (%)   Temporal Monitor Lying Right arm --      Pain Score       5           Vitals:    10/30/23 0800 10/30/23 0815 10/30/23 0830 10/30/23 0845   BP:  (!) 167/113 (!) 193/94 (!) 177/89   Pulse: 68 72 68 72   Patient Position - Orthostatic VS:             Visual Acuity      ED Medications  Medications   labetalol (NORMODYNE) injection 10 mg (10 mg Intravenous Given 10/30/23 0750)   acetaminophen (TYLENOL) tablet 650 mg (650 mg Oral Given 10/30/23 0749)   iohexol (OMNIPAQUE) 350 MG/ML injection (MULTI-DOSE) 85 mL (85 mL Intravenous Given 10/30/23 0857)       Diagnostic Studies  Results Reviewed       Procedure Component Value Units Date/Time    HS Troponin I 2hr [[id number]]  (Normal) Collected: 10/30/23 0946    Lab Status: Final result Specimen: Blood from Arm, Right Updated: 10/30/23 1016     hs TnI 2hr 15 ng/L      Delta 2hr hsTnI 4 ng/L     B-Type Natriuretic Peptide(BNP) [[id number]]  (Abnormal) Collected: 10/30/23 0748    Lab Status: Final result Specimen: Blood from Arm, Right Updated: 10/30/23 0856      pg/mL     D-Dimer [[id number]]  (Abnormal) Collected: 10/30/23 0748    Lab Status: Final result Specimen: Blood from Arm, Right Updated: 10/30/23 4759     D-Dimer, Quant 2.30 ug/ml FEU     Narrative:       In the evaluation for possible pulmonary embolism, in the appropriate (Well's Score of 4 or less) patient, the age adjusted d-dimer cutoff for this patient can be calculated as:    Age x 0.01 (in ug/mL) for Age-adjusted D-dimer exclusion threshold for a patient over 50 years.     HS Troponin I 4hr [[id number]]     Lab Status: No result Specimen: Blood     HS Troponin 0hr (reflex protocol) [[id number]]  (Normal) Collected: 10/30/23 0748    Lab Status: Final result Specimen: Blood from Arm, Right Updated: 10/30/23 0822     hs TnI 0hr 11 ng/L     CBC and differential [[id number]]  (Abnormal) Collected: 10/30/23 0748    Lab Status: Final result Specimen: Blood from Arm, Right Updated: 10/30/23 0821     WBC 5.81 Thousand/uL      RBC 5.12 Million/uL      Hemoglobin 13.5 g/dL      Hematocrit 42.9 %      MCV 84 fL      MCH 26.4 pg      MCHC 31.5 g/dL      RDW 15.5 %      MPV 10.4 fL      Platelets 532 Thousands/uL      nRBC 0 /100 WBCs      Neutrophils Relative 62 %      Immat GRANS % 0 %      Lymphocytes Relative 26 %      Monocytes Relative 9 %      Eosinophils Relative 2 %      Basophils Relative 1 %      Neutrophils Absolute 3.63 Thousands/µL      Immature Grans Absolute 0.02 Thousand/uL      Lymphocytes Absolute 1.53 Thousands/µL      Monocytes Absolute 0.50 Thousand/µL      Eosinophils Absolute 0.10 Thousand/µL      Basophils Absolute 0.03 Thousands/µL     Protime-INR [[id number]]  (Normal) Collected: 10/30/23 0748    Lab Status: Final result Specimen: Blood from Arm, Right Updated: 10/30/23 0821     Protime 13.0 seconds      INR 0.95    APTT [[id number]]  (Normal) Collected: 10/30/23 0748    Lab Status: Final result Specimen: Blood from Arm, Right Updated: 10/30/23 0821     PTT 27 seconds     Beta Hydroxybutyrate [[id number]]  (Normal) Collected: 10/30/23 0748    Lab Status: Final result Specimen: Blood from Arm, Right Updated: 10/30/23 0818     BETA-HYDROXYBUTYRATE 0.0 mmol/L     Comprehensive metabolic panel [[id number]]  (Abnormal) Collected: 10/30/23 0748    Lab Status: Final result Specimen: Blood from Arm, Right Updated: 10/30/23 0483 Sodium 140 mmol/L      Potassium 3.7 mmol/L      Chloride 107 mmol/L      CO2 25 mmol/L      ANION GAP 8 mmol/L      BUN 15 mg/dL      Creatinine 1.24 mg/dL      Glucose 142 mg/dL      Calcium 8.7 mg/dL      AST 17 U/L      ALT 10 U/L      Alkaline Phosphatase 106 U/L      Total Protein 7.1 g/dL      Albumin 3.5 g/dL      Total Bilirubin 0.40 mg/dL      eGFR 44 ml/min/1.73sq m     Narrative:      [facility] guidelines for [facility] Kid[facility] Disease (CKD):     Stage 1 with normal or high GFR (GFR > 90 mL/min/1.73 square meters)    Stage 2 Mild CKD (GFR = 60-89 mL/min/1.73 square meters)    Stage 3A Moderate CKD (GFR = 45-59 mL/min/1.73 square meters)    Stage 3B Moderate CKD (GFR = 30-44 mL/min/1.73 square meters)    Stage 4 Severe CKD (GFR = 15-29 mL/min/1.73 square meters)    Stage 5 End Stage CKD (GFR <15 mL/min/1.73 square meters)  Note: GFR calculation is accurate only with a steady state creatinine    Blood gas, venous [[id number]]  (Abnormal) Collected: 10/30/23 0748    Lab Status: Final result Specimen: Blood from Arm, Right Updated: 10/30/23 0813     pH, Jonathan 7.426     pCO2, Jonathan 41.6 mm Hg      pO2, Jonathan 97.5 mm Hg      HCO3, Jonathan 26.7 mmol/L      Base Excess, Jonathan 2.1 mmol/L      O2 Content, Jonathan 18.5 ml/dL      O2 HGB, VENOUS 95.1 %                    CTA ed chest pe study   Final Result by Kimberley Singh MD (10/30 2214)      No PE or acute pathology. Stable, nonemergent findings above. Workstation performed: [id number]         XR chest portable   Final Result by Kimberley Singh MD (10/30 0925)   No acute cardiopulmonary findings.                   Workstation performed: [id number]                    Procedures  Procedures         ED Course  ED Course as of 10/30/23 1026   Mon Oct 30, 2023   0728 EKG 7:20 AM normal sinus rhythm rate 81 first-degree AV block right bundle branch block QTc 464 T wave inversion V1 lead III aVF no ST elevation or depression interpreted by me [id number] D-Dimer, Quant(!): 2.30   0841 hs TnI 0hr: 11   0841 POCT INR: 0.95   0841 WBC: 5.81   0841 eGFR: 44   0841 XR chest portable  No acute cardiopulmonary changes   0957 Patient describes recurrence of retrosternal discomfort, heartburn    Repeat EKG 9:52 AM inferolateral T wave inversions similar, more prominent than prior    We discussed options including hospitalization and agreeable    Hospital medicine [employer] Tiger Texted   80 Delta 2hr hsTnI: 4   1019 hs TnI 2hr: 15             HEART Risk Score      Flowsheet Row Most Recent Value   Heart Score Risk Calculator    History 0 Filed at: 10/30/2023 0759   ECG 0 Filed at: 10/30/2023 0759   Age 2 Filed at: 10/30/2023 0759   Risk Factors 2 Filed at: 10/30/2023 0759   Troponin 0 Filed at: 10/30/2023 0759   HEART Score 4 Filed at: 10/30/2023 0759             Stroke Assessment       Row Name 10/30/23 [facility]9             NIH Stroke Scale    Interval --      Level of Consciousness (1a.) 0      LOC Questions (1b.) 0      LOC Commands (1c.) 0      Best Gaze (2.) 0      Visual (3.) 0      Facial Palsy (4.) 0      Motor Arm, Left (5a.) 0      Motor Arm, Right (5b.) 0      Motor Leg, Left (6a.) 0      Motor Leg, Right (6b.) 0      Limb Ataxia (7.) 0      Sensory (8.) 0      Best Language (9.) 0      Dysarthria (10.) 0      Extinction and Inattention (11.) (Formerly Neglect) 0      Total 0                                              Medical Decision Making  Amount and/or Complexity of Data Reviewed  Labs: ordered. Decision-making details documented in ED Course. Radiology: ordered and independent interpretation performed. Decision-making details documented in ED Course. ECG/medicine tests: ordered and independent interpretation performed. Decision-making details documented in ED Course. Risk  OTC drugs. Prescription drug management. Decision regarding hospitalization.              Disposition  Final diagnoses:   Chest pain     Time reflects when diagnosis was documented in both MDM as applicable and the Disposition within this note       Time User Action Codes Description Comment    10/30/2023 10:07 AM Amy Marie Add [R07.9] Chest pain           ED Disposition       ED Disposition   Admit    Condition   Stable    Date/Time   Mon Oct 30, 2023 1025    Comment   Case was discussed with Dr. Suraj Ernandez and the patient's admission status was agreed to be Admission Status: inpatient status to the service of Dr. Gila Zarate    None         Patient's Medications   Discharge Prescriptions    No medications on file       No discharge procedures on file.     PDMP Review       None            ED Provider  Electronically Signed by             Amy Marie DO  10/30/23 1026

## 2023-10-30 NOTE — ASSESSMENT & PLAN NOTE
Complains of pain for the last 1 day in the right shoulder radiating to the scapular region. Denies any traumatic injury or fall. Follow-up on x-rays of the shoulder. Tylenol, Voltaren gel, Lidoderm patch. Physical therapy and orthopedic evaluation will be obtained.

## 2023-10-30 NOTE — PROGRESS NOTES
Patient vomited 100 cc of undigested food after eating dinner and ambulating into the bathroom. Post emesis patient stated she felt better and was not nauseous and felt it was because she ate too much at dinner time. Dr. Michelle Art made aware via tiger text.

## 2023-10-30 NOTE — RESPIRATORY THERAPY NOTE
RT Protocol Note  Rosa Caruso 76 y.o. female MRN: [id number]  Unit/Bed#: -01 Encounter: [id number]    Assessment    Active Problems:    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 proliferative retinopathy and macular edema, without long-term current use of insulin (HCC)    Chronic kidney disease, stage 3 unspecified (HCC)    Chest pain    Coronary artery disease involving native coronary artery of native heart    Hypertensive urgency    Acute pain of right shoulder      Home Pulmonary Medications:         Past Medical History:   Diagnosis Date    Abnormal ECG     last assessed: 5/15/17    Abnormal mammogram     last assessed: 7/11/17    Abnormal stress test     last assesed: 7/24/17    Anemia     Anxiety     Arthritis     Asthma     Back problem     Breast cyst     CAD (coronary artery disease)     Chest pain     last assessed: 7/24/17    Chronic pain disorder     Cyst of left breast     last assessed: 8/18/17    Depression     Depression     resolved: 1/2/18    Diabetes mellitus ([address] [facility])     Hiatal hernia     last assessed: 11/7/17    Hyperlipidemia     Hypertension     Keloid of skin     last assessed: 11/2/16    Multiple thyroid nodules     Neuropathy     Psychiatric disorder     anxiety    Stroke [facility])     TIA 2005    Tuberculosis     as a child      Social History     Socioeconomic History    Marital status:       Spouse name: None    Number of children: None    Years of education: None    Highest education level: None   Occupational History    Occupation: housewife/homemaker   Tobacco Use    Smoking status: Never    Smokeless tobacco: Never   Vaping Use    Vaping Use: Never used   Substance and Sexual Activity    Alcohol use: Not Currently     Alcohol/week: 0.0 standard drinks of alcohol    Drug use: No    Sexual activity: Not Currently     Partners: Male     Comment:  passed 2019 due to cancer   Other Topics Concern    None   Social History Narrative    Always uses seatbelts No living will     Social Determinants of Health     Financial Resource Strain: Low Risk  (9/7/2023)    Overall Financial Resource Strain (CARDIA)     Difficulty of Paying Living Expenses: Not hard at all   Food Insecurity: No Food Insecurity (6/23/2023)    Hunger Vital Sign     Worried About Running Out of Food in the Last Year: Never true     Ran Out of Food in the Last Year: Never true   Transportation Needs: No Transportation Needs (9/7/2023)    PRAPARE - Transportation     Lack of Transportation (Medical): No     Lack of Transportation (Non-Medical): No   Physical Activity: Not on file   Stress: Not on file   Social Connections: Not on file   Intimate Partner Violence: Not on file   Housing Stability: Low Risk  (6/23/2023)    Housing Stability Vital Sign     Unable to Pay for Housing in the Last Year: No     Number of Places Lived in the Last Year: 1     Unstable Housing in the Last Year: No       Subjective         Objective    Physical Exam:   Assessment Type: Assess only  General Appearance: Alert, Awake  Respiratory Pattern: Normal  Chest Assessment: Chest expansion symmetrical  Bilateral Breath Sounds: Diminished, Clear  Cough: None  O2 Device: RA    Vitals:  Blood pressure (!) 199/103, pulse 62, temperature (!) 97.2 °F (36.2 °C), resp. rate 18, height 5' 6" (1.676 m), weight 92.9 kg (204 lb 12.9 oz), SpO2 96 %. Imaging and other studies: I have personally reviewed pertinent reports. O2 Device: RA     Plan    Respiratory Plan: No distress/Pulmonary history, Discontinue Protocol        Resp Comments: (P) pt is a nonsmoker admitted for chest pain. hx asthma using MDI at home, stating she last used on Friday, PRN use. pt denies SOB. Pt aware Albuterol MDI is ordered prn.

## 2023-10-30 NOTE — ASSESSMENT & PLAN NOTE
Left-sided chest pain which started this morning at around 6 AM.  Described the pain to moderate to severe in intensity nonradiating associated with palpitations  Showed bifascicular block with  T wave inversions in inferior lead. IMELDA score on admission is 4  High-sensitivity troponin x2 negative  Continue with telemetry monitoring and serial cardiac enzymes  Continue with aspirin, Brilinta, statin, beta-blocker  Cardiology consultation will be obtained.     Further ischemic evaluation per cardiology

## 2023-10-30 NOTE — CONSULTS
Consultation - Cardiology   Prashanth Mccall 76 y.o. female MRN: [id number]  Unit/Bed#: -01 Encounter: [id number]    Assessment/Plan     Assessment:  CP/R arm pain  CAD - PAUL x 1 3/1/2023 in [place]   Chronic HFpEF  HTN with hypertensive urgency  Obesity  H/x TIA in 2005 per chart  DM2      Plan:  Troponins have been negative x3. Unlikely to be any sign of acute coronary syndrome given duration of symptoms up to 2 hours without any troponin elevation of significance. Suspect some of her symptoms are secondary to her uncontrolled hypertension. She has been on/off medications the past week and is not taking them routinely. Recommend increasing carvedilol up to 12.5 mg twice a day. Cannot take ACE inhibitor/ARB given an episode of angioedema with an ACE inhibitor. Agree with restarting her outpatient Cardura, Lasix. Continue aspirin, Brilinta given recent PCI. Continue atorvastatin. Continue hydralazine and can titrate as needed. Would recommend slowly bring down blood pressures over the next few days. If symptomatic or further blood pressure spikes occur, consider IV hydralazine. No sign of CHF by examination. Still has ongoing pain in her right armpit that is worse when she moves her arm. CT of the chest showed normal aortic caliber and no mention of dissection. If lites return and not improved with IV hydralazine and slow up titration of her oral medications, could begin IV nitroglycerin drip. We will follow    History of Present Illness   Physician Requesting Consult: Mukesh Horne MD  Reason for Consult / Principal Problem: HTN urgency, CP    HPI: Prashanth Mccall is a 76y.o. year old female who presents with a history of CAD and questionable NSTEMI March 2023 with PCI in [facility], hypertension, hyperlipidemia, CKD, diabetes mellitus, obesity, history of TIA. Patient notes for the past week she has had food insecurity and a lot of stress at home with a potential eviction.   She has not taken her medications routinely because she likes to take them with food otherwise she gets abdominal discomfort/nausea. Today she developed chest pain and right arm/shoulder pain. The chest pain was rather intense and lasted about 2 hours in total and was relieved with an aspirin. She still has persistent right shoulder/armpit pain that is worse with motion of the arm. She noted some mild shortness of breath when she first developed the pain, but it is now resolved. She denies any palpitations. Denies any current headaches, lightheadedness, syncope or near syncopal events. Denies orthopnea, PND, lower extremity swelling, or claudication. Inpatient consult to Cardiology  Consult performed by: Mckinley Stuart DO  Consult ordered by: Leisa Massey MD          Review of Systems    Historical Information   Past Medical History:   Diagnosis Date    Abnormal ECG     last assessed: 5/15/17    Abnormal mammogram     last assessed: 7/11/17    Abnormal stress test     last assesed: 7/24/17    Anemia     Anxiety     Arthritis     Asthma     Back problem     Breast cyst     CAD (coronary artery disease)     Chest pain     last assessed: 7/24/17    Chronic pain disorder     Cyst of left breast     last assessed: 8/18/17    Depression     Depression     resolved: 1/2/18    Diabetes mellitus ([address])     Hiatal hernia     last assessed: 11/7/17    Hyperlipidemia     Hypertension     Keloid of skin     last assessed: 11/2/16    Multiple thyroid nodules     Neuropathy     Psychiatric disorder     anxiety    Stroke [facility])     TIA 2005    Tuberculosis     as a child      Past Surgical History:   Procedure Laterality Date    ARM WOUND REPAIR / [facility] Left     CATARACT EXTRACTION Bilateral     2015    COLONOSCOPY      CORONARY ANGIOPLASTY WITH STENT PLACEMENT  03/01/2023    at [address]      right upper arm.     EYE SURGERY      cataract removaql    [place] LUMBAR PUNCTURE DIAGNOSTIC  08/15/2019    [place] ESOPHAGOGASTRODUODENOSCOPY TRANSORAL DIAGNOSTIC N/A 2018    Procedure: ESOPHAGOGASTRODUODENOSCOPY (EGD);   Surgeon: Rosa M Kennedy DO;  Location: [facility] MAIN OR;  Service: Gastroenterology    [place] [address] WRST SURG W/RLS TRANSVRS CARPL LIGM Left 2019    Procedure: ENDOSCOPIC CARPAL TUNNEL RELEASE;  Surgeon: Ras Bean MD;  Location: [facility] MAIN OR;  Service: Orthopedics    TUBAL LIGATION      US GUIDED THYROID BIOPSY  04/15/2019     Social History     Substance and Sexual Activity   Alcohol Use Not Currently    Alcohol/week: 0.0 standard drinks of alcohol     Social History     Substance and Sexual Activity   Drug Use No     E-Cigarette/Vaping    E-Cigarette Use Never User      E-Cigarette/Vaping Substances    Nicotine No     THC No     CBD No     Flavoring No     Other No     Unknown No      Social History     Tobacco Use   Smoking Status Never   Smokeless Tobacco Never     Family History:   Family History   Problem Relation Age of Onset    Heart disease Mother     Diabetes Mother     Arthritis Mother     Hypertension Mother     MONE disease Mother     Kidney disease Father     Hypertension Father     Stomach cancer Father     Arthritis Sister     Kidney disease Brother         age 34     Stroke Maternal Grandmother     Stomach cancer Maternal Grandmother     No Known Problems Maternal Grandfather     Arthritis Paternal Grandmother     Heart attack Paternal Grandmother     No Known Problems Paternal Grandfather     No Known Problems Daughter     No Known Problems Daughter     No Known Problems Daughter     No Known Problems Daughter     Stroke Maternal Aunt     Liver cancer Maternal Aunt     No Known Problems Maternal Aunt     No Known Problems Maternal Aunt     Heart attack Maternal Uncle     No Known Problems Paternal Aunt     Cancer Family         spinal column    Coronary artery disease Family     Glaucoma Family     Rheum arthritis Family Meds/Allergies   all current active meds have been reviewed, current meds:   Current Facility-Administered Medications   Medication Dose Route Frequency    acetaminophen (TYLENOL) tablet 975 mg  975 mg Oral Q6H [address]    albuterol (PROVENTIL HFA,VENTOLIN HFA) inhaler 2 puff  2 puff Inhalation Q6H PRN    [START ON 10/31/2023] aspirin (ECOTRIN LOW STRENGTH) EC tablet 81 mg  81 mg Oral Daily    atorvastatin (LIPITOR) tablet 80 mg  80 mg Oral Daily With Dinner    carvedilol (COREG) tablet 6.25 mg  6.25 mg Oral BID With Meals    Diclofenac Sodium (VOLTAREN) 1 % topical gel 2 g  2 g Topical 4x Daily    enoxaparin (LOVENOX) subcutaneous injection 40 mg  40 mg Subcutaneous Daily    furosemide (LASIX) tablet 20 mg  20 mg Oral Daily    gabapentin (NEURONTIN) capsule 300 mg  300 mg Oral TID    hydrALAZINE (APRESOLINE) injection 5 mg  5 mg Intravenous Q6H PRN    insulin lispro (HumaLOG) 100 units/mL subcutaneous injection 1-5 Units  1-5 Units Subcutaneous HS    insulin lispro (HumaLOG) 100 units/mL subcutaneous injection 1-6 Units  1-6 Units Subcutaneous TID AC    isosorbide mononitrate (IMDUR) 24 hr tablet 60 mg  60 mg Oral Daily    polyethylene glycol (MIRALAX) packet 17 g  17 g Oral Daily PRN    [START ON 10/31/2023] potassium chloride (K-DUR,KLOR-CON) CR tablet 10 mEq  10 mEq Oral Daily With Breakfast    ticagrelor (BRILINTA) tablet 90 mg  90 mg Oral Q12H [address]   , and PTA meds:   Prior to Admission Medications   Prescriptions Last Dose Informant Patient Reported? Taking?    Alcohol Swabs (DropSafe Alcohol Prep) 70 % PADS  Self No No   Sig: Use one pad twice daily when check blood sugar   Blood Glucose Monitoring Suppl (True Metrix Air Glucose Meter) w/Device KIT  Self No No   Sig: USE AS DIRECTED   TRUEplus Lancets 33G MISC  Self No No   Sig: Use 2 (two) times a day   albuterol (PROVENTIL HFA,VENTOLIN HFA) 90 mcg/act inhaler Past Week  No Yes   Sig: Inhale 2 puffs every 6 (six) hours as needed for wheezing or shortness of breath   aspirin (ECOTRIN LOW STRENGTH) 81 mg EC tablet 10/30/2023 Self No Yes   Sig: Take 1 tablet (81 mg total) by mouth daily   atorvastatin (LIPITOR) 80 mg tablet 10/29/2023  No Yes   Sig: Take 1 tablet (80 mg total) by mouth daily with dinner   carvedilol (COREG) 3.125 mg tablet 10/30/2023  No Yes   Sig: Take 1 tablet (3.125 mg total) by mouth 2 (two) times a day with meals   dapagliflozin (Farxiga) 10 MG tablet 10/29/2023  No Yes   Sig: Take 1 tablet (10 mg total) by mouth in the morning 1/2 tablet daily for the 1st month then increase to a full tablet daily in the morning   famotidine (PEPCID) 40 MG tablet Not Taking  No No   Sig: Take 1 tablet (40 mg total) by mouth daily at bedtime   Patient not taking: Reported on 10/30/2023   furosemide (LASIX) 20 mg tablet 10/29/2023  No Yes   Sig: Take 1 tablet (20 mg total) by mouth daily   gabapentin (NEURONTIN) 300 mg capsule 10/30/2023  No Yes   Sig: Take 1 capsule (300 mg total) by mouth 3 (three) times a day   isosorbide mononitrate (IMDUR) 60 mg 24 hr tablet 10/29/2023  No Yes   Sig: Take 1 tablet (60 mg total) by mouth daily   ofloxacin (OCUFLOX) 0.3 % ophthalmic solution Not Taking Self Yes No   Patient not taking: Reported on 9/7/2023   polyethylene glycol (GLYCOLAX) 17 GM/SCOOP powder Past Week Self No Yes   Sig: Take 17 g by mouth daily Increase to twice daily if still constiptated   potassium chloride (Klor-Con) 10 mEq tablet Not Taking Self No No   Sig: Take 1 tablet (10 mEq total) by mouth every other day   Patient not taking: Reported on 9/7/2023   sitaGLIPtin (Januvia) 100 mg tablet 10/29/2023  No Yes   Sig: Take 1 tablet (100 mg total) by mouth daily   ticagrelor (BRILINTA) 90 MG 10/30/2023 Self No Yes   Sig: Take 1 tablet (90 mg total) by mouth every 12 (twelve) hours      Facility-Administered Medications: None     Allergies   Allergen Reactions    Bactrim [Sulfamethoxazole-Trimethoprim] Shortness Of Breath    Lisinopril Angioedema    Vicodin [Hydrocodone-Acetaminophen] GI Intolerance    Hydrocodone-Acetaminophen Nausea Only    Oxycodone-Acetaminophen GI Intolerance and Nausea Only       Objective   Vitals: Blood pressure (!) 199/103, pulse 62, temperature (!) 97.2 °F (36.2 °C), resp. rate 18, height 5' 6" (1.676 m), weight 92.9 kg (204 lb 12.9 oz), SpO2 96 %. Orthostatic Blood Pressures      Flowsheet Row Most Recent Value   Blood Pressure 199/103 filed at 10/30/2023 1122   Patient Position - Orthostatic VS Lying filed at 10/30/2023 1620            No intake or output data in the 24 hours ending 10/30/23 1302    Invasive Devices       Peripheral Intravenous Line  Duration             Peripheral IV 10/30/23 Distal;Right;Ventral (anterior) Forearm <1 day                    Physical Exam    Gen:  No acute distress  Neck: Soft, supple, no JVD or carotid bruits appreciated  Cardiovascular: Regular rhythm, S1/S2 of normal intensity. No murmurs rubs or gallops are appreciated  Abdomen: Soft, obese, positive bowel sounds, nontender  Extremities: No cyanosis, clubbing or edema    Lab Results: I have personally reviewed pertinent lab results.     CBC with diff:   Results from last 7 days   Lab Units 10/30/23  0748   WBC Thousand/uL 5.81   RBC Million/uL 5.12   HEMOGLOBIN g/dL 13.5   HEMATOCRIT % 42.9   MCV fL 84   MCH pg 26.4*   MCHC g/dL 31.5   RDW % 15.5*   MPV fL 10.4   PLATELETS Thousands/uL 231     CMP:   Results from last 7 days   Lab Units 10/30/23  0748   SODIUM mmol/L 140   CHLORIDE mmol/L 107   CO2 mmol/L 25   BUN mg/dL 15   CREATININE mg/dL 1.24   CALCIUM mg/dL 8.7   AST U/L 17   ALT U/L 10   ALK PHOS U/L 106*   EGFR ml/min/1.73sq m 44     HS Troponin:   0   Lab Value Date/Time    HSTNI0 11 10/30/2023 0748    HSTNI2 15 10/30/2023 0946    HSTNI4 16 10/30/2023 1155    HSTNI4 8 06/23/2023 1438    HSTNI4 11 04/12/2023 1846    HSTNI4 9 07/08/2022 1838     BNP:   Results from last 7 days   Lab Units 10/30/23  0748   POTASSIUM mmol/L 3.7   CHLORIDE mmol/L 107   CO2 mmol/L 25   BUN mg/dL 15   CREATININE mg/dL 1.24   CALCIUM mg/dL 8.7   EGFR ml/min/1.73sq m 44     Coags:   Results from last 7 days   Lab Units 10/30/23  0748   PTT seconds 27   INR  0.95       Imaging: I have personally reviewed pertinent reports. EKG: NSR, RBBB, LAFB, IL TWI    CT PE  No PE or acute pathology. Mild dependant atelectasis. Patent central airways. Normal aorta caliber and enhancement. Counseling / Coordination of Care  Total floor / unit time spent today 60 minutes. Greater than 50% of total time was spent with the patient and / or family counseling and / or coordination of care.

## 2023-10-31 LAB
ANION GAP SERPL CALCULATED.3IONS-SCNC: 6 MMOL/L
BASOPHILS # BLD AUTO: 0.02 THOUSANDS/ÂΜL (ref 0–0.1)
BASOPHILS NFR BLD AUTO: 1 % (ref 0–1)
BUN SERPL-MCNC: 15 MG/DL (ref 5–25)
CALCIUM SERPL-MCNC: 8.4 MG/DL (ref 8.4–10.2)
CHLORIDE SERPL-SCNC: 108 MMOL/L (ref 96–108)
CO2 SERPL-SCNC: 27 MMOL/L (ref 21–32)
CREAT SERPL-MCNC: 1.31 MG/DL (ref 0.6–1.3)
DME PARACHUTE DELIVERY DATE REQUESTED: NORMAL
DME PARACHUTE ITEM DESCRIPTION: NORMAL
DME PARACHUTE ORDER STATUS: NORMAL
DME PARACHUTE SUPPLIER NAME: NORMAL
DME PARACHUTE SUPPLIER PHONE: NORMAL
EOSINOPHIL # BLD AUTO: 0.08 THOUSAND/ÂΜL (ref 0–0.61)
EOSINOPHIL NFR BLD AUTO: 2 % (ref 0–6)
ERYTHROCYTE [DISTWIDTH] IN BLOOD BY AUTOMATED COUNT: 15.5 % (ref 11.6–15.1)
GFR SERPL CREATININE-BSD FRML MDRD: 41 ML/MIN/1.73SQ M
GLUCOSE P FAST SERPL-MCNC: 92 MG/DL (ref 65–99)
GLUCOSE SERPL-MCNC: 105 MG/DL (ref 65–140)
GLUCOSE SERPL-MCNC: 159 MG/DL (ref 65–140)
GLUCOSE SERPL-MCNC: 189 MG/DL (ref 65–140)
GLUCOSE SERPL-MCNC: 91 MG/DL (ref 65–140)
GLUCOSE SERPL-MCNC: 92 MG/DL (ref 65–140)
HCT VFR BLD AUTO: 36.3 % (ref 34.8–46.1)
HGB BLD-MCNC: 11.8 G/DL (ref 11.5–15.4)
IMM GRANULOCYTES # BLD AUTO: 0.01 THOUSAND/UL (ref 0–0.2)
IMM GRANULOCYTES NFR BLD AUTO: 0 % (ref 0–2)
LYMPHOCYTES # BLD AUTO: 1.51 THOUSANDS/ÂΜL (ref 0.6–4.47)
LYMPHOCYTES NFR BLD AUTO: 43 % (ref 14–44)
MCH RBC QN AUTO: 26.8 PG (ref 26.8–34.3)
MCHC RBC AUTO-ENTMCNC: 32.5 G/DL (ref 31.4–37.4)
MCV RBC AUTO: 82 FL (ref 82–98)
MONOCYTES # BLD AUTO: 0.35 THOUSAND/ÂΜL (ref 0.17–1.22)
MONOCYTES NFR BLD AUTO: 10 % (ref 4–12)
NEUTROPHILS # BLD AUTO: 1.53 THOUSANDS/ÂΜL (ref 1.85–7.62)
NEUTS SEG NFR BLD AUTO: 44 % (ref 43–75)
NRBC BLD AUTO-RTO: 0 /100 WBCS
PLATELET # BLD AUTO: 198 THOUSANDS/UL (ref 149–390)
PMV BLD AUTO: 10.2 FL (ref 8.9–12.7)
POTASSIUM SERPL-SCNC: 3.5 MMOL/L (ref 3.5–5.3)
RBC # BLD AUTO: 4.41 MILLION/UL (ref 3.81–5.12)
SODIUM SERPL-SCNC: 141 MMOL/L (ref 135–147)
WBC # BLD AUTO: 3.5 THOUSAND/UL (ref 4.31–10.16)

## 2023-10-31 PROCEDURE — 97167 OT EVAL HIGH COMPLEX 60 MIN: CPT

## 2023-10-31 PROCEDURE — 97163 PT EVAL HIGH COMPLEX 45 MIN: CPT

## 2023-10-31 PROCEDURE — 85025 COMPLETE CBC W/AUTO DIFF WBC: CPT | Performed by: INTERNAL MEDICINE

## 2023-10-31 PROCEDURE — 82948 REAGENT STRIP/BLOOD GLUCOSE: CPT

## 2023-10-31 PROCEDURE — 99232 SBSQ HOSP IP/OBS MODERATE 35: CPT | Performed by: FAMILY MEDICINE

## 2023-10-31 PROCEDURE — 97116 GAIT TRAINING THERAPY: CPT

## 2023-10-31 PROCEDURE — 99221 1ST HOSP IP/OBS SF/LOW 40: CPT | Performed by: ORTHOPAEDIC SURGERY

## 2023-10-31 PROCEDURE — 80048 BASIC METABOLIC PNL TOTAL CA: CPT | Performed by: INTERNAL MEDICINE

## 2023-10-31 RX ORDER — METOCLOPRAMIDE HYDROCHLORIDE 5 MG/ML
10 INJECTION INTRAMUSCULAR; INTRAVENOUS ONCE
Status: COMPLETED | OUTPATIENT
Start: 2023-10-31 | End: 2023-10-31

## 2023-10-31 RX ORDER — HYDRALAZINE HYDROCHLORIDE 25 MG/1
25 TABLET, FILM COATED ORAL EVERY 8 HOURS SCHEDULED
Status: DISCONTINUED | OUTPATIENT
Start: 2023-10-31 | End: 2023-11-01

## 2023-10-31 RX ADMIN — ASPIRIN 81 MG: 81 TABLET, COATED ORAL at 08:45

## 2023-10-31 RX ADMIN — INSULIN LISPRO 1 UNITS: 100 INJECTION, SOLUTION INTRAVENOUS; SUBCUTANEOUS at 13:00

## 2023-10-31 RX ADMIN — GABAPENTIN 300 MG: 300 CAPSULE ORAL at 17:58

## 2023-10-31 RX ADMIN — Medication 2 G: at 08:49

## 2023-10-31 RX ADMIN — METOCLOPRAMIDE HYDROCHLORIDE 10 MG: 5 INJECTION INTRAMUSCULAR; INTRAVENOUS at 13:23

## 2023-10-31 RX ADMIN — TICAGRELOR 90 MG: 90 TABLET ORAL at 08:45

## 2023-10-31 RX ADMIN — ACETAMINOPHEN 975 MG: 325 TABLET, FILM COATED ORAL at 17:58

## 2023-10-31 RX ADMIN — ACETAMINOPHEN 975 MG: 325 TABLET, FILM COATED ORAL at 13:15

## 2023-10-31 RX ADMIN — POTASSIUM CHLORIDE 10 MEQ: 750 TABLET, EXTENDED RELEASE ORAL at 08:46

## 2023-10-31 RX ADMIN — HYDRALAZINE HYDROCHLORIDE 25 MG: 25 TABLET ORAL at 13:15

## 2023-10-31 RX ADMIN — ENOXAPARIN SODIUM 40 MG: 40 INJECTION SUBCUTANEOUS at 13:18

## 2023-10-31 RX ADMIN — ISOSORBIDE MONONITRATE 60 MG: 60 TABLET, EXTENDED RELEASE ORAL at 08:46

## 2023-10-31 RX ADMIN — HYDRALAZINE HYDROCHLORIDE 5 MG: 20 INJECTION INTRAMUSCULAR; INTRAVENOUS at 06:41

## 2023-10-31 RX ADMIN — GABAPENTIN 300 MG: 300 CAPSULE ORAL at 08:46

## 2023-10-31 RX ADMIN — INSULIN LISPRO 1 UNITS: 100 INJECTION, SOLUTION INTRAVENOUS; SUBCUTANEOUS at 18:01

## 2023-10-31 RX ADMIN — GABAPENTIN 300 MG: 300 CAPSULE ORAL at 22:56

## 2023-10-31 RX ADMIN — Medication 2 G: at 13:18

## 2023-10-31 RX ADMIN — CARVEDILOL 12.5 MG: 12.5 TABLET, FILM COATED ORAL at 08:46

## 2023-10-31 RX ADMIN — ATORVASTATIN CALCIUM 80 MG: 40 TABLET, FILM COATED ORAL at 17:58

## 2023-10-31 RX ADMIN — ACETAMINOPHEN 975 MG: 325 TABLET, FILM COATED ORAL at 06:31

## 2023-10-31 RX ADMIN — CARVEDILOL 12.5 MG: 12.5 TABLET, FILM COATED ORAL at 17:58

## 2023-10-31 RX ADMIN — Medication 2 G: at 22:57

## 2023-10-31 RX ADMIN — HYDRALAZINE HYDROCHLORIDE 25 MG: 25 TABLET ORAL at 22:56

## 2023-10-31 RX ADMIN — DOXAZOSIN 2 MG: 1 TABLET ORAL at 08:45

## 2023-10-31 RX ADMIN — Medication 2 G: at 18:01

## 2023-10-31 RX ADMIN — TICAGRELOR 90 MG: 90 TABLET ORAL at 22:57

## 2023-10-31 RX ADMIN — FUROSEMIDE 20 MG: 20 TABLET ORAL at 08:46

## 2023-10-31 NOTE — CASE MANAGEMENT
Case Management Discharge Planning Note    Patient name Miranda Tavares  Location /-01 MRN [id number]  : 1955 Date 10/31/2023       Current Admission Date: 10/30/2023  Current Admission Diagnosis:Chest pain   Patient Active Problem List    Diagnosis Date Noted    Acute pain of right shoulder 10/30/2023    Hypertensive urgency 2023    Coronary artery disease involving native coronary artery of native heart 2023    Palpitations 2023    Chest pain 2023    Acute kidney injury (PADMINI) with chronic kidney disease stage III 2023    Thyroid nodule 2022    Other headache syndrome 2022    Chronic kidney disease, stage 3 unspecified ([address]) 2022    Abnormal CT scan 08/10/2021    Myasthenia gravis ([address]) 2021    Depression, recurrent ([address]) 2021    Cerebral meningocele ([address]) 2021    Obesity, morbid ([address]) 2020    UTI (urinary tract infection) 2020    Chronic heart failure ([address]) 2020    Subcortical microvascular ischemic occlusive disease 2019    Sixth nerve palsy of right eye     Binocular vision disorder with diplopia 2019    Carpal tunnel syndrome on left 2019    Numbness in both hands 2019    Carpal tunnel syndrome of left wrist 2019    Multinodular goiter 2019    Hoarseness or changing voice 2019    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 proliferative retinopathy and macular edema, without long-term current use of insulin ([address])     Diabetic neuropathy ([address]) 2018    Chronic constipation 2017    Cyst of left breast 2017    Impaired hearing 2017    Cerebral atherosclerosis 05/15/2017    Hyperlipidemia 2017    Moderate persistent asthma without complication     Lumbar pain 10/18/2016    Hiatal hernia 10/18/2016    Mild persistent asthma without complication     Chronic back pain 2016    Depression 2016 Essential hypertension 09/06/2016    Neuropathy, diabetic ([facility]) 09/06/2016      LOS (days): 0  Geometric Mean LOS (GMLOS) (days): 2.10  Days to GMLOS:1.9     OBJECTIVE:  Risk of Unplanned Readmission Score: 13.17         Current admission status: Inpatient   Preferred Pharmacy:   [facility], [address] [facility]  [address] [facility] [address]  Phone: [phone] Fax: [phone] [address]  [address] [facility] [address]  [address] [facility] [address]  Phone: [phone] Fax: [phone]    Primary Care Provider: Sena Kumar DO    Primary Insurance: [facility] [employer] [employer]  Secondary Insurance: [facility]    DISCHARGE DETAILS:  Aware pt needs a walker for dc. Discharge planning discussed with[de-identified] Pt  Freedom of Choice: Yes  Comments - Freedom of Choice: No prefrence for DME              Freedom of choice was provided in regards to needing a home medical equipment company, pt does not have preference. Pt is aware that we frequently utilized [employer] as we have a working relationship with this company. Patients choice is to use [employer]. .             DME Referral Provided  Referral made for DME?: Yes  DME referral completed for the following items[de-identified] Elisa Jewell  DME Supplier Name[de-identified] [employer]    Other Referral/Resources/Interventions Provided:  Interventions: DME  Referral Comments: Will order rw from [employer]     Dc plan remains home, with OP PT services, rw was ordered from [employer].

## 2023-10-31 NOTE — PLAN OF CARE
Problem: OCCUPATIONAL THERAPY ADULT  Goal: Performs self-care activities at highest level of function for planned discharge setting. See evaluation for individualized goals. Description: Treatment Interventions: ADL retraining, Functional transfer training, UE strengthening/ROM, Endurance training, Cognitive reorientation, Patient/family training, Equipment evaluation/education, Compensatory technique education, Continued evaluation, Energy conservation, Activityengagement          See flowsheet documentation for full assessment, interventions and recommendations. Outcome: Progressing  Note: Limitation: Decreased ADL status, Decreased UE strength, Decreased Safe judgement during ADL, Decreased cognition, Decreased endurance, Decreased self-care trans, Decreased high-level ADLs  Prognosis: Good  Assessment: Pt is a 76 y.o. female, admitted to [address] [place] [address] 10/30/2023 d/t experiencing chest pain. Dx: No active principal problem. Pt with PMHx impacting their performance during ADL tasks, including: acute pain of R shoulder, hypertensive urgency, CAD, CKD 3, DM II. Prior to admission to the hospital Pt was performing ADLs without physical assistance. IADLs with physical assistance. Functional transfers/ambulation without physical assistance. Cognitive status PTA was [facility]. OT order placed to assess Pt's ADLs, cognitive status, and performance during functional tasks in order to maximize safety and independence while making most appropriate d/c recommendations. PT/OT co-evaluation completed at this time d/t significant mobility deficits and safety concerns.  Pt's clinical presentation is currently unstable/unpredictable given new onset deficits that affect Pt's occupational performance and ability to safely return to PLOF including decrease activity tolerance, decrease standing balance, decrease performance during ADL tasks, decrease safety awareness , decrease activity engagement, decrease performance during functional transfers, limited family support, and limited insight to deficits combined with medical complications of abnormal renal lab values, abnormal blood sugars, abnormal D-dimer, elevated BNP, and need for input for mobility technique/safety. Personal factors affecting Pt at time of initial evaluation include: limited home support, inability to perform IADLs, new need for AD, inability to ambulate household distances, inability to navigate community distances, limited insight into impairments, and decreased initiation and engagement. Pt will benefit from continued acute skilled OT services to address deficits as defined above and to maximize level independence/participation during ADLs and functional tasks to facilitate return toward PLOF and improved quality of life. Pt anticipated to progress well towards goals of care. From an OT standpoint, recommendation at time of d/c would be home with support of family PRN and no skilled OT needs.      Rehab Resource Intensity Level, OT: No post-acute rehabilitation needs

## 2023-10-31 NOTE — ASSESSMENT & PLAN NOTE
History of coronary artery disease with PCI in March 2023. Maintain on aspirin Brilinta beta-blockers and statin. Will resume. Presents with chest pain.   Cardiac enzymes are negative

## 2023-10-31 NOTE — OCCUPATIONAL THERAPY NOTE
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, without long-term current use of insulin (HCC)    Chronic kidney disease, stage 3 unspecified (HCC)    Chest pain    Coronary artery disease involving native coronary artery of native heart    Hypertensive urgency    Acute pain of right shoulder    Past Medical History  Past Medical History:   Diagnosis Date    Abnormal ECG     last assessed: 5/15/17    Abnormal mammogram     last assessed: 7/11/17    Abnormal stress test     last assesed: 7/24/17    Anemia     Anxiety     Arthritis     Asthma     Back problem     Breast cyst     CAD (coronary artery disease)     Chest pain     last assessed: 7/24/17    Chronic pain disorder     Cyst of left breast     last assessed: 8/18/17    Depression     Depression     resolved: 1/2/18    Diabetes mellitus ([address] [facility])     Hiatal hernia     last assessed: 11/7/17    Hyperlipidemia     Hypertension     Keloid of skin     last assessed: 11/2/16    Multiple thyroid nodules     Neuropathy     Psychiatric disorder     anxiety    Stroke [facility])     TIA 2005    Tuberculosis     as a child      Past Surgical History  Past Surgical History:   Procedure Laterality Date    ARM WOUND REPAIR / [facility] Left     CATARACT EXTRACTION Bilateral     2015    COLONOSCOPY      CORONARY ANGIOPLASTY WITH STENT PLACEMENT  03/01/2023    at [facility]      right upper arm. EYE SURGERY      cataract removaql    FL LUMBAR PUNCTURE DIAGNOSTIC  08/15/2019    NV ESOPHAGOGASTRODUODENOSCOPY TRANSORAL DIAGNOSTIC N/A 01/05/2018    Procedure: ESOPHAGOGASTRODUODENOSCOPY (EGD);   Surgeon: Katherin Cheek DO;  Location: [facility] MAIN OR;  Service: Gastroenterology    NV NDSC WRST SURG W/RLS TRANSVRS CARPL LIGM Left 04/19/2019    Procedure: ENDOSCOPIC CARPAL TUNNEL RELEASE;  Surgeon: Don Bates MD;  Location: [address] [place] [address] MAIN OR;  Service: [address] THYROID BIOPSY  04/15/2019        10/31/23 1034   Note Type   Note type Evaluation   Pain Assessment   Pain Assessment Tool 0-10   Pain Score No Pain   Restrictions/Precautions   Other Precautions Chair Alarm; Bed Alarm; Fall Risk   Home Living   Type of Home House  (0 LEONORA)   Home Layout Two level;Bed/bath upstairs  (13 steps to 2nd floor R full rail and 1/2 L rail)   Bathroom Shower/Tub Tub/shower unit   Bathroom Toilet Standard   Home Equipment Cane   Additional Comments Pt reports living in a [facility] with 0 LEONORA. Pt wasn't using AD for functional mobility PTA, only intermittent use of cane. Prior Function   Level of [place] Independent with ADLs; Independent with functional mobility; Needs assistance with IADLS   Lives With Daughter   Receives Help From Family   IADLs Independent with medication management; Family/Friend/Other provides transportation; Family/Friend/Other provides meals   Falls in the last 6 months 0   Comments PTA, pt reports independence with ADLs and functional mobility, daughter provides transportation. ADL   UB Dressing Assistance 6  Modified independent   LB Dressing Assistance 5  Supervision/Setup   LB Dressing Deficit Don/doff R sock; Don/doff L sock   Additional Comments Pt able to doff/don R sock while seated EOB with increased time. Bed Mobility   Supine to Sit 5  Supervision   Additional items HOB elevated; Increased time required;Verbal cues   Additional Comments Pt received supine in bed upon start of session. Pt supine > sit EOB with supervision, HOB elevated, and increased time. Transfers   Sit to Stand   (SBA)   Stand to Sit   (SBA)   Stand pivot   (Steadying assist)   Additional items Increased time required;Verbal cues   Additional Comments All functional transfers without AD.  Pt's BP while sitting EOB after short walk reads 198/89. After standing, BP reading 153/79 and pt with c/o dizziness. Functional Mobility   Functional Mobility   (Steadying assist)   Additional Comments Pt participated in short functional distance with steadying assist and no AD. At end of session, pt seated in chair with chair alarm on, call bell in reach, and all needs met. Balance   Static Sitting Normal   Dynamic Sitting Good   Static Standing Fair   Dynamic Standing Fair -   Activity Tolerance   Activity Tolerance Patient limited by fatigue; Other (Comment)  (Pt limited by feelings of lightheadedness)   Medical Staff Made Aware PT, Douglas   RUE Assessment   RUE Assessment WFL  (Strength grossly 3+/5)   LUE Assessment   LUE Assessment WFL  (Strength grossly 3+/5)   Hand Function   Gross Motor Coordination Functional   Fine Motor Coordination Functional   Hand Function Comments Pt is R hand dominant. Vision-Basic Assessment   Current Vision Wears glasses only for reading   Cognition   Overall Cognitive Status Impaired   Arousal/Participation Alert; Cooperative   Attention Within functional limits   Orientation Level Oriented to person;Oriented to place;Oriented to situation;Disoriented to time  (Oriented to month and day, states it is "2014", then states "2024")   Memory Unable to assess   Following Commands Follows one step commands without difficulty   Assessment   Limitation Decreased ADL status; Decreased UE strength;Decreased Safe judgement during ADL;Decreased cognition;Decreased endurance;Decreased self-care trans;Decreased high-level ADLs   Prognosis Good   Assessment Pt is a 76 y.o. female, admitted to [address] [place] [address] 10/30/2023 d/t experiencing chest pain. Dx: No active principal problem. Pt with PMHx impacting their performance during ADL tasks, including: acute pain of R shoulder, hypertensive urgency, CAD, CKD 3, DM II. Prior to admission to the hospital Pt was performing ADLs without physical assistance. IADLs with physical assistance. Functional transfers/ambulation without physical assistance. Cognitive status PTA was [facility]. OT order placed to assess Pt's ADLs, cognitive status, and performance during functional tasks in order to maximize safety and independence while making most appropriate d/c recommendations. PT/OT co-evaluation completed at this time d/t significant mobility deficits and safety concerns. Pt's clinical presentation is currently unstable/unpredictable given new onset deficits that affect Pt's occupational performance and ability to safely return to PLOF including decrease activity tolerance, decrease standing balance, decrease performance during ADL tasks, decrease safety awareness , decrease activity engagement, decrease performance during functional transfers, limited family support, and limited insight to deficits combined with medical complications of abnormal renal lab values, abnormal blood sugars, abnormal D-dimer, elevated BNP, and need for input for mobility technique/safety. Personal factors affecting Pt at time of initial evaluation include: limited home support, inability to perform IADLs, new need for AD, inability to ambulate household distances, inability to navigate community distances, limited insight into impairments, and decreased initiation and engagement. Pt will benefit from continued acute skilled OT services to address deficits as defined above and to maximize level independence/participation during ADLs and functional tasks to facilitate return toward PLOF and improved quality of life. Pt anticipated to progress well towards goals of care. From an OT standpoint, recommendation at time of d/c would be home with support of family PRN and no skilled OT needs. Plan   Treatment Interventions ADL retraining;Functional transfer training;UE strengthening/ROM; Endurance training;Cognitive reorientation;Patient/family training;Equipment evaluation/education; Compensatory technique education;Continued evaluation; Energy conservation; Activityengagement   Goal Expiration Date 11/14/23   OT Frequency 1-2x/wk   Discharge Recommendation   Rehab Resource Intensity Level, OT No post-acute rehabilitation needs   AM-PAC Daily Activity Inpatient   Lower Body Dressing 3   Bathing 3   Toileting 3   Upper Body Dressing 4   Grooming 4   Eating 4   Daily Activity Raw Score 21   Daily Activity Standardized Score (Calc for Raw Score >=11) 44.27   AM-PAC Applied Cognition Inpatient   Following a Speech/Presentation 3   Understanding Ordinary Conversation 4   Taking Medications 3   Remembering Where Things Are Placed or Put Away 4   Remembering List of 4-5 Errands 3   Taking Care of Complicated Tasks 3   Applied Cognition Raw Score 20   Applied Cognition Standardized Score 41.76     The patient's raw score on the AM-PAC Daily Activity Inpatient Short Form is 21. A raw score of greater than or equal to 19 suggests the patient may benefit from discharge to home. Please refer to the recommendation of the Occupational Therapist for safe discharge planning. Pt goals to be met by 11/14/23:    Pt will demonstrate ability to complete grooming/hygiene tasks @ mod I after set-up. Pt will demonstrate ability to complete supine<>sit @ mod I in order to increase safety and independence during ADL tasks. Pt will demonstrate ability to complete LB dressing @ mod I in order to increase safety and independence during meaningful tasks. Pt will demonstrate ability to ne/doff socks/shoes while sitting EOB/chair @ mod I in order to increase safety and independence during meaningful tasks. Pt will demonstrate ability to complete toileting tasks including CM and pericare @ mod I in order to increase safety and independence during meaningful tasks. Pt will demonstrate ability to complete EOB, chair, toilet/commode transfers @ mod I in order to increase performance and participation during functional tasks.   Pt will demonstrate ability to stand for 10 minutes while maintaining F+ balance with use of RW for UB support PRN. Pt will demonstrate ability to tolerate 30 minute OT session with no vc'ing for deep breathing or use of energy conservation techniques in order to increase activity tolerance during functional tasks. Pt will demonstrate Good carryover of use of energy conservation/compensatory strategies during ADLs and functional tasks in order to increase safety and reduce risk for falls. Pt will demonstrate Good attention and participation in continued evaluation of functional ambulation house hold distances in order to assist with safe d/c planning. Pt will attend to continued cognitive assessments 100% of the time in order to provide most appropriate d/c recommendations. Pt will follow 100% simple 2-step commands and be A&O x4 consistently with environmental cues to increase participation in functional activities. Pt will identify 3 areas of interest/hobbies and 1 intervention on how to incorporate into daily life in order to increase interaction with environment and peers as well as increase participation in meaningful tasks. Pt will demonstrate 100% carryover of BUE HEP in order to increase BUE MS and increase performance during functional tasks upon d/c home.     [employer], MS, OTR/L

## 2023-10-31 NOTE — CASE MANAGEMENT
Case Management Discharge Planning Note    Patient name Radu Knowles  Location /-01 MRN [id number]  : 1955 Date 10/31/2023       Current Admission Date: 10/30/2023  Current Admission Diagnosis:Chest pain   Patient Active Problem List    Diagnosis Date Noted    Acute pain of right shoulder 10/30/2023    Hypertensive urgency 2023    Coronary artery disease involving native coronary artery of native heart 2023    Palpitations 2023    Chest pain 2023    Acute kidney injury (PADMINI) with chronic kidney disease stage III 2023    Thyroid nodule 2022    Other headache syndrome 2022    Chronic kidney disease, stage 3 unspecified ([address]) 2022    Abnormal CT scan 08/10/2021    Myasthenia gravis ([address]) 2021    Depression, recurrent ([address]) 2021    Cerebral meningocele ([address]) 2021    Obesity, morbid ([address]) 2020    UTI (urinary tract infection) 2020    Chronic heart failure ([address]) 2020    Subcortical microvascular ischemic occlusive disease 2019    Sixth nerve palsy of right eye     Binocular vision disorder with diplopia 2019    Carpal tunnel syndrome on left 2019    Numbness in both hands 2019    Carpal tunnel syndrome of left wrist 2019    Multinodular goiter 2019    Hoarseness or changing voice 2019    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 proliferative retinopathy and macular edema, without long-term current use of insulin ([address])     Diabetic neuropathy ([address]) 2018    Chronic constipation 2017    Cyst of left breast 2017    Impaired hearing 2017    Cerebral atherosclerosis 05/15/2017    Hyperlipidemia 2017    Moderate persistent asthma without complication     Lumbar pain 10/18/2016    Hiatal hernia 10/18/2016    Mild persistent asthma without complication     Chronic back pain 2016    Depression 2016 Essential hypertension 09/06/2016    Neuropathy, diabetic ([facility]) 09/06/2016      LOS (days): 0  Geometric Mean LOS (GMLOS) (days): 2.10  Days to GMLOS:2     OBJECTIVE:            Current admission status: Inpatient   Preferred Pharmacy:   [facility], [address] [facility]  [address] [facility] [address]  Phone: [phone] Fax: [phone] [id number] [address]  Phone: [phone] Fax: [phone]    Primary Care Provider: Doug Vasquez DO    Primary Insurance: [facility]  Secondary Insurance: Jon Jamison    DISCHARGE DETAILS:                                        Other Referral/Resources/Interventions Provided:  Interventions: Food Bank  Referral Comments: Cm made aware from ayan that pt is experiencing food insecurity, Cm provided resource list to pt. CM spoke with pt and provided a resource packet for food insecurities.

## 2023-10-31 NOTE — UTILIZATION REVIEW
Initial Clinical Review    OBS 10/30 UPGRADED TO INPATIENT FOR CONTINUED BP TREATMENT AND MONITORING WITH MED ADJUSTMENTS    Admission: Date/Time/Statement:   Admission Orders (From admission, onward)       Ordered        10/31/23 0857  Inpatient Admission  Once            10/30/23 1026  Place in Observation  Once                          Orders Placed This Encounter   Procedures    Inpatient Admission     Standing Status:   Standing     Number of Occurrences:   1     Order Specific Question:   Level of Care     Answer:   Med Surg [16]     Order Specific Question:   Estimated length of stay     Answer:   More than 2 Midnights     Order Specific Question:   Certification     Answer:   I certify that inpatient services are medically necessary for this patient for a duration of greater than two midnights. See H&P and MD Progress Notes for additional information about the patient's course of treatment. ED Arrival Information       Expected   10/30/2023 07:19    Arrival   10/30/2023 07:20    Acuity   Urgent              Means of arrival   [facility]    Admission type   Emergency              Arrival complaint   chest pain             Chief Complaint   Patient presents with    Chest Pain     C/P ONSET THIS AM, APPROX 6AM.   Endorses increased stress lately. Took 81mg ASA approx 6am with some relief. History of stent placement in March 23       Initial Presentation: 76 y.o. female with PMHX OF CAD, HTN, T2 DM, CKD III, peripheral neuropathy presents to ED by ems with c/o L-sided chest pain and R sided shoulder pain. States that the shoulder pain started 2 days ago but denies any trauma. Pain is moderate to severe intensity worsening with movement and radiating to the scapular region. Denies any abdominal pain, n/v. Then again at 6 AM she started having left-sided chest pain along with palpitations and diaphoresis. Denied any sob.  BP elevated on presentation, which she attributes to stress as she states she's being evicted from her home. States she's been compliant with her home meds. But does admit she has missed some doses of Brilinta recently. On exam Rt shoulder tenderness with ROM. Tenderness extending to the right lateral aspect of the neck. CTA chest PE study -- neg for PE or acute pathology. CXR neg. Admitted under observation to MS unit with chest pain, hypertensive urgency - trop neg x1, continue with serial. Telemetry. Continue aspirin, Brilinta, statin, BB. Cardiology consult. XR R shoulder pending. Tylenol, Voltaren gel, Lidoderm patch. PT/OT evals. IV hydralazine for SBP >180. Cardiology consult --   Plan: Troponins neg x3. Unlikely to be any sign of ACS given duration of symptoms up to 2 hours without any troponin elevation of significance. Suspect some of her symptoms are 2/2 her uncontrolled HTN. She has been on/off medications the past week and is not taking them routinely. Recommend increasing carvedilol up to 12.5 mg BID. Cannot take ACE inhibitor/ARB given an episode of angioedema with an ACE inhibitor. Agree with restarting her op Cardura, Lasix. Continue aspirin, Brilinta given recent PCI. Continue atorvastatin. Continue hydralazine and can titrate prn. Would recommend slowly bring down BPs over the next few days. If symptomatic or further BP spikes occur, consider IV hydralazine. No sign of CHF by examination. Still has ongoing pain in her right armpit that is worse when she moves her arm. CT of the chest showed normal aortic caliber and no mention of dissection. If lites return and not improved with IV hydralazine and slow up titration of her oral medications, could begin IV nitroglycerin drip. Date: 10/31   Day 2: upgraded to inpatient  Ortho consult -- Acute shoulder pain -- No specific treatment is needed at this time.   The pt can be seen as an op if symptoms persist.  She is permitted activity as tolerated with the right upper extremity.       Date: 11/1    Day 3: Has surpassed a 2nd midnight with active treatments and services, which include continued monitoring of HR and BP with med adjustments as needed, and PT for safe d/c plan           ED Triage Vitals [10/30/23 0723]   Temperature Pulse Respirations Blood Pressure SpO2   97.8 °F (36.6 °C) 82 18 (!) 256/120 100 %      Temp Source Heart Rate Source Patient Position - Orthostatic VS BP Location FiO2 (%)   Temporal Monitor Lying Right arm --      Pain Score       5          Wt Readings from Last 1 Encounters:   10/30/23 92.9 kg (204 lb 12.9 oz)     Additional Vital Signs:   Date/Time Temp Pulse Resp BP MAP (mmHg) SpO2 O2 Device Patient Position - Orthostatic VS   10/31/23 1315 -- -- -- 119/66 -- -- -- --   10/31/23 10:57:58 -- 58 -- 170/81 111 97 % -- --   10/31/23 10:50:58 -- 71 -- 153/79 104 95 % -- Standing    Patient Position - Orthostatic VS: After sit > stand transfer at 10/31/23 1050   10/31/23 10:49:02 -- 61 -- 198/89 Abnormal  125 95 % -- Sitting    Patient Position - Orthostatic VS: After short walk at 10/31/23 1049   10/31/23 0845 -- 56 -- 151/73 -- -- None (Room air) --   10/31/23 06:37:47 -- 57 -- 202/97 Abnormal  132 97 % -- --   10/31/23 03:13:48 97.3 °F (36.3 °C) Abnormal  53 Abnormal  17 171/87 Abnormal  115 97 % -- --   10/30/23 23:40:18 97.2 °F (36.2 °C) Abnormal  55 17 150/81 104 97 % -- --   10/30/23 2025 -- -- -- -- -- -- None (Room air) --   10/30/23 19:04:25 97.5 °F (36.4 °C) 56 17 157/78 104 95 % -- --   10/30/23 17:46:25 97 °F (36.1 °C) Abnormal  60 20 142/109 Abnormal  120 97 % None (Room air) Sitting   10/30/23 1701 -- -- -- -- -- 96 % None (Room air) --   10/30/23 15:14:47 97.2 °F (36.2 °C) Abnormal  70 18 179/94 Abnormal  122 97 % -- --   10/30/23 1410 -- -- -- -- -- 96 % None (Room air) --   10/30/23 1144 -- -- -- -- -- -- None (Room air) --   10/30/23 1122 -- -- -- 199/103 Abnormal  135 -- -- --   10/30/23 1120 97.2 °F (36.2 °C) Abnormal  62 18 207/92 Abnormal  130 96 % None (Room air) --   10/30/23 1100 -- 57 26 Abnormal  -- -- 99 % -- --   10/30/23 1045 -- 58 18 213/98 Abnormal  141 99 % -- --   10/30/23 0845 -- 72 12 177/89 Abnormal  126 98 % -- --   10/30/23 0830 -- 68 26 Abnormal  193/94 Abnormal  135 99 % -- --   10/30/23 0815 -- 72 21 167/113 Abnormal  135 98 % None (Room air) --   10/30/23 0800 -- 68 17 -- -- 97 % -- --   10/30/23 0745 -- 79 10 Abnormal  189/101 Abnormal  138 99 % -- --   10/30/23 0736 -- -- -- -- -- -- None (Room air) --   10/30/23 0730 -- 78 24 Abnormal  232/105 Abnormal  153 95 % -- --   10/30/23 0723 97.8 °F (36.6 °C) 82 18 256/120 Abnormal  -- 100 % None (Room air) Lying     Pertinent Labs/Diagnostic Test Results:   XR shoulder 2+ vw right   Final Result by Chiki King MD (10/31 1025)      No acute osseous abnormality. CTA ed chest pe study   Final Result by Navin Serrano MD (10/30 9058)      No PE or acute pathology. XR chest portable   Final Result by Navin Serrano MD (10/30 5879)   No acute cardiopulmonary findings.             Results from last 7 days   Lab Units 10/31/23  0636 10/30/23  1405 10/30/23  0748   WBC Thousand/uL 3.50*  --  5.81   HEMOGLOBIN g/dL 11.8  --  13.5   HEMATOCRIT % 36.3  --  42.9   PLATELETS Thousands/uL 198 220 231   NEUTROS ABS Thousands/µL 1.53*  --  3.63     Results from last 7 days   Lab Units 10/31/23  0636 10/30/23  0748   SODIUM mmol/L 141 140   POTASSIUM mmol/L 3.5 3.7   CHLORIDE mmol/L 108 107   CO2 mmol/L 27 25   ANION GAP mmol/L 6 8   BUN mg/dL 15 15   CREATININE mg/dL 1.31* 1.24   EGFR ml/min/1.73sq m 41 44   CALCIUM mg/dL 8.4 8.7     Results from last 7 days   Lab Units 10/30/23  0748   AST U/L 17   ALT U/L 10   ALK PHOS U/L 106*   TOTAL PROTEIN g/dL 7.1   ALBUMIN g/dL 3.5   TOTAL BILIRUBIN mg/dL 0.40     Results from last 7 days   Lab Units 10/31/23  1119 10/31/23  0638 10/30/23  2138 10/30/23  1649 10/30/23  1314   POC GLUCOSE mg/dl 159* 91 139 143* 110     Results from last 7 days   Lab Units 10/31/23  0636 10/30/23  0748   GLUCOSE RANDOM mg/dL 92 142*     Results from last 7 days   Lab Units 10/30/23  1405   HEMOGLOBIN A1C % 7.1*   EAG mg/dl 157     BETA-HYDROXYBUTYRATE   Date Value Ref Range Status   10/30/2023 0.0 <0.6 mmol/L Final      Results from last 7 days   Lab Units 10/30/23  0748   PH AMERICA  7.426*   PCO2 AMERICA mm Hg 41.6*   PO2 AMERICA mm Hg 97.5*   HCO3 AMERICA mmol/L 26.7   BASE EXC AMERICA mmol/L 2.1   O2 CONTENT AMERICA ml/dL 18.5   O2 HGB, VENOUS % 95.1*     Results from last 7 days   Lab Units 10/30/23  1155 10/30/23  0946 10/30/23  0748   HS TNI 0HR ng/L  --   --  11   HS TNI 2HR ng/L  --  15  --    HSTNI D2 ng/L  --  4  --    HS TNI 4HR ng/L 16  --   --    HSTNI D4 ng/L 5  --   --      Results from last 7 days   Lab Units 10/30/23  0748   D-DIMER QUANTITATIVE ug/ml FEU 2.30*     Results from last 7 days   Lab Units 10/30/23  0748   PROTIME seconds 13.0   INR  0.95   PTT seconds 27     Results from last 7 days   Lab Units 10/30/23  0748   BNP pg/mL 124*       ED Treatment:   Medication Administration from 10/30/2023 0719 to 10/30/2023 1116         Date/Time Order Dose Route Action     10/30/2023 0750 EDT labetalol (NORMODYNE) injection 10 mg 10 mg Intravenous Given     10/30/2023 0749 EDT acetaminophen (TYLENOL) tablet 650 mg 650 mg Oral Given         Past Medical History:   Diagnosis Date    Abnormal ECG     last assessed: 5/15/17    Abnormal mammogram     last assessed: 7/11/17    Abnormal stress test     last assesed: 7/24/17    Anemia     Anxiety     Arthritis     Asthma     Back problem     Breast cyst     CAD (coronary artery disease)     Chest pain     last assessed: 7/24/17    Chronic pain disorder     Cyst of left breast     last assessed: 8/18/17    Depression     Depression     resolved: 1/2/18    Diabetes mellitus ([address])     Hiatal hernia     last assessed: 11/7/17    Hyperlipidemia     Hypertension     Keloid of skin     last assessed: 11/2/16 Multiple thyroid nodules     Neuropathy     Psychiatric disorder     anxiety    Stroke [facility])     TIA 2005    Tuberculosis     as a child      Present on Admission:   Chest pain   Chronic kidney disease, stage 3 unspecified ([address])   Hypertensive urgency   Coronary artery disease involving native coronary artery of native heart   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 proliferative retinopathy and macular edema, without long-term current use of insulin ([facility])      Admitting Diagnosis: Chest pain [R07.9]  Age/Sex: 76 y.o. female  Admission Orders:  Scheduled Medications:  acetaminophen, 975 mg, Oral, Q6H [facility]  aspirin, 81 mg, Oral, Daily  atorvastatin, 80 mg, Oral, Daily With Dinner  carvedilol, 12.5 mg, Oral, BID With Meals  Diclofenac Sodium, 2 g, Topical, 4x Daily  doxazosin, 2 mg, Oral, Daily  enoxaparin, 40 mg, Subcutaneous, Daily  furosemide, 20 mg, Oral, Daily  gabapentin, 300 mg, Oral, TID  hydrALAZINE, 25 mg, Oral, Q8H ANTONIO  insulin lispro, 1-5 Units, Subcutaneous, HS  insulin lispro, 1-6 Units, Subcutaneous, TID AC  isosorbide mononitrate, 60 mg, Oral, Daily  potassium chloride, 10 mEq, Oral, Daily With Breakfast  ticagrelor, 90 mg, Oral, Q12H [facility]     PRN Meds:  albuterol, 2 puff, Inhalation, Q6H PRN  hydrALAZINE, 5 mg, Intravenous, Q6H PRN  polyethylene glycol, 17 g, Oral, Daily PRN        IP CONSULT TO NUTRITION SERVICES  IP CONSULT TO ORTHOPEDIC SURGERY  IP CONSULT TO CARDIOLOGY    Network Utilization Review Department  ATTENTION: Please call with any questions or concerns to [phone] and carefully listen to the prompts so that you are directed to the right person. All voicemails are confidential.   For Discharge needs, contact Care Management DC Support Team at [phone] opt. [phone]  Send all requests for admission clinical reviews, approved or denied determinations and any other requests to dedicated fax number below belonging to the campus where the patient is receiving treatment.  List of dedicated fax numbers for the Facilities:  [place] DENIALS (Administrative/Medical Necessity) [phone]   DISCHARGE SUPPORT TEAM (NETWORK) [id number] Ivan Quezada (Maternity/NICU/Pediatrics) [phone]   [address] [place] [address] Drive [address] 1000 [facility] [phone] [address] 5220 [facility] [address] [address] [facility] [address] [phone]

## 2023-10-31 NOTE — CONSULTS
Consultation - [place] Carlyjenny 76 y.o. female MRN: [id number]  Unit/Bed#: -01 Encounter: [id number]      Assessment/Plan     Assessment:  Acute shoulder pain  Plan:  No specific treatment is needed at this time. The patient can be seen as an outpatient if symptoms persist.  She is permitted activity as tolerated with the right upper extremity. History of Present Illness   Physician Requesting Consult: Dimitrios Lo MD  Reason for Consult / Principal Problem: Right Shoulder pain  HPI: Holley Esposito is a 76y.o. year old female who presents with onset of right shoulder pain 10/29/2023. She denies any injury and cannot recall any unusual activity. Over the past 48 hours she has noted some improvement. She initially noted pain in the axilla radiating anteriorly, superiorly and then posteriorly into the shoulder. She noted some right-sided paraspinal pain, as well. She describes the pain as being a constricting type of pain. She has noted some radiation distally into the upper extremity but denies any upper extremity paresthesias. She denies any left-sided symptoms. She denies any history of right shoulder pain in the past.    Inpatient consult to Orthopedic Surgery  Consult performed by: Fabiola Brownlee  Consult ordered by: Chad Hurtado MD          Review of Systems 10 organ system review is negative excluding the chief complaint and as is consistent with the patient's past medical history.     Historical Information   Past Medical History:   Diagnosis Date    Abnormal ECG     last assessed: 5/15/17    Abnormal mammogram     last assessed: 7/11/17    Abnormal stress test     last assesed: 7/24/17    Anemia     Anxiety     Arthritis     Asthma     Back problem     Breast cyst     CAD (coronary artery disease)     Chest pain     last assessed: 7/24/17    Chronic pain disorder     Cyst of left breast     last assessed: 8/18/17    Depression     Depression     resolved: 1/2/18 Diabetes mellitus ([address])     Hiatal hernia     last assessed: 11/7/17    Hyperlipidemia     Hypertension     Keloid of skin     last assessed: 11/2/16    Multiple thyroid nodules     Neuropathy     Psychiatric disorder     anxiety    Stroke [facility])     TIA 2005    Tuberculosis     as a child      Past Surgical History:   Procedure Laterality Date    ARM WOUND REPAIR / CLOSURE      CARPAL TUNNEL RELEASE      CARPAL TUNNEL RELEASE Left     CATARACT EXTRACTION Bilateral     2015    COLONOSCOPY      CORONARY ANGIOPLASTY WITH STENT PLACEMENT  03/01/2023    at [address]      right upper arm. EYE SURGERY      cataract removaql    FL LUMBAR PUNCTURE DIAGNOSTIC  08/15/2019    AR ESOPHAGOGASTRODUODENOSCOPY TRANSORAL DIAGNOSTIC N/A 01/05/2018    Procedure: ESOPHAGOGASTRODUODENOSCOPY (EGD);   Surgeon: Rhonda Newton DO;  Location: [facility] MAIN OR;  Service: Gastroenterology    AR [address] WRST SURG W/RLS TRANSVRS CARPL LIGM Left 04/19/2019    Procedure: ENDOSCOPIC CARPAL TUNNEL RELEASE;  Surgeon: Jacob Lopez MD;  Location: [address] [place] [address] MAIN OR;  Service: [address] [place] [address] THYROID BIOPSY  04/15/2019     Social History   Social History     Substance and Sexual Activity   Alcohol Use Not Currently    Alcohol/week: 0.0 standard drinks of alcohol     Social History     Substance and Sexual Activity   Drug Use No     E-Cigarette/Vaping    E-Cigarette Use Never User      E-Cigarette/Vaping Substances    Nicotine No     THC No     CBD No     Flavoring No     Other No     Unknown No      Social History     Tobacco Use   Smoking Status Never   Smokeless Tobacco Never     Family History: non-contributory    Meds/Allergies   all current active meds have been reviewed  Allergies   Allergen Reactions    Bactrim [Sulfamethoxazole-Trimethoprim] Shortness Of Breath    Lisinopril Angioedema    Vicodin [Hydrocodone-Acetaminophen] GI Intolerance    Hydrocodone-Acetaminophen Nausea Only Oxycodone-Acetaminophen GI Intolerance and Nausea Only       Objective   Vitals: Blood pressure 119/66, pulse 58, temperature (!) 97.3 °F (36.3 °C), resp. rate 17, height 5' 6" (1.676 m), weight 92.9 kg (204 lb 12.9 oz), SpO2 97 %. ,Body mass index is 33.06 kg/m². Intake/Output Summary (Last 24 hours) at 10/31/2023 1402  Last data filed at 10/30/2023 1745  Gross per 24 hour   Intake 480 ml   Output 100 ml   Net 380 ml     I/O last 24 hours: In: 480 [P.O.:480]  Out: 100 [Emesis/NG output:100]    Invasive Devices       Peripheral Intravenous Line  Duration             Peripheral IV 10/30/23 Distal;Right;Ventral (anterior) Forearm 1 day                    Physical Exam: Jun Bella is alert, oriented and seems to be in no acute distress lying in bed. HEENT exam is grossly benign  Heart regular, pulses palpable  Lungs clear  Abdomen soft and nontender  Skin without lacerations or abrasions  Motor and sensory exams are intact  Ortho Exam: The right shoulder exam demonstrates excellent range of motion, symmetrical to the contralateral left side without complaints. She has no complaints during palpation of the rotator cuff insertion, biceps tendon, AC joint or periscapular musculature. She has good strength of resisted motion without complaints. The axilla is nontender. Remainder the right upper extremity exam is benign. Lab Results: I have personally reviewed pertinent lab results. Imaging Studies:  X-rays of her shoulder were reviewed demonstrating no acute abnormality, no significant degenerative disease and no evidence of remote trauma. EKG, Pathology, and Other Studies: I have personally reviewed pertinent reports.

## 2023-10-31 NOTE — PROGRESS NOTES
Progress Note - Cardiology   Mya Burns 76 y.o. female MRN: [id number]  Unit/Bed#: -01 Encounter: [id number]    Assessment:  HTN with hypertensive urgency  CP/R arm pain  CAD - PAUL x 1 3/1/2023 in [place]   Chronic HFpEF  Obesity  H/x TIA in 2005 per chart  DM2    Plan:  Chest pains have resolved. Still with some residual shoulder pain. Blood pressures trending a bit better but still spikes over 956 systolic. Continue with carvedilol 12.5 mg twice a day and titrate as needed. Would begin hydralazine 25 mg 3 times daily. Continue on isosorbide  Stay on Cardura  Continue aspirin, Brilinta, atorvastatin therapy. We will continue to slowly reduce her blood pressure daily. Follow renal function    Subjective/Objective       Subjective: Patient feels better overall. Chest pains have all resolved. Still some shoulder discomfort upon motion. Denies shortness of breath, palpitations, lightheadedness        Vitals: /73   Pulse 56   Temp (!) 97.3 °F (36.3 °C)   Resp 17   Ht 5' 6" (1.676 m)   Wt 92.9 kg (204 lb 12.9 oz)   SpO2 97%   BMI 33.06 kg/m²   Vitals:    10/30/23 0723 10/30/23 1116   Weight: 94 kg (207 lb 3.7 oz) 92.9 kg (204 lb 12.9 oz)     Orthostatic Blood Pressures      Flowsheet Row Most Recent Value   Blood Pressure 151/73 filed at 10/31/2023 0845   Patient Position - Orthostatic VS Sitting filed at 10/30/2023 1746              Intake/Output Summary (Last 24 hours) at 10/31/2023 0946  Last data filed at 10/30/2023 1745  Gross per 24 hour   Intake 480 ml   Output 100 ml   Net 380 ml       Invasive Devices       Peripheral Intravenous Line  Duration             Peripheral IV 10/30/23 Distal;Right;Ventral (anterior) Forearm 1 day                      Physical Exam:     Gen:  No acute distress  Neck: Soft, supple, no JVD or carotid bruits appreciated  Cardiovascular: Regular rhythm, S1/S2 of normal intensity.   No murmurs rubs or gallops are appreciated  Abdomen: Soft, obese, positive bowel sounds, nontender  Extremities: No cyanosis, clubbing or edema      Lab Results: I have personally reviewed pertinent lab results. CBC with diff:   Results from last 7 days   Lab Units 10/31/23  0636   WBC Thousand/uL 3.50*   RBC Million/uL 4.41   HEMOGLOBIN g/dL 11.8   HEMATOCRIT % 36.3   MCV fL 82   MCH pg 26.8   MCHC g/dL 32.5   RDW % 15.5*   MPV fL 10.2   PLATELETS Thousands/uL 198     CMP:   Results from last 7 days   Lab Units 10/31/23  0636 10/30/23  0748   SODIUM mmol/L 141 140   CHLORIDE mmol/L 108 107   CO2 mmol/L 27 25   BUN mg/dL 15 15   CREATININE mg/dL 1.31* 1.24   CALCIUM mg/dL 8.4 8.7   AST U/L  --  17   ALT U/L  --  10   ALK PHOS U/L  --  106*   EGFR ml/min/1.73sq m 41 44     HS Troponin:   0   Lab Value Date/Time    HSTNI0 11 10/30/2023 0748    HSTNI2 15 10/30/2023 0946    HSTNI4 16 10/30/2023 1155    HSTNI4 8 06/23/2023 1438    HSTNI4 11 04/12/2023 1846    HSTNI4 9 07/08/2022 1838     BNP:   Results from last 7 days   Lab Units 10/31/23  0636   POTASSIUM mmol/L 3.5   CHLORIDE mmol/L 108   CO2 mmol/L 27   BUN mg/dL 15   CREATININE mg/dL 1.31*   CALCIUM mg/dL 8.4   EGFR ml/min/1.73sq m 41     Imaging: I have personally reviewed pertinent reports. EKG: EKG: NSR, RBBB, LAFB, [place] TWI     Counseling / Coordination of Care  Total time spent today 30 minutes. Greater than 50% of total time was spent with the patient and / or family counseling and / or coordination of care.

## 2023-10-31 NOTE — CONSULTS
Consult received for pt requesting diet instruction. Pt reports intentionally eating smaller portions recently to promote weight loss and healthful diet. Has been limiting red meat in her diet, moreso eating turkey or chicken or fish. Does report consumption of turkey sausage, impossible whopper processed foods. Unsure of portion sizes of certain foods like lima and kidney beans. Pt interested in improving her health via diet. Previously met with RD at [address] [place] [address] for [facility]. Wants to follow back up with them. Discussed heart healthy diet including avoidance of processed foods such as turkey sausage and impossible whopper. Discussed nutritious protein sources. Discussed plate method for portioning starchy and nonstarchy vegetables, discussed examples of each. Discussed avoidance of juice which pt reports consuming regularly, encouraged water as primary beverage. Discussed appropriate cooking preparations. Answered variety of nutrition questions pt had. Provided with Heart Healthy Nutrition Therapy handout along with RD contact information and OP MNT brochure. Pt plans to obtain referral from her PCP and see RD at [address] [place] [address]. Continue diet as ordered.

## 2023-10-31 NOTE — ASSESSMENT & PLAN NOTE
/120-->199/103. Resume outpatient dose of Cardura Imdur and as discussed with cardiology mild bradycardia at rest is okay continue Coreg 12.5 mg p.o. twice daily  Continue with low-salt diet. As needed IV hydralazine for SBP greater than 180. She has history of angioedema with ACE inhibitor and subsequently is not on ARB as well.   Hydralazine started yesterday 25 mg p.o. every 8 hours if she was only given 2 doses yesterday at 7 in the morning her blood pressure was 180 after receiving all her medication went down to 130 will monitor for another day after she received all 3 doses of hydralazine blood pressure stable will discharge home tomorrow

## 2023-10-31 NOTE — ASSESSMENT & PLAN NOTE
Left-sided chest pain   Described the pain to moderate to severe in intensity nonradiating associated with palpitations  Showed bifascicular block with  T wave inversions in inferior lead. IMELDA score on admission is 4  Suspect in light of uncontrolled htn.    Continue with aspirin, Brilinta, statin, beta-blocker  Reviewed cards consult  Blood pressure improved today and no chest pain  Cardiac enzymes are negative

## 2023-10-31 NOTE — ASSESSMENT & PLAN NOTE
/120-->199/103. Resume outpatient dose of Cardura Imdur and increase Coreg to 12.5 mg p.o. twice daily done yesterday by cardiology  Continue with low-salt diet. As needed IV hydralazine for SBP greater than 180. She has history of angioedema with ACE inhibitor and subsequently is not on ARB as well. Pressures down to 150 today we will observe further without any adjustments that she is just receiving her second dose is today.   Avoid major drops

## 2023-10-31 NOTE — ASSESSMENT & PLAN NOTE
Lab Results   Component Value Date    HGBA1C 7.1 (H) 10/30/2023       Recent Labs     10/30/23  1314 10/30/23  1649 10/30/23  2138 10/31/23  0638   POCGLU 110 143* 139 91       Blood Sugar Average: Last 72 hrs:  (P) 120. 75Start sliding scale insulin coverage and hold oral hypoglycemic medications. Continue to monitor blood glucose levels closely.

## 2023-10-31 NOTE — PHYSICAL THERAPY NOTE
PHYSICAL THERAPY EVALUATION  NAME:  Carol Vázquez  DATE: 10/31/23    AGE:   76 y.o. Mrn:   [id number]  ADMIT DX:  Chest pain [R07.9]    Past Medical History:   Diagnosis Date    Abnormal ECG     last assessed: 5/15/17    Abnormal mammogram     last assessed: 7/11/17    Abnormal stress test     last assesed: 7/24/17    Anemia     Anxiety     Arthritis     Asthma     Back problem     Breast cyst     CAD (coronary artery disease)     Chest pain     last assessed: 7/24/17    Chronic pain disorder     Cyst of left breast     last assessed: 8/18/17    Depression     Depression     resolved: 1/2/18    Diabetes mellitus ([address] )     Hiatal hernia     last assessed: 11/7/17    Hyperlipidemia     Hypertension     Keloid of skin     last assessed: 11/2/16    Multiple thyroid nodules     Neuropathy     Psychiatric disorder     anxiety    Stroke [facility])     TIA 2005    Tuberculosis     as a child      Length Of Stay: 0  Performed at least 2 patient identifiers during session: Name and Birthday  PHYSICAL THERAPY EVALUATION :       10/31/23 1033   PT Last Visit   PT Visit Date 10/31/23   Note Type   Note type Evaluation   Pain Assessment   Pain Assessment Tool 0-10   Pain Score No Pain  (states shoulder & chest pain has resolved)   Restrictions/Precautions   Weight Bearing Precautions Per Order No   Other Precautions Chair Alarm; Bed Alarm; Fall Risk   Home Living   Type of [address] [place] [address] Two level;Bed/bath upstairs  (No LEONORA; 13 steps to 2nd floor R rail with another half rail on L)   Bathroom Shower/Tub Tub/shower unit   [facility]   Additional Comments Pt states her and her daughter are being evicted from their apartment in the next few weeks. Prior Function   Level of [place] Independent with ADLs; Independent with functional mobility; Needs assistance with IADLS   Lives With Daughter  (pt states dtr is home with her all the time)   Receives Help From Family   IADLs Family/Friend/Other provides transportation; Family/Friend/Other provides meals; Independent with medication management  (Dtr drives)   Falls in the last 6 months 0   Comments No AD primarily with intermittent use of cane. General   Family/Caregiver Present No   Cognition   Overall Cognitive Status Impaired   Arousal/Participation Cooperative   Orientation Level Disoriented to time;Oriented to place;Oriented to person;Oriented to situation  (states year 2014, then 2024)   Following Commands Follows one step commands without difficulty   RLE Assessment   RLE Assessment WFL   LLE Assessment   LLE Assessment WFL   Light Touch   RLE Light Touch Grossly intact   LLE Light Touch Grossly intact   Proprioception   RLE Proprioception Grossly intact   LLE Proprioception Grossly Intact   Bed Mobility   Supine to Sit 5  Supervision   Additional items HOB elevated; Increased time required   Transfers   Sit to Stand   (SBA)   Stand to Sit   (SBA)   Stand pivot   (Steadying assist)   Additional items Verbal cues; Increased time required   Additional Comments No AD   Ambulation/Elevation   Gait pattern Inconsistent arnie;Decreased foot clearance; Excessively slow  (pt reaching for external objects at times)   Gait Assistance   (steadying assist)   Additional items Verbal cues   Assistive Device None   Distance 75 ft   Ambulation/Elevation Additional Comments Ambulation distance limited by PT due to elevated BP. Balance   Static Sitting Normal   Dynamic Sitting Good   Static Standing Fair   Dynamic Standing Fair -   Ambulatory Fair -   Endurance Deficit   Endurance Deficit Yes   Endurance Deficit Description c/o 3-4/10 feelings of lightheadeness in stance   Activity Tolerance   Activity Tolerance Patient limited by fatigue  (feeling lightheaded)   Medical Staff Made Aware OT Sol   Assessment   Prognosis Good   Problem List Decreased strength;Decreased endurance; Impaired balance;Decreased mobility; Decreased cognition;Obesity Barriers to Discharge   (medical)   Barriers to Discharge Comments pt states her and dtr are getting evicted from their apartment in the next few weeks   Goals   Patient Goals to get stronger   STG Expiration Date 11/14/23   PT Treatment Day 1   Plan   Treatment/Interventions ADL retraining;Functional transfer training;LE strengthening/ROM; Elevations; Therapeutic exercise; Endurance training;Cognitive reorientation;Patient/family training;Equipment eval/education; Bed mobility;Gait training; Compensatory technique education;OT   PT Frequency 3-5x/wk   Discharge Recommendation   Rehab Resource Intensity Level, PT III (Minimum Resource Intensity)   Equipment Recommended Maxim Ramos Package Recommended Wheeled walker   AM-PAC Basic Mobility Inpatient   Turning in Flat Bed Without Bedrails 4   Lying on Back to Sitting on Edge of Flat Bed Without Bedrails 3   Moving Bed to Chair 3   Standing Up From Chair Using Arms 3   Walk in Room 3   Climb 3-5 Stairs With Railing 3   Basic Mobility Inpatient Raw Score 19   Basic Mobility Standardized Score 42.48   Highest Level Of Mobility   JH-HLM Goal 6: Walk 10 steps or more   JH-HLM Achieved 7: Walk 25 feet or more   Additional Treatment Session   Start Time 1047   End Time 1059   Treatment Assessment Pt tolerated tx session fair. [place] to complain of lightheadednes in stance (4/10) with decrease in sit to stand BP from 198/89 to 153/79. Pt does not report an increase in feeling lightheaded with ambulation or prolonged standing - reporting the 4/10 remains the same. Pt declines use of RW during tx session, but continues to reach for external objects for support at times due to slightly impaired balance - she states that it is only due to feeling somewhat lightheaded. Pt may benefit from RW use post D/C. Equipment Use Pt ambulates 9 ft x 2 without AD steadying - SBA. PT offers pt RW however pt declines use of RW. Pt navigates 4 steps B rails with SBA.   Transfers SBA - close SPV without use of AD. End of Consult   Patient Position at End of Consult Bedside chair; All needs within reach;Bed/Chair alarm activated   End of Consult Comments Seated BP at end of session 170/81     (Please find full objective findings from PT assessment regarding body systems outlined above). Assessment: Pt is a 76 y.o. female seen for PT evaluation s/p admission to [address] [place] [address] on 10/30/2023 with <principal problem not specified>. Order placed for PT services. Upon evaluation: Pt is presenting with impaired functional mobility due to decreased strength, decreased endurance, impaired balance, gait deviations, and fall risk requiring  stand-by to steadying assistance for transfers and ambulation with no AD . Pt's clinical presentation is currently unstable/unpredictable given the functional mobility deficits above, especially weakness, gait deviations, decreased activity tolerance, and decreased functional mobility tolerance, coupled with fall risks as indicated by AM-PAC 6-Clicks: 06/87 as well as impaired balance, polypharmacy, and decreased cognition and combined with medical complications of hypertension , poor blood pressure control, abnormal renal lab values, abnormal WBCs, and need for input for mobility technique/safety. Pt's PMHx and comorbidities that may affect physical performance and progress include: CAD, CKD, DM, HTN, and TIA, myasthenia gravis . Personal factors affecting pt at time of IE include: multi-level environment. Pt will benefit from continued skilled PT services to address deficits as defined above and to maximize level of functional mobility to facilitate return toward PLOF and improved QOL. From PT/mobility standpoint, recommendation at time of d/c would be OPPT pending progress in order to reduce fall risk and maximize pt's functional independence and consistency with mobility in order to facilitate return to PLOF.   Recommend trial with walker next 1-2 sessions, ther ex next 1-2 sessions, and dynamic standing balance, stair navigation . The patient's AM-PAC Basic Mobility Inpatient Short Form Raw Score is 19. A Raw score of greater than 16 suggests the patient may benefit from discharge to home. Please also refer to the recommendation of the Physical Therapist for safe discharge planning. Goals: Pt will: Perform bed mobility tasks with modified I to reposition in bed and prepare for transfers. Pt will perform transfers with modified I to increase Indep in home environment and prepare for ambulation. Pt will ambulate with no AD vs. RW for >/= 150 ft with  modified I  to decrease risk for falls and improve gait quality and to access home environment. Pt will complete >/= 12 steps with with unilateral handrail with modified [facility] Pt will participate in objective balance assessment to determine baseline fall risk.       Carolyn Marcial, PT,DPT

## 2023-10-31 NOTE — PLAN OF CARE
Problem: MOBILITY - ADULT  Goal: Maintain or return to baseline ADL function  Description: INTERVENTIONS:  -  Assess patient's ability to carry out ADLs; assess patient's baseline for ADL function and identify physical deficits which impact ability to perform ADLs (bathing, care of mouth/teeth, toileting, grooming, dressing, etc.)  - Assess/evaluate cause of self-care deficits   - Assess range of motion  - Assess patient's mobility; develop plan if impaired  - Assess patient's need for assistive devices and provide as appropriate  - Encourage maximum independence but intervene and supervise when necessary  - Involve family in performance of ADLs  - Assess for home care needs following discharge   - Consider OT consult to assist with ADL evaluation and planning for discharge  - Provide patient education as appropriate  Outcome: Progressing  Goal: Maintains/Returns to pre admission functional level  Description: INTERVENTIONS:  - Perform BMAT or MOVE assessment daily.   - Set and communicate daily mobility goal to care team and patient/family/caregiver. - Collaborate with rehabilitation services on mobility goals if consulted  - Perform Range of Motion 3 times a day. - Reposition patient every 2 hours.   - Dangle patient 3 times a day  - Stand patient 3 times a day  - Ambulate patient 3 times a day  - Out of bed to chair 3 times a day   - Out of bed for meals 3 times a day  - Out of bed for toileting  - Record patient progress and toleration of activity level   Outcome: Progressing     Problem: PAIN - ADULT  Goal: Verbalizes/displays adequate comfort level or baseline comfort level  Description: Interventions:  - Encourage patient to monitor pain and request assistance  - Assess pain using appropriate pain scale  - Administer analgesics based on type and severity of pain and evaluate response  - Implement non-pharmacological measures as appropriate and evaluate response  - Consider cultural and social influences on pain and pain management  - Notify physician/advanced practitioner if interventions unsuccessful or patient reports new pain  Outcome: Progressing     Problem: INFECTION - ADULT  Goal: Absence or prevention of progression during hospitalization  Description: INTERVENTIONS:  - Assess and monitor for signs and symptoms of infection  - Monitor lab/diagnostic results  - Monitor all insertion sites, i.e. indwelling lines, tubes, and drains  - Monitor endotracheal if appropriate and nasal secretions for changes in amount and color  - [place] appropriate cooling/warming therapies per order  - Administer medications as ordered  - Instruct and encourage patient and family to use good hand hygiene technique  - Identify and instruct in appropriate isolation precautions for identified infection/condition  Outcome: Progressing  Goal: Absence of fever/infection during neutropenic period  Description: INTERVENTIONS:  - Monitor WBC    Outcome: Progressing     Problem: SAFETY ADULT  Goal: Maintain or return to baseline ADL function  Description: INTERVENTIONS:  -  Assess patient's ability to carry out ADLs; assess patient's baseline for ADL function and identify physical deficits which impact ability to perform ADLs (bathing, care of mouth/teeth, toileting, grooming, dressing, etc.)  - Assess/evaluate cause of self-care deficits   - Assess range of motion  - Assess patient's mobility; develop plan if impaired  - Assess patient's need for assistive devices and provide as appropriate  - Encourage maximum independence but intervene and supervise when necessary  - Involve family in performance of ADLs  - Assess for home care needs following discharge   - Consider OT consult to assist with ADL evaluation and planning for discharge  - Provide patient education as appropriate  Outcome: Progressing  Goal: Maintains/Returns to pre admission functional level  Description: INTERVENTIONS:  - Perform BMAT or MOVE assessment daily.   - Set and communicate daily mobility goal to care team and patient/family/caregiver. - Collaborate with rehabilitation services on mobility goals if consulted  - Perform Range of Motion 3 times a day. - Reposition patient every 2 hours.   - Dangle patient 3 times a day  - Stand patient 3 times a day  - Ambulate patient 3 times a day  - Out of bed to chair 3 times a day   - Out of bed for meals 3 times a day  - Out of bed for toileting  - Record patient progress and toleration of activity level   Outcome: Progressing  Goal: Patient will remain free of falls  Description: INTERVENTIONS:  -  Assess patient's ability to carry out ADLs; assess patient's baseline for ADL function and identify physical deficits which impact ability to perform ADLs (bathing, care of mouth/teeth, toileting, grooming, dressing, etc.)  - Assess/evaluate cause of self-care deficits   - Assess range of motion  - Assess patient's mobility; develop plan if impaired  - Assess patient's need for assistive devices and provide as appropriate  - Encourage maximum independence but intervene and supervise when necessary  - Involve family in performance of ADLs  - Assess for home care needs following discharge   - Consider OT consult to assist with ADL evaluation and planning for discharge  - Provide patient education as appropriate  Outcome: Progressing     Problem: DISCHARGE PLANNING  Goal: Discharge to home or other facility with appropriate resources  Description: INTERVENTIONS:  - Identify barriers to discharge w/patient and caregiver  - Arrange for needed discharge resources and transportation as appropriate  - Identify discharge learning needs (meds, wound care, etc.)  - Arrange for interpretive services to assist at discharge as needed  - Refer to Case Management Department for coordinating discharge planning if the patient needs post-hospital services based on physician/advanced practitioner order or complex needs related to functional status, cognitive ability, or social support system  Outcome: Progressing     Problem: Knowledge Deficit  Goal: Patient/family/caregiver demonstrates understanding of disease process, treatment plan, medications, and discharge instructions  Description: Complete learning assessment and assess knowledge base.   Interventions:  - Provide teaching at level of understanding  - Provide teaching via preferred learning methods  Outcome: Progressing     Problem: CARDIOVASCULAR - ADULT  Goal: Maintains optimal cardiac output and hemodynamic stability  Description: INTERVENTIONS:  - Monitor I/O, vital signs and rhythm  - Monitor for S/S and trends of decreased cardiac output  - Administer and titrate ordered vasoactive medications to optimize hemodynamic stability  - Assess quality of pulses, skin color and temperature  - Assess for signs of decreased coronary artery perfusion  - Instruct patient to report change in severity of symptoms  Outcome: Progressing  Goal: Absence of cardiac dysrhythmias or at baseline rhythm  Description: INTERVENTIONS:  - Continuous cardiac monitoring, vital signs, obtain 12 lead EKG if ordered  - Administer antiarrhythmic and heart rate control medications as ordered  - Monitor electrolytes and administer replacement therapy as ordered  Outcome: Progressing     Problem: METABOLIC, FLUID AND ELECTROLYTES - ADULT  Goal: Electrolytes maintained within normal limits  Description: INTERVENTIONS:  - Monitor labs and assess patient for signs and symptoms of electrolyte imbalances  - Administer electrolyte replacement as ordered  - Monitor response to electrolyte replacements, including repeat lab results as appropriate  - Instruct patient on fluid and nutrition as appropriate  Outcome: Progressing  Goal: Fluid balance maintained  Description: INTERVENTIONS:  - Monitor labs   - Monitor I/O and WT  - Instruct patient on fluid and nutrition as appropriate  - Assess for signs & symptoms of volume excess or deficit  Outcome: Progressing  Goal: Glucose maintained within target range  Description: INTERVENTIONS:  - Monitor Blood Glucose as ordered  - Assess for signs and symptoms of hyperglycemia and hypoglycemia  - Administer ordered medications to maintain glucose within target range  - Assess nutritional intake and initiate nutrition service referral as needed  Outcome: Progressing     Problem: Prexisting or High Potential for Compromised Skin Integrity  Goal: Skin integrity is maintained or improved  Description: INTERVENTIONS:  - Identify patients at risk for skin breakdown  - Assess and monitor skin integrity  - Assess and monitor nutrition and hydration status  - Monitor labs   - Assess for incontinence   - Turn and reposition patient  - Assist with mobility/ambulation  - Relieve pressure over bony prominences  - Avoid friction and shearing  - Provide appropriate hygiene as needed including keeping skin clean and dry  - Evaluate need for skin moisturizer/barrier cream  - Collaborate with interdisciplinary team   - Patient/family teaching  - Consider wound care consult   Outcome: Progressing

## 2023-10-31 NOTE — PROGRESS NOTES
[address] [facility]  Progress Note  Name: David Hilton  MRN: [id number]  Unit/Bed#: -01 I Date of Admission: 10/30/2023   Date of Service: 10/31/2023 I Hospital Day: 0    Assessment/Plan   Acute pain of right shoulder  Assessment & Plan  Complains of pain for the last 1 day in the right shoulder radiating to the scapular region. Denies any traumatic injury or fall. Follow-up on x-rays of the shoulder. Tylenol, Voltaren gel, Lidoderm patch. Physical therapy and orthopedic evaluation will be obtained. Hypertensive urgency  Assessment & Plan  /120-->199/103. Resume outpatient dose of Cardura Imdur and increase Coreg to 12.5 mg p.o. twice daily done yesterday by cardiology  Continue with low-salt diet. As needed IV hydralazine for SBP greater than 180. She has history of angioedema with ACE inhibitor and subsequently is not on ARB as well. Treatment and up to 200 but did drop to 150 cardiology started hydralazine 25 every 8    Coronary artery disease involving native coronary artery of native heart  Assessment & Plan  History of coronary artery disease with PCI in March 2023. Maintain on aspirin Brilinta beta-blockers and statin. Will resume. Presents with chest pain. Cardiac enzymes are negative    Chest pain  Assessment & Plan  Left-sided chest pain   Described the pain to moderate to severe in intensity nonradiating associated with palpitations  Showed bifascicular block with  T wave inversions in inferior lead. IMELDA score on admission is 4  Suspect in light of uncontrolled htn.    Continue with aspirin, Brilinta, statin, beta-blocker  Reviewed cards consult  Blood pressure improved today and no chest pain  Cardiac enzymes are negative      Chronic kidney disease, stage 3 unspecified [facility])  Assessment & Plan  Lab Results   Component Value Date    EGFR 41 10/31/2023    EGFR 44 10/30/2023    EGFR 47 09/07/2023    CREATININE 1.31 (H) 10/31/2023    CREATININE 1.24 10/30/2023    CREATININE 1.18 09/07/2023   Baseline Creatinine between 1.1-1.4. Creatinine at baseline. Avoid hypotension and nephrotoxic medications. Avoid fluctuations in blood pressure lowered slowly over days. Monitor as did receive IV contrast on admission    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 proliferative retinopathy and macular edema, without long-term current use of insulin [facility])  Assessment & Plan  Lab Results   Component Value Date    HGBA1C 7.1 (H) 10/30/2023       Recent Labs     10/30/23  1314 10/30/23  1649 10/30/23  2138 10/31/23  0638   POCGLU 110 143* 139 91         Blood Sugar Average: Last 72 hrs:  (P) 120. 75Start sliding scale insulin coverage and hold oral hypoglycemic medications. Continue to monitor blood glucose levels closely. VTE Pharmacologic Prophylaxis: VTE Score: 4 Moderate Risk (Score 3-4) - Pharmacological DVT Prophylaxis Ordered: enoxaparin (Lovenox). Patient Centered Rounds: I performed bedside rounds with nursing staff today. Discussions with Specialists or Other Care Team Provider: will discuss with cards and ortho    Education and Discussions with Family / Patient:pt will update daughter    Total Time Spent on Date of Encounter in care of patient: >35 mins. This time was spent on one or more of the following: performing physical exam; counseling and coordination of care; obtaining or reviewing history; documenting in the medical record; reviewing/ordering tests, medications or procedures; communicating with other healthcare professionals and discussing with patient's family/caregivers. Current Length of Stay: 0 day(s)  Current Patient Status: Inpatient   Certification Statement: The patient will continue to require additional inpatient hospital stay due to htn urgency  Discharge Plan: Anticipate discharge in 24-48 hrs to home. Code Status: Level 1 - Full Code    Subjective:   Seen and examined no chest pain today.  Right armpit pain    Objective:     Vitals:   Temp (24hrs), Av.2 °F (36.2 °C), Min:97 °F (36.1 °C), Max:97.5 °F (36.4 °C)    Temp:  [97 °F (36.1 °C)-97.5 °F (36.4 °C)] 97.3 °F (36.3 °C)  HR:  [53-70] 56  Resp:  [17-26] 17  BP: (142-213)/() 151/73  SpO2:  [95 %-99 %] 97 %  Body mass index is 33.06 kg/m². Input and Output Summary (last 24 hours): Intake/Output Summary (Last 24 hours) at 10/31/2023 1003  Last data filed at 10/30/2023 1745  Gross per 24 hour   Intake 480 ml   Output 100 ml   Net 380 ml       Physical Exam:   Physical Exam  Vitals and nursing note reviewed. Constitutional:       General: She is not in acute distress. Appearance: She is well-developed. HENT:      Head: Normocephalic and atraumatic. Eyes:      Conjunctiva/sclera: Conjunctivae normal.   Cardiovascular:      Rate and Rhythm: Normal rate and regular rhythm. Heart sounds: No murmur heard. Pulmonary:      Effort: Pulmonary effort is normal. No respiratory distress. Breath sounds: Normal breath sounds. Abdominal:      Palpations: Abdomen is soft. Tenderness: There is no abdominal tenderness. Musculoskeletal:         General: No swelling. Arms:       Cervical back: Neck supple. Skin:     General: Skin is warm and dry. Capillary Refill: Capillary refill takes less than 2 seconds. Neurological:      Mental Status: She is alert.    Psychiatric:         Mood and Affect: Mood normal.          Additional Data:     Labs:  Results from last 7 days   Lab Units 10/31/23  0636   WBC Thousand/uL 3.50*   HEMOGLOBIN g/dL 11.8   HEMATOCRIT % 36.3   PLATELETS Thousands/uL 198   NEUTROS PCT % 44   LYMPHS PCT % 43   MONOS PCT % 10   EOS PCT % 2     Results from last 7 days   Lab Units 10/31/23  0636 10/30/23  0748   SODIUM mmol/L 141 140   POTASSIUM mmol/L 3.5 3.7   CHLORIDE mmol/L 108 107   CO2 mmol/L 27 25   BUN mg/dL 15 15   CREATININE mg/dL 1.31* 1.24   ANION GAP mmol/L 6 8   CALCIUM mg/dL 8.4 8.7   ALBUMIN g/dL  --  3.5   TOTAL BILIRUBIN mg/dL  --  0.40   ALK PHOS U/L  --  106*   ALT U/L  --  10   AST U/L  --  17   GLUCOSE RANDOM mg/dL 92 142*     Results from last 7 days   Lab Units 10/30/23  0748   INR  0.95     Results from last 7 days   Lab Units 10/31/23  0638 10/30/23  2138 10/30/23  1649 10/30/23  1314   POC GLUCOSE mg/dl 91 139 143* 110     Results from last 7 days   Lab Units 10/30/23  1405   HEMOGLOBIN A1C % 7.1*           Lines/Drains:  Invasive Devices       Peripheral Intravenous Line  Duration             Peripheral IV 10/30/23 Distal;Right;Ventral (anterior) Forearm 1 day                      Telemetry:  Telemetry Orders (From admission, onward)               24 Hour Telemetry Monitoring  Continuous x 24 Hours (Telem)        Expiring   Question:  Reason for 24 Hour Telemetry  Answer:  PCI/EP study (including pacer and ICD implementation), Cardiac surgery, MI, abnormal cardiac cath, and chest pain- rule out MI                     Telemetry Reviewed: Normal Sinus Rhythm  Indication for Continued Telemetry Use: No indication for continued use. Will discontinue.               Imaging: Reviewed radiology reports from this admission including: chest xray    Recent Cultures (last 7 days):         Last 24 Hours Medication List:   Current Facility-Administered Medications   Medication Dose Route Frequency Provider Last Rate    acetaminophen  975 mg Oral Q6H Aminata Wilson MD      albuterol  2 puff Inhalation Q6H PRN Funmi Molina MD      aspirin  81 mg Oral Daily Funmi Molina MD      atorvastatin  80 mg Oral Daily With Dameon Rivera MD      carvedilol  12.5 mg Oral BID With Meals Pina Ayon DO      Diclofenac Sodium  2 g Topical 4x Daily Funmi Molina MD      doxazosin  2 mg Oral Daily Funmi Molina MD      enoxaparin  40 mg Subcutaneous Daily Funmi Molina MD      furosemide  20 mg Oral Daily Funmi Molina MD      gabapentin  300 mg Oral TID Funmi Molina MD      hydrALAZINE 5 mg Intravenous Q6H PRN Danielle Wooten MD      hydrALAZINE  25 mg Oral [facility] Danielle Yang DO      insulin lispro  1-5 Units Subcutaneous HS Danielle Wooten MD      insulin lispro  1-6 Units Subcutaneous TID AC Danielle Wooten MD      isosorbide mononitrate  60 mg Oral Daily Danielle Wooten MD      polyethylene glycol  17 g Oral Daily PRN Danielle Wooten MD      potassium chloride  10 mEq Oral Daily With Edgardo Raya MD      ticagrelor  90 mg Oral Q12H Disha Ortega MD          Today, Patient Was Seen By: Kirsty Witt MD    **Please Note: This note may have been constructed using a voice recognition system. **

## 2023-10-31 NOTE — ASSESSMENT & PLAN NOTE
Complains of pain for the last 1 day in the right shoulder radiating to the scapular region. Denies any traumatic injury or fall. Follow-up on x-rays of the shoulder. Tylenol, Voltaren gel, Lidoderm patch. Discussed with Ortho yesterday if needs to outpatient follow-up patient able to move her arm without any pain.   Pains she states has improved

## 2023-10-31 NOTE — PLAN OF CARE
Problem: PHYSICAL THERAPY ADULT  Goal: Performs mobility at highest level of function for planned discharge setting. See evaluation for individualized goals. Description: Treatment/Interventions: ADL retraining, Functional transfer training, LE strengthening/ROM, Elevations, Therapeutic exercise, Endurance training, Cognitive reorientation, Patient/family training, Equipment eval/education, Bed mobility, Gait training, Compensatory technique education, OT  Equipment Recommended: Navneet Whitley       See flowsheet documentation for full assessment, interventions and recommendations. Note: Prognosis: Good  Problem List: Decreased strength, Decreased endurance, Impaired balance, Decreased mobility, Decreased cognition, Obesity  Assessment: Pt is a 76 y.o. female seen for PT evaluation s/p admission to [address] [place] [address] on 10/30/2023 with <principal problem not specified>. Order placed for PT services. Upon evaluation: Pt is presenting with impaired functional mobility due to decreased strength, decreased endurance, impaired balance, gait deviations, and fall risk requiring  stand-by to steadying assistance for transfers and ambulation with no AD . Pt's clinical presentation is currently unstable/unpredictable given the functional mobility deficits above, especially weakness, gait deviations, decreased activity tolerance, and decreased functional mobility tolerance, coupled with fall risks as indicated by AM-PAC 6-Clicks: 80/84 as well as impaired balance, polypharmacy, and decreased cognition and combined with medical complications of hypertension , poor blood pressure control, abnormal renal lab values, abnormal WBCs, and need for input for mobility technique/safety. Pt's PMHx and comorbidities that may affect physical performance and progress include: CAD, CKD, DM, HTN, and TIA, myasthenia gravis . Personal factors affecting pt at time of IE include: multi-level environment.  Pt will benefit from continued skilled PT services to address deficits as defined above and to maximize level of functional mobility to facilitate return toward PLOF and improved QOL. From PT/mobility standpoint, recommendation at time of d/c would be OPPT pending progress in order to reduce fall risk and maximize pt's functional independence and consistency with mobility in order to facilitate return to PLOF. Recommend trial with walker next 1-2 sessions, ther ex next 1-2 sessions, and dynamic standing balance, stair navigation . Barriers to Discharge:  (medical)  Barriers to Discharge Comments: pt states her and dtr are getting evicted from their apartment in the next few weeks  Rehab Resource Intensity Level, PT: III (Minimum Resource Intensity)    See flowsheet documentation for full assessment.

## 2023-10-31 NOTE — PLAN OF CARE
Problem: MOBILITY - ADULT  Goal: Maintain or return to baseline ADL function  Description: INTERVENTIONS:  -  Assess patient's ability to carry out ADLs; assess patient's baseline for ADL function and identify physical deficits which impact ability to perform ADLs (bathing, care of mouth/teeth, toileting, grooming, dressing, etc.)  - Assess/evaluate cause of self-care deficits   - Assess range of motion  - Assess patient's mobility; develop plan if impaired  - Assess patient's need for assistive devices and provide as appropriate  - Encourage maximum independence but intervene and supervise when necessary  - Involve family in performance of ADLs  - Assess for home care needs following discharge   - Consider OT consult to assist with ADL evaluation and planning for discharge  - Provide patient education as appropriate  Outcome: Progressing  Goal: Maintains/Returns to pre admission functional level  Description: INTERVENTIONS:  - Perform BMAT or MOVE assessment daily.   - Set and communicate daily mobility goal to care team and patient/family/caregiver. - Collaborate with rehabilitation services on mobility goals if consulted  - Perform Range of Motion 3 times a day. - Reposition patient every 2 hours.   - Dangle patient 3 times a day  - Stand patient 3 times a day  - Ambulate patient 3 times a day  - Out of bed to chair 3 times a day   - Out of bed for meals 3 times a day  - Out of bed for toileting  - Record patient progress and toleration of activity level   Outcome: Progressing     Problem: PAIN - ADULT  Goal: Verbalizes/displays adequate comfort level or baseline comfort level  Description: Interventions:  - Encourage patient to monitor pain and request assistance  - Assess pain using appropriate pain scale  - Administer analgesics based on type and severity of pain and evaluate response  - Implement non-pharmacological measures as appropriate and evaluate response  - Consider cultural and social influences on pain and pain management  - Notify physician/advanced practitioner if interventions unsuccessful or patient reports new pain  Outcome: Progressing     Problem: INFECTION - ADULT  Goal: Absence or prevention of progression during hospitalization  Description: INTERVENTIONS:  - Assess and monitor for signs and symptoms of infection  - Monitor lab/diagnostic results  - Monitor all insertion sites, i.e. indwelling lines, tubes, and drains  - Monitor endotracheal if appropriate and nasal secretions for changes in amount and color  - [place] appropriate cooling/warming therapies per order  - Administer medications as ordered  - Instruct and encourage patient and family to use good hand hygiene technique  - Identify and instruct in appropriate isolation precautions for identified infection/condition  Outcome: Progressing  Goal: Absence of fever/infection during neutropenic period  Description: INTERVENTIONS:  - Monitor WBC    Outcome: Progressing     Problem: SAFETY ADULT  Goal: Maintain or return to baseline ADL function  Description: INTERVENTIONS:  -  Assess patient's ability to carry out ADLs; assess patient's baseline for ADL function and identify physical deficits which impact ability to perform ADLs (bathing, care of mouth/teeth, toileting, grooming, dressing, etc.)  - Assess/evaluate cause of self-care deficits   - Assess range of motion  - Assess patient's mobility; develop plan if impaired  - Assess patient's need for assistive devices and provide as appropriate  - Encourage maximum independence but intervene and supervise when necessary  - Involve family in performance of ADLs  - Assess for home care needs following discharge   - Consider OT consult to assist with ADL evaluation and planning for discharge  - Provide patient education as appropriate  Outcome: Progressing  Goal: Maintains/Returns to pre admission functional level  Description: INTERVENTIONS:  - Perform BMAT or MOVE assessment daily.   - Set and communicate daily mobility goal to care team and patient/family/caregiver. - Collaborate with rehabilitation services on mobility goals if consulted  - Perform Range of Motion 3 times a day. - Reposition patient every 2 hours.   - Dangle patient 3 times a day  - Stand patient 3 times a day  - Ambulate patient 3 times a day  - Out of bed to chair 3 times a day   - Out of bed for meals 3 times a day  - Out of bed for toileting  - Record patient progress and toleration of activity level   Outcome: Progressing  Goal: Patient will remain free of falls  Description: INTERVENTIONS:  - Educate patient/family on patient safety including physical limitations  - Instruct patient to call for assistance with activity   - Consult OT/PT to assist with strengthening/mobility   - Keep Call bell within reach  - Keep bed low and locked with side rails adjusted as appropriate  - Keep care items and personal belongings within reach  - Initiate and maintain comfort rounds  - Make Fall Risk Sign visible to staff  - Offer Toileting every 2 Hours, in advance of need  - Initiate/Maintain bed and chair alarm  - Apply yellow socks and bracelet for high fall risk patients  - Consider moving patient to room near nurses station  Outcome: Progressing     Problem: DISCHARGE PLANNING  Goal: Discharge to home or other facility with appropriate resources  Description: INTERVENTIONS:  - Identify barriers to discharge w/patient and caregiver  - Arrange for needed discharge resources and transportation as appropriate  - Identify discharge learning needs (meds, wound care, etc.)  - Arrange for interpretive services to assist at discharge as needed  - Refer to Case Management Department for coordinating discharge planning if the patient needs post-hospital services based on physician/advanced practitioner order or complex needs related to functional status, cognitive ability, or social support system  Outcome: Progressing     Problem: Knowledge Deficit  Goal: Patient/family/caregiver demonstrates understanding of disease process, treatment plan, medications, and discharge instructions  Description: Complete learning assessment and assess knowledge base.   Interventions:  - Provide teaching at level of understanding  - Provide teaching via preferred learning methods  Outcome: Progressing     Problem: CARDIOVASCULAR - ADULT  Goal: Maintains optimal cardiac output and hemodynamic stability  Description: INTERVENTIONS:  - Monitor I/O, vital signs and rhythm  - Monitor for S/S and trends of decreased cardiac output  - Administer and titrate ordered vasoactive medications to optimize hemodynamic stability  - Assess quality of pulses, skin color and temperature  - Assess for signs of decreased coronary artery perfusion  - Instruct patient to report change in severity of symptoms  Outcome: Progressing  Goal: Absence of cardiac dysrhythmias or at baseline rhythm  Description: INTERVENTIONS:  - Continuous cardiac monitoring, vital signs, obtain 12 lead EKG if ordered  - Administer antiarrhythmic and heart rate control medications as ordered  - Monitor electrolytes and administer replacement therapy as ordered  Outcome: Progressing     Problem: METABOLIC, FLUID AND ELECTROLYTES - ADULT  Goal: Electrolytes maintained within normal limits  Description: INTERVENTIONS:  - Monitor labs and assess patient for signs and symptoms of electrolyte imbalances  - Administer electrolyte replacement as ordered  - Monitor response to electrolyte replacements, including repeat lab results as appropriate  - Instruct patient on fluid and nutrition as appropriate  Outcome: Progressing  Goal: Fluid balance maintained  Description: INTERVENTIONS:  - Monitor labs   - Monitor I/O and WT  - Instruct patient on fluid and nutrition as appropriate  - Assess for signs & symptoms of volume excess or deficit  Outcome: Progressing  Goal: Glucose maintained within target range  Description: INTERVENTIONS:  - Monitor Blood Glucose as ordered  - Assess for signs and symptoms of hyperglycemia and hypoglycemia  - Administer ordered medications to maintain glucose within target range  - Assess nutritional intake and initiate nutrition service referral as needed  Outcome: Progressing     Problem: Prexisting or High Potential for Compromised Skin Integrity  Goal: Skin integrity is maintained or improved  Description: INTERVENTIONS:  - Identify patients at risk for skin breakdown  - Assess and monitor skin integrity  - Assess and monitor nutrition and hydration status  - Monitor labs   - Assess for incontinence   - Turn and reposition patient  - Assist with mobility/ambulation  - Relieve pressure over bony prominences  - Avoid friction and shearing  - Provide appropriate hygiene as needed including keeping skin clean and dry  - Evaluate need for skin moisturizer/barrier cream  - Collaborate with interdisciplinary team   - Patient/family teaching  - Consider wound care consult   Outcome: Progressing

## 2023-10-31 NOTE — ASSESSMENT & PLAN NOTE
Lab Results   Component Value Date    EGFR 41 10/31/2023    EGFR 44 10/30/2023    EGFR 47 09/07/2023    CREATININE 1.31 (H) 10/31/2023    CREATININE 1.24 10/30/2023    CREATININE 1.18 09/07/2023   Baseline Creatinine between 1.1-1.4. Creatinine at baseline. Avoid hypotension and nephrotoxic medications. Avoid fluctuations in blood pressure lowered slowly over days.   Monitor as did receive IV contrast on admission

## 2023-10-31 NOTE — PROGRESS NOTES
[address] [facility]  Progress Note  Name: Juliana Jordan  MRN: [id number]  Unit/Bed#: -01 I Date of Admission: 10/30/2023   Date of Service: 10/31/2023 I Hospital Day: 0    Assessment/Plan   Acute pain of right shoulder  Assessment & Plan  Complains of pain for the last 1 day in the right shoulder radiating to the scapular region. Denies any traumatic injury or fall. Follow-up on x-rays of the shoulder. Tylenol, Voltaren gel, Lidoderm patch. Physical therapy and orthopedic evaluation will be obtained. Hypertensive urgency  Assessment & Plan  /120-->199/103. Resume outpatient dose of Cardura Imdur and increase Coreg to 12.5 mg p.o. twice daily done yesterday by cardiology  Continue with low-salt diet. As needed IV hydralazine for SBP greater than 180. She has history of angioedema with ACE inhibitor and subsequently is not on ARB as well. Pressures down to 150 today we will observe further without any adjustments that she is just receiving her second dose is today. Avoid major drops    Coronary artery disease involving native coronary artery of native heart  Assessment & Plan  History of coronary artery disease with PCI in March 2023. Maintain on aspirin Brilinta beta-blockers and statin. Will resume. Presents with chest pain. Cardiac enzymes are negative    Chest pain  Assessment & Plan  Left-sided chest pain   Described the pain to moderate to severe in intensity nonradiating associated with palpitations  Showed bifascicular block with  T wave inversions in inferior lead. IMELDA score on admission is 4  Suspect in light of uncontrolled htn.    Continue with aspirin, Brilinta, statin, beta-blocker  Reviewed cards consult  Blood pressure improved today and no chest pain  Cardiac enzymes are negative      Chronic kidney disease, stage 3 unspecified [facility])  Assessment & Plan  Lab Results   Component Value Date    EGFR 41 10/31/2023    EGFR 44 10/30/2023    EGFR 47 09/07/2023    CREATININE 1.31 (H) 10/31/2023    CREATININE 1.24 10/30/2023    CREATININE 1.18 09/07/2023   Baseline Creatinine between 1.1-1.4. Creatinine at baseline. Avoid hypotension and nephrotoxic medications. Avoid fluctuations in blood pressure lowered slowly over days. Monitor as did receive IV contrast on admission    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 proliferative retinopathy and macular edema, without long-term current use of insulin [facility])  Assessment & Plan  Lab Results   Component Value Date    HGBA1C 7.1 (H) 10/30/2023       Recent Labs     10/30/23  1314 10/30/23  1649 10/30/23  2138 10/31/23  0638   POCGLU 110 143* 139 91         Blood Sugar Average: Last 72 hrs:  (P) 120. 75Start sliding scale insulin coverage and hold oral hypoglycemic medications. Continue to monitor blood glucose levels closely. VTE Pharmacologic Prophylaxis: VTE Score: 4 Moderate Risk (Score 3-4) - Pharmacological DVT Prophylaxis Ordered: enoxaparin (Lovenox). Patient Centered Rounds: I performed bedside rounds with nursing staff today. Discussions with Specialists or Other Care Team Provider: will discuss with cards    Education and Discussions with Family / Patient: pt will update family     Total Time Spent on Date of Encounter in care of patient: >35 mins. This time was spent on one or more of the following: performing physical exam; counseling and coordination of care; obtaining or reviewing history; documenting in the medical record; reviewing/ordering tests, medications or procedures; communicating with other healthcare professionals and discussing with patient's family/caregivers. Current Length of Stay: 0 day(s)  Current Patient Status: Inpatient   Certification Statement: The patient will continue to require additional inpatient hospital stay due to hypertensive urgency   Discharge Plan: Anticipate discharge in 24-48 hrs to home.     Code Status: Level 1 - Full Code    Subjective:   Examined still has some right axilla pain. But no chest pain    Objective:     Vitals:   Temp (24hrs), Av.2 °F (36.2 °C), Min:97 °F (36.1 °C), Max:97.5 °F (36.4 °C)    Temp:  [97 °F (36.1 °C)-97.5 °F (36.4 °C)] 97.3 °F (36.3 °C)  HR:  [53-70] 56  Resp:  [17-26] 17  BP: (142-213)/() 151/73  SpO2:  [95 %-99 %] 97 %  Body mass index is 33.06 kg/m². Input and Output Summary (last 24 hours): Intake/Output Summary (Last 24 hours) at 10/31/2023 1001  Last data filed at 10/30/2023 1745  Gross per 24 hour   Intake 480 ml   Output 100 ml   Net 380 ml       Physical Exam:   Physical Exam  Vitals and nursing note reviewed. Constitutional:       General: She is not in acute distress. Appearance: She is well-developed. HENT:      Head: Normocephalic and atraumatic. Eyes:      Conjunctiva/sclera: Conjunctivae normal.   Cardiovascular:      Rate and Rhythm: Normal rate and regular rhythm. Heart sounds: No murmur heard. Pulmonary:      Effort: Pulmonary effort is normal. No respiratory distress. Breath sounds: Normal breath sounds. No wheezing or rales. Abdominal:      General: There is no distension. Palpations: Abdomen is soft. Tenderness: There is no abdominal tenderness. Musculoskeletal:         General: No swelling. Cervical back: Neck supple. Skin:     General: Skin is warm and dry. Capillary Refill: Capillary refill takes less than 2 seconds. Neurological:      Mental Status: She is alert and oriented to person, place, and time.    Psychiatric:         Mood and Affect: Mood normal.          Additional Data:     Labs:  Results from last 7 days   Lab Units 10/31/23  0636   WBC Thousand/uL 3.50*   HEMOGLOBIN g/dL 11.8   HEMATOCRIT % 36.3   PLATELETS Thousands/uL 198   NEUTROS PCT % 44   LYMPHS PCT % 43   MONOS PCT % 10   EOS PCT % 2     Results from last 7 days   Lab Units 10/31/23  0636 10/30/23  0748   SODIUM mmol/L 141 140   POTASSIUM mmol/L 3.5 3.7   CHLORIDE mmol/L 108 107   CO2 mmol/L 27 25   BUN mg/dL 15 15   CREATININE mg/dL 1.31* 1.24   ANION GAP mmol/L 6 8   CALCIUM mg/dL 8.4 8.7   ALBUMIN g/dL  --  3.5   TOTAL BILIRUBIN mg/dL  --  0.40   ALK PHOS U/L  --  106*   ALT U/L  --  10   AST U/L  --  17   GLUCOSE RANDOM mg/dL 92 142*     Results from last 7 days   Lab Units 10/30/23  0748   INR  0.95     Results from last 7 days   Lab Units 10/31/23  0638 10/30/23  2138 10/30/23  1649 10/30/23  1314   POC GLUCOSE mg/dl 91 139 143* 110     Results from last 7 days   Lab Units 10/30/23  1405   HEMOGLOBIN A1C % 7.1*           Lines/Drains:  Invasive Devices       Peripheral Intravenous Line  Duration             Peripheral IV 10/30/23 Distal;Right;Ventral (anterior) Forearm 1 day                          Imaging: Reviewed radiology reports from this admission including: chest CT scan    Recent Cultures (last 7 days):         Last 24 Hours Medication List:   Current Facility-Administered Medications   Medication Dose Route Frequency Provider Last Rate    acetaminophen  975 mg Oral Q6H Darvin Coronel MD      albuterol  2 puff Inhalation Q6H PRN Leisa Massey MD      aspirin  81 mg Oral Daily Leisa Massey MD      atorvastatin  80 mg Oral Daily With Yudith Castillo MD      carvedilol  12.5 mg Oral BID With Meals Mckinley Stuart DO      Diclofenac Sodium  2 g Topical 4x Daily Leisa Massey MD      doxazosin  2 mg Oral Daily Leisa Massey MD      enoxaparin  40 mg Subcutaneous Daily Leisa Massey MD      furosemide  20 mg Oral Daily Leisa Massey MD      gabapentin  300 mg Oral TID Leisa Massey MD      hydrALAZINE  5 mg Intravenous Q6H PRN Leisa Massey MD      hydrALAZINE  25 mg Oral [facility] Mckinley Stuart DO      insulin lispro  1-5 Units Subcutaneous HS Leisa Massey MD      insulin lispro  1-6 Units Subcutaneous TID Dano Escalera MD      isosorbide mononitrate  60 mg Oral Daily Leisa Massey MD polyethylene glycol  17 g Oral Daily PRN Peggy Pacheco MD      potassium chloride  10 mEq Oral Daily With Breakfast Peggy Pacheco MD      ticagrelor  90 mg Oral Q12H Monae Whitfield MD          Today, Patient Was Seen By: Anthony Gaffney MD    **Please Note: This note may have been constructed using a voice recognition system. **

## 2023-10-31 NOTE — PLAN OF CARE
Problem: PHYSICAL THERAPY ADULT  Goal: Performs mobility at highest level of function for planned discharge setting. See evaluation for individualized goals. Description: Treatment/Interventions: ADL retraining, Functional transfer training, LE strengthening/ROM, Elevations, Therapeutic exercise, Endurance training, Cognitive reorientation, Patient/family training, Equipment eval/education, Bed mobility, Gait training, Compensatory technique education, OT  Equipment Recommended: Lis Bennett       See flowsheet documentation for full assessment, interventions and recommendations. 10/31/2023 1314 by Vinayak Cabrera PT  Outcome: Progressing  Note: Prognosis: Good  Problem List: Decreased strength, Decreased endurance, Impaired balance, Decreased mobility, Decreased cognition, Obesity  Pt tolerated tx session fair. Contines to complain of lightheadednes in stance (4/10) with decrease in sit to stand BP from 198/89 to 153/79. Pt does not report an increase in feeling lightheaded with ambulation or prolonged standing - reporting the 4/10 remains the same. Pt declines use of RW during tx session, but continues to reach for external objects for support at times due to slightly impaired balance - she states that it is only due to feeling somewhat lightheaded. Pt may benefit from RW use post D/C. Barriers to Discharge:  (medical)  Barriers to Discharge Comments: pt states her and dtr are getting evicted from their apartment in the next few weeks  Rehab Resource Intensity Level, PT: III (Minimum Resource Intensity)    See flowsheet documentation for full assessment.

## 2023-11-01 LAB
ANION GAP SERPL CALCULATED.3IONS-SCNC: 4 MMOL/L
BUN SERPL-MCNC: 17 MG/DL (ref 5–25)
CALCIUM SERPL-MCNC: 8.5 MG/DL (ref 8.4–10.2)
CHLORIDE SERPL-SCNC: 108 MMOL/L (ref 96–108)
CO2 SERPL-SCNC: 28 MMOL/L (ref 21–32)
CREAT SERPL-MCNC: 1.36 MG/DL (ref 0.6–1.3)
GFR SERPL CREATININE-BSD FRML MDRD: 40 ML/MIN/1.73SQ M
GLUCOSE SERPL-MCNC: 103 MG/DL (ref 65–140)
GLUCOSE SERPL-MCNC: 119 MG/DL (ref 65–140)
GLUCOSE SERPL-MCNC: 171 MG/DL (ref 65–140)
GLUCOSE SERPL-MCNC: 175 MG/DL (ref 65–140)
GLUCOSE SERPL-MCNC: 97 MG/DL (ref 65–140)
POTASSIUM SERPL-SCNC: 3.7 MMOL/L (ref 3.5–5.3)
SODIUM SERPL-SCNC: 140 MMOL/L (ref 135–147)

## 2023-11-01 PROCEDURE — 80048 BASIC METABOLIC PNL TOTAL CA: CPT | Performed by: FAMILY MEDICINE

## 2023-11-01 PROCEDURE — 99232 SBSQ HOSP IP/OBS MODERATE 35: CPT | Performed by: FAMILY MEDICINE

## 2023-11-01 PROCEDURE — 97530 THERAPEUTIC ACTIVITIES: CPT

## 2023-11-01 PROCEDURE — 97116 GAIT TRAINING THERAPY: CPT

## 2023-11-01 PROCEDURE — 82948 REAGENT STRIP/BLOOD GLUCOSE: CPT

## 2023-11-01 PROCEDURE — 97110 THERAPEUTIC EXERCISES: CPT

## 2023-11-01 RX ORDER — CARVEDILOL 12.5 MG/1
12.5 TABLET ORAL 2 TIMES DAILY WITH MEALS
Status: DISCONTINUED | OUTPATIENT
Start: 2023-11-01 | End: 2023-11-02 | Stop reason: HOSPADM

## 2023-11-01 RX ORDER — HYDRALAZINE HYDROCHLORIDE 25 MG/1
50 TABLET, FILM COATED ORAL EVERY 8 HOURS SCHEDULED
Status: DISCONTINUED | OUTPATIENT
Start: 2023-11-01 | End: 2023-11-01

## 2023-11-01 RX ORDER — CARVEDILOL 6.25 MG/1
6.25 TABLET ORAL 2 TIMES DAILY WITH MEALS
Status: DISCONTINUED | OUTPATIENT
Start: 2023-11-01 | End: 2023-11-01

## 2023-11-01 RX ORDER — HYDRALAZINE HYDROCHLORIDE 25 MG/1
25 TABLET, FILM COATED ORAL EVERY 8 HOURS SCHEDULED
Status: DISCONTINUED | OUTPATIENT
Start: 2023-11-01 | End: 2023-11-02

## 2023-11-01 RX ADMIN — Medication 2 G: at 08:07

## 2023-11-01 RX ADMIN — POTASSIUM CHLORIDE 10 MEQ: 750 TABLET, EXTENDED RELEASE ORAL at 06:52

## 2023-11-01 RX ADMIN — Medication 2 G: at 12:17

## 2023-11-01 RX ADMIN — ENOXAPARIN SODIUM 40 MG: 40 INJECTION SUBCUTANEOUS at 14:15

## 2023-11-01 RX ADMIN — Medication 2 G: at 21:43

## 2023-11-01 RX ADMIN — FUROSEMIDE 20 MG: 20 TABLET ORAL at 08:05

## 2023-11-01 RX ADMIN — ASPIRIN 81 MG: 81 TABLET, COATED ORAL at 08:05

## 2023-11-01 RX ADMIN — HYDRALAZINE HYDROCHLORIDE 25 MG: 25 TABLET ORAL at 14:15

## 2023-11-01 RX ADMIN — CARVEDILOL 12.5 MG: 12.5 TABLET, FILM COATED ORAL at 17:09

## 2023-11-01 RX ADMIN — DOXAZOSIN 2 MG: 1 TABLET ORAL at 08:05

## 2023-11-01 RX ADMIN — ACETAMINOPHEN 975 MG: 325 TABLET, FILM COATED ORAL at 23:22

## 2023-11-01 RX ADMIN — GABAPENTIN 300 MG: 300 CAPSULE ORAL at 17:09

## 2023-11-01 RX ADMIN — HYDRALAZINE HYDROCHLORIDE 25 MG: 25 TABLET ORAL at 21:43

## 2023-11-01 RX ADMIN — HYDRALAZINE HYDROCHLORIDE 25 MG: 25 TABLET ORAL at 06:52

## 2023-11-01 RX ADMIN — GABAPENTIN 300 MG: 300 CAPSULE ORAL at 21:43

## 2023-11-01 RX ADMIN — Medication 2 G: at 17:09

## 2023-11-01 RX ADMIN — TICAGRELOR 90 MG: 90 TABLET ORAL at 08:05

## 2023-11-01 RX ADMIN — ATORVASTATIN CALCIUM 80 MG: 40 TABLET, FILM COATED ORAL at 17:09

## 2023-11-01 RX ADMIN — INSULIN LISPRO 1 UNITS: 100 INJECTION, SOLUTION INTRAVENOUS; SUBCUTANEOUS at 21:43

## 2023-11-01 RX ADMIN — GABAPENTIN 300 MG: 300 CAPSULE ORAL at 08:05

## 2023-11-01 RX ADMIN — CARVEDILOL 12.5 MG: 12.5 TABLET, FILM COATED ORAL at 06:51

## 2023-11-01 RX ADMIN — ACETAMINOPHEN 975 MG: 325 TABLET, FILM COATED ORAL at 12:17

## 2023-11-01 RX ADMIN — ISOSORBIDE MONONITRATE 60 MG: 60 TABLET, EXTENDED RELEASE ORAL at 08:05

## 2023-11-01 RX ADMIN — INSULIN LISPRO 1 UNITS: 100 INJECTION, SOLUTION INTRAVENOUS; SUBCUTANEOUS at 12:18

## 2023-11-01 RX ADMIN — TICAGRELOR 90 MG: 90 TABLET ORAL at 21:43

## 2023-11-01 RX ADMIN — ACETAMINOPHEN 975 MG: 325 TABLET, FILM COATED ORAL at 06:52

## 2023-11-01 RX ADMIN — ACETAMINOPHEN 975 MG: 325 TABLET, FILM COATED ORAL at 17:08

## 2023-11-01 NOTE — UTILIZATION REVIEW
NOTIFICATION OF INPATIENT ADMISSION   AUTHORIZATION REQUEST   SERVICING FACILITY:   [address]  Tax ID: [id number]  NPI: [id number] ATTENDING PROVIDER:  Attending Name and NPI#: Kirsty Witt Md [[id number]]  Address: [address] [place] [address]  Phone: [phone]   ADMISSION INFORMATION:  Place of Service: [address] [place] [address]  Place of Service Code: 21  Inpatient Admission Date/Time: 10/31/23  8:57 AM  Discharge Date/Time: No discharge date for patient encounter. Admitting Diagnosis Code/Description:  Chest pain [R07.9]     UTILIZATION REVIEW CONTACT:  Jitendra Lam, Utilization   Network Utilization Review Department  Phone: [phone]  Fax [phone]  Email: [email or web address]  Contact for approvals/pending authorizations, clinical reviews, and discharge. PHYSICIAN ADVISORY SERVICES:  Medical Necessity Denial & Jegb[employer] Review  Phone: [phone]  Fax: [phone]  Email: [email or web address]     DISCHARGE SUPPORT TEAM:  For Patients Discharge Needs & Updates  Phone: [phone] opt. 2 Fax: [phone]  Email: [email or web address]

## 2023-11-01 NOTE — CASE MANAGEMENT
Case Management Assessment & Discharge Planning Note    Patient name Nehemias King  Location /-01 MRN [id number]  : 1955 Date 2023       Current Admission Date: 10/30/2023  Current Admission Diagnosis:Chest pain   Patient Active Problem List    Diagnosis Date Noted    Acute pain of right shoulder 10/30/2023    Hypertensive urgency 2023    Coronary artery disease involving native coronary artery of native heart 2023    Palpitations 2023    Chest pain 2023    Acute kidney injury (PADMINI) with chronic kidney disease stage III 2023    Thyroid nodule 2022    Other headache syndrome 2022    Chronic kidney disease, stage 3 unspecified ([address]) 2022    Abnormal CT scan 08/10/2021    Myasthenia gravis ([address]) 2021    Depression, recurrent ([address]) 2021    Cerebral meningocele ([address]) 2021    Obesity, morbid ([address]) 2020    UTI (urinary tract infection) 2020    Chronic heart failure ([address]) 2020    Subcortical microvascular ischemic occlusive disease 2019    Sixth nerve palsy of right eye     Binocular vision disorder with diplopia 2019    Carpal tunnel syndrome on left 2019    Numbness in both hands 2019    Carpal tunnel syndrome of left wrist 2019    Multinodular goiter 2019    Hoarseness or changing voice 2019    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 proliferative retinopathy and macular edema, without long-term current use of insulin ([address])     Diabetic neuropathy ([address]) 2018    Chronic constipation 2017    Cyst of left breast 2017    Impaired hearing 2017    Cerebral atherosclerosis 05/15/2017    Hyperlipidemia 2017    Moderate persistent asthma without complication     Lumbar pain 10/18/2016    Hiatal hernia 10/18/2016    Mild persistent asthma without complication     Chronic back pain 2016    Depression 09/06/2016    Essential hypertension 09/06/2016    Neuropathy, diabetic ([address]) 09/06/2016      LOS (days): 1  Geometric Mean LOS (GMLOS) (days): 2.10  Days to GMLOS:1.1     OBJECTIVE:    Risk of Unplanned Readmission Score: 13.48         Current admission status: Inpatient  Referral Reason: Other (discharge planning)    Preferred Pharmacy:   [address], [address] [facility]  [address] [place] [address]  Phone: [phone] Fax: [phone] [address] [place] [id number]  [address] [facility] [address]  [address] [facility] [address]  Phone: [phone] Fax: [phone]    Primary Care Provider: Sena Kumar DO    Primary Insurance: [facility]  Secondary Insurance: Marylee Sayre    ASSESSMENT:  [facility] Representative - Daughter   Primary Phone: [phone] (Mobile)                    CM delivered rolling walker to pt room, delivery ticket signed. Pt stated she will be able to get out of a car herself, CM will set up uber ride when pt is ready to be DC. DISCHARGE DETAILS:                                          Other Referral/Resources/Interventions Provided:  Interventions: DME  Referral Comments: Ordered RW from [place], the walker has been delivered to pt room.

## 2023-11-01 NOTE — PHYSICAL THERAPY NOTE
PHYSICAL THERAPY TREATMENT NOTE  NAME:  Leyla Ruth  DATE: 11/01/23    Length Of Stay: 1  Performed at least 2 patient identifiers during session: Name and ID bracelet    TREATMENT:      11/01/23 0829   PT Last Visit   PT Visit Date 11/01/23   Note Type   Note Type Treatment   Pain Assessment   Pain Assessment Tool 0-10   Pain Score No Pain   Restrictions/Precautions   Weight Bearing Precautions Per Order No   Other Precautions Chair Alarm; Bed Alarm; Fall Risk   General   Chart Reviewed Yes   Family/Caregiver Present No   Cognition   Overall Cognitive Status Impaired   Arousal/Participation Alert; Cooperative   Attention Within functional limits   Orientation Level Oriented to person;Oriented to place;Oriented to situation;Disoriented to time   Memory Unable to assess   Following Commands Follows one step commands without difficulty   Comments pt is agreeable to PT session   Bed Mobility   Supine to Sit 5  Supervision   Additional items HOB elevated; Bedrails; Increased time required   Sit to Supine 5  Supervision   Additional items Bedrails; Increased time required   Additional Comments pt able to sit at EOBx 25 minutes to complete B LE ther ex, with good balance   Transfers   Sit to Stand 5  Supervision   Additional items Increased time required; Bedrails   Stand to Sit 5  Supervision   Additional items Bedrails; Increased time required   Stand pivot   (SBA)   Additional items Increased time required;Verbal cues   Additional Comments pt utilized RW for functional transfers   Ambulation/Elevation   Gait pattern Decreased foot clearance; Short stride; Excessively slow   Gait Assistance   (SBA)   Additional items Verbal cues   Assistive Device Rolling walker   Distance 160 ftx1   Ambulation/Elevation Additional Comments pt required 2 standing rest breaks during ambulation trial   Balance   Static Sitting Normal   Dynamic Sitting Good   Static Standing Fair +   Dynamic Standing Fair   Ambulatory Fair -   Endurance Deficit   Endurance Deficit Yes   Endurance Deficit Description required standing rest breaks during ambulation trial   Activity Tolerance   Activity Tolerance Patient limited by fatigue   Medical Staff Made Aware yes, RN aware of outcomes   Exercises   Quad Sets Sitting;20 reps;AROM; Bilateral   Heelslides Sitting;20 reps;AROM; Bilateral   Glute Sets Sitting;20 reps;AROM; Bilateral   Hip Abduction Sitting;20 reps;AROM; Bilateral   Knee AROM Long Arc Quad Sitting;20 reps;AROM; Bilateral   Ankle Pumps Sitting;20 reps;AROM; Bilateral   Marching Sitting;20 reps;AROM; Bilateral   Assessment   Prognosis Good   Problem List Decreased strength;Decreased endurance; Impaired balance;Decreased mobility;Obesity   Assessment Pt seen for PT treatment session this date with interventions consisting of gait training w/ emphasis on improving pt's ability to ambulate level surfaces x 160 ftx1 with SBA provided by therapist with RW, Therapeutic exercise consisting of: AROM 20 reps B LE in sitting position, and therapeutic activity consisting of training: bed mobility, supine<>sit transfers, sit<>stand transfers, static sitting tolerance at EOB for 25 minutes w/ no UE support, vc and tactile cues for static sitting posture faciliation, vc and tactile cues for static standing posture faciliation, and stand pivot transfers towards B direction. Pt agreeable to PT treatment session upon arrival, pt found supine in bed w/ HOB elevated, in no apparent distress and responsive. In comparison to previous session, pt with improvements in distance ambulated . Post session: pt returned BTB, all needs in reach, and RN notified of session findings/recommendations. Continue to recommend Level III (Minimum Resource Intensity), with family support, and with walker at time of d/c in order to maximize pt's functional independence and safety w/ mobility. Pt continues to be functioning below baseline level.  PT will continue to see pt during current hospitalization in order to address the deficits listed above and provide interventions consistent w/ POC in effort to achieve STGs. Nereyda Hammond, EDY   Goals   STG Expiration Date 11/14/23   PT Treatment Day 2   Plan   Treatment/Interventions Functional transfer training;LE strengthening/ROM; Therapeutic exercise; Endurance training;Bed mobility;Gait training;Spoke to nursing   PT Frequency 3-5x/wk   Discharge Recommendation   Rehab Resource Intensity Level, PT III (Minimum Resource Intensity)   Equipment Recommended Norden Forth Package Recommended Wheeled walker   AM-PAC Basic Mobility Inpatient   Turning in Flat Bed Without Bedrails 4   Lying on Back to Sitting on Edge of Flat Bed Without Bedrails 3   Moving Bed to Chair 3   Standing Up From Chair Using Arms 3   Walk in Room 3   Climb 3-5 Stairs With Railing 3   Basic Mobility Inpatient Raw Score 19   Basic Mobility Standardized Score 42.48   Highest Level Of Mobility   JH-HLM Goal 6: Walk 10 steps or more   JH-HLM Achieved 7: Walk 25 feet or more   End of Consult   Patient Position at End of Consult Supine;Bed/Chair alarm activated; All needs within reach       The patient's AM-PAC Basic Mobility Inpatient Short Form Raw Score is 19. A Raw score of greater than 16 suggests the patient may benefit from discharge to home. Please also refer to the recommendation of the Physical Therapist for safe discharge planning.       Mortimer Spore, PTA,PTA

## 2023-11-01 NOTE — PLAN OF CARE
Problem: MOBILITY - ADULT  Goal: Maintain or return to baseline ADL function  Description: INTERVENTIONS:  -  Assess patient's ability to carry out ADLs; assess patient's baseline for ADL function and identify physical deficits which impact ability to perform ADLs (bathing, care of mouth/teeth, toileting, grooming, dressing, etc.)  - Assess/evaluate cause of self-care deficits   - Assess range of motion  - Assess patient's mobility; develop plan if impaired  - Assess patient's need for assistive devices and provide as appropriate  - Encourage maximum independence but intervene and supervise when necessary  - Involve family in performance of ADLs  - Assess for home care needs following discharge   - Consider OT consult to assist with ADL evaluation and planning for discharge  - Provide patient education as appropriate  Outcome: Progressing  Goal: Maintains/Returns to pre admission functional level  Description: INTERVENTIONS:  - Perform BMAT or MOVE assessment daily.   - Set and communicate daily mobility goal to care team and patient/family/caregiver.    - Collaborate with rehabilitation services on mobility goals if consulted  - Out of bed for toileting  - Record patient progress and toleration of activity level   Outcome: Progressing     Problem: PAIN - ADULT  Goal: Verbalizes/displays adequate comfort level or baseline comfort level  Description: Interventions:  - Encourage patient to monitor pain and request assistance  - Assess pain using appropriate pain scale  - Administer analgesics based on type and severity of pain and evaluate response  - Implement non-pharmacological measures as appropriate and evaluate response  - Consider cultural and social influences on pain and pain management  - Notify physician/advanced practitioner if interventions unsuccessful or patient reports new pain  Outcome: Progressing     Problem: INFECTION - ADULT  Goal: Absence or prevention of progression during hospitalization  Description: INTERVENTIONS:  - Assess and monitor for signs and symptoms of infection  - Monitor lab/diagnostic results  - Monitor all insertion sites, i.e. indwelling lines, tubes, and drains  - Monitor endotracheal if appropriate and nasal secretions for changes in amount and color  - [place] appropriate cooling/warming therapies per order  - Administer medications as ordered  - Instruct and encourage patient and family to use good hand hygiene technique  - Identify and instruct in appropriate isolation precautions for identified infection/condition  Outcome: Progressing  Goal: Absence of fever/infection during neutropenic period  Description: INTERVENTIONS:  - Monitor WBC    Outcome: Progressing     Problem: SAFETY ADULT  Goal: Maintain or return to baseline ADL function  Description: INTERVENTIONS:  -  Assess patient's ability to carry out ADLs; assess patient's baseline for ADL function and identify physical deficits which impact ability to perform ADLs (bathing, care of mouth/teeth, toileting, grooming, dressing, etc.)  - Assess/evaluate cause of self-care deficits   - Assess range of motion  - Assess patient's mobility; develop plan if impaired  - Assess patient's need for assistive devices and provide as appropriate  - Encourage maximum independence but intervene and supervise when necessary  - Involve family in performance of ADLs  - Assess for home care needs following discharge   - Consider OT consult to assist with ADL evaluation and planning for discharge  - Provide patient education as appropriate  Outcome: Progressing  Goal: Maintains/Returns to pre admission functional level  Description: INTERVENTIONS:  - Perform BMAT or MOVE assessment daily.   - Set and communicate daily mobility goal to care team and patient/family/caregiver.    - Collaborate with rehabilitation services on mobility goals if consulted  - Stand patient 3 times a day  - Ambulate patient 3 times a day  - Out of bed to chair 3 times a day   - Out of bed for meals 3 times a day  - Out of bed for toileting  - Record patient progress and toleration of activity level   Outcome: Progressing  Goal: Patient will remain free of falls  Description: INTERVENTIONS:  - Educate patient/family on patient safety including physical limitations  - Instruct patient to call for assistance with activity   - Consult OT/PT to assist with strengthening/mobility   - Keep Call bell within reach  - Keep bed low and locked with side rails adjusted as appropriate  - Keep care items and personal belongings within reach  - Initiate and maintain comfort rounds  - Make Fall Risk Sign visible to staff  - Apply yellow socks and bracelet for high fall risk patients  - Consider moving patient to room near nurses station  Outcome: Progressing     Problem: DISCHARGE PLANNING  Goal: Discharge to home or other facility with appropriate resources  Description: INTERVENTIONS:  - Identify barriers to discharge w/patient and caregiver  - Arrange for needed discharge resources and transportation as appropriate  - Identify discharge learning needs (meds, wound care, etc.)  - Arrange for interpretive services to assist at discharge as needed  - Refer to Case Management Department for coordinating discharge planning if the patient needs post-hospital services based on physician/advanced practitioner order or complex needs related to functional status, cognitive ability, or social support system  Outcome: Progressing     Problem: Knowledge Deficit  Goal: Patient/family/caregiver demonstrates understanding of disease process, treatment plan, medications, and discharge instructions  Description: Complete learning assessment and assess knowledge base.   Interventions:  - Provide teaching at level of understanding  - Provide teaching via preferred learning methods  Outcome: Progressing     Problem: CARDIOVASCULAR - ADULT  Goal: Maintains optimal cardiac output and hemodynamic stability  Description: INTERVENTIONS:  - Monitor I/O, vital signs and rhythm  - Monitor for S/S and trends of decreased cardiac output  - Administer and titrate ordered vasoactive medications to optimize hemodynamic stability  - Assess quality of pulses, skin color and temperature  - Assess for signs of decreased coronary artery perfusion  - Instruct patient to report change in severity of symptoms  Outcome: Progressing  Goal: Absence of cardiac dysrhythmias or at baseline rhythm  Description: INTERVENTIONS:  - Continuous cardiac monitoring, vital signs, obtain 12 lead EKG if ordered  - Administer antiarrhythmic and heart rate control medications as ordered  - Monitor electrolytes and administer replacement therapy as ordered  Outcome: Progressing     Problem: METABOLIC, FLUID AND ELECTROLYTES - ADULT  Goal: Electrolytes maintained within normal limits  Description: INTERVENTIONS:  - Monitor labs and assess patient for signs and symptoms of electrolyte imbalances  - Administer electrolyte replacement as ordered  - Monitor response to electrolyte replacements, including repeat lab results as appropriate  - Instruct patient on fluid and nutrition as appropriate  Outcome: Progressing  Goal: Fluid balance maintained  Description: INTERVENTIONS:  - Monitor labs   - Monitor I/O and WT  - Instruct patient on fluid and nutrition as appropriate  - Assess for signs & symptoms of volume excess or deficit  Outcome: Progressing  Goal: Glucose maintained within target range  Description: INTERVENTIONS:  - Monitor Blood Glucose as ordered  - Assess for signs and symptoms of hyperglycemia and hypoglycemia  - Administer ordered medications to maintain glucose within target range  - Assess nutritional intake and initiate nutrition service referral as needed  Outcome: Progressing     Problem: Prexisting or High Potential for Compromised Skin Integrity  Goal: Skin integrity is maintained or improved  Description: INTERVENTIONS:  - Identify patients at risk for skin breakdown  - Assess and monitor skin integrity  - Assess and monitor nutrition and hydration status  - Monitor labs   - Assess for incontinence   - Turn and reposition patient  - Assist with mobility/ambulation  - Relieve pressure over bony prominences  - Avoid friction and shearing  - Provide appropriate hygiene as needed including keeping skin clean and dry  - Evaluate need for skin moisturizer/barrier cream  - Collaborate with interdisciplinary team   - Patient/family teaching  - Consider wound care consult   Outcome: Progressing

## 2023-11-01 NOTE — PROGRESS NOTES
Progress Note - Cardiology   Prashanth Mccall 76 y.o. female MRN: [id number]  Unit/Bed#: -01 Encounter: [id number]    Assessment:  HTN with hypertensive urgency  CP/R arm pain  CAD - PAUL x 1 3/1/2023 in [place]   Chronic HFpEF  Obesity  H/x TIA in 2005 per chart  DM2    Plan:  Chest pains have resolved. Still with some residual shoulder pain with movement of the arm/shoulder. Blood pressures trending a better  Continue with carvedilol 12.5 mg twice a day . Mild bradycardia at rest is ok. Titrate hydralazine 25 mg 3 times daily. Continue on isosorbide  Stay on Cardura  Continue aspirin, Brilinta, atorvastatin therapy. Continue to slowly reduce her blood pressure daily - ? D/c tomorrow if stable  Follow renal function    Subjective/Objective       Subjective: Patient feels better overall. Chest pains have all resolved. Still some shoulder discomfort upon motion. Denies shortness of breath, palpitations, lightheadedness        Vitals: /67   Pulse 56   Temp (!) 97.3 °F (36.3 °C)   Resp 21   Ht 5' 6" (1.676 m)   Wt 92.9 kg (204 lb 12.9 oz)   SpO2 97%   BMI 33.06 kg/m²   Vitals:    10/30/23 0723 10/30/23 1116   Weight: 94 kg (207 lb 3.7 oz) 92.9 kg (204 lb 12.9 oz)     Orthostatic Blood Pressures      Flowsheet Row Most Recent Value   Blood Pressure 139/67 filed at 11/01/2023 5466   Patient Position - Orthostatic VS Standing  [After sit > stand transfer] filed at 10/31/2023 1050              Intake/Output Summary (Last 24 hours) at 11/1/2023 1001  Last data filed at 10/31/2023 1803  Gross per 24 hour   Intake 120 ml   Output --   Net 120 ml       Invasive Devices       Peripheral Intravenous Line  Duration             Peripheral IV 10/30/23 Distal;Right;Ventral (anterior) Forearm 2 days                      Physical Exam:     Gen:  No acute distress  Neck: Soft, supple, no JVD or carotid bruits appreciated  Cardiovascular: Regular rhythm, S1/S2 of normal intensity.   No murmurs rubs or gallops are appreciated  Abdomen: Soft, obese, positive bowel sounds, nontender  Extremities: No cyanosis, clubbing or edema      Lab Results: I have personally reviewed pertinent lab results. CBC with diff:   Results from last 7 days   Lab Units 10/31/23  0636   WBC Thousand/uL 3.50*   RBC Million/uL 4.41   HEMOGLOBIN g/dL 11.8   HEMATOCRIT % 36.3   MCV fL 82   MCH pg 26.8   MCHC g/dL 32.5   RDW % 15.5*   MPV fL 10.2   PLATELETS Thousands/uL 198     CMP:   Results from last 7 days   Lab Units 11/01/23  0453 10/31/23  0636 10/30/23  0748   SODIUM mmol/L 140   < > 140   CHLORIDE mmol/L 108   < > 107   CO2 mmol/L 28   < > 25   BUN mg/dL 17   < > 15   CREATININE mg/dL 1.36*   < > 1.24   CALCIUM mg/dL 8.5   < > 8.7   AST U/L  --   --  17   ALT U/L  --   --  10   ALK PHOS U/L  --   --  106*   EGFR ml/min/1.73sq m 40   < > 44    < > = values in this interval not displayed. HS Troponin:   0   Lab Value Date/Time    HSTNI0 11 10/30/2023 0748    HSTNI2 15 10/30/2023 0946    HSTNI4 16 10/30/2023 1155    HSTNI4 8 06/23/2023 1438    HSTNI4 11 04/12/2023 1846    HSTNI4 9 07/08/2022 1838     BNP:   Results from last 7 days   Lab Units 11/01/23  0453   POTASSIUM mmol/L 3.7   CHLORIDE mmol/L 108   CO2 mmol/L 28   BUN mg/dL 17   CREATININE mg/dL 1.36*   CALCIUM mg/dL 8.5   EGFR ml/min/1.73sq m 40     Imaging: I have personally reviewed pertinent reports. EKG: EKG: NSR, RBBB, LAFB, IL TWI     Counseling / Coordination of Care  Total time spent today 30 minutes. Greater than 50% of total time was spent with the patient and / or family counseling and / or coordination of care.

## 2023-11-01 NOTE — PLAN OF CARE
Problem: PHYSICAL THERAPY ADULT  Goal: Performs mobility at highest level of function for planned discharge setting. See evaluation for individualized goals. Description: Treatment/Interventions: ADL retraining, Functional transfer training, LE strengthening/ROM, Elevations, Therapeutic exercise, Endurance training, Cognitive reorientation, Patient/family training, Equipment eval/education, Bed mobility, Gait training, Compensatory technique education, OT  Equipment Recommended: Kavita Esposito       See flowsheet documentation for full assessment, interventions and recommendations. Outcome: Progressing  Note: Prognosis: Good  Problem List: Decreased strength, Decreased endurance, Impaired balance, Decreased mobility, Obesity  Assessment: Pt seen for PT treatment session this date with interventions consisting of gait training w/ emphasis on improving pt's ability to ambulate level surfaces x 160 ftx1 with SBA provided by therapist with RW, Therapeutic exercise consisting of: AROM 20 reps B LE in sitting position, and therapeutic activity consisting of training: bed mobility, supine<>sit transfers, sit<>stand transfers, static sitting tolerance at EOB for 25 minutes w/ no UE support, vc and tactile cues for static sitting posture faciliation, vc and tactile cues for static standing posture faciliation, and stand pivot transfers towards B direction. Pt agreeable to PT treatment session upon arrival, pt found supine in bed w/ HOB elevated, in no apparent distress and responsive. In comparison to previous session, pt with improvements in distance ambulated . Post session: pt returned BTB, all needs in reach, and RN notified of session findings/recommendations. Continue to recommend Level III (Minimum Resource Intensity), with family support, and with walker at time of d/c in order to maximize pt's functional independence and safety w/ mobility. Pt continues to be functioning below baseline level.  PT will continue to see pt during current hospitalization in order to address the deficits listed above and provide interventions consistent w/ POC in effort to achieve STGs. [id number] Satya Quezada, PTA  Barriers to Discharge:  (medical)  Barriers to Discharge Comments: pt states her and dtr are getting evicted from their apartment in the next few weeks  Rehab Resource Intensity Level, PT: III (Minimum Resource Intensity)    See flowsheet documentation for full assessment.

## 2023-11-01 NOTE — PLAN OF CARE
Problem: MOBILITY - ADULT  Goal: Maintain or return to baseline ADL function  Description: INTERVENTIONS:  -  Assess patient's ability to carry out ADLs; assess patient's baseline for ADL function and identify physical deficits which impact ability to perform ADLs (bathing, care of mouth/teeth, toileting, grooming, dressing, etc.)  - Assess/evaluate cause of self-care deficits   - Assess range of motion  - Assess patient's mobility; develop plan if impaired  - Assess patient's need for assistive devices and provide as appropriate  - Encourage maximum independence but intervene and supervise when necessary  - Involve family in performance of ADLs  - Assess for home care needs following discharge   - Consider OT consult to assist with ADL evaluation and planning for discharge  - Provide patient education as appropriate  Outcome: Progressing  Goal: Maintains/Returns to pre admission functional level  Description: INTERVENTIONS:  - Perform BMAT or MOVE assessment daily.   - Set and communicate daily mobility goal to care team and patient/family/caregiver. - Collaborate with rehabilitation services on mobility goals if consulted  - Perform Range of Motion 3 times a day. - Reposition patient every 2 hours.   - Dangle patient 3 times a day  - Stand patient 3 times a day  - Ambulate patient 3 times a day  - Out of bed to chair 3 times a day   - Out of bed for meals 3 times a day  - Out of bed for toileting  - Record patient progress and toleration of activity level   Outcome: Progressing     Problem: PAIN - ADULT  Goal: Verbalizes/displays adequate comfort level or baseline comfort level  Description: Interventions:  - Encourage patient to monitor pain and request assistance  - Assess pain using appropriate pain scale  - Administer analgesics based on type and severity of pain and evaluate response  - Implement non-pharmacological measures as appropriate and evaluate response  - Consider cultural and social influences on pain and pain management  - Notify physician/advanced practitioner if interventions unsuccessful or patient reports new pain  Outcome: Progressing     Problem: INFECTION - ADULT  Goal: Absence or prevention of progression during hospitalization  Description: INTERVENTIONS:  - Assess and monitor for signs and symptoms of infection  - Monitor lab/diagnostic results  - Monitor all insertion sites, i.e. indwelling lines, tubes, and drains  - Monitor endotracheal if appropriate and nasal secretions for changes in amount and color  - [place] appropriate cooling/warming therapies per order  - Administer medications as ordered  - Instruct and encourage patient and family to use good hand hygiene technique  - Identify and instruct in appropriate isolation precautions for identified infection/condition  Outcome: Progressing  Goal: Absence of fever/infection during neutropenic period  Description: INTERVENTIONS:  - Monitor WBC    Outcome: Progressing     Problem: SAFETY ADULT  Goal: Maintain or return to baseline ADL function  Description: INTERVENTIONS:  -  Assess patient's ability to carry out ADLs; assess patient's baseline for ADL function and identify physical deficits which impact ability to perform ADLs (bathing, care of mouth/teeth, toileting, grooming, dressing, etc.)  - Assess/evaluate cause of self-care deficits   - Assess range of motion  - Assess patient's mobility; develop plan if impaired  - Assess patient's need for assistive devices and provide as appropriate  - Encourage maximum independence but intervene and supervise when necessary  - Involve family in performance of ADLs  - Assess for home care needs following discharge   - Consider OT consult to assist with ADL evaluation and planning for discharge  - Provide patient education as appropriate  Outcome: Progressing  Goal: Maintains/Returns to pre admission functional level  Description: INTERVENTIONS:  - Perform BMAT or MOVE assessment daily.   - Set and communicate daily mobility goal to care team and patient/family/caregiver. - Collaborate with rehabilitation services on mobility goals if consulted  - Perform Range of Motion 3 times a day. - Reposition patient every 2 hours.   - Dangle patient 3 times a day  - Stand patient 3 times a day  - Ambulate patient 3 times a day  - Out of bed to chair 3 times a day   - Out of bed for meals 3 times a day  - Out of bed for toileting  - Record patient progress and toleration of activity level   Outcome: Progressing  Goal: Patient will remain free of falls  Description: INTERVENTIONS:  - Educate patient/family on patient safety including physical limitations  - Instruct patient to call for assistance with activity   - Consult OT/PT to assist with strengthening/mobility   - Keep Call bell within reach  - Keep bed low and locked with side rails adjusted as appropriate  - Keep care items and personal belongings within reach  - Initiate and maintain comfort rounds  - Make Fall Risk Sign visible to staff  - Offer Toileting every  2 Hours, in advance of need  - Initiate/Maintain bed alarm  - Obtain necessary fall risk management equipment: walker  - Apply yellow socks and bracelet for high fall risk patients  - Consider moving patient to room near nurses station  Outcome: Progressing     Problem: DISCHARGE PLANNING  Goal: Discharge to home or other facility with appropriate resources  Description: INTERVENTIONS:  - Identify barriers to discharge w/patient and caregiver  - Arrange for needed discharge resources and transportation as appropriate  - Identify discharge learning needs (meds, wound care, etc.)  - Arrange for interpretive services to assist at discharge as needed  - Refer to Case Management Department for coordinating discharge planning if the patient needs post-hospital services based on physician/advanced practitioner order or complex needs related to functional status, cognitive ability, or social support system  Outcome: Progressing     Problem: Knowledge Deficit  Goal: Patient/family/caregiver demonstrates understanding of disease process, treatment plan, medications, and discharge instructions  Description: Complete learning assessment and assess knowledge base.   Interventions:  - Provide teaching at level of understanding  - Provide teaching via preferred learning methods  Outcome: Progressing     Problem: CARDIOVASCULAR - ADULT  Goal: Maintains optimal cardiac output and hemodynamic stability  Description: INTERVENTIONS:  - Monitor I/O, vital signs and rhythm  - Monitor for S/S and trends of decreased cardiac output  - Administer and titrate ordered vasoactive medications to optimize hemodynamic stability  - Assess quality of pulses, skin color and temperature  - Assess for signs of decreased coronary artery perfusion  - Instruct patient to report change in severity of symptoms  Outcome: Progressing  Goal: Absence of cardiac dysrhythmias or at baseline rhythm  Description: INTERVENTIONS:  - Continuous cardiac monitoring, vital signs, obtain 12 lead EKG if ordered  - Administer antiarrhythmic and heart rate control medications as ordered  - Monitor electrolytes and administer replacement therapy as ordered  Outcome: Progressing     Problem: METABOLIC, FLUID AND ELECTROLYTES - ADULT  Goal: Electrolytes maintained within normal limits  Description: INTERVENTIONS:  - Monitor labs and assess patient for signs and symptoms of electrolyte imbalances  - Administer electrolyte replacement as ordered  - Monitor response to electrolyte replacements, including repeat lab results as appropriate  - Instruct patient on fluid and nutrition as appropriate  Outcome: Progressing  Goal: Fluid balance maintained  Description: INTERVENTIONS:  - Monitor labs   - Monitor I/O and WT  - Instruct patient on fluid and nutrition as appropriate  - Assess for signs & symptoms of volume excess or deficit  Outcome: Progressing  Goal: Glucose maintained within target range  Description: INTERVENTIONS:  - Monitor Blood Glucose as ordered  - Assess for signs and symptoms of hyperglycemia and hypoglycemia  - Administer ordered medications to maintain glucose within target range  - Assess nutritional intake and initiate nutrition service referral as needed  Outcome: Progressing     Problem: Prexisting or High Potential for Compromised Skin Integrity  Goal: Skin integrity is maintained or improved  Description: INTERVENTIONS:  - Identify patients at risk for skin breakdown  - Assess and monitor skin integrity  - Assess and monitor nutrition and hydration status  - Monitor labs   - Assess for incontinence   - Turn and reposition patient  - Assist with mobility/ambulation  - Relieve pressure over bony prominences  - Avoid friction and shearing  - Provide appropriate hygiene as needed including keeping skin clean and dry  - Evaluate need for skin moisturizer/barrier cream  - Collaborate with interdisciplinary team   - Patient/family teaching  - Consider wound care consult   Outcome: Progressing

## 2023-11-01 NOTE — PROGRESS NOTES
[address] [facility]  Progress Note  Name: Otis Mandujano  MRN: [id number]  Unit/Bed#: -01 I Date of Admission: 10/30/2023   Date of Service: 11/1/2023 I Hospital Day: 1    Assessment/Plan   Acute pain of right shoulder  Assessment & Plan  Complains of pain for the last 1 day in the right shoulder radiating to the scapular region. Denies any traumatic injury or fall. Follow-up on x-rays of the shoulder. Tylenol, Voltaren gel, Lidoderm patch. Discussed with Ortho yesterday if needs to outpatient follow-up patient able to move her arm without any pain. Pains she states has improved    Hypertensive urgency  Assessment & Plan  /120-->199/103. Resume outpatient dose of Cardura Imdur and as discussed with cardiology mild bradycardia at rest is okay continue Coreg 12.5 mg p.o. twice daily  Continue with low-salt diet. As needed IV hydralazine for SBP greater than 180. She has history of angioedema with ACE inhibitor and subsequently is not on ARB as well. Hydralazine started yesterday 25 mg p.o. every 8 hours if she was only given 2 doses yesterday at 7 in the morning her blood pressure was 180 after receiving all her medication went down to 130 will monitor for another day after she received all 3 doses of hydralazine blood pressure stable will discharge home tomorrow    Coronary artery disease involving native coronary artery of native heart  Assessment & Plan  History of coronary artery disease with PCI in March 2023. Maintain on aspirin Brilinta beta-blockers and statin. Will resume. Presents with chest pain. Cardiac enzymes are negative    Chest pain  Assessment & Plan  Left-sided chest pain   Described the pain to moderate to severe in intensity nonradiating associated with palpitations  Showed bifascicular block with  T wave inversions in inferior lead. IMELDA score on admission is 4  Suspect in light of uncontrolled htn.    Continue with aspirin, Brilinta, statin, beta-blocker  Reviewed cards consult  Blood pressure improved today and no chest pain  Cardiac enzymes are negative      Chronic kidney disease, stage 3 unspecified [facility])  Assessment & Plan  Lab Results   Component Value Date    EGFR 41 10/31/2023    EGFR 44 10/30/2023    EGFR 47 09/07/2023    CREATININE 1.31 (H) 10/31/2023    CREATININE 1.24 10/30/2023    CREATININE 1.18 09/07/2023   Baseline Creatinine between 1.1-1.4. Creatinine at baseline. Avoid hypotension and nephrotoxic medications. Avoid fluctuations in blood pressure lowered slowly over days. Monitor as did receive IV contrast on admission    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 proliferative retinopathy and macular edema, without long-term current use of insulin [facility])  Assessment & Plan  Lab Results   Component Value Date    HGBA1C 7.1 (H) 10/30/2023       Recent Labs     10/30/23  1314 10/30/23  1649 10/30/23  2138 10/31/23  0638   POCGLU 110 143* 139 91         Blood Sugar Average: Last 72 hrs:  (P) 120. 75Start sliding scale insulin coverage and hold oral hypoglycemic medications. Continue to monitor blood glucose levels closely. VTE Pharmacologic Prophylaxis: VTE Score: 4 Moderate Risk (Score 3-4) - Pharmacological DVT Prophylaxis Ordered: enoxaparin (Lovenox). Patient Centered Rounds: I performed bedside rounds with nursing staff today. Discussions with Specialists or Other Care Team Provider: discussed with cards    Education and Discussions with Family / Patient: Patient declined call to . Total Time Spent on Date of Encounter in care of patient: >35 mins.  This time was spent on one or more of the following: performing physical exam; counseling and coordination of care; obtaining or reviewing history; documenting in the medical record; reviewing/ordering tests, medications or procedures; communicating with other healthcare professionals and discussing with patient's family/caregivers. Current Length of Stay: 1 day(s)  Current Patient Status: Inpatient   Certification Statement: The patient will continue to require additional inpatient hospital stay due to uncontrolled bp  Discharge Plan: Anticipate discharge tomorrow to home. Code Status: Level 1 - Full Code    Subjective:   Seen and examined no n/v. Arm pain improved    Objective:     Vitals:   Temp (24hrs), Av.6 °F (36.4 °C), Min:97.3 °F (36.3 °C), Max:97.9 °F (36.6 °C)    Temp:  [97.3 °F (36.3 °C)-97.9 °F (36.6 °C)] 97.3 °F (36.3 °C)  HR:  [50-63] 56  Resp:  [14-21] 21  BP: (119-189)/(66-87) 139/67  SpO2:  [93 %-99 %] 97 %  Body mass index is 33.06 kg/m². Input and Output Summary (last 24 hours): Intake/Output Summary (Last 24 hours) at 2023 1135  Last data filed at 10/31/2023 1803  Gross per 24 hour   Intake 120 ml   Output --   Net 120 ml       Physical Exam:   Physical Exam  Vitals and nursing note reviewed. Constitutional:       General: She is not in acute distress. Appearance: She is well-developed. HENT:      Head: Normocephalic and atraumatic. Eyes:      Conjunctiva/sclera: Conjunctivae normal.   Cardiovascular:      Rate and Rhythm: Normal rate and regular rhythm. Heart sounds: No murmur heard. Pulmonary:      Effort: Pulmonary effort is normal. No respiratory distress. Breath sounds: Normal breath sounds. No wheezing or rales. Abdominal:      General: There is no distension. Palpations: Abdomen is soft. Tenderness: There is no abdominal tenderness. Musculoskeletal:         General: No swelling. Cervical back: Neck supple. Skin:     General: Skin is warm and dry. Capillary Refill: Capillary refill takes less than 2 seconds. Neurological:      Mental Status: She is alert.    Psychiatric:         Mood and Affect: Mood normal.          Additional Data:     Labs:  Results from last 7 days   Lab Units 10/31/23  0636   WBC Thousand/uL 3.50* HEMOGLOBIN g/dL 11.8   HEMATOCRIT % 36.3   PLATELETS Thousands/uL 198   NEUTROS PCT % 44   LYMPHS PCT % 43   MONOS PCT % 10   EOS PCT % 2     Results from last 7 days   Lab Units 11/01/23  0453 10/31/23  0636 10/30/23  0748   SODIUM mmol/L 140   < > 140   POTASSIUM mmol/L 3.7   < > 3.7   CHLORIDE mmol/L 108   < > 107   CO2 mmol/L 28   < > 25   BUN mg/dL 17   < > 15   CREATININE mg/dL 1.36*   < > 1.24   ANION GAP mmol/L 4   < > 8   CALCIUM mg/dL 8.5   < > 8.7   ALBUMIN g/dL  --   --  3.5   TOTAL BILIRUBIN mg/dL  --   --  0.40   ALK PHOS U/L  --   --  106*   ALT U/L  --   --  10   AST U/L  --   --  17   GLUCOSE RANDOM mg/dL 97   < > 142*    < > = values in this interval not displayed.      Results from last 7 days   Lab Units 10/30/23  0748   INR  0.95     Results from last 7 days   Lab Units 11/01/23  0727 10/31/23  2054 10/31/23  1546 10/31/23  1119 10/31/23  0638 10/30/23  2138 10/30/23  1649 10/30/23  1314   POC GLUCOSE mg/dl 103 105 189* 159* 91 139 143* 110     Results from last 7 days   Lab Units 10/30/23  1405   HEMOGLOBIN A1C % 7.1*           Lines/Drains:  Invasive Devices       Peripheral Intravenous Line  Duration             Peripheral IV 10/30/23 Distal;Right;Ventral (anterior) Forearm 2 days                          Imaging: Reviewed radiology reports from this admission including: xray(s)    Recent Cultures (last 7 days):         Last 24 Hours Medication List:   Current Facility-Administered Medications   Medication Dose Route Frequency Provider Last Rate    acetaminophen  975 mg Oral Q6H [address] MD Tank      albuterol  2 puff Inhalation Q6H PRN Breezy Pate MD      aspirin  81 mg Oral Daily Breezy Pate MD      atorvastatin  80 mg Oral Daily With Robin Segal MD      carvedilol  12.5 mg Oral BID With Meals Alyssa Ritchie DO      Diclofenac Sodium  2 g Topical 4x Daily Breezy Pate MD      doxazosin  2 mg Oral Daily Breezy Pate MD      enoxaparin  40 mg Subcutaneous Daily Mora Gramajo MD      furosemide  20 mg Oral Daily Mora Gramajo MD      gabapentin  300 mg Oral TID Mora Gramajo MD      hydrALAZINE  5 mg Intravenous Q6H PRN Mora Gramajo MD      hydrALAZINE  25 mg Oral [facility] Cristina Marroquin MD      insulin lispro  1-5 Units Subcutaneous HS Mora Gramajo MD      insulin lispro  1-6 Units Subcutaneous TID Sabrina Iverson MD      isosorbide mononitrate  60 mg Oral Daily Mora Gramajo MD      polyethylene glycol  17 g Oral Daily PRN Mora Gramajo MD      potassium chloride  10 mEq Oral Daily With Mike Thomas MD      ticagrelor  90 mg Oral Q12H Alex Landrum MD          Today, Patient Was Seen By: Cristina Marroquin MD    **Please Note: This note may have been constructed using a voice recognition system. **

## 2023-11-02 VITALS
WEIGHT: 204.81 LBS | SYSTOLIC BLOOD PRESSURE: 133 MMHG | HEART RATE: 64 BPM | RESPIRATION RATE: 20 BRPM | TEMPERATURE: 97.2 F | HEIGHT: 66 IN | OXYGEN SATURATION: 96 % | DIASTOLIC BLOOD PRESSURE: 73 MMHG | BODY MASS INDEX: 32.92 KG/M2

## 2023-11-02 LAB
GLUCOSE SERPL-MCNC: 103 MG/DL (ref 65–140)
GLUCOSE SERPL-MCNC: 172 MG/DL (ref 65–140)

## 2023-11-02 PROCEDURE — 82948 REAGENT STRIP/BLOOD GLUCOSE: CPT

## 2023-11-02 PROCEDURE — 99239 HOSP IP/OBS DSCHRG MGMT >30: CPT | Performed by: FAMILY MEDICINE

## 2023-11-02 RX ORDER — HYDRALAZINE HYDROCHLORIDE 25 MG/1
25 TABLET, FILM COATED ORAL ONCE
Status: COMPLETED | OUTPATIENT
Start: 2023-11-02 | End: 2023-11-02

## 2023-11-02 RX ORDER — HYDRALAZINE HYDROCHLORIDE 50 MG/1
50 TABLET, FILM COATED ORAL EVERY 8 HOURS SCHEDULED
Qty: 90 TABLET | Refills: 0 | Status: SHIPPED | OUTPATIENT
Start: 2023-11-02

## 2023-11-02 RX ORDER — ISOSORBIDE MONONITRATE 60 MG/1
60 TABLET, EXTENDED RELEASE ORAL DAILY
Qty: 90 TABLET | Refills: 0 | Status: SHIPPED | OUTPATIENT
Start: 2023-11-02

## 2023-11-02 RX ORDER — CARVEDILOL 12.5 MG/1
12.5 TABLET ORAL 2 TIMES DAILY WITH MEALS
Qty: 60 TABLET | Refills: 0 | Status: SHIPPED | OUTPATIENT
Start: 2023-11-02

## 2023-11-02 RX ORDER — HYDRALAZINE HYDROCHLORIDE 25 MG/1
50 TABLET, FILM COATED ORAL EVERY 8 HOURS SCHEDULED
Status: DISCONTINUED | OUTPATIENT
Start: 2023-11-02 | End: 2023-11-02 | Stop reason: HOSPADM

## 2023-11-02 RX ORDER — ONDANSETRON 4 MG/1
4 TABLET, FILM COATED ORAL EVERY 8 HOURS PRN
Qty: 20 TABLET | Refills: 0 | Status: SHIPPED | OUTPATIENT
Start: 2023-11-02

## 2023-11-02 RX ORDER — FUROSEMIDE 20 MG/1
20 TABLET ORAL DAILY
Qty: 90 TABLET | Refills: 0 | Status: SHIPPED | OUTPATIENT
Start: 2023-11-02

## 2023-11-02 RX ORDER — DOXAZOSIN 2 MG/1
2 TABLET ORAL DAILY
Qty: 30 TABLET | Refills: 0 | Status: SHIPPED | OUTPATIENT
Start: 2023-11-03

## 2023-11-02 RX ADMIN — POTASSIUM CHLORIDE 10 MEQ: 750 TABLET, EXTENDED RELEASE ORAL at 08:15

## 2023-11-02 RX ADMIN — FUROSEMIDE 20 MG: 20 TABLET ORAL at 08:13

## 2023-11-02 RX ADMIN — ACETAMINOPHEN 975 MG: 325 TABLET, FILM COATED ORAL at 06:08

## 2023-11-02 RX ADMIN — HYDRALAZINE HYDROCHLORIDE 25 MG: 25 TABLET ORAL at 06:06

## 2023-11-02 RX ADMIN — GABAPENTIN 300 MG: 300 CAPSULE ORAL at 08:12

## 2023-11-02 RX ADMIN — DOXAZOSIN 2 MG: 1 TABLET ORAL at 08:15

## 2023-11-02 RX ADMIN — ISOSORBIDE MONONITRATE 60 MG: 60 TABLET, EXTENDED RELEASE ORAL at 08:12

## 2023-11-02 RX ADMIN — ASPIRIN 81 MG: 81 TABLET, COATED ORAL at 08:14

## 2023-11-02 RX ADMIN — ACETAMINOPHEN 975 MG: 325 TABLET, FILM COATED ORAL at 11:31

## 2023-11-02 RX ADMIN — TICAGRELOR 90 MG: 90 TABLET ORAL at 08:14

## 2023-11-02 RX ADMIN — INSULIN LISPRO 1 UNITS: 100 INJECTION, SOLUTION INTRAVENOUS; SUBCUTANEOUS at 11:31

## 2023-11-02 RX ADMIN — CARVEDILOL 12.5 MG: 12.5 TABLET, FILM COATED ORAL at 08:12

## 2023-11-02 RX ADMIN — Medication 2 G: at 08:17

## 2023-11-02 RX ADMIN — HYDRALAZINE HYDROCHLORIDE 25 MG: 25 TABLET ORAL at 08:15

## 2023-11-02 NOTE — ASSESSMENT & PLAN NOTE
/120-->199/103. Resume outpatient dose of Cardura Imdur and as discussed with cardiology mild bradycardia at rest is okay continue Coreg 12.5 mg p.o. twice daily  Continue with low-salt diet. As needed IV hydralazine for SBP greater than 180. She has history of angioedema with ACE inhibitor and subsequently is not on ARB as well. Overall blood pressure improved but she does have elevations from 6 to 7 AM and then dropped to 133 discussed with cardiology we will continue Coreg 12.5 mg p.o. twice daily resting mild bradycardia is okay.   And also increase hydralazine 50 mg p.o. every 8 her blood pressure is 133 Will discharge home

## 2023-11-02 NOTE — ASSESSMENT & PLAN NOTE
History of coronary artery disease with PCI in March 2023. Maintain on aspirin Brilinta beta-blockers and statin. Presents with chest pain.   Cardiac enzymes are negative

## 2023-11-02 NOTE — NURSING NOTE
Patient refused PRN Miralax before she goes home. Patient states she will take it when she gets home.

## 2023-11-02 NOTE — ASSESSMENT & PLAN NOTE
Lab Results   Component Value Date    EGFR 40 11/01/2023    EGFR 41 10/31/2023    EGFR 44 10/30/2023    CREATININE 1.36 (H) 11/01/2023    CREATININE 1.31 (H) 10/31/2023    CREATININE 1.24 10/30/2023   Baseline Creatinine between 1.1-1.4. Creatinine at baseline. Avoid hypotension and nephrotoxic medications. Avoid fluctuations in blood pressure lowered slowly over days.   Monitor as did receive IV contrast on admission

## 2023-11-02 NOTE — PROGRESS NOTES
Progress Note - Cardiology   Xiomy Beavers 76 y.o. female MRN: [id number]  Unit/Bed#: -01 Encounter: [id number]    Assessment:  HTN with hypertensive urgency  CP/R arm pain  CAD - PAUL x 1 3/1/2023 in [place]   Chronic HFpEF  Obesity  H/x TIA in 2005 per chart  DM2    Plan:  Chest pains have resolved. Blood pressures trending a better (elevated daily 6-8AM prior to meds)  Continue with carvedilol 12.5 mg twice a day . Mild bradycardia at rest is ok. Titrate hydralazine 50 mg to 3 times daily. Continue on isosorbide  Stay on Cardura  Continue aspirin, Brilinta, atorvastatin therapy. Appear ok for d/c from CV standpoint - will help arrange f/u      Subjective/Objective       Subjective: Patient feels better overall. Chest pains have all resolved. Still some shoulder discomfort upon motion. Denies shortness of breath, palpitations, lightheadedness        Vitals: /73   Pulse 64   Temp (!) 97.2 °F (36.2 °C)   Resp 20   Ht 5' 6" (1.676 m)   Wt 92.9 kg (204 lb 12.9 oz)   SpO2 96%   BMI 33.06 kg/m²   Vitals:    10/30/23 0723 10/30/23 1116   Weight: 94 kg (207 lb 3.7 oz) 92.9 kg (204 lb 12.9 oz)     Orthostatic Blood Pressures      Flowsheet Row Most Recent Value   Blood Pressure 133/73 filed at 11/02/2023 2999   Patient Position - Orthostatic VS Standing  [After sit > stand transfer] filed at 10/31/2023 1050            No intake or output data in the 24 hours ending 11/02/23 1000      Invasive Devices       Peripheral Intravenous Line  Duration             Peripheral IV 10/30/23 Distal;Right;Ventral (anterior) Forearm 3 days                      Physical Exam:     Gen:  No acute distress  Neck: Soft, supple, no JVD or carotid bruits appreciated  Cardiovascular: Regular rhythm, S1/S2 of normal intensity.   No murmurs rubs or gallops are appreciated  Abdomen: Soft, obese, positive bowel sounds, nontender  Extremities: No cyanosis, clubbing or edema      Lab Results: I have personally reviewed pertinent lab results. CBC with diff:   Results from last 7 days   Lab Units 10/31/23  0636   WBC Thousand/uL 3.50*   RBC Million/uL 4.41   HEMOGLOBIN g/dL 11.8   HEMATOCRIT % 36.3   MCV fL 82   MCH pg 26.8   MCHC g/dL 32.5   RDW % 15.5*   MPV fL 10.2   PLATELETS Thousands/uL 198     CMP:   Results from last 7 days   Lab Units 11/01/23  0453 10/31/23  0636 10/30/23  0748   SODIUM mmol/L 140   < > 140   CHLORIDE mmol/L 108   < > 107   CO2 mmol/L 28   < > 25   BUN mg/dL 17   < > 15   CREATININE mg/dL 1.36*   < > 1.24   CALCIUM mg/dL 8.5   < > 8.7   AST U/L  --   --  17   ALT U/L  --   --  10   ALK PHOS U/L  --   --  106*   EGFR ml/min/1.73sq m 40   < > 44    < > = values in this interval not displayed. HS Troponin:   0   Lab Value Date/Time    HSTNI0 11 10/30/2023 0748    HSTNI2 15 10/30/2023 0946    HSTNI4 16 10/30/2023 1155    HSTNI4 8 06/23/2023 1438    HSTNI4 11 04/12/2023 1846    HSTNI4 9 07/08/2022 1838     BNP:   Results from last 7 days   Lab Units 11/01/23  0453   POTASSIUM mmol/L 3.7   CHLORIDE mmol/L 108   CO2 mmol/L 28   BUN mg/dL 17   CREATININE mg/dL 1.36*   CALCIUM mg/dL 8.5   EGFR ml/min/1.73sq m 40     Imaging: I have personally reviewed pertinent reports.     EKG: EKG: NSR, RBBB, LAFB, IL TWI

## 2023-11-02 NOTE — PLAN OF CARE
Problem: MOBILITY - ADULT  Goal: Maintain or return to baseline ADL function  Description: INTERVENTIONS:  -  Assess patient's ability to carry out ADLs; assess patient's baseline for ADL function and identify physical deficits which impact ability to perform ADLs (bathing, care of mouth/teeth, toileting, grooming, dressing, etc.)  - Assess/evaluate cause of self-care deficits   - Assess range of motion  - Assess patient's mobility; develop plan if impaired  - Assess patient's need for assistive devices and provide as appropriate  - Encourage maximum independence but intervene and supervise when necessary  - Involve family in performance of ADLs  - Assess for home care needs following discharge   - Consider OT consult to assist with ADL evaluation and planning for discharge  - Provide patient education as appropriate  Outcome: Progressing  Goal: Maintains/Returns to pre admission functional level  Description: INTERVENTIONS:  - Perform BMAT or MOVE assessment daily.   - Set and communicate daily mobility goal to care team and patient/family/caregiver. - Collaborate with rehabilitation services on mobility goals if consulted  - Perform Range of Motion 3 times a day. - Reposition patient every 2 hours.   - Dangle patient 3 times a day  - Stand patient 3 times a day  - Ambulate patient 3 times a day  - Out of bed to chair 3 times a day   - Out of bed for meals 3 times a day  - Out of bed for toileting  - Record patient progress and toleration of activity level   Outcome: Progressing     Problem: PAIN - ADULT  Goal: Verbalizes/displays adequate comfort level or baseline comfort level  Description: Interventions:  - Encourage patient to monitor pain and request assistance  - Assess pain using appropriate pain scale  - Administer analgesics based on type and severity of pain and evaluate response  - Implement non-pharmacological measures as appropriate and evaluate response  - Consider cultural and social influences on pain and pain management  - Notify physician/advanced practitioner if interventions unsuccessful or patient reports new pain  Outcome: Progressing     Problem: INFECTION - ADULT  Goal: Absence or prevention of progression during hospitalization  Description: INTERVENTIONS:  - Assess and monitor for signs and symptoms of infection  - Monitor lab/diagnostic results  - Monitor all insertion sites, i.e. indwelling lines, tubes, and drains  - Monitor endotracheal if appropriate and nasal secretions for changes in amount and color  - [place] appropriate cooling/warming therapies per order  - Administer medications as ordered  - Instruct and encourage patient and family to use good hand hygiene technique  - Identify and instruct in appropriate isolation precautions for identified infection/condition  Outcome: Progressing  Goal: Absence of fever/infection during neutropenic period  Description: INTERVENTIONS:  - Monitor WBC    Outcome: Progressing     Problem: SAFETY ADULT  Goal: Maintain or return to baseline ADL function  Description: INTERVENTIONS:  -  Assess patient's ability to carry out ADLs; assess patient's baseline for ADL function and identify physical deficits which impact ability to perform ADLs (bathing, care of mouth/teeth, toileting, grooming, dressing, etc.)  - Assess/evaluate cause of self-care deficits   - Assess range of motion  - Assess patient's mobility; develop plan if impaired  - Assess patient's need for assistive devices and provide as appropriate  - Encourage maximum independence but intervene and supervise when necessary  - Involve family in performance of ADLs  - Assess for home care needs following discharge   - Consider OT consult to assist with ADL evaluation and planning for discharge  - Provide patient education as appropriate  Outcome: Progressing  Goal: Maintains/Returns to pre admission functional level  Description: INTERVENTIONS:  - Perform BMAT or MOVE assessment daily.   - Set and communicate daily mobility goal to care team and patient/family/caregiver. - Collaborate with rehabilitation services on mobility goals if consulted  - Perform Range of Motion 3 times a day. - Reposition patient every 2 hours.   - Dangle patient 3 times a day  - Stand patient 3 times a day  - Ambulate patient 3 times a day  - Out of bed to chair 3 times a day   - Out of bed for meals 3 times a day  - Out of bed for toileting  - Record patient progress and toleration of activity level   Outcome: Progressing  Goal: Patient will remain free of falls  Description: INTERVENTIONS:  - Educate patient/family on patient safety including physical limitations  - Instruct patient to call for assistance with activity   - Consult OT/PT to assist with strengthening/mobility   - Keep Call bell within reach  - Keep bed low and locked with side rails adjusted as appropriate  - Keep care items and personal belongings within reach  - Initiate and maintain comfort rounds  - Make Fall Risk Sign visible to staff  - Offer Toileting every 2 Hours, in advance of need  - Initiate/Maintain bed and chair alarm  - Obtain necessary fall risk management equipment: walker   - Apply yellow socks and bracelet for high fall risk patients  - Consider moving patient to room near nurses station  Outcome: Progressing     Problem: DISCHARGE PLANNING  Goal: Discharge to home or other facility with appropriate resources  Description: INTERVENTIONS:  - Identify barriers to discharge w/patient and caregiver  - Arrange for needed discharge resources and transportation as appropriate  - Identify discharge learning needs (meds, wound care, etc.)  - Arrange for interpretive services to assist at discharge as needed  - Refer to Case Management Department for coordinating discharge planning if the patient needs post-hospital services based on physician/advanced practitioner order or complex needs related to functional status, cognitive ability, or social support system  Outcome: Progressing     Problem: Knowledge Deficit  Goal: Patient/family/caregiver demonstrates understanding of disease process, treatment plan, medications, and discharge instructions  Description: Complete learning assessment and assess knowledge base.   Interventions:  - Provide teaching at level of understanding  - Provide teaching via preferred learning methods  Outcome: Progressing     Problem: CARDIOVASCULAR - ADULT  Goal: Maintains optimal cardiac output and hemodynamic stability  Description: INTERVENTIONS:  - Monitor I/O, vital signs and rhythm  - Monitor for S/S and trends of decreased cardiac output  - Administer and titrate ordered vasoactive medications to optimize hemodynamic stability  - Assess quality of pulses, skin color and temperature  - Assess for signs of decreased coronary artery perfusion  - Instruct patient to report change in severity of symptoms  Outcome: Progressing  Goal: Absence of cardiac dysrhythmias or at baseline rhythm  Description: INTERVENTIONS:  - Continuous cardiac monitoring, vital signs, obtain 12 lead EKG if ordered  - Administer antiarrhythmic and heart rate control medications as ordered  - Monitor electrolytes and administer replacement therapy as ordered  Outcome: Progressing     Problem: METABOLIC, FLUID AND ELECTROLYTES - ADULT  Goal: Electrolytes maintained within normal limits  Description: INTERVENTIONS:  - Monitor labs and assess patient for signs and symptoms of electrolyte imbalances  - Administer electrolyte replacement as ordered  - Monitor response to electrolyte replacements, including repeat lab results as appropriate  - Instruct patient on fluid and nutrition as appropriate  Outcome: Progressing  Goal: Fluid balance maintained  Description: INTERVENTIONS:  - Monitor labs   - Monitor I/O and WT  - Instruct patient on fluid and nutrition as appropriate  - Assess for signs & symptoms of volume excess or deficit  Outcome: Progressing  Goal: Glucose maintained within target range  Description: INTERVENTIONS:  - Monitor Blood Glucose as ordered  - Assess for signs and symptoms of hyperglycemia and hypoglycemia  - Administer ordered medications to maintain glucose within target range  - Assess nutritional intake and initiate nutrition service referral as needed  Outcome: Progressing     Problem: Prexisting or High Potential for Compromised Skin Integrity  Goal: Skin integrity is maintained or improved  Description: INTERVENTIONS:  - Identify patients at risk for skin breakdown  - Assess and monitor skin integrity  - Assess and monitor nutrition and hydration status  - Monitor labs   - Assess for incontinence   - Turn and reposition patient  - Assist with mobility/ambulation  - Relieve pressure over bony prominences  - Avoid friction and shearing  - Provide appropriate hygiene as needed including keeping skin clean and dry  - Evaluate need for skin moisturizer/barrier cream  - Collaborate with interdisciplinary team   - Patient/family teaching  - Consider wound care consult   Outcome: Progressing

## 2023-11-02 NOTE — DISCHARGE SUMMARY
[address] [facility]  Discharge- Ethelda Antis 1955, 76 y.o. female MRN: [id number]  Unit/Bed#: -01 Encounter: [id number]  Primary Care Provider: Vincent Niño DO   Date and time admitted to hospital: 10/30/2023  7:20 AM    Acute pain of right shoulder  Assessment & Plan  Complains of pain for the last 1 day in the right shoulder radiating to the scapular region. Denies any traumatic injury or fall. Follow-up on x-rays of the shoulder. Tylenol, Voltaren gel, Lidoderm patch. Discussed with Ortho yesterday if needs to outpatient follow-up patient able to move her arm without any pain. Pains she states has improved    Hypertensive urgency  Assessment & Plan  /120-->199/103. Resume outpatient dose of Cardura Imdur and as discussed with cardiology mild bradycardia at rest is okay continue Coreg 12.5 mg p.o. twice daily  Continue with low-salt diet. As needed IV hydralazine for SBP greater than 180. She has history of angioedema with ACE inhibitor and subsequently is not on ARB as well. Overall blood pressure improved but she does have elevations from 6 to 7 AM and then dropped to 133 discussed with cardiology we will continue Coreg 12.5 mg p.o. twice daily resting mild bradycardia is okay. And also increase hydralazine 50 mg p.o. every 8 her blood pressure is 133 Will discharge home    Coronary artery disease involving native coronary artery of native heart  Assessment & Plan  History of coronary artery disease with PCI in March 2023. Maintain on aspirin Brilinta beta-blockers and statin. Presents with chest pain. Cardiac enzymes are negative    Chest pain  Assessment & Plan  Left-sided chest pain   Described the pain to moderate to severe in intensity nonradiating associated with palpitations  Showed bifascicular block with  T wave inversions in inferior lead. IMELDA score on admission is 4  Suspect in light of uncontrolled htn.    Continue with aspirin, Brilinta, statin, beta-blocker  Reviewed cards consult  Blood pressure improved today and no chest pain  Cardiac enzymes are negative      Chronic kidney disease, stage 3 unspecified [facility])  Assessment & Plan  Lab Results   Component Value Date    EGFR 40 11/01/2023    EGFR 41 10/31/2023    EGFR 44 10/30/2023    CREATININE 1.36 (H) 11/01/2023    CREATININE 1.31 (H) 10/31/2023    CREATININE 1.24 10/30/2023   Baseline Creatinine between 1.1-1.4. Creatinine at baseline. Avoid hypotension and nephrotoxic medications. Avoid fluctuations in blood pressure lowered slowly over days.   Monitor as did receive IV contrast on admission    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 proliferative retinopathy and macular edema, without long-term current use of insulin [facility])  Assessment & Plan  Lab Results   Component Value Date    HGBA1C 7.1 (H) 10/30/2023       Recent Labs     11/01/23  1149 11/01/23  1612 11/01/23  2109 11/02/23  0712   POCGLU 175* 119 171* 103         Blood Sugar Average: Last 72 hrs:  (P) [id number] home meds             Medical Problems       Resolved Problems  Date Reviewed: 11/2/2023   None       Discharging Physician / Practitioner: Lisseth Peoples MD  PCP: Ro Carpenter DO  Admission Date:   Admission Orders (From admission, onward)       Ordered        10/31/23 0857  Inpatient Admission  Once            10/30/23 1026  Place in Observation  Once                          Discharge Date: 11/02/23    Consultations During Hospital Stay:  Cardiology  ortho    Procedures Performed:   none    Significant Findings / Test Results:   Cxr - nml  Cta pe- study-neg  Right shoulder xray-nml    Incidental Findings:   none    Test Results Pending at Discharge (will require follow up):   none     Outpatient Tests Requested:  none    Complications:  none    Reason for Admission: chest pain    Hospital Course:   Prashanth Mccall is a 76 y.o. female patient who originally presented to the hospital on 10/30/2023 due to chest pain ACS was ruled out and was thought secondary to her hypertensive urgency noncompliant with medications all restarted with cardiology consultation troponin was negative also had an acute right shoulder pain which has improved without any orthopedic intervention was just Voltaren and Tylenol. Her Coreg was increased to 12.5 mg p.o. twice daily she was started on hydralazine in addition to all other medications increased to 50 mg p.o. every 8. Bp improved d chome         Please see above list of diagnoses and related plan for additional information. Condition at Discharge: stable    Discharge Day Visit / Exam:   Subjective:  seen and examined no cp or sob. Ate breakfast no n/v. Has not had bm  Vitals: Blood Pressure: 133/73 (11/02/23 0921)  Pulse: 64 (11/02/23 0921)  Temperature: (!) 97.2 °F (36.2 °C) (11/02/23 0713)  Temp Source: Temporal (10/30/23 1746)  Respirations: 20 (11/02/23 0713)  Height: 5' 6" (167.6 cm) (10/30/23 0723)  Weight - Scale: 92.9 kg (204 lb 12.9 oz) (10/30/23 1116)  SpO2: 96 % (11/02/23 0921)  Exam:   Physical Exam  Vitals and nursing note reviewed. Constitutional:       General: She is not in acute distress. Appearance: She is well-developed. HENT:      Head: Normocephalic and atraumatic. Eyes:      Conjunctiva/sclera: Conjunctivae normal.   Cardiovascular:      Rate and Rhythm: Normal rate and regular rhythm. Heart sounds: No murmur heard. Pulmonary:      Effort: Pulmonary effort is normal. No respiratory distress. Breath sounds: Normal breath sounds. No wheezing or rales. Abdominal:      General: Bowel sounds are normal. There is no distension. Palpations: Abdomen is soft. Tenderness: There is no abdominal tenderness. Musculoskeletal:         General: No swelling. Cervical back: Neck supple. Skin:     General: Skin is warm and dry. Capillary Refill: Capillary refill takes less than 2 seconds.    Neurological: Mental Status: She is alert and oriented to person, place, and time. Psychiatric:         Mood and Affect: Mood normal.          Discussion with Family: Patient declined call to . Discharge instructions/Information to patient and family:   See after visit summary for information provided to patient and family. Provisions for Follow-Up Care:  See after visit summary for information related to follow-up care and any pertinent home health orders. Disposition:   Home    Planned Readmission: no     Discharge Statement:  I spent >35 minutes discharging the patient. This time was spent on the day of discharge. I had direct contact with the patient on the day of discharge. Greater than 50% of the total time was spent examining patient, answering all patient questions, arranging and discussing plan of care with patient as well as directly providing post-discharge instructions. Additional time then spent on discharge activities. Discharge Medications:  See after visit summary for reconciled discharge medications provided to patient and/or family.       **Please Note: This note may have been constructed using a voice recognition system**

## 2023-11-02 NOTE — PLAN OF CARE
Problem: MOBILITY - ADULT  Goal: Maintain or return to baseline ADL function  Description: INTERVENTIONS:  -  Assess patient's ability to carry out ADLs; assess patient's baseline for ADL function and identify physical deficits which impact ability to perform ADLs (bathing, care of mouth/teeth, toileting, grooming, dressing, etc.)  - Assess/evaluate cause of self-care deficits   - Assess range of motion  - Assess patient's mobility; develop plan if impaired  - Assess patient's need for assistive devices and provide as appropriate  - Encourage maximum independence but intervene and supervise when necessary  - Involve family in performance of ADLs  - Assess for home care needs following discharge   - Consider OT consult to assist with ADL evaluation and planning for discharge  - Provide patient education as appropriate  Outcome: Progressing  Goal: Maintains/Returns to pre admission functional level  Description: INTERVENTIONS:  - Perform BMAT or MOVE assessment daily.   - Set and communicate daily mobility goal to care team and patient/family/caregiver. - Collaborate with rehabilitation services on mobility goals if consulted  - Perform Range of Motion 3 times a day. - Reposition patient every 2 hours.   - Dangle patient 3 times a day  - Stand patient 3 times a day  - Ambulate patient 3 times a day  - Out of bed to chair 3 times a day   - Out of bed for meals 3 times a day  - Out of bed for toileting  - Record patient progress and toleration of activity level   Outcome: Progressing     Problem: PAIN - ADULT  Goal: Verbalizes/displays adequate comfort level or baseline comfort level  Description: Interventions:  - Encourage patient to monitor pain and request assistance  - Assess pain using appropriate pain scale  - Administer analgesics based on type and severity of pain and evaluate response  - Implement non-pharmacological measures as appropriate and evaluate response  - Consider cultural and social influences on pain and pain management  - Notify physician/advanced practitioner if interventions unsuccessful or patient reports new pain  Outcome: Progressing     Problem: INFECTION - ADULT  Goal: Absence or prevention of progression during hospitalization  Description: INTERVENTIONS:  - Assess and monitor for signs and symptoms of infection  - Monitor lab/diagnostic results  - Monitor all insertion sites, i.e. indwelling lines, tubes, and drains  - Monitor endotracheal if appropriate and nasal secretions for changes in amount and color  - [place] appropriate cooling/warming therapies per order  - Administer medications as ordered  - Instruct and encourage patient and family to use good hand hygiene technique  - Identify and instruct in appropriate isolation precautions for identified infection/condition  Outcome: Progressing  Goal: Absence of fever/infection during neutropenic period  Description: INTERVENTIONS:  - Monitor WBC    Outcome: Progressing     Problem: SAFETY ADULT  Goal: Maintain or return to baseline ADL function  Description: INTERVENTIONS:  -  Assess patient's ability to carry out ADLs; assess patient's baseline for ADL function and identify physical deficits which impact ability to perform ADLs (bathing, care of mouth/teeth, toileting, grooming, dressing, etc.)  - Assess/evaluate cause of self-care deficits   - Assess range of motion  - Assess patient's mobility; develop plan if impaired  - Assess patient's need for assistive devices and provide as appropriate  - Encourage maximum independence but intervene and supervise when necessary  - Involve family in performance of ADLs  - Assess for home care needs following discharge   - Consider OT consult to assist with ADL evaluation and planning for discharge  - Provide patient education as appropriate  Outcome: Progressing  Goal: Maintains/Returns to pre admission functional level  Description: INTERVENTIONS:  - Perform BMAT or MOVE assessment daily.   - Set and communicate daily mobility goal to care team and patient/family/caregiver. - Collaborate with rehabilitation services on mobility goals if consulted  - Perform Range of Motion 3 times a day. - Reposition patient every 2 hours.   - Dangle patient 3 times a day  - Stand patient 3 times a day  - Ambulate patient 3 times a day  - Out of bed to chair 3 times a day   - Out of bed for meals 3 times a day  - Out of bed for toileting  - Record patient progress and toleration of activity level   Outcome: Progressing  Goal: Patient will remain free of falls  Description: INTERVENTIONS:  - Educate patient/family on patient safety including physical limitations  - Instruct patient to call for assistance with activity   - Consult OT/PT to assist with strengthening/mobility   - Keep Call bell within reach  - Keep bed low and locked with side rails adjusted as appropriate  - Keep care items and personal belongings within reach  - Initiate and maintain comfort rounds  - Make Fall Risk Sign visible to staff  - Offer Toileting every 1 Hours, in advance of need  - Initiate/Maintain bed/chair alarm  - Obtain necessary fall risk management equipment  - Apply yellow socks and bracelet for high fall risk patients  - Consider moving patient to room near nurses station  Outcome: Progressing     Problem: DISCHARGE PLANNING  Goal: Discharge to home or other facility with appropriate resources  Description: INTERVENTIONS:  - Identify barriers to discharge w/patient and caregiver  - Arrange for needed discharge resources and transportation as appropriate  - Identify discharge learning needs (meds, wound care, etc.)  - Arrange for interpretive services to assist at discharge as needed  - Refer to Case Management Department for coordinating discharge planning if the patient needs post-hospital services based on physician/advanced practitioner order or complex needs related to functional status, cognitive ability, or social support system  Outcome: Progressing     Problem: Knowledge Deficit  Goal: Patient/family/caregiver demonstrates understanding of disease process, treatment plan, medications, and discharge instructions  Description: Complete learning assessment and assess knowledge base.   Interventions:  - Provide teaching at level of understanding  - Provide teaching via preferred learning methods  Outcome: Progressing     Problem: CARDIOVASCULAR - ADULT  Goal: Maintains optimal cardiac output and hemodynamic stability  Description: INTERVENTIONS:  - Monitor I/O, vital signs and rhythm  - Monitor for S/S and trends of decreased cardiac output  - Administer and titrate ordered vasoactive medications to optimize hemodynamic stability  - Assess quality of pulses, skin color and temperature  - Assess for signs of decreased coronary artery perfusion  - Instruct patient to report change in severity of symptoms  Outcome: Progressing  Goal: Absence of cardiac dysrhythmias or at baseline rhythm  Description: INTERVENTIONS:  - Continuous cardiac monitoring, vital signs, obtain 12 lead EKG if ordered  - Administer antiarrhythmic and heart rate control medications as ordered  - Monitor electrolytes and administer replacement therapy as ordered  Outcome: Progressing     Problem: METABOLIC, FLUID AND ELECTROLYTES - ADULT  Goal: Electrolytes maintained within normal limits  Description: INTERVENTIONS:  - Monitor labs and assess patient for signs and symptoms of electrolyte imbalances  - Administer electrolyte replacement as ordered  - Monitor response to electrolyte replacements, including repeat lab results as appropriate  - Instruct patient on fluid and nutrition as appropriate  Outcome: Progressing  Goal: Fluid balance maintained  Description: INTERVENTIONS:  - Monitor labs   - Monitor I/O and WT  - Instruct patient on fluid and nutrition as appropriate  - Assess for signs & symptoms of volume excess or deficit  Outcome: Progressing  Goal: Glucose maintained within target range  Description: INTERVENTIONS:  - Monitor Blood Glucose as ordered  - Assess for signs and symptoms of hyperglycemia and hypoglycemia  - Administer ordered medications to maintain glucose within target range  - Assess nutritional intake and initiate nutrition service referral as needed  Outcome: Progressing     Problem: Prexisting or High Potential for Compromised Skin Integrity  Goal: Skin integrity is maintained or improved  Description: INTERVENTIONS:  - Identify patients at risk for skin breakdown  - Assess and monitor skin integrity  - Assess and monitor nutrition and hydration status  - Monitor labs   - Assess for incontinence   - Turn and reposition patient  - Assist with mobility/ambulation  - Relieve pressure over bony prominences  - Avoid friction and shearing  - Provide appropriate hygiene as needed including keeping skin clean and dry  - Evaluate need for skin moisturizer/barrier cream  - Collaborate with interdisciplinary team   - Patient/family teaching  - Consider wound care consult   Outcome: Progressing

## 2023-11-02 NOTE — ASSESSMENT & PLAN NOTE
Lab Results   Component Value Date    HGBA1C 7.1 (H) 10/30/2023       Recent Labs     11/01/23  1149 11/01/23  1612 11/01/23  2109 11/02/23  0712   POCGLU 175* 119 171* 103         Blood Sugar Average: Last 72 hrs:  (P) [id number] home meds

## 2023-11-02 NOTE — CASE MANAGEMENT
Case Management Discharge Planning Note    Patient name Lillian Moore  Location /-01 MRN [id number]  : 1955 Date 2023       Current Admission Date: 10/30/2023  Current Admission Diagnosis:Chest pain   Patient Active Problem List    Diagnosis Date Noted    Acute pain of right shoulder 10/30/2023    Hypertensive urgency 2023    Coronary artery disease involving native coronary artery of native heart 2023    Palpitations 2023    Chest pain 2023    Acute kidney injury (PADMINI) with chronic kidney disease stage III 2023    Thyroid nodule 2022    Other headache syndrome 2022    Chronic kidney disease, stage 3 unspecified ([address]) 2022    Abnormal CT scan 08/10/2021    Myasthenia gravis ([address]) 2021    Depression, recurrent ([address]) 2021    Cerebral meningocele ([address]) 2021    Obesity, morbid ([address]) 2020    UTI (urinary tract infection) 2020    Chronic heart failure ([address]) 2020    Subcortical microvascular ischemic occlusive disease 2019    Sixth nerve palsy of right eye     Binocular vision disorder with diplopia 2019    Carpal tunnel syndrome on left 2019    Numbness in both hands 2019    Carpal tunnel syndrome of left wrist 2019    Multinodular goiter 2019    Hoarseness or changing voice 2019    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 proliferative retinopathy and macular edema, without long-term current use of insulin ([address])     Diabetic neuropathy ([address]) 2018    Chronic constipation 2017    Cyst of left breast 2017    Impaired hearing 2017    Cerebral atherosclerosis 05/15/2017    Hyperlipidemia 2017    Moderate persistent asthma without complication     Lumbar pain 10/18/2016    Hiatal hernia 10/18/2016    Mild persistent asthma without complication     Chronic back pain 2016    Depression 2016 Essential hypertension 09/06/2016    Neuropathy, diabetic ([facility]) 09/06/2016      LOS (days): 2  Geometric Mean LOS (GMLOS) (days): 2.10  Days to GMLOS:0     OBJECTIVE:  Risk of Unplanned Readmission Score: 13.54         Current admission status: Inpatient   Preferred Pharmacy:   [address] [facility], [address] [facility]  [address] [facility] [address]  Phone: [phone] Fax: [phone]    [place] [address] [address] [place] [id number]  [address] [facility]  [address] [facility] [address]  Phone: [phone] Fax: [phone]    Primary Care Provider: Allie Martinez DO    Primary Insurance: [facility] [employer]  Secondary Insurance: Shelton Maya    DISCHARGE DETAILS:        CM gave pt [facility] information and explained how to apply. Cm requested ride share transport for DC on 11\2 at 12:00, waiting for transport to be claimed. CM explained [facility]'s policy on ride share and pt signed waiver and release for free local transportation. CM put it in scan bin.                                                    Transport at Discharge : Ride Share           ETA of Transport (Date): 11/02/23  ETA of Transport (Time): 1200     Transfer Mode: Ambulate  Accompanied by: Alone

## 2023-11-03 ENCOUNTER — TRANSITIONAL CARE MANAGEMENT (OUTPATIENT)
Dept: FAMILY MEDICINE CLINIC | Facility: CLINIC | Age: 68
End: 2023-11-03

## 2023-11-03 DIAGNOSIS — Z71.89 COORDINATION OF COMPLEX CARE: Primary | ICD-10-CM

## 2023-11-03 NOTE — UTILIZATION REVIEW
NOTIFICATION OF ADMISSION DISCHARGE   This is a Notification of Discharge from [address] [facility]. Please be advised that this patient has been discharge from our facility. Below you will find the admission and discharge date and time including the patient’s disposition. UTILIZATION REVIEW CONTACT:  Osiris Rodriguez  Utilization   Network Utilization Review Department  Phone: [phone] x carefully listen to the prompts. All voicemails are confidential.  Email: [email or web address]     ADMISSION INFORMATION  PRESENTATION DATE: 10/30/2023  7:20 AM  OBERVATION ADMISSION DATE:   INPATIENT ADMISSION DATE: 10/31/23  8:57 AM   DISCHARGE DATE: 11/2/2023 12:16 PM   DISPOSITION:Home/Self Care    Network Utilization Review Department  ATTENTION: Please call with any questions or concerns to [phone] and carefully listen to the prompts so that you are directed to the right person. All voicemails are confidential.   For Discharge needs, contact Care Management DC Support Team at [phone] opt. 2  Send all requests for admission clinical reviews, approved or denied determinations and any other requests to dedicated fax number below belonging to the campus where the patient is receiving treatment.  List of dedicated fax numbers for the Facilities:  [place] DENIALS (Administrative/Medical Necessity) [phone]   DISCHARGE SUPPORT TEAM (Network) [phone] [address] [facility] (Maternity/NICU/Pediatrics) [phone] [address] [facility] [address] [place] [address] [address]  [place] [phone]

## 2023-11-06 ENCOUNTER — PATIENT OUTREACH (OUTPATIENT)
Dept: CASE MANAGEMENT | Facility: HOSPITAL | Age: 68
End: 2023-11-06

## 2023-11-06 LAB
DME PARACHUTE DELIVERY DATE ACTUAL: NORMAL
DME PARACHUTE DELIVERY DATE REQUESTED: NORMAL
DME PARACHUTE ITEM DESCRIPTION: NORMAL
DME PARACHUTE ORDER STATUS: NORMAL
DME PARACHUTE SUPPLIER NAME: NORMAL
DME PARACHUTE SUPPLIER PHONE: NORMAL

## 2023-11-06 NOTE — PROGRESS NOTES
HRR referral received and chart review performed. Pt with recent hospitalization 10/30-11/2 at [facility] for chest pain and hypertensive urgency- /120. Pt discharged home. Follow up TCM on 11/8/23. Call placed to Nyannette Dafter for interest in care management. Left detailed message with contact information for this RN as well as Odin Waldrop RN CM on care team OOO today. Note routed to [facility], RN CM for follow up for 2nd outreach attempt. n/a

## 2023-11-08 ENCOUNTER — TELEPHONE (OUTPATIENT)
Dept: FAMILY MEDICINE CLINIC | Facility: CLINIC | Age: 68
End: 2023-11-08

## 2023-11-08 NOTE — TELEPHONE ENCOUNTER
Patient states she is having SOB, using inhaler's but not helping, unsure what the issue is. Told her she needs to go to ER to determine by she is having SOB. Patient is agreeable.

## 2023-11-13 ENCOUNTER — OFFICE VISIT (OUTPATIENT)
Dept: FAMILY MEDICINE CLINIC | Facility: CLINIC | Age: 68
End: 2023-11-13
Payer: COMMERCIAL

## 2023-11-13 VITALS
SYSTOLIC BLOOD PRESSURE: 160 MMHG | HEART RATE: 67 BPM | WEIGHT: 214 LBS | OXYGEN SATURATION: 97 % | TEMPERATURE: 98.2 F | HEIGHT: 66 IN | BODY MASS INDEX: 34.39 KG/M2 | DIASTOLIC BLOOD PRESSURE: 92 MMHG

## 2023-11-13 DIAGNOSIS — Z76.89 ENCOUNTER FOR SUPPORT AND COORDINATION OF TRANSITION OF CARE: Primary | ICD-10-CM

## 2023-11-13 DIAGNOSIS — Z91.199 NONCOMPLIANCE: ICD-10-CM

## 2023-11-13 DIAGNOSIS — E11.49 OTHER DIABETIC NEUROLOGICAL COMPLICATION ASSOCIATED WITH TYPE 2 DIABETES MELLITUS (HCC): ICD-10-CM

## 2023-11-13 DIAGNOSIS — Z12.31 ENCOUNTER FOR SCREENING MAMMOGRAM FOR MALIGNANT NEOPLASM OF BREAST: ICD-10-CM

## 2023-11-13 DIAGNOSIS — Z91.119 NONCOMPLIANCE WITH DIETARY RESTRICTION: ICD-10-CM

## 2023-11-13 DIAGNOSIS — I10 ESSENTIAL HYPERTENSION: ICD-10-CM

## 2023-11-13 DIAGNOSIS — G70.00 MYASTHENIA GRAVIS (HCC): ICD-10-CM

## 2023-11-13 DIAGNOSIS — E11.3513 TYPE 2 DIABETES MELLITUS WITH BOTH EYES AFFECTED BY PROLIFERATIVE RETINOPATHY AND MACULAR EDEMA, WITHOUT LONG-TERM CURRENT USE OF INSULIN (HCC): ICD-10-CM

## 2023-11-13 PROBLEM — N39.0 UTI (URINARY TRACT INFECTION): Status: RESOLVED | Noted: 2020-04-14 | Resolved: 2023-11-13

## 2023-11-13 PROBLEM — I67.2 CEREBRAL ATHEROSCLEROSIS: Status: RESOLVED | Noted: 2017-05-15 | Resolved: 2023-11-13

## 2023-11-13 PROBLEM — G56.02 CARPAL TUNNEL SYNDROME ON LEFT: Status: RESOLVED | Noted: 2019-04-05 | Resolved: 2023-11-13

## 2023-11-13 PROBLEM — R07.9 CHEST PAIN: Status: RESOLVED | Noted: 2023-04-12 | Resolved: 2023-11-13

## 2023-11-13 PROBLEM — N17.0 ACUTE KIDNEY INJURY (AKI) WITH ACUTE TUBULAR NECROSIS (ATN) (HCC): Status: RESOLVED | Noted: 2023-04-12 | Resolved: 2023-11-13

## 2023-11-13 PROBLEM — N60.02 CYST OF LEFT BREAST: Status: RESOLVED | Noted: 2017-07-20 | Resolved: 2023-11-13

## 2023-11-13 PROBLEM — R49.9 HOARSENESS OR CHANGING VOICE: Status: RESOLVED | Noted: 2019-02-01 | Resolved: 2023-11-13

## 2023-11-13 PROBLEM — G44.89 OTHER HEADACHE SYNDROME: Status: RESOLVED | Noted: 2022-07-08 | Resolved: 2023-11-13

## 2023-11-13 PROBLEM — K59.09 CHRONIC CONSTIPATION: Status: RESOLVED | Noted: 2017-11-07 | Resolved: 2023-11-13

## 2023-11-13 PROBLEM — I16.0 HYPERTENSIVE URGENCY: Status: RESOLVED | Noted: 2023-06-23 | Resolved: 2023-11-13

## 2023-11-13 PROBLEM — M25.511 ACUTE PAIN OF RIGHT SHOULDER: Status: RESOLVED | Noted: 2023-10-30 | Resolved: 2023-11-13

## 2023-11-13 PROCEDURE — 99495 TRANSJ CARE MGMT MOD F2F 14D: CPT | Performed by: FAMILY MEDICINE

## 2023-11-13 NOTE — PROGRESS NOTES
Assessment & Plan     1. Encounter for support and coordination of transition of care    2. Noncompliance    3. Noncompliance with dietary restriction    4. 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 Essential hypertension    7. Myasthenia gravis [facility])         Subjective     Transitional Care Management Review:   Prashanth Mccall is a 76 y.o. female here for TCM follow up. During the TCM phone call patient stated:  TCM Call       Date and time call was made  11/3/2023  9:54 AM    Hospital care reviewed  Records reviewed    Patient was hospitialized at  [facility]    Date of Admission  10/30/23    Date of discharge  11/02/23    Diagnosis  CHEST PAIN    Disposition  Home    Were the patients medications reviewed and updated  No    Current Symptoms  Fatigue    Left side leg pain severity  Mild    Leg pain, left side, onset  Unable to tell; Unable to assess    Right side leg pain severity  Moderate    Leg pain, right side, onset  Unable to tell; Unable to assess    Back pain, left side, severity  Moderate    Back pain, left side, onset  Ongoing    Dizziness severity  Mild; Moderate  6 or 7    Back pain, right side, severity  Moderate   from lumbar puncture    Back pain, right side, onset  Unable to tell; Unable to assess    Fatigue severity  Mild    Headache pain severity  Moderate  because of double vision in right eye    Headache pain onset  Ongoing  because of vision.     Headache cause / trigger  --  vision, wearing patch, but not helping          TCM Call       Modifying factors-feels better  Rest; Darkened room  tylenol    Modifying factors-feels worse  Light    Clinical progress  Unchanged    Headache risk factors  --  only due to current visionn issue    Clinical progress swelling  Unchanged Post hospital issues  Reduced activity    Should patient be enrolled in anticoag monitoring? No    Scheduled for follow up? Yes  TCM 11/8    Not clinically warranted  PT TRANSFERRED TO [facility]    Patients specialists  Nephrologist    Cardiologist name  Esteban E [address]    Nephrologist name  Geovany Delaney    Other specialists names  Gastro: Garcia Judie: appt 5/08/20    Referrals needed  [facility]    Did you obtain your prescribed medications  Yes    Why were you unable to obtain your medications  no RX. Do you need help managing your prescriptions or medications  No    Is transportation to your appointment needed  No    I have advised the patient to call PCP with any new or worsening symptoms  [address] [place] [address] Patient Conversation Media    Support System  Family    The type of support provided  Emotional; Physical    Do you have social support  Yes, some    Are you recieving any outpatient services  No    Are you recieving home care services  No    Are you using any community resources  No    Current waiver services  No    Have you fallen in the last 12 months  No    Interperter language line needed  No    Counseling  Patient          Patient presents for a transition of care I have reviewed the entire hospital record I have reconciled her medication list I have refilled medications that are necessary      Review of Systems   Constitutional:  Positive for fatigue. Negative for activity change, appetite change, diaphoresis and fever. HENT: Negative. Eyes: Negative. Respiratory:  Positive for shortness of breath. Negative for apnea, cough, chest tightness and wheezing. Cardiovascular:  Positive for leg swelling. Negative for chest pain and palpitations. Gastrointestinal:  Negative for abdominal distention, abdominal pain, anal bleeding, constipation, diarrhea, nausea and vomiting.    Endocrine: Negative for cold intolerance, heat intolerance, polydipsia, polyphagia and polyuria. Genitourinary:  Negative for difficulty urinating, dysuria, flank pain, hematuria and urgency. Musculoskeletal:  Negative for arthralgias, back pain, gait problem, joint swelling and myalgias. Skin:  Negative for color change, rash and wound. Allergic/Immunologic: Negative for environmental allergies, food allergies and immunocompromised state. Neurological:  Negative for dizziness, seizures, syncope, speech difficulty, numbness and headaches. Hematological:  Negative for adenopathy. Does not bruise/bleed easily. Psychiatric/Behavioral:  Negative for agitation, behavioral problems, hallucinations, sleep disturbance and suicidal ideas. Objective     /92   Pulse 67   Temp 98.2 °F (36.8 °C) (Temporal)   Ht 5' 6" (1.676 m)   Wt 97.1 kg (214 lb)   SpO2 97%   BMI 34.54 kg/m²      Physical Exam  Constitutional:       Appearance: She is well-developed. HENT:      Head: Normocephalic and atraumatic. Right Ear: External ear normal.      Left Ear: External ear normal.      Nose: Nose normal.   Eyes:      Conjunctiva/sclera: Conjunctivae normal.      Pupils: Pupils are equal, round, and reactive to light. Cardiovascular:      Rate and Rhythm: Normal rate and regular rhythm. Heart sounds: Normal heart sounds. No murmur heard. No friction rub. Pulmonary:      Effort: Pulmonary effort is normal. No respiratory distress. Breath sounds: Normal breath sounds. No wheezing or rales. Chest:      Chest wall: No tenderness. Abdominal:      General: Bowel sounds are normal.      Palpations: Abdomen is soft. Musculoskeletal:         General: Normal range of motion. Cervical back: Normal range of motion and neck supple. Skin:     General: Skin is warm and dry. Capillary Refill: Capillary refill takes 2 to 3 seconds. Neurological:      Mental Status: She is alert and oriented to person, place, and time. Psychiatric:         Behavior: Behavior normal.         Thought Content:  Thought content normal.         Judgment: Judgment normal.       Medications have been reviewed by provider in current encounter    Jasmin Linton, DO

## 2023-11-14 ENCOUNTER — PATIENT OUTREACH (OUTPATIENT)
Dept: FAMILY MEDICINE CLINIC | Facility: CLINIC | Age: 68
End: 2023-11-14

## 2023-11-14 NOTE — LETTER
Date: 11/14/23    Dear Carol Vázquez,   My name is Maude Gaucher; I am a registered nurse care manager working with   [facility]   I have not been able to reach you and would like to set a time that I can talk with you over the phone. My work is to help patients that have complex medical conditions get the care they need. This includes patients who may have been in the hospital or emergency room. I have enclosed my contact information below for you. Please call me with any questions you may have. I look forward to hearing from you.   Sincerely,  Raisa Bridges RN  [phone]  Outpatient Care Manager

## 2023-11-16 NOTE — ASSESSMENT & PLAN NOTE
Lab Results   Component Value Date    EGFR 41 10/31/2023    EGFR 44 10/30/2023    EGFR 47 09/07/2023    CREATININE 1.31 (H) 10/31/2023    CREATININE 1.24 10/30/2023    CREATININE 1.18 09/07/2023   Baseline Creatinine between 1.1-1.4. Creatinine at baseline. Avoid hypotension and nephrotoxic medications. Avoid fluctuations in blood pressure lowered slowly over days.   Monitor as did receive IV contrast on admission Statement Selected

## 2023-11-17 ENCOUNTER — DOCTOR'S OFFICE (OUTPATIENT)
Dept: URBAN - NONMETROPOLITAN AREA CLINIC 1 | Facility: CLINIC | Age: 68
Setting detail: OPHTHALMOLOGY
End: 2023-11-17
Payer: COMMERCIAL

## 2023-11-17 DIAGNOSIS — H26.493: ICD-10-CM

## 2023-11-17 DIAGNOSIS — E11.3413: ICD-10-CM

## 2023-11-17 DIAGNOSIS — H40.013: ICD-10-CM

## 2023-11-17 LAB
LEFT EYE DIABETIC RETINOPATHY: POSITIVE
RIGHT EYE DIABETIC RETINOPATHY: POSITIVE
SEVERITY (EYE EXAM): NORMAL

## 2023-11-17 PROCEDURE — 92134 CPTRZ OPH DX IMG PST SGM RTA: CPT | Performed by: OPHTHALMOLOGY

## 2023-11-17 PROCEDURE — 99214 OFFICE O/P EST MOD 30 MIN: CPT | Performed by: OPHTHALMOLOGY

## 2023-11-17 ASSESSMENT — VISUAL ACUITY
OS_BCVA: 20/40-1
OD_BCVA: 20/60

## 2023-11-17 ASSESSMENT — CONFRONTATIONAL VISUAL FIELD TEST (CVF)
OS_FINDINGS: FULL
OD_FINDINGS: FULL

## 2023-11-17 ASSESSMENT — KERATOMETRY
OS_K1POWER_DIOPTERS: 42.75
OD_K2POWER_DIOPTERS: 43.75
OD_K1POWER_DIOPTERS: 42.75
OD_AXISANGLE_DEGREES: 066
OS_AXISANGLE_DEGREES: 159
OS_K2POWER_DIOPTERS: 43.50

## 2023-11-17 ASSESSMENT — PACHYMETRY
OD_CT_UM: 632
OS_CT_CORRECTION: -6
OD_CT_CORRECTION: -6
OS_CT_UM: 626

## 2023-11-17 ASSESSMENT — REFRACTION_AUTOREFRACTION
OS_SPHERE: -0.75
OD_AXIS: 162
OD_CYLINDER: -0.75
OD_SPHERE: -0.50

## 2023-11-17 ASSESSMENT — TONOMETRY: OD_IOP_MMHG: 21

## 2023-11-17 ASSESSMENT — SPHEQUIV_DERIVED: OD_SPHEQUIV: -0.875

## 2023-11-17 ASSESSMENT — AXIALLENGTH_DERIVED: OD_AL: 24.0327

## 2023-11-28 ENCOUNTER — PATIENT OUTREACH (OUTPATIENT)
Dept: FAMILY MEDICINE CLINIC | Facility: CLINIC | Age: 68
End: 2023-11-28

## 2023-11-29 ENCOUNTER — APPOINTMENT (EMERGENCY)
Dept: CT IMAGING | Facility: HOSPITAL | Age: 68
DRG: 065 | End: 2023-11-29
Payer: COMMERCIAL

## 2023-11-29 ENCOUNTER — HOSPITAL ENCOUNTER (INPATIENT)
Facility: HOSPITAL | Age: 68
LOS: 3 days | Discharge: NURSING, REHAB OR LONG-TERM CARE | DRG: 065 | End: 2023-12-02
Attending: EMERGENCY MEDICINE | Admitting: FAMILY MEDICINE
Payer: COMMERCIAL

## 2023-11-29 ENCOUNTER — APPOINTMENT (EMERGENCY)
Dept: RADIOLOGY | Facility: HOSPITAL | Age: 68
DRG: 065 | End: 2023-11-29
Payer: COMMERCIAL

## 2023-11-29 DIAGNOSIS — R29.90 STROKE-LIKE SYMPTOMS: ICD-10-CM

## 2023-11-29 DIAGNOSIS — I63.9 ACUTE ISCHEMIC STROKE (HCC): ICD-10-CM

## 2023-11-29 DIAGNOSIS — G45.9 TIA (TRANSIENT ISCHEMIC ATTACK): ICD-10-CM

## 2023-11-29 DIAGNOSIS — I10 HYPERTENSION: Primary | ICD-10-CM

## 2023-11-29 DIAGNOSIS — N18.32 STAGE 3B CHRONIC KIDNEY DISEASE (HCC): ICD-10-CM

## 2023-11-29 DIAGNOSIS — Q21.12 PFO (PATENT FORAMEN OVALE): ICD-10-CM

## 2023-11-29 DIAGNOSIS — E11.42 DIABETIC POLYNEUROPATHY ASSOCIATED WITH TYPE 2 DIABETES MELLITUS (HCC): ICD-10-CM

## 2023-11-29 LAB
2HR DELTA HS TROPONIN: -1 NG/L
4HR DELTA HS TROPONIN: 0 NG/L
ANION GAP SERPL CALCULATED.3IONS-SCNC: 6 MMOL/L
APTT PPP: 26 SECONDS (ref 23–37)
BACTERIA UR QL AUTO: NORMAL /HPF
BILIRUB UR QL STRIP: NEGATIVE
BUN SERPL-MCNC: 14 MG/DL (ref 5–25)
CALCIUM SERPL-MCNC: 8.8 MG/DL (ref 8.4–10.2)
CARDIAC TROPONIN I PNL SERPL HS: 10 NG/L
CARDIAC TROPONIN I PNL SERPL HS: 11 NG/L
CARDIAC TROPONIN I PNL SERPL HS: 11 NG/L
CHLORIDE SERPL-SCNC: 107 MMOL/L (ref 96–108)
CHOLEST SERPL-MCNC: 197 MG/DL
CLARITY UR: CLEAR
CO2 SERPL-SCNC: 29 MMOL/L (ref 21–32)
COLOR UR: YELLOW
CREAT SERPL-MCNC: 1.4 MG/DL (ref 0.6–1.3)
CRP SERPL QL: 8.9 MG/L
ERYTHROCYTE [DISTWIDTH] IN BLOOD BY AUTOMATED COUNT: 15.4 % (ref 11.6–15.1)
ERYTHROCYTE [SEDIMENTATION RATE] IN BLOOD: 62 MM/HOUR (ref 0–29)
FLUAV RNA RESP QL NAA+PROBE: NEGATIVE
FLUBV RNA RESP QL NAA+PROBE: NEGATIVE
GFR SERPL CREATININE-BSD FRML MDRD: 38 ML/MIN/1.73SQ M
GLUCOSE SERPL-MCNC: 121 MG/DL (ref 65–140)
GLUCOSE SERPL-MCNC: 122 MG/DL (ref 65–140)
GLUCOSE SERPL-MCNC: 91 MG/DL (ref 65–140)
GLUCOSE SERPL-MCNC: 99 MG/DL (ref 65–140)
GLUCOSE UR STRIP-MCNC: ABNORMAL MG/DL
HCT VFR BLD AUTO: 40.7 % (ref 34.8–46.1)
HDLC SERPL-MCNC: 47 MG/DL
HGB BLD-MCNC: 12.8 G/DL (ref 11.5–15.4)
HGB UR QL STRIP.AUTO: ABNORMAL
INR PPP: 0.98 (ref 0.84–1.19)
KETONES UR STRIP-MCNC: NEGATIVE MG/DL
LDLC SERPL CALC-MCNC: 134 MG/DL (ref 0–100)
LEUKOCYTE ESTERASE UR QL STRIP: NEGATIVE
MCH RBC QN AUTO: 26.5 PG (ref 26.8–34.3)
MCHC RBC AUTO-ENTMCNC: 31.4 G/DL (ref 31.4–37.4)
MCV RBC AUTO: 84 FL (ref 82–98)
NITRITE UR QL STRIP: NEGATIVE
NON-SQ EPI CELLS URNS QL MICRO: NORMAL /HPF
PH UR STRIP.AUTO: 7.5 [PH]
PLATELET # BLD AUTO: 239 THOUSANDS/UL (ref 149–390)
PMV BLD AUTO: 10.5 FL (ref 8.9–12.7)
POTASSIUM SERPL-SCNC: 3.5 MMOL/L (ref 3.5–5.3)
PROT UR STRIP-MCNC: >=300 MG/DL
PROTHROMBIN TIME: 13.3 SECONDS (ref 11.6–14.5)
RBC # BLD AUTO: 4.83 MILLION/UL (ref 3.81–5.12)
RBC #/AREA URNS AUTO: NORMAL /HPF
RSV RNA RESP QL NAA+PROBE: NEGATIVE
SARS-COV-2 RNA RESP QL NAA+PROBE: NEGATIVE
SODIUM SERPL-SCNC: 142 MMOL/L (ref 135–147)
SP GR UR STRIP.AUTO: 1.01 (ref 1–1.03)
TRIGL SERPL-MCNC: 82 MG/DL
UROBILINOGEN UR QL STRIP.AUTO: 0.2 E.U./DL
WBC # BLD AUTO: 4.88 THOUSAND/UL (ref 4.31–10.16)
WBC #/AREA URNS AUTO: NORMAL /HPF

## 2023-11-29 PROCEDURE — 82948 REAGENT STRIP/BLOOD GLUCOSE: CPT

## 2023-11-29 PROCEDURE — 96374 THER/PROPH/DIAG INJ IV PUSH: CPT

## 2023-11-29 PROCEDURE — 81001 URINALYSIS AUTO W/SCOPE: CPT | Performed by: PHYSICIAN ASSISTANT

## 2023-11-29 PROCEDURE — 85652 RBC SED RATE AUTOMATED: CPT | Performed by: PHYSICIAN ASSISTANT

## 2023-11-29 PROCEDURE — 71045 X-RAY EXAM CHEST 1 VIEW: CPT

## 2023-11-29 PROCEDURE — 99223 1ST HOSP IP/OBS HIGH 75: CPT | Performed by: FAMILY MEDICINE

## 2023-11-29 PROCEDURE — 85027 COMPLETE CBC AUTOMATED: CPT | Performed by: EMERGENCY MEDICINE

## 2023-11-29 PROCEDURE — G0425 INPT/ED TELECONSULT30: HCPCS | Performed by: STUDENT IN AN ORGANIZED HEALTH CARE EDUCATION/TRAINING PROGRAM

## 2023-11-29 PROCEDURE — 85610 PROTHROMBIN TIME: CPT | Performed by: EMERGENCY MEDICINE

## 2023-11-29 PROCEDURE — 84484 ASSAY OF TROPONIN QUANT: CPT | Performed by: PHYSICIAN ASSISTANT

## 2023-11-29 PROCEDURE — 70496 CT ANGIOGRAPHY HEAD: CPT

## 2023-11-29 PROCEDURE — 70498 CT ANGIOGRAPHY NECK: CPT

## 2023-11-29 PROCEDURE — 84484 ASSAY OF TROPONIN QUANT: CPT | Performed by: EMERGENCY MEDICINE

## 2023-11-29 PROCEDURE — 80048 BASIC METABOLIC PNL TOTAL CA: CPT | Performed by: EMERGENCY MEDICINE

## 2023-11-29 PROCEDURE — 80061 LIPID PANEL: CPT | Performed by: PHYSICIAN ASSISTANT

## 2023-11-29 PROCEDURE — 0241U HB NFCT DS VIR RESP RNA 4 TRGT: CPT | Performed by: EMERGENCY MEDICINE

## 2023-11-29 PROCEDURE — 36415 COLL VENOUS BLD VENIPUNCTURE: CPT | Performed by: EMERGENCY MEDICINE

## 2023-11-29 PROCEDURE — 86140 C-REACTIVE PROTEIN: CPT | Performed by: PHYSICIAN ASSISTANT

## 2023-11-29 PROCEDURE — 93005 ELECTROCARDIOGRAM TRACING: CPT

## 2023-11-29 PROCEDURE — 99291 CRITICAL CARE FIRST HOUR: CPT

## 2023-11-29 PROCEDURE — 85730 THROMBOPLASTIN TIME PARTIAL: CPT | Performed by: EMERGENCY MEDICINE

## 2023-11-29 RX ORDER — INSULIN LISPRO 100 [IU]/ML
1-6 INJECTION, SOLUTION INTRAVENOUS; SUBCUTANEOUS
Status: DISCONTINUED | OUTPATIENT
Start: 2023-11-29 | End: 2023-12-02 | Stop reason: HOSPADM

## 2023-11-29 RX ORDER — DOXAZOSIN MESYLATE 1 MG/1
2 TABLET ORAL DAILY
Status: DISCONTINUED | OUTPATIENT
Start: 2023-11-30 | End: 2023-12-02 | Stop reason: HOSPADM

## 2023-11-29 RX ORDER — CARVEDILOL 12.5 MG/1
12.5 TABLET ORAL 2 TIMES DAILY WITH MEALS
Status: DISCONTINUED | OUTPATIENT
Start: 2023-11-29 | End: 2023-11-29

## 2023-11-29 RX ORDER — CARVEDILOL 12.5 MG/1
12.5 TABLET ORAL 2 TIMES DAILY WITH MEALS
Status: DISCONTINUED | OUTPATIENT
Start: 2023-11-30 | End: 2023-12-02 | Stop reason: HOSPADM

## 2023-11-29 RX ORDER — HYDRALAZINE HYDROCHLORIDE 20 MG/ML
INJECTION INTRAMUSCULAR; INTRAVENOUS
Status: COMPLETED
Start: 2023-11-29 | End: 2023-11-29

## 2023-11-29 RX ORDER — LABETALOL HYDROCHLORIDE 5 MG/ML
10 INJECTION, SOLUTION INTRAVENOUS EVERY 6 HOURS PRN
Status: DISCONTINUED | OUTPATIENT
Start: 2023-11-29 | End: 2023-12-02 | Stop reason: HOSPADM

## 2023-11-29 RX ORDER — POLYETHYLENE GLYCOL 3350 17 G/17G
17 POWDER, FOR SOLUTION ORAL DAILY
Status: DISCONTINUED | OUTPATIENT
Start: 2023-11-30 | End: 2023-12-02 | Stop reason: HOSPADM

## 2023-11-29 RX ORDER — ALBUTEROL SULFATE 90 UG/1
2 AEROSOL, METERED RESPIRATORY (INHALATION) EVERY 6 HOURS PRN
Status: DISCONTINUED | OUTPATIENT
Start: 2023-11-29 | End: 2023-12-02 | Stop reason: HOSPADM

## 2023-11-29 RX ORDER — HEPARIN SODIUM 5000 [USP'U]/ML
5000 INJECTION, SOLUTION INTRAVENOUS; SUBCUTANEOUS EVERY 8 HOURS SCHEDULED
Status: DISCONTINUED | OUTPATIENT
Start: 2023-11-29 | End: 2023-12-02 | Stop reason: HOSPADM

## 2023-11-29 RX ORDER — ATORVASTATIN CALCIUM 40 MG/1
80 TABLET, FILM COATED ORAL
Status: DISCONTINUED | OUTPATIENT
Start: 2023-11-29 | End: 2023-12-02 | Stop reason: HOSPADM

## 2023-11-29 RX ORDER — HYDRALAZINE HYDROCHLORIDE 20 MG/ML
20 INJECTION INTRAMUSCULAR; INTRAVENOUS ONCE
Status: DISCONTINUED | OUTPATIENT
Start: 2023-11-29 | End: 2023-11-29

## 2023-11-29 RX ORDER — HYDRALAZINE HYDROCHLORIDE 20 MG/ML
20 INJECTION INTRAMUSCULAR; INTRAVENOUS ONCE
Status: COMPLETED | OUTPATIENT
Start: 2023-11-29 | End: 2023-11-29

## 2023-11-29 RX ORDER — ISOSORBIDE MONONITRATE 60 MG/1
60 TABLET, EXTENDED RELEASE ORAL DAILY
Status: DISCONTINUED | OUTPATIENT
Start: 2023-11-30 | End: 2023-12-02 | Stop reason: HOSPADM

## 2023-11-29 RX ORDER — HYDRALAZINE HYDROCHLORIDE 25 MG/1
50 TABLET, FILM COATED ORAL EVERY 8 HOURS SCHEDULED
Status: DISCONTINUED | OUTPATIENT
Start: 2023-11-29 | End: 2023-12-02 | Stop reason: HOSPADM

## 2023-11-29 RX ORDER — LORAZEPAM 2 MG/ML
0.5 INJECTION INTRAMUSCULAR
Status: DISCONTINUED | OUTPATIENT
Start: 2023-11-29 | End: 2023-11-30

## 2023-11-29 RX ORDER — ONDANSETRON 2 MG/ML
4 INJECTION INTRAMUSCULAR; INTRAVENOUS EVERY 6 HOURS PRN
Status: DISCONTINUED | OUTPATIENT
Start: 2023-11-29 | End: 2023-12-02 | Stop reason: HOSPADM

## 2023-11-29 RX ORDER — GABAPENTIN 300 MG/1
300 CAPSULE ORAL 3 TIMES DAILY
Status: DISCONTINUED | OUTPATIENT
Start: 2023-11-29 | End: 2023-12-02 | Stop reason: HOSPADM

## 2023-11-29 RX ORDER — ACETAMINOPHEN 325 MG/1
650 TABLET ORAL EVERY 6 HOURS PRN
Status: DISCONTINUED | OUTPATIENT
Start: 2023-11-29 | End: 2023-12-02 | Stop reason: HOSPADM

## 2023-11-29 RX ADMIN — TICAGRELOR 90 MG: 90 TABLET ORAL at 22:09

## 2023-11-29 RX ADMIN — HYDRALAZINE HYDROCHLORIDE 20 MG: 20 INJECTION INTRAMUSCULAR; INTRAVENOUS at 15:27

## 2023-11-29 RX ADMIN — ATORVASTATIN CALCIUM 80 MG: 40 TABLET, FILM COATED ORAL at 17:34

## 2023-11-29 RX ADMIN — CARVEDILOL 12.5 MG: 12.5 TABLET, FILM COATED ORAL at 17:34

## 2023-11-29 RX ADMIN — HEPARIN SODIUM 5000 UNITS: 5000 INJECTION INTRAVENOUS; SUBCUTANEOUS at 22:49

## 2023-11-29 RX ADMIN — HYDRALAZINE HYDROCHLORIDE 50 MG: 25 TABLET ORAL at 17:34

## 2023-11-29 RX ADMIN — LORAZEPAM 0.5 MG: 2 INJECTION INTRAMUSCULAR; INTRAVENOUS at 17:34

## 2023-11-29 RX ADMIN — GABAPENTIN 300 MG: 300 CAPSULE ORAL at 22:09

## 2023-11-29 RX ADMIN — HEPARIN SODIUM 5000 UNITS: 5000 INJECTION INTRAVENOUS; SUBCUTANEOUS at 17:34

## 2023-11-29 RX ADMIN — HYDRALAZINE HYDROCHLORIDE 20 MG: 20 INJECTION INTRAMUSCULAR; INTRAVENOUS at 13:23

## 2023-11-29 RX ADMIN — GABAPENTIN 300 MG: 300 CAPSULE ORAL at 17:34

## 2023-11-29 RX ADMIN — IOHEXOL 85 ML: 350 INJECTION, SOLUTION INTRAVENOUS at 13:32

## 2023-11-29 NOTE — ASSESSMENT & PLAN NOTE
On presentation, BP was 240/113  Recent hospitalization with similar readings that were controlled with increased doses of cardura and hydralazine  Patient has had social issues outpatient and has forgotten to take several doses of medications  Currently we are allowing for permissive HTN  Hold parameters increased generously on her home meds for now

## 2023-11-29 NOTE — H&P
[address] [facility]  H&P  Name: William Young 76 y.o. female I MRN: [id number]  Unit/Bed#: -01 I Date of Admission: 11/29/2023   Date of Service: 11/29/2023 I Hospital Day: 0      Assessment/Plan   * Stroke-like symptoms  Assessment & Plan  Acute right sided weakness that started the morning of admission and now w  With BP of 240/113 on initial check   CTH without signs of acute stroke, CTA does show an area of concern for possible vasculitis/RCVS however patient is not having any symptomatology consistent with this  Check ESR, CRP for completeness sake  Neurology recommendations appreciated, start on stroke pathway  Telemetry monitoring  ECHO  MRI/MRA brain  Neuro checks  STAT CT for any neuro changes  Permissive HTN for now  PT/OT/Speech eval  Continue on aspirin, brilinta, high intensity statin    Hypertensive urgency  Assessment & Plan  On presentation, BP was 240/113  Recent hospitalization with similar readings that were controlled with increased doses of cardura and hydralazine  Patient has had social issues outpatient and has forgotten to take several doses of medications  Currently we are allowing for permissive HTN  Hold parameters increased generously on her home meds for now    Coronary artery disease involving native coronary artery of native heart  Assessment & Plan  Continue statin, asa, brilinta, imdur, coreg    Chronic kidney disease, stage 3 unspecified [facility])  Assessment & Plan  Lab Results   Component Value Date    EGFR 38 11/29/2023    EGFR 40 11/01/2023    EGFR 41 10/31/2023    CREATININE 1.40 (H) 11/29/2023    CREATININE 1.36 (H) 11/01/2023    CREATININE 1.31 (H) 10/31/2023     Baseline cr around 1.4  She is currently at baseline  Avoid hypotension    Chronic heart failure (HCC)  Assessment & Plan  Wt Readings from Last 3 Encounters:   11/29/23 93.7 kg (206 lb 9.1 oz)   11/13/23 97.1 kg (214 lb)   10/30/23 92.9 kg (204 lb 12.9 oz)     Euvolemic on examination  Hold lasix while allowing for permissive HTN  Low salt diet ordered          Moderate persistent asthma without complication  Assessment & Plan  Not in acute exacerbation at this time    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 proliferative retinopathy and macular edema, without long-term current use of insulin [facility])  Assessment & Plan  Lab Results   Component Value Date    HGBA1C 7.1 (H) 10/30/2023       Recent Labs     11/29/23  1315   POCGLU 91       Blood Sugar Average: Last 72 hrs:  (P) 91    Fairly good control per last A1c  Hold home oral meds, utilize SSI  Fingersticks with meals  ADA diet         VTE Pharmacologic Prophylaxis: VTE Score: 3 Moderate Risk (Score 3-4) - Pharmacological DVT Prophylaxis Ordered: heparin. Code Status: Level 1 - Full Code   Discussion with family: Updated  (son) at bedside. Anticipated Length of Stay: Patient will be admitted on an inpatient basis with an anticipated length of stay of greater than 2 midnights secondary to stroke work up. Total Time Spent on Date of Encounter in care of patient: 65 mins. This time was spent on one or more of the following: performing physical exam; counseling and coordination of care; obtaining or reviewing history; documenting in the medical record; reviewing/ordering tests, medications or procedures; communicating with other healthcare professionals and discussing with patient's family/caregivers. Chief Complaint: right sided weakness    History of Present Illness:  Flavia Mayorga is a 76 y.o. female with a PMH of CHF, HTN, CKD, DM, asthma who presents with right sided weakness. Ongoing since 10 AM on 11/29. With development of slurred speech in the ED. Has not been consistent with her medications secondary to social issues including being kicked out of her apartment. Also with significantly elevated BP on presentation. Was recently discharged with good control per note review.  No seizure like activity noted. Pateint denies any headache, fevers, chills. She did vomit in the ED. Denies diarrhea or constipation. CT head did not show acute stroke but did show abnormality concerning for possible vasculitis or RCVS. Neurology did evaluate the patient and recommended her for stroke pathway. Review of Systems:  Review of Systems   Constitutional:  Negative for appetite change, diaphoresis and fever. Respiratory:  Negative for cough and shortness of breath. Cardiovascular:  Negative for chest pain, palpitations and leg swelling. Gastrointestinal:  Positive for nausea and vomiting. Negative for constipation and diarrhea. Endocrine: Negative for polydipsia, polyphagia and polyuria. Genitourinary:  Negative for difficulty urinating, dysuria, enuresis and flank pain. Musculoskeletal:  Negative for arthralgias, back pain, gait problem and joint swelling. Skin:  Negative for color change, pallor, rash and wound. Neurological:  Positive for speech difficulty and weakness. Psychiatric/Behavioral:  Negative for confusion. The patient is not nervous/anxious.         Past Medical and Surgical History:   Past Medical History:   Diagnosis Date    Abnormal ECG     last assessed: 5/15/17    Abnormal mammogram     last assessed: 7/11/17    Abnormal stress test     last assesed: 7/24/17    Anemia     Anxiety     Arthritis     Asthma     Back problem     Breast cyst     CAD (coronary artery disease)     Chest pain     last assessed: 7/24/17    Chronic pain disorder     Cyst of left breast     last assessed: 8/18/17    Depression     Depression     resolved: 1/2/18    Diabetes mellitus ([address])     Hiatal hernia     last assessed: 11/7/17    Hyperlipidemia     Hypertension     Keloid of skin     last assessed: 11/2/16    Multiple thyroid nodules     Neuropathy     Psychiatric disorder     anxiety    Stroke [facility])     TIA 2005    Tuberculosis     as a child        Past Surgical History:   Procedure Laterality Date ARM WOUND REPAIR / CLOSURE      CARPAL TUNNEL RELEASE      CARPAL TUNNEL RELEASE Left     CATARACT EXTRACTION Bilateral     2015    COLONOSCOPY      CORONARY ANGIOPLASTY WITH STENT PLACEMENT  03/01/2023    at [address]      right upper arm. EYE SURGERY      cataract removaql    [place] LUMBAR PUNCTURE DIAGNOSTIC  08/15/2019    [place] ESOPHAGOGASTRODUODENOSCOPY TRANSORAL DIAGNOSTIC N/A 01/05/2018    Procedure: ESOPHAGOGASTRODUODENOSCOPY (EGD); Surgeon: Radha Kohli DO;  Location: [facility] MAIN OR;  Service: Gastroenterology    [place] [address] WRST SURG W/RLS TRANSVRS CARPL LIGM Left 04/19/2019    Procedure: ENDOSCOPIC CARPAL TUNNEL RELEASE;  Surgeon: Maurizio Quezada MD;  Location[facility] MAIN OR;  Service: [address] [place] [address] THYROID BIOPSY  04/15/2019       Meds/Allergies:  Prior to Admission medications    Medication Sig Start Date End Date Taking?  Authorizing Provider   albuterol (PROVENTIL HFA,VENTOLIN HFA) 90 mcg/act inhaler Inhale 2 puffs every 6 (six) hours as needed for wheezing or shortness of breath 10/25/23   Lyubov Zuniga DO   Alcohol Swabs (DropSafe Alcohol Prep) 70 % PADS Use one pad twice daily when check blood sugar 1/23/23   Lyubov Zuniga DO   aspirin (ECOTRIN LOW STRENGTH) 81 mg EC tablet Take 1 tablet (81 mg total) by mouth daily 12/14/22   Lyubov Zuniga DO   atorvastatin (LIPITOR) 80 mg tablet Take 1 tablet (80 mg total) by mouth daily with dinner 10/25/23   Lyubov Zuniga DO   Blood Glucose Monitoring Suppl (True Metrix Air Glucose Meter) w/Device KIT USE AS DIRECTED 3/27/23   Lyubov Zuniga DO   carvedilol (COREG) 12.5 mg tablet Take 1 tablet (12.5 mg total) by mouth 2 (two) times a day with meals 11/2/23   Royal Argelia MD   dapagliflozin (Farxiga) 10 MG tablet Take 1 tablet (10 mg total) by mouth in the morning 1/2 tablet daily for the 1st month then increase to a full tablet daily in the morning  Patient taking differently: Take 10 mg by mouth in the morning 10/25/23   Elvia Carlson DO   Diclofenac Sodium (VOLTAREN) 1 % Apply 2 g topically 4 (four) times a day 11/2/23   Martha Gordillo MD   doxazosin (CARDURA) 2 mg tablet Take 1 tablet (2 mg total) by mouth daily Do not start before November 3, 2023. 11/3/23   Martha Gordillo MD   furosemide (LASIX) 20 mg tablet Take 1 tablet (20 mg total) by mouth daily 11/2/23   Martha Gordillo MD   gabapentin (NEURONTIN) 300 mg capsule Take 1 capsule (300 mg total) by mouth 3 (three) times a day 10/25/23   Elvia Carlson DO   hydrALAZINE (APRESOLINE) 50 mg tablet Take 1 tablet (50 mg total) by mouth every 8 (eight) hours 11/2/23   Martha Gordillo MD   isosorbide mononitrate (IMDUR) 60 mg 24 hr tablet Take 1 tablet (60 mg total) by mouth daily 11/2/23   Martha Gordillo MD   [facility]etron [facility]) 4 mg tablet Take 1 tablet (4 mg total) by mouth every 8 (eight) hours as needed for nausea or vomiting 11/2/23   Martha Gordillo MD   polyethylene glycol [facility]) 17 GM/SCOOP powder Take 17 g by mouth daily Increase to twice daily if still constiptated 12/21/22   Christian Finnegan PA-C   sitaGLIPtin (Januvia) 100 mg tablet Take 1 tablet (100 mg total) by mouth daily 10/25/23   Elvia Carlson DO   ticagrelor (BRILINTA) 90 MG Take 1 tablet (90 mg total) by mouth every 12 (twelve) hours 4/16/23   Erick Gallegos MD   TRUEplus Lancets 33G MISC Use 2 (two) times a day 1/23/23   Elvia Carlson DO     I have reviewed home medications using recent Epic encounter. Allergies: Allergies   Allergen Reactions    Bactrim [Sulfamethoxazole-Trimethoprim] Shortness Of Breath    Lisinopril Angioedema    Vicodin [Hydrocodone-Acetaminophen] GI Intolerance    Hydrocodone-Acetaminophen Nausea Only    Oxycodone-Acetaminophen GI Intolerance and Nausea Only       Social History:  Marital Status:     Occupation: noncontributory  Patient Pre-hospital Living Situation: Home  Patient Pre-hospital Level of Mobility: walks  Patient Pre-hospital Diet Restrictions: none  Substance Use History:   Social History     Substance and Sexual Activity   Alcohol Use Not Currently    Alcohol/week: 0.0 standard drinks of alcohol     Social History     Tobacco Use   Smoking Status Never   Smokeless Tobacco Never     Social History     Substance and Sexual Activity   Drug Use No       Family History:  Family History   Problem Relation Age of Onset    Heart disease Mother     Diabetes Mother     Arthritis Mother     Hypertension Mother     MONE disease Mother     Kidney disease Father     Hypertension Father     Stomach cancer Father     Arthritis Sister     Kidney disease Brother         age 34     Stroke Maternal Grandmother     Stomach cancer Maternal Grandmother     No Known Problems Maternal Grandfather     Arthritis Paternal Grandmother     Heart attack Paternal Grandmother     No Known Problems Paternal Grandfather     No Known Problems Daughter     No Known Problems Daughter     No Known Problems Daughter     No Known Problems Daughter     Stroke Maternal Aunt     Liver cancer Maternal Aunt     No Known Problems Maternal Aunt     No Known Problems Maternal Aunt     Heart attack Maternal Uncle     No Known Problems Paternal Aunt     Cancer Family         spinal column    Coronary artery disease Family     Glaucoma Family     Rheum arthritis Family        Physical Exam:     Vitals:   Blood Pressure: (!) 171/79 (23 1630)  Pulse: 63 (23 1630)  Temperature: 98.1 °F (36.7 °C) (23 1630)  Temp Source: Temporal (23 1630)  Respirations: 22 (23 1630)  Weight - Scale: 93.7 kg (206 lb 9.1 oz) (23 1315)  SpO2: 97 % (23 1630)    Physical Exam  Vitals and nursing note reviewed. Constitutional:       Appearance: She is obese. She is ill-appearing. Cardiovascular:      Rate and Rhythm: Normal rate and regular rhythm. Pulses: Normal pulses. Heart sounds: Normal heart sounds.    Pulmonary:      Effort: Pulmonary effort is normal.      Breath sounds: Normal breath sounds. Abdominal:      General: Bowel sounds are normal.      Palpations: Abdomen is soft. Musculoskeletal:      Right lower leg: No edema. Left lower leg: No edema. Skin:     General: Skin is warm and dry. Neurological:      Mental Status: She is alert and oriented to person, place, and time. Comments: Right sided weakness of both uppper and lower extremities  dysarthria          Additional Data:     Lab Results:  Results from last 7 days   Lab Units 11/29/23  1327   WBC Thousand/uL 4.88   HEMOGLOBIN g/dL 12.8   HEMATOCRIT % 40.7   PLATELETS Thousands/uL 239     Results from last 7 days   Lab Units 11/29/23  1339   SODIUM mmol/L 142   POTASSIUM mmol/L 3.5   CHLORIDE mmol/L 107   CO2 mmol/L 29   BUN mg/dL 14   CREATININE mg/dL 1.40*   ANION GAP mmol/L 6   CALCIUM mg/dL 8.8   GLUCOSE RANDOM mg/dL 99     Results from last 7 days   Lab Units 11/29/23  1327   INR  0.98     Results from last 7 days   Lab Units 11/29/23  1315   POC GLUCOSE mg/dl 91               Lines/Drains:  Invasive Devices       Peripheral Intravenous Line  Duration             Peripheral IV 11/29/23 Left Antecubital <1 day                        Imaging: Reviewed radiology reports from this admission including: chest xray and CT head  X-ray chest 1 view portable   Final Result by Liane Lefort, MD (11/29 9447)      No acute cardiopulmonary disease. Workstation performed: [id number]         CTA stroke alert (head/neck)   Final Result by LANRE Brand MD (11/29 0485)      No significant stenosis of the cervical carotid or vertebral arteries. No acute large vessel occlusion. No significant stenosis of the large vessels. Mild narrowing of the right P1 segment. Poor visualization of. Identified left P-comm. Multifocal irregularity of the distal anterior cerebral artery branches with mild irregularity of the distal MCA branches.  Findings are new from prior study. While atherosclerotic disease is possible, suggest correlation for vasculitis or RCVS.      Findings were directly discussed with Charlotte Muller at 1:47 p.m. Workstation performed: [id number]         CT stroke alert brain   Final Result by LANRE Quinones MD (11/29 1401)      No acute intracranial abnormality. Stable chronic microangiopathy and old lacunar infarcts. Findings were directly discussed with Charlotte Muller at 1:47 p.m. Workstation performed: [id number]         MRI Inpatient Order    (Results Pending)       EKG and Other Studies Reviewed on Admission:   EKG: No EKG obtained. ** Please Note: This note has been constructed using a voice recognition system.  **

## 2023-11-29 NOTE — ED PROVIDER NOTES
History  Chief Complaint   Patient presents with    STROKE Alert     Pt presented to this ED c/o dizziness, elevated bp, right facial numbness, tingling left arm/fingertips and right sided weakness in arm and leg stating fell this morning walking to bathroom between [id number]. Denies hitting head/loc. + blood thinners. Last well known at 18 today     76 yr old female presents for evaluation of dizziness, right sided facial numbness as well as right arm. Patient states her right leg became weak and she she fell because she was not able to stand and bear full weight. She had no head injury and no LOC. Her symptoms all started at 7:30 am upon waking up and her symptoms have persisted. Prior to Admission Medications   Prescriptions Last Dose Informant Patient Reported? Taking?    Alcohol Swabs (DropSafe Alcohol Prep) 70 % PADS  Self No No   Sig: Use one pad twice daily when check blood sugar   Blood Glucose Monitoring Suppl (True Metrix Air Glucose Meter) w/Device KIT  Self No No   Sig: USE AS DIRECTED   Diclofenac Sodium (VOLTAREN) 1 %   No No   Sig: Apply 2 g topically 4 (four) times a day   TRUEplus Lancets 33G MISC  Self No No   Sig: Use 2 (two) times a day   albuterol (PROVENTIL HFA,VENTOLIN HFA) 90 mcg/act inhaler   No No   Sig: Inhale 2 puffs every 6 (six) hours as needed for wheezing or shortness of breath   aspirin (ECOTRIN LOW STRENGTH) 81 mg EC tablet  Self No No   Sig: Take 1 tablet (81 mg total) by mouth daily   atorvastatin (LIPITOR) 80 mg tablet   No No   Sig: Take 1 tablet (80 mg total) by mouth daily with dinner   carvedilol (COREG) 12.5 mg tablet   No No   Sig: Take 1 tablet (12.5 mg total) by mouth 2 (two) times a day with meals   dapagliflozin (Farxiga) 10 MG tablet   No No   Sig: Take 1 tablet (10 mg total) by mouth in the morning 1/2 tablet daily for the 1st month then increase to a full tablet daily in the morning   Patient taking differently: Take 10 mg by mouth in the morning doxazosin (CARDURA) 2 mg tablet   No No   Sig: Take 1 tablet (2 mg total) by mouth daily Do not start before November 3, 2023.   furosemide (LASIX) 20 mg tablet   No No   Sig: Take 1 tablet (20 mg total) by mouth daily   gabapentin (NEURONTIN) 300 mg capsule   No No   Sig: Take 1 capsule (300 mg total) by mouth 3 (three) times a day   hydrALAZINE (APRESOLINE) 50 mg tablet   No No   Sig: Take 1 tablet (50 mg total) by mouth every 8 (eight) hours   isosorbide mononitrate (IMDUR) 60 mg 24 hr tablet   No No   Sig: Take 1 tablet (60 mg total) by mouth daily   ondansetron (ZOFRAN) 4 mg tablet   No No   Sig: Take 1 tablet (4 mg total) by mouth every 8 (eight) hours as needed for nausea or vomiting   polyethylene glycol (GLYCOLAX) 17 GM/SCOOP powder  Self No No   Sig: Take 17 g by mouth daily Increase to twice daily if still constiptated   sitaGLIPtin (Januvia) 100 mg tablet   No No   Sig: Take 1 tablet (100 mg total) by mouth daily   ticagrelor (BRILINTA) 90 MG  Self No No   Sig: Take 1 tablet (90 mg total) by mouth every 12 (twelve) hours      Facility-Administered Medications: None       Past Medical History:   Diagnosis Date    Abnormal ECG     last assessed: 5/15/17    Abnormal mammogram     last assessed: 7/11/17    Abnormal stress test     last assesed: 7/24/17    Anemia     Anxiety     Arthritis     Asthma     Back problem     Breast cyst     CAD (coronary artery disease)     Chest pain     last assessed: 7/24/17    Chronic pain disorder     Cyst of left breast     last assessed: 8/18/17    Depression     Depression     resolved: 1/2/18    Diabetes mellitus ([address] )     Hiatal hernia     last assessed: 11/7/17    Hyperlipidemia     Hypertension     Keloid of skin     last assessed: 11/2/16    Multiple thyroid nodules     Neuropathy     Psychiatric disorder     anxiety    Stroke [facility])     TIA 2005    Tuberculosis     as a child        Past Surgical History:   Procedure Laterality Date    ARM WOUND REPAIR / CLOSURE CARPAL TUNNEL RELEASE      CARPAL TUNNEL RELEASE Left     CATARACT EXTRACTION Bilateral     2015    COLONOSCOPY      CORONARY ANGIOPLASTY WITH STENT PLACEMENT  2023    at [address]      right upper arm. EYE SURGERY      cataract removaql    [place] LUMBAR PUNCTURE DIAGNOSTIC  08/15/2019    [place] ESOPHAGOGASTRODUODENOSCOPY TRANSORAL DIAGNOSTIC N/A 2018    Procedure: ESOPHAGOGASTRODUODENOSCOPY (EGD); Surgeon: Modesto Sorensen DO;  Location: [facility] MAIN OR;  Service: Gastroenterology    [place] [address] WRST SURG W/RLS TRANSVRS CARPL LIGM Left 2019    Procedure: ENDOSCOPIC CARPAL TUNNEL RELEASE;  Surgeon: Zack Koyanagi, MD;  Location: [facility] MAIN OR;  Service: [address]  THYROID BIOPSY  04/15/2019       Family History   Problem Relation Age of Onset    Heart disease Mother     Diabetes Mother     Arthritis Mother     Hypertension Mother     MONE disease Mother     Kidney disease Father     Hypertension Father     Stomach cancer Father     Arthritis Sister     Kidney disease Brother         age 34     Stroke Maternal Grandmother     Stomach cancer Maternal Grandmother     No Known Problems Maternal Grandfather     Arthritis Paternal Grandmother     Heart attack Paternal Grandmother     No Known Problems Paternal Grandfather     No Known Problems Daughter     No Known Problems Daughter     No Known Problems Daughter     No Known Problems Daughter     Stroke Maternal Aunt     Liver cancer Maternal Aunt     No Known Problems Maternal Aunt     No Known Problems Maternal Aunt     Heart attack Maternal Uncle     No Known Problems Paternal Aunt     Cancer Family         spinal column    Coronary artery disease Family     Glaucoma Family     Rheum arthritis Family      I have reviewed and agree with the history as documented.     E-Cigarette/Vaping    E-Cigarette Use Never User      E-Cigarette/Vaping Substances    Nicotine No     THC No     CBD No Flavoring No     Other No     Unknown No      Social History     Tobacco Use    Smoking status: Never    Smokeless tobacco: Never   Vaping Use    Vaping Use: Never used   Substance Use Topics    Alcohol use: Not Currently     Alcohol/week: 0.0 standard drinks of alcohol    Drug use: No       Review of Systems   Musculoskeletal:  Positive for gait problem. Neurological:  Positive for dizziness, weakness and numbness. Physical Exam  Physical Exam  Vitals and nursing note reviewed. Constitutional:       General: She is not in acute distress. Appearance: She is well-developed. HENT:      Head: Normocephalic and atraumatic. Eyes:      Conjunctiva/sclera: Conjunctivae normal.   Cardiovascular:      Rate and Rhythm: Normal rate and regular rhythm. Heart sounds: No murmur heard. Pulmonary:      Effort: Pulmonary effort is normal. No respiratory distress. Breath sounds: Normal breath sounds. Abdominal:      General: Abdomen is flat. Palpations: Abdomen is soft. Tenderness: There is no abdominal tenderness. Musculoskeletal:         General: No swelling. Cervical back: Neck supple. Skin:     General: Skin is warm and dry. Capillary Refill: Capillary refill takes less than 2 seconds. Neurological:      Mental Status: She is alert and oriented to person, place, and time. Cranial Nerves: No cranial nerve deficit. Sensory: Sensory deficit present. Motor: Weakness present.       Comments: Right arm 4/5 strength, right leg 3/5 with drift, numbness to right face and RUE   Psychiatric:         Mood and Affect: Mood normal.         Vital Signs  ED Triage Vitals   Temperature Pulse Respirations Blood Pressure SpO2   11/29/23 1315 11/29/23 1315 11/29/23 1315 11/29/23 1315 11/29/23 1315   98.6 °F (37 °C) 60 18 (!) 244/114 98 %      Temp Source Heart Rate Source Patient Position - Orthostatic VS BP Location FiO2 (%)   11/29/23 1315 11/29/23 1315 11/29/23 1315 11/29/23 1600 --   Tympanic Popliteal Lying Right arm       Pain Score       --                  Vitals:    11/29/23 1630 11/29/23 1730 11/29/23 1830 11/29/23 1930   BP: (!) 171/79 (!) 179/83 (!) 177/82 143/80   Pulse: 63 63 74 79   Patient Position - Orthostatic VS: Lying Lying Lying Lying         Visual Acuity  Visual Acuity      Flowsheet Row Most Recent Value   L Pupil Size (mm) 3   R Pupil Size (mm) 3   L Pupil Shape Round   R Pupil Shape Round            ED Medications  Medications   albuterol (PROVENTIL HFA,VENTOLIN HFA) inhaler 2 puff (has no administration in time range)   aspirin (ECOTRIN LOW STRENGTH) EC tablet 81 mg (has no administration in time range)   atorvastatin (LIPITOR) tablet 80 mg (80 mg Oral Given 11/29/23 1734)   doxazosin (CARDURA) tablet 2 mg (has no administration in time range)   gabapentin (NEURONTIN) capsule 300 mg (300 mg Oral Given 11/29/23 1734)   hydrALAZINE (APRESOLINE) tablet 50 mg (50 mg Oral Given 11/29/23 1734)   isosorbide mononitrate (IMDUR) 24 hr tablet 60 mg (has no administration in time range)   polyethylene glycol (MIRALAX) packet 17 g (has no administration in time range)   ticagrelor (BRILINTA) tablet 90 mg (has no administration in time range)   acetaminophen (TYLENOL) tablet 650 mg (has no administration in time range)   ondansetron (ZOFRAN) injection 4 mg (has no administration in time range)   heparin (porcine) subcutaneous injection 5,000 Units (5,000 Units Subcutaneous Given 11/29/23 1734)   insulin lispro (HumaLOG) 100 units/mL subcutaneous injection 1-6 Units ( Subcutaneous Not Given 11/29/23 1722)   insulin lispro (HumaLOG) 100 units/mL subcutaneous injection 1-6 Units ( Subcutaneous Not Given 11/29/23 2200)   LORazepam (ATIVAN) injection 0.5 mg (0.5 mg Intravenous Given 11/29/23 1734)   labetalol (NORMODYNE) injection 10 mg (has no administration in time range)   carvedilol (COREG) tablet 12.5 mg (has no administration in time range)   hydrALAZINE (APRESOLINE) 20 mg/mL injection **ADS Override Pull** (20 mg  Given 11/29/23 1323)   iohexol (OMNIPAQUE) 350 MG/ML injection (MULTI-DOSE) 85 mL (85 mL Intravenous Given 11/29/23 1332)   hydrALAZINE (APRESOLINE) injection 20 mg (20 mg Intravenous Given 11/29/23 1527)       Diagnostic Studies  Results Reviewed       Procedure Component Value Units Date/Time    HS Troponin I 4hr [[id number]]  (Normal) Collected: 11/29/23 1816    Lab Status: Final result Specimen: Blood from Hand, Left Updated: 11/29/23 1853     hs TnI 4hr 11 ng/L      Delta 4hr hsTnI 0 ng/L     Lipid Panel with Direct LDL reflex [[id number]]  (Abnormal) Collected: 11/29/23 1339    Lab Status: Final result Specimen: Blood from Arm, Left Updated: 11/29/23 1719     Cholesterol 197 mg/dL      Triglycerides 82 mg/dL      HDL, Direct 47 mg/dL      LDL Calculated 134 mg/dL     C-reactive protein [[id number]]  (Abnormal) Collected: 11/29/23 1339    Lab Status: Final result Specimen: Blood from Arm, Left Updated: 11/29/23 1719     CRP 8.9 mg/L     Sedimentation rate, automated [[id number]]  (Abnormal) Collected: 11/29/23 1327    Lab Status: Final result Specimen: Blood from Arm, Left Updated: 11/29/23 1700     Sed Rate 62 mm/hour     HS Troponin I 2hr [[id number]]  (Normal) Collected: 11/29/23 1533    Lab Status: Final result Specimen: Blood from Arm, Left Updated: 11/29/23 1600     hs TnI 2hr 10 ng/L      Delta 2hr hsTnI -1 ng/L     FLU/RSV/COVID - if FLU/RSV clinically relevant [[id number]]  (Normal) Collected: 11/29/23 1327    Lab Status: Final result Specimen: Nares from Nasopharyngeal Swab Updated: 11/29/23 1540     SARS-CoV-2 Negative     INFLUENZA A PCR Negative     INFLUENZA B PCR Negative     RSV PCR Negative    Narrative:      FOR PEDIATRIC PATIENTS - copy/paste COVID Guidelines URL to browser: [email or web address]    SARS-CoV-2 assay is a Nucleic Acid Amplification assay intended for the  qualitative detection of nucleic acid from SARS-CoV-2 in nasopharyngeal  swabs. Results are for the presumptive identification of SARS-CoV-2 RNA. Positive results are indicative of infection with SARS-CoV-2, the virus  causing COVID-19, but do not rule out bacterial infection or co-infection  with other viruses. Laboratories within the [facility] and its  territories are required to report all positive results to the appropriate  public health authorities. Negative results do not preclude SARS-CoV-2  infection and should not be used as the sole basis for treatment or other  patient management decisions. Negative results must be combined with  clinical observations, patient history, and epidemiological information. This test has not been FDA cleared or approved. This test has been authorized by FDA under an Emergency Use Authorization  (EUA). This test is only authorized for the duration of time the  declaration that circumstances exist justifying the authorization of the  emergency use of an in vitro diagnostic tests for detection of SARS-CoV-2  virus and/or diagnosis of COVID-19 infection under section 564(b)(1) of  the Act, 21 U. S.C. 858TPW-8(Q)(2), unless the authorization is terminated  or revoked sooner. The test has been validated but independent review by FDA  and CLIA is pending. Test performed using [employer] GeneXpert: This RT-PCR assay targets N2,  a region unique to SARS-CoV-2. A conserved region in the E-gene was chosen  for pan-Sarbecovirus detection which includes SARS-CoV-2. According to CMS-2020-01-R, this platform meets the definition of high-throughput technology.     Basic metabolic panel [[id number]]  (Abnormal) Collected: 11/29/23 1339    Lab Status: Final result Specimen: Blood from Arm, Left Updated: 11/29/23 1403     Sodium 142 mmol/L      Potassium 3.5 mmol/L      Chloride 107 mmol/L      CO2 29 mmol/L      ANION GAP 6 mmol/L      BUN 14 mg/dL      Creatinine 1.40 mg/dL      Glucose 99 mg/dL Calcium 8.8 mg/dL      eGFR 38 ml/min/1.73sq m     Narrative:      [place] guidelines for Chronic Kidney Disease (CKD):     Stage 1 with normal or high GFR (GFR > 90 mL/min/1.73 square meters)    Stage 2 Mild CKD (GFR = 60-89 mL/min/1.73 square meters)    Stage 3A Moderate CKD (GFR = 45-59 mL/min/1.73 square meters)    Stage 3B Moderate CKD (GFR = 30-44 mL/min/1.73 square meters)    Stage 4 Severe CKD (GFR = 15-29 mL/min/1.73 square meters)    Stage 5 End Stage CKD (GFR <15 mL/min/1.73 square meters)  Note: GFR calculation is accurate only with a steady state creatinine    HS Troponin 0hr (reflex protocol) [[id number]]  (Normal) Collected: 11/29/23 1327    Lab Status: Final result Specimen: Blood from Arm, Left Updated: 11/29/23 1403     hs TnI 0hr 11 ng/L     Protime-INR [[id number]]  (Normal) Collected: 11/29/23 1327    Lab Status: Final result Specimen: Blood from Arm, Left Updated: 11/29/23 1351     Protime 13.3 seconds      INR 0.98    APTT [[id number]]  (Normal) Collected: 11/29/23 1327    Lab Status: Final result Specimen: Blood from Arm, Left Updated: 11/29/23 1351     PTT 26 seconds     CBC and Platelet [[id number]]  (Abnormal) Collected: 11/29/23 1327    Lab Status: Final result Specimen: Blood from Arm, Left Updated: 11/29/23 1337     WBC 4.88 Thousand/uL      RBC 4.83 Million/uL      Hemoglobin 12.8 g/dL      Hematocrit 40.7 %      MCV 84 fL      MCH 26.5 pg      MCHC 31.4 g/dL      RDW 15.4 %      Platelets 929 Thousands/uL      MPV 10.5 fL     Fingerstick Glucose (POCT) [[id number]]  (Normal) Collected: 11/29/23 1315    Lab Status: Final result Updated: 11/29/23 1315     POC Glucose 91 mg/dl                    X-ray chest 1 view portable   Final Result by Vikas Martínez MD (11/29 1346)      No acute cardiopulmonary disease. Workstation performed: [id number]         CTA stroke alert (head/neck)   Final Result by LANRE Dawn MD (11/29 1874)      No significant stenosis of the cervical carotid or vertebral arteries. No acute large vessel occlusion. No significant stenosis of the large vessels. Mild narrowing of the right P1 segment. Poor visualization of. Identified left P-comm. Multifocal irregularity of the distal anterior cerebral artery branches with mild irregularity of the distal MCA branches. Findings are new from prior study. While atherosclerotic disease is possible, suggest correlation for vasculitis or RCVS.      Findings were directly discussed with Kyle Cardozo at 1:47 p.m. Workstation performed: [id number]         CT stroke alert brain   Final Result by LANRE Alamo MD (11/29 1401)      No acute intracranial abnormality. Stable chronic microangiopathy and old lacunar infarcts. Findings were directly discussed with Kyle Cardozo at 1:47 p.m.          Workstation performed: [id number]         MRI Inpatient Order    (Results Pending)              Procedures  CriticalCare Time    Date/Time: 11/29/2023 3:26 PM    Performed by: Masood Parker DO  Authorized by: Masood Parker DO    Critical care provider statement:     Critical care time (minutes):  45    Critical care was necessary to treat or prevent imminent or life-threatening deterioration of the following conditions:  CNS failure or compromise and metabolic crisis    Critical care was time spent personally by me on the following activities:  Obtaining history from patient or surrogate, development of treatment plan with patient or surrogate, discussions with primary provider, discussions with consultants, evaluation of patient's response to treatment, examination of patient, ordering and review of laboratory studies, ordering and review of radiographic studies, re-evaluation of patient's condition, ventilator management and ordering and performing treatments and interventions           ED Course  ED Course as of 11/29/23 2207   Wed 2023   1337 Spoke with Dr Aiden Land the neurologist. Will await ct scan   1531 Case was discussed with    99 [id number] Case was discussed with the hospitalist who accepts the patient for admission                  Stroke Assessment       Row Name 23 1326             NIH Stroke Scale    Interval --      Level of Consciousness (1a.) 0      LOC Questions (1b.) 0      LOC Commands (1c.) 0      Best Gaze (2.) 0      Visual (3.) 0      Facial Palsy (4.) 0      Motor Arm, Left (5a.) 0      Motor Arm, Right (5b.) 0      Motor Leg, Left (6a.) 0      Motor Leg, Right (6b.) 0      Limb Ataxia (7.) 0      Sensory (8.) 1      Best Language (9.) 1      Dysarthria (10.) 0      Extinction and Inattention (11.) (Formerly Neglect) 0      Total 2                                              Medical Decision Making  Amount and/or Complexity of Data Reviewed  Labs: ordered. Radiology: ordered. Risk  Prescription drug management. Decision regarding hospitalization. Disposition  Final diagnoses:   Hypertension   TIA (transient ischemic attack)     Time reflects when diagnosis was documented in both MDM as applicable and the Disposition within this note       Time User Action Codes Description Comment    2023  1:25 PM Nereyda Coffey Add [I10] Hypertension     2023  3:32 PM Chema Mata Add [G45.9] TIA (transient ischemic attack)           ED Disposition       ED Disposition   Admit    Condition   Stable    Date/Time     3:32 PM    Comment   --             Follow-up Information    None         Current Discharge Medication List        CONTINUE these medications which have NOT CHANGED    Details   albuterol (PROVENTIL HFA,VENTOLIN HFA) 90 mcg/act inhaler Inhale 2 puffs every 6 (six) hours as needed for wheezing or shortness of breath  Qty: 54 g, Refills: 1    Comments: Substitution to a formulary equivalent within the same pharmaceutical class is authorized. Associated Diagnoses:  Moderate persistent asthma with acute exacerbation      Alcohol Swabs (DropSafe Alcohol Prep) 70 % PADS Use one pad twice daily when check blood sugar  Qty: 200 each, Refills: 2    Associated Diagnoses: Type 2 diabetes mellitus with stage 3b chronic kidney disease, with long-term current use of insulin ([facility])      aspirin (ECOTRIN LOW STRENGTH) 81 mg EC tablet Take 1 tablet (81 mg total) by mouth daily  Qty: 30 tablet, Refills: 3    Associated Diagnoses: Cerebral atherosclerosis      atorvastatin (LIPITOR) 80 mg tablet Take 1 tablet (80 mg total) by mouth daily with dinner  Qty: 90 tablet, Refills: 3    Associated Diagnoses: Dyslipidemia      Blood Glucose Monitoring Suppl (True Metrix Air Glucose Meter) w/Device KIT USE AS DIRECTED  Qty: 1 kit, Refills: 0    Associated Diagnoses: Type 2 diabetes mellitus with hyperglycemia, without long-term current use of insulin ([facility])      carvedilol (COREG) 12.5 mg tablet Take 1 tablet (12.5 mg total) by mouth 2 (two) times a day with meals  Qty: 60 tablet, Refills: 0    Associated Diagnoses: Hypertensive urgency      dapagliflozin (Farxiga) 10 MG tablet Take 1 tablet (10 mg total) by mouth in the morning 1/2 tablet daily for the 1st month then increase to a full tablet daily in the morning  Qty: 90 tablet, Refills: 3    Associated Diagnoses: Type 2 diabetes mellitus with stage 3b chronic kidney disease, with long-term current use of insulin ([facility])      Diclofenac Sodium (VOLTAREN) 1 % Apply 2 g topically 4 (four) times a day  Qty: 2 g, Refills: 0    Associated Diagnoses: Acute pain of right shoulder      doxazosin (CARDURA) 2 mg tablet Take 1 tablet (2 mg total) by mouth daily Do not start before November 3, 2023.   Qty: 30 tablet, Refills: 0    Associated Diagnoses: Hypertensive urgency      furosemide (LASIX) 20 mg tablet Take 1 tablet (20 mg total) by mouth daily  Qty: 90 tablet, Refills: 0    Associated Diagnoses: Stage 3b chronic kidney disease ([facility])      gabapentin (NEURONTIN) 300 mg capsule Take 1 capsule (300 mg total) by mouth 3 (three) times a day  Qty: 270 capsule, Refills: 3    Associated Diagnoses: Diabetic polyneuropathy associated with type 2 diabetes mellitus (HCC)      hydrALAZINE (APRESOLINE) 50 mg tablet Take 1 tablet (50 mg total) by mouth every 8 (eight) hours  Qty: 90 tablet, Refills: 0    Associated Diagnoses: Hypertensive urgency      isosorbide mononitrate (IMDUR) 60 mg 24 hr tablet Take 1 tablet (60 mg total) by mouth daily  Qty: 90 tablet, Refills: 0    Associated Diagnoses: Coronary artery disease involving native coronary artery of native heart      ondansetron (ZOFRAN) 4 mg tablet Take 1 tablet (4 mg total) by mouth every 8 (eight) hours as needed for nausea or vomiting  Qty: 20 tablet, Refills: 0    Associated Diagnoses: Hypertensive urgency; Acute pain of right shoulder      polyethylene glycol (GLYCOLAX) 17 GM/SCOOP powder Take 17 g by mouth daily Increase to twice daily if still constiptated  Qty: 578 g, Refills: 3    Associated Diagnoses: Chronic constipation      sitaGLIPtin (Januvia) 100 mg tablet Take 1 tablet (100 mg total) by mouth daily  Qty: 90 tablet, Refills: 3    Associated Diagnoses: Type 2 diabetes mellitus with diabetic nephropathy, with long-term current use of insulin (HCC)      ticagrelor (BRILINTA) 90 MG Take 1 tablet (90 mg total) by mouth every 12 (twelve) hours  Refills: 0    Associated Diagnoses: Coronary artery disease involving native coronary artery of native heart      TRUEplus Lancets 33G MISC Use 2 (two) times a day  Qty: 200 each, Refills: 2    Associated Diagnoses: Type 2 diabetes mellitus with stage 3b chronic kidney disease, with long-term current use of insulin (HCC)             No discharge procedures on file.     PDMP Review       None            ED Provider  Electronically Signed by             Ren Daniel DO  11/29/23 [id number]

## 2023-11-29 NOTE — ASSESSMENT & PLAN NOTE
Lab Results   Component Value Date    EGFR 38 11/29/2023    EGFR 40 11/01/2023    EGFR 41 10/31/2023    CREATININE 1.40 (H) 11/29/2023    CREATININE 1.36 (H) 11/01/2023    CREATININE 1.31 (H) 10/31/2023     Baseline cr around 1.4  She is currently at baseline  Avoid hypotension

## 2023-11-29 NOTE — ASSESSMENT & PLAN NOTE
Wt Readings from Last 3 Encounters:   11/29/23 93.7 kg (206 lb 9.1 oz)   11/13/23 97.1 kg (214 lb)   10/30/23 92.9 kg (204 lb 12.9 oz)     Euvolemic on examination  Hold lasix while allowing for permissive HTN  Low salt diet ordered

## 2023-11-29 NOTE — CONSULTS
TeleConsultation - Stroke   Pantera Tripp 76 y.o. female MRN: [id number]  Unit/Bed#: ED 01 Encounter: [id number]    REQUIRED DOCUMENTATION:     1. This service was provided via Telemedicine. 2. Provider located at [facility]. 3. TeleMed provider: Alvin Collado MD.  4. Identify all parties in room with patient during tele consult:  patient  5. Patient was then informed that this was a Telemedicine visit and that the exam was being conducted confidentially over secure lines. My office door was closed. No one else was in the room. Patient acknowledged consent and understanding of privacy and security of the Telemedicine visit, and gave us permission to have the assistant stay in the room in order to assist with the history and to conduct the exam.  I informed the patient that I have reviewed their record in Epic and presented the opportunity for them to ask any questions regarding the visit today. The patient agreed to participate. Assessment/Plan   Assessment:   - Pt presenting for evaluation of sensory changes and weakness. She reports waking up with symptoms of right facial numbness localized to the right side of the mouth, no where else on the right side of the face, in addition to numbness/tingling in the fingertips of the right hand only and no where else in the arm. She also attempted to get up and felt her right leg was weak, causing her to fall. No noted weakness elsewhere. She additionally has lightheadedness. No headache or vision changes. No other noted symptoms. Limited examination due to telemed, but when assessing RLE, she does have some downward drift which she is able to correct and then later maintain elevation of her RLE. In general, difficult to localize within CNS with reported symptoms, especially since sensory changes are not complete throughout affected. CT head with no acute findings.  No LVO on CTA H/N but has some irregularities in distal vessels of anterior circulation of unclear etiology. She does not provide history suggestive of RCVS or vasculitis, or any other obvious issue that could be attributed to findings on CTA H/N. Will look to re-evaluate with MRA head. Suspect symptoms could be secondary to HTN but given risk factors and symptoms, should have MRI brain performed to rule out stroke. Recommendations:  - Stroke pathway  MRI brain w/o contrast  MRA head w/o contrast for re-evaluation of intracranial vasculature  Echo  Lipid Panel  Hemoglobin A1c  TSH  Continue home aspirin and Brilinta. May need to adjust regimen if MRI brain shows stroke, although does not appear to have controlled vascular risk factors  Continue home atorvastatin  Permissive HTN, labetalol if SBP >200. Avoid overcorrection of BP (BP was 014R systolic on admission)  Euglycemic, normothermic goal  Continue telemetry  PT/OT/ST  Stroke education  Frequent neuro checks. Continue to monitor and notify neurology with any changes. STAT CT head for any acute change in neuro exam  - Medical management and supportive care per primary team. Correction of any metabolic or infectious disturbances. TPA Decision: Patient not a candidate. Outside window    Not an endovascular candidate since no LVO. Holley Esposito will need follow up in in 4 weeks with neurovascular attending or advance practitioner. She will not require outpatient neurological testing. History of Present Illness   Reason for Consult / Principal Problem: weakness, sensory changes  Patient last known well: 0730 today  Stroke alert called: 1314  Neurology time of arrival: immediate over phone then via telemedicine after return from CT scan  HPI: Holley Esposito is a 76 y.o. woman with PMHx including CAD s/p stent, CKD, HTN, DM who presented for evaluation of weakness and numbness/tingling. Woke up around 7 or 8 AM with symptoms. Numbness at right side of mouth like she was given Novocaine. Still has the numbness/tingling there. Also has numbness/tingling in the fingertips of the right hand still present. No sensory changes otherwise in the right face/arm or in the right leg or left side. Went to stand up and fell, feels like right leg is weak. No weakness in the right arm or on the left side. No facial weakness. Speech sounds mildly slurred to her. No headache. No change in mental status. Also having dizziness described as lightheadedness and feeling off-balance. No other symptoms such as aphasia, N/V, syncope, tremor, dysphagia. Symptoms about the same since coming to the hospital. Compliant with aspirin and Brilinta. Still feels about the same as when she first came in. Mentioned that she was evicted from her home yesterday and has been upset about this. May have missed her BP medications yesterday and today. Sometimes uses a walker when she feels weak, and has a cane if needed. She has no other issues or concerns to discuss.     Inpatient consult to Neurology  Consult performed by: Rene Carey MD  Consult ordered by: Herman Hennessy DO      Review of Systems  See HPI    Historical Information   Past Medical History:   Diagnosis Date    Abnormal ECG     last assessed: 5/15/17    Abnormal mammogram     last assessed: 7/11/17    Abnormal stress test     last assesed: 7/24/17    Anemia     Anxiety     Arthritis     Asthma     Back problem     Breast cyst     CAD (coronary artery disease)     Chest pain     last assessed: 7/24/17    Chronic pain disorder     Cyst of left breast     last assessed: 8/18/17    Depression     Depression     resolved: 1/2/18    Diabetes mellitus ([address])     Hiatal hernia     last assessed: 11/7/17    Hyperlipidemia     Hypertension     Keloid of skin     last assessed: 11/2/16    Multiple thyroid nodules     Neuropathy     Psychiatric disorder     anxiety    Stroke [facility])     TIA 2005    Tuberculosis     as a child      Past Surgical History:   Procedure Laterality Date    ARM WOUND REPAIR / CLOSURE CARPAL TUNNEL RELEASE      CARPAL TUNNEL RELEASE Left     CATARACT EXTRACTION Bilateral     2015    COLONOSCOPY      CORONARY ANGIOPLASTY WITH STENT PLACEMENT  03/01/2023    at [address]      right upper arm. EYE SURGERY      cataract removaql    FL LUMBAR PUNCTURE DIAGNOSTIC  08/15/2019    [place] ESOPHAGOGASTRODUODENOSCOPY TRANSORAL DIAGNOSTIC N/A 01/05/2018    Procedure: ESOPHAGOGASTRODUODENOSCOPY (EGD); Surgeon: Radha Kohli DO;  Location: [facility] MAIN OR;  Service: Gastroenterology    [place] [address] WRST SURG W/RLS TRANSVRS CARPL LIGM Left 04/19/2019    Procedure: ENDOSCOPIC CARPAL TUNNEL RELEASE;  Surgeon: Maurizio Quezada MD;  Location: [address] MAIN OR;  Service: [address]  THYROID BIOPSY  04/15/2019     Social History   Social History     Substance and Sexual Activity   Alcohol Use Not Currently    Alcohol/week: 0.0 standard drinks of alcohol     Social History     Substance and Sexual Activity   Drug Use No     E-Cigarette/Vaping    E-Cigarette Use Never User      E-Cigarette/Vaping Substances    Nicotine No     THC No     CBD No     Flavoring No     Other No     Unknown No      Social History     Tobacco Use   Smoking Status Never   Smokeless Tobacco Never     Meds/Allergies   current meds:   Current Facility-Administered Medications   Medication Dose Route Frequency    hydrALAZINE (APRESOLINE) injection 20 mg  20 mg Intravenous Once    and PTA meds:   Prior to Admission Medications   Prescriptions Last Dose Informant Patient Reported? Taking?    Alcohol Swabs (DropSafe Alcohol Prep) 70 % PADS  Self No No   Sig: Use one pad twice daily when check blood sugar   Blood Glucose Monitoring Suppl (True Metrix Air Glucose Meter) w/Device KIT  Self No No   Sig: USE AS DIRECTED   Diclofenac Sodium (VOLTAREN) 1 %   No No   Sig: Apply 2 g topically 4 (four) times a day   TRUEplus Lancets 33G MISC  Self No No   Sig: Use 2 (two) times a day   albuterol (PROVENTIL HFA,VENTOLIN HFA) 90 mcg/act inhaler   No No   Sig: Inhale 2 puffs every 6 (six) hours as needed for wheezing or shortness of breath   aspirin (ECOTRIN LOW STRENGTH) 81 mg EC tablet  Self No No   Sig: Take 1 tablet (81 mg total) by mouth daily   atorvastatin (LIPITOR) 80 mg tablet   No No   Sig: Take 1 tablet (80 mg total) by mouth daily with dinner   carvedilol (COREG) 12.5 mg tablet   No No   Sig: Take 1 tablet (12.5 mg total) by mouth 2 (two) times a day with meals   dapagliflozin (Farxiga) 10 MG tablet   No No   Sig: Take 1 tablet (10 mg total) by mouth in the morning 1/2 tablet daily for the 1st month then increase to a full tablet daily in the morning   Patient taking differently: Take 10 mg by mouth in the morning   doxazosin (CARDURA) 2 mg tablet   No No   Sig: Take 1 tablet (2 mg total) by mouth daily Do not start before November 3, 2023.   furosemide (LASIX) 20 mg tablet   No No   Sig: Take 1 tablet (20 mg total) by mouth daily   gabapentin (NEURONTIN) 300 mg capsule   No No   Sig: Take 1 capsule (300 mg total) by mouth 3 (three) times a day   hydrALAZINE (APRESOLINE) 50 mg tablet   No No   Sig: Take 1 tablet (50 mg total) by mouth every 8 (eight) hours   isosorbide mononitrate (IMDUR) 60 mg 24 hr tablet   No No   Sig: Take 1 tablet (60 mg total) by mouth daily   ondansetron (ZOFRAN) 4 mg tablet   No No   Sig: Take 1 tablet (4 mg total) by mouth every 8 (eight) hours as needed for nausea or vomiting   polyethylene glycol (GLYCOLAX) 17 GM/SCOOP powder  Self No No   Sig: Take 17 g by mouth daily Increase to twice daily if still constiptated   sitaGLIPtin (Januvia) 100 mg tablet   No No   Sig: Take 1 tablet (100 mg total) by mouth daily   ticagrelor (BRILINTA) 90 MG  Self No No   Sig: Take 1 tablet (90 mg total) by mouth every 12 (twelve) hours      Facility-Administered Medications: None     Allergies   Allergen Reactions    Bactrim [Sulfamethoxazole-Trimethoprim] Shortness Of Breath    Lisinopril Angioedema Vicodin [Hydrocodone-Acetaminophen] GI Intolerance    Hydrocodone-Acetaminophen Nausea Only    Oxycodone-Acetaminophen GI Intolerance and Nausea Only     Objective   Vitals:Blood pressure 165/75, pulse 63, temperature 98.6 °F (37 °C), temperature source Tympanic, resp. rate 15, weight 93.7 kg (206 lb 9.1 oz), SpO2 99 %. ,Body mass index is 33.34 kg/m². No intake or output data in the 24 hours ending 11/29/23 1448    Invasive Devices: Invasive Devices       Peripheral Intravenous Line  Duration             Peripheral IV 11/29/23 Left Antecubital <1 day                  Physical Exam  Modified physical examination as this is a video consultation:    Gen: NAD. HEENT: NC/AT, no septal deviation, EOMI  Resp: Symmetric chest rise and patient in no obvious respiratory distress  MSK: ROM normal  Skin: No rash noted in visualized portion of this exam     Neurologic exam:  Mental status: awake, alert and oriented x3, no aphasia or dysarthria noted. Patient is able to follow 2 and 3 step commands with ease. Naming/repetition/comprehension intact. Attention/concentration intact. Cranial nerves: pupil exam limited, EOMI, no reported differences in sensation to light touch in V1-V3 b/l, no facial droop or obvious facial asymmetry, tongue midline  Motor: intact antigravity x4 extremities although has some downward drift in RLE  Sensation: intact to light touch but somewhat decreased in RUE when compared to LUE, instructed patient on how to do this in all extremities proximal and distal  Cerebellar: no dysmetria b/l with FNF testing  Gait: deferred    NIHSS:  1a.Level of Consciousness: 0 = Alert   1b. LOC Questions: 0 = Answers both correctly   1c. LOC Commands: 0 = Obeys both correctly   2. Best Gaze: 0 = Normal   3. Visual: 0 = No visual field loss   4. Facial Palsy: 0=Normal symmetric movement   5a. Motor Right Arm: 0=No drift, limb holds 90 (or 45) degrees for full 10 seconds   5b.  Motor Left Arm: 0=No drift, limb holds 90 (or 45) degrees for full 10 seconds   6a. Motor Right Le=Drift, limb holds 90 (or 45) degrees but drifts down before full 10 seconds: does not hit bed   6b. Motor Left Le=No drift, limb holds 90 (or 45) degrees for full 10 seconds   7. Limb Ataxia:  0=Absent   8. Sensory: 1=Mild to moderate sensory loss; patient feels pinprick is less sharp or is dull on the affected side; there is a loss of superficial pain with pinprick but patient is aware She is being touched   9. Best Language:  0=No aphasia, normal   10. Dysarthria: 0=Normal articulation   11. Extinction and Inattention (formerly Neglect): 0=No abnormality   Total Score: 2   Time NIHSS was completed: 1435    Modified [place] Score:  0 (No baseline symptoms/disability)    Lab Results: CBC:   Results from last 7 days   Lab Units 23  1327   WBC Thousand/uL 4.88   RBC Million/uL 4.83   HEMOGLOBIN g/dL 12.8   HEMATOCRIT % 40.7   MCV fL 84   PLATELETS Thousands/uL 239   , BMP/CMP:   Results from last 7 days   Lab Units 23  1339   SODIUM mmol/L 142   POTASSIUM mmol/L 3.5   CHLORIDE mmol/L 107   CO2 mmol/L 29   BUN mg/dL 14   CREATININE mg/dL 1.40*   CALCIUM mg/dL 8.8   EGFR ml/min/1.73sq m 38   , Coagulation:   Results from last 7 days   Lab Units 23  1327   INR  0.98   , Urinalysis:       Invalid input(s): "URIBILINOGEN"  Imaging Studies: I have personally reviewed pertinent reports. and I have personally reviewed pertinent films in PACS    Counseling / Coordination of Care  Total time spent today 35 minutes. Greater than 50% of total time was spent with the patient and / or family counseling and / or coordination of care.  A description of the counseling / coordination of care:

## 2023-11-29 NOTE — ASSESSMENT & PLAN NOTE
Lab Results   Component Value Date    HGBA1C 7.1 (H) 10/30/2023       Recent Labs     11/29/23  1315   POCGLU 91       Blood Sugar Average: Last 72 hrs:  (P) 91    Fairly good control per last A1c  Hold home oral meds, utilize SSI  Fingersticks with meals  ADA diet

## 2023-11-30 ENCOUNTER — APPOINTMENT (INPATIENT)
Dept: MRI IMAGING | Facility: HOSPITAL | Age: 68
DRG: 065 | End: 2023-11-30
Payer: COMMERCIAL

## 2023-11-30 ENCOUNTER — APPOINTMENT (INPATIENT)
Dept: NON INVASIVE DIAGNOSTICS | Facility: HOSPITAL | Age: 68
DRG: 065 | End: 2023-11-30
Payer: COMMERCIAL

## 2023-11-30 PROBLEM — G93.2 IDIOPATHIC INTRACRANIAL HYPERTENSION: Status: ACTIVE | Noted: 2017-05-15

## 2023-11-30 PROBLEM — I63.9 ACUTE ISCHEMIC STROKE (HCC): Status: ACTIVE | Noted: 2023-11-29

## 2023-11-30 PROBLEM — Q21.12 PFO (PATENT FORAMEN OVALE): Status: ACTIVE | Noted: 2023-04-13

## 2023-11-30 PROBLEM — G93.2 IDIOPATHIC INTRACRANIAL HYPERTENSION: Status: RESOLVED | Noted: 2017-05-15 | Resolved: 2023-11-13

## 2023-11-30 PROBLEM — I72.9 ANEURYSM (HCC): Status: ACTIVE | Noted: 2017-07-27

## 2023-11-30 PROBLEM — I72.9 ANEURYSM (HCC): Status: RESOLVED | Noted: 2017-07-27 | Resolved: 2019-09-26

## 2023-11-30 PROBLEM — Q21.12 PFO (PATENT FORAMEN OVALE): Status: RESOLVED | Noted: 2023-04-13 | Resolved: 2023-04-13

## 2023-11-30 LAB
ALBUMIN SERPL BCP-MCNC: 3 G/DL (ref 3.5–5)
ALP SERPL-CCNC: 90 U/L (ref 34–104)
ALT SERPL W P-5'-P-CCNC: 10 U/L (ref 7–52)
ANION GAP SERPL CALCULATED.3IONS-SCNC: 6 MMOL/L
AORTIC ROOT: 3.7 CM
APICAL FOUR CHAMBER EJECTION FRACTION: 69 %
AST SERPL W P-5'-P-CCNC: 16 U/L (ref 13–39)
ATRIAL RATE: 72 BPM
BASOPHILS # BLD AUTO: 0.01 THOUSANDS/ÂΜL (ref 0–0.1)
BASOPHILS NFR BLD AUTO: 0 % (ref 0–1)
BILIRUB SERPL-MCNC: 0.51 MG/DL (ref 0.2–1)
BUN SERPL-MCNC: 16 MG/DL (ref 5–25)
CALCIUM ALBUM COR SERPL-MCNC: 9.2 MG/DL (ref 8.3–10.1)
CALCIUM SERPL-MCNC: 8.4 MG/DL (ref 8.4–10.2)
CHLORIDE SERPL-SCNC: 109 MMOL/L (ref 96–108)
CO2 SERPL-SCNC: 26 MMOL/L (ref 21–32)
CREAT SERPL-MCNC: 1.42 MG/DL (ref 0.6–1.3)
E WAVE DECELERATION TIME: 249 MS
E/A RATIO: 0.69
EOSINOPHIL # BLD AUTO: 0.12 THOUSAND/ÂΜL (ref 0–0.61)
EOSINOPHIL NFR BLD AUTO: 2 % (ref 0–6)
ERYTHROCYTE [DISTWIDTH] IN BLOOD BY AUTOMATED COUNT: 15.9 % (ref 11.6–15.1)
EST. AVERAGE GLUCOSE BLD GHB EST-MCNC: 160 MG/DL
FRACTIONAL SHORTENING: 37 (ref 28–44)
GFR SERPL CREATININE-BSD FRML MDRD: 37 ML/MIN/1.73SQ M
GLUCOSE SERPL-MCNC: 102 MG/DL (ref 65–140)
GLUCOSE SERPL-MCNC: 117 MG/DL (ref 65–140)
GLUCOSE SERPL-MCNC: 117 MG/DL (ref 65–140)
GLUCOSE SERPL-MCNC: 153 MG/DL (ref 65–140)
GLUCOSE SERPL-MCNC: 89 MG/DL (ref 65–140)
HBA1C MFR BLD: 7.2 %
HCT VFR BLD AUTO: 37.5 % (ref 34.8–46.1)
HGB BLD-MCNC: 11.9 G/DL (ref 11.5–15.4)
IMM GRANULOCYTES # BLD AUTO: 0 THOUSAND/UL (ref 0–0.2)
IMM GRANULOCYTES NFR BLD AUTO: 0 % (ref 0–2)
INTERVENTRICULAR SEPTUM IN DIASTOLE (PARASTERNAL SHORT AXIS VIEW): 1.3 CM
INTERVENTRICULAR SEPTUM: 1.3 CM (ref 0.6–1.1)
LAAS-AP2: 12.7 CM2
LAAS-AP4: 13 CM2
LEFT ATRIUM SIZE: 3.1 CM
LEFT ATRIUM VOLUME (MOD BIPLANE): 27 ML
LEFT ATRIUM VOLUME INDEX (MOD BIPLANE): 13.3 ML/M2
LEFT INTERNAL DIMENSION IN SYSTOLE: 2.6 CM (ref 2.1–4)
LEFT VENTRICLE DIASTOLIC VOLUME (MOD BIPLANE): 59 ML
LEFT VENTRICLE SYSTOLIC VOLUME (MOD BIPLANE): 20 ML
LEFT VENTRICULAR INTERNAL DIMENSION IN DIASTOLE: 4.1 CM (ref 3.5–6)
LEFT VENTRICULAR POSTERIOR WALL IN END DIASTOLE: 1.2 CM
LEFT VENTRICULAR STROKE VOLUME: 53 ML
LV EF: 66 %
LVSV (TEICH): 53 ML
LYMPHOCYTES # BLD AUTO: 1.53 THOUSANDS/ÂΜL (ref 0.6–4.47)
LYMPHOCYTES NFR BLD AUTO: 29 % (ref 14–44)
MAGNESIUM SERPL-MCNC: 1.9 MG/DL (ref 1.9–2.7)
MCH RBC QN AUTO: 26.7 PG (ref 26.8–34.3)
MCHC RBC AUTO-ENTMCNC: 31.7 G/DL (ref 31.4–37.4)
MCV RBC AUTO: 84 FL (ref 82–98)
MONOCYTES # BLD AUTO: 0.45 THOUSAND/ÂΜL (ref 0.17–1.22)
MONOCYTES NFR BLD AUTO: 9 % (ref 4–12)
MV E'TISSUE VEL-LAT: 7 CM/S
MV E'TISSUE VEL-SEP: 6 CM/S
MV PEAK A VEL: 0.77 M/S
MV PEAK E VEL: 53 CM/S
MV STENOSIS PRESSURE HALF TIME: 72 MS
MV VALVE AREA P 1/2 METHOD: 3.06
NEUTROPHILS # BLD AUTO: 3.14 THOUSANDS/ÂΜL (ref 1.85–7.62)
NEUTS SEG NFR BLD AUTO: 60 % (ref 43–75)
NRBC BLD AUTO-RTO: 0 /100 WBCS
P AXIS: 33 DEGREES
PLATELET # BLD AUTO: 230 THOUSANDS/UL (ref 149–390)
PMV BLD AUTO: 10.5 FL (ref 8.9–12.7)
POTASSIUM SERPL-SCNC: 3.5 MMOL/L (ref 3.5–5.3)
PR INTERVAL: 202 MS
PROT SERPL-MCNC: 6.2 G/DL (ref 6.4–8.4)
QRS AXIS: -59 DEGREES
QRSD INTERVAL: 140 MS
QT INTERVAL: 464 MS
QTC INTERVAL: 508 MS
RA PRESSURE ESTIMATED: 3 MMHG
RBC # BLD AUTO: 4.45 MILLION/UL (ref 3.81–5.12)
RIGHT ATRIUM AREA SYSTOLE A4C: 10.3 CM2
RIGHT VENTRICLE ID DIMENSION: 3.5 CM
SL CV LEFT ATRIUM LENGTH A2C: 5.2 CM
SL CV LV EF: 66
SL CV PED ECHO LEFT VENTRICLE DIASTOLIC VOLUME (MOD BIPLANE) 2D: 76 ML
SL CV PED ECHO LEFT VENTRICLE SYSTOLIC VOLUME (MOD BIPLANE) 2D: 24 ML
SODIUM SERPL-SCNC: 141 MMOL/L (ref 135–147)
T WAVE AXIS: -13 DEGREES
T4 FREE SERPL-MCNC: 0.98 NG/DL (ref 0.61–1.12)
TRICUSPID ANNULAR PLANE SYSTOLIC EXCURSION: 2.3 CM
TSH SERPL DL<=0.05 MIU/L-ACNC: 0.45 UIU/ML (ref 0.45–4.5)
VENTRICULAR RATE: 72 BPM
WBC # BLD AUTO: 5.25 THOUSAND/UL (ref 4.31–10.16)

## 2023-11-30 PROCEDURE — 83735 ASSAY OF MAGNESIUM: CPT | Performed by: PHYSICIAN ASSISTANT

## 2023-11-30 PROCEDURE — 82948 REAGENT STRIP/BLOOD GLUCOSE: CPT

## 2023-11-30 PROCEDURE — 84443 ASSAY THYROID STIM HORMONE: CPT | Performed by: FAMILY MEDICINE

## 2023-11-30 PROCEDURE — 70551 MRI BRAIN STEM W/O DYE: CPT

## 2023-11-30 PROCEDURE — 80053 COMPREHEN METABOLIC PANEL: CPT | Performed by: PHYSICIAN ASSISTANT

## 2023-11-30 PROCEDURE — 93306 TTE W/DOPPLER COMPLETE: CPT

## 2023-11-30 PROCEDURE — 97116 GAIT TRAINING THERAPY: CPT

## 2023-11-30 PROCEDURE — 97163 PT EVAL HIGH COMPLEX 45 MIN: CPT

## 2023-11-30 PROCEDURE — 99232 SBSQ HOSP IP/OBS MODERATE 35: CPT | Performed by: STUDENT IN AN ORGANIZED HEALTH CARE EDUCATION/TRAINING PROGRAM

## 2023-11-30 PROCEDURE — 85025 COMPLETE CBC W/AUTO DIFF WBC: CPT | Performed by: PHYSICIAN ASSISTANT

## 2023-11-30 PROCEDURE — 99233 SBSQ HOSP IP/OBS HIGH 50: CPT | Performed by: PHYSICIAN ASSISTANT

## 2023-11-30 PROCEDURE — 84439 ASSAY OF FREE THYROXINE: CPT | Performed by: PHYSICIAN ASSISTANT

## 2023-11-30 PROCEDURE — 70544 MR ANGIOGRAPHY HEAD W/O DYE: CPT

## 2023-11-30 PROCEDURE — 97167 OT EVAL HIGH COMPLEX 60 MIN: CPT

## 2023-11-30 PROCEDURE — 83036 HEMOGLOBIN GLYCOSYLATED A1C: CPT | Performed by: FAMILY MEDICINE

## 2023-11-30 PROCEDURE — 97535 SELF CARE MNGMENT TRAINING: CPT

## 2023-11-30 RX ORDER — LORAZEPAM 2 MG/ML
0.5 INJECTION INTRAMUSCULAR
Status: DISCONTINUED | OUTPATIENT
Start: 2023-11-30 | End: 2023-12-02 | Stop reason: HOSPADM

## 2023-11-30 RX ADMIN — ISOSORBIDE MONONITRATE 60 MG: 60 TABLET, EXTENDED RELEASE ORAL at 08:04

## 2023-11-30 RX ADMIN — HYDRALAZINE HYDROCHLORIDE 50 MG: 25 TABLET ORAL at 22:50

## 2023-11-30 RX ADMIN — GABAPENTIN 300 MG: 300 CAPSULE ORAL at 16:36

## 2023-11-30 RX ADMIN — ATORVASTATIN CALCIUM 80 MG: 40 TABLET, FILM COATED ORAL at 16:36

## 2023-11-30 RX ADMIN — HYDRALAZINE HYDROCHLORIDE 50 MG: 25 TABLET ORAL at 06:00

## 2023-11-30 RX ADMIN — INSULIN LISPRO 1 UNITS: 100 INJECTION, SOLUTION INTRAVENOUS; SUBCUTANEOUS at 12:04

## 2023-11-30 RX ADMIN — ASPIRIN 81 MG: 81 TABLET, COATED ORAL at 08:04

## 2023-11-30 RX ADMIN — TICAGRELOR 90 MG: 90 TABLET ORAL at 22:50

## 2023-11-30 RX ADMIN — GABAPENTIN 300 MG: 300 CAPSULE ORAL at 22:50

## 2023-11-30 RX ADMIN — HEPARIN SODIUM 5000 UNITS: 5000 INJECTION INTRAVENOUS; SUBCUTANEOUS at 14:22

## 2023-11-30 RX ADMIN — GABAPENTIN 300 MG: 300 CAPSULE ORAL at 08:04

## 2023-11-30 RX ADMIN — TICAGRELOR 90 MG: 90 TABLET ORAL at 08:04

## 2023-11-30 RX ADMIN — HEPARIN SODIUM 5000 UNITS: 5000 INJECTION INTRAVENOUS; SUBCUTANEOUS at 05:48

## 2023-11-30 RX ADMIN — CARVEDILOL 12.5 MG: 12.5 TABLET, FILM COATED ORAL at 08:03

## 2023-11-30 RX ADMIN — HEPARIN SODIUM 5000 UNITS: 5000 INJECTION INTRAVENOUS; SUBCUTANEOUS at 22:52

## 2023-11-30 RX ADMIN — DOXAZOSIN 2 MG: 1 TABLET ORAL at 08:04

## 2023-11-30 RX ADMIN — LORAZEPAM 0.5 MG: 2 INJECTION INTRAMUSCULAR; INTRAVENOUS at 09:36

## 2023-11-30 NOTE — PHYSICAL THERAPY NOTE
PHYSICAL THERAPY EVALUATION    NAME:  Huy Manzo  DATE: 11/30/23    AGE:   76 y.o. Mrn:   [id number]  ADMIT DX:  TIA (transient ischemic attack) [G45.9]  Hypertension [I10]  Stroke [facility]) [I63.9]    Past Medical History:   Diagnosis Date    Abnormal ECG     last assessed: 5/15/17    Abnormal mammogram     last assessed: 7/11/17    Abnormal stress test     last assesed: 7/24/17    Anemia     Anxiety     Arthritis     Asthma     Back problem     Breast cyst     CAD (coronary artery disease)     Chest pain     last assessed: 7/24/17    Chronic pain disorder     Cyst of left breast     last assessed: 8/18/17    Depression     Depression     resolved: 1/2/18    Diabetes mellitus ([address])     Hiatal hernia     last assessed: 11/7/17    Hyperlipidemia     Hypertension     Keloid of skin     last assessed: 11/2/16    Multiple thyroid nodules     Neuropathy     Psychiatric disorder     anxiety    Stroke [facility])     TIA 2005    Tuberculosis     as a child      Length Of Stay: 1  Performed at least 2 patient identifiers during session: Name and Birthday  PHYSICAL THERAPY EVALUATION:       11/30/23 0825   PT Last Visit   PT Visit Date 11/30/23   Note Type   Note type Evaluation   Pain Assessment   Pain Score   ("sore")   Pain Location/Orientation Orientation: Right;Location: Knee  (at rest)   Restrictions/Precautions   Weight Bearing Precautions Per Order No   Other Precautions Chair Alarm; Bed Alarm; Fall Risk;Pain   Home Living   Type of Home Other (Comment)  [facility])   Home Layout One level;Performs ADLs on one level; Able to live on main level with bedroom/bathroom  (No steps)   Bathroom Shower/Tub Tub/shower unit  (stands to shower)   [address] Cane;Walker   Additional Comments Recently evicted from home; so currently pt and dtr are living in a hotel. Prior Function   Level of [place] Needs assistance with IADLS; Independent with functional mobility; Independent with ADLs   Lives With Daughter   Receives Help From Other (Comment)  (dtr)   IADLs Independent with medication management; Family/Friend/Other provides transportation; Family/Friend/Other provides meals  (dtr drives and does meals)   Falls in the last 6 months 1 to 4   Comments IND no AD short distances   General   Additional Pertinent History Recently here back in october   Family/Caregiver Present No   Cognition   Overall Cognitive Status WFL   Arousal/Participation Cooperative   Orientation Level Oriented X4   Memory Within functional limits   Following Commands Follows one step commands without difficulty   Comments Difficulty finding words. Slow expression   RLE Assessment   RLE Assessment X   Strength RLE   RLE Overall Strength 3+/5   LLE Assessment   LLE Assessment WFL   Coordination   Heel to [place] Impaired  (slightly impaired R LE)   Light Touch   RLE Light Touch Grossly intact  (pt reports slightly different than L LE but can feel light touch)   LLE Light Touch Grossly intact   Proprioception   RLE Proprioception Grossly intact   LLE Proprioception Grossly Intact   Bed Mobility   Additional Comments Not assessed. Pt OOB upon arrival to room. Transfers   Sit to Stand   (steadying assist)   Additional items Verbal cues; Increased time required   Stand to Sit   (steadying assist)   Additional items Verbal cues   Stand pivot 4  Minimal assistance   Additional items Increased time required;Verbal cues   Additional Comments No AD   Ambulation/Elevation   Gait pattern Improper Weight shift;Decreased foot clearance; Inconsistent arnie; Short stride; Excessively slow;Decreased heel strike;Decreased toe off   Gait Assistance 4  Minimal assist   Additional items Assist x 1;Verbal cues   Assistive Device None   Distance 16 ft   Balance   Static Sitting Good   Dynamic Sitting Fair +   Static Standing Fair -   Dynamic Standing Poor +   Ambulatory Poor +   Activity Tolerance   Activity Tolerance Patient limited by pain; Patient limited by fatigue   Medical Staff Made Aware OT Sharon Mann   Nurse Made Aware BHARAT Gilman   Assessment   Prognosis Good   Problem List Decreased strength;Decreased endurance; Impaired balance;Decreased mobility; Impaired sensation;Pain   Barriers to Discharge Decreased caregiver support   Barriers to Discharge Comments Fall risk, mobility off from pt's baseline status   Goals   Patient Goals to get stronger   STG Expiration Date 12/14/23   PT Treatment Day 1   Plan   Treatment/Interventions ADL retraining;Functional transfer training;LE strengthening/ROM; Elevations; Therapeutic exercise; Endurance training;Patient/family training;Equipment eval/education; Bed mobility;Gait training; Compensatory technique education;OT;Spoke to case management   PT Frequency 3-5x/wk   Discharge Recommendation   Rehab Resource Intensity Level, PT I (Maximum Resource Intensity)   AM-PAC Basic Mobility Inpatient   Turning in Flat Bed Without Bedrails 3   Lying on Back to Sitting on Edge of Flat Bed Without Bedrails 3   Moving Bed to Chair 3   Standing Up From Chair Using Arms 3   Walk in Room 3   Climb 3-5 Stairs With Railing 2   Basic Mobility Inpatient Raw Score 17   Basic Mobility Standardized Score 39.67   Highest Level Of Mobility   -HLM Goal 5: Stand one or more mins   JH-HLM Achieved 7: Walk 25 feet or more   Additional Treatment Session   Start Time 0849   End Time 0900   Treatment Assessment Pt tolerated tx session fairly. Interventions consisting of transfer and gait training with use of RW. Limited by c/o R knee pain (6/10) with ambulation. Pt with much improved gait quality using RW as compared to no AD - improved stride length, more fluidity, & faster gait speed with RW. Pt with noted orthostatic hypotension (see vitals), however reports no to mild symptoms. Pt remains a fall risk at this time and level I resource intensity is recommended.    Equipment Use Pt performs transfer using RW steadying - SBA.  Pt ambulates 18 ft x 1 with RW and steadying assist.   End of Consult   Patient Position at End of Consult Bedside chair;Bed/Chair alarm activated; All needs within reach     (Please find full objective findings from PT assessment regarding body systems outlined above). 11/30/23 0843   Vitals   Pulse 69   Blood Pressure 140/69   MAP (mmHg) 93   Patient Position - Orthostatic VS Sitting - Orthostatic VS      11/30/23 0845   Vitals   Pulse 86   Blood Pressure 101/61   MAP (mmHg) 74   Patient Position - Orthostatic VS Standing - Orthostatic VS     Assessment: Pt is a 76 y.o. female seen for PT evaluation s/p admission to [address] [place] [address] on 11/29/2023 with Stroke-like symptoms. Order placed for PT services. Upon evaluation: Pt is presenting with impaired functional mobility due to pain, decreased strength, decreased endurance, impaired balance, gait deviations, altered sensation, and fall risk requiring  min - steadying assistance for transfers and ambulation with RW . Pt's clinical presentation is currently unstable/unpredictable given the functional mobility deficits above, especially decreased activity tolerance and orthostatic hypotension , coupled with fall risks as indicated by AM-PAC 6-Clicks: 00/95 as well as hx of falls and impaired balance and combined with medical complications of poor blood pressure control, pain impacting overall mobility status, abnormal renal lab values, and need for input for mobility technique/safety. Pt's PMHx and comorbidities that may affect physical performance and progress include: CAD, CHF, CKD, DM, and HTN. Personal factors affecting pt at time of IE include: inability to navigate level surfaces without external assistance and recent fall(s)/fall history. Pt will benefit from continued skilled PT services to address deficits as defined above and to maximize level of functional mobility to facilitate return toward PLOF and improved QOL.  From PT/mobility standpoint, recommendation at time of d/c would be Level I (Maximum Resource Intensity) pending progress in order to reduce fall risk and maximize pt's functional independence and consistency with mobility in order to facilitate return to PLOF. Recommend trial with walker next 1-2 sessions and ther ex next 1-2 sessions. The patient's AM-PAC Basic Mobility Inpatient Short Form Raw Score is 17. A Raw score of greater than 16 suggests the patient may benefit from discharge to home. Please also refer to the recommendation of the Physical Therapist for safe discharge planning. Goals: Pt will: Perform bed mobility tasks with modified I to reposition in bed and prepare for transfers. Pt will perform transfers with modified I to increase Indep in prior living environment and decrease burden of care and prepare for ambulation. Pt will ambulate with RW for >/= 250 ft with  modified I  to decrease risk for falls, improve gait quality, and promote proper use of assistive device and to access home environment. Pt will complete >/= 12 steps with with unilateral handrail with modified I to  improve community mobility . Pt will participate in objective balance assessment to determine baseline fall risk.         Wilfrido Leon, PT,DPT

## 2023-11-30 NOTE — PLAN OF CARE
Problem: PHYSICAL THERAPY ADULT  Goal: Performs mobility at highest level of function for planned discharge setting. See evaluation for individualized goals. Description: Treatment/Interventions: ADL retraining, Functional transfer training, LE strengthening/ROM, Elevations, Therapeutic exercise, Endurance training, Patient/family training, Equipment eval/education, Bed mobility, Gait training, Compensatory technique education, OT, Spoke to case management          See flowsheet documentation for full assessment, interventions and recommendations. Note: Prognosis: Good  Problem List: Decreased strength, Decreased endurance, Impaired balance, Decreased mobility, Impaired sensation, Pain  Assessment: Pt is a 76 y.o. female seen for PT evaluation s/p admission to [facility] on 11/29/2023 with Stroke-like symptoms. Order placed for PT services. Upon evaluation: Pt is presenting with impaired functional mobility due to pain, decreased strength, decreased endurance, impaired balance, gait deviations, altered sensation, and fall risk requiring  min - steadying assistance for transfers and ambulation with RW . Pt's clinical presentation is currently unstable/unpredictable given the functional mobility deficits above, especially decreased activity tolerance and orthostatic hypotension , coupled with fall risks as indicated by AM-PAC 6-Clicks: 16/10 as well as hx of falls and impaired balance and combined with medical complications of poor blood pressure control, pain impacting overall mobility status, abnormal renal lab values, and need for input for mobility technique/safety. Pt's PMHx and comorbidities that may affect physical performance and progress include: CAD, CHF, CKD, DM, and HTN. Personal factors affecting pt at time of IE include: inability to navigate level surfaces without external assistance and recent fall(s)/fall history.  Pt will benefit from continued skilled PT services to address deficits as defined above and to maximize level of functional mobility to facilitate return toward PLOF and improved QOL. From PT/mobility standpoint, recommendation at time of d/c would be Level I (Maximum Resource Intensity) pending progress in order to reduce fall risk and maximize pt's functional independence and consistency with mobility in order to facilitate return to PLOF. Recommend trial with walker next 1-2 sessions and ther ex next 1-2 sessions. Barriers to Discharge: Decreased caregiver support  Barriers to Discharge Comments: Fall risk, mobility off from pt's baseline status  Rehab Resource Intensity Level, PT: I (Maximum Resource Intensity)    See flowsheet documentation for full assessment.

## 2023-11-30 NOTE — CASE MANAGEMENT
Case Management Assessment & Discharge Planning Note    Patient name Leyla Ruth  Location /-66 MRN [id number]  : 1955 Date 2023       Current Admission Date: 2023  Current Admission Diagnosis:Acute ischemic stroke [facility])   Patient Active Problem List    Diagnosis Date Noted    Acute ischemic stroke ([address]) 2023    Hypertensive urgency 2023    Coronary artery disease involving native coronary artery of native heart 2023    PFO (patent foramen ovale) 2023    Palpitations 2023    Thyroid nodule 2022    Chronic kidney disease, stage 3 unspecified ([address]) 2022    Abnormal CT scan 08/10/2021    Myasthenia gravis ([address]) 2021    Depression, recurrent ([address]) 2021    Cerebral meningocele ([address]) 2021    Obesity, morbid ([address]) 2020    Chronic heart failure ([address]) 2020    Subcortical microvascular ischemic occlusive disease 2019    Sixth nerve palsy of right eye     Binocular vision disorder with diplopia 2019    Numbness in both hands 2019    Carpal tunnel syndrome of left wrist 2019    Multinodular goiter 2019    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 proliferative retinopathy and macular edema, without long-term current use of insulin ([address])     Diabetic neuropathy ([address]) 2018    Aneurysm ([address]) 2017    Impaired hearing 2017    Hyperlipidemia 2017    Moderate persistent asthma without complication 8337    Lumbar pain 10/18/2016    Hiatal hernia 10/18/2016    Chronic back pain 2016    Essential hypertension 2016    Neuropathy, diabetic ([address]) 2016      LOS (days): 1  Geometric Mean LOS (GMLOS) (days): 2.60  Days to GMLOS:1.6     OBJECTIVE:  PATIENT READMITTED TO HOSPITAL  Risk of Unplanned Readmission Score: 15.63         Current admission status: Inpatient       Preferred Pharmacy:   [address] [place] [id number] [facility]  [address] [address]  Phone: [phone] Fax: [phone] #[id number] Zara Tucker [address] [facility]  [address] [facility] [address]  Phone: [phone] Fax: [phone]    [facility] [place], [place] - [address] [facility] [address]  [address] [facility] [address] [address]  Phone: [phone] Fax: [phone]    Primary Care Provider: Tesfaye Teague DO    Primary Insurance: [facility] [employer]  Secondary Insurance: [address]    ASSESSMENT:  Scott, [address] Representative - Daughter   Primary Phone: [phone] (Mobile)                                Patient Information  Admitted from[de-identified] Home  Mental Status: Alert  During Assessment patient was accompanied by: Not accompanied during assessment  Assessment information provided by[de-identified] Patient  Primary Caregiver: Self  Support Systems: [address] [place], [place] [address]vd of Residence: [facility] Jeremy Galaviz entry access options.  Select all that apply.: No steps to enter home  Type of Current Residence: Other (Comment) [employer])  Living Arrangements: Lives w/ Daughter    Activities of Daily Living Prior to Admission  Functional Status: Independent  Completes ADLs independently?: Yes  Ambulates independently?: Yes  Does patient use assisted devices?: Yes  Assisted Devices (DME) used: Winda Lands  Does patient currently own DME?: Yes  What DME does the patient currently own?: Arina Ochoa  Does patient have a history of Outpatient Therapy (PT/OT)?: No  Does the patient have a history of Short-Term Rehab?: No  Does patient have a history of HHC?: No  Does patient currently have [address]?: No         Patient Information Continued  Income Source: SSI/SSD (pt reports she gets $876/month)  Does patient have prescription coverage?: Yes  Does patient receive dialysis treatments?: No  Does patient have a history of substance abuse?: No  Does patient have a history of Mental Health Diagnosis?: No         Means of Transportation  Means of Transport to Appts[de-identified] Drives Self      Housing Stability: Low Risk  (10/30/2023)    Housing Stability Vital Sign     Unable to Pay for Housing in the Last Year: No     Number of Places Lived in the Last Year: 1     Unstable Housing in the Last Year: No   Food Insecurity: No Food Insecurity (10/30/2023)    Hunger Vital Sign     Worried About Running Out of Food in the Last Year: Never true     Ran Out of Food in the Last Year: Never true   Transportation Needs: No Transportation Needs (10/30/2023)    PRAPARE - Transportation     Lack of Transportation (Medical): No     Lack of Transportation (Non-Medical): No   Utilities: Not on file       DISCHARGE DETAILS:    Discharge planning discussed with[de-identified] patient  Freedom of Choice: Yes     CM contacted family/caregiver?: No- see comments  Were Treatment Team discharge recommendations reviewed with patient/caregiver?: Yes  Did patient/caregiver verbalize understanding of patient care needs?: Yes  Were patient/caregiver advised of the risks associated with not following Treatment Team discharge recommendations?: Yes    Contacts  Patient Contacts: daughter, Soheila Guerra  Relationship to Patient[de-identified] Family                   Would you like to participate in our [address] service program?  : No - Declined           Pt reports she is currently living in a hotel in [facility] her two children, who are 20and 26 yo. Pt sts she is staying in hotel funded by "you know those people, they are helping us, they are paying for it through the 3rd". CM unable to assess who "those people" are, perhaps the [employer] or a [employer] group, pt can not elaborate at this time. Pt sts she receives SSD of $876 monthly.   CM inquiring if the grown children contribute to the finances in the home, pt sts "no", Cm asking if children are employed, pt sts "they are looking for jobs."  Pt sts she was "evicted" from last home in [facility]. Pt sts her and her children both drive, share a car. CM offering pt information on food collier, soup jose, shelters and local community agencies that can assist those in need. Cm giving pt a folder of the above information to the patient. Pt also informing pt to initiate assistance in [facility], she must call 211, pt verbalizes understanding. Pt appears drowsy during assessment. STR has been recommended for pt at time of discharge, pt sts she wishes to go to [facility] in [facility]  Cm reserving [facility]. Cm initiating prior aothorization through pts Kyleigh Hernandez.

## 2023-11-30 NOTE — ASSESSMENT & PLAN NOTE
Lab Results   Component Value Date    HGBA1C 7.1 (H) 10/30/2023       Recent Labs     11/29/23  1315 11/29/23  1719 11/29/23  2118 11/30/23  0801   POCGLU 91 122 121 102         Blood Sugar Average: Last 72 hrs:  (P) 109    Hold home oral meds, utilize SSI  Fingersticks with meals  ADA diet  Daughter reports hypoglycemia at home.  Fu with PCP for diabetes medication management

## 2023-11-30 NOTE — PROGRESS NOTES
Brief Neurology Progress Note    MRI brain completed, noted to have small infarcts in the left genu of the corpus callosum and left thalamus in addition to chronic infarcts and other incidental findings noted on prior MRI brain (basal meningoceles). Have higher suspicion for etiology of strokes being small vessel disease although cannot completely rule out embolic etiology. MRA head completed which did not show irregularities noted on CTA H/N yesterday, only incidental aneurysms, in addition to stenosis of R P1 segment of PCA. Lower suspicion for vasculitis, RCVS or similar condition. TTE with EF 66%, normal LA size, small PFO (ROPE score 2, strokes less likely related to this). She is already on aspirin and Brilinta and reports compliance, although did mention that she missed BP medications recently, unclear if there is true compliance with antiplatelet. Regardless, the combination of ASA/Brilinta is a strong combination and other options do not seem to be better than this, especially with stent in place. Additionally, she has uncontrolled risk factors for stroke and small vessel disease, especially her uncontrolled BP, which will need to be addressed and controlled to reduce risk of future stroke. Would recommend strict control of vascular risk factors and continuing ASA/Brilinta at this time (given that vascular risk factors are not optimized). - Continue aspirin and Brilinta  - Needs good control of vascular risk factors, especially her HTN, in order to prevent future strokes. Needs good control of DM (HbA1c 7.2, which is improved compared to prior numbers) and HLD (). Dietary and lifestyle modifications.   - PT/OT  - Gradually reduce BP goal, avoid overcorrection  - Should have cardiac monitoring in the outpatient setting  - Outpatient follow up with Neurosurgery for aneurysm surveillance     Christianslim Rubio will need follow up in in 4 weeks with neurovascular attending or advance practitioner. She will require outpatient cardiac monitoring.

## 2023-11-30 NOTE — ASSESSMENT & PLAN NOTE
Noted on echo from stroke pathway  Cards referral on dc  Will need holter monitor to complete stroke work up as OP

## 2023-11-30 NOTE — CONSULTS
Consult received for stroke pathway. Pt with some noted speech difficulties, sometimes difficult to understand. Pt reports she was recently evicted from her home, now living in a hotel with her daughters who are at bedside. Family reporting they have tried establishing services with [employer] and food pantries for food assistance though were unable to establish services due to change in address/not within service location. Family reportedly has not ate in several days. Pt reports she was able to receive Meals on Wheels still for herself, is concerned there is not enough food to be able to take her medications as well. During recent previous admission, pt was interested in nutrition services to improve diet quality and her A1c. Discussed with dietary manager, will be providing breakfast/lunch/dinner meals to pt's children during pt's stay. Relayed food insecurity/needs to CM. Daughter also requesting shower chair as pt is unable to step up into tub at the motel. Continue diet as ordered at this time. Diet instruction not appropriate at this time, did discuss lab values with pt however. Diet instruction as outpatient as appropriate.

## 2023-11-30 NOTE — OCCUPATIONAL THERAPY NOTE
Occupational Therapy Evaluation     Patient Name: Jeanine Fonseca  WYVDW'K Date: 11/30/2023  Problem List  Principal Problem:    Stroke-like symptoms  Active Problems:    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 proliferative retinopathy and macular edema, without long-term current use of insulin (HCC)    Moderate persistent asthma without complication    Chronic heart failure (HCC)    Chronic kidney disease, stage 3 unspecified (HCC)    Coronary artery disease involving native coronary artery of native heart    Hypertensive urgency    Past Medical History  Past Medical History:   Diagnosis Date    Abnormal ECG     last assessed: 5/15/17    Abnormal mammogram     last assessed: 7/11/17    Abnormal stress test     last assesed: 7/24/17    Anemia     Anxiety     Arthritis     Asthma     Back problem     Breast cyst     CAD (coronary artery disease)     Chest pain     last assessed: 7/24/17    Chronic pain disorder     Cyst of left breast     last assessed: 8/18/17    Depression     Depression     resolved: 1/2/18    Diabetes mellitus ([facility])     Hiatal hernia     last assessed: 11/7/17    Hyperlipidemia     Hypertension     Keloid of skin     last assessed: 11/2/16    Multiple thyroid nodules     Neuropathy     Psychiatric disorder     anxiety    Stroke [facility])     TIA 2005    Tuberculosis     as a child      Past Surgical History  Past Surgical History:   Procedure Laterality Date    ARM WOUND REPAIR / [facility] Left     CATARACT EXTRACTION Bilateral     2015    COLONOSCOPY      CORONARY ANGIOPLASTY WITH STENT PLACEMENT  03/01/2023    at [facility]      right upper arm. EYE SURGERY      cataract removaql    FL LUMBAR PUNCTURE DIAGNOSTIC  08/15/2019    [place] ESOPHAGOGASTRODUODENOSCOPY TRANSORAL DIAGNOSTIC N/A 01/05/2018    Procedure: ESOPHAGOGASTRODUODENOSCOPY (EGD);   Surgeon: Alesia Lewis DO;  Location: [facility] MAIN OR; Service: Gastroenterology    [place] [address] WRST SURG W/RLS TRANSVRS CARPL LIGM Left 04/19/2019    Procedure: ENDOSCOPIC CARPAL TUNNEL RELEASE;  Surgeon: Sd Pedraza MD;  Location: [address] [place] [address] OR;  Service: Orthopedics    TUBAL LIGATION      US GUIDED THYROID BIOPSY  04/15/2019         11/30/23 0826   Note Type   Note type Evaluation   Pain Assessment   Pain Assessment Tool 0-10   Pain Score 6  (No pain at rest. 6/10 pain with ambulation)   Pain Location/Orientation Orientation: Right;Location: Knee   Restrictions/Precautions   Weight Bearing Precautions Per Order No   Other Precautions Chair Alarm; Bed Alarm; Fall Risk;Pain   Home Living   Type of Home Other (Comment)  [facility])   Home Layout One level;Performs ADLs on one level; Able to live on main level with bedroom/bathroom   Bathroom Shower/Tub Tub/shower unit   Bathroom Toilet Standard   Bathroom Equipment Grab bars in shower   Home Equipment Walker;Cane   Additional Comments Pt reporting her and daughter recently evicted from home and are currently living in hotel. Pt using no AD for short distane mobility PTA. Prior Function   Level of [place] Needs assistance with IADLS; Independent with functional mobility; Independent with ADLs   Lives With Daughter   Receives Help From Family  (dtr)   IADLs Independent with medication management; Family/Friend/Other provides transportation; Family/Friend/Other provides meals  (dtr drives and does meals)   Falls in the last 6 months 1 to 4   Comments Pt reporting previously IND with ADLs, functional transfers and mobility. Daughter completes majority of IADLs and provides transportation. ADL   LB Dressing Assistance   (SBA)   LB Dressing Deficit Setup;Verbal cueing;Supervision/safety; Increased time to complete; Don/doff R sock; Don/doff L sock   Additional Comments Pt able to don socks while sitting in recliner with SBA for safety during distal reach. Decreased [place] in R hand noted during LB dressing.  Based on functional abilities assessed, pt demo ability to complete UB ADLs with setup/SUP and LB ADLs with SBA-Bobby. Bed Mobility   Additional Comments Pt sitting in recliner prior to/following session therefore bed mobility not assessed. Transfers   Sit to Stand   (Bobby-> steadying)   Additional items Assist x 1; Increased time required;Verbal cues   Stand to Sit   (Bobby-> steadying)   Additional items Assist x 1; Increased time required;Verbal cues   Stand pivot   (Bobby->steadying)   Additional items Assist x 1; Increased time required;Verbal cues   Toilet transfer 4  Minimal assistance   Additional items Assist x 1; Increased time required;Verbal cues;Standard toilet   Additional Comments Pt initially completing functional transfers without use of AD. Pt requiring Bobby for STS and SPT without AD due to decreased balance and RLE weakness. Pt reporting dizziness upon initial stand which resolved in ~1min. See below vitals. Pt completing STS and SPT with RW with steadying assist. Cuing required during all functional transfers for safe hand placement, RW management and sequencing. See treatment note for toilet transfer. Functional Mobility   Functional Mobility   (Bobby-> steadying)   Additional Comments Pt initially completing functional mobility without AD wtih Bobby. Please see treatment note for functional mobiltiy from bathroom using RW. Balance   Static Sitting Good   Dynamic Sitting Fair +   Static Standing Fair   Dynamic Standing Poor +   Activity Tolerance   Activity Tolerance Patient limited by fatigue   Medical Staff Made Aware PTDouglas   Nurse Made Aware RN   RUE Assessment   RUE Assessment WFL  (3+/5 MMT)   LUE Assessment   LUE Assessment WFL  (4-/5 MMT)   Hand Function   Gross Motor Coordination Functional   Fine Motor Coordination Impaired  (R hand strength and coordination deficits)   Sensation   Light Touch No apparent deficits   Cognition   Overall Cognitive Status WFL   Arousal/Participation Alert; Responsive; Cooperative Attention Attends with cues to redirect   Orientation Level Oriented X4   Memory Within functional limits   Following Commands Follows one step commands without difficulty   Comments Patient presenting with very slow speech. Assessment   Limitation Decreased ADL status; Decreased UE strength;Decreased Safe judgement during ADL;Decreased endurance;Decreased fine motor control;Decreased self-care trans;Decreased high-level ADLs   Prognosis Fair   Assessment Pt is a 76 y.o. female, admitted to [address] [place] [address] 11/29/2023 d/t experiencing R sided weakness and slurred speech. Dx: stroke-like symptoms. Pt with PMHx impacting their performance during ADL tasks, including: CHF, HTN, CKD, DM, asthma, anxiety, arthritis, CAD, depression, HLD, neuropathy. Prior to admission to the hospital Pt was performing ADLs without physical assistance. IADLs with physical assistance. Functional transfers/ambulation without physical assistance. Cognitive status was PTA was WNL. OT order placed to assess Pt's ADLs, cognitive status, and performance during functional tasks in order to maximize safety and independence while making most appropriate d/c recommendations. Pt's clinical presentation is currently unstable/unpredictable given new onset deficits that effect Pt's occupational performance and ability to safely return to PLOF including decrease activity tolerance, decrease standing balance, decrease performance during ADL tasks, decrease UB MS, increased pain, decrease generalized strength, decrease activity engagement, decrease performance during functional transfers, decrease FM control, limited family support, and high fall risk combined with medical complications of hypertension , poor blood pressure control, and abnormal renal lab values.  Personal factors affecting Pt at time of initial evaluation include: anxiety, depression, limited home support, inability to perform IADLs, inability to perform ADLs, new need for AD, inability to ambulate household distances, inability to navigate community distances, difficulty communicating, and homeless. Pt will benefit from continued skilled OT services to address deficits as defined above and to maximize level independence/participation during ADLs and functional tasks to facilitate return toward PLOF and improved quality of life. From an occupational therapy standpoint, recommendation at time of d/c would be Level II (Moderate Resource Intensity) therapy. Plan   Treatment Interventions ADL retraining;Functional transfer training;UE strengthening/ROM; Endurance training;Patient/family training;Neuromuscular reeducation; Fine motor coordination activities; Energy conservation; Activityengagement   Goal Expiration Date 12/14/23   OT Treatment Day 1   OT Frequency 3-5x/wk   Discharge Recommendation   Rehab Resource Intensity Level, OT II (Moderate Resource Intensity)   AM-PAC Daily Activity Inpatient   Lower Body Dressing 3   Bathing 3   Toileting 3   Upper Body Dressing 3   Grooming 3   Eating 3   Daily Activity Raw Score 18   Daily Activity Standardized Score (Calc for Raw Score >=11) 38.66   AM-PAC Applied Cognition Inpatient   Following a Speech/Presentation 3   Understanding Ordinary Conversation 4   Taking Medications 2   Remembering Where Things Are Placed or Put Away 3   Remembering List of 4-5 Errands 3   Taking Care of Complicated Tasks 3   Applied Cognition Raw Score 18   Applied Cognition Standardized Score 38.07   Additional Treatment Session   Start Time 0847   End Time 0900   Treatment Assessment Pt completed toilet transfer with Bobby using grab bars due to low toilet height. Cuing required for safe hand placement and sequencing. Pt completed functional mobility from bathroom using RW with steadying assist and cuing for RW management.         11/30/23 0843   Vitals   Pulse 69   Blood Pressure 140/69   MAP (mmHg) 93   Patient Position - Orthostatic VS Sitting - Orthostatic VS   Oxygen Therapy   SpO2 98 % 11/30/23 0845   Vitals   Pulse 86   Blood Pressure 101/61   MAP (mmHg) 74   Patient Position - Orthostatic VS Standing - Orthostatic VS   Oxygen Therapy   SpO2 98 %     The patient's raw score on the AM-PAC Daily Activity inpatient short form is 18, standardized score is 38.66, less than 39.4. Patients at this level are likely to benefit from DC to post-acute rehabilitation services. Please refer to the recommendation of the Occupational Therapist for safe DC planning. Pt goals to be met by 12/14/23. Pt will demonstrate ability to complete grooming/hygiene tasks @ [facility] after set-up. Pt will demonstrate ability to complete supine<>sit @ [facility] in order to increase safety and independence during ADL tasks. Pt will demonstrate ability to complete UB ADLs including washing/dressing @ [facility] in order to increase performance and participation during meaningful tasks  Pt will demonstrate ability to complete LB dressing @ [facility] in order to increase safety and independence during meaningful tasks. Pt will demonstrate ability to ne/doff socks/shoes while sitting EOB @ MI in order to increase safety and independence during meaningful tasks. Pt will demonstrate ability to complete toileting tasks including CM and pericare @ [facility] in order to increase safety and independence during meaningful tasks. Pt will demonstrate ability to complete EOB, chair, toilet/commode transfers @ [facility] in order to increase performance and participation during functional tasks. Pt will demonstrate ability to stand for 8 minutes while maintaining G balance with use of RW for UB support PRN. Pt will demonstrate ability to tolerate 30-35 minute OT session with no vc'ing for deep breathing or use of energy conservation techniques in order to increase activity tolerance during functional tasks.    Pt will demonstrate Good carryover of use of energy conservation/compensatory strategies during ADLs and functional tasks in order to increase safety and reduce risk for falls. Pt will demonstrate Good attention and participation in continued evaluation of functional ambulation house hold distances in order to assist with safe d/c planning. Pt will demonstrate 100% carryover of BUE HEP in order to increase BUE MS and increase performance during functional tasks upon d/c home.     Giselle Lyle, OTR/L

## 2023-11-30 NOTE — ASSESSMENT & PLAN NOTE
Lab Results   Component Value Date    EGFR 37 11/30/2023    EGFR 38 11/29/2023    EGFR 40 11/01/2023    CREATININE 1.42 (H) 11/30/2023    CREATININE 1.40 (H) 11/29/2023    CREATININE 1.36 (H) 11/01/2023     Baseline cr around 1.4  She is currently at baseline  Avoid hypotension

## 2023-11-30 NOTE — CASE MANAGEMENT
Case Management Discharge Planning Note    Patient name William Bolaños  Location /-72 MRN [id number]  : 1955 Date 2023       Current Admission Date: 2023  Current Admission Diagnosis:Stroke-like symptoms   Patient Active Problem List    Diagnosis Date Noted    Stroke-like symptoms 2023    Hypertensive urgency 2023    Coronary artery disease involving native coronary artery of native heart 2023    Palpitations 2023    Thyroid nodule 2022    Chronic kidney disease, stage 3 unspecified ([address]) 2022    Abnormal CT scan 08/10/2021    Myasthenia gravis ([address]) 2021    Depression, recurrent ([address]) 2021    Cerebral meningocele ([address]) 2021    Obesity, morbid ([address]) 2020    Chronic heart failure ([address]) 2020    Subcortical microvascular ischemic occlusive disease 2019    Sixth nerve palsy of right eye     Binocular vision disorder with diplopia 2019    Numbness in both hands 2019    Carpal tunnel syndrome of left wrist 2019    Multinodular goiter 2019    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 proliferative retinopathy and macular edema, without long-term current use of insulin ([address])     Diabetic neuropathy ([address]) 2018    Impaired hearing 2017    Hyperlipidemia 2017    Moderate persistent asthma without complication     Lumbar pain 10/18/2016    Hiatal hernia 10/18/2016    Chronic back pain 2016    Essential hypertension 2016    Neuropathy, diabetic ([address]) 2016      LOS (days): 1  Geometric Mean LOS (GMLOS) (days): 2.60  Days to GMLOS:1.7     OBJECTIVE:  Risk of Unplanned Readmission Score: 16.05         Current admission status: Inpatient   Preferred Pharmacy:   [facility] [place], [address] [address] [address]  Phone: [phone] Fax: [phone]    [facility] [place] - [address]  [address] [facility] [address]  [address] [address]  Phone: [phone] Fax: [phone]    [facility] [place], [place] - [address] [facility] [address] [facility] [address] [address]  Phone: [phone] Fax: [phone]    Primary Care Provider: Elvia Carlson DO    Primary Insurance: [facility] [employer]  Secondary Insurance: [facility]    DISCHARGE DETAILS:                    STR has been recommended for pt at time of discharge.   CM placing blanket referral to STR in [place]

## 2023-11-30 NOTE — ASSESSMENT & PLAN NOTE
Acute right sided weakness and aphasia started the morning of admission. Subcentimeter acute to subacute infarcts involving the left genu of the corpus callosum and left thalamus. Chronic lacunar infarcts involving the bilateral centrum semiovale, right internal capsule, and by lateral thalamic regions. Neuro consulted; etiology likely small vessel disease - optimize CV risk factors  Have to r/o embolic etiology with cards referral/ Holter monitor given her PFO on echo  Normotension  Euglycemia  PT/ OT rec rehab which CM is working on it. Will need ST there , too.

## 2023-11-30 NOTE — PLAN OF CARE
Problem: OCCUPATIONAL THERAPY ADULT  Goal: Performs self-care activities at highest level of function for planned discharge setting. See evaluation for individualized goals. Description: Treatment Interventions: ADL retraining, Functional transfer training, UE strengthening/ROM, Endurance training, Patient/family training, Neuromuscular reeducation, Fine motor coordination activities, Energy conservation, Activityengagement          See flowsheet documentation for full assessment, interventions and recommendations. Note: Limitation: Decreased ADL status, Decreased UE strength, Decreased Safe judgement during ADL, Decreased endurance, Decreased fine motor control, Decreased self-care trans, Decreased high-level ADLs  Prognosis: Fair  Assessment: Pt is a 76 y.o. female, admitted to [address] [place] [address] 11/29/2023 d/t experiencing R sided weakness and slurred speech. Dx: stroke-like symptoms. Pt with PMHx impacting their performance during ADL tasks, including: CHF, HTN, CKD, DM, asthma, anxiety, arthritis, CAD, depression, HLD, neuropathy. Prior to admission to the hospital Pt was performing ADLs without physical assistance. IADLs with physical assistance. Functional transfers/ambulation without physical assistance. Cognitive status was PTA was WNL. OT order placed to assess Pt's ADLs, cognitive status, and performance during functional tasks in order to maximize safety and independence while making most appropriate d/c recommendations.  Pt's clinical presentation is currently unstable/unpredictable given new onset deficits that effect Pt's occupational performance and ability to safely return to PLOF including decrease activity tolerance, decrease standing balance, decrease performance during ADL tasks, decrease UB MS, increased pain, decrease generalized strength, decrease activity engagement, decrease performance during functional transfers, decrease FM control, limited family support, and high fall risk combined with medical complications of hypertension , poor blood pressure control, and abnormal renal lab values. Personal factors affecting Pt at time of initial evaluation include: anxiety, depression, limited home support, inability to perform IADLs, inability to perform ADLs, new need for AD, inability to ambulate household distances, inability to navigate community distances, difficulty communicating, and homeless. Pt will benefit from continued skilled OT services to address deficits as defined above and to maximize level independence/participation during ADLs and functional tasks to facilitate return toward PLOF and improved quality of life. From an occupational therapy standpoint, recommendation at time of d/c would be Level II (Moderate Resource Intensity) therapy.      Rehab Resource Intensity Level, OT: II (Moderate Resource Intensity)

## 2023-11-30 NOTE — UTILIZATION REVIEW
Initial Clinical Review    Admission: Date/Time/Statement:   Admission Orders (From admission, onward)       Ordered        11/29/23 1533  INPATIENT ADMISSION  Once                          Orders Placed This Encounter   Procedures    INPATIENT ADMISSION     Standing Status:   Standing     Number of Occurrences:   1     Order Specific Question:   Level of Care     Answer:   Med Surg [16]     Order Specific Question:   Estimated length of stay     Answer:   More than 2 Midnights     Order Specific Question:   Certification     Answer:   I certify that inpatient services are medically necessary for this patient for a duration of greater than two midnights. See H&P and MD Progress Notes for additional information about the patient's course of treatment. ED Arrival Information       Expected   -    Arrival   11/29/2023 13:06    Acuity   Emergent              Means of arrival   Walk-In    Escorted by   Family Member    Service   Hospitalist    Admission type   Emergency              Arrival complaint   Fall on BT, no headstrike, no loss of conscious, Facial numbness, Finger numbness             Chief Complaint   Patient presents with    STROKE Alert     Pt presented to this ED c/o dizziness, elevated bp, right facial numbness, tingling left arm/fingertips and right sided weakness in arm and leg stating fell this morning walking to bathroom between 1336-3525. Denies hitting head/loc. + blood thinners. Last well known at 0730 today       Initial Presentation: 76 y.o. female to ED via walk-in from home   Present to ED with right sided weakness. Ongoing since 10 AM on 11/29. BP of 240/113 on initial check. With development of slurred speech in the ED. Neurology did evaluate the patient and recommended her for stroke pathway.     PMHX CHF, HTN, CKD, DM, asthma   Admitted to [facility] with DX: Stroke-like symptoms   on exam: hypertensive; ill-appearing; Right sided weakness of both uppper and lower extremities; dysarthria   CT head did not show acute stroke but did show abnormality concerning for possible vasculitis or RCVS.   PLAN: neuro checks; tele monitoring; f/u MRI / MRA brain; f/u echo; PT/ OT / ST eval - tx; aspiration precautions; cont asa, brilinta, statin; monitor / trend BP's ; Accuchecks with ssic      Date: 11/30/23      Day 2  Improved word finding capability , still with right side weakness and dysarthria. PT/ OT/ ST rec rehab which CM is working on it. Resume home BP medications for goal normotension 120 - 150 mmHg /  mmHg   Plan: neuro checks; tele monitoring; aspiration precautions; cont asa, brilinta, statin; monitor / trend BP's ; Accuchecks with ssic; CM consulted for D/C to rehab    NEUROLOGY CONSULT   MRI brain completed, noted to have small infarcts in the left genu of the corpus callosum and left thalamus in addition to chronic infarcts and other incidental findings noted on prior MRI brain (basal meningoceles). Have higher suspicion for etiology of strokes being small vessel disease although cannot completely rule out embolic etiology. MRA head completed which did not show irregularities noted on CTA H/N yesterday, only incidental aneurysms, in addition to stenosis of R P1 segment of PCA. Lower suspicion for vasculitis, RCVS or similar condition. TTE with EF 66%, normal LA size, small PFO (ROPE score 2, strokes less likely related to this). She is already on aspirin and Brilinta and reports compliance, although did mention that she missed BP medications recently, unclear if there is true compliance with antiplatelet. Regardless, the combination of ASA/Brilinta is a strong combination and other options do not seem to be better than this, especially with stent in place. Additionally, she has uncontrolled risk factors for stroke and small vessel disease, especially her uncontrolled BP, which will need to be addressed and controlled to reduce risk of future stroke.  Would recommend strict control of vascular risk factors and continuing ASA/Brilinta at this time (given that vascular risk factors are not optimized). Plan:  Continue aspirin and Brilinta;  Needs good control of vascular risk factors, especially her HTN, in order to prevent future strokes. Needs good control of DM (HbA1c 7.2, which is improved compared to prior numbers) and HLD (). Dietary and lifestyle modifications. Gradually reduce BP goal, avoid overcorrection. Should have cardiac monitoring in the outpatient setting. Outpatient follow up with Neurosurgery for aneurysm surveillance       Date: 12/1/23       Day 3: Has surpassed a 2nd midnight with active treatments and services, which include tele monitoring; cont asa, brilinta, statin; monitor / trend BP's ; Accuchecks with ssic; CM consulted for D/C to rehab.       ED Triage Vitals   Temperature Pulse Respirations Blood Pressure SpO2   11/29/23 1315 11/29/23 1315 11/29/23 1315 11/29/23 1315 11/29/23 1315   98.6 °F (37 °C) 60 18 (!) 244/114 98 %      Temp Source Heart Rate Source Patient Position - Orthostatic VS BP Location FiO2 (%)   11/29/23 1315 11/29/23 1315 11/29/23 1315 11/29/23 1600 --   Tympanic Popliteal Lying Right arm       Pain Score       11/29/23 1930       No Pain          Wt Readings from Last 1 Encounters:   11/30/23 93.4 kg (206 lb)     Additional Vital Signs:   Date/Time Temp Pulse Resp BP MAP (mmHg) SpO2 O2 Device Patient Position - Orthostatic VS   12/01/23 0730 98.1 °F (36.7 °C) 63 17 155/82 106 99 % None (Room air) Sitting   12/01/23 0618 -- -- -- 160/80 -- -- -- --   12/01/23 03:30:17 -- -- -- -- -- 97 % -- --   12/01/23 0330 98.1 °F (36.7 °C) 54 Abnormal  19 160/79 106 -- -- --   11/30/23 2330 97.9 °F (36.6 °C) 71 18 157/79 105 96 % -- --   11/30/23 22:49:43 -- 63 -- 166/81 109 98 % -- --   11/30/23 2100 -- -- -- -- -- 96 % None (Room air) --   11/30/23 1930 98.1 °F (36.7 °C) 59 18 156/75 -- -- -- Lying   11/30/23 17:25:59 -- 57 -- 151/76 101 98 % -- --   11/30/23 1614 -- 55 -- -- -- -- -- --   11/30/23 1530 97.5 °F (36.4 °C) 60 16 135/67 90 97 % -- --   11/30/23 14:21:13 -- 61 -- 132/67 89 97 % -- --   11/30/23 1130 97.3 °F (36.3 °C) Abnormal  55 16 131/67 -- -- -- Lying     Date/Time Temp Pulse Resp BP MAP (mmHg) SpO2 O2 Device Patient Position - Orthostatic VS   11/30/23 0927 -- 62 -- 101/61 -- 98 % -- --   11/30/23 08:45:56 -- 86 -- 101/61 74 98 % -- Standing - Orthostatic VS   11/30/23 08:43:54 -- 69 -- 140/69 93 98 % -- Sitting - Orthostatic VS   11/30/23 0730 97.3 °F (36.3 °C) Abnormal  69 18 169/88 -- 95 % None (Room air) Lying   11/30/23 0600 -- -- -- 167/90 -- -- -- --   11/30/23 05:49:54 -- 72 -- 167/90 116 95 % -- --   11/30/23 0330 97.7 °F (36.5 °C) 72 18 142/90 102 94 % None (Room air) Lying   11/30/23 0230 -- -- -- -- -- 96 % None (Room air) --   11/30/23 0130 -- -- 18 147/89 -- -- -- Lying   11/29/23 2330 97.5 °F (36.4 °C) 78 16 148/83 98 94 % None (Room air) Lying   11/29/23 22:08:56 -- -- -- -- -- 95 % -- --   11/29/23 2130 97.9 °F (36.6 °C) 79 18 141/79 -- -- -- Lying   11/29/23 1930 97.9 °F (36.6 °C) 79 18 143/80 101 95 % None (Room air) Lying   11/29/23 1830 97.9 °F (36.6 °C) 74 19 177/82 Abnormal  -- 95 % -- Lying   11/29/23 1730 98.1 °F (36.7 °C) 63 20 179/83 Abnormal  -- 95 % None (Room air) Lying   11/29/23 1630 98.1 °F (36.7 °C) 63 22 171/79 Abnormal  -- 97 % None (Room air) Lying   11/29/23 1600 -- 65 19 136/67 -- 97 % None (Room air) Lying   11/29/23 1530 -- 64 21 192/91 Abnormal  -- 98 % None (Room air) Lying   11/29/23 1527 -- -- -- 182/87 Abnormal  -- -- -- --   11/29/23 1515 -- 70 20 194/93 Abnormal  -- 98 % None (Room air) --   11/29/23 1500 -- 67 18 204/93 Abnormal  -- 98 % None (Room air) Lying   11/29/23 1445 -- 64 18 215/91 Abnormal  -- 98 % None (Room air) Lying   11/29/23 1430 -- 62 18 166/76 -- 98 % None (Room air) Lying   11/29/23 1416 -- -- -- 165/75 -- -- -- --   11/29/23 1415 -- 63 15 165/75 -- 99 % None (Room air) Lying   11/29/23 1400 -- 71 12 167/76 -- 98 % None (Room air) Lying   11/29/23 1345 -- 71 16 198/85 Abnormal  -- 99 % None (Room air) Lying   11/29/23 1330 -- 60 16 243/113 Abnormal  -- 99 % None (Room air) Lying   11/29/23 1315 98.6 °F (37 °C) 60 18 244/114 Abnormal  -- 98 % None (Room air) Lying       EKG: Normal sinus rhythm  Right bundle branch block  Left anterior fascicular block   Bifascicular block   Minimal voltage criteria for LVH, may be normal variant ( R in aVL )  Abnormal ECG      Pertinent Labs/Diagnostic Test Results:   MRI brain wo contrast   Final Result by Nathaniel Agosto MD (11/30 1131)      Subcentimeter acute to subacute infarcts involving the left genu of the corpus callosum and left thalamus. Chronic lacunar infarcts involving the bilateral centrum semiovale, right internal capsule, and by lateral thalamic regions. Chronic microangiopathic ischemic changes. Stable prominence of the right greater than left Meckel's caves. Associated basal meningoceles cannot be excluded in the setting. Partially empty sella. This constellation of findings can be seen with idiopathic intracranial hypertension. Findings are    stable dating back to 2019. The study was marked in [facility] for immediate notification. Workstation performed: [id number]         MRA head wo contrast   Final Result by Nathaniel Agosto MD (11/30 1293)      There is no large vessel intracranial occlusion. Moderate stenosis at the right PCA P1 segment. Focal 2-3 mm medial protuberances along the left cavernous and supraclinoid ICA segments suspicious for aneurysms. Focal 2 mm left PCOM infundibular dilatation versus aneurysm. Workstation performed: [id number]         X-ray chest 1 view portable   Final Result by Poncho Gould MD (11/29 1346)      No acute cardiopulmonary disease.                   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 Recommend follow-up with neurovascular    service   . As mentioned on initial report, there is poor visualization of the left    P-comm that was clearly visualized and patent on the prior study. There is    residual opacification of the left P-comm infundibulum. Final      No significant stenosis of the cervical carotid or vertebral arteries. No acute large vessel occlusion. No significant stenosis of the large vessels. Mild narrowing of the right P1 segment. Poor visualization of. Identified left P-comm. Multifocal irregularity of the distal anterior cerebral artery branches with mild irregularity of the distal MCA branches. Findings are new from prior study. While atherosclerotic disease is possible, suggest correlation for vasculitis or RCVS.      Findings were directly discussed with Aurora Marcial at 1:47 p.m. Workstation performed: [id number]         CT stroke alert brain   Final Result by LANRE Gary MD (11/29 1401)      No acute intracranial abnormality. Stable chronic microangiopathy and old lacunar infarcts. Findings were directly discussed with Aurora Marcial at 1:47 p.m.          Workstation performed: [id number]           Results from last 7 days   Lab Units 11/29/23  1327   SARS-COV-2  Negative     Results from last 7 days   Lab Units 11/30/23  0600 11/29/23  1327   WBC Thousand/uL 5.25 4.88   HEMOGLOBIN g/dL 11.9 12.8   HEMATOCRIT % 37.5 40.7   PLATELETS Thousands/uL 230 239   NEUTROS ABS Thousands/µL 3.14  --         Results from last 7 days   Lab Units 11/30/23  0600 11/29/23  1339   SODIUM mmol/L 141 142   POTASSIUM mmol/L 3.5 3.5 CHLORIDE mmol/L 109* 107   CO2 mmol/L 26 29   ANION GAP mmol/L 6 6   BUN mg/dL 16 14   CREATININE mg/dL 1.42* 1.40*   EGFR ml/min/1.73sq m 37 38   CALCIUM mg/dL 8.4 8.8   MAGNESIUM mg/dL 1.9  --      Results from last 7 days   Lab Units 11/30/23  0600   AST U/L 16   ALT U/L 10   ALK PHOS U/L 90   TOTAL PROTEIN g/dL 6.2*   ALBUMIN g/dL 3.0*   TOTAL BILIRUBIN mg/dL 0.51     Results from last 7 days   Lab Units 12/01/23  0723 11/30/23  2117 11/30/23  1532 11/30/23  1043 11/30/23  0801 11/29/23  2118 11/29/23  1719 11/29/23  1315   POC GLUCOSE mg/dl 108 117 117 153* 102 121 122 91     Results from last 7 days   Lab Units 11/30/23  0600 11/29/23  1339   GLUCOSE RANDOM mg/dL 89 99        Results from last 7 days   Lab Units 11/30/23  0600   HEMOGLOBIN A1C % 7.2*   EAG mg/dl 160     BETA-HYDROXYBUTYRATE   Date Value Ref Range Status   10/30/2023 0.0 <0.6 mmol/L Final         Results from last 7 days   Lab Units 11/29/23  1816 11/29/23  1533 11/29/23  1327   HS TNI 0HR ng/L  --   --  11   HS TNI 2HR ng/L  --  10  --    HSTNI D2 ng/L  --  -1  --    HS TNI 4HR ng/L 11  --   --    HSTNI D4 ng/L 0  --   --         Results from last 7 days   Lab Units 11/29/23  1327   PROTIME seconds 13.3   INR  0.98   PTT seconds 26     Results from last 7 days   Lab Units 11/30/23  0600   TSH 3RD GENERATON uIU/mL 0.448*            Results from last 7 days   Lab Units 11/29/23  1339 11/29/23  1327   CRP mg/L 8.9*  --    SED RATE mm/hour  --  62*        Results from last 7 days   Lab Units 11/29/23  1836   CLARITY UA  Clear   COLOR UA  Yellow   SPEC GRAV UA  1.010   PH UA  7.5   GLUCOSE UA mg/dl 500 (1/2%)*   KETONES UA mg/dl Negative   BLOOD UA  Trace-Intact*   PROTEIN UA mg/dl >=300*   NITRITE UA  Negative   BILIRUBIN UA  Negative   UROBILINOGEN UA E.U./dl 0.2   LEUKOCYTES UA  Negative   WBC UA /hpf None Seen   RBC UA /hpf 0-1   BACTERIA UA /hpf None Seen   EPITHELIAL CELLS WET PREP /hpf Occasional     Results from last 7 days   Lab Units 11/29/23  1327   INFLUENZA A PCR  Negative   INFLUENZA B PCR  Negative   RSV PCR  Negative          ED Treatment:   Medication Administration from 11/29/2023 1305 to 11/29/2023 1630         Date/Time Order Dose Route Action     11/29/2023 1323 EST hydrALAZINE (APRESOLINE) 20 mg/mL injection **ADS Override Pull** 20 mg  Given     11/29/2023 1332 EST iohexol (OMNIPAQUE) 350 MG/ML injection (MULTI-DOSE) 85 mL 85 mL Intravenous Given     11/29/2023 1527 EST hydrALAZINE (APRESOLINE) injection 20 mg 20 mg Intravenous Given            Present on Admission:   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 proliferative retinopathy and macular edema, without long-term current use of insulin ([facility])   Moderate persistent asthma without complication   Hypertensive urgency   Chronic heart failure ([facility])   Chronic kidney disease, stage 3 unspecified ([facility])   Coronary artery disease involving native coronary artery of native heart   Aneurysm ([facility])      Admitting Diagnosis: TIA (transient ischemic attack) [G45.9]  Hypertension [I10]  Stroke ([address]) [I63.9]    Age/Sex: 76 y.o. female    Admission Orders: SCDs; Neuro checks; tele monitoring; aspiration precautions; I/O; Daily wts; I/S; Consistent Carbohydrate Diet. Blood glucose checks ACHS.     Scheduled Medications:  aspirin, 81 mg, Oral, Daily  atorvastatin, 80 mg, Oral, Daily With Dinner  carvedilol, 12.5 mg, Oral, BID With Meals  doxazosin, 2 mg, Oral, Daily  gabapentin, 300 mg, Oral, TID  heparin (porcine), 5,000 Units, Subcutaneous, Q8H ANTONIO  hydrALAZINE, 50 mg, Oral, Q8H ANTONIO  insulin lispro, 1-6 Units, Subcutaneous, TID AC  insulin lispro, 1-6 Units, Subcutaneous, HS  isosorbide mononitrate, 60 mg, Oral, Daily  LORazepam, 0.5 mg, Intravenous, 30 min pre-procedure  polyethylene glycol, 17 g, Oral, Daily  ticagrelor, 90 mg, Oral, Q12H ANTONIO      Continuous IV Infusions: None     PRN Meds:  acetaminophen, 650 mg, Oral, Q6H PRN  albuterol, 2 puff, Inhalation, Q6H PRN  labetalol, 10 mg, Intravenous, Q6H PRN  ondansetron, 4 mg, Intravenous, Q6H PRN        IP CONSULT TO NEUROLOGY  IP CONSULT TO NUTRITION SERVICES    Network Utilization Review Department  ATTENTION: Please call with any questions or concerns to [phone] and carefully listen to the prompts so that you are directed to the right person. All voicemails are confidential.   For Discharge needs, contact Care Management DC Support Team at [phone] opt. 2  Send all requests for admission clinical reviews, approved or denied determinations and any other requests to dedicated fax number below belonging to the campus where the patient is receiving treatment.  List of dedicated fax numbers for the Facilities:  [place] DENIALS (Administrative/Medical Necessity) [phone]   DISCHARGE SUPPORT TEAM (NETWORK) [address] (Maternity/NICU/Pediatrics) [phone]   [address] 1521 [facility] 1000 [facility] [phone]   15[address] [facility] [address] [address] [facility] [address] 1300 [facility] [address] [place] [address] [phone]

## 2023-11-30 NOTE — ASSESSMENT & PLAN NOTE
On presentation, BP was 240/113  Recent hospitalization with similar readings that were controlled with increased doses of cardura and hydralazine  Patient has had social issues outpatient and has forgotten to take several doses of medications  Resume home BP medications for goal normotension 120 - 150 mmHg /  mmHg

## 2023-11-30 NOTE — PROGRESS NOTES
Patient:    MRN:  [id number]    Yao Request ID:  [id number]    Level of care reserved:  [address]    [facility]ner [facility]  [facility] [address] [facility], [facility] [phone]    Clinical needs requested:    Geography searched:  20 miles around [phone]    Start of Service:    Request sent:  12:01pm EST on 11/30/2023 by Laila Salazar    Partner reserved:  4:55pm EST on 11/30/2023 by Laila Salazar    Choice list shared:

## 2023-11-30 NOTE — PLAN OF CARE
Problem: PAIN - ADULT  Goal: Verbalizes/displays adequate comfort level or baseline comfort level  Description: Interventions:  - Encourage patient to monitor pain and request assistance  - Assess pain using appropriate pain scale  - Administer analgesics based on type and severity of pain and evaluate response  - Implement non-pharmacological measures as appropriate and evaluate response  - Consider cultural and social influences on pain and pain management  - Notify physician/advanced practitioner if interventions unsuccessful or patient reports new pain  Outcome: Progressing     Problem: INFECTION - ADULT  Goal: Absence or prevention of progression during hospitalization  Description: INTERVENTIONS:  - Assess and monitor for signs and symptoms of infection  - Monitor lab/diagnostic results  - Monitor all insertion sites, i.e. indwelling lines, tubes, and drains  - Monitor endotracheal if appropriate and nasal secretions for changes in amount and color  - [place] appropriate cooling/warming therapies per order  - Administer medications as ordered  - Instruct and encourage patient and family to use good hand hygiene technique  - Identify and instruct in appropriate isolation precautions for identified infection/condition  Outcome: Progressing  Goal: Absence of fever/infection during neutropenic period  Description: INTERVENTIONS:  - Monitor WBC    Outcome: Progressing     Problem: SAFETY ADULT  Goal: Patient will remain free of falls  Description: INTERVENTIONS:  - Educate patient/family on patient safety including physical limitations  - Instruct patient to call for assistance with activity   - Consult OT/PT to assist with strengthening/mobility   - Keep Call bell within reach  - Keep bed low and locked with side rails adjusted as appropriate  - Keep care items and personal belongings within reach  - Initiate and maintain comfort rounds  - Make Fall Risk Sign visible to staff  - Offer Toileting every 2 Hours, in advance of need  - Initiate/Maintain alarm  - Obtain necessary fall risk management equipment  - Apply yellow socks and bracelet for high fall risk patients  - Consider moving patient to room near nurses station  Outcome: Progressing  Goal: Maintain or return to baseline ADL function  Description: INTERVENTIONS:  -  Assess patient's ability to carry out ADLs; assess patient's baseline for ADL function and identify physical deficits which impact ability to perform ADLs (bathing, care of mouth/teeth, toileting, grooming, dressing, etc.)  - Assess/evaluate cause of self-care deficits   - Assess range of motion  - Assess patient's mobility; develop plan if impaired  - Assess patient's need for assistive devices and provide as appropriate  - Encourage maximum independence but intervene and supervise when necessary  - Involve family in performance of ADLs  - Assess for home care needs following discharge   - Consider OT consult to assist with ADL evaluation and planning for discharge  - Provide patient education as appropriate  Outcome: Progressing  Goal: Maintains/Returns to pre admission functional level  Description: INTERVENTIONS:  - Perform AM-PAC 6 Click Basic Mobility/ Daily Activity assessment daily.  - Set and communicate daily mobility goal to care team and patient/family/caregiver. - Collaborate with rehabilitation services on mobility goals if consulted  - Perform Range of Motion 3 times a day. - Reposition patient every 2 hours.   - Dangle patient 3 times a day  - Stand patient 3 times a day  - Ambulate patient 3 times a day  - Out of bed to chair 3 times a day   - Out of bed for meals 3 times a day  - Out of bed for toileting  - Record patient progress and toleration of activity level   Outcome: Progressing     Problem: DISCHARGE PLANNING  Goal: Discharge to home or other facility with appropriate resources  Description: INTERVENTIONS:  - Identify barriers to discharge w/patient and caregiver  - Arrange for needed discharge resources and transportation as appropriate  - Identify discharge learning needs (meds, wound care, etc.)  - Arrange for interpretive services to assist at discharge as needed  - Refer to Case Management Department for coordinating discharge planning if the patient needs post-hospital services based on physician/advanced practitioner order or complex needs related to functional status, cognitive ability, or social support system  Outcome: Progressing     Problem: Knowledge Deficit  Goal: Patient/family/caregiver demonstrates understanding of disease process, treatment plan, medications, and discharge instructions  Description: Complete learning assessment and assess knowledge base.   Interventions:  - Provide teaching at level of understanding  - Provide teaching via preferred learning methods  Outcome: Progressing     Problem: NEUROSENSORY - ADULT  Goal: Achieves stable or improved neurological status  Description: INTERVENTIONS  - Monitor and report changes in neurological status  - Monitor vital signs such as temperature, blood pressure, glucose, and any other labs ordered   - Initiate measures to prevent increased intracranial pressure  - Monitor for seizure activity and implement precautions if appropriate      Outcome: Progressing  Goal: Remains free of injury related to seizures activity  Description: INTERVENTIONS  - Maintain airway, patient safety  and administer oxygen as ordered  - Monitor patient for seizure activity, document and report duration and description of seizure to physician/advanced practitioner  - If seizure occurs,  ensure patient safety during seizure  - Reorient patient post seizure  - Seizure pads on all 4 side rails  - Instruct patient/family to notify RN of any seizure activity including if an aura is experienced  - Instruct patient/family to call for assistance with activity based on nursing assessment  - Administer anti-seizure medications if ordered    Outcome: Progressing  Goal: Achieves maximal functionality and self care  Description: INTERVENTIONS  - Monitor swallowing and airway patency with patient fatigue and changes in neurological status  - Encourage and assist patient to increase activity and self care.    - Encourage visually impaired, hearing impaired and aphasic patients to use assistive/communication devices  Outcome: Progressing     Problem: MUSCULOSKELETAL - ADULT  Goal: Maintain or return mobility to safest level of function  Description: INTERVENTIONS:  - Assess patient's ability to carry out ADLs; assess patient's baseline for ADL function and identify physical deficits which impact ability to perform ADLs (bathing, care of mouth/teeth, toileting, grooming, dressing, etc.)  - Assess/evaluate cause of self-care deficits   - Assess range of motion  - Assess patient's mobility  - Assess patient's need for assistive devices and provide as appropriate  - Encourage maximum independence but intervene and supervise when necessary  - Involve family in performance of ADLs  - Assess for home care needs following discharge   - Consider OT consult to assist with ADL evaluation and planning for discharge  - Provide patient education as appropriate  Outcome: Progressing  Goal: Maintain proper alignment of affected body part  Description: INTERVENTIONS:  - Support, maintain and protect limb and body alignment  - Provide patient/ family with appropriate education  Outcome: Progressing

## 2023-11-30 NOTE — ASSESSMENT & PLAN NOTE
Wt Readings from Last 3 Encounters:   11/29/23 93.7 kg (206 lb 9.1 oz)   11/13/23 97.1 kg (214 lb)   10/30/23 92.9 kg (204 lb 12.9 oz)     Euvolemic on examination  Resume home lasix  Low salt diet ordered

## 2023-11-30 NOTE — PLAN OF CARE
Problem: PAIN - ADULT  Goal: Verbalizes/displays adequate comfort level or baseline comfort level  Description: Interventions:  - Encourage patient to monitor pain and request assistance  - Assess pain using appropriate pain scale  - Administer analgesics based on type and severity of pain and evaluate response  - Implement non-pharmacological measures as appropriate and evaluate response  - Consider cultural and social influences on pain and pain management  - Notify physician/advanced practitioner if interventions unsuccessful or patient reports new pain  Outcome: Progressing     Problem: INFECTION - ADULT  Goal: Absence or prevention of progression during hospitalization  Description: INTERVENTIONS:  - Assess and monitor for signs and symptoms of infection  - Monitor lab/diagnostic results  - Monitor all insertion sites, i.e. indwelling lines, tubes, and drains  - Monitor endotracheal if appropriate and nasal secretions for changes in amount and color  - [place] appropriate cooling/warming therapies per order  - Administer medications as ordered  - Instruct and encourage patient and family to use good hand hygiene technique  - Identify and instruct in appropriate isolation precautions for identified infection/condition  Outcome: Progressing  Goal: Absence of fever/infection during neutropenic period  Description: INTERVENTIONS:  - Monitor WBC    Outcome: Progressing     Problem: SAFETY ADULT  Goal: Patient will remain free of falls  Description: INTERVENTIONS:  - Educate patient/family on patient safety including physical limitations  - Instruct patient to call for assistance with activity   - Consult OT/PT to assist with strengthening/mobility   - Keep Call bell within reach  - Keep bed low and locked with side rails adjusted as appropriate  - Keep care items and personal belongings within reach  - Initiate and maintain comfort rounds  - Make Fall Risk Sign visible to staff  - Offer Toileting every 2 Hours, in advance of need  - Initiate/Maintain bed alarm    - Apply yellow socks and bracelet for high fall risk patients  - Consider moving patient to room near nurses station  Outcome: Progressing  Goal: Maintain or return to baseline ADL function  Description: INTERVENTIONS:  -  Assess patient's ability to carry out ADLs; assess patient's baseline for ADL function and identify physical deficits which impact ability to perform ADLs (bathing, care of mouth/teeth, toileting, grooming, dressing, etc.)  - Assess/evaluate cause of self-care deficits   - Assess range of motion  - Assess patient's mobility; develop plan if impaired  - Assess patient's need for assistive devices and provide as appropriate  - Encourage maximum independence but intervene and supervise when necessary  - Involve family in performance of ADLs  - Assess for home care needs following discharge   - Consider OT consult to assist with ADL evaluation and planning for discharge  - Provide patient education as appropriate  Outcome: Progressing  Goal: Maintains/Returns to pre admission functional level  Description: INTERVENTIONS:  - Perform AM-PAC 6 Click Basic Mobility/ Daily Activity assessment daily.  - Set and communicate daily mobility goal to care team and patient/family/caregiver.    - Collaborate with rehabilitation services on mobility goals if consulted  - Stand patient 3 times a day  - Ambulate patient 3 times a day  - Out of bed to chair 3 times a day   - Out of bed for meals 3 times a day  - Out of bed for toileting  - Record patient progress and toleration of activity level   Outcome: Progressing     Problem: DISCHARGE PLANNING  Goal: Discharge to home or other facility with appropriate resources  Description: INTERVENTIONS:  - Identify barriers to discharge w/patient and caregiver  - Arrange for needed discharge resources and transportation as appropriate  - Identify discharge learning needs (meds, wound care, etc.)  - Arrange for interpretive services to assist at discharge as needed  - Refer to Case Management Department for coordinating discharge planning if the patient needs post-hospital services based on physician/advanced practitioner order or complex needs related to functional status, cognitive ability, or social support system  Outcome: Progressing     Problem: Knowledge Deficit  Goal: Patient/family/caregiver demonstrates understanding of disease process, treatment plan, medications, and discharge instructions  Description: Complete learning assessment and assess knowledge base.   Interventions:  - Provide teaching at level of understanding  - Provide teaching via preferred learning methods  Outcome: Progressing     Problem: NEUROSENSORY - ADULT  Goal: Achieves stable or improved neurological status  Description: INTERVENTIONS  - Monitor and report changes in neurological status  - Monitor vital signs such as temperature, blood pressure, glucose, and any other labs ordered   - Initiate measures to prevent increased intracranial pressure  - Monitor for seizure activity and implement precautions if appropriate      Outcome: Progressing  Goal: Remains free of injury related to seizures activity  Description: INTERVENTIONS  - Maintain airway, patient safety  and administer oxygen as ordered  - Monitor patient for seizure activity, document and report duration and description of seizure to physician/advanced practitioner  - If seizure occurs,  ensure patient safety during seizure  - Reorient patient post seizure  - Seizure pads on all 4 side rails  - Instruct patient/family to notify RN of any seizure activity including if an aura is experienced  - Instruct patient/family to call for assistance with activity based on nursing assessment  - Administer anti-seizure medications if ordered    Outcome: Progressing  Goal: Achieves maximal functionality and self care  Description: INTERVENTIONS  - Monitor swallowing and airway patency with patient fatigue and changes in neurological status  - Encourage and assist patient to increase activity and self care.    - Encourage visually impaired, hearing impaired and aphasic patients to use assistive/communication devices  Outcome: Progressing     Problem: MUSCULOSKELETAL - ADULT  Goal: Maintain or return mobility to safest level of function  Description: INTERVENTIONS:  - Assess patient's ability to carry out ADLs; assess patient's baseline for ADL function and identify physical deficits which impact ability to perform ADLs (bathing, care of mouth/teeth, toileting, grooming, dressing, etc.)  - Assess/evaluate cause of self-care deficits   - Assess range of motion  - Assess patient's mobility  - Assess patient's need for assistive devices and provide as appropriate  - Encourage maximum independence but intervene and supervise when necessary  - Involve family in performance of ADLs  - Assess for home care needs following discharge   - Consider OT consult to assist with ADL evaluation and planning for discharge  - Provide patient education as appropriate  Outcome: Progressing  Goal: Maintain proper alignment of affected body part  Description: INTERVENTIONS:  - Support, maintain and protect limb and body alignment  - Provide patient/ family with appropriate education  Outcome: Progressing

## 2023-11-30 NOTE — PROGRESS NOTES
[address] [facility]  Progress Note  Name: Shameka Weiss  MRN: [id number]  Unit/Bed#: -40 I Date of Admission: 11/29/2023   Date of Service: 11/30/2023 I Hospital Day: 1    Assessment/Plan   * Acute ischemic stroke [facility])  Assessment & Plan  Acute right sided weakness and aphasia started the morning of admission. Subcentimeter acute to subacute infarcts involving the left genu of the corpus callosum and left thalamus. Chronic lacunar infarcts involving the bilateral centrum semiovale, right internal capsule, and by lateral thalamic regions. Neuro consulted; etiology likely small vessel disease - optimize CV risk factors  Have to r/o embolic etiology with cards referral/ Holter monitor given her PFO on echo  Normotension  Euglycemia  PT/ OT rec rehab which CM is working on it. Will need ST there , too.     Hypertensive urgency  Assessment & Plan  On presentation, BP was 240/113  Recent hospitalization with similar readings that were controlled with increased doses of cardura and hydralazine  Patient has had social issues outpatient and has forgotten to take several doses of medications  Resume home BP medications for goal normotension 120 - 150 mmHg /  mmHg    PFO (patent foramen ovale)  Assessment & Plan  Noted on echo from stroke pathway  Cards referral on dc  Will need holter monitor to complete stroke work up as OP    Coronary artery disease involving native coronary artery of native heart  Assessment & Plan  Continue statin, asa, brilinta, imdur, coreg    Chronic kidney disease, stage 3 unspecified ([address] [facility])  Assessment & Plan  Lab Results   Component Value Date    EGFR 37 11/30/2023    EGFR 38 11/29/2023    EGFR 40 11/01/2023    CREATININE 1.42 (H) 11/30/2023    CREATININE 1.40 (H) 11/29/2023    CREATININE 1.36 (H) 11/01/2023     Baseline cr around 1.4  She is currently at baseline  Avoid hypotension    Cerebral meningocele [facility])  Assessment & Plan  Known history  Unchanged for years since childhood    Chronic heart failure ([address])  Assessment & Plan  Wt Readings from Last 3 Encounters:   11/29/23 93.7 kg (206 lb 9.1 oz)   11/13/23 97.1 kg (214 lb)   10/30/23 92.9 kg (204 lb 12.9 oz)     Euvolemic on examination  Resume home lasix  Low salt diet ordered          Moderate persistent asthma without complication  Assessment & Plan  Not in acute exacerbation at this time    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 proliferative retinopathy and macular edema, without long-term current use of insulin [facility])  Assessment & Plan  Lab Results   Component Value Date    HGBA1C 7.1 (H) 10/30/2023       Recent Labs     11/29/23  1315 11/29/23  1719 11/29/23  2118 11/30/23  0801   POCGLU 91 122 121 102         Blood Sugar Average: Last 72 hrs:  (P) 109    Hold home oral meds, utilize SSI  Fingersticks with meals  ADA diet  Daughter reports hypoglycemia at home. Fu with PCP for diabetes medication management    Aneurysm [facility])  Assessment & Plan  Noted on MRA  Neurosurgery fu as OP for serial imaging         VTE Pharmacologic Prophylaxis: VTE Score: 8 High Risk (Score >/= 5) - Pharmacological DVT Prophylaxis Ordered: heparin. Sequential Compression Devices Ordered. Mobility:   Basic Mobility Inpatient Raw Score: 17  JH-HLM Goal: 5: Stand one or more mins  JH-HLM Achieved: 6: Walk 10 steps or more  HLM Goal achieved. Continue to encourage appropriate mobility. Patient Centered Rounds: I performed bedside rounds with nursing staff today. Discussions with Specialists or Other Care Team Provider: neuro, cards    Education and Discussions with Family / Patient: Updated  (daughter) at bedside. Total Time Spent on Date of Encounter in care of patient: 55 mins.  This time was spent on one or more of the following: performing physical exam; counseling and coordination of care; obtaining or reviewing history; documenting in the medical record; reviewing/ordering tests, medications or procedures; communicating with other healthcare professionals and discussing with patient's family/caregivers. Current Length of Stay: 1 day(s)  Current Patient Status: Inpatient   Certification Statement: The patient will continue to require additional inpatient hospital stay due to placement for pt rehab  Discharge Plan:  asap once placement is achieved    Code Status: Level 1 - Full Code    Subjective:   Improved word finding capability , still with right side weakness and dysarthria    No drooling or dysphagia    Mentation is clear    Objective:     Vitals:   Temp (24hrs), Av.7 °F (36.5 °C), Min:97.3 °F (36.3 °C), Max:98.1 °F (36.7 °C)    Temp:  [97.3 °F (36.3 °C)-98.1 °F (36.7 °C)] 97.5 °F (36.4 °C)  HR:  [55-86] 60  Resp:  [16-22] 16  BP: (101-179)/(61-90) 135/67  SpO2:  [94 %-98 %] 97 %  Body mass index is 33.25 kg/m². Input and Output Summary (last 24 hours): Intake/Output Summary (Last 24 hours) at 2023 1543  Last data filed at 2023 1238  Gross per 24 hour   Intake 660 ml   Output 300 ml   Net 360 ml       Physical Exam:   Physical Exam  Vitals and nursing note reviewed. Constitutional:       General: She is not in acute distress. HENT:      Head: Normocephalic and atraumatic. Nose: No congestion. Mouth/Throat:      Mouth: Mucous membranes are moist.   Eyes:      Conjunctiva/sclera: Conjunctivae normal.   Cardiovascular:      Rate and Rhythm: Normal rate and regular rhythm. Heart sounds: No murmur heard. Pulmonary:      Effort: Pulmonary effort is normal.   Abdominal:      General: Abdomen is flat. Musculoskeletal:         General: No swelling. Cervical back: Normal range of motion. Skin:     General: Skin is dry. Neurological:      Mental Status: She is alert.       Comments: Dysarthria no facial droop   Psychiatric:         Behavior: Behavior normal.          Additional Data:     Labs:  Results from last 7 days   Lab Units 23  0600   WBC Thousand/uL 5.25   HEMOGLOBIN g/dL 11.9   HEMATOCRIT % 37.5   PLATELETS Thousands/uL 230   NEUTROS PCT % 60   LYMPHS PCT % 29   MONOS PCT % 9   EOS PCT % 2     Results from last 7 days   Lab Units 11/30/23  0600   SODIUM mmol/L 141   POTASSIUM mmol/L 3.5   CHLORIDE mmol/L 109*   CO2 mmol/L 26   BUN mg/dL 16   CREATININE mg/dL 1.42*   ANION GAP mmol/L 6   CALCIUM mg/dL 8.4   ALBUMIN g/dL 3.0*   TOTAL BILIRUBIN mg/dL 0.51   ALK PHOS U/L 90   ALT U/L 10   AST U/L 16   GLUCOSE RANDOM mg/dL 89     Results from last 7 days   Lab Units 11/29/23  1327   INR  0.98     Results from last 7 days   Lab Units 11/30/23  1043 11/30/23  0801 11/29/23  2118 11/29/23  1719 11/29/23  1315   POC GLUCOSE mg/dl 153* 102 121 122 91     Results from last 7 days   Lab Units 11/30/23  0600   HEMOGLOBIN A1C % 7.2*           Lines/Drains:  Invasive Devices       Peripheral Intravenous Line  Duration             Peripheral IV 11/29/23 Left Antecubital 1 day                      Telemetry:  Telemetry Orders (From admission, onward)               24 Hour Telemetry Monitoring  Continuous x 24 Hours (Telem)        Question:  Reason for 24 Hour Telemetry  Answer:  TIA/Suspected CVA/ Confirmed CVA                     Telemetry Reviewed: Normal Sinus Rhythm  Indication for Continued Telemetry Use: Acute CVA             Imaging: Reviewed radiology reports from this admission including: MRI brain and MRA    Recent Cultures (last 7 days): none        Last 24 Hours Medication List:   Current Facility-Administered Medications   Medication Dose Route Frequency Provider Last Rate    acetaminophen  650 mg Oral Q6H PRN Amber Walsh PA-C      albuterol  2 puff Inhalation Q6H PRN Amber Walsh PA-C      aspirin  81 mg Oral Daily Amber Walsh PA-C      atorvastatin  80 mg Oral Daily With [employer], MIKEY      carvedilol  12.5 mg Oral BID With Meals Alanna Lambert MD      doxazosin  2 mg Oral Daily Amber Walsh PA-C      gabapentin  300 mg Oral TID Brittany Caban Corky Du PA-C      heparin (porcine)  5,000 Units Subcutaneous [facility], MIKEY      hydrALAZINE  50 mg Oral [facility], PA-DEMETRIUS      insulin lispro  1-6 Units Subcutaneous TID AC [facility], PA-DEMETRIUS      insulin lispro  1-6 Units Subcutaneous HS [facility], PA-DEMETRIUS      isosorbide mononitrate  60 mg Oral Daily [facility], MIKEY      labetalol  10 mg Intravenous Q6H PRN Anthony Gaffney MD      LORazepam  0.5 mg Intravenous 30 min pre-procedure Heaven Allen PA-C      ondansetron  4 mg Intravenous Q6H PRN [facility]KEY ellington      polyethylene glycol  17 g Oral Daily [facility], MIKEY      ticagrelor  90 mg Oral Q12H [address] [facility] [place]          Today, Patient Was Seen By: Parrish Acosta PA-C    **Please Note: This note may have been constructed using a voice recognition system. **

## 2023-11-30 NOTE — PLAN OF CARE
Problem: PHYSICAL THERAPY ADULT  Goal: Performs mobility at highest level of function for planned discharge setting. See evaluation for individualized goals. Description: Treatment/Interventions: ADL retraining, Functional transfer training, LE strengthening/ROM, Elevations, Therapeutic exercise, Endurance training, Patient/family training, Equipment eval/education, Bed mobility, Gait training, Compensatory technique education, OT, Spoke to case management          See flowsheet documentation for full assessment, interventions and recommendations. 11/30/2023 1045 by Lissa Madera PT  Outcome: Progressing  Note: Prognosis: Good  Problem List: Decreased strength, Decreased endurance, Impaired balance, Decreased mobility, Impaired sensation, Pain  Pt tolerated tx session fairly. Interventions consisting of transfer and gait training with use of RW. Limited by c/o R knee pain (6/10) with ambulation. Pt with much improved gait quality using RW as compared to no AD - improved stride length, more fluidity, & faster gait speed with RW. Pt with noted orthostatic hypotension (see vitals), however reports no to mild symptoms. Pt remains a fall risk at this time and level I resource intensity is recommended. Barriers to Discharge: Decreased caregiver support  Barriers to Discharge Comments: Fall risk, mobility off from pt's baseline status  Rehab Resource Intensity Level, PT: I (Maximum Resource Intensity)    See flowsheet documentation for full assessment.

## 2023-12-01 LAB
GLUCOSE SERPL-MCNC: 100 MG/DL (ref 65–140)
GLUCOSE SERPL-MCNC: 108 MG/DL (ref 65–140)
GLUCOSE SERPL-MCNC: 143 MG/DL (ref 65–140)
GLUCOSE SERPL-MCNC: 187 MG/DL (ref 65–140)

## 2023-12-01 PROCEDURE — 97116 GAIT TRAINING THERAPY: CPT

## 2023-12-01 PROCEDURE — 99232 SBSQ HOSP IP/OBS MODERATE 35: CPT | Performed by: NURSE PRACTITIONER

## 2023-12-01 PROCEDURE — 82948 REAGENT STRIP/BLOOD GLUCOSE: CPT

## 2023-12-01 PROCEDURE — 92522 EVALUATE SPEECH PRODUCTION: CPT

## 2023-12-01 PROCEDURE — 97530 THERAPEUTIC ACTIVITIES: CPT

## 2023-12-01 RX ADMIN — GABAPENTIN 300 MG: 300 CAPSULE ORAL at 08:11

## 2023-12-01 RX ADMIN — HYDRALAZINE HYDROCHLORIDE 50 MG: 25 TABLET ORAL at 21:45

## 2023-12-01 RX ADMIN — HEPARIN SODIUM 5000 UNITS: 5000 INJECTION INTRAVENOUS; SUBCUTANEOUS at 15:44

## 2023-12-01 RX ADMIN — TICAGRELOR 90 MG: 90 TABLET ORAL at 08:11

## 2023-12-01 RX ADMIN — DOXAZOSIN 2 MG: 1 TABLET ORAL at 08:11

## 2023-12-01 RX ADMIN — POLYETHYLENE GLYCOL 3350 17 G: 17 POWDER, FOR SOLUTION ORAL at 08:10

## 2023-12-01 RX ADMIN — INSULIN LISPRO 1 UNITS: 100 INJECTION, SOLUTION INTRAVENOUS; SUBCUTANEOUS at 11:33

## 2023-12-01 RX ADMIN — ISOSORBIDE MONONITRATE 60 MG: 60 TABLET, EXTENDED RELEASE ORAL at 08:11

## 2023-12-01 RX ADMIN — HYDRALAZINE HYDROCHLORIDE 50 MG: 25 TABLET ORAL at 06:18

## 2023-12-01 RX ADMIN — CARVEDILOL 12.5 MG: 12.5 TABLET, FILM COATED ORAL at 08:11

## 2023-12-01 RX ADMIN — GABAPENTIN 300 MG: 300 CAPSULE ORAL at 21:45

## 2023-12-01 RX ADMIN — GABAPENTIN 300 MG: 300 CAPSULE ORAL at 15:44

## 2023-12-01 RX ADMIN — HYDRALAZINE HYDROCHLORIDE 50 MG: 25 TABLET ORAL at 15:53

## 2023-12-01 RX ADMIN — ALBUTEROL SULFATE 2 PUFF: 90 AEROSOL, METERED RESPIRATORY (INHALATION) at 14:15

## 2023-12-01 RX ADMIN — TICAGRELOR 90 MG: 90 TABLET ORAL at 21:45

## 2023-12-01 RX ADMIN — HEPARIN SODIUM 5000 UNITS: 5000 INJECTION INTRAVENOUS; SUBCUTANEOUS at 06:21

## 2023-12-01 RX ADMIN — CARVEDILOL 12.5 MG: 12.5 TABLET, FILM COATED ORAL at 15:44

## 2023-12-01 RX ADMIN — ASPIRIN 81 MG: 81 TABLET, COATED ORAL at 08:11

## 2023-12-01 RX ADMIN — ATORVASTATIN CALCIUM 80 MG: 40 TABLET, FILM COATED ORAL at 15:44

## 2023-12-01 RX ADMIN — HEPARIN SODIUM 5000 UNITS: 5000 INJECTION INTRAVENOUS; SUBCUTANEOUS at 21:46

## 2023-12-01 NOTE — PLAN OF CARE
Problem: PAIN - ADULT  Goal: Verbalizes/displays adequate comfort level or baseline comfort level  Description: Interventions:  - Encourage patient to monitor pain and request assistance  - Assess pain using appropriate pain scale  - Administer analgesics based on type and severity of pain and evaluate response  - Implement non-pharmacological measures as appropriate and evaluate response  - Consider cultural and social influences on pain and pain management  - Notify physician/advanced practitioner if interventions unsuccessful or patient reports new pain  Outcome: Progressing     Problem: INFECTION - ADULT  Goal: Absence or prevention of progression during hospitalization  Description: INTERVENTIONS:  - Assess and monitor for signs and symptoms of infection  - Monitor lab/diagnostic results  - Monitor all insertion sites, i.e. indwelling lines, tubes, and drains  - Monitor endotracheal if appropriate and nasal secretions for changes in amount and color  - Center appropriate cooling/warming therapies per order  - Administer medications as ordered  - Instruct and encourage patient and family to use good hand hygiene technique  - Identify and instruct in appropriate isolation precautions for identified infection/condition  Outcome: Progressing     Problem: SAFETY ADULT  Goal: Patient will remain free of falls  Description: INTERVENTIONS:  - Educate patient/family on patient safety including physical limitations  - Instruct patient to call for assistance with activity   - Consult OT/PT to assist with strengthening/mobility   - Keep Call bell within reach  - Keep bed low and locked with side rails adjusted as appropriate  - Keep care items and personal belongings within reach  - Initiate and maintain comfort rounds  - Make Fall Risk Sign visible to staff  - Offer Toileting every 2 Hours, in advance of need  - Initiate/Maintain bed alarm    - Apply yellow socks and bracelet for high fall risk patients  - Consider moving patient to room near nurses station  Outcome: Progressing  Goal: Maintain or return to baseline ADL function  Description: INTERVENTIONS:  -  Assess patient's ability to carry out ADLs; assess patient's baseline for ADL function and identify physical deficits which impact ability to perform ADLs (bathing, care of mouth/teeth, toileting, grooming, dressing, etc.)  - Assess/evaluate cause of self-care deficits   - Assess range of motion  - Assess patient's mobility; develop plan if impaired  - Assess patient's need for assistive devices and provide as appropriate  - Encourage maximum independence but intervene and supervise when necessary  - Involve family in performance of ADLs  - Assess for home care needs following discharge   - Consider OT consult to assist with ADL evaluation and planning for discharge  - Provide patient education as appropriate  Outcome: Progressing  Goal: Maintains/Returns to pre admission functional level  Description: INTERVENTIONS:  - Perform AM-PAC 6 Click Basic Mobility/ Daily Activity assessment daily.  - Set and communicate daily mobility goal to care team and patient/family/caregiver. - Collaborate with rehabilitation services on mobility goals if consulted  - Perform Range of Motion 2 times a day.     - Out of bed for toileting  - Record patient progress and toleration of activity level   Outcome: Progressing     Problem: DISCHARGE PLANNING  Goal: Discharge to home or other facility with appropriate resources  Description: INTERVENTIONS:  - Identify barriers to discharge w/patient and caregiver  - Arrange for needed discharge resources and transportation as appropriate  - Identify discharge learning needs (meds, wound care, etc.)  - Arrange for interpretive services to assist at discharge as needed  - Refer to Case Management Department for coordinating discharge planning if the patient needs post-hospital services based on physician/advanced practitioner order or complex needs related to functional status, cognitive ability, or social support system  Outcome: Progressing     Problem: Knowledge Deficit  Goal: Patient/family/caregiver demonstrates understanding of disease process, treatment plan, medications, and discharge instructions  Description: Complete learning assessment and assess knowledge base. Interventions:  - Provide teaching at level of understanding  - Provide teaching via preferred learning methods  Outcome: Progressing     Problem: NEUROSENSORY - ADULT  Goal: Achieves stable or improved neurological status  Description: INTERVENTIONS  - Monitor and report changes in neurological status  - Monitor vital signs such as temperature, blood pressure, glucose, and any other labs ordered   - Initiate measures to prevent increased intracranial pressure  - Monitor for seizure activity and implement precautions if appropriate      Outcome: Progressing  Goal: Achieves maximal functionality and self care  Description: INTERVENTIONS  - Monitor swallowing and airway patency with patient fatigue and changes in neurological status  - Encourage and assist patient to increase activity and self care.    - Encourage visually impaired, hearing impaired and aphasic patients to use assistive/communication devices  Outcome: Progressing     Problem: MUSCULOSKELETAL - ADULT  Goal: Maintain or return mobility to safest level of function  Description: INTERVENTIONS:  - Assess patient's ability to carry out ADLs; assess patient's baseline for ADL function and identify physical deficits which impact ability to perform ADLs (bathing, care of mouth/teeth, toileting, grooming, dressing, etc.)  - Assess/evaluate cause of self-care deficits   - Assess range of motion  - Assess patient's mobility  - Assess patient's need for assistive devices and provide as appropriate  - Encourage maximum independence but intervene and supervise when necessary  - Involve family in performance of ADLs  - Assess for home care needs following discharge   - Consider OT consult to assist with ADL evaluation and planning for discharge  - Provide patient education as appropriate  Outcome: Progressing  Goal: Maintain proper alignment of affected body part  Description: INTERVENTIONS:  - Support, maintain and protect limb and body alignment  - Provide patient/ family with appropriate education  Outcome: Progressing

## 2023-12-01 NOTE — PLAN OF CARE
Problem: PAIN - ADULT  Goal: Verbalizes/displays adequate comfort level or baseline comfort level  Description: Interventions:  - Encourage patient to monitor pain and request assistance  - Assess pain using appropriate pain scale  - Administer analgesics based on type and severity of pain and evaluate response  - Implement non-pharmacological measures as appropriate and evaluate response  - Consider cultural and social influences on pain and pain management  - Notify physician/advanced practitioner if interventions unsuccessful or patient reports new pain  Outcome: Progressing     Problem: INFECTION - ADULT  Goal: Absence or prevention of progression during hospitalization  Description: INTERVENTIONS:  - Assess and monitor for signs and symptoms of infection  - Monitor lab/diagnostic results  - Monitor all insertion sites, i.e. indwelling lines, tubes, and drains  - Monitor endotracheal if appropriate and nasal secretions for changes in amount and color  - [place] appropriate cooling/warming therapies per order  - Administer medications as ordered  - Instruct and encourage patient and family to use good hand hygiene technique  - Identify and instruct in appropriate isolation precautions for identified infection/condition  Outcome: Progressing  Goal: Absence of fever/infection during neutropenic period  Description: INTERVENTIONS:  - Monitor WBC    Outcome: Progressing     Problem: SAFETY ADULT  Goal: Patient will remain free of falls  Description: INTERVENTIONS:  - Educate patient/family on patient safety including physical limitations  - Instruct patient to call for assistance with activity   - Consult OT/PT to assist with strengthening/mobility   - Keep Call bell within reach  - Keep bed low and locked with side rails adjusted as appropriate  - Keep care items and personal belongings within reach  - Initiate and maintain comfort rounds  - Make Fall Risk Sign visible to staff  - Offer Toileting every 2 Hours, in advance of need  - Initiate/Maintain bed alarm  - Obtain necessary fall risk management equipment walker  - Apply yellow socks and bracelet for high fall risk patients  - Consider moving patient to room near nurses station  Outcome: Progressing  Goal: Maintain or return to baseline ADL function  Description: INTERVENTIONS:  -  Assess patient's ability to carry out ADLs; assess patient's baseline for ADL function and identify physical deficits which impact ability to perform ADLs (bathing, care of mouth/teeth, toileting, grooming, dressing, etc.)  - Assess/evaluate cause of self-care deficits   - Assess range of motion  - Assess patient's mobility; develop plan if impaired  - Assess patient's need for assistive devices and provide as appropriate  - Encourage maximum independence but intervene and supervise when necessary  - Involve family in performance of ADLs  - Assess for home care needs following discharge   - Consider OT consult to assist with ADL evaluation and planning for discharge  - Provide patient education as appropriate  Outcome: Progressing  Goal: Maintains/Returns to pre admission functional level  Description: INTERVENTIONS:  - Perform AM-PAC 6 Click Basic Mobility/ Daily Activity assessment daily.  - Set and communicate daily mobility goal to care team and patient/family/caregiver. - Collaborate with rehabilitation services on mobility goals if consulted  - Perform Range of Motion 3 times a day. - Reposition patient every 2 hours.   - Dangle patient 3 times a day  - Stand patient 3 times a day  - Ambulate patient 3 times a day  - Out of bed to chair 3 times a day   - Out of bed for meals 3 times a day  - Out of bed for toileting  - Record patient progress and toleration of activity level   Outcome: Progressing     Problem: DISCHARGE PLANNING  Goal: Discharge to home or other facility with appropriate resources  Description: INTERVENTIONS:  - Identify barriers to discharge w/patient and caregiver  - Arrange for needed discharge resources and transportation as appropriate  - Identify discharge learning needs (meds, wound care, etc.)  - Arrange for interpretive services to assist at discharge as needed  - Refer to Case Management Department for coordinating discharge planning if the patient needs post-hospital services based on physician/advanced practitioner order or complex needs related to functional status, cognitive ability, or social support system  Outcome: Progressing     Problem: Knowledge Deficit  Goal: Patient/family/caregiver demonstrates understanding of disease process, treatment plan, medications, and discharge instructions  Description: Complete learning assessment and assess knowledge base.   Interventions:  - Provide teaching at level of understanding  - Provide teaching via preferred learning methods  Outcome: Progressing     Problem: NEUROSENSORY - ADULT  Goal: Achieves stable or improved neurological status  Description: INTERVENTIONS  - Monitor and report changes in neurological status  - Monitor vital signs such as temperature, blood pressure, glucose, and any other labs ordered   - Initiate measures to prevent increased intracranial pressure  - Monitor for seizure activity and implement precautions if appropriate      Outcome: Progressing  Goal: Remains free of injury related to seizures activity  Description: INTERVENTIONS  - Maintain airway, patient safety  and administer oxygen as ordered  - Monitor patient for seizure activity, document and report duration and description of seizure to physician/advanced practitioner  - If seizure occurs,  ensure patient safety during seizure  - Reorient patient post seizure  - Seizure pads on all 4 side rails  - Instruct patient/family to notify RN of any seizure activity including if an aura is experienced  - Instruct patient/family to call for assistance with activity based on nursing assessment  - Administer anti-seizure medications if ordered    Outcome: Progressing  Goal: Achieves maximal functionality and self care  Description: INTERVENTIONS  - Monitor swallowing and airway patency with patient fatigue and changes in neurological status  - Encourage and assist patient to increase activity and self care.    - Encourage visually impaired, hearing impaired and aphasic patients to use assistive/communication devices  Outcome: Progressing     Problem: MUSCULOSKELETAL - ADULT  Goal: Maintain or return mobility to safest level of function  Description: INTERVENTIONS:  - Assess patient's ability to carry out ADLs; assess patient's baseline for ADL function and identify physical deficits which impact ability to perform ADLs (bathing, care of mouth/teeth, toileting, grooming, dressing, etc.)  - Assess/evaluate cause of self-care deficits   - Assess range of motion  - Assess patient's mobility  - Assess patient's need for assistive devices and provide as appropriate  - Encourage maximum independence but intervene and supervise when necessary  - Involve family in performance of ADLs  - Assess for home care needs following discharge   - Consider OT consult to assist with ADL evaluation and planning for discharge  - Provide patient education as appropriate  Outcome: Progressing  Goal: Maintain proper alignment of affected body part  Description: INTERVENTIONS:  - Support, maintain and protect limb and body alignment  - Provide patient/ family with appropriate education  Outcome: Progressing

## 2023-12-01 NOTE — CASE MANAGEMENT
[facility] has received approved authorization from insurance:   [facility] in by Aspen Vilchis P#  [phone] [facility].[id number]  Authorization received for: SNF  Facility: [address] [place] [address] Road #:[id number]  Start of Care:12/1/23  Next Review Date:12/6/23  Continued Stay Care Coordinator: Zulema CAGE#: [phone] ext.  [phone]  Submit next review via fax#[phone]   Care Manager notified: Dalila Malave

## 2023-12-01 NOTE — ASSESSMENT & PLAN NOTE
Acute right sided weakness and aphasia started the morning of admission. Subcentimeter acute to subacute infarcts involving the left genu of the corpus callosum and left thalamus. Chronic lacunar infarcts involving the bilateral centrum semiovale, right internal capsule, and by lateral thalamic regions.    Neuro consulted; etiology likely small vessel disease - optimize CV risk factors  Have to r/o embolic etiology with cards referral/ Holter monitor given her PFO on echo  Normotension  Euglycemia  PT/ OT rec rehab; case management for dispo planning; auth pending

## 2023-12-01 NOTE — SPEECH THERAPY NOTE
Speech Language/Pathology  Speech/Language Evaluation    MOTOR SPEECH EVALUATION    Impressions:  Pt presents w/ mild dysarthria characterized by imprecise articulation. Oral motor exam reveals mild R facial droop w/ decreased strength/ROM of labial/lingual musculature. Expressive/ receptive language and cognition appears WFL. Pt reported she is frequently biting the inside of her right cheek. SLP provided oral motor exercises for strengthening. Recommend continued SLP services at rehab. Therapy Prognosis: Good      H&P/Admit info, pertinent provider notes:(pmh noted above)    Miranda Tavares is a 76 y.o. female with a PMH of CHF, HTN, CKD, DM, asthma who presents with right sided weakness. Ongoing since 10 AM on 11/29. With development of slurred speech in the ED. Has not been consistent with her medications secondary to social issues including being kicked out of her apartment. Also with significantly elevated BP on presentation. Was recently discharged with good control per note review. No seizure like activity noted. Pateint denies any headache, fevers, chills. She did vomit in the ED. Denies diarrhea or constipation. CT head did not show acute stroke but did show abnormality concerning for possible vasculitis or RCVS. Neurology did evaluate the patient and recommended her for stroke pathway. Special Studies:  MRI Brain:     Subcentimeter acute to subacute infarcts involving the left genu of the corpus callosum and left thalamus. Chronic lacunar infarcts involving the bilateral centrum semiovale, right internal capsule, and by lateral thalamic regions. Chronic microangiopathic ischemic changes. Stable prominence of the right greater than left Meckel's caves. Associated basal meningoceles cannot be excluded in the setting. Partially empty sella. This constellation of findings can be seen with idiopathic intracranial hypertension.  Findings are   stable dating back to 2019.    Evaluation:    Vowel prolongation: /a/ (Normal 15-20 seconds): WFL    S/z Ratio: WFL    Diadochokinesis:    puh puh puh (normal 3-5.5 repetitions/second): impaired    tuh tuh tuh (normal should exceed 25 reps in 10 seconds): impaired    kuh kuh kuh (normal should exceed 25 reps in 10 seconds): impaired    puh tuh kuh (or buttercup)- (normal should exceed 8 reps in 10 seconds): impaired    Articulation:  Single words: intact    Multisyllabic words: impaired    Repetition of multisyllabic words: impaired    Words of progressive length: impaired     Oral apraxia: N/A    Articulation in connected speech: Impaired    Conversation:  Imprecise articulation  Slow rate    Jose A Momin, [address] [facility]-SLP  12/1/2023

## 2023-12-01 NOTE — CASE MANAGEMENT
Contacted [employer] at the request of CM, and spoke to Lee [phone] [id number] to change SNF Facility From [facility] to [facility]. Lee will be working on the case later today, made changes to Pending Auth Request#[id number].  Notified CM: Farzaneh Roe

## 2023-12-01 NOTE — CASE MANAGEMENT
Case Management Discharge Planning Note    Patient name Jaziel Prather  Location /-24 MRN [id number]  : 1955 Date 2023       Current Admission Date: 2023  Current Admission Diagnosis:Acute ischemic stroke [facility])   Patient Active Problem List    Diagnosis Date Noted    Acute ischemic stroke ([address]) 2023    Hypertensive urgency 2023    Coronary artery disease involving native coronary artery of native heart 2023    PFO (patent foramen ovale) 2023    Palpitations 2023    Thyroid nodule 2022    Chronic kidney disease, stage 3 unspecified ([address]) 2022    Abnormal CT scan 08/10/2021    Myasthenia gravis ([address]) 2021    Depression, recurrent ([address]) 2021    Cerebral meningocele ([address]) 2021    Obesity, morbid ([address]) 2020    Chronic heart failure ([address]) 2020    Subcortical microvascular ischemic occlusive disease 2019    Sixth nerve palsy of right eye     Binocular vision disorder with diplopia 2019    Numbness in both hands 2019    Carpal tunnel syndrome of left wrist 2019    Multinodular goiter 2019    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 proliferative retinopathy and macular edema, without long-term current use of insulin ([address])     Diabetic neuropathy ([address]) 2018    Aneurysm ([address]) 2017    Impaired hearing 2017    Hyperlipidemia 2017    Moderate persistent asthma without complication     Lumbar pain 10/18/2016    Hiatal hernia 10/18/2016    Chronic back pain 2016    Essential hypertension 2016    Neuropathy, diabetic ([address]) 2016      LOS (days): 2  Geometric Mean LOS (GMLOS) (days): 2.90  Days to GMLOS:0.9     OBJECTIVE:  Risk of Unplanned Readmission Score: 15.86         Current admission status: Inpatient   Preferred Pharmacy:   [facility], [facility] [address] [place] [address]  [address] [place] [address]  Phone: [phone] Fax: [phone]    [facility] #[address]67 [place], [place] - [address]  [address]  [address] [place] [address]  Phone: [phone] Fax: [phone]    [facility] - [place], [place] - [address]  [address] [address]  Phone: [phone] Fax: [phone]    Primary Care Provider: Idania Collins DO    Primary Insurance: [facility]  Secondary Insurance: [id number]    DISCHARGE DETAILS:    Discharge planning discussed with[de-identified] patient  Freedom of Choice: Yes  Comments - Freedom of Choice: blanket STR referral                     Requested [address]         Is the patient interested in [facility] at discharge?: No    DME Referral Provided  Referral made for DME?: No    Other Referral/Resources/Interventions Provided:  Interventions: Short Term Rehab  Referral Comments: [facility] STR    Would you like to participate in our [address] service program?  : No - Declined    Treatment Team Recommendation: Short Term Rehab  Discharge Destination Plan[de-identified] Short Term Rehab  Transport at Discharge : [address] met with patient to let her know auth received for STR and CM will schedule WC transport for tomorrow. CM submitted [facility] referral for [facility] to [facility].

## 2023-12-01 NOTE — PLAN OF CARE
Problem: PHYSICAL THERAPY ADULT  Goal: Performs mobility at highest level of function for planned discharge setting. See evaluation for individualized goals. Description: Treatment/Interventions: ADL retraining, Functional transfer training, LE strengthening/ROM, Elevations, Therapeutic exercise, Endurance training, Patient/family training, Equipment eval/education, Bed mobility, Gait training, Compensatory technique education, OT, Spoke to case management          See flowsheet documentation for full assessment, interventions and recommendations. 12/1/2023 1614 by Lissa Madera PT  Outcome: Progressing  Note: Prognosis: Good  Problem List: Decreased strength, Decreased endurance, Impaired balance, Decreased mobility, Decreased coordination, Impaired sensation  Assessment: Pt tolerated tx session fairly well. Interventions consisting of bed mobility, transfer training, gait training, & progressive gait training. Pt progressing ambulation distance with less assistance required. Pt reports feeling better today compared to yesterday with improvements in speech. She continues to report her R UE/LE feeling "heavy" with noted lack of motor control of R side. Pt reporting only very mild lightheadedness during gait with /85 seated vs. 158/76 standing. Barriers to Discharge: Decreased caregiver support  Barriers to Discharge Comments: fall risk  Rehab Resource Intensity Level, PT: II (Moderate Resource Intensity)    See flowsheet documentation for full assessment.

## 2023-12-01 NOTE — ASSESSMENT & PLAN NOTE
Lab Results   Component Value Date    HGBA1C 7.2 (H) 11/30/2023       Recent Labs     11/30/23  1043 11/30/23  1532 11/30/23  2117 12/01/23  0723   POCGLU 153* 117 117 108         Blood Sugar Average: Last 72 hrs:  (P) 116.375    Hold home oral meds, utilize SSI  Fingersticks with meals  ADA diet  Daughter reports hypoglycemia at home.  Fu with PCP for diabetes medication management

## 2023-12-01 NOTE — CASE MANAGEMENT
Case Management Discharge Planning Note    Patient name Radu Knowles  Location /-42 MRN [id number]  : 1955 Date 2023       Current Admission Date: 2023  Current Admission Diagnosis:Acute ischemic stroke [facility])   Patient Active Problem List    Diagnosis Date Noted    Acute ischemic stroke ([address]) 2023    Hypertensive urgency 2023    Coronary artery disease involving native coronary artery of native heart 2023    PFO (patent foramen ovale) 2023    Palpitations 2023    Thyroid nodule 2022    Chronic kidney disease, stage 3 unspecified ([address]) 2022    Abnormal CT scan 08/10/2021    Myasthenia gravis ([address]) 2021    Depression, recurrent ([address]) 2021    Cerebral meningocele ([address]) 2021    Obesity, morbid ([address]) 2020    Chronic heart failure ([address]) 2020    Subcortical microvascular ischemic occlusive disease 2019    Sixth nerve palsy of right eye     Binocular vision disorder with diplopia 2019    Numbness in both hands 2019    Carpal tunnel syndrome of left wrist 2019    Multinodular goiter 2019    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 proliferative retinopathy and macular edema, without long-term current use of insulin ([address])     Diabetic neuropathy ([address]) 2018    Aneurysm ([address]) 2017    Impaired hearing 2017    Hyperlipidemia 2017    Moderate persistent asthma without complication     Lumbar pain 10/18/2016    Hiatal hernia 10/18/2016    Chronic back pain 2016    Essential hypertension 2016    Neuropathy, diabetic ([address]) 2016      LOS (days): 2  Geometric Mean LOS (GMLOS) (days): 2.90  Days to GMLOS:0.9     OBJECTIVE:  Risk of Unplanned Readmission Score: 15.86         Current admission status: Inpatient   Preferred Pharmacy:   [facility], [facility] [address] [place] [address]  [address] [place] [address]  Phone: [phone] Fax: [phone] #[phone] [place], [place] - [address] [facility] [address]  [address] [facility] [address]  [address] [place] [address]  Phone: [phone] Fax: [phone]    [facility] - [place], [place] - [id number]  [address] [facility] [address] [facility] [address]  Phone: [phone] Fax: [phone]    Primary Care Provider: Zuleika Gonzalez DO    Primary Insurance: [facility] RE[facility]  Secondary Insurance: [facility] Number: [id number] [facility]

## 2023-12-01 NOTE — CASE MANAGEMENT
[address] received request for authorization from Care Manager.   Authorization request for: SNF  Facility Name: [address] NPI: [id number]  Facility MD: Dr. Salima Toure: [id number]  Authorization initiated by contacting insurance: [employer] Via: Availity   Pending Reference #: [id number]  Clinicals submitted via: Availity attachment

## 2023-12-01 NOTE — PROGRESS NOTES
Patient:    MRN:  [id number]    Yao Request ID:  [id number]    Level of care reserved:  [address]    Partner Reserved:  [address] [facility], [place], [facility] [address] [phone]    Clinical needs requested:    Geography searched:  20 miles around [address] [place] [address]    Start of Service:    Request sent:  8:51am EST on 12/1/2023 by Trae Mary    Partner reserved:  1:22pm EST on 12/1/2023 by Trae Mary    Choice list shared:  1:22pm EST on 12/1/2023 by Trae Mary

## 2023-12-01 NOTE — PHYSICAL THERAPY NOTE
PHYSICAL THERAPY TREATMENT NOTE    NAME:  Xiomy Beavers  DATE: 12/01/23    Length Of Stay: 2  Performed at least 2 patient identifiers during session: Name and Birthday    TREATMENT FLOW SHEET:       12/01/23 1445   PT Last Visit   PT Visit Date 12/01/23   Note Type   Note Type Treatment   Pain Assessment   Pain Assessment Tool 0-10   Pain Score 3   Pain Location/Orientation Orientation: Lower; Location: Back;Orientation: Right   Restrictions/Precautions   Weight Bearing Precautions Per Order No   Other Precautions Chair Alarm; Bed Alarm; Fall Risk   General   Chart Reviewed Yes   Family/Caregiver Present Yes   Cognition   Overall Cognitive Status WFL   Arousal/Participation Cooperative   Attention Within functional limits   Orientation Level Oriented X4   Memory Within functional limits   Following Commands Follows one step commands without difficulty   Bed Mobility   Supine to Sit 5  Supervision   Sit to Supine 5  Supervision   Additional items Verbal cues   Transfers   Sit to Stand   (SBA)   Additional items Verbal cues   Stand to Sit   (SBA)   Stand pivot   (SBA)   Additional items Verbal cues; Increased time required   Toilet transfer   (SBA)   Additional items Verbal cues;Standard toilet   Additional Comments Using RW   Ambulation/Elevation   Gait pattern Improper Weight shift;Decreased foot clearance; Short stride   Gait Assistance   (SBA - steadying assist)   Additional items Assist x 1;Verbal cues   Assistive Device Rolling walker   Distance 10 ft x 1, 95 ft x 1, 110 ft x 1   Ambulation/Elevation Additional Comments Pt also ambulates 9 ft x 1 without AD, min A (much slower arnie, shorter stride, reaching for external support in room)   Balance   Static Sitting Good   Dynamic Sitting Fair +   Static Standing Fair   Dynamic Standing Fair -   Ambulatory Fair -   Activity Tolerance   Activity Tolerance Patient limited by fatigue   Nurse Made Aware BHARAT Rollins   Prognosis Good   Problem List Decreased strength;Decreased endurance; Impaired balance;Decreased mobility; Decreased coordination; Impaired sensation   Assessment Pt tolerated tx session fairly well. Interventions consisting of bed mobility, transfer training, gait training, & progressive gait training. Pt progressing ambulation distance with less assistance required. Pt reports feeling better today compared to yesterday with improvements in speech. She continues to report her R UE/LE feeling "heavy" with noted lack of motor control of R side. Pt reporting only very mild lightheadedness during gait with /85 seated vs. 158/76 standing. Barriers to Discharge Decreased caregiver support   Barriers to Discharge Comments fall risk   Goals   Patient Goals to get as well as I can   STG Expiration Date 12/14/23   PT Treatment Day 2   Plan   Treatment/Interventions ADL retraining;Functional transfer training;LE strengthening/ROM; Elevations; Therapeutic exercise; Endurance training;Patient/family training;Equipment eval/education; Bed mobility;Gait training;Spoke to nursing   Progress Progressing toward goals   PT Frequency 3-5x/wk   Discharge Recommendation   Rehab Resource Intensity Level, PT II (Moderate Resource Intensity)   Additional Comments using RW   AM-PAC Basic Mobility Inpatient   Turning in Flat Bed Without Bedrails 3   Lying on Back to Sitting on Edge of Flat Bed Without Bedrails 3   Moving Bed to Chair 3   Standing Up From Chair Using Arms 3   Walk in Room 3   Climb 3-5 Stairs With Railing 3   Basic Mobility Inpatient Raw Score 18   Basic Mobility Standardized Score 41.05   Highest Level Of Mobility   JH-HLM Goal 6: Walk 10 steps or more   JH-HLM Achieved 7: Walk 25 feet or more   Education   Education Provided Mobility training;Assistive device   Patient Demonstrates acceptance/verbal understanding   End of Consult   Patient Position at End of Consult Supine;Bed/Chair alarm activated; All needs within reach       The patient's AM-PAC Basic Mobility Inpatient Short Form Raw Score is 18. A Raw score of greater than 16 suggests the patient may benefit from discharge to home. Please also refer to the recommendation of the Physical Therapist for safe discharge planning.        Marylou Kaplan PT,DPT

## 2023-12-01 NOTE — ASSESSMENT & PLAN NOTE
Wt Readings from Last 3 Encounters:   11/30/23 93.4 kg (206 lb)   11/13/23 97.1 kg (214 lb)   10/30/23 92.9 kg (204 lb 12.9 oz)     Euvolemic on examination  Resume home lasix  Low salt diet ordered

## 2023-12-01 NOTE — PROGRESS NOTES
[address] [facility]  Progress Note  Name: Keyla Daniels  MRN: [id number]  Unit/Bed#: -04 I Date of Admission: 11/29/2023   Date of Service: 12/1/2023 I Hospital Day: 2    Assessment/Plan   * Acute ischemic stroke [facility])  Assessment & Plan  Acute right sided weakness and aphasia started the morning of admission. Subcentimeter acute to subacute infarcts involving the left genu of the corpus callosum and left thalamus. Chronic lacunar infarcts involving the bilateral centrum semiovale, right internal capsule, and by lateral thalamic regions.    Neuro consulted; etiology likely small vessel disease - optimize CV risk factors  Have to r/o embolic etiology with cards referral/ Holter monitor given her PFO on echo  Normotension  Euglycemia  PT/ OT rec rehab; case management for dispo planning; auth pending     Hypertensive urgency  Assessment & Plan  On presentation, BP was 240/113  Recent hospitalization with similar readings that were controlled with increased doses of cardura and hydralazine  Patient has had social issues outpatient and has forgotten to take several doses of medications  Resume home BP medications for goal normotension 120 - 150 mmHg /  mmHg    PFO (patent foramen ovale)  Assessment & Plan  Noted on echo from stroke pathway  Cards referral on dc  Will need holter monitor to complete stroke work up as OP    Coronary artery disease involving native coronary artery of native heart  Assessment & Plan  Continue statin, asa, brilinta, imdur, coreg    Chronic kidney disease, stage 3 unspecified ([address] [facility])  Assessment & Plan  Lab Results   Component Value Date    EGFR 37 11/30/2023    EGFR 38 11/29/2023    EGFR 40 11/01/2023    CREATININE 1.42 (H) 11/30/2023    CREATININE 1.40 (H) 11/29/2023    CREATININE 1.36 (H) 11/01/2023     Baseline cr around 1.4  She is currently at baseline  Avoid hypotension    Cerebral meningocele (HCC)  Assessment & Plan  Known history  Unchanged for years since childhood    Chronic heart failure ([address])  Assessment & Plan  Wt Readings from Last 3 Encounters:   11/30/23 93.4 kg (206 lb)   11/13/23 97.1 kg (214 lb)   10/30/23 92.9 kg (204 lb 12.9 oz)     Euvolemic on examination  Resume home lasix  Low salt diet ordered          Moderate persistent asthma without complication  Assessment & Plan  Not in acute exacerbation at this time    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 proliferative retinopathy and macular edema, without long-term current use of insulin [facility])  Assessment & Plan  Lab Results   Component Value Date    HGBA1C 7.2 (H) 11/30/2023       Recent Labs     11/30/23  1043 11/30/23  1532 11/30/23  2117 12/01/23  0723   POCGLU 153* 117 117 108         Blood Sugar Average: Last 72 hrs:  (P) 116.375    Hold home oral meds, utilize SSI  Fingersticks with meals  ADA diet  Daughter reports hypoglycemia at home. Fu with PCP for diabetes medication management    Aneurysm [facility])  Assessment & Plan  Noted on MRA  Neurosurgery fu as OP for serial imaging               VTE Pharmacologic Prophylaxis: VTE Score: 8 High Risk (Score >/= 5) - Pharmacological DVT Prophylaxis Ordered: heparin. Sequential Compression Devices Ordered. Mobility:   Basic Mobility Inpatient Raw Score: 17  JH-HLM Goal: 5: Stand one or more mins  JH-HLM Achieved: 7: Walk 25 feet or more  HLM Goal achieved. Continue to encourage appropriate mobility. Patient Centered Rounds: I performed bedside rounds with nursing staff today. Discussions with Specialists or Other Care Team Provider: case management     Education and Discussions with Family / Patient: Patient declined call to . Total Time Spent on Date of Encounter in care of patient: 30 mins.  This time was spent on one or more of the following: performing physical exam; counseling and coordination of care; obtaining or reviewing history; documenting in the medical record; reviewing/ordering tests, medications or procedures; communicating with other healthcare professionals and discussing with patient's family/caregivers. Current Length of Stay: 2 day(s)  Current Patient Status: Inpatient   Certification Statement: The patient will continue to require additional inpatient hospital stay due to acute CVA awaiting placement   Discharge Plan:  medically stable pending dispo plan per case management     Code Status: Level 1 - Full Code    Subjective:   Patient has no generalized complaints today understands waiting to here on patient going to [facility] back home. Objective:     Vitals:   Temp (24hrs), Av.9 °F (36.6 °C), Min:97.5 °F (36.4 °C), Max:98.1 °F (36.7 °C)    Temp:  [97.5 °F (36.4 °C)-98.1 °F (36.7 °C)] 97.7 °F (36.5 °C)  HR:  [54-71] 60  Resp:  [16-19] 18  BP: (129-166)/(67-82) 129/69  SpO2:  [96 %-99 %] 97 %  Body mass index is 33.25 kg/m². Input and Output Summary (last 24 hours): Intake/Output Summary (Last 24 hours) at 2023 1429  Last data filed at 2023 0813  Gross per 24 hour   Intake 600 ml   Output --   Net 600 ml       Physical Exam:   Physical Exam  Vitals and nursing note reviewed. Constitutional:       General: She is not in acute distress. Appearance: She is well-developed. HENT:      Head: Normocephalic and atraumatic. Eyes:      Conjunctiva/sclera: Conjunctivae normal.   Cardiovascular:      Rate and Rhythm: Normal rate and regular rhythm. Heart sounds: No murmur heard. Pulmonary:      Effort: Pulmonary effort is normal. No respiratory distress. Breath sounds: Normal breath sounds. Abdominal:      Palpations: Abdomen is soft. Tenderness: There is no abdominal tenderness. Musculoskeletal:         General: No swelling. Cervical back: Neck supple. Skin:     General: Skin is warm and dry. Capillary Refill: Capillary refill takes less than 2 seconds. Neurological:      Mental Status: She is alert and oriented to person, place, and time. Psychiatric:         Mood and Affect: Mood normal.          Additional Data:     Labs:  Results from last 7 days   Lab Units 11/30/23  0600   WBC Thousand/uL 5.25   HEMOGLOBIN g/dL 11.9   HEMATOCRIT % 37.5   PLATELETS Thousands/uL 230   NEUTROS PCT % 60   LYMPHS PCT % 29   MONOS PCT % 9   EOS PCT % 2     Results from last 7 days   Lab Units 11/30/23  0600   SODIUM mmol/L 141   POTASSIUM mmol/L 3.5   CHLORIDE mmol/L 109*   CO2 mmol/L 26   BUN mg/dL 16   CREATININE mg/dL 1.42*   ANION GAP mmol/L 6   CALCIUM mg/dL 8.4   ALBUMIN g/dL 3.0*   TOTAL BILIRUBIN mg/dL 0.51   ALK PHOS U/L 90   ALT U/L 10   AST U/L 16   GLUCOSE RANDOM mg/dL 89     Results from last 7 days   Lab Units 11/29/23  1327   INR  0.98     Results from last 7 days   Lab Units 12/01/23  1106 12/01/23  0723 11/30/23  2117 11/30/23  1532 11/30/23  1043 11/30/23  0801 11/29/23  2118 11/29/23  1719 11/29/23  1315   POC GLUCOSE mg/dl 187* 108 117 117 153* 102 121 122 91     Results from last 7 days   Lab Units 11/30/23  0600   HEMOGLOBIN A1C % 7.2*           Lines/Drains:  Invasive Devices       Peripheral Intravenous Line  Duration             Peripheral IV 11/29/23 Left Antecubital 2 days                      Telemetry:  Telemetry Orders (From admission, onward)               24 Hour Telemetry Monitoring  Continuous x 24 Hours (Telem)        Question:  Reason for 24 Hour Telemetry  Answer:  TIA/Suspected CVA/ Confirmed CVA                     Telemetry Reviewed: Normal Sinus Rhythm  Indication for Continued Telemetry Use: Acute CVA             Imaging: Reviewed radiology reports from this admission including: MRI brain    Recent Cultures (last 7 days):         Last 24 Hours Medication List:   Current Facility-Administered Medications   Medication Dose Route Frequency Provider Last Rate    acetaminophen  650 mg Oral Q6H PRN Kaitlin Woodson PA-C      albuterol  2 puff Inhalation Q6H PRN Kaitlin Woodson PA-C      aspirin  81 mg Oral Daily Kaitlin Woodson PA-C atorvastatin  80 mg Oral Daily With Theresa FilterMIKEY      carvedilol  12.5 mg Oral BID With Meals Fermin Fuchs MD      doxazosin  2 mg Oral Daily Diego Krause PA-C      gabapentin  300 mg Oral TID Diego Krause PA-C      heparin (porcine)  5,000 Units Subcutaneous [facility] Diego Krause PA-C      hydrALAZINE  50 mg Oral [facility] Diego Krause PA-C      insulin lispro  1-6 Units Subcutaneous TID AC Diego Krause PA-C      insulin lispro  1-6 Units Subcutaneous HS Diego Krause PA-C      isosorbide mononitrate  60 mg Oral Daily Diego Krause PA-C      labetalol  10 mg Intravenous Q6H PRN Fermin Fuchs MD      LORazepam  0.5 mg Intravenous 30 min pre-procedure Heaven Allen PA-C      ondansetron  4 mg Intravenous Q6H PRN Diego Krause PA-C      polyethylene glycol  17 g Oral Daily Diego Krause PA-C      ticagrelor  90 mg Oral Q12H [facility] Diego Krause PA-C          Today, Patient Was Seen By: JUAN Kenney    **Please Note: This note may have been constructed using a voice recognition system. **

## 2023-12-01 NOTE — UTILIZATION REVIEW
NOTIFICATION OF INPATIENT ADMISSION   AUTHORIZATION REQUEST   SERVICING FACILITY:   [address]  Tax ID: [id number]  NPI: [id number] ATTENDING PROVIDER:  Attending Name and NPI#: Randy Bowie Md [[id number]]  Address: [address] [place] [address]  Phone: [phone]   ADMISSION INFORMATION:  Place of Service: Inpatient [address]  Place of Service Code: 21  Inpatient Admission Date/Time: 11/29/23  3:33 PM  Discharge Date/Time: No discharge date for patient encounter. Admitting Diagnosis Code/Description:  TIA (transient ischemic attack) [G45.9]  Hypertension [I10]  Stroke [facility]) [I63.9]     UTILIZATION REVIEW CONTACT:  Romayne Sinks, Utilization   Network Utilization Review Department  Phone: [phone]  Fax [phone]  Email: [email or web address]  Contact for approvals/pending authorizations, clinical reviews, and discharge. PHYSICIAN ADVISORY SERVICES:  Medical Necessity Denial & Vhbl-wk-Pydf Review  Phone: [phone]  Fax: [phone]  Email: [email or web address]     DISCHARGE SUPPORT TEAM:  For Patients Discharge Needs & Updates  Phone: [phone] opt. 2 Fax: [phone]  Email: [email or web address]

## 2023-12-01 NOTE — CASE MANAGEMENT
Case Management Discharge Planning Note    Patient name Pantera Tripp  Location /-17 MRN [id number]  : 1955 Date 2023       Current Admission Date: 2023  Current Admission Diagnosis:Acute ischemic stroke [facility])   Patient Active Problem List    Diagnosis Date Noted    Acute ischemic stroke ([address]) 2023    Hypertensive urgency 2023    Coronary artery disease involving native coronary artery of native heart 2023    PFO (patent foramen ovale) 2023    Palpitations 2023    Thyroid nodule 2022    Chronic kidney disease, stage 3 unspecified ([address]) 2022    Abnormal CT scan 08/10/2021    Myasthenia gravis ([address]) 2021    Depression, recurrent ([address]) 2021    Cerebral meningocele ([address]) 2021    Obesity, morbid ([address]) 2020    Chronic heart failure ([address]) 2020    Subcortical microvascular ischemic occlusive disease 2019    Sixth nerve palsy of right eye     Binocular vision disorder with diplopia 2019    Numbness in both hands 2019    Carpal tunnel syndrome of left wrist 2019    Multinodular goiter 2019    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 proliferative retinopathy and macular edema, without long-term current use of insulin ([address])     Diabetic neuropathy ([address]) 2018    Aneurysm ([address]) 2017    Impaired hearing 2017    Hyperlipidemia 2017    Moderate persistent asthma without complication     Lumbar pain 10/18/2016    Hiatal hernia 10/18/2016    Chronic back pain 2016    Essential hypertension 2016    Neuropathy, diabetic ([address]) 2016      LOS (days): 2  Geometric Mean LOS (GMLOS) (days): 2.90  Days to GMLOS:1     OBJECTIVE:  Risk of Unplanned Readmission Score: 15.82         Current admission status: Inpatient   Preferred Pharmacy:   [address] [place] [address], [address] [place] [address] [place] [address]  Phone: [phone] Fax: [phone] [address] [facility] [address]  [address] [facility] [address]  Phone: [phone] Fax: [phone]    [facility] [address] [facility], [address] [facility] [address] [facility] [address] [facility] [address] [address]  Phone: [phone] Fax: [phone]    Primary Care Provider: Emil Carrasco DO    Primary Insurance: [facility] [employer]  Secondary Insurance: Silvina Days    DISCHARGE DETAILS:    1000 CM informed [facility] has no beds available for patient. 1030 CM met with patient to review alternative STR choice/[facility] Provider Choice List: [address] [facility], [address] [facility], [facility], [place], [facility]. Patient chose [place] on location. CM made AIDIN providers aware of patients choice. CM had discussion with patient to complete PASSR. CM completed PASSR in [address] Patient indicated her daughter is securing family animals today from home where family was evicted. Patient discussed potential need for outpatient counseling when discharged from [facility]. CM encouraged patient to discuss with CM at [facility] prior to discharge. 9655 CM submitted request to CM Discharge Support for auth request to be changed from [facility] to [facility].

## 2023-12-02 VITALS
DIASTOLIC BLOOD PRESSURE: 83 MMHG | HEIGHT: 66 IN | BODY MASS INDEX: 33.11 KG/M2 | TEMPERATURE: 97.2 F | HEART RATE: 62 BPM | RESPIRATION RATE: 18 BRPM | SYSTOLIC BLOOD PRESSURE: 168 MMHG | OXYGEN SATURATION: 94 % | WEIGHT: 206 LBS

## 2023-12-02 LAB
GLUCOSE SERPL-MCNC: 112 MG/DL (ref 65–140)
GLUCOSE SERPL-MCNC: 138 MG/DL (ref 65–140)

## 2023-12-02 PROCEDURE — 82948 REAGENT STRIP/BLOOD GLUCOSE: CPT

## 2023-12-02 PROCEDURE — 99239 HOSP IP/OBS DSCHRG MGMT >30: CPT | Performed by: STUDENT IN AN ORGANIZED HEALTH CARE EDUCATION/TRAINING PROGRAM

## 2023-12-02 RX ORDER — FUROSEMIDE 20 MG/1
20 TABLET ORAL DAILY PRN
Qty: 30 TABLET | Refills: 0
Start: 2023-12-02

## 2023-12-02 RX ORDER — GABAPENTIN 300 MG/1
300 CAPSULE ORAL 3 TIMES DAILY
Qty: 270 CAPSULE | Refills: 0
Start: 2023-12-05

## 2023-12-02 RX ORDER — AMLODIPINE BESYLATE 5 MG/1
5 TABLET ORAL DAILY
Status: DISCONTINUED | OUTPATIENT
Start: 2023-12-02 | End: 2023-12-02 | Stop reason: HOSPADM

## 2023-12-02 RX ORDER — AMLODIPINE BESYLATE 5 MG/1
5 TABLET ORAL DAILY
Refills: 0
Start: 2023-12-02

## 2023-12-02 RX ADMIN — HEPARIN SODIUM 5000 UNITS: 5000 INJECTION INTRAVENOUS; SUBCUTANEOUS at 05:22

## 2023-12-02 RX ADMIN — CARVEDILOL 12.5 MG: 12.5 TABLET, FILM COATED ORAL at 08:16

## 2023-12-02 RX ADMIN — DOXAZOSIN 2 MG: 1 TABLET ORAL at 08:16

## 2023-12-02 RX ADMIN — TICAGRELOR 90 MG: 90 TABLET ORAL at 08:16

## 2023-12-02 RX ADMIN — ISOSORBIDE MONONITRATE 60 MG: 60 TABLET, EXTENDED RELEASE ORAL at 08:16

## 2023-12-02 RX ADMIN — ASPIRIN 81 MG: 81 TABLET, COATED ORAL at 08:16

## 2023-12-02 RX ADMIN — HYDRALAZINE HYDROCHLORIDE 50 MG: 25 TABLET ORAL at 05:22

## 2023-12-02 RX ADMIN — GABAPENTIN 300 MG: 300 CAPSULE ORAL at 08:16

## 2023-12-02 RX ADMIN — POLYETHYLENE GLYCOL 3350 17 G: 17 POWDER, FOR SOLUTION ORAL at 08:16

## 2023-12-02 NOTE — CASE MANAGEMENT
Case Management Discharge Planning Note    Patient name Mallory Calderon  Location /-41 MRN [id number]  : 1955 Date 2023       Current Admission Date: 2023  Current Admission Diagnosis:Acute ischemic stroke [facility])   Patient Active Problem List    Diagnosis Date Noted    Acute ischemic stroke ([address]) 2023    Hypertensive urgency 2023    Coronary artery disease involving native coronary artery of native heart 2023    PFO (patent foramen ovale) 2023    Palpitations 2023    Thyroid nodule 2022    Chronic kidney disease, stage 3 unspecified ([address]) 2022    Abnormal CT scan 08/10/2021    Myasthenia gravis ([address]) 2021    Depression, recurrent ([address]) 2021    Cerebral meningocele ([address]) 2021    Obesity, morbid ([address]) 2020    Chronic heart failure ([address]) 2020    Subcortical microvascular ischemic occlusive disease 2019    Sixth nerve palsy of right eye     Binocular vision disorder with diplopia 2019    Numbness in both hands 2019    Carpal tunnel syndrome of left wrist 2019    Multinodular goiter 2019    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 proliferative retinopathy and macular edema, without long-term current use of insulin ([address])     Diabetic neuropathy ([address]) 2018    Aneurysm ([address]) 2017    Impaired hearing 2017    Hyperlipidemia 2017    Moderate persistent asthma without complication     Lumbar pain 10/18/2016    Hiatal hernia 10/18/2016    Chronic back pain 2016    Essential hypertension 2016    Neuropathy, diabetic ([address]) 2016      LOS (days): 3  Geometric Mean LOS (GMLOS) (days): 2.90  Days to GMLOS:0.1     OBJECTIVE:  Risk of Unplanned Readmission Score: 16.02         Current admission status: Inpatient   Preferred Pharmacy:   [facility], [facility] [address] [place] [address]  [address] [place] [address]  Phone: [phone] Fax: [phone]    [facility] [facility], [place] - [address]  [address]  [address] [place] [address]  Phone: [phone] Fax: [phone]    [facility] - [place], [place] - [address] [facility] [address] [address]  Phone: [phone] Fax: [phone]    Primary Care Provider: Karolina Mock DO    Primary Insurance: [facility] [employer]  Secondary Insurance: [address]    DISCHARGE DETAILS:     Updated provider, nursing and patient of transport time. 1330 with [employer]. Facility is aware as well.            IMM Given (Date):: 12/02/23 (completed about 0845 and placed in scan bin)           DC plan is [address]

## 2023-12-02 NOTE — PLAN OF CARE
Problem: SAFETY ADULT  Goal: Patient will remain free of falls  Description: INTERVENTIONS:  - Educate patient/family on patient safety including physical limitations  - Instruct patient to call for assistance with activity   - Consult OT/PT to assist with strengthening/mobility   - Keep Call bell within reach  - Keep bed low and locked with side rails adjusted as appropriate  - Keep care items and personal belongings within reach  - Initiate and maintain comfort rounds  - Make Fall Risk Sign visible to staff  - Offer Toileting every 2 Hours, in advance of need  - Initiate/Maintain bed/chair alarm  - Obtain necessary fall risk management equipment: bed/chair alarm, walker  - Apply yellow socks and bracelet for high fall risk patients  - Consider moving patient to room near nurses station  Outcome: Progressing  Goal: Maintain or return to baseline ADL function  Description: INTERVENTIONS:  -  Assess patient's ability to carry out ADLs; assess patient's baseline for ADL function and identify physical deficits which impact ability to perform ADLs (bathing, care of mouth/teeth, toileting, grooming, dressing, etc.)  - Assess/evaluate cause of self-care deficits   - Assess range of motion  - Assess patient's mobility; develop plan if impaired  - Assess patient's need for assistive devices and provide as appropriate  - Encourage maximum independence but intervene and supervise when necessary  - Involve family in performance of ADLs  - Assess for home care needs following discharge   - Consider OT consult to assist with ADL evaluation and planning for discharge  - Provide patient education as appropriate  Outcome: Progressing  Goal: Maintains/Returns to pre admission functional level  Description: INTERVENTIONS:  - Perform AM-PAC 6 Click Basic Mobility/ Daily Activity assessment daily.  - Set and communicate daily mobility goal to care team and patient/family/caregiver.    - Collaborate with rehabilitation services on mobility goals if consulted  - Perform Range of Motion 3 times a day. - Reposition patient every 2 hours. - Dangle patient 3 times a day  - Stand patient 3 times a day  - Ambulate patient 3 times a day  - Out of bed to chair 3 times a day   - Out of bed for meals 3 times a day  - Out of bed for toileting  - Record patient progress and toleration of activity level   Outcome: Progressing     Problem: DISCHARGE PLANNING  Goal: Discharge to home or other facility with appropriate resources  Description: INTERVENTIONS:  - Identify barriers to discharge w/patient and caregiver  - Arrange for needed discharge resources and transportation as appropriate  - Identify discharge learning needs (meds, wound care, etc.)  - Arrange for interpretive services to assist at discharge as needed  - Refer to Case Management Department for coordinating discharge planning if the patient needs post-hospital services based on physician/advanced practitioner order or complex needs related to functional status, cognitive ability, or social support system  Outcome: Progressing     Problem: Knowledge Deficit  Goal: Patient/family/caregiver demonstrates understanding of disease process, treatment plan, medications, and discharge instructions  Description: Complete learning assessment and assess knowledge base.   Interventions:  - Provide teaching at level of understanding  - Provide teaching via preferred learning methods  Outcome: Progressing

## 2023-12-02 NOTE — PLAN OF CARE
Problem: PAIN - ADULT  Goal: Verbalizes/displays adequate comfort level or baseline comfort level  Description: Interventions:  - Encourage patient to monitor pain and request assistance  - Assess pain using appropriate pain scale  - Administer analgesics based on type and severity of pain and evaluate response  - Implement non-pharmacological measures as appropriate and evaluate response  - Consider cultural and social influences on pain and pain management  - Notify physician/advanced practitioner if interventions unsuccessful or patient reports new pain  Outcome: Progressing     Problem: INFECTION - ADULT  Goal: Absence or prevention of progression during hospitalization  Description: INTERVENTIONS:  - Assess and monitor for signs and symptoms of infection  - Monitor lab/diagnostic results  - Monitor all insertion sites, i.e. indwelling lines, tubes, and drains  - Monitor endotracheal if appropriate and nasal secretions for changes in amount and color  - [place] appropriate cooling/warming therapies per order  - Administer medications as ordered  - Instruct and encourage patient and family to use good hand hygiene technique  - Identify and instruct in appropriate isolation precautions for identified infection/condition  Outcome: Progressing     Problem: SAFETY ADULT  Goal: Patient will remain free of falls  Description: INTERVENTIONS:  - Educate patient/family on patient safety including physical limitations  - Instruct patient to call for assistance with activity   - Consult OT/PT to assist with strengthening/mobility   - Keep Call bell within reach  - Keep bed low and locked with side rails adjusted as appropriate  - Keep care items and personal belongings within reach  - Initiate and maintain comfort rounds  - Make Fall Risk Sign visible to staff  - Offer Toileting every 2 Hours, in advance of need  - Initiate/Maintain bed/chair alarm  - Obtain necessary fall risk management equipment:   - Apply yellow socks and bracelet for high fall risk patients  - Consider moving patient to room near nurses station  Outcome: Progressing  Goal: Maintain or return to baseline ADL function  Description: INTERVENTIONS:  -  Assess patient's ability to carry out ADLs; assess patient's baseline for ADL function and identify physical deficits which impact ability to perform ADLs (bathing, care of mouth/teeth, toileting, grooming, dressing, etc.)  - Assess/evaluate cause of self-care deficits   - Assess range of motion  - Assess patient's mobility; develop plan if impaired  - Assess patient's need for assistive devices and provide as appropriate  - Encourage maximum independence but intervene and supervise when necessary  - Involve family in performance of ADLs  - Assess for home care needs following discharge   - Consider OT consult to assist with ADL evaluation and planning for discharge  - Provide patient education as appropriate  Outcome: Progressing  Goal: Maintains/Returns to pre admission functional level  Description: INTERVENTIONS:  - Perform AM-PAC 6 Click Basic Mobility/ Daily Activity assessment daily.  - Set and communicate daily mobility goal to care team and patient/family/caregiver. - Collaborate with rehabilitation services on mobility goals if consulted  - Perform Range of Motion 3 times a day. - Reposition patient every 2 hours.   - Dangle patient 3 times a day  - Stand patient 3 times a day  - Ambulate patient 3 times a day  - Out of bed to chair 3 times a day   - Out of bed for meals 3 times a day  - Out of bed for toileting  - Record patient progress and toleration of activity level   Outcome: Progressing     Problem: DISCHARGE PLANNING  Goal: Discharge to home or other facility with appropriate resources  Description: INTERVENTIONS:  - Identify barriers to discharge w/patient and caregiver  - Arrange for needed discharge resources and transportation as appropriate  - Identify discharge learning needs (meds, wound care, etc.)  - Arrange for interpretive services to assist at discharge as needed  - Refer to Case Management Department for coordinating discharge planning if the patient needs post-hospital services based on physician/advanced practitioner order or complex needs related to functional status, cognitive ability, or social support system  Outcome: Progressing     Problem: Knowledge Deficit  Goal: Patient/family/caregiver demonstrates understanding of disease process, treatment plan, medications, and discharge instructions  Description: Complete learning assessment and assess knowledge base. Interventions:  - Provide teaching at level of understanding  - Provide teaching via preferred learning methods  Outcome: Progressing     Problem: NEUROSENSORY - ADULT  Goal: Achieves stable or improved neurological status  Description: INTERVENTIONS  - Monitor and report changes in neurological status  - Monitor vital signs such as temperature, blood pressure, glucose, and any other labs ordered   - Initiate measures to prevent increased intracranial pressure  - Monitor for seizure activity and implement precautions if appropriate      Outcome: Progressing  Goal: Achieves maximal functionality and self care  Description: INTERVENTIONS  - Monitor swallowing and airway patency with patient fatigue and changes in neurological status  - Encourage and assist patient to increase activity and self care.    - Encourage visually impaired, hearing impaired and aphasic patients to use assistive/communication devices  Outcome: Progressing     Problem: MUSCULOSKELETAL - ADULT  Goal: Maintain or return mobility to safest level of function  Description: INTERVENTIONS:  - Assess patient's ability to carry out ADLs; assess patient's baseline for ADL function and identify physical deficits which impact ability to perform ADLs (bathing, care of mouth/teeth, toileting, grooming, dressing, etc.)  - Assess/evaluate cause of self-care deficits - Assess range of motion  - Assess patient's mobility  - Assess patient's need for assistive devices and provide as appropriate  - Encourage maximum independence but intervene and supervise when necessary  - Involve family in performance of ADLs  - Assess for home care needs following discharge   - Consider OT consult to assist with ADL evaluation and planning for discharge  - Provide patient education as appropriate  Outcome: Progressing  Goal: Maintain proper alignment of affected body part  Description: INTERVENTIONS:  - Support, maintain and protect limb and body alignment  - Provide patient/ family with appropriate education  Outcome: Progressing

## 2023-12-02 NOTE — INCIDENTAL FINDINGS
The following findings require follow up:  Radiographic finding   Finding: MRI Brain ; acute /subacute stroke + chronic CVA    Follow up required: with PCP , Neurology & cardiology   Follow up should be done within 1-2 week(s)

## 2023-12-02 NOTE — DISCHARGE SUMMARY
[address] [facility]  Discharge- Radu Knowles 1955, 76 y.o. female MRN: [id number]  Unit/Bed#: -[place] Encounter: [id number]  Primary Care Provider: Zuleika Gonzalez DO   Date and time admitted to hospital: 11/29/2023  1:08 PM    * Acute ischemic stroke [facility])  Assessment & Plan  Acute right sided weakness and aphasia started the morning of admission. Subcentimeter acute to subacute infarcts involving the left genu of the corpus callosum and left thalamus. Chronic lacunar infarcts involving the bilateral centrum semiovale, right internal capsule, and by lateral thalamic regions.    Neuro consulted; etiology likely small vessel disease - optimize CV risk factors  Have to r/o embolic etiology with cards referral/ Holter monitor given her PFO on echo  Normotension  Euglycemia  PT/ OT rec rehab; case management for dispo planning; auth pending     ASA + Brilinta ; will send in for outpatient heart monitor as per neuro recs     Hypertensive urgency  Assessment & Plan  On presentation, BP was 240/113  Recent hospitalization with similar readings that were controlled with increased doses of cardura and hydralazine  Patient has had social issues outpatient and has forgotten to take several doses of medications  Resume home BP medications for goal normotension 120 - 150 mmHg /  mmHg    On Coreg 12.5 mg BID , Cardura 2 mg QD , on Hydralazine 50 mg Q8 hrs ; still 's on 12/2 , will add amlodipine 5 mg QD     PFO (patent foramen ovale)  Assessment & Plan    ROPE score of 2 , less likely related to patient's CVA   continue ASA + Brilinta   F/u with PCP and Cardio op    Coronary artery disease involving native coronary artery of native heart  Assessment & Plan  Continue statin, asa, brilinta, imdur, coreg    Chronic kidney disease, stage 3 unspecified [facility])  Assessment & Plan  Lab Results   Component Value Date    EGFR 37 11/30/2023    EGFR 38 11/29/2023    EGFR 40 11/01/2023 CREATININE 1.42 (H) 11/30/2023    CREATININE 1.40 (H) 11/29/2023    CREATININE 1.36 (H) 11/01/2023     Baseline cr around 1.4  She is currently at baseline  Avoid hypotension    Cerebral meningocele [facility])  Assessment & Plan  Known history  Unchanged for years since childhood    Chronic heart failure (HCC)  Assessment & Plan  Wt Readings from Last 3 Encounters:   11/30/23 93.4 kg (206 lb)   11/13/23 97.1 kg (214 lb)   10/30/23 92.9 kg (204 lb 12.9 oz)     Euvolemic on examination  Resume home lasix  Low salt diet ordered          Moderate persistent asthma without complication  Assessment & Plan  Not in acute exacerbation at this time    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 proliferative retinopathy and macular edema, without long-term current use of insulin [facility])  Assessment & Plan  Lab Results   Component Value Date    HGBA1C 7.2 (H) 11/30/2023       Recent Labs     12/01/23  1604 12/01/23  2100 12/02/23  0753 12/02/23  1139   POCGLU 100 143* 112 138       Blood Sugar Average: Last 72 hrs:  (P) 127.7    Hold home oral meds, utilize SSI  Fingersticks with meals  ADA diet  Daughter reports hypoglycemia at home.  Fu with PCP for diabetes medication management    Aneurysm [facility])  Assessment & Plan  Noted on MRA  Neurosurgery fu as OP for serial imaging        Medical Problems       Resolved Problems  Date Reviewed: 12/2/2023   None       Discharging Physician / Practitioner: Keyanna Meraz DO  PCP: Karolina Mock DO  Admission Date:   Admission Orders (From admission, onward)       Ordered        11/29/23 1533  INPATIENT ADMISSION  Once                          Discharge Date: 12/02/2023    Consultations During Hospital Stay:  Neurology, Nutrition Service, PT/OT , Case Management    Procedures Performed:   None     Significant Findings / Test Results:   HTN urgency and Acute Stroke     Incidental Findings:   HTN urgency and Acute Stroke  ; small PFO      Test Results Pending at Discharge (will require follow up):      Outpatient Tests Requested:  ZIO Patch x 4 weeks    Complications:  none     Reason for Admission: stroke     Hospital Course:   Huy Manzo is a 76 y.o. female patient who originally presented to the hospital on 11/29/2023 due to right facial numbness & right sided weakness , complicated with a fall at home walking to the bathroom, denies head trauma. In the ED found with elevated /113. Patient reports social issues for which she did not take her medications lately including her BP Medications and questionable if she was taking her ASA/Brilinta as well or not. Workup revealed acute & chronic ischemic strokes as above, and per neuro eval thought to be most likely complication of small vessel disease & less likely due to embolic etiology or her incident found small PFO (ROPE score of 2). Recs for contiune ASA + Brilinta & to restart her BP medications , however with BP on day of discharge still systolic 022'H for which Norvasc 5 mg QD was added. Outpatient Zio patch was ordered on dc as well. Patient to follow closely with PCP & referral to neurology is placed as she needs to see neurovascular attending within 4 weeks from dc as per neuro recs. Patient was seen and examined on day of dc. Her two daughters are at bed side. Above findings and plan are explained in details . All questions answered. Dc to STR is arranged with dc time at 1:30 pm. Patient denies any new symptoms or concern at the day of dc, confirming using the walker when out of bed for support , still has relative slight weakness 4+ on 5 right upper extremity , RLE with some drift , otherwise no focal deficit noted on exam        Please see above list of diagnoses and related plan for additional information.      Condition at Discharge: stable    Discharge Day Visit / Exam:   Subjective:  see course above   Vitals: Blood Pressure: 168/83 (12/02/23 1138)  Pulse: 62 (12/02/23 1130)  Temperature: (!) 97.2 °F (36.2 °C) (12/02/23 1138)  Temp Source: Temporal (12/02/23 1130)  Respirations: 18 (12/02/23 1138)  Height: 5' 6" (167.6 cm) (11/30/23 0927)  Weight - Scale: 93.4 kg (206 lb) (11/30/23 0927)  SpO2: 94 % (12/02/23 1130)  Exam:   Physical Exam   - GEN: Appears well, alert and oriented x 3, pleasant and cooperative, in no acute distress  - HEENT: Anicteric, mucous membranes moist, PERRL and EOMI   - NECK: No lymphadenopathy, JVD or carotid bruits   - HEART: RRR, normal S1 and S2, no murmurs, clicks, gallops or rubs   - LUNGS: Clear to auscultation bilaterally; no wheezes, rales, or rhonchi  - ABDOMEN: Normal bowel sounds, soft, no tenderness, no distention, no organomegaly or masses felt on exam.   - EXTREMITIES: Peripheral pulses normal; no clubbing, cyanosis, or edema  - NEURO: RUE 4+ on 5 strength ; RLE when walking mild drift noted ; walks with a walker, no facial asymmetry no dysarthria noted;  CN II-XII are grossly intact. - Musculoskeletal: normal ROM, no swollen or erythematous joints. - SKIN: Normal without suspicious lesions on exposed skin      Discussion with Family: Updated  (sister) at bedside. Discharge instructions/Information to patient and family:   See after visit summary for information provided to patient and family. Provisions for Follow-Up Care:  See after visit summary for information related to follow-up care and any pertinent home health orders. Mobility at time of Discharge:   Basic Mobility Inpatient Raw Score: 18  JH-HLM Goal: 6: Walk 10 steps or more  JH-HLM Achieved: 6: Walk 10 steps or more  HLM Goal achieved. Continue to encourage appropriate mobility. Disposition:   Other: short term rehab , [facility]    Planned Readmission: no     Discharge Statement:  I spent 45 minutes discharging the patient. This time was spent on the day of discharge. I had direct contact with the patient on the day of discharge.  Greater than 50% of the total time was spent examining patient, answering all patient questions, arranging and discussing plan of care with patient as well as directly providing post-discharge instructions. Additional time then spent on discharge activities. Discharge Medications:  See after visit summary for reconciled discharge medications provided to patient and/or family.       **Please Note: This note may have been constructed using a voice recognition system**

## 2023-12-02 NOTE — PLAN OF CARE
Problem: PAIN - ADULT  Goal: Verbalizes/displays adequate comfort level or baseline comfort level  Description: Interventions:  - Encourage patient to monitor pain and request assistance  - Assess pain using appropriate pain scale  - Administer analgesics based on type and severity of pain and evaluate response  - Implement non-pharmacological measures as appropriate and evaluate response  - Consider cultural and social influences on pain and pain management  - Notify physician/advanced practitioner if interventions unsuccessful or patient reports new pain  Outcome: Progressing     Problem: INFECTION - ADULT  Goal: Absence or prevention of progression during hospitalization  Description: INTERVENTIONS:  - Assess and monitor for signs and symptoms of infection  - Monitor lab/diagnostic results  - Monitor all insertion sites, i.e. indwelling lines, tubes, and drains  - Monitor endotracheal if appropriate and nasal secretions for changes in amount and color  - [place] appropriate cooling/warming therapies per order  - Administer medications as ordered  - Instruct and encourage patient and family to use good hand hygiene technique  - Identify and instruct in appropriate isolation precautions for identified infection/condition  Outcome: Progressing     Problem: SAFETY ADULT  Goal: Patient will remain free of falls  Description: INTERVENTIONS:  - Educate patient/family on patient safety including physical limitations  - Instruct patient to call for assistance with activity   - Consult OT/PT to assist with strengthening/mobility   - Keep Call bell within reach  - Keep bed low and locked with side rails adjusted as appropriate  - Keep care items and personal belongings within reach  - Initiate and maintain comfort rounds  - Make Fall Risk Sign visible to staff  - Offer Toileting every 2 Hours, in advance of need  - Initiate/Maintain bed alarm  - Obtain necessary fall risk management equipment:   - Apply yellow socks and bracelet for high fall risk patients  - Consider moving patient to room near nurses station  Outcome: Progressing  Goal: Maintain or return to baseline ADL function  Description: INTERVENTIONS:  -  Assess patient's ability to carry out ADLs; assess patient's baseline for ADL function and identify physical deficits which impact ability to perform ADLs (bathing, care of mouth/teeth, toileting, grooming, dressing, etc.)  - Assess/evaluate cause of self-care deficits   - Assess range of motion  - Assess patient's mobility; develop plan if impaired  - Assess patient's need for assistive devices and provide as appropriate  - Encourage maximum independence but intervene and supervise when necessary  - Involve family in performance of ADLs  - Assess for home care needs following discharge   - Consider OT consult to assist with ADL evaluation and planning for discharge  - Provide patient education as appropriate  Outcome: Progressing  Goal: Maintains/Returns to pre admission functional level  Description: INTERVENTIONS:  - Perform AM-PAC 6 Click Basic Mobility/ Daily Activity assessment daily.  - Set and communicate daily mobility goal to care team and patient/family/caregiver. - Collaborate with rehabilitation services on mobility goals if consulted  - Perform Range of Motion 4 times a day. - Reposition patient every 2 hours.   - Dangle patient 2 times a day  - Stand patient 2 times a day  - Ambulate patient 2 times a day  - Out of bed to chair 2 times a day   - Out of bed for meals 2 times a day  - Out of bed for toileting  - Record patient progress and toleration of activity level   Outcome: Progressing     Problem: DISCHARGE PLANNING  Goal: Discharge to home or other facility with appropriate resources  Description: INTERVENTIONS:  - Identify barriers to discharge w/patient and caregiver  - Arrange for needed discharge resources and transportation as appropriate  - Identify discharge learning needs (meds, wound care, etc.)  - Arrange for interpretive services to assist at discharge as needed  - Refer to Case Management Department for coordinating discharge planning if the patient needs post-hospital services based on physician/advanced practitioner order or complex needs related to functional status, cognitive ability, or social support system  Outcome: Progressing     Problem: Knowledge Deficit  Goal: Patient/family/caregiver demonstrates understanding of disease process, treatment plan, medications, and discharge instructions  Description: Complete learning assessment and assess knowledge base.   Interventions:  - Provide teaching at level of understanding  - Provide teaching via preferred learning methods  Outcome: Progressing     Problem: NEUROSENSORY - ADULT  Goal: Achieves stable or improved neurological status  Description: INTERVENTIONS  - Monitor and report changes in neurological status  - Monitor vital signs such as temperature, blood pressure, glucose, and any other labs ordered   - Initiate measures to prevent increased intracranial pressure  - Monitor for seizure activity and implement precautions if appropriate      Outcome: Progressing     Problem: MUSCULOSKELETAL - ADULT  Goal: Maintain or return mobility to safest level of function  Description: INTERVENTIONS:  - Assess patient's ability to carry out ADLs; assess patient's baseline for ADL function and identify physical deficits which impact ability to perform ADLs (bathing, care of mouth/teeth, toileting, grooming, dressing, etc.)  - Assess/evaluate cause of self-care deficits   - Assess range of motion  - Assess patient's mobility  - Assess patient's need for assistive devices and provide as appropriate  - Encourage maximum independence but intervene and supervise when necessary  - Involve family in performance of ADLs  - Assess for home care needs following discharge   - Consider OT consult to assist with ADL evaluation and planning for discharge  - Provide patient education as appropriate  Outcome: Progressing  Goal: Maintain proper alignment of affected body part  Description: INTERVENTIONS:  - Support, maintain and protect limb and body alignment  - Provide patient/ family with appropriate education  Outcome: Progressing

## 2023-12-02 NOTE — ASSESSMENT & PLAN NOTE
On presentation, BP was 240/113  Recent hospitalization with similar readings that were controlled with increased doses of cardura and hydralazine  Patient has had social issues outpatient and has forgotten to take several doses of medications  Resume home BP medications for goal normotension 120 - 150 mmHg /  mmHg    On Coreg 12.5 mg BID , Cardura 2 mg QD , on Hydralazine 50 mg Q8 hrs ; still 's on 12/2 , will add amlodipine 5 mg QD

## 2023-12-02 NOTE — ASSESSMENT & PLAN NOTE
Acute right sided weakness and aphasia started the morning of admission. Subcentimeter acute to subacute infarcts involving the left genu of the corpus callosum and left thalamus. Chronic lacunar infarcts involving the bilateral centrum semiovale, right internal capsule, and by lateral thalamic regions.    Neuro consulted; etiology likely small vessel disease - optimize CV risk factors  Have to r/o embolic etiology with cards referral/ Holter monitor given her PFO on echo  Normotension  Euglycemia  PT/ OT rec rehab; case management for dispo planning; auth pending     ASA + Brilinta ; will send in for outpatient heart monitor as per neuro recs

## 2023-12-04 NOTE — UTILIZATION REVIEW
NOTIFICATION OF ADMISSION DISCHARGE   This is a Notification of Discharge from [address] [place] [address]. Please be advised that this patient has been discharge from our facility. Below you will find the admission and discharge date and time including the patient’s disposition. UTILIZATION REVIEW CONTACT:  Osiris Rodriguez  Utilization   Network Utilization Review Department  Phone: [phone] x carefully listen to the prompts. All voicemails are confidential.  Email: [email or web address]     ADMISSION INFORMATION  PRESENTATION DATE: 11/29/2023  1:08 PM  OBERVATION ADMISSION DATE:   INPATIENT ADMISSION DATE: 11/29/23  3:33 PM   DISCHARGE DATE: 12/2/2023  1:55 PM   DISPOSITION:[facility] SNF/TCU/SNU    Network Utilization Review Department  ATTENTION: Please call with any questions or concerns to [phone] and carefully listen to the prompts so that you are directed to the right person. All voicemails are confidential.   For Discharge needs, contact Care Management DC Support Team at [phone] opt. 2  Send all requests for admission clinical reviews, approved or denied determinations and any other requests to dedicated fax number below belonging to the campus where the patient is receiving treatment.  List of dedicated fax numbers for the Facilities:  [place] DENIALS (Administrative/Medical Necessity) [phone]   DISCHARGE SUPPORT TEAM (Network) [phone] [facility] (Maternity/NICU/Pediatrics) [phone]   [address] [facility] [address] [place] [address] [place] [id number] [address] [phone]   [address] [place] [address]  [address]  [phone]

## 2023-12-04 NOTE — ASSESSMENT & PLAN NOTE
Lab Results   Component Value Date    HGBA1C 7.2 (H) 11/30/2023       Recent Labs     12/01/23  1604 12/01/23  2100 12/02/23  0753 12/02/23  1139   POCGLU 100 143* 112 138       Blood Sugar Average: Last 72 hrs:  (P) 127.7    Hold home oral meds, utilize SSI  Fingersticks with meals  ADA diet  Daughter reports hypoglycemia at home.  Fu with PCP for diabetes medication management

## 2023-12-04 NOTE — ASSESSMENT & PLAN NOTE
ROPE score of 2 , less likely related to patient's CVA   continue ASA + Brilinta   F/u with PCP and Cardio op

## 2024-01-12 ENCOUNTER — TELEPHONE (OUTPATIENT)
Dept: NEUROLOGY | Facility: CLINIC | Age: 69
End: 2024-01-12

## 2024-01-12 NOTE — TELEPHONE ENCOUNTER
Called patient and spoke with her regarding HFU. Patient declined to schedule. I did advise her that she can schedule an HFU up to 1 yr from her d/c date, anything after that would be a new patient appt. She verbalized understanding.    Not scheduling HFU at this time     DUE TO LOCATION , ALL OFFICES TOO FAR    Thank you,     Barbara      [facility]     DC- FACILITY- 12/02/2023.    Discharging Facility Name and Phone Number (continued)  Flowsheet Row Most Recent Value  Accepting Facility Name, [facility]  Receiving Facility/Agency Phone  Number  [phone]      Erin Arroyo will need follow up in in 4 weeks with neurovascular attending or advance practitioner. She will require outpatient cardiac monitoring.

## 2024-01-17 ENCOUNTER — VBI (OUTPATIENT)
Dept: ADMINISTRATIVE | Facility: OTHER | Age: 69
End: 2024-01-17

## 2024-01-18 NOTE — RESULT ENCOUNTER NOTE
Please call the patient regarding her abnormal result.  Make sure patient has a ophthalmologist that she follows with if she is still living in the [facility] I would suggest Dr. Cuenca group

## 2024-01-25 NOTE — ASSESSMENT & PLAN NOTE
Acute right sided weakness that started the morning of admission and now w  With BP of 240/113 on initial check   CTH without signs of acute stroke, CTA does show an area of concern for possible vasculitis/RCVS however patient is not having any symptomatology consistent with this  Check ESR, CRP for completeness sake  Neurology recommendations appreciated, start on stroke pathway  Telemetry monitoring  ECHO  MRI/MRA brain  Neuro checks  STAT CT for any neuro changes  Permissive HTN for now  PT/OT/Speech eval  Continue on aspirin, brilinta, high intensity statin 86 yo female with HTN,HLD, s/p Rt partial second toe amp in early December  admitted with plantar ulcer near 3rd MTH.  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  The wound is relatively clean.  She is concerned that cipro almost killed her.  She was at [facility] from 12/6-12/11, received a few days of vanco and zosyn and was to received a limited course of augmentin after discharge.  She was receiving cipro and admitted to [facility] later in December with CARLOS and a creat near 7, was ultimately felt to have a quinolone induced CARLOS.  This resolved with conservative therapy.  I am concerned that based on chronicity she may have chronic osteomyelitis.  Her vanco levels were low, drug had been stopped a few days ago.  MRI raises concerns of osteo of 2nd met stump, 3rd digit, and third met head and shaft.  Her prior podiatric procedure was felt to have clean margins, bone culture was negative, wound culture with C striatum.  The cellulitic component is improved  Suggest:  1 Favor limiting meropenem to 5 days unless further surgical debridement planned, can stop antibiotics after todays doses  2 MRI noted, ? if she is a candidate for a TMA or more definitive surgery- will defer to OPD podiatry  3 would prefer a management plan that limits antibiotic duration to minimize potential toxicities for limited gain if bone is infected.She has been off and on antibiotics x 3 years , potential toxicities may outweigh benefit here.  5 Perhaps she should be sent back to Dr Montes who operated last month.  6. Wound cultures do not always correlate to bone cultures, Vanco would be needed to treat C Striatum but has potential nephrotoxicity.She has done well with only a few doses  7 No ID objection to discharge planning. If she develops signs of recurrent soft tissue infection we will no a more aggressive approach will be needed

## 2024-03-07 RX ORDER — PANTOPRAZOLE SODIUM 40 MG/1
40 TABLET, DELAYED RELEASE ORAL EVERY MORNING
COMMUNITY
Start: 2024-01-11

## 2024-03-07 RX ORDER — VALSARTAN 160 MG/1
160 TABLET ORAL DAILY
COMMUNITY
Start: 2024-02-17

## 2024-03-07 RX ORDER — ESCITALOPRAM OXALATE 5 MG/1
5 TABLET ORAL DAILY
COMMUNITY
Start: 2024-02-28

## 2024-04-08 ENCOUNTER — TELEPHONE (OUTPATIENT)
Dept: FAMILY MEDICINE CLINIC | Facility: CLINIC | Age: 69
End: 2024-04-08

## 2024-04-08 ENCOUNTER — TELEPHONE (OUTPATIENT)
Dept: NEPHROLOGY | Facility: CLINIC | Age: 69
End: 2024-04-08

## 2024-04-08 DIAGNOSIS — I63.9 ACUTE ISCHEMIC STROKE (HCC): Primary | ICD-10-CM

## 2024-04-08 DIAGNOSIS — Q01.9: ICD-10-CM

## 2024-04-08 DIAGNOSIS — I63.9 ACUTE ISCHEMIC STROKE (HCC): ICD-10-CM

## 2024-04-08 DIAGNOSIS — E11.49 OTHER DIABETIC NEUROLOGICAL COMPLICATION ASSOCIATED WITH TYPE 2 DIABETES MELLITUS (HCC): ICD-10-CM

## 2024-04-08 DIAGNOSIS — Q01.9: Primary | ICD-10-CM

## 2024-04-08 DIAGNOSIS — N18.31 STAGE 3A CHRONIC KIDNEY DISEASE (HCC): Primary | ICD-10-CM

## 2024-04-08 NOTE — TELEPHONE ENCOUNTER
Office received a request for a referral for Pt for Hospital F/U / [facility] Neuro / Mary Malik    The referral was started below if you would please attach Dx to it.    Appt TOMORROW 4/9/24

## 2024-04-09 ENCOUNTER — OFFICE VISIT (OUTPATIENT)
Dept: NEUROLOGY | Facility: CLINIC | Age: 69
End: 2024-04-09
Payer: COMMERCIAL

## 2024-04-09 VITALS
DIASTOLIC BLOOD PRESSURE: 76 MMHG | HEART RATE: 58 BPM | SYSTOLIC BLOOD PRESSURE: 130 MMHG | BODY MASS INDEX: 33.75 KG/M2 | TEMPERATURE: 97.9 F | OXYGEN SATURATION: 93 % | WEIGHT: 210 LBS | HEIGHT: 66 IN

## 2024-04-09 DIAGNOSIS — I72.9 ANEURYSM (HCC): ICD-10-CM

## 2024-04-09 DIAGNOSIS — I10 ESSENTIAL HYPERTENSION: ICD-10-CM

## 2024-04-09 DIAGNOSIS — I25.10 CORONARY ARTERY DISEASE INVOLVING NATIVE CORONARY ARTERY OF NATIVE HEART WITHOUT ANGINA PECTORIS: ICD-10-CM

## 2024-04-09 DIAGNOSIS — E11.3513 TYPE 2 DIABETES MELLITUS WITH BOTH EYES AFFECTED BY PROLIFERATIVE RETINOPATHY AND MACULAR EDEMA, WITHOUT LONG-TERM CURRENT USE OF INSULIN (HCC): ICD-10-CM

## 2024-04-09 DIAGNOSIS — E78.00 PURE HYPERCHOLESTEROLEMIA: ICD-10-CM

## 2024-04-09 DIAGNOSIS — Q21.12 PFO (PATENT FORAMEN OVALE): ICD-10-CM

## 2024-04-09 DIAGNOSIS — Z86.73 HISTORY OF CVA (CEREBROVASCULAR ACCIDENT): Primary | ICD-10-CM

## 2024-04-09 PROBLEM — H53.2 BINOCULAR VISION DISORDER WITH DIPLOPIA: Status: RESOLVED | Noted: 2019-08-12 | Resolved: 2024-04-09

## 2024-04-09 PROBLEM — G70.00 MYASTHENIA GRAVIS (HCC): Status: RESOLVED | Noted: 2021-07-08 | Resolved: 2024-04-09

## 2024-04-09 PROCEDURE — 99214 OFFICE O/P EST MOD 30 MIN: CPT | Performed by: PHYSICIAN ASSISTANT

## 2024-04-09 RX ORDER — SITAGLIPTIN 50 MG/1
TABLET, FILM COATED ORAL
COMMUNITY
Start: 2024-04-04

## 2024-04-09 NOTE — PATIENT INSTRUCTIONS
For ongoing stroke prevention, patient will continue aspirin 81mg daily, Brilinta 90mg BID (note that she is on DAPT at baseline prior to stroke due to CAD s/p stent), and Lipitor 80mg daily  Will defer management of blood pressure, cholesterol and blood sugar to her PCP.  Goal BP <130/80 routinely, goal LDL <70, goal A1C <7.0  Recommend making an appt with her cardiologist for follow up regarding CAD, PFO.  Also should at least have a Zio patch monitor for long term cardiac monitoring to exclude Afib as a cause of stroke.  Patient apparently has outpatient cardiologist already.  If not, should be referred by PCP  Referral to neurosurgery for surveillance of aneurysms seen on imaging.  Blood pressure control is extremely important  Recommend heart healthy diet and routine exercise as tolerated  Follow up with neurovascular attending in 4-6 months    If you experience any facial droop, weakness on one side of the body, speech or swallowing difficulty, painless loss of vision in one eye, double vision, vertigo that does not resolve quickly/imbalance, go to the ER/call 911.

## 2024-04-09 NOTE — PROGRESS NOTES
Patient ID: Erin Arroyo is a 68 y.o. female with PMH including CAD s/p stent, CKD, HTN, HLD, DM, neuropathy, Grave's disease, who presents today for a hospital follow up visit.     Assessment:  Patient presented 11/29/23 with R facial numbness (localized to R side of mouth) and numbness/tingling R hand fingertips, along with R leg weakness/giving out.  BP on presentation 244/114.  Noted noncompliance with medications, was on ASA/Brilinta and statin at baseline.  CTA H/N with no LVO.  MRA completed due to some vessel abnormalities and this did not demonstrate those irregularities, but did show incidental aneurysms.  MRI with small infarcts in the left genu of the corpus callosum and left thalamus in addition to chronic infarcts.  ECHO with EF 66%, normal LA size, small PFO (ROPE score 2, strokes less likely related to this).  A1C 7.2, .  Stroke felt to be small vessel and related to uncontrolled vascular risk factors and medication noncompliance.  Outpatient Zio was advised however, along with neurosurgery follow up for aneurysms.    She is currently at [facility].  She completed therapies, feels nearly back to baseline.  No new neurologic symptoms to indicate recurrent TIA/CVA.    Discussed likely cause of stroke is small vessel given uncontrolled vascular risk factors and medication non-compliance.  Agree she should make an appt with her cardiologist and consider at least a Zio patch to ensure no underlying Afib.  Agree PFO likely not contributing to stroke.  Note that she was on DAPT at baseline due to her history of cardiac stent.  She would not need long-term DAPT for this stroke.    Will place referral to neurosurgery for monitoring of her aneurysms.  We discussed the importance of blood pressure control.    Advised management of cardiovascular risk factors under the direction of her PCP.  Goal blood pressure is less than 130/80 routinely, goal LDL less than 70 and goal A1c less  than 7.0.  She is not a smoker.    Plan:  - For ongoing stroke prevention patient will continue aspirin 81 mg daily, Brilinta 90 mg twice daily and atorvastatin 80 mg daily (note that she is on DAPT at baseline prior to stroke due to CAD status post stent)  - Will defer management of blood pressure, lipids and blood sugar to her PCP  - Recommend making an appointment with her cardiologist to follow-up regarding CAD.  Would advise at least a Zio patch monitor to exclude A-fib as a cause of stroke  - Referral to neurosurgery for surveillance of aneurysm seen on imaging  - Recommend heart healthy diet and routine exercise as tolerated  - Follow-up with neurovascular attending in 4-6 months or sooner if needed    Signs and symptoms of stroke were discussed and included in the AVS today.         Diagnoses and all orders for this visit:    History of CVA (cerebrovascular accident)    Aneurysm (HCC)    Coronary artery disease involving native coronary artery of native heart without angina pectoris    Essential hypertension    PFO (patent foramen ovale)    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 proliferative retinopathy and macular edema, without long-term current use of insulin (HCC)    Pure hypercholesterolemia    Other orders           Subjective:    CHAPIN Arroyo is a 68 y.o. female with PMH including CAD s/p stent, CKD, HTN, HLD, DM, neuropathy, Grave's disease, who presents today for a hospital follow up visit.  She presents from care facility today, unaccompanied.    Patient presented to the ED on 11/29/23 after waking up with numbness of the right side of the mouth, and numbness/tingling in the right hand/fingers.  She apparently went to stand up and fell, felt her right leg was weak.  She also felt her speech sounded slurred.  No headache.  She also reported dizziness described as lightheadedness and feeling off-balance. No other symptoms such as aphasia, N/V, syncope, tremor, dysphagia.  Patient was on aspirin and Brilinta at baseline and reported compliance, however there was concern for noncompliance as she reported noncompliance with BP meds (and has a long, well documented history of medication noncompliance). BP on arrival to the ED was 244/114.  CTH with no acute abnormalities.  Chronic microangiopathic changes and old lacunar infarcts.  CTA h/n with no LVO.  Mild narrowing of the right P1 segment. Poor visualization of. Identified left P-comm.  Multifocal irregularity of the distal anterior cerebral artery branches with mild irregularity of the distal MCA branches.  MRA head then completed. No large vessel intracranial occlusion. Moderate stenosis at the right PCA P1 segment.  Focal 2-3 mm medial protuberances along the left cavernous and supraclinoid ICA segments suspicious for aneurysms.  Focal 2 mm left PCOM infundibular dilatation versus aneurysm.  Outpatient neurosurgery referral advised.  MRI brain demonstrated acute to subacute infarcts involving the left genu of the corpus callosum and left thalamus.  Chronic lacunar infarcts involving the bilateral centrum semiovale, right internal capsule, and by lateral thalamic regions. Stable prominence of the right greater than left Meckel's caves. Associated basal meningoceles cannot be excluded in the setting. Partially empty sella. This constellation of findings can be seen with idiopathic intracranial hypertension. Findings are stable dating back to 2019.  A1C 7.2, lipid panel , .  ECHO with EF 66%, G1DD, small PFO identified, no atrial dilation.  Per inpatient neurology, etiology of strokes likely small vessel due to uncontrolled vascular risk factors (hussein blood pressure), and possible medication non-compliance.  She was continued on ASA and Brilinta, Lipitor 80mg daily. ROPE score 2, strokes less likely related to PFO.  Cardiac monitoring was advised, Zio patch was ordered at discharge. Outpatient follow up with Neurosurgery  "for aneurysm surveillance.  She was ultimately discharged on 12/2/23 to a care facility ([facility]).    Today, patient reports she has been doing well since her stroke.  She reports her symptoms have essentially resolved and she feels nearly back to her baseline.  She does not have any more sensory changes on the right side.  She still has a little bit of ambulatory dysfunction and is using a walker, although she can ambulate on her own.  She is taking aspirin, Brilinta and statin regularly.  She has not had any new neurologic symptoms.  She completed therapies.  She tells me that at the time of her stroke, she was going through some very rough times in her life and was not taking care of herself and not taking medications or eating like she should be.  She says she was very dependent on her daughter, who now essentially abandoned her, and she does not have much contact with her other children or family members.  She says she is not psychotherapy and have come to terms with being on her own and making a new life for herself.  She says overall she feels her life is better now than it was prior to her stroke and she is feeling more motivated.  She says she is only still at the care facility because she has nowhere to live (she was evicted from her home just around the time of the stroke).  She feels she would eventually like to move out on her own.      The following portions of the patient's history were reviewed and updated as appropriate: current medications, past family history, past medical history, past social history, past surgical history, and problem list.       Objective:    Blood pressure 130/76, pulse 58, temperature 97.9 °F (36.6 °C), temperature source Temporal, height 5' 6\" (1.676 m), weight 95.3 kg (210 lb), SpO2 93%.    Physical Exam  Constitutional:       Appearance: Normal appearance.   Eyes:      Extraocular Movements: EOM normal.      Pupils: Pupils are equal, round, and reactive to light. "   Neurological:      Mental Status: She is alert.      Motor: Motor strength is normal.     Deep Tendon Reflexes: Reflexes are normal and symmetric.   Psychiatric:         Mood and Affect: Mood normal.         Speech: Speech normal.         Behavior: Behavior normal.         Neurological Exam  Mental Status  Alert. Oriented to person, place, time and situation. Speech is normal. Language is fluent with no aphasia. Attention and concentration are normal.    Cranial Nerves  CN II: Visual fields full to confrontation.  CN III, IV, VI: Extraocular movements intact bilaterally. Pupils equal round and reactive to light bilaterally.  CN V: Facial sensation is normal.  CN VII: Full and symmetric facial movement.  CN VIII: Hearing is normal.  CN IX, X: Palate elevates symmetrically  CN XI: Shoulder shrug strength is normal.  CN XII: Tongue midline without atrophy or fasciculations.    Motor   Normal muscle tone. No abnormal involuntary movements. Strength is 5/5 throughout all four extremities.    Sensory  Light touch is normal in upper and lower extremities.     Reflexes  Deep tendon reflexes are 2+ and symmetric in all four extremities.    Coordination  Right: Finger-to-nose normal.Left: Finger-to-nose normal.    Gait    Steady gait using walker.        ROS:    Review of Systems   Constitutional: Negative.    HENT: Negative.     Eyes: Negative.    Respiratory: Negative.     Cardiovascular: Negative.    Gastrointestinal: Negative.    Endocrine: Negative.    Genitourinary: Negative.    Musculoskeletal:  Positive for gait problem (uses walker).   Skin: Negative.    Allergic/Immunologic: Negative.    Hematological: Negative.    Psychiatric/Behavioral: Negative.       I personally reviewed and updated the ROS as appropriate

## 2024-05-03 ENCOUNTER — DOCTOR'S OFFICE (OUTPATIENT)
Dept: URBAN - NONMETROPOLITAN AREA CLINIC 1 | Facility: CLINIC | Age: 69
Setting detail: OPHTHALMOLOGY
End: 2024-05-03
Payer: COMMERCIAL

## 2024-05-03 VITALS — HEIGHT: 55 IN

## 2024-05-03 DIAGNOSIS — E11.3313: ICD-10-CM

## 2024-05-03 DIAGNOSIS — H40.013: ICD-10-CM

## 2024-05-03 DIAGNOSIS — I63.9: ICD-10-CM

## 2024-05-03 PROCEDURE — 92134 CPTRZ OPH DX IMG PST SGM RTA: CPT | Performed by: OPHTHALMOLOGY

## 2024-05-03 PROCEDURE — 92083 EXTENDED VISUAL FIELD XM: CPT | Performed by: OPHTHALMOLOGY

## 2024-05-03 PROCEDURE — 99213 OFFICE O/P EST LOW 20 MIN: CPT | Performed by: OPHTHALMOLOGY

## 2024-05-03 ASSESSMENT — CONFRONTATIONAL VISUAL FIELD TEST (CVF)
OD_FINDINGS: FULL
OS_FINDINGS: FULL

## 2024-06-04 ENCOUNTER — OFFICE VISIT (OUTPATIENT)
Dept: CARDIOLOGY CLINIC | Facility: CLINIC | Age: 69
End: 2024-06-04
Payer: COMMERCIAL

## 2024-06-04 VITALS
HEIGHT: 66 IN | WEIGHT: 215 LBS | OXYGEN SATURATION: 97 % | DIASTOLIC BLOOD PRESSURE: 78 MMHG | SYSTOLIC BLOOD PRESSURE: 152 MMHG | HEART RATE: 62 BPM | BODY MASS INDEX: 34.55 KG/M2

## 2024-06-04 DIAGNOSIS — N18.32 STAGE 3B CHRONIC KIDNEY DISEASE (HCC): ICD-10-CM

## 2024-06-04 DIAGNOSIS — I10 ESSENTIAL HYPERTENSION: ICD-10-CM

## 2024-06-04 DIAGNOSIS — I16.0 HYPERTENSIVE URGENCY: ICD-10-CM

## 2024-06-04 DIAGNOSIS — E78.00 PURE HYPERCHOLESTEROLEMIA: ICD-10-CM

## 2024-06-04 DIAGNOSIS — Z86.73 HISTORY OF CVA (CEREBROVASCULAR ACCIDENT): ICD-10-CM

## 2024-06-04 DIAGNOSIS — I50.32 CHRONIC DIASTOLIC HEART FAILURE (HCC): ICD-10-CM

## 2024-06-04 DIAGNOSIS — Q21.12 PFO (PATENT FORAMEN OVALE): ICD-10-CM

## 2024-06-04 DIAGNOSIS — I10 HYPERTENSION: ICD-10-CM

## 2024-06-04 DIAGNOSIS — E11.3513 TYPE 2 DIABETES MELLITUS WITH BOTH EYES AFFECTED BY PROLIFERATIVE RETINOPATHY AND MACULAR EDEMA, WITHOUT LONG-TERM CURRENT USE OF INSULIN (HCC): ICD-10-CM

## 2024-06-04 DIAGNOSIS — I25.10 ATHEROSCLEROSIS OF NATIVE CORONARY ARTERY OF NATIVE HEART WITHOUT ANGINA PECTORIS: Primary | ICD-10-CM

## 2024-06-04 DIAGNOSIS — E66.01 OBESITY, MORBID (HCC): ICD-10-CM

## 2024-06-04 PROCEDURE — 99244 OFF/OP CNSLTJ NEW/EST MOD 40: CPT | Performed by: INTERNAL MEDICINE

## 2024-06-04 RX ORDER — AMLODIPINE BESYLATE 5 MG/1
5 TABLET ORAL DAILY
Qty: 90 TABLET | Refills: 3 | Status: SHIPPED | OUTPATIENT
Start: 2024-06-04

## 2024-06-04 RX ORDER — HYDRALAZINE HYDROCHLORIDE 50 MG/1
50 TABLET, FILM COATED ORAL 3 TIMES DAILY
Qty: 90 TABLET | Refills: 3 | Status: SHIPPED | OUTPATIENT
Start: 2024-06-04

## 2024-06-04 RX ORDER — AMLODIPINE BESYLATE 5 MG/1
5 TABLET ORAL DAILY
Qty: 90 TABLET | Refills: 3 | Status: SHIPPED | OUTPATIENT
Start: 2024-06-04 | End: 2024-06-04 | Stop reason: SDUPTHER

## 2024-06-04 RX ORDER — HYDRALAZINE HYDROCHLORIDE 50 MG/1
50 TABLET, FILM COATED ORAL 3 TIMES DAILY
Qty: 90 TABLET | Refills: 0 | Status: SHIPPED | OUTPATIENT
Start: 2024-06-04 | End: 2024-06-04 | Stop reason: SDUPTHER

## 2024-06-04 NOTE — ASSESSMENT & PLAN NOTE
Blood pressure is elevated but not critically.  -- Changes being made to medical regimen as outlined above.

## 2024-06-04 NOTE — PATIENT INSTRUCTIONS
CARDIOLOGY ASSESSMENT & PLAN:   Diagnosis ICD-10-CM Associated Orders   1. Atherosclerosis of native coronary artery of native heart without angina pectoris  I25.10       2. Chronic diastolic heart failure (HCC)  I50.32       3. Essential hypertension  I10       4. PFO (patent foramen ovale)  Q21.12 AMB extended holter monitor      5. Stage 3b chronic kidney disease ([facility])  N18.32       6. History of CVA (cerebrovascular accident)  Z86.73 AMB extended holter monitor      7. Obesity, morbid ([facility])  E66.01       8. Pure hypercholesterolemia  E78.00       9. Hypertensive urgency  I16.0 hydrALAZINE (APRESOLINE) 50 mg tablet      10. Hypertension  I10 amLODIPine (NORVASC) 5 mg tablet      11. 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 proliferative retinopathy and macular edema, without long-term current use of insulin ([facility])  E11.3513         1. Atherosclerosis of native coronary artery of native heart without angina pectoris  Assessment & Plan:  Ms. Erin Arroyo has established coronary artery disease underwent a proximal LAD in March 2023..  She has been.  She had minimal disease in other vessels.  Her blood pressure is noted to be elevated.  She is on multiple antihypertensive medications.  Her left ventricular systolic function is preserved.  On physical examination there is no evidence of pulmonary or peripheral vascular condition.      We are making following changes to her medical regimen:  -- Brilinta is being discontinued.  -- She should continue aspirin therapy 81 mg daily every day.  -- We are adding amlodipine 5 mg daily for further blood pressure control.  -- We are increasing the frequency of hydralazine to 50 mg 3 times daily instead of her current twice daily.  -- She is advised to continue current doses of carvedilol and atorvastatin and isosorbide mononitrate and valsartan.  -- She should establish follow-up with her nephrologist from time to time for monitoring and treatment of her kidney  disease.  -- She is encouraged to exercise regularly and eat healthy.  -- We will consider repeating an echocardiogram next year.      2. Chronic diastolic heart failure (HCC)  Assessment & Plan:  Wt Readings from Last 3 Encounters:   06/04/24 97.5 kg (215 lb)   04/09/24 95.3 kg (210 lb)   11/30/23 93.4 kg (206 lb)     She appears to be overall stable.  Though her weight is up this is more likely due to occluding rather than fluid retention.  On exam there is no pulmonary or peripheral vascular congestion.  She is now not on diuretic therapy.  -- She is advised to continue heart failure precautions and diet and monitoring.  -- Blood pressure medication changes as noted above.  3. Essential hypertension  4. PFO (patent foramen ovale)  Assessment & Plan:  History of small PFO demonstrated by echocardiogram in 2023.  Her ROPE score is 3 and stroke unlikely be related to the PFO and more likely secondary to small vessel disease.  Will have to readdress this if got for which she has another event.  Orders:  -     AMB extended holter monitor; Future; Expected date: 06/04/2024  5. Stage 3b chronic kidney disease (HCC)  6. History of CVA (cerebrovascular accident)  -     AMB extended holter monitor; Future; Expected date: 06/04/2024  7. Obesity, morbid (HCC)  8. Pure hypercholesterolemia  9. Hypertensive urgency  Assessment & Plan:  Blood pressure is elevated but not critically.  -- Changes being made to medical regimen as outlined above.  Orders:  -     hydrALAZINE (APRESOLINE) 50 mg tablet; Take 1 tablet (50 mg total) by mouth 3 (three) times a day  10. Hypertension  -     amLODIPine (NORVASC) 5 mg tablet; Take 1 tablet (5 mg total) by mouth daily  11. 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 proliferative retinopathy and macular edema, without long-term current use of insulin ([facility])  Assessment & Plan:    Lab Results   Component Value Date    HGBA1C 6.0 (H) 04/10/2024   Diabetes seems to be well-controlled.  --  Advising continued medications and dietary precautions.

## 2024-06-04 NOTE — ASSESSMENT & PLAN NOTE
Wt Readings from Last 3 Encounters:   06/04/24 97.5 kg (215 lb)   04/09/24 95.3 kg (210 lb)   11/30/23 93.4 kg (206 lb)     She appears to be overall stable.  Though her weight is up this is more likely due to occluding rather than fluid retention.  On exam there is no pulmonary or peripheral vascular congestion.  She is now not on diuretic therapy.  -- She is advised to continue heart failure precautions and diet and monitoring.  -- Blood pressure medication changes as noted above.

## 2024-06-04 NOTE — PROGRESS NOTES
[facility]  [facility]  Primary Office: [address] [place] [id number]  Phone: [phone]; Fax: [phone]  *-*-*-*-*-*-*-*-*-*-*-*-*-*-*-*-*-*-*-*-*-*-*-*-*-*-*-*-*-*-*-*-*-*-*-*-*-*-*-*-*-*-*-*-*-*-*-*-*-*-*-*-*-*  ENCOUNTER DATE: 06/04/24 9:30 AM  PATIENT NAME: Erin Arroyo   1955    [id number]  Age: 68 y.o.      Sex: female  ENCOUNTER PROVIDER:  Rakel Krishnan MD, A, [facility]  PRIMARYCARE PHYSICIAN: Laureano Macias DO  REFERRING PHYSICIAN: Laureano Macias DO  [address] [place] [address] Laureano Macias DO   *-*-*-*-*-*-*-*-*-*-*-*-*-*-*-*-*-*-*-*-*-*-*-*-*-*-*-*-*-*-*-*-*-*-*-*-*-*-*-*-*-*-*-*-*-*-*-*-*-*-*-*-*-*-  REASON FOR REFERRAL: Establishment of care for cardiac comorbidities    *-*-*-*-*-*-*-*-*-*-*-*-*-*-*-*-*-*-*-*-*-*-*-*-*-*-*-*-*-*-*-*-*-*-*-*-*-*-*-*-*-*-*-*-*-*-*-*-*-*-*-*-*-*-  CARDIOLOGY ASSESSMENT & PLAN:   Diagnosis ICD-10-CM Associated Orders   1. Atherosclerosis of native coronary artery of native heart without angina pectoris  I25.10       2. Chronic diastolic heart failure (HCC)  I50.32       3. Essential hypertension  I10       4. PFO (patent foramen ovale)  Q21.12 AMB extended holter monitor      5. Stage 3b chronic kidney disease (HCC)  N18.32       6. History of CVA (cerebrovascular accident)  Z86.73 AMB extended holter monitor      7. Obesity, morbid ([facility])  E66.01       8. Pure hypercholesterolemia  E78.00       9. Hypertensive urgency  I16.0 hydrALAZINE (APRESOLINE) 50 mg tablet      10. Hypertension  I10 amLODIPine (NORVASC) 5 mg tablet      11. 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 proliferative retinopathy and macular edema, without long-term current use of insulin ([facility])  E11.3513         1. Atherosclerosis of native coronary artery of native heart without angina pectoris  Assessment & Plan:  Ms. Erin Arroyo has established coronary artery disease underwent a proximal LAD in March 2023..   She has been.  She had minimal disease in other vessels.  Her blood pressure is noted to be elevated.  She is on multiple antihypertensive medications.  Her left ventricular systolic function is preserved.  On physical examination there is no evidence of pulmonary or peripheral vascular condition.      We are making following changes to her medical regimen:  -- Brilinta is being discontinued.  -- She should continue aspirin therapy 81 mg daily every day.  -- We are adding amlodipine 5 mg daily for further blood pressure control.  -- We are increasing the frequency of hydralazine to 50 mg 3 times daily instead of her current twice daily.  -- She is advised to continue current doses of carvedilol and atorvastatin and isosorbide mononitrate and valsartan.  -- She should establish follow-up with her nephrologist from time to time for monitoring and treatment of her kidney disease.  -- She is encouraged to exercise regularly and eat healthy.  -- We will consider repeating an echocardiogram next year.      2. Chronic diastolic heart failure (HCC)  Assessment & Plan:  Wt Readings from Last 3 Encounters:   06/04/24 97.5 kg (215 lb)   04/09/24 95.3 kg (210 lb)   11/30/23 93.4 kg (206 lb)     She appears to be overall stable.  Though her weight is up this is more likely due to occluding rather than fluid retention.  On exam there is no pulmonary or peripheral vascular congestion.  She is now not on diuretic therapy.  -- She is advised to continue heart failure precautions and diet and monitoring.  -- Blood pressure medication changes as noted above.  3. Essential hypertension  4. PFO (patent foramen ovale)  Assessment & Plan:  History of small PFO demonstrated by echocardiogram in 2023.  Her ROPE score is 3 and stroke unlikely be related to the PFO and more likely secondary to small vessel disease.  Will have to readdress this if got for which she has another event.  Orders:  -     AMB extended holter monitor; Future;  Expected date: 06/04/2024  5. Stage 3b chronic kidney disease ([facility])  6. History of CVA (cerebrovascular accident)  -     AMB extended holter monitor; Future; Expected date: 06/04/2024  7. Obesity, morbid ([facility])  8. Pure hypercholesterolemia  9. Hypertensive urgency  Assessment & Plan:  Blood pressure is elevated but not critically.  -- Changes being made to medical regimen as outlined above.  Orders:  -     hydrALAZINE (APRESOLINE) 50 mg tablet; Take 1 tablet (50 mg total) by mouth 3 (three) times a day  10. Hypertension  -     amLODIPine (NORVASC) 5 mg tablet; Take 1 tablet (5 mg total) by mouth daily  11. 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 proliferative retinopathy and macular edema, without long-term current use of insulin ([facility])  Assessment & Plan:    Lab Results   Component Value Date    HGBA1C 6.0 (H) 04/10/2024   Diabetes seems to be well-controlled.  -- Advising continued medications and dietary precautions.     *-*-*-*-*-*-*-*-*-*-*-*-*-*-*-*-*-*-*-*-*-*-*-*-*-*-*-*-*-*-*-*-*-*-*-*-*-*-*-*-*-*-*-*-*-*-*-*-*-*-*-*-*-*-  CURRENT ECG:  No results found for this visit on 06/04/24.      *-*-*-*-*-*-*-*-*-*-*-*-*-*-*-*-*-*-*-*-*-*-*-*-*-*-*-*-*-*-*-*-*-*-*-*-*-*-*-*-*-*-*-*-*-*-*-*-*-*-*-*-*-*-  HISTORY OF PRESENT ILLNESS:  Patient is a 68-year-old female with medical history significant for:  1.  Coronary artery disease, status post MI, status post drug-eluting stent placement to  unspecified vessel 3/1/2023 (proximal LAD -- information given over telephone by Cath Lab charge nurse on 6/4/2024 -- no formal report was seen by me.).  2.  Chronic diastolic heart failure, history of systolic heart failure with mildly reduced function  3.  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  Dyslipidemia  5.  Chronic kidney disease, stage III  6.  History of multinodular goiter  7.  Asthma  8.  Obesity  9.  Gastroparesis  10.  Glaucoma suspect  11.  History of TIA 2005 and more  recently history of CVA with right-sided weakness and aphasia (November 2023 -- acute to subacute infarction left genu of corpus callosum and left thalamus, bilateral chronic lacunar infarcts involving centrum semiovale internal capsule on the right side and lateral thalamic regions-likely small vessel disease)  12.  Hypertension with hypertensive emergency due to medication noncompliance  13.  History of PFO  14.  Cerebral aneurysm  15.  Carpal tunnel syndrome, status right wrist  16.  Recurrent major depression  17. Myesthenia Gravis    She is reestablishing follow-up for her cardiac comorbidities.  She was last hospitalized at [facility] between 11/29/2023 and 12/2/2023 with acute ischemic stroke.  She is here for her cardiac disease.  Today she mentions that since her stroke event she has had no new diagnosis or hospitalizations or significant illnesses.  She is currently a resident of [facility] facility.  She uses a walker for ambulation.  From a symptom perspective she denies any chest pain or palpitation or dizziness.  She does get some shortness of breath when she exerts herself.  She denies any passing out or near passing out episodes.  She has residual weakness in lower extremities from her history of stroke.  She occasionally has some speech issues.  She denies any recent falls or passing out or near passing out.    Functional capacity status: Moderate   (Excellent- >10 METs; Good: (7-10 METs); Moderate (4-7 METs); Poor (<= 4 METs)    Any chronic stressors: None   (feeling of poor health, financial problems, and social isolation etc).    Tobacco or alcohol dependence: She has never been a smoker.  She denies any alcohol use.    Family history: Her mother had coronary artery disease and had stents placed in her heart and older age    Current cardiac meds: Aspirin 81 mg daily atorvastatin 80 mg daily Brilinta 90 mg twice daily carvedilol 12.5 mg twice daily dapagliflozin daily  doxazosin 2 mg daily at bedtime hydralazine 50 mg twice daily isosorbide mononitrate 60 mg once daily Januvia 50 mg daily valsartan 160 mg daily    Previous blood work:  Blood work 4/10/2024 sodium 141 potassium 4.1 chloride 108 bicarb 24 BUN 24 creatinine 1.62 GFR 34  Abnormal LFTs except for decreased total protein and albumin  Lipid provide 11/29/2023 total cholesterol 197 triglyceride 82 HDL 47 calculated   Hemoglobin A1c 6.0 48 2024  TSH 12.448 and free T40.98 On 11/30/2023     Previous cardiac studies:   Echocardiogram 11/30/2023:  Mildly increased left ventricular wall thickness, normal left ventricular systolic function EF 66%.  Grade 1 diastolic dysfunction.  Normal RV size and function.  Normal left and right atrial size.  Small PFO with right to left shunt noted with Valsalva maneuver and saline contrast  Aortic valve.  Trace mitral and tricuspid valve regurgitation.  No obvious pulmonary hypertension.  No pericardial effusion.  Mildly dilated aortic root.    Cardiac catheterization information given over telephone by charge nurse at   Cath Lab [facility]: Cardiac cath 3/1/2023 showed:  Left main: Minimal luminal irregularities  LAD: Proximal LAD stent was placed.  LCx: Minimal luminal irregularities  RCA: Dominant, 25% proximal disease.    [facility] 7/28/2017:  VENTRICLES: -- Global left ventricular function was normal. EF calculated by contrast ventriculography was 55 %. Limited study.   CORONARY VESSELS: -- The coronary circulation is right dominant.   -- Left main: Angiography showed minor luminal irregularities.   -- LAD: Angiography showed minor luminal irregularities.   -- Circumflex: Angiography showed minor luminal irregularities.   -- RCA: Angiography showed minor luminal irregularities.     The ASCVD Risk score (Joey DK, et al., 2019) failed to calculate for the following reasons:    The patient has a prior MI or stroke diagnosis  (Low risk: Less than <5%; borderline  risk: ?5% to <7.5%; intermediate risk: ?7.5% to <20%; high risk:?20%)  Patient's ASCVD risk category: Patient has established ASCVD with history of MI and CVA    *-*-*-*-*-*-*-*-*-*-*-*-*-*-*-*-*-*-*-*-*-*-*-*-*-*-*-*-*-*-*-*-*-*-*-*-*-*-*-*-*-*-*-*-*-*-*-*-*-*-*-*-*-*  PAST MEDICAL HISTORY:  Past Medical History:   Diagnosis Date    Abnormal ECG     last assessed: 5/15/17    Abnormal mammogram     last assessed: 7/11/17    Abnormal stress test     last assesed: 7/24/17    Anemia     Anxiety     Arthritis     Asthma     Back problem     Breast cyst     CAD (coronary artery disease)     Chest pain     last assessed: 7/24/17    Chronic pain disorder     Cyst of left breast     last assessed: 8/18/17    Depression     Depression     resolved: 1/2/18    Diabetes mellitus (HCC)     Hiatal hernia     last assessed: 11/7/17    Hyperlipidemia     Hypertension     Idiopathic intracranial hypertension 05/15/2017    Keloid of skin     last assessed: 11/2/16    Multiple thyroid nodules     Neuropathy     Psychiatric disorder     anxiety    Stroke (HCC)     TIA 2005    Tuberculosis     as a child     PAST SURGICAL HISTORY:   Past Surgical History:   Procedure Laterality Date    ARM WOUND REPAIR / CLOSURE      CARPAL TUNNEL RELEASE      CARPAL TUNNEL RELEASE Left     CATARACT EXTRACTION Bilateral     2015    COLONOSCOPY      CORONARY ANGIOPLASTY WITH STENT PLACEMENT  03/01/2023    at [facility]    CYST REMOVAL      right upper arm.    EYE SURGERY      cataract removaql    FL LUMBAR PUNCTURE DIAGNOSTIC  08/15/2019    PA ESOPHAGOGASTRODUODENOSCOPY TRANSORAL DIAGNOSTIC N/A 01/05/2018    Procedure: ESOPHAGOGASTRODUODENOSCOPY (EGD);  Surgeon: Kerline Coley DO;  Location: [facility];  Service: Gastroenterology    PA NDSC WRST SURG W/RLS TRANSVRS CARPL LIGM Left 04/19/2019    Procedure: ENDOSCOPIC CARPAL TUNNEL RELEASE;  Surgeon: Dalton Jesus MD;  Location:  MAIN OR;  Service: Orthopedics    TUBAL LIGATION      US  GUIDED THYROID BIOPSY  04/15/2019         FAMILY HISTORY:  Family History   Problem Relation Age of Onset    Heart disease Mother     Diabetes Mother     Arthritis Mother     Hypertension Mother     MONE disease Mother     Kidney disease Father     Hypertension Father     Stomach cancer Father     Arthritis Sister     Kidney disease Brother         age 29     Stroke Maternal Grandmother     Stomach cancer Maternal Grandmother     No Known Problems Maternal Grandfather     Arthritis Paternal Grandmother     Heart attack Paternal Grandmother     No Known Problems Paternal Grandfather     No Known Problems Daughter     No Known Problems Daughter     No Known Problems Daughter     No Known Problems Daughter     Stroke Maternal Aunt     Liver cancer Maternal Aunt     No Known Problems Maternal Aunt     No Known Problems Maternal Aunt     Heart attack Maternal Uncle     No Known Problems Paternal Aunt     Cancer Family         spinal column    Coronary artery disease Family     Glaucoma Family     Rheum arthritis Family     SOCIAL HISTORY:  Social History     Tobacco Use   Smoking Status Never   Smokeless Tobacco Never      Social History     Substance and Sexual Activity   Alcohol Use Not Currently    Alcohol/week: 0.0 standard drinks of alcohol     Social History     Substance and Sexual Activity   Drug Use No    [unfilled]     *-*-*-*-*-*-*-*-*-*-*-*-*-*-*-*-*-*-*-*-*-*-*-*-*-*-*-*-*-*-*-*-*-*-*-*-*-*-*-*-*-*-*-*-*-*-*-*-*-*-*-*-*-*  ALLERGIES:  Allergies   Allergen Reactions    Bactrim [Sulfamethoxazole-Trimethoprim] Shortness Of Breath    Lisinopril Angioedema    Vicodin [Hydrocodone-Acetaminophen] GI Intolerance    Hydrocodone-Acetaminophen Nausea Only    Oxycodone-Acetaminophen GI Intolerance and Nausea Only    CURRENT SCHEDULED MEDICATIONS:    Current Outpatient Medications:     albuterol (PROVENTIL HFA,VENTOLIN HFA) 90 mcg/act inhaler, Inhale 2 puffs every 6 (six) hours as needed for wheezing or shortness of  breath, Disp: 54 g, Rfl: 1    Alcohol Swabs (DropSafe Alcohol Prep) 70 % PADS, Use one pad twice daily when check blood sugar, Disp: 200 each, Rfl: 2    amLODIPine (NORVASC) 5 mg tablet, Take 1 tablet (5 mg total) by mouth daily, Disp: 90 tablet, Rfl: 3    aspirin (ECOTRIN LOW STRENGTH) 81 mg EC tablet, Take 1 tablet (81 mg total) by mouth daily, Disp: 30 tablet, Rfl: 3    atorvastatin (LIPITOR) 80 mg tablet, Take 1 tablet (80 mg total) by mouth daily with dinner, Disp: 90 tablet, Rfl: 3    Blood Glucose Monitoring Suppl (True Metrix Air Glucose Meter) w/Device KIT, USE AS DIRECTED, Disp: 1 kit, Rfl: 0    carvedilol (COREG) 12.5 mg tablet, Take 1 tablet (12.5 mg total) by mouth 2 (two) times a day with meals, Disp: 60 tablet, Rfl: 0    dapagliflozin (Farxiga) 10 MG tablet, Take 1 tablet (10 mg total) by mouth in the morning 1/2 tablet daily for the 1st month then increase to a full tablet daily in the morning, Disp: 90 tablet, Rfl: 3    Diclofenac Sodium (VOLTAREN) 1 %, Apply 2 g topically 4 (four) times a day, Disp: 2 g, Rfl: 0    doxazosin (CARDURA) 2 mg tablet, Take 1 tablet (2 mg total) by mouth daily Do not start before November 3, 2023., Disp: 30 tablet, Rfl: 0    escitalopram (LEXAPRO) 5 mg tablet, Take 5 mg by mouth daily, Disp: , Rfl:     gabapentin (NEURONTIN) 300 mg capsule, Take 1 capsule (300 mg total) by mouth 3 (three) times a day Do not start before December 5, 2023., Disp: 270 capsule, Rfl: 0    hydrALAZINE (APRESOLINE) 50 mg tablet, Take 1 tablet (50 mg total) by mouth 3 (three) times a day, Disp: 90 tablet, Rfl: 0    isosorbide mononitrate (IMDUR) 60 mg 24 hr tablet, Take 1 tablet (60 mg total) by mouth daily, Disp: 90 tablet, Rfl: 0    Januvia 50 MG tablet, , Disp: , Rfl:     pantoprazole (PROTONIX) 40 mg tablet, Take 40 mg by mouth every morning At 8AM, Disp: , Rfl:     polyethylene glycol (GLYCOLAX) 17 GM/SCOOP powder, Take 17 g by mouth daily Increase to twice daily if still constiptated,  Disp: 578 g, Rfl: 3    TRUEplus Lancets 33G MISC, Use 2 (two) times a day, Disp: 200 each, Rfl: 2    valsartan (DIOVAN) 160 mg tablet, Take 160 mg by mouth daily, Disp: , Rfl:     furosemide (LASIX) 20 mg tablet, Take 1 tablet (20 mg total) by mouth daily as needed (LE Edema only daily as needed) (Patient not taking: Reported on 4/9/2024), Disp: 30 tablet, Rfl: 0    ondansetron (ZOFRAN) 4 mg tablet, Take 1 tablet (4 mg total) by mouth every 8 (eight) hours as needed for nausea or vomiting (Patient not taking: Reported on 4/9/2024), Disp: 20 tablet, Rfl: 0    sitaGLIPtin (Januvia) 100 mg tablet, Take 1 tablet (100 mg total) by mouth daily (Patient not taking: Reported on 4/9/2024), Disp: 90 tablet, Rfl: 3     *-*-*-*-*-*-*-*-*-*-*-*-*-*-*-*-*-*-*-*-*-*-*-*-*-*-*-*-*-*-*-*-*-*-*-*-*-*-*-*-*-*-*-*-*-*-*-*-*-*-*-*-*-*  REVIEW OF SYMPTOMS:    Positive for: As noted above in HPI  Negative for: All remaining as reviewed below and in HPI. SYSTEM SYMPTOMS REVIEWED:  General--weight change, fever, night sweats  Respiratoryl-- Wheezing, shortness of breath, cough, URI symptoms, sputum, blood  Cardiovascular--chest pain, syncope, dyspnea on exertion, edema, decline in exercise tolerance, claudication   Gastrointestinal--persistent vomiting, diarrhea, abdominal distention, blood in stool   Muscular or skeletal--joint pain or swelling   Neurologic--headaches, syncope, abnormal movement  Hematologic--history of easy bruising and bleeding   Endocrine--thyroid enlargement, heat or cold intolerance, polyuria   Psychiatric--anxiety, depression      *-*-*-*-*-*-*-*-*-*-*-*-*-*-*-*-*-*-*-*-*-*-*-*-*-*-*-*-*-*-*-*-*-*-*-*-*-*-*-*-*-*-*-*-*-*-*-*-*-*-*-*-*-*  CURRENT OUTPATIENT MEDICATIONS:     Current Outpatient Medications:     albuterol (PROVENTIL HFA,VENTOLIN HFA) 90 mcg/act inhaler, Inhale 2 puffs every 6 (six) hours as needed for wheezing or shortness of breath, Disp: 54 g, Rfl: 1    Alcohol Swabs (DropSafe Alcohol Prep) 70 % PADS,  Use one pad twice daily when check blood sugar, Disp: 200 each, Rfl: 2    amLODIPine (NORVASC) 5 mg tablet, Take 1 tablet (5 mg total) by mouth daily, Disp: 90 tablet, Rfl: 3    aspirin (ECOTRIN LOW STRENGTH) 81 mg EC tablet, Take 1 tablet (81 mg total) by mouth daily, Disp: 30 tablet, Rfl: 3    atorvastatin (LIPITOR) 80 mg tablet, Take 1 tablet (80 mg total) by mouth daily with dinner, Disp: 90 tablet, Rfl: 3    Blood Glucose Monitoring Suppl (True Metrix Air Glucose Meter) w/Device KIT, USE AS DIRECTED, Disp: 1 kit, Rfl: 0    carvedilol (COREG) 12.5 mg tablet, Take 1 tablet (12.5 mg total) by mouth 2 (two) times a day with meals, Disp: 60 tablet, Rfl: 0    dapagliflozin (Farxiga) 10 MG tablet, Take 1 tablet (10 mg total) by mouth in the morning 1/2 tablet daily for the 1st month then increase to a full tablet daily in the morning, Disp: 90 tablet, Rfl: 3    Diclofenac Sodium (VOLTAREN) 1 %, Apply 2 g topically 4 (four) times a day, Disp: 2 g, Rfl: 0    doxazosin (CARDURA) 2 mg tablet, Take 1 tablet (2 mg total) by mouth daily Do not start before November 3, 2023., Disp: 30 tablet, Rfl: 0    escitalopram (LEXAPRO) 5 mg tablet, Take 5 mg by mouth daily, Disp: , Rfl:     gabapentin (NEURONTIN) 300 mg capsule, Take 1 capsule (300 mg total) by mouth 3 (three) times a day Do not start before December 5, 2023., Disp: 270 capsule, Rfl: 0    hydrALAZINE (APRESOLINE) 50 mg tablet, Take 1 tablet (50 mg total) by mouth 3 (three) times a day, Disp: 90 tablet, Rfl: 0    isosorbide mononitrate (IMDUR) 60 mg 24 hr tablet, Take 1 tablet (60 mg total) by mouth daily, Disp: 90 tablet, Rfl: 0    Januvia 50 MG tablet, , Disp: , Rfl:     pantoprazole (PROTONIX) 40 mg tablet, Take 40 mg by mouth every morning At 8AM, Disp: , Rfl:     polyethylene glycol (GLYCOLAX) 17 GM/SCOOP powder, Take 17 g by mouth daily Increase to twice daily if still constiptated, Disp: 578 g, Rfl: 3    TRUEplus Lancets 33G MISC, Use 2 (two) times a day, Disp:  "200 each, Rfl: 2    valsartan (DIOVAN) 160 mg tablet, Take 160 mg by mouth daily, Disp: , Rfl:     furosemide (LASIX) 20 mg tablet, Take 1 tablet (20 mg total) by mouth daily as needed (LE Edema only daily as needed) (Patient not taking: Reported on 4/9/2024), Disp: 30 tablet, Rfl: 0    ondansetron (ZOFRAN) 4 mg tablet, Take 1 tablet (4 mg total) by mouth every 8 (eight) hours as needed for nausea or vomiting (Patient not taking: Reported on 4/9/2024), Disp: 20 tablet, Rfl: 0    sitaGLIPtin (Januvia) 100 mg tablet, Take 1 tablet (100 mg total) by mouth daily (Patient not taking: Reported on 4/9/2024), Disp: 90 tablet, Rfl: 3    *-*-*-*-*-*-*-*-*-*-*-*-*-*-*-*-*-*-*-*-*-*-*-*-*-*-*-*-*-*-*-*-*-*-*-*-*-*-*-*-*-*-*-*-*-*-*-*-*-*-*-*-*-*  VITAL SIGNS:  Vitals:    06/04/24 0814   BP: 152/78   Pulse: 62   SpO2: 97%   Weight: 97.5 kg (215 lb)   Height: 5' 6\" (1.676 m)       BMI: Body mass index is 34.7 kg/m².    WEIGHTS:   Wt Readings from Last 25 Encounters:   06/04/24 97.5 kg (215 lb)   04/09/24 95.3 kg (210 lb)   11/30/23 93.4 kg (206 lb)   11/13/23 97.1 kg (214 lb)   10/30/23 92.9 kg (204 lb 12.9 oz)   09/07/23 98.9 kg (218 lb)   07/19/23 95.9 kg (211 lb 6.4 oz)   06/23/23 93.5 kg (206 lb 2.1 oz)   06/23/23 95.1 kg (209 lb 9.6 oz)   06/07/23 97.8 kg (215 lb 9.6 oz)   04/16/23 94 kg (207 lb 3.2 oz)   02/07/23 93.9 kg (207 lb)   12/21/22 93.9 kg (207 lb)   11/23/22 98.4 kg (217 lb)   11/13/22 96.2 kg (212 lb)   10/03/22 95.7 kg (211 lb)   09/19/22 95.7 kg (211 lb)   07/22/22 92.2 kg (203 lb 3.2 oz)   07/18/22 91.8 kg (202 lb 6.4 oz)   07/15/22 93.4 kg (206 lb)   07/08/22 90.2 kg (198 lb 13.7 oz)   03/07/22 97.2 kg (214 lb 4.8 oz)   11/16/21 99.8 kg (220 lb)   11/16/21 100 kg (220 lb 12.8 oz)   10/12/21 102 kg (224 lb 13.9 oz)        *-*-*-*-*-*-*-*-*-*-*-*-*-*-*-*-*-*-*-*-*-*-*-*-*-*-*-*-*-*-*-*-*-*-*-*-*-*-*-*-*-*-*-*-*-*-*-*-*-*-*-*-*-*-  PHYSICAL EXAM:  General Appearance:    Alert, cooperative, no distress, appears " stated age, slightly increased BMI   Head, Eyes, ENT:    No obvious abnormality, moist mucous mebranes.   Neck:   Supple, no carotid bruit. No JVD, no obvious lymphadenoapthy   Back:     Symmetric, no curvature.   Lungs:     Respirations unlabored. Clear to auscultation bilaterally,    Chest wall:    No tenderness or deformity   Heart:    Regular rate and rhythm, normal intensity heart sounds, no murmur, rub  or gallop.   Abdomen:     Soft, non-tender.   Extremities:   Extremities warm, no cyanosis or edema    Skin:   No venostatic changes in lower extremities.   Normal skin color, texture, and turgor. No rashes or lesions   Neuro: Pt is alert and oriented time 3 with expressive dysarthria and some word finding difficulty.   Psychiatric/Behavioral: Mood is normal. Behavior is normal.   *-*-*-*-*-*-*-*-*-*-*-*-*-*-*-*-*-*-*-*-*-*-*-*-*-*-*-*-*-*-*-*-*-*-*-*-*-*-*-*-*-*-*-*-*-*-*-*-*-*-*-*-*-*-  LABORATORY DATA: I have personally reviewed the available laboratory data.        Potassium   Date Value Ref Range Status   04/10/2024 4.1 3.5 - 5.2 mmol/L Final   01/25/2024 4.4 3.5 - 5.2 mmol/L Final   11/30/2023 3.5 3.5 - 5.3 mmol/L Final   11/29/2023 3.5 3.5 - 5.3 mmol/L Final   11/01/2023 3.7 3.5 - 5.3 mmol/L Final     Chloride   Date Value Ref Range Status   04/10/2024 108 100 - 109 mmol/L Final   01/25/2024 108 100 - 109 mmol/L Final   11/30/2023 109 (H) 96 - 108 mmol/L Final   11/29/2023 107 96 - 108 mmol/L Final   11/01/2023 108 96 - 108 mmol/L Final     Carbon Dioxide   Date Value Ref Range Status   04/10/2024 24 21 - 31 mmol/L Final   01/25/2024 27 21 - 31 mmol/L Final     CO2   Date Value Ref Range Status   11/30/2023 26 21 - 32 mmol/L Final   11/29/2023 29 21 - 32 mmol/L Final   11/01/2023 28 21 - 32 mmol/L Final     BUN   Date Value Ref Range Status   04/10/2024 24 7 - 25 mg/dL Final   01/25/2024 20 7 - 25 mg/dL Final   11/30/2023 16 5 - 25 mg/dL Final   11/29/2023 14 5 - 25 mg/dL Final   11/01/2023 17 5 - 25  mg/dL Final     Creatinine   Date Value Ref Range Status   04/10/2024 1.62 (H) 0.40 - 1.10 mg/dL Final   01/25/2024 1.47 (H) 0.40 - 1.10 mg/dL Final   11/30/2023 1.42 (H) 0.60 - 1.30 mg/dL Final     Comment:     Standardized to IDMS reference method   11/29/2023 1.40 (H) 0.60 - 1.30 mg/dL Final     Comment:     Standardized to IDMS reference method   11/01/2023 1.36 (H) 0.60 - 1.30 mg/dL Final     Comment:     Standardized to IDMS reference method     eGFRcr   Date Value Ref Range Status   04/10/2024 34 (L) >59 Final   01/25/2024 39 (L) >59 Final     eGFR   Date Value Ref Range Status   11/30/2023 37 ml/min/1.73sq m Final   11/29/2023 38 ml/min/1.73sq m Final   11/01/2023 40 ml/min/1.73sq m Final     Calcium   Date Value Ref Range Status   04/10/2024 8.2 (L) 8.5 - 10.1 mg/dL Final   01/25/2024 8.7 8.5 - 10.1 mg/dL Final   11/30/2023 8.4 8.4 - 10.2 mg/dL Final   11/29/2023 8.8 8.4 - 10.2 mg/dL Final   11/01/2023 8.5 8.4 - 10.2 mg/dL Final     AST   Date Value Ref Range Status   04/10/2024 16 <41 U/L Final   11/30/2023 16 13 - 39 U/L Final   10/30/2023 17 13 - 39 U/L Final   07/19/2023 17 5 - 45 U/L Final     Comment:     Specimen collection should occur prior to Sulfasalazine administration due to the potential for falsely depressed results.      ALT   Date Value Ref Range Status   04/10/2024 12 <56 U/L Final   11/30/2023 10 7 - 52 U/L Final     Comment:     Specimen collection should occur prior to Sulfasalazine administration due to the potential for falsely depressed results.    10/30/2023 10 7 - 52 U/L Final     Comment:     Specimen collection should occur prior to Sulfasalazine administration due to the potential for falsely depressed results.    07/19/2023 23 12 - 78 U/L Final     Comment:     Specimen collection should occur prior to Sulfasalazine and/or Sulfapyridine administration due to the potential for falsely depressed results.      Alkaline Phosphatase   Date Value Ref Range Status   04/10/2024 74 35 -  "120 U/L Final   11/30/2023 90 34 - 104 U/L Final   10/30/2023 106 (H) 34 - 104 U/L Final   07/19/2023 95 46 - 116 U/L Final     Magnesium   Date Value Ref Range Status   11/30/2023 1.9 1.9 - 2.7 mg/dL Final   07/19/2023 2.3 1.6 - 2.6 mg/dL Final   06/23/2023 1.5 (L) 1.9 - 2.7 mg/dL Final     WBC   Date Value Ref Range Status   11/30/2023 5.25 4.31 - 10.16 Thousand/uL Final   11/29/2023 4.88 4.31 - 10.16 Thousand/uL Final   10/31/2023 3.50 (L) 4.31 - 10.16 Thousand/uL Final     Hemoglobin   Date Value Ref Range Status   11/30/2023 11.9 11.5 - 15.4 g/dL Final   11/29/2023 12.8 11.5 - 15.4 g/dL Final   10/31/2023 11.8 11.5 - 15.4 g/dL Final     Platelets   Date Value Ref Range Status   11/30/2023 230 149 - 390 Thousands/uL Final   11/29/2023 239 149 - 390 Thousands/uL Final   10/31/2023 198 149 - 390 Thousands/uL Final     PTT   Date Value Ref Range Status   11/29/2023 26 23 - 37 seconds Final     Comment:     Therapeutic Heparin Range =  60-90 seconds   10/30/2023 27 23 - 37 seconds Final     Comment:     Therapeutic Heparin Range =  60-90 seconds     INR   Date Value Ref Range Status   11/29/2023 0.98 0.84 - 1.19 Final   10/30/2023 0.95 0.84 - 1.19 Final     No results found for: \"CK\", \"CKMB\", \"DIGOXIN\"  No results found for: \"TSH\"  HDL, Direct   Date Value Ref Range Status   11/29/2023 47 (L) >=50 mg/dL Final     Triglycerides   Date Value Ref Range Status   11/29/2023 82 See Comment mg/dL Final     Comment:     Triglyceride:     0-9Y            <75mg/dL     10Y-17Y         <90 mg/dL       >=18Y     Normal          <150 mg/dL     Borderline High 150-199 mg/dL     High            200-499 mg/dL        Very High       >499 mg/dL    Specimen collection should occur prior to Metamizole administration due to the potential for falsely depressed results.      Hemoglobin A1C   Date Value Ref Range Status   04/10/2024 6.0 (H) <5.7 % Final     Comment:     Reference Range  Non-diabetic                     <5.7  Pre-diabetic    "                  5.7-6.4  Diabetic                         >=6.5  ADA target for diabetic control  <=7     Blood Culture   Date Value Ref Range Status   10/12/2021 No Growth After 5 Days.  Final   10/12/2021 No Growth After 5 Days.  Final     Urine Culture   Date Value Ref Range Status   05/13/2020 20,000-29,000 cfu/ml  Final     Comment:     Mixed Contaminants X5   03/24/2020 <10,000 cfu/ml  Final     Comment:     Mixed Contaminants X2   05/01/2019 20,000-29,000 cfu/ml  Final     Comment:     Mixed Contaminants X3   04/11/2019 30,000-39,000 cfu/ml  Final     Comment:     Mixed Contaminants X4   04/09/2019 <10,000 cfu/ml  Final     Comment:     Mixed Contaminants X3   11/17/2017 50,000-59,000 cfu/ml Lactobacillus species (A)  Final       *-*-*-*-*-*-*-*-*-*-*-*-*-*-*-*-*-*-*-*-*-*-*-*-*-*-*-*-*-*-*-*-*-*-*-*-*-*-*-*-*-*-*-*-*-*-*-*-*-*-*-*-*-*-  RADIOLOGY RESULTS:  CTA stroke alert (head/neck)    Addendum Date: 11/30/2023    ADDENDUM: 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 Recommend follow-up with neurovascular service . As mentioned on initial report, there is poor visualization of the left P-comm that was clearly visualized and patent on the prior study. There is residual opacification of the left P-comm infundibulum.    Result Date: 11/30/2023  Impression: No significant stenosis of the cervical carotid or vertebral arteries. No acute large vessel occlusion. No significant stenosis of the large vessels. Mild narrowing of the right P1 segment. Poor visualization of. Identified left P-comm. Multifocal irregularity of the distal anterior cerebral artery branches with mild irregularity of the distal MCA branches. Findings are new from prior study. While atherosclerotic disease is possible, suggest correlation for vasculitis or RCVS. Findings were directly discussed with  Ashish Beaver at 1:47 p.m. Workstation performed: [id number]     MRA head wo contrast    Result Date: 11/30/2023  Impression: There is no large vessel intracranial occlusion. Moderate stenosis at the right PCA P1 segment. Focal 2-3 mm medial protuberances along the left cavernous and supraclinoid ICA segments suspicious for aneurysms. Focal 2 mm left PCOM infundibular dilatation versus aneurysm. Workstation performed: [id number]     MRI brain wo contrast    Result Date: 11/30/2023  Impression: Subcentimeter acute to subacute infarcts involving the left genu of the corpus callosum and left thalamus. Chronic lacunar infarcts involving the bilateral centrum semiovale, right internal capsule, and by lateral thalamic regions. Chronic microangiopathic ischemic changes. Stable prominence of the right greater than left Meckel's caves. Associated basal meningoceles cannot be excluded in the setting. Partially empty sella. This constellation of findings can be seen with idiopathic intracranial hypertension. Findings are stable dating back to 2019. The study was marked in EPIC for immediate notification. Workstation performed: [id number]     CT stroke alert brain    Result Date: 11/29/2023  Impression: No acute intracranial abnormality. Stable chronic microangiopathy and old lacunar infarcts. Findings were directly discussed with Ashish Beaver at 1:47 p.m. Workstation performed: [id number]     X-ray chest 1 view portable    Result Date: 11/29/2023  Impression: No acute cardiopulmonary disease. Workstation performed: [id number]       *-*-*-*-*-*-*-*-*-*-*-*-*-*-*-*-*-*-*-*-*-*-*-*-*-*-*-*-*-*-*-*-*-*-*-*-*-*-*-*-*-*-*-*-*-*-*-*-*-*-*-*-*-*-  LAST ECHOCARDIOGRAM AND OTHER CARDIOLOGY RESULTS:  Results for orders placed during the hospital encounter of 04/14/17    Echo complete with contrast if indicated    [place] [address]  [phone]    Transthoracic  Echocardiogram  2D, M-mode, Doppler, and Color Doppler    Study date:  2017    Patient: RICKIE SANTOS  MR number: [id number]  Account number: [id number]  : 1955  Age: 61 years  Gender: Female  Status: Outpatient  Location: Echo lab  Height: 65 in  Weight: 237.6 lb  BP: 124/ 76 mmHg    Diagnoses: R07.9 - Chest pain, unspecified    Sonographer:  Farzana Mcgrath RDCS  Primary Physician:  Laureano Macias DO  Referring Physician:  Laureano Macias DO  Group:  [facility]  Interpreting Physician:  Damon Sotomayor MD    SUMMARY    LEFT VENTRICLE:  Size was normal.  Systolic function was normal. Ejection fraction was estimated to be 60 %.  There were no regional wall motion abnormalities.  Wall thickness was normal.    TRICUSPID VALVE:  There was mild regurgitation.    HISTORY: PRIOR HISTORY: DIABETES MELLITUS, PALPITATIONS, HYPERTENSION    PROCEDURE: The procedure was performed in the echo lab. This was a routine study. The transthoracic approach was used. The study included complete 2D imaging, M-mode, complete spectral Doppler, and color Doppler. Images were obtained from  the parasternal, apical, subcostal, and suprasternal notch acoustic windows. Image quality was adequate.    LEFT VENTRICLE: Size was normal. Systolic function was normal. Ejection fraction was estimated to be 60 %. There were no regional wall motion abnormalities. Wall thickness was normal.    RIGHT VENTRICLE: The size was normal. Systolic function was normal. Wall thickness was normal.    LEFT ATRIUM: Size was normal.    RIGHT ATRIUM: Size was normal.    MITRAL VALVE: Valve structure was normal. There was normal leaflet separation. DOPPLER: The transmitral velocity was within the normal range. There was no evidence for stenosis. There was no significant regurgitation.    AORTIC VALVE: The valve was trileaflet. Leaflets exhibited normal thickness and normal cuspal separation. DOPPLER: Transaortic velocity was  within the normal range. There was no evidence for stenosis. There was no regurgitation.    TRICUSPID VALVE: The valve structure was normal. There was normal leaflet separation. DOPPLER: The transtricuspid velocity was within the normal range. There was no evidence for stenosis. There was mild regurgitation.    PULMONIC VALVE: Leaflets exhibited normal thickness, no calcification, and normal cuspal separation. DOPPLER: The transpulmonic velocity was within the normal range. There was no regurgitation.    PERICARDIUM: There was no pericardial effusion. The pericardium was normal in appearance.    AORTA: The root exhibited normal size.    SYSTEM MEASUREMENT TABLES    2D  %FS: 45.11 %  AV Diam: 3.03 cm  EDV(Teich): 62.6 ml  EF(Teich): 77.12 %  ESV(Teich): 14.32 ml  IVSd: 1.37 cm  LA Area: 15.47 cm2  LA Diam: 3.65 cm  LVEDV MOD A4C: 121.38 ml  LVEF MOD A4C: 63.23 %  LVESV MOD A4C: 44.63 ml  LVIDd: 3.82 cm  LVIDs: 2.1 cm  LVLd A4C: 8.67 cm  LVLs A4C: 6.58 cm  LVPWd: 0.91 cm  RA Area: 11.67 cm2  RV DIAM: 2.16 cm  SV MOD A4C: 76.75 ml  SV(Teich): 48.28 ml    CW  AV Vmax: 1.29 m/s  AV maxP.61 mmHg  PV Vmax: 0.94 m/s  PV maxPG: 3.51 mmHg    MM  TAPSE: 2.03 cm    PW  E': 0.08 m/s  E/E': 7.45  LVOT Vmax: 0.63 m/s  LVOT maxP.61 mmHg  MV A Tomas: 0.77 m/s  MV Dec Refugio: 3.04 m/s2  MV DecT: 203.31 ms  MV E Tomas: 0.62 m/s  MV E/A Ratio: 0.81  RVOT Vmax: 0.51 m/s  RVOT maxP.03 mmHg    [facility] Accredited Echocardiography Laboratory    Prepared and electronically signed by    Damon Sotomayor MD  Signed 2017 12:14:21    No results found for this or any previous visit.    No results found for this or any previous visit.    Results for orders placed during the hospital encounter of 17    NM myocardial perfusion spect (stress and/or [place] [address]  [phone]    Exercise    Patient: RICKIE DANIELLE  MR number:  [id number]  Account number: [id number]  : 1955  Age: 61 years  Gender: Female  Status: Outpatient  Location: Stress lab  Height: 65 in  Weight: 234 lb  BP: 140/ 86 mmHg    Allergies: NO KNOWN ALLERGIES    Diagnosis: R07.9 - Chest pain, unspecified    RN:  Christa Gallegos RN  Primary Physician:  Laureano Macias DO  Technician:  Farzana Mcgrath RDCS  Referring Physician:  Damon Sotomayor MD  Interpreting Physician:  Ashley Weber DO    INDICATIONS: Detection of coronary artery disease.    HISTORY: HYPERCHOLESTEROLEMIA, POSITIVE FAMILY HISTORY, DIABETES MELLITUS, SHORTNESS OF BREATH, CHEST PAIN The patient is a 61 year old  female.    REST ECG: Sinus bradycardia. Left axis deviation. Voltage criteria for left ventricular hypertrophy were present. Q waves were present in leads V1 and V2. T wave inversions were deep and observed in leads II, III, aVF, V5 and V6.    PROCEDURE: The study was performed in the stress lab. Treadmill exercise testing was performed, using the Mushtaq protocol. Systolic blood pressure was 140 mmHg, at the start of the study. Diastolic blood pressure was 86 mmHg, at the start  of the study. The heart rate was 52 bpm, at the start of the study.    MUSHTAQ PROTOCOL:  HR bpm SBP mmHg DBP mmHg Symptoms  Baseline 52 140 86 none  Stage 1 95 158 90 none  Stage 2 136 182 86 mild fatigue, nausea  Stage 3 136 -- -- --  Immediate 130 204 90 --  Recovery 2 67 184 84 mild chest discomfort  Recovery 5 62 156 80 subsiding  No medications or fluids given.    STRESS SUMMARY: Duration of exercise was 6 min and 6 sec. The patient exercised to protocol stage 3. Maximal work rate was 7.1 METs. Functional capacity was slightly decreased (20% to 30%). Maximal heart rate during stress was 139 bpm ( 87  % of maximal predicted heart rate). The heart rate response to stress was normal. There was resting hypertension with a hypertensive blood pressure response to stress. The rate-pressure product for  the peak heart rate and blood pressure  was 25958. There was no chest pain during stress. The stress test was terminated due to fatigue. The stress ECG was non-diagnostic for ischemia due to baseline ST/T wave abnormalities. Arrhythmia during stress: isolated atrial premature  beats.    ISOTOPE ADMINISTRATION:  Resting isotope administration Stress isotope administration  Agent Tetrofosmin Tetrofosmin  Dose 10.7 mCi 30 mCi  Date 05/26/2017 05/26/2017  Injection time 07:53 09:59  Imaging time 09:05 10:27  Injection-image interval 72 min 28 min    The radiopharmaceutical was injected one minute before the end of exercise.    MYOCARDIAL PERFUSION IMAGING:  The image quality was good. Rotating projection images reveal moderate breast attenuation. Left ventricular size was normal. The TID ratio was 0.96.    PERFUSION DEFECTS:  -  There was a small, mildly severe, reversible myocardial perfusion defect of the mid anteroseptal wall.    GATED SPECT:  The calculated left ventricular ejection fraction was 57 %. Left ventricular ejection fraction was within normal limits by visual estimate. There was no left ventricular regional abnormality.    SUMMARY:  -  Stress results: Duration of exercise was 6 min and 6 sec. Functional capacity was slightly decreased (20% to 30%). Target heart rate was achieved. There was resting hypertension with a hypertensive blood pressure response to stress.  There was no chest pain during stress.  -  ECG conclusions: The stress ECG was non-diagnostic for ischemia due to baseline ST/T wave abnormalities.  -  Perfusion imaging: There was a small, mildly severe, reversible myocardial perfusion defect of the mid anteroseptal wall.  -  Gated SPECT: The calculated left ventricular ejection fraction was 57 %. Left ventricular ejection fraction was within normal limits by visual estimate. There was no left ventricular regional abnormality.    IMPRESSIONS: Abnormal study after maximal exercise with  reproduction of symptoms. There was a small amount of ischemia in the distribution of the left anterior descending coronary artery. Left ventricular systolic function was normal.    Prepared and signed by    Ashley Weber DO  Signed 05/26/2017 11:19:57       *-*-*-*-*-*-*-*-*-*-*-*-*-*-*-*-*-*-*-*-*-*-*-*-*-*-*-*-*-*-*-*-*-*-*-*-*-*-*-*-*-*-*-*-*-*-*-*-*-*-*-*-*-*-  SIGNATURES:   @SIG@   Rakel Krishnan MD     CC:   DO Gilberto Song Craig, DO

## 2024-06-04 NOTE — LETTER
June 4, 2024     Laureano Macias DO  [address] [facility] [address]    Patient: Erin Arroyo   YOB: 1955   Date of Visit: 6/4/2024       Dear Dr. Macias:    Thank you for referring Erin Arroyo to me for evaluation. Below are my notes for this consultation.    If you have questions, please do not hesitate to call me. I look forward to following your patient along with you.         Sincerely,        Rakel Krishnan MD        CC: Erin Krishnan MD  6/4/2024  9:31 AM  Signed   [facility]  [facility]  Primary Office: [address] [place] [address]  Phone: [phone]; Fax: [phone]  *-*-*-*-*-*-*-*-*-*-*-*-*-*-*-*-*-*-*-*-*-*-*-*-*-*-*-*-*-*-*-*-*-*-*-*-*-*-*-*-*-*-*-*-*-*-*-*-*-*-*-*-*-*  ENCOUNTER DATE: 06/04/24 9:30 AM  PATIENT NAME: Erin Arroyo   1955    [id number]  Age: 68 y.o.      Sex: female  ENCOUNTER PROVIDER:  Rakel Krishnan MD, A, [facility]  PRIMARYCARE PHYSICIAN: Laureano Macias DO  REFERRING PHYSICIAN: Laureano Macias DO  [address] [place] [address] Laureano Macias DO   *-*-*-*-*-*-*-*-*-*-*-*-*-*-*-*-*-*-*-*-*-*-*-*-*-*-*-*-*-*-*-*-*-*-*-*-*-*-*-*-*-*-*-*-*-*-*-*-*-*-*-*-*-*-  REASON FOR REFERRAL: Establishment of care for cardiac comorbidities    *-*-*-*-*-*-*-*-*-*-*-*-*-*-*-*-*-*-*-*-*-*-*-*-*-*-*-*-*-*-*-*-*-*-*-*-*-*-*-*-*-*-*-*-*-*-*-*-*-*-*-*-*-*-  CARDIOLOGY ASSESSMENT & PLAN:   Diagnosis ICD-10-CM Associated Orders   1. Atherosclerosis of native coronary artery of native heart without angina pectoris  I25.10       2. Chronic diastolic heart failure (HCC)  I50.32       3. Essential hypertension  I10       4. PFO (patent foramen ovale)  Q21.12 AMB extended holter monitor      5. Stage 3b chronic kidney disease (HCC)  N18.32       6. History of CVA (cerebrovascular accident)  Z86.73 AMB extended holter monitor      7. Obesity, morbid ([facility])  E66.01       8. Pure  hypercholesterolemia  E78.00       9. Hypertensive urgency  I16.0 hydrALAZINE (APRESOLINE) 50 mg tablet      10. Hypertension  I10 amLODIPine (NORVASC) 5 mg tablet      11. 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 proliferative retinopathy and macular edema, without long-term current use of insulin ([facility])  E11.3513         1. Atherosclerosis of native coronary artery of native heart without angina pectoris  Assessment & Plan:  Ms. Erin Arroyo has established coronary artery disease underwent a proximal LAD in March 2023..  She has been.  She had minimal disease in other vessels.  Her blood pressure is noted to be elevated.  She is on multiple antihypertensive medications.  Her left ventricular systolic function is preserved.  On physical examination there is no evidence of pulmonary or peripheral vascular condition.      We are making following changes to her medical regimen:  -- Brilinta is being discontinued.  -- She should continue aspirin therapy 81 mg daily every day.  -- We are adding amlodipine 5 mg daily for further blood pressure control.  -- We are increasing the frequency of hydralazine to 50 mg 3 times daily instead of her current twice daily.  -- She is advised to continue current doses of carvedilol and atorvastatin and isosorbide mononitrate and valsartan.  -- She should establish follow-up with her nephrologist from time to time for monitoring and treatment of her kidney disease.  -- She is encouraged to exercise regularly and eat healthy.  -- We will consider repeating an echocardiogram next year.      2. Chronic diastolic heart failure (HCC)  Assessment & Plan:  Wt Readings from Last 3 Encounters:   06/04/24 97.5 kg (215 lb)   04/09/24 95.3 kg (210 lb)   11/30/23 93.4 kg (206 lb)     She appears to be overall stable.  Though her weight is up this is more likely due to occluding rather than fluid retention.  On exam there is no pulmonary or peripheral vascular congestion.  She is  now not on diuretic therapy.  -- She is advised to continue heart failure precautions and diet and monitoring.  -- Blood pressure medication changes as noted above.  3. Essential hypertension  4. PFO (patent foramen ovale)  Assessment & Plan:  History of small PFO demonstrated by echocardiogram in 2023.  Her ROPE score is 3 and stroke unlikely be related to the PFO and more likely secondary to small vessel disease.  Will have to readdress this if got for which she has another event.  Orders:  -     AMB extended holter monitor; Future; Expected date: 06/04/2024  5. Stage 3b chronic kidney disease (HCC)  6. History of CVA (cerebrovascular accident)  -     AMB extended holter monitor; Future; Expected date: 06/04/2024  7. Obesity, morbid (HCC)  8. Pure hypercholesterolemia  9. Hypertensive urgency  Assessment & Plan:  Blood pressure is elevated but not critically.  -- Changes being made to medical regimen as outlined above.  Orders:  -     hydrALAZINE (APRESOLINE) 50 mg tablet; Take 1 tablet (50 mg total) by mouth 3 (three) times a day  10. Hypertension  -     amLODIPine (NORVASC) 5 mg tablet; Take 1 tablet (5 mg total) by mouth daily  11. 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 proliferative retinopathy and macular edema, without long-term current use of insulin ([facility])  Assessment & Plan:    Lab Results   Component Value Date    HGBA1C 6.0 (H) 04/10/2024   Diabetes seems to be well-controlled.  -- Advising continued medications and dietary precautions.     *-*-*-*-*-*-*-*-*-*-*-*-*-*-*-*-*-*-*-*-*-*-*-*-*-*-*-*-*-*-*-*-*-*-*-*-*-*-*-*-*-*-*-*-*-*-*-*-*-*-*-*-*-*-  CURRENT ECG:  No results found for this visit on 06/04/24.      *-*-*-*-*-*-*-*-*-*-*-*-*-*-*-*-*-*-*-*-*-*-*-*-*-*-*-*-*-*-*-*-*-*-*-*-*-*-*-*-*-*-*-*-*-*-*-*-*-*-*-*-*-*-  HISTORY OF PRESENT ILLNESS:  Patient is a 68-year-old female with medical history significant for:  1.  Coronary artery disease, status post MI, status post drug-eluting stent  placement to  unspecified vessel 3/1/2023 (proximal LAD -- information given over telephone by Cath Lab charge nurse on 6/4/2024 -- no formal report was seen by me.).  2.  Chronic diastolic heart failure, history of systolic heart failure with mildly reduced function  3.  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  Dyslipidemia  5.  Chronic kidney disease, stage III  6.  History of multinodular goiter  7.  Asthma  8.  Obesity  9.  Gastroparesis  10.  Glaucoma suspect  11.  History of TIA 2005 and more recently history of CVA with right-sided weakness and aphasia (November 2023 -- acute to subacute infarction left genu of corpus callosum and left thalamus, bilateral chronic lacunar infarcts involving centrum semiovale internal capsule on the right side and lateral thalamic regions-likely small vessel disease)  12.  Hypertension with hypertensive emergency due to medication noncompliance  13.  History of PFO  14.  Cerebral aneurysm  15.  Carpal tunnel syndrome, status right wrist  16.  Recurrent major depression  17. Myesthenia Gravis    She is reestablishing follow-up for her cardiac comorbidities.  She was last hospitalized at [facility] between 11/29/2023 and 12/2/2023 with acute ischemic stroke.  She is here for her cardiac disease.  Today she mentions that since her stroke event she has had no new diagnosis or hospitalizations or significant illnesses.  She is currently a resident of [facility] facility.  She uses a walker for ambulation.  From a symptom perspective she denies any chest pain or palpitation or dizziness.  She does get some shortness of breath when she exerts herself.  She denies any passing out or near passing out episodes.  She has residual weakness in lower extremities from her history of stroke.  She occasionally has some speech issues.  She denies any recent falls or passing out or near passing out.    Functional capacity status: Moderate    (Excellent- >10 METs; Good: (7-10 METs); Moderate (4-7 METs); Poor (<= 4 METs)    Any chronic stressors: None   (feeling of poor health, financial problems, and social isolation etc).    Tobacco or alcohol dependence: She has never been a smoker.  She denies any alcohol use.    Family history: Her mother had coronary artery disease and had stents placed in her heart and older age    Current cardiac meds: Aspirin 81 mg daily atorvastatin 80 mg daily Brilinta 90 mg twice daily carvedilol 12.5 mg twice daily dapagliflozin daily doxazosin 2 mg daily at bedtime hydralazine 50 mg twice daily isosorbide mononitrate 60 mg once daily Januvia 50 mg daily valsartan 160 mg daily    Previous blood work:  Blood work 4/10/2024 sodium 141 potassium 4.1 chloride 108 bicarb 24 BUN 24 creatinine 1.62 GFR 34  Abnormal LFTs except for decreased total protein and albumin  Lipid provide 11/29/2023 total cholesterol 197 triglyceride 82 HDL 47 calculated   Hemoglobin A1c 6.0 48 2024  TSH 12.448 and free T40.98 On 11/30/2023     Previous cardiac studies:   Echocardiogram 11/30/2023:  Mildly increased left ventricular wall thickness, normal left ventricular systolic function EF 66%.  Grade 1 diastolic dysfunction.  Normal RV size and function.  Normal left and right atrial size.  Small PFO with right to left shunt noted with Valsalva maneuver and saline contrast  Aortic valve.  Trace mitral and tricuspid valve regurgitation.  No obvious pulmonary hypertension.  No pericardial effusion.  Mildly dilated aortic root.    Cardiac catheterization information given over telephone by charge nurse at   Cath Lab [facility]: Cardiac cath 3/1/2023 showed:  Left main: Minimal luminal irregularities  LAD: Proximal LAD stent was placed.  LCx: Minimal luminal irregularities  RCA: Dominant, 25% proximal disease.    [facility] 7/28/2017:  VENTRICLES: -- Global left ventricular function was normal. EF calculated by contrast ventriculography  was 55 %. Limited study.   CORONARY VESSELS: -- The coronary circulation is right dominant.   -- Left main: Angiography showed minor luminal irregularities.   -- LAD: Angiography showed minor luminal irregularities.   -- Circumflex: Angiography showed minor luminal irregularities.   -- RCA: Angiography showed minor luminal irregularities.     The ASCVD Risk score (Joey DE LA ROSA, et al., 2019) failed to calculate for the following reasons:    The patient has a prior MI or stroke diagnosis  (Low risk: Less than <5%; borderline risk: =5% to <7.5%; intermediate risk: =7.5% to <20%; high risk:=20%)  Patient's ASCVD risk category: Patient has established ASCVD with history of MI and CVA    *-*-*-*-*-*-*-*-*-*-*-*-*-*-*-*-*-*-*-*-*-*-*-*-*-*-*-*-*-*-*-*-*-*-*-*-*-*-*-*-*-*-*-*-*-*-*-*-*-*-*-*-*-*  PAST MEDICAL HISTORY:  Past Medical History:   Diagnosis Date   • Abnormal ECG     last assessed: 5/15/17   • Abnormal mammogram     last assessed: 7/11/17   • Abnormal stress test     last assesed: 7/24/17   • Anemia    • Anxiety    • Arthritis    • Asthma    • Back problem    • Breast cyst    • CAD (coronary artery disease)    • Chest pain     last assessed: 7/24/17   • Chronic pain disorder    • Cyst of left breast     last assessed: 8/18/17   • Depression    • Depression     resolved: 1/2/18   • Diabetes mellitus (HCC)    • Hiatal hernia     last assessed: 11/7/17   • Hyperlipidemia    • Hypertension    • Idiopathic intracranial hypertension 05/15/2017   • Keloid of skin     last assessed: 11/2/16   • Multiple thyroid nodules    • Neuropathy    • Psychiatric disorder     anxiety   • Stroke (HCC)     TIA 2005   • Tuberculosis     as a child     PAST SURGICAL HISTORY:   Past Surgical History:   Procedure Laterality Date   • ARM WOUND REPAIR / CLOSURE     • CARPAL TUNNEL RELEASE     • CARPAL TUNNEL RELEASE Left    • CATARACT EXTRACTION Bilateral     2015   • COLONOSCOPY     • CORONARY ANGIOPLASTY WITH STENT PLACEMENT  03/01/2023     at [facility]   • CYST REMOVAL      right upper arm.   • EYE SURGERY      cataract removaql   • FL LUMBAR PUNCTURE DIAGNOSTIC  08/15/2019   • GA ESOPHAGOGASTRODUODENOSCOPY TRANSORAL DIAGNOSTIC N/A 2018    Procedure: ESOPHAGOGASTRODUODENOSCOPY (EGD);  Surgeon: Kerline Coley DO;  Location: [facility];  Service: Gastroenterology   • GA NDSC WRST SURG W/RLS TRANSVRS CARPL LIGM Left 2019    Procedure: ENDOSCOPIC CARPAL TUNNEL RELEASE;  Surgeon: Dalton Jesus MD;  Location:  MAIN OR;  Service: Orthopedics   • TUBAL LIGATION     • US GUIDED THYROID BIOPSY  04/15/2019         FAMILY HISTORY:  Family History   Problem Relation Age of Onset   • Heart disease Mother    • Diabetes Mother    • Arthritis Mother    • Hypertension Mother    • MONE disease Mother    • Kidney disease Father    • Hypertension Father    • Stomach cancer Father    • Arthritis Sister    • Kidney disease Brother         age 29    • Stroke Maternal Grandmother    • Stomach cancer Maternal Grandmother    • No Known Problems Maternal Grandfather    • Arthritis Paternal Grandmother    • Heart attack Paternal Grandmother    • No Known Problems Paternal Grandfather    • No Known Problems Daughter    • No Known Problems Daughter    • No Known Problems Daughter    • No Known Problems Daughter    • Stroke Maternal Aunt    • Liver cancer Maternal Aunt    • No Known Problems Maternal Aunt    • No Known Problems Maternal Aunt    • Heart attack Maternal Uncle    • No Known Problems Paternal Aunt    • Cancer Family         spinal column   • Coronary artery disease Family    • Glaucoma Family    • Rheum arthritis Family     SOCIAL HISTORY:  Social History     Tobacco Use   Smoking Status Never   Smokeless Tobacco Never      Social History     Substance and Sexual Activity   Alcohol Use Not Currently   • Alcohol/week: 0.0 standard drinks of alcohol     Social History     Substance and Sexual Activity   Drug Use No    [unfilled]      *-*-*-*-*-*-*-*-*-*-*-*-*-*-*-*-*-*-*-*-*-*-*-*-*-*-*-*-*-*-*-*-*-*-*-*-*-*-*-*-*-*-*-*-*-*-*-*-*-*-*-*-*-*  ALLERGIES:  Allergies   Allergen Reactions   • Bactrim [Sulfamethoxazole-Trimethoprim] Shortness Of Breath   • Lisinopril Angioedema   • Vicodin [Hydrocodone-Acetaminophen] GI Intolerance   • Hydrocodone-Acetaminophen Nausea Only   • Oxycodone-Acetaminophen GI Intolerance and Nausea Only    CURRENT SCHEDULED MEDICATIONS:    Current Outpatient Medications:   •  albuterol (PROVENTIL HFA,VENTOLIN HFA) 90 mcg/act inhaler, Inhale 2 puffs every 6 (six) hours as needed for wheezing or shortness of breath, Disp: 54 g, Rfl: 1  •  Alcohol Swabs (DropSafe Alcohol Prep) 70 % PADS, Use one pad twice daily when check blood sugar, Disp: 200 each, Rfl: 2  •  amLODIPine (NORVASC) 5 mg tablet, Take 1 tablet (5 mg total) by mouth daily, Disp: 90 tablet, Rfl: 3  •  aspirin (ECOTRIN LOW STRENGTH) 81 mg EC tablet, Take 1 tablet (81 mg total) by mouth daily, Disp: 30 tablet, Rfl: 3  •  atorvastatin (LIPITOR) 80 mg tablet, Take 1 tablet (80 mg total) by mouth daily with dinner, Disp: 90 tablet, Rfl: 3  •  Blood Glucose Monitoring Suppl (True Metrix Air Glucose Meter) w/Device KIT, USE AS DIRECTED, Disp: 1 kit, Rfl: 0  •  carvedilol (COREG) 12.5 mg tablet, Take 1 tablet (12.5 mg total) by mouth 2 (two) times a day with meals, Disp: 60 tablet, Rfl: 0  •  dapagliflozin (Farxiga) 10 MG tablet, Take 1 tablet (10 mg total) by mouth in the morning 1/2 tablet daily for the 1st month then increase to a full tablet daily in the morning, Disp: 90 tablet, Rfl: 3  •  Diclofenac Sodium (VOLTAREN) 1 %, Apply 2 g topically 4 (four) times a day, Disp: 2 g, Rfl: 0  •  doxazosin (CARDURA) 2 mg tablet, Take 1 tablet (2 mg total) by mouth daily Do not start before November 3, 2023., Disp: 30 tablet, Rfl: 0  •  escitalopram (LEXAPRO) 5 mg tablet, Take 5 mg by mouth daily, Disp: , Rfl:   •  gabapentin (NEURONTIN) 300 mg capsule, Take 1 capsule (300 mg  total) by mouth 3 (three) times a day Do not start before December 5, 2023., Disp: 270 capsule, Rfl: 0  •  hydrALAZINE (APRESOLINE) 50 mg tablet, Take 1 tablet (50 mg total) by mouth 3 (three) times a day, Disp: 90 tablet, Rfl: 0  •  isosorbide mononitrate (IMDUR) 60 mg 24 hr tablet, Take 1 tablet (60 mg total) by mouth daily, Disp: 90 tablet, Rfl: 0  •  Januvia 50 MG tablet, , Disp: , Rfl:   •  pantoprazole (PROTONIX) 40 mg tablet, Take 40 mg by mouth every morning At 8AM, Disp: , Rfl:   •  polyethylene glycol (GLYCOLAX) 17 GM/SCOOP powder, Take 17 g by mouth daily Increase to twice daily if still constiptated, Disp: 578 g, Rfl: 3  •  TRUEplus Lancets 33G MISC, Use 2 (two) times a day, Disp: 200 each, Rfl: 2  •  valsartan (DIOVAN) 160 mg tablet, Take 160 mg by mouth daily, Disp: , Rfl:   •  furosemide (LASIX) 20 mg tablet, Take 1 tablet (20 mg total) by mouth daily as needed (LE Edema only daily as needed) (Patient not taking: Reported on 4/9/2024), Disp: 30 tablet, Rfl: 0  •  ondansetron (ZOFRAN) 4 mg tablet, Take 1 tablet (4 mg total) by mouth every 8 (eight) hours as needed for nausea or vomiting (Patient not taking: Reported on 4/9/2024), Disp: 20 tablet, Rfl: 0  •  sitaGLIPtin (Januvia) 100 mg tablet, Take 1 tablet (100 mg total) by mouth daily (Patient not taking: Reported on 4/9/2024), Disp: 90 tablet, Rfl: 3     *-*-*-*-*-*-*-*-*-*-*-*-*-*-*-*-*-*-*-*-*-*-*-*-*-*-*-*-*-*-*-*-*-*-*-*-*-*-*-*-*-*-*-*-*-*-*-*-*-*-*-*-*-*  REVIEW OF SYMPTOMS:    Positive for: As noted above in HPI  Negative for: All remaining as reviewed below and in HPI. SYSTEM SYMPTOMS REVIEWED:  General--weight change, fever, night sweats  Respiratoryl-- Wheezing, shortness of breath, cough, URI symptoms, sputum, blood  Cardiovascular--chest pain, syncope, dyspnea on exertion, edema, decline in exercise tolerance, claudication   Gastrointestinal--persistent vomiting, diarrhea, abdominal distention, blood in stool   Muscular or  skeletal--joint pain or swelling   Neurologic--headaches, syncope, abnormal movement  Hematologic--history of easy bruising and bleeding   Endocrine--thyroid enlargement, heat or cold intolerance, polyuria   Psychiatric--anxiety, depression      *-*-*-*-*-*-*-*-*-*-*-*-*-*-*-*-*-*-*-*-*-*-*-*-*-*-*-*-*-*-*-*-*-*-*-*-*-*-*-*-*-*-*-*-*-*-*-*-*-*-*-*-*-*  CURRENT OUTPATIENT MEDICATIONS:     Current Outpatient Medications:   •  albuterol (PROVENTIL HFA,VENTOLIN HFA) 90 mcg/act inhaler, Inhale 2 puffs every 6 (six) hours as needed for wheezing or shortness of breath, Disp: 54 g, Rfl: 1  •  Alcohol Swabs (DropSafe Alcohol Prep) 70 % PADS, Use one pad twice daily when check blood sugar, Disp: 200 each, Rfl: 2  •  amLODIPine (NORVASC) 5 mg tablet, Take 1 tablet (5 mg total) by mouth daily, Disp: 90 tablet, Rfl: 3  •  aspirin (ECOTRIN LOW STRENGTH) 81 mg EC tablet, Take 1 tablet (81 mg total) by mouth daily, Disp: 30 tablet, Rfl: 3  •  atorvastatin (LIPITOR) 80 mg tablet, Take 1 tablet (80 mg total) by mouth daily with dinner, Disp: 90 tablet, Rfl: 3  •  Blood Glucose Monitoring Suppl (True Metrix Air Glucose Meter) w/Device KIT, USE AS DIRECTED, Disp: 1 kit, Rfl: 0  •  carvedilol (COREG) 12.5 mg tablet, Take 1 tablet (12.5 mg total) by mouth 2 (two) times a day with meals, Disp: 60 tablet, Rfl: 0  •  dapagliflozin (Farxiga) 10 MG tablet, Take 1 tablet (10 mg total) by mouth in the morning 1/2 tablet daily for the 1st month then increase to a full tablet daily in the morning, Disp: 90 tablet, Rfl: 3  •  Diclofenac Sodium (VOLTAREN) 1 %, Apply 2 g topically 4 (four) times a day, Disp: 2 g, Rfl: 0  •  doxazosin (CARDURA) 2 mg tablet, Take 1 tablet (2 mg total) by mouth daily Do not start before November 3, 2023., Disp: 30 tablet, Rfl: 0  •  escitalopram (LEXAPRO) 5 mg tablet, Take 5 mg by mouth daily, Disp: , Rfl:   •  gabapentin (NEURONTIN) 300 mg capsule, Take 1 capsule (300 mg total) by mouth 3 (three) times a day Do not  "start before December 5, 2023., Disp: 270 capsule, Rfl: 0  •  hydrALAZINE (APRESOLINE) 50 mg tablet, Take 1 tablet (50 mg total) by mouth 3 (three) times a day, Disp: 90 tablet, Rfl: 0  •  isosorbide mononitrate (IMDUR) 60 mg 24 hr tablet, Take 1 tablet (60 mg total) by mouth daily, Disp: 90 tablet, Rfl: 0  •  Januvia 50 MG tablet, , Disp: , Rfl:   •  pantoprazole (PROTONIX) 40 mg tablet, Take 40 mg by mouth every morning At 8AM, Disp: , Rfl:   •  polyethylene glycol (GLYCOLAX) 17 GM/SCOOP powder, Take 17 g by mouth daily Increase to twice daily if still constiptated, Disp: 578 g, Rfl: 3  •  TRUEplus Lancets 33G MISC, Use 2 (two) times a day, Disp: 200 each, Rfl: 2  •  valsartan (DIOVAN) 160 mg tablet, Take 160 mg by mouth daily, Disp: , Rfl:   •  furosemide (LASIX) 20 mg tablet, Take 1 tablet (20 mg total) by mouth daily as needed (LE Edema only daily as needed) (Patient not taking: Reported on 4/9/2024), Disp: 30 tablet, Rfl: 0  •  ondansetron (ZOFRAN) 4 mg tablet, Take 1 tablet (4 mg total) by mouth every 8 (eight) hours as needed for nausea or vomiting (Patient not taking: Reported on 4/9/2024), Disp: 20 tablet, Rfl: 0  •  sitaGLIPtin (Januvia) 100 mg tablet, Take 1 tablet (100 mg total) by mouth daily (Patient not taking: Reported on 4/9/2024), Disp: 90 tablet, Rfl: 3    *-*-*-*-*-*-*-*-*-*-*-*-*-*-*-*-*-*-*-*-*-*-*-*-*-*-*-*-*-*-*-*-*-*-*-*-*-*-*-*-*-*-*-*-*-*-*-*-*-*-*-*-*-*  VITAL SIGNS:  Vitals:    06/04/24 0814   BP: 152/78   Pulse: 62   SpO2: 97%   Weight: 97.5 kg (215 lb)   Height: 5' 6\" (1.676 m)       BMI: Body mass index is 34.7 kg/m².    WEIGHTS:   Wt Readings from Last 25 Encounters:   06/04/24 97.5 kg (215 lb)   04/09/24 95.3 kg (210 lb)   11/30/23 93.4 kg (206 lb)   11/13/23 97.1 kg (214 lb)   10/30/23 92.9 kg (204 lb 12.9 oz)   09/07/23 98.9 kg (218 lb)   07/19/23 95.9 kg (211 lb 6.4 oz)   06/23/23 93.5 kg (206 lb 2.1 oz)   06/23/23 95.1 kg (209 lb 9.6 oz)   06/07/23 97.8 kg (215 lb 9.6 oz) "   04/16/23 94 kg (207 lb 3.2 oz)   02/07/23 93.9 kg (207 lb)   12/21/22 93.9 kg (207 lb)   11/23/22 98.4 kg (217 lb)   11/13/22 96.2 kg (212 lb)   10/03/22 95.7 kg (211 lb)   09/19/22 95.7 kg (211 lb)   07/22/22 92.2 kg (203 lb 3.2 oz)   07/18/22 91.8 kg (202 lb 6.4 oz)   07/15/22 93.4 kg (206 lb)   07/08/22 90.2 kg (198 lb 13.7 oz)   03/07/22 97.2 kg (214 lb 4.8 oz)   11/16/21 99.8 kg (220 lb)   11/16/21 100 kg (220 lb 12.8 oz)   10/12/21 102 kg (224 lb 13.9 oz)        *-*-*-*-*-*-*-*-*-*-*-*-*-*-*-*-*-*-*-*-*-*-*-*-*-*-*-*-*-*-*-*-*-*-*-*-*-*-*-*-*-*-*-*-*-*-*-*-*-*-*-*-*-*-  PHYSICAL EXAM:  General Appearance:    Alert, cooperative, no distress, appears stated age, slightly increased BMI   Head, Eyes, ENT:    No obvious abnormality, moist mucous mebranes.   Neck:   Supple, no carotid bruit. No JVD, no obvious lymphadenoapthy   Back:     Symmetric, no curvature.   Lungs:     Respirations unlabored. Clear to auscultation bilaterally,    Chest wall:    No tenderness or deformity   Heart:    Regular rate and rhythm, normal intensity heart sounds, no murmur, rub  or gallop.   Abdomen:     Soft, non-tender.   Extremities:   Extremities warm, no cyanosis or edema    Skin:   No venostatic changes in lower extremities.   Normal skin color, texture, and turgor. No rashes or lesions   Neuro: Pt is alert and oriented time 3 with expressive dysarthria and some word finding difficulty.   Psychiatric/Behavioral: Mood is normal. Behavior is normal.   *-*-*-*-*-*-*-*-*-*-*-*-*-*-*-*-*-*-*-*-*-*-*-*-*-*-*-*-*-*-*-*-*-*-*-*-*-*-*-*-*-*-*-*-*-*-*-*-*-*-*-*-*-*-  LABORATORY DATA: I have personally reviewed the available laboratory data.        Potassium   Date Value Ref Range Status   04/10/2024 4.1 3.5 - 5.2 mmol/L Final   01/25/2024 4.4 3.5 - 5.2 mmol/L Final   11/30/2023 3.5 3.5 - 5.3 mmol/L Final   11/29/2023 3.5 3.5 - 5.3 mmol/L Final   11/01/2023 3.7 3.5 - 5.3 mmol/L Final     Chloride   Date Value Ref Range Status   04/10/2024  108 100 - 109 mmol/L Final   01/25/2024 108 100 - 109 mmol/L Final   11/30/2023 109 (H) 96 - 108 mmol/L Final   11/29/2023 107 96 - 108 mmol/L Final   11/01/2023 108 96 - 108 mmol/L Final     Carbon Dioxide   Date Value Ref Range Status   04/10/2024 24 21 - 31 mmol/L Final   01/25/2024 27 21 - 31 mmol/L Final     CO2   Date Value Ref Range Status   11/30/2023 26 21 - 32 mmol/L Final   11/29/2023 29 21 - 32 mmol/L Final   11/01/2023 28 21 - 32 mmol/L Final     BUN   Date Value Ref Range Status   04/10/2024 24 7 - 25 mg/dL Final   01/25/2024 20 7 - 25 mg/dL Final   11/30/2023 16 5 - 25 mg/dL Final   11/29/2023 14 5 - 25 mg/dL Final   11/01/2023 17 5 - 25 mg/dL Final     Creatinine   Date Value Ref Range Status   04/10/2024 1.62 (H) 0.40 - 1.10 mg/dL Final   01/25/2024 1.47 (H) 0.40 - 1.10 mg/dL Final   11/30/2023 1.42 (H) 0.60 - 1.30 mg/dL Final     Comment:     Standardized to IDMS reference method   11/29/2023 1.40 (H) 0.60 - 1.30 mg/dL Final     Comment:     Standardized to IDMS reference method   11/01/2023 1.36 (H) 0.60 - 1.30 mg/dL Final     Comment:     Standardized to IDMS reference method     eGFRcr   Date Value Ref Range Status   04/10/2024 34 (L) >59 Final   01/25/2024 39 (L) >59 Final     eGFR   Date Value Ref Range Status   11/30/2023 37 ml/min/1.73sq m Final   11/29/2023 38 ml/min/1.73sq m Final   11/01/2023 40 ml/min/1.73sq m Final     Calcium   Date Value Ref Range Status   04/10/2024 8.2 (L) 8.5 - 10.1 mg/dL Final   01/25/2024 8.7 8.5 - 10.1 mg/dL Final   11/30/2023 8.4 8.4 - 10.2 mg/dL Final   11/29/2023 8.8 8.4 - 10.2 mg/dL Final   11/01/2023 8.5 8.4 - 10.2 mg/dL Final     AST   Date Value Ref Range Status   04/10/2024 16 <41 U/L Final   11/30/2023 16 13 - 39 U/L Final   10/30/2023 17 13 - 39 U/L Final   07/19/2023 17 5 - 45 U/L Final     Comment:     Specimen collection should occur prior to Sulfasalazine administration due to the potential for falsely depressed results.      ALT   Date Value Ref  "Range Status   04/10/2024 12 <56 U/L Final   11/30/2023 10 7 - 52 U/L Final     Comment:     Specimen collection should occur prior to Sulfasalazine administration due to the potential for falsely depressed results.    10/30/2023 10 7 - 52 U/L Final     Comment:     Specimen collection should occur prior to Sulfasalazine administration due to the potential for falsely depressed results.    07/19/2023 23 12 - 78 U/L Final     Comment:     Specimen collection should occur prior to Sulfasalazine and/or Sulfapyridine administration due to the potential for falsely depressed results.      Alkaline Phosphatase   Date Value Ref Range Status   04/10/2024 74 35 - 120 U/L Final   11/30/2023 90 34 - 104 U/L Final   10/30/2023 106 (H) 34 - 104 U/L Final   07/19/2023 95 46 - 116 U/L Final     Magnesium   Date Value Ref Range Status   11/30/2023 1.9 1.9 - 2.7 mg/dL Final   07/19/2023 2.3 1.6 - 2.6 mg/dL Final   06/23/2023 1.5 (L) 1.9 - 2.7 mg/dL Final     WBC   Date Value Ref Range Status   11/30/2023 5.25 4.31 - 10.16 Thousand/uL Final   11/29/2023 4.88 4.31 - 10.16 Thousand/uL Final   10/31/2023 3.50 (L) 4.31 - 10.16 Thousand/uL Final     Hemoglobin   Date Value Ref Range Status   11/30/2023 11.9 11.5 - 15.4 g/dL Final   11/29/2023 12.8 11.5 - 15.4 g/dL Final   10/31/2023 11.8 11.5 - 15.4 g/dL Final     Platelets   Date Value Ref Range Status   11/30/2023 230 149 - 390 Thousands/uL Final   11/29/2023 239 149 - 390 Thousands/uL Final   10/31/2023 198 149 - 390 Thousands/uL Final     PTT   Date Value Ref Range Status   11/29/2023 26 23 - 37 seconds Final     Comment:     Therapeutic Heparin Range =  60-90 seconds   10/30/2023 27 23 - 37 seconds Final     Comment:     Therapeutic Heparin Range =  60-90 seconds     INR   Date Value Ref Range Status   11/29/2023 0.98 0.84 - 1.19 Final   10/30/2023 0.95 0.84 - 1.19 Final     No results found for: \"CK\", \"CKMB\", \"DIGOXIN\"  No results found for: \"TSH\"  HDL, Direct   Date Value Ref Range " Status   11/29/2023 47 (L) >=50 mg/dL Final     Triglycerides   Date Value Ref Range Status   11/29/2023 82 See Comment mg/dL Final     Comment:     Triglyceride:     0-9Y            <75mg/dL     10Y-17Y         <90 mg/dL       >=18Y     Normal          <150 mg/dL     Borderline High 150-199 mg/dL     High            200-499 mg/dL        Very High       >499 mg/dL    Specimen collection should occur prior to Metamizole administration due to the potential for falsely depressed results.      Hemoglobin A1C   Date Value Ref Range Status   04/10/2024 6.0 (H) <5.7 % Final     Comment:     Reference Range  Non-diabetic                     <5.7  Pre-diabetic                     5.7-6.4  Diabetic                         >=6.5  ADA target for diabetic control  <=7     Blood Culture   Date Value Ref Range Status   10/12/2021 No Growth After 5 Days.  Final   10/12/2021 No Growth After 5 Days.  Final     Urine Culture   Date Value Ref Range Status   05/13/2020 20,000-29,000 cfu/ml  Final     Comment:     Mixed Contaminants X5   03/24/2020 <10,000 cfu/ml  Final     Comment:     Mixed Contaminants X2   05/01/2019 20,000-29,000 cfu/ml  Final     Comment:     Mixed Contaminants X3   04/11/2019 30,000-39,000 cfu/ml  Final     Comment:     Mixed Contaminants X4   04/09/2019 <10,000 cfu/ml  Final     Comment:     Mixed Contaminants X3   11/17/2017 50,000-59,000 cfu/ml Lactobacillus species (A)  Final       *-*-*-*-*-*-*-*-*-*-*-*-*-*-*-*-*-*-*-*-*-*-*-*-*-*-*-*-*-*-*-*-*-*-*-*-*-*-*-*-*-*-*-*-*-*-*-*-*-*-*-*-*-*-  RADIOLOGY RESULTS:  CTA stroke alert (head/neck)    Addendum Date: 11/30/2023    ADDENDUM: 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 Recommend follow-up with neurovascular service . As mentioned on initial report, there is poor visualization of the left P-comm that was clearly  visualized and patent on the prior study. There is residual opacification of the left P-comm infundibulum.    Result Date: 11/30/2023  Impression: No significant stenosis of the cervical carotid or vertebral arteries. No acute large vessel occlusion. No significant stenosis of the large vessels. Mild narrowing of the right P1 segment. Poor visualization of. Identified left P-comm. Multifocal irregularity of the distal anterior cerebral artery branches with mild irregularity of the distal MCA branches. Findings are new from prior study. While atherosclerotic disease is possible, suggest correlation for vasculitis or RCVS. Findings were directly discussed with Ashish Beaver at 1:47 p.m. Workstation performed: [id number]     MRA head wo contrast    Result Date: 11/30/2023  Impression: There is no large vessel intracranial occlusion. Moderate stenosis at the right PCA P1 segment. Focal 2-3 mm medial protuberances along the left cavernous and supraclinoid ICA segments suspicious for aneurysms. Focal 2 mm left PCOM infundibular dilatation versus aneurysm. Workstation performed: [id number]     MRI brain wo contrast    Result Date: 11/30/2023  Impression: Subcentimeter acute to subacute infarcts involving the left genu of the corpus callosum and left thalamus. Chronic lacunar infarcts involving the bilateral centrum semiovale, right internal capsule, and by lateral thalamic regions. Chronic microangiopathic ischemic changes. Stable prominence of the right greater than left Meckel's caves. Associated basal meningoceles cannot be excluded in the setting. Partially empty sella. This constellation of findings can be seen with idiopathic intracranial hypertension. Findings are stable dating back to 2019. The study was marked in EPIC for immediate notification. Workstation performed: [id number]     CT stroke alert brain    Result Date: 11/29/2023  Impression: No acute intracranial abnormality. Stable chronic microangiopathy and old  lacunar infarcts. Findings were directly discussed with Ashish Beaver at 1:47 p.m. Workstation performed: [id number]     X-ray chest 1 view portable    Result Date: 2023  Impression: No acute cardiopulmonary disease. Workstation performed: [id number]       *-*-*-*-*-*-*-*-*-*-*-*-*-*-*-*-*-*-*-*-*-*-*-*-*-*-*-*-*-*-*-*-*-*-*-*-*-*-*-*-*-*-*-*-*-*-*-*-*-*-*-*-*-*-  LAST ECHOCARDIOGRAM AND OTHER CARDIOLOGY RESULTS:  Results for orders placed during the hospital encounter of 17    Echo complete with contrast if indicated    [place] [address]  [phone]    Transthoracic Echocardiogram  2D, M-mode, Doppler, and Color Doppler    Study date:  2017    Patient: RICKIE SANTOS  MR number: [id number]  Account number: [id number]  : 1955  Age: 61 years  Gender: Female  Status: Outpatient  Location: Echo lab  Height: 65 in  Weight: 237.6 lb  BP: 124/ 76 mmHg    Diagnoses: R07.9 - Chest pain, unspecified    Sonographer:  Farzana Mcgrath RDCS  Primary Physician:  Laureano Macias DO  Referring Physician:  Laureano Macias DO  Group:  [facility]  Interpreting Physician:  Damon Sotomayor MD    SUMMARY    LEFT VENTRICLE:  Size was normal.  Systolic function was normal. Ejection fraction was estimated to be 60 %.  There were no regional wall motion abnormalities.  Wall thickness was normal.    TRICUSPID VALVE:  There was mild regurgitation.    HISTORY: PRIOR HISTORY: DIABETES MELLITUS, PALPITATIONS, HYPERTENSION    PROCEDURE: The procedure was performed in the echo lab. This was a routine study. The transthoracic approach was used. The study included complete 2D imaging, M-mode, complete spectral Doppler, and color Doppler. Images were obtained from  the parasternal, apical, subcostal, and suprasternal notch acoustic windows. Image quality was adequate.    LEFT VENTRICLE: Size was normal. Systolic function was  normal. Ejection fraction was estimated to be 60 %. There were no regional wall motion abnormalities. Wall thickness was normal.    RIGHT VENTRICLE: The size was normal. Systolic function was normal. Wall thickness was normal.    LEFT ATRIUM: Size was normal.    RIGHT ATRIUM: Size was normal.    MITRAL VALVE: Valve structure was normal. There was normal leaflet separation. DOPPLER: The transmitral velocity was within the normal range. There was no evidence for stenosis. There was no significant regurgitation.    AORTIC VALVE: The valve was trileaflet. Leaflets exhibited normal thickness and normal cuspal separation. DOPPLER: Transaortic velocity was within the normal range. There was no evidence for stenosis. There was no regurgitation.    TRICUSPID VALVE: The valve structure was normal. There was normal leaflet separation. DOPPLER: The transtricuspid velocity was within the normal range. There was no evidence for stenosis. There was mild regurgitation.    PULMONIC VALVE: Leaflets exhibited normal thickness, no calcification, and normal cuspal separation. DOPPLER: The transpulmonic velocity was within the normal range. There was no regurgitation.    PERICARDIUM: There was no pericardial effusion. The pericardium was normal in appearance.    AORTA: The root exhibited normal size.    SYSTEM MEASUREMENT TABLES    2D  %FS: 45.11 %  AV Diam: 3.03 cm  EDV(Teich): 62.6 ml  EF(Teich): 77.12 %  ESV(Teich): 14.32 ml  IVSd: 1.37 cm  LA Area: 15.47 cm2  LA Diam: 3.65 cm  LVEDV MOD A4C: 121.38 ml  LVEF MOD A4C: 63.23 %  LVESV MOD A4C: 44.63 ml  LVIDd: 3.82 cm  LVIDs: 2.1 cm  LVLd A4C: 8.67 cm  LVLs A4C: 6.58 cm  LVPWd: 0.91 cm  RA Area: 11.67 cm2  RV DIAM: 2.16 cm  SV MOD A4C: 76.75 ml  SV(Teich): 48.28 ml    CW  AV Vmax: 1.29 m/s  AV maxP.61 mmHg  PV Vmax: 0.94 m/s  PV maxPG: 3.51 mmHg    MM  TAPSE: 2.03 cm    PW  E': 0.08 m/s  E/E': 7.45  LVOT Vmax: 0.63 m/s  LVOT maxP.61 mmHg  MV A Tomas: 0.77 m/s  MV Dec Knox: 3.04  m/s2  MV DecT: 203.31 ms  MV E Tomas: 0.62 m/s  MV E/A Ratio: 0.81  RVOT Vmax: 0.51 m/s  RVOT maxP.03 mmHg    [facility]    Prepared and electronically signed by    Damon Sotomayor MD  Signed 2017 12:14:21    No results found for this or any previous visit.    No results found for this or any previous visit.    Results for orders placed during the hospital encounter of 17    NM myocardial perfusion spect (stress and/or rest)    [address] [address]  [phone]    Exercise    Patient: RICKIE SANTOS  MR number: [id number]  Account number: [id number]  : 1955  Age: 61 years  Gender: Female  Status: Outpatient  Location: Stress lab  Height: 65 in  Weight: 234 lb  BP: 140/ 86 mmHg    Allergies: NO KNOWN ALLERGIES    Diagnosis: R07.9 - Chest pain, unspecified    RN:  Christa Gallegos RN  Primary Physician:  Laureano Macias DO  Technician:  Farzana Mcgrath RDCS  Referring Physician:  Damon Sotomayor MD  Interpreting Physician:  Ashley Weber DO    INDICATIONS: Detection of coronary artery disease.    HISTORY: HYPERCHOLESTEROLEMIA, POSITIVE FAMILY HISTORY, DIABETES MELLITUS, SHORTNESS OF BREATH, CHEST PAIN The patient is a 61 year old  female.    REST ECG: Sinus bradycardia. Left axis deviation. Voltage criteria for left ventricular hypertrophy were present. Q waves were present in leads V1 and V2. T wave inversions were deep and observed in leads II, III, aVF, V5 and V6.    PROCEDURE: The study was performed in the stress lab. Treadmill exercise testing was performed, using the Mushtaq protocol. Systolic blood pressure was 140 mmHg, at the start of the study. Diastolic blood pressure was 86 mmHg, at the start  of the study. The heart rate was 52 bpm, at the start of the study.    MUSHTAQ PROTOCOL:  HR bpm SBP mmHg DBP mmHg Symptoms  Baseline 52 140 86 none  Stage  1 95 158 90 none  Stage 2 136 182 86 mild fatigue, nausea  Stage 3 136 -- -- --  Immediate 130 204 90 --  Recovery 2 67 184 84 mild chest discomfort  Recovery 5 62 156 80 subsiding  No medications or fluids given.    STRESS SUMMARY: Duration of exercise was 6 min and 6 sec. The patient exercised to protocol stage 3. Maximal work rate was 7.1 METs. Functional capacity was slightly decreased (20% to 30%). Maximal heart rate during stress was 139 bpm ( 87  % of maximal predicted heart rate). The heart rate response to stress was normal. There was resting hypertension with a hypertensive blood pressure response to stress. The rate-pressure product for the peak heart rate and blood pressure  was 32436. There was no chest pain during stress. The stress test was terminated due to fatigue. The stress ECG was non-diagnostic for ischemia due to baseline ST/T wave abnormalities. Arrhythmia during stress: isolated atrial premature  beats.    ISOTOPE ADMINISTRATION:  Resting isotope administration Stress isotope administration  Agent Tetrofosmin Tetrofosmin  Dose 10.7 mCi 30 mCi  Date 05/26/2017 05/26/2017  Injection time 07:53 09:59  Imaging time 09:05 10:27  Injection-image interval 72 min 28 min    The radiopharmaceutical was injected one minute before the end of exercise.    MYOCARDIAL PERFUSION IMAGING:  The image quality was good. Rotating projection images reveal moderate breast attenuation. Left ventricular size was normal. The TID ratio was 0.96.    PERFUSION DEFECTS:  -  There was a small, mildly severe, reversible myocardial perfusion defect of the mid anteroseptal wall.    GATED SPECT:  The calculated left ventricular ejection fraction was 57 %. Left ventricular ejection fraction was within normal limits by visual estimate. There was no left ventricular regional abnormality.    SUMMARY:  -  Stress results: Duration of exercise was 6 min and 6 sec. Functional capacity was slightly decreased (20% to 30%). Target heart  rate was achieved. There was resting hypertension with a hypertensive blood pressure response to stress.  There was no chest pain during stress.  -  ECG conclusions: The stress ECG was non-diagnostic for ischemia due to baseline ST/T wave abnormalities.  -  Perfusion imaging: There was a small, mildly severe, reversible myocardial perfusion defect of the mid anteroseptal wall.  -  Gated SPECT: The calculated left ventricular ejection fraction was 57 %. Left ventricular ejection fraction was within normal limits by visual estimate. There was no left ventricular regional abnormality.    IMPRESSIONS: Abnormal study after maximal exercise with reproduction of symptoms. There was a small amount of ischemia in the distribution of the left anterior descending coronary artery. Left ventricular systolic function was normal.    Prepared and signed by    Ashley Weber DO  Signed 05/26/2017 11:19:57       *-*-*-*-*-*-*-*-*-*-*-*-*-*-*-*-*-*-*-*-*-*-*-*-*-*-*-*-*-*-*-*-*-*-*-*-*-*-*-*-*-*-*-*-*-*-*-*-*-*-*-*-*-*-  SIGNATURES:   @SIG@   Rakel Krishnan MD     CC:   DO Gilberto Song Craig, DO

## 2024-06-04 NOTE — ASSESSMENT & PLAN NOTE
History of small PFO demonstrated by echocardiogram in 2023.  Her ROPE score is 3 and stroke unlikely be related to the PFO and more likely secondary to small vessel disease.  Will have to readdress this if got for which she has another event.

## 2024-06-04 NOTE — ASSESSMENT & PLAN NOTE
Lab Results   Component Value Date    HGBA1C 6.0 (H) 04/10/2024   Diabetes seems to be well-controlled.  -- Advising continued medications and dietary precautions.

## 2024-06-04 NOTE — ASSESSMENT & PLAN NOTE
Ms. Erin Arroyo has established coronary artery disease underwent a proximal LAD in March 2023..  She has been.  She had minimal disease in other vessels.  Her blood pressure is noted to be elevated.  She is on multiple antihypertensive medications.  Her left ventricular systolic function is preserved.  On physical examination there is no evidence of pulmonary or peripheral vascular condition.      We are making following changes to her medical regimen:  -- Brilinta is being discontinued.  -- She should continue aspirin therapy 81 mg daily every day.  -- We are adding amlodipine 5 mg daily for further blood pressure control.  -- We are increasing the frequency of hydralazine to 50 mg 3 times daily instead of her current twice daily.  -- She is advised to continue current doses of carvedilol and atorvastatin and isosorbide mononitrate and valsartan.  -- She should establish follow-up with her nephrologist from time to time for monitoring and treatment of her kidney disease.  -- She is encouraged to exercise regularly and eat healthy.  -- We will consider repeating an echocardiogram next year.

## 2024-06-06 ENCOUNTER — DOCTOR'S OFFICE (OUTPATIENT)
Dept: URBAN - NONMETROPOLITAN AREA CLINIC 1 | Facility: CLINIC | Age: 69
Setting detail: OPHTHALMOLOGY
End: 2024-06-06
Payer: COMMERCIAL

## 2024-06-06 DIAGNOSIS — E11.3411: ICD-10-CM

## 2024-06-06 DIAGNOSIS — E11.3412: ICD-10-CM

## 2024-06-06 DIAGNOSIS — H40.011: ICD-10-CM

## 2024-06-06 DIAGNOSIS — I63.9: ICD-10-CM

## 2024-06-06 PROCEDURE — 92134 CPTRZ OPH DX IMG PST SGM RTA: CPT | Performed by: OPHTHALMOLOGY

## 2024-06-06 PROCEDURE — 92202 OPSCPY EXTND ON/MAC DRAW: CPT | Performed by: OPHTHALMOLOGY

## 2024-06-06 PROCEDURE — 99214 OFFICE O/P EST MOD 30 MIN: CPT | Performed by: OPHTHALMOLOGY

## 2024-06-06 ASSESSMENT — CONFRONTATIONAL VISUAL FIELD TEST (CVF)
OD_FINDINGS: FULL
OS_FINDINGS: FULL

## 2024-06-07 ENCOUNTER — TELEPHONE (OUTPATIENT)
Age: 69
End: 2024-06-07

## 2024-06-07 NOTE — TELEPHONE ENCOUNTER
Kaylin from patient's nursing home calls.  They received patient's Zio monitor in mail and wanted to confirm pt is supposed to wear it as they received no order.  Advised her yes, it is ordered for 30 days.  Faxed monitor order to [phone]

## 2024-06-10 ENCOUNTER — DOCTOR'S OFFICE (OUTPATIENT)
Dept: URBAN - NONMETROPOLITAN AREA CLINIC 1 | Facility: CLINIC | Age: 69
Setting detail: OPHTHALMOLOGY
End: 2024-06-10
Payer: COMMERCIAL

## 2024-06-10 DIAGNOSIS — E11.3411: ICD-10-CM

## 2024-06-10 PROCEDURE — 92235 FLUORESCEIN ANGRPH MLTIFRAME: CPT | Performed by: OPHTHALMOLOGY

## 2024-06-10 PROCEDURE — 67028 INJECTION EYE DRUG: CPT | Mod: RT | Performed by: OPHTHALMOLOGY

## 2024-06-10 PROCEDURE — C9257 BEVACIZUMAB INJECTION: HCPCS | Performed by: OPHTHALMOLOGY

## 2024-06-10 ASSESSMENT — CONFRONTATIONAL VISUAL FIELD TEST (CVF)
OD_FINDINGS: FULL
OS_FINDINGS: FULL

## 2024-06-13 ENCOUNTER — DOCTOR'S OFFICE (OUTPATIENT)
Dept: URBAN - NONMETROPOLITAN AREA CLINIC 1 | Facility: CLINIC | Age: 69
Setting detail: OPHTHALMOLOGY
End: 2024-06-13
Payer: COMMERCIAL

## 2024-06-13 DIAGNOSIS — E11.3412: ICD-10-CM

## 2024-06-13 PROCEDURE — 67028 INJECTION EYE DRUG: CPT | Mod: LT | Performed by: OPHTHALMOLOGY

## 2024-06-17 ENCOUNTER — AMBUL SURGICAL CARE (OUTPATIENT)
Dept: URBAN - NONMETROPOLITAN AREA SURGERY 1 | Facility: SURGERY | Age: 69
Setting detail: OPHTHALMOLOGY
End: 2024-06-17
Payer: COMMERCIAL

## 2024-06-17 DIAGNOSIS — E11.3511: ICD-10-CM

## 2024-06-17 PROBLEM — E11.3411 DM TYPE 2; RIGHT SEVERE WITH ME, LEFT SEVERE WITH ME: Status: ACTIVE | Noted: 2024-06-06

## 2024-06-17 PROBLEM — E11.3412 DM TYPE 2; RIGHT SEVERE WITH ME, LEFT SEVERE WITH ME: Status: ACTIVE | Noted: 2024-06-06

## 2024-06-17 PROCEDURE — 67210 TREATMENT OF RETINAL LESION: CPT | Mod: RT | Performed by: OPHTHALMOLOGY

## 2024-06-17 PROCEDURE — G8907 PT DOC NO EVENTS ON DISCHARG: HCPCS | Performed by: OPHTHALMOLOGY

## 2024-06-17 PROCEDURE — G8918 PT W/O PREOP ORDER IV AB PRO: HCPCS | Performed by: OPHTHALMOLOGY

## 2024-07-03 ENCOUNTER — CLINICAL SUPPORT (OUTPATIENT)
Dept: CARDIOLOGY CLINIC | Facility: CLINIC | Age: 69
End: 2024-07-03
Payer: COMMERCIAL

## 2024-07-03 DIAGNOSIS — Z86.73 HISTORY OF CVA (CEREBROVASCULAR ACCIDENT): ICD-10-CM

## 2024-07-03 DIAGNOSIS — Q21.12 PFO (PATENT FORAMEN OVALE): ICD-10-CM

## 2024-07-03 PROCEDURE — 93248 EXT ECG>7D<15D REV&INTERPJ: CPT

## 2024-07-08 ENCOUNTER — DOCTOR'S OFFICE (OUTPATIENT)
Dept: URBAN - NONMETROPOLITAN AREA CLINIC 1 | Facility: CLINIC | Age: 69
Setting detail: OPHTHALMOLOGY
End: 2024-07-08
Payer: COMMERCIAL

## 2024-07-08 DIAGNOSIS — E11.3411: ICD-10-CM

## 2024-07-08 DIAGNOSIS — E11.3412: ICD-10-CM

## 2024-07-08 PROCEDURE — 67028 INJECTION EYE DRUG: CPT | Mod: 78,RT | Performed by: OPHTHALMOLOGY

## 2024-07-08 PROCEDURE — 99024 POSTOP FOLLOW-UP VISIT: CPT | Performed by: OPHTHALMOLOGY

## 2024-07-08 PROCEDURE — C9257 BEVACIZUMAB INJECTION: HCPCS | Performed by: OPHTHALMOLOGY

## 2024-07-09 ENCOUNTER — TELEPHONE (OUTPATIENT)
Dept: NEPHROLOGY | Facility: CLINIC | Age: 69
End: 2024-07-09

## 2024-07-09 ENCOUNTER — TELEPHONE (OUTPATIENT)
Age: 69
End: 2024-07-09

## 2024-07-09 NOTE — TELEPHONE ENCOUNTER
I called and spoke with nursing facility, they ar aware of labs needing to be done prior to appointment. Lab order faxed to nursing facility.

## 2024-07-11 ENCOUNTER — DOCTOR'S OFFICE (OUTPATIENT)
Dept: URBAN - NONMETROPOLITAN AREA CLINIC 1 | Facility: CLINIC | Age: 69
Setting detail: OPHTHALMOLOGY
End: 2024-07-11
Payer: COMMERCIAL

## 2024-07-11 DIAGNOSIS — E11.3412: ICD-10-CM

## 2024-07-11 DIAGNOSIS — E11.3411: ICD-10-CM

## 2024-07-11 PROCEDURE — 67028 INJECTION EYE DRUG: CPT | Mod: 79,LT | Performed by: OPHTHALMOLOGY

## 2024-07-11 PROCEDURE — 99024 POSTOP FOLLOW-UP VISIT: CPT | Performed by: OPHTHALMOLOGY

## 2024-07-16 ENCOUNTER — OFFICE VISIT (OUTPATIENT)
Dept: NEPHROLOGY | Facility: CLINIC | Age: 69
End: 2024-07-16
Payer: COMMERCIAL

## 2024-07-16 ENCOUNTER — TELEPHONE (OUTPATIENT)
Dept: NEPHROLOGY | Facility: CLINIC | Age: 69
End: 2024-07-16

## 2024-07-16 VITALS
DIASTOLIC BLOOD PRESSURE: 60 MMHG | SYSTOLIC BLOOD PRESSURE: 120 MMHG | WEIGHT: 215.8 LBS | BODY MASS INDEX: 34.68 KG/M2 | HEART RATE: 58 BPM | HEIGHT: 66 IN

## 2024-07-16 DIAGNOSIS — N18.32 STAGE 3B CHRONIC KIDNEY DISEASE (HCC): Primary | ICD-10-CM

## 2024-07-16 PROCEDURE — 99214 OFFICE O/P EST MOD 30 MIN: CPT

## 2024-07-16 NOTE — PATIENT INSTRUCTIONS
It was nice seeing you today. Your kidney function is stable. Please follow up with us in 6 months. I have ordered lab work for you to get done before then. In the meantime, please avoid NSAIDs and have a healthy diet and exercise.  Increase valsartan to 320 mg daily. Check blood work in 2 weeks. Monitor your blood pressures daily. If blood pressures are dropping too low give the office a call or if you start to feel symptoms like lightheadedness or dizziness. Continue with a low salt diet.

## 2024-07-16 NOTE — PROGRESS NOTES
OFFICE FOLLOW UP - Nephrology   Erin Arroyo 68 y.o. female MRN: [id number]         Interim HPI:   Erin Arroyo has a PMH of hypertension, CHF, DM2 and CKD stage 3b returns today in the renal clinic for the continued management of CKD 3b.   Patient was last seen on 7/19/23 and was stable from renal standpoint. Since the last visit, the patient has had multiple hospitalizations, one of which was for a stroke in November 2023. Patient has no complaints at this time and is feeling well. Patient denies any chest pain, shortness of breath or swelling. The last blood work was done on   which we have reviewed together.    Had the stroke in march of 2023    Kidney biopsy contraindications -small hyperechoic kidneys, solitary native kidney, bleeding disorder, severe hypertension, hydronephrosis  Check updated renal ultrasound  Main complication is bleeding  Has foamy urine     ASSESSMENT and PLAN:  Erin was seen today for follow-up.    Diagnoses and all orders for this visit:    Stage 3b chronic kidney disease (HCC)  -     Protein electrophoresis, serum; Future  -     Protein electrophoresis, urine; Future  -     US kidney and bladder; Future  -     Basic metabolic panel; Future  -     Ambulatory referral to ckd education program; Future  -     Basic metabolic panel; Future  -     Magnesium; Future  -     Phosphorus; Future  -     Urinalysis with microscopic; Future  -     PTH, intact; Future  -     Albumin / creatinine urine ratio; Future        Chronic kidney disease stage 3b:  Etiology: hypertension, diabetes, age related nephron loss   Baseline creatinine 1.3-1.4 mg/dL  Current creatinine 1.62 mg/dL  Avoid NSAIDs  Has nephrotic range proteinuria possibly due to underlying diabetes and hypertension however this is not biopsy proven and given the degree of proteinuria and rate of rise a kidney biopsy should be considered. Counseled her on risks of procedure. Will check updated renal ultrasound. Check  SPEP/UPEP  Kidney smart class- patient has not completed, will refer again   Patient on farxiga 10 mg and valsartan 160 mg. Does not report side effects from valsartan. Increase valsartan to 320 mg daily, will need to maximize RAAS inhibition. Follow blood pressures closely over the next few weeks. Get BMP in 2 weeks.     Hypertension:  Current /60. Has had recent addition of amlodipine 5 mg and increased hydralazine to 50 mg TID   Currently on amlodipine 5 mg, carvedilol 12.5 mg, hydralazine 50 mg TID, doxazosin 2 mg, valsartan 160 mg, lasix 20 mg?, isosorbide 60 mg  Low sodium diet recommended  Home BP monitoring recommended  Recommend diet and low impact exercise weight loss and health benefits    Type 2 diabetes  Hemoglobin A1c 6, management per primary     Chronic diastolic heart failure  Reportedly she is not on lasix 20 mg daily as an outpatient. I agree with this notion due to the fact that she does not have peripheral edema     Age related screening:   Your primary caregiver may do yearly screening for colorectal cancer. It is recommended in all men and women over 45 years old. You may have screening earlier if you have colon disease or a family history of colorectal cancer.       Patient Instructions   It was nice seeing you today. Your kidney function is stable. Please follow up with us in 6 months. I have ordered lab work for you to get done before then. In the meantime, please avoid NSAIDs and have a healthy diet and exercise.  Increase valsartan to 320 mg daily. Check blood work in 2 weeks. Monitor your blood pressures daily. If blood pressures are dropping too low give the office a call or if you start to feel symptoms like lightheadedness or dizziness. Continue with a low salt diet.           ROS:   Review of Systems   Constitutional:  Negative for chills and fever.   Respiratory:  Negative for cough and shortness of breath.    Cardiovascular:  Negative for chest pain.   Gastrointestinal:   Negative for abdominal pain, nausea and vomiting.   Genitourinary:  Negative for dysuria and hematuria.        Has foamy urine    Neurological:  Negative for dizziness and headaches.   All other systems reviewed and are negative.       Allergies: Bactrim [sulfamethoxazole-trimethoprim], Lisinopril, Vicodin [hydrocodone-acetaminophen], Hydrocodone-acetaminophen, and Oxycodone-acetaminophen    Medications:   Current Outpatient Medications:     albuterol (PROVENTIL HFA,VENTOLIN HFA) 90 mcg/act inhaler, Inhale 2 puffs every 6 (six) hours as needed for wheezing or shortness of breath, Disp: 54 g, Rfl: 1    Alcohol Swabs (DropSafe Alcohol Prep) 70 % PADS, Use one pad twice daily when check blood sugar, Disp: 200 each, Rfl: 2    amLODIPine (NORVASC) 5 mg tablet, Take 1 tablet (5 mg total) by mouth daily, Disp: 90 tablet, Rfl: 3    aspirin (ECOTRIN LOW STRENGTH) 81 mg EC tablet, Take 1 tablet (81 mg total) by mouth daily, Disp: 30 tablet, Rfl: 3    atorvastatin (LIPITOR) 80 mg tablet, Take 1 tablet (80 mg total) by mouth daily with dinner, Disp: 90 tablet, Rfl: 3    Blood Glucose Monitoring Suppl (True Metrix Air Glucose Meter) w/Device KIT, USE AS DIRECTED, Disp: 1 kit, Rfl: 0    carvedilol (COREG) 12.5 mg tablet, Take 1 tablet (12.5 mg total) by mouth 2 (two) times a day with meals, Disp: 60 tablet, Rfl: 0    dapagliflozin (Farxiga) 10 MG tablet, Take 1 tablet (10 mg total) by mouth in the morning 1/2 tablet daily for the 1st month then increase to a full tablet daily in the morning, Disp: 90 tablet, Rfl: 3    Diclofenac Sodium (VOLTAREN) 1 %, Apply 2 g topically 4 (four) times a day, Disp: 2 g, Rfl: 0    doxazosin (CARDURA) 2 mg tablet, Take 1 tablet (2 mg total) by mouth daily Do not start before November 3, 2023., Disp: 30 tablet, Rfl: 0    escitalopram (LEXAPRO) 5 mg tablet, Take 5 mg by mouth daily, Disp: , Rfl:     gabapentin (NEURONTIN) 300 mg capsule, Take 1 capsule (300 mg total) by mouth 3 (three) times a day Do  not start before December 5, 2023., Disp: 270 capsule, Rfl: 0    hydrALAZINE (APRESOLINE) 50 mg tablet, Take 1 tablet (50 mg total) by mouth 3 (three) times a day, Disp: 90 tablet, Rfl: 3    isosorbide mononitrate (IMDUR) 60 mg 24 hr tablet, Take 1 tablet (60 mg total) by mouth daily, Disp: 90 tablet, Rfl: 0    Januvia 50 MG tablet, , Disp: , Rfl:     pantoprazole (PROTONIX) 40 mg tablet, Take 40 mg by mouth every morning At 8AM, Disp: , Rfl:     polyethylene glycol (GLYCOLAX) 17 GM/SCOOP powder, Take 17 g by mouth daily Increase to twice daily if still constiptated, Disp: 578 g, Rfl: 3    sitaGLIPtin (Januvia) 100 mg tablet, Take 1 tablet (100 mg total) by mouth daily (Patient taking differently: Take 50 mg by mouth daily), Disp: 90 tablet, Rfl: 3    TRUEplus Lancets 33G MISC, Use 2 (two) times a day, Disp: 200 each, Rfl: 2    valsartan (DIOVAN) 160 mg tablet, Take 320 mg by mouth daily, Disp: , Rfl:     furosemide (LASIX) 20 mg tablet, Take 1 tablet (20 mg total) by mouth daily as needed (LE Edema only daily as needed) (Patient not taking: Reported on 4/9/2024), Disp: 30 tablet, Rfl: 0    ondansetron (ZOFRAN) 4 mg tablet, Take 1 tablet (4 mg total) by mouth every 8 (eight) hours as needed for nausea or vomiting (Patient not taking: Reported on 4/9/2024), Disp: 20 tablet, Rfl: 0    Past Medical History:   Diagnosis Date    Abnormal ECG     last assessed: 5/15/17    Abnormal mammogram     last assessed: 7/11/17    Abnormal stress test     last assesed: 7/24/17    Anemia     Anxiety     Arthritis     Asthma     Back problem     Breast cyst     CAD (coronary artery disease)     Chest pain     last assessed: 7/24/17    Chronic pain disorder     Cyst of left breast     last assessed: 8/18/17    Depression     Depression     resolved: 1/2/18    Diabetes mellitus (HCC)     Hiatal hernia     last assessed: 11/7/17    Hyperlipidemia     Hypertension     Idiopathic intracranial hypertension 05/15/2017    Keloid of skin      last assessed: 16    Multiple thyroid nodules     Neuropathy     Psychiatric disorder     anxiety    Stroke (HCC)     TIA 2005    Tuberculosis     as a child      Past Surgical History:   Procedure Laterality Date    ARM WOUND REPAIR / CLOSURE      CARPAL TUNNEL RELEASE      CARPAL TUNNEL RELEASE Left     CATARACT EXTRACTION Bilateral         COLONOSCOPY      CORONARY ANGIOPLASTY WITH STENT PLACEMENT  2023    at [facility]    CYST REMOVAL      right upper arm.    EYE SURGERY      cataract removaql    FL LUMBAR PUNCTURE DIAGNOSTIC  08/15/2019    RI ESOPHAGOGASTRODUODENOSCOPY TRANSORAL DIAGNOSTIC N/A 2018    Procedure: ESOPHAGOGASTRODUODENOSCOPY (EGD);  Surgeon: Kerline Coley DO;  Location: [facility];  Service: Gastroenterology    RI NDSC WRST SURG W/RLS TRANSVRS CARPL LIGM Left 2019    Procedure: ENDOSCOPIC CARPAL TUNNEL RELEASE;  Surgeon: Dalton Jesus MD;  Location:  MAIN OR;  Service: Orthopedics    TUBAL LIGATION      US GUIDED THYROID BIOPSY  04/15/2019     Family History   Problem Relation Age of Onset    Heart disease Mother     Diabetes Mother     Arthritis Mother     Hypertension Mother     MONE disease Mother     Kidney disease Father     Hypertension Father     Stomach cancer Father     Arthritis Sister     Kidney disease Brother         age 29     Stroke Maternal Grandmother     Stomach cancer Maternal Grandmother     No Known Problems Maternal Grandfather     Arthritis Paternal Grandmother     Heart attack Paternal Grandmother     No Known Problems Paternal Grandfather     No Known Problems Daughter     No Known Problems Daughter     No Known Problems Daughter     No Known Problems Daughter     Stroke Maternal Aunt     Liver cancer Maternal Aunt     No Known Problems Maternal Aunt     No Known Problems Maternal Aunt     Heart attack Maternal Uncle     No Known Problems Paternal Aunt     Cancer Family         spinal column    Coronary artery disease  "Family     Glaucoma Family     Rheum arthritis Family       reports that she has never smoked. She has never used smokeless tobacco. She reports that she does not currently use alcohol. She reports that she does not use drugs.      Physical Exam:   Vitals:    07/16/24 0749   BP: 120/60   BP Location: Left arm   Patient Position: Sitting   Cuff Size: Large   Pulse: 58   Weight: 97.9 kg (215 lb 12.8 oz)   Height: 5' 6\" (1.676 m)     Body mass index is 34.83 kg/m².  Physical Exam  Constitutional:       General: She is not in acute distress.  HENT:      Head: Normocephalic and atraumatic.   Cardiovascular:      Rate and Rhythm: Normal rate and regular rhythm.      Pulses: Normal pulses.      Heart sounds: No murmur heard.  Pulmonary:      Effort: Pulmonary effort is normal. No respiratory distress.      Breath sounds: Normal breath sounds.   Abdominal:      General: Bowel sounds are normal.      Palpations: Abdomen is soft.      Tenderness: There is no abdominal tenderness.   Musculoskeletal:      Right lower leg: No edema.      Left lower leg: No edema.   Skin:     General: Skin is warm and dry.   Neurological:      General: No focal deficit present.      Mental Status: She is alert.   Psychiatric:         Mood and Affect: Mood normal.         Behavior: Behavior normal.            Lab Results:  Results for orders placed or performed in visit on 01/17/24    Diabetes Eye Exam   Result Value Ref Range    Severity Alert     Right Eye Diabetic Retinopathy Positive     Left Eye Diabetic Retinopathy Positive              Invalid input(s): \"ALBUMIN\"        Portions of the record may have been created with voice recognition software. Occasional wrong word or \"sound a like\" substitutions may have occurred due to the inherent limitations of voice recognition software. Read the chart carefully and recognize,    "

## 2024-07-18 ENCOUNTER — TELEPHONE (OUTPATIENT)
Dept: CARDIOLOGY CLINIC | Facility: CLINIC | Age: 69
End: 2024-07-18

## 2024-07-24 ENCOUNTER — TELEPHONE (OUTPATIENT)
Age: 69
End: 2024-07-24

## 2024-07-24 NOTE — TELEPHONE ENCOUNTER
Kaylin from [facility] called faxed over the BP/HR.       Please see scanned BP/HR    Routed zio monitor to [phone]    Please review

## 2024-07-25 ENCOUNTER — AMBUL SURGICAL CARE (OUTPATIENT)
Dept: URBAN - NONMETROPOLITAN AREA SURGERY 1 | Facility: SURGERY | Age: 69
Setting detail: OPHTHALMOLOGY
End: 2024-07-25
Payer: COMMERCIAL

## 2024-07-25 DIAGNOSIS — E11.3512: ICD-10-CM

## 2024-07-25 PROCEDURE — 67210 TREATMENT OF RETINAL LESION: CPT | Mod: LT | Performed by: OPHTHALMOLOGY

## 2024-07-25 PROCEDURE — G8907 PT DOC NO EVENTS ON DISCHARG: HCPCS | Performed by: OPHTHALMOLOGY

## 2024-07-25 PROCEDURE — 67210 TREATMENT OF RETINAL LESION: CPT | Mod: 79,LT | Performed by: OPHTHALMOLOGY

## 2024-07-25 PROCEDURE — G8918 PT W/O PREOP ORDER IV AB PRO: HCPCS | Performed by: OPHTHALMOLOGY

## 2024-07-26 ENCOUNTER — TELEPHONE (OUTPATIENT)
Dept: NEPHROLOGY | Facility: CLINIC | Age: 69
End: 2024-07-26

## 2024-07-29 ENCOUNTER — DOCTOR'S OFFICE (OUTPATIENT)
Dept: URBAN - NONMETROPOLITAN AREA CLINIC 1 | Facility: CLINIC | Age: 69
Setting detail: OPHTHALMOLOGY
End: 2024-07-29
Payer: COMMERCIAL

## 2024-07-29 DIAGNOSIS — H04.121: ICD-10-CM

## 2024-07-29 DIAGNOSIS — H04.122: ICD-10-CM

## 2024-07-29 DIAGNOSIS — E11.3411: ICD-10-CM

## 2024-07-29 DIAGNOSIS — E11.3412: ICD-10-CM

## 2024-07-29 LAB
LEFT EYE DIABETIC RETINOPATHY: POSITIVE
RIGHT EYE DIABETIC RETINOPATHY: POSITIVE

## 2024-07-29 PROCEDURE — 83861 MICROFLUID ANALY TEARS: CPT | Mod: QW,RT | Performed by: OPHTHALMOLOGY

## 2024-07-29 PROCEDURE — 83861 MICROFLUID ANALY TEARS: CPT | Mod: QW,LT | Performed by: OPHTHALMOLOGY

## 2024-07-29 PROCEDURE — 99024 POSTOP FOLLOW-UP VISIT: CPT | Performed by: OPHTHALMOLOGY

## 2024-07-29 PROCEDURE — 92134 CPTRZ OPH DX IMG PST SGM RTA: CPT | Performed by: OPHTHALMOLOGY

## 2024-07-29 ASSESSMENT — CONFRONTATIONAL VISUAL FIELD TEST (CVF)
OD_FINDINGS: FULL
OS_FINDINGS: FULL

## 2024-08-04 ENCOUNTER — DOCUMENTATION (OUTPATIENT)
Dept: CARDIOLOGY CLINIC | Facility: CLINIC | Age: 69
End: 2024-08-04

## 2024-08-04 NOTE — PROGRESS NOTES
ZIO XT extended Holter report:  -- Patient was monitored for 13 days and 20 hours between 6/7/2024 and 6/21/2024.  -- Patient had a min HR of 49 bpm, max HR of 141 bpm, and avg HR of 61 bpm.  -- Predominant underlying rhythm was Sinus Rhythm. Bundle Branch Block/IVCD was present.  -- 1 run of Ventricular Tachycardia occurred lasting 5 beats with a max rate of 130 bpm (avg 122 bpm).  -- 22 Supraventricular Tachycardia runs occurred, the run with the fastest interval lasting 5 beats with a max rate of 141 bpm, the longest lasting 16 beats with an avg rate of 111 bpm.  -- No atrial fibrillation or clinically significant pause events or Mobitz type II or higher degree AV block events were noted.  -- Isolated SVEs were occasional (1.9%, 96288), SVE Couplets were rare (<1.0%, 77), and SVE Triplets were rare (<1.0%, 25).  -- Isolated VEs were rare (<1.0%), and no VE Couplets or VE Triplets were present. Ventricular Bigeminy was present.  -- There was 1 triggered event and 0 diary entries.  Triggered event correlated with artifact.  Conclusion: Overall benign extended Holter monitoring findings with predominant sinus rhythm with presence of bundle branch block conduction abnormality.  There was minor ectopy as described above.  There were no sustained arrhythmias.  No atrial fibrillation was identified.    Rakel Krishnan MD

## 2024-08-05 ENCOUNTER — DOCTOR'S OFFICE (OUTPATIENT)
Dept: URBAN - NONMETROPOLITAN AREA CLINIC 1 | Facility: CLINIC | Age: 69
Setting detail: OPHTHALMOLOGY
End: 2024-08-05
Payer: COMMERCIAL

## 2024-08-05 DIAGNOSIS — E11.3411: ICD-10-CM

## 2024-08-05 PROCEDURE — 67028 INJECTION EYE DRUG: CPT | Mod: 58,RT | Performed by: OPHTHALMOLOGY

## 2024-08-08 ENCOUNTER — DOCTOR'S OFFICE (OUTPATIENT)
Dept: URBAN - NONMETROPOLITAN AREA CLINIC 1 | Facility: CLINIC | Age: 69
Setting detail: OPHTHALMOLOGY
End: 2024-08-08
Payer: COMMERCIAL

## 2024-08-08 ENCOUNTER — RX ONLY (RX ONLY)
Age: 69
End: 2024-08-08

## 2024-08-08 DIAGNOSIS — E11.3411: ICD-10-CM

## 2024-08-08 DIAGNOSIS — E11.3412: ICD-10-CM

## 2024-08-08 PROCEDURE — 67028 INJECTION EYE DRUG: CPT | Mod: 58,LT | Performed by: OPHTHALMOLOGY

## 2024-08-08 PROCEDURE — 99024 POSTOP FOLLOW-UP VISIT: CPT | Performed by: OPHTHALMOLOGY

## 2024-08-09 ENCOUNTER — TELEPHONE (OUTPATIENT)
Age: 69
End: 2024-08-09

## 2024-08-09 RX ORDER — CARVEDILOL 6.25 MG/1
6.25 TABLET ORAL 2 TIMES DAILY WITH MEALS
COMMUNITY

## 2024-08-09 NOTE — TELEPHONE ENCOUNTER
Kaylin called from [facility] stating that pt's Coreg has been decreased to 6.25mg BID.     FYI     Call back #[phone]

## 2024-08-12 ENCOUNTER — AMBUL SURGICAL CARE (OUTPATIENT)
Dept: URBAN - NONMETROPOLITAN AREA SURGERY 1 | Facility: SURGERY | Age: 69
Setting detail: OPHTHALMOLOGY
End: 2024-08-12
Payer: COMMERCIAL

## 2024-08-12 DIAGNOSIS — E11.3511: ICD-10-CM

## 2024-08-12 PROCEDURE — G8907 PT DOC NO EVENTS ON DISCHARG: HCPCS | Performed by: OPHTHALMOLOGY

## 2024-08-12 PROCEDURE — 67210 TREATMENT OF RETINAL LESION: CPT | Mod: RT | Performed by: OPHTHALMOLOGY

## 2024-08-12 PROCEDURE — G8918 PT W/O PREOP ORDER IV AB PRO: HCPCS | Performed by: OPHTHALMOLOGY

## 2024-08-27 ENCOUNTER — TELEPHONE (OUTPATIENT)
Dept: CARDIOLOGY CLINIC | Facility: CLINIC | Age: 69
End: 2024-08-27

## 2024-09-09 ENCOUNTER — DOCTOR'S OFFICE (OUTPATIENT)
Dept: URBAN - NONMETROPOLITAN AREA CLINIC 1 | Facility: CLINIC | Age: 69
Setting detail: OPHTHALMOLOGY
End: 2024-09-09
Payer: MEDICARE

## 2024-09-09 VITALS — HEIGHT: 55 IN

## 2024-09-09 DIAGNOSIS — H04.121: ICD-10-CM

## 2024-09-09 DIAGNOSIS — E11.3413: ICD-10-CM

## 2024-09-09 DIAGNOSIS — H04.122: ICD-10-CM

## 2024-09-09 LAB
LEFT EYE DIABETIC RETINOPATHY: POSITIVE
RIGHT EYE DIABETIC RETINOPATHY: POSITIVE

## 2024-09-09 PROCEDURE — 99024 POSTOP FOLLOW-UP VISIT: CPT | Performed by: OPHTHALMOLOGY

## 2024-09-09 PROCEDURE — 83861 MICROFLUID ANALY TEARS: CPT | Mod: QW,LT | Performed by: OPHTHALMOLOGY

## 2024-09-09 PROCEDURE — 92134 CPTRZ OPH DX IMG PST SGM RTA: CPT | Performed by: OPHTHALMOLOGY

## 2024-09-09 PROCEDURE — 83861 MICROFLUID ANALY TEARS: CPT | Mod: QW,RT | Performed by: OPHTHALMOLOGY

## 2024-09-09 ASSESSMENT — KERATOMETRY
OD_K2POWER_DIOPTERS: 43.25
OD_K1POWER_DIOPTERS: 42.50
OD_AXISANGLE_DEGREES: 056
OS_K1POWER_DIOPTERS: 42.75
OS_AXISANGLE_DEGREES: 129
OS_K2POWER_DIOPTERS: 43.75

## 2024-09-09 ASSESSMENT — REFRACTION_AUTOREFRACTION
OD_CYLINDER: -0.50
OD_SPHERE: +0.50
OS_AXIS: 162
OD_AXIS: 138
OS_SPHERE: -0.25
OS_CYLINDER: -0.25

## 2024-09-09 ASSESSMENT — VISUAL ACUITY
OD_BCVA: 20/40
OS_BCVA: 20/40+1

## 2024-09-10 NOTE — TELEPHONE ENCOUNTER
"History Of Present Illness  Diamond Diaz is a 70 y.o. female presenting with low back pain.     Past Medical History  Past Medical History:   Diagnosis Date    Acute non-recurrent maxillary sinusitis 01/04/2024    Chest wall pain 01/04/2024    Cigarette nicotine dependence with nicotine-induced disorder 01/04/2024    Fatigue 02/09/2022    Left foot pain 01/04/2024    Numbness of foot 01/17/2023    Trigger finger of right hand 01/04/2024    Trigger finger, unspecified finger     Trigger finger of right hand       Surgical History  Past Surgical History:   Procedure Laterality Date    APPENDECTOMY      BI US GUIDED BREAST LOCALIZATION AND BIOPSY LEFT Left 09/23/2014    BI US GUIDED BREAST LOCALIZATION AND BIOPSY LEFT [facility]    CHOLECYSTECTOMY      HYSTERECTOMY          Social History  She reports that she has been smoking cigarettes. She has never used smokeless tobacco. She reports current alcohol use. She reports that she does not use drugs.    Family History  Family History   Problem Relation Name Age of Onset    Stroke Mother      Diabetes Father      Heart attack Father          Allergies  Patient has no known allergies.    Review of Systems     Physical Exam     Last Recorded Vitals  Blood pressure 108/78, pulse 74, temperature 36.5 °C (97.7 °F), temperature source Temporal, resp. rate 17, height 1.575 m (5' 2\"), weight 56.7 kg (125 lb), SpO2 98%.    Relevant Results             Assessment/Plan   Assessment & Plan  Lumbar radiculopathy      Caudal epidural steroid injection       I spent 10 minutes in the professional and overall care of this patient.      Adrian Fulton MD    " I called and left a detailed message for nursing facility that patient needs labs prior to appointment.

## 2024-09-16 ENCOUNTER — DOCTOR'S OFFICE (OUTPATIENT)
Dept: URBAN - NONMETROPOLITAN AREA CLINIC 1 | Facility: CLINIC | Age: 69
Setting detail: OPHTHALMOLOGY
End: 2024-09-16
Payer: MEDICARE

## 2024-09-16 DIAGNOSIS — E11.3411: ICD-10-CM

## 2024-09-16 DIAGNOSIS — E11.3412: ICD-10-CM

## 2024-09-16 PROCEDURE — 92235 FLUORESCEIN ANGRPH MLTIFRAME: CPT | Performed by: OPHTHALMOLOGY

## 2024-09-16 PROCEDURE — 67028 INJECTION EYE DRUG: CPT | Mod: 58,LT | Performed by: OPHTHALMOLOGY

## 2024-09-16 ASSESSMENT — KERATOMETRY
OS_AXISANGLE_DEGREES: 129
OD_AXISANGLE_DEGREES: 056
OD_K2POWER_DIOPTERS: 43.25
OS_K2POWER_DIOPTERS: 43.75
OD_K1POWER_DIOPTERS: 42.50
OS_K1POWER_DIOPTERS: 42.75

## 2024-09-16 ASSESSMENT — CONFRONTATIONAL VISUAL FIELD TEST (CVF)
OD_FINDINGS: FULL
OS_FINDINGS: FULL

## 2024-09-16 ASSESSMENT — REFRACTION_AUTOREFRACTION
OD_SPHERE: +0.50
OS_CYLINDER: -0.25
OS_AXIS: 162
OD_CYLINDER: -0.50
OS_SPHERE: -0.25
OD_AXIS: 138

## 2024-09-16 ASSESSMENT — VISUAL ACUITY
OD_BCVA: 20/30-2
OS_BCVA: 20/30-2

## 2024-09-30 ENCOUNTER — DOCTOR'S OFFICE (OUTPATIENT)
Dept: URBAN - NONMETROPOLITAN AREA CLINIC 1 | Facility: CLINIC | Age: 69
Setting detail: OPHTHALMOLOGY
End: 2024-09-30
Payer: MEDICARE

## 2024-09-30 DIAGNOSIS — I63.9: ICD-10-CM

## 2024-09-30 DIAGNOSIS — E11.3411: ICD-10-CM

## 2024-09-30 DIAGNOSIS — E11.3412: ICD-10-CM

## 2024-09-30 LAB
LEFT EYE DIABETIC RETINOPATHY: POSITIVE
RIGHT EYE DIABETIC RETINOPATHY: POSITIVE

## 2024-09-30 PROCEDURE — 67028 INJECTION EYE DRUG: CPT | Mod: 58,RT | Performed by: OPHTHALMOLOGY

## 2024-09-30 PROCEDURE — 99024 POSTOP FOLLOW-UP VISIT: CPT | Performed by: OPHTHALMOLOGY

## 2024-09-30 ASSESSMENT — VISUAL ACUITY
OS_BCVA: 20/30-2
OD_BCVA: 20/30-1

## 2024-09-30 ASSESSMENT — CONFRONTATIONAL VISUAL FIELD TEST (CVF)
OD_FINDINGS: FULL
OS_FINDINGS: FULL

## 2024-10-02 ENCOUNTER — TELEPHONE (OUTPATIENT)
Dept: FAMILY MEDICINE CLINIC | Facility: CLINIC | Age: 69
End: 2024-10-02

## 2024-10-21 ENCOUNTER — TELEPHONE (OUTPATIENT)
Age: 69
End: 2024-10-21

## 2024-10-21 NOTE — TELEPHONE ENCOUNTER
Patient calling to verify her next scheduled appointment. Advised it is tomorrow at 220pm [facility].

## 2024-10-22 ENCOUNTER — OFFICE VISIT (OUTPATIENT)
Dept: CARDIOLOGY CLINIC | Facility: CLINIC | Age: 69
End: 2024-10-22
Payer: COMMERCIAL

## 2024-10-22 VITALS
SYSTOLIC BLOOD PRESSURE: 140 MMHG | BODY MASS INDEX: 32.78 KG/M2 | HEIGHT: 66 IN | TEMPERATURE: 98 F | DIASTOLIC BLOOD PRESSURE: 76 MMHG | RESPIRATION RATE: 20 BRPM | OXYGEN SATURATION: 96 % | WEIGHT: 204 LBS | HEART RATE: 60 BPM

## 2024-10-22 DIAGNOSIS — I47.10 PSVT (PAROXYSMAL SUPRAVENTRICULAR TACHYCARDIA) (HCC): ICD-10-CM

## 2024-10-22 DIAGNOSIS — E11.3513 TYPE 2 DIABETES MELLITUS WITH BOTH EYES AFFECTED BY PROLIFERATIVE RETINOPATHY AND MACULAR EDEMA, WITHOUT LONG-TERM CURRENT USE OF INSULIN (HCC): ICD-10-CM

## 2024-10-22 DIAGNOSIS — Q21.12 PFO (PATENT FORAMEN OVALE): ICD-10-CM

## 2024-10-22 DIAGNOSIS — I25.10 ATHEROSCLEROSIS OF NATIVE CORONARY ARTERY OF NATIVE HEART WITHOUT ANGINA PECTORIS: Primary | ICD-10-CM

## 2024-10-22 DIAGNOSIS — I10 ESSENTIAL HYPERTENSION: ICD-10-CM

## 2024-10-22 PROCEDURE — 93000 ELECTROCARDIOGRAM COMPLETE: CPT | Performed by: INTERNAL MEDICINE

## 2024-10-22 PROCEDURE — 99214 OFFICE O/P EST MOD 30 MIN: CPT | Performed by: INTERNAL MEDICINE

## 2024-10-22 NOTE — ASSESSMENT & PLAN NOTE
History of small PFO demonstrated by echocardiogram in 2023.  Her ROPE score is 3 and stroke unlikely be related to the PFO and more likely secondary to small vessel disease.  Will have to readdress this if got for which she has another event.     -- To continue aspirin therapy.

## 2024-10-22 NOTE — ASSESSMENT & PLAN NOTE
RELEVANT FINDINGS   PCI of unspecified vessel in March 2023.  Has had no recent anginal-like symptoms.  Exercise tolerance has improved.  Previously reported still symptoms of heart failure have resolved.   CURRENT RELEVANT MEDICATIONS Aspirin 81 mg daily Brilinta 90 mg twice daily  carvedilol 12.5 g twice daily doxazosin 2 mg at bedtime amlodipine 5 mg daily hydralazine 503 times daily and isosorbide mononitrate 60 mg daily and valsartan 160 mg daily.   GOALS Control risk factors and any anginal symptoms.   MEDICATION CHANGES Advising to discontinue taking Brilinta and continue all other medications.   OTHER RECOMMENDATIONS Advising to continue normal activity as tolerated including exercise activity.  Continue to eat healthy.

## 2024-10-22 NOTE — ASSESSMENT & PLAN NOTE
Lab Results   Component Value Date    HGBA1C 6.0 (H) 04/10/2024     Blood sugars recently have been in good range.  Advised to continue current medications and follow-up closely with primary physician.

## 2024-10-22 NOTE — ASSESSMENT & PLAN NOTE
She is noted to have paroxysmal SVT episodes on extended Holter monitoring.  No atrial fibrillation was identified.  She is already on beta-blocker therapy.  She is not symptomatic due to dysrhythmia events.  -- Will continue current medications at this time and consider increasing the beta-blocker dose if she has significant symptoms of palpitations in the future.

## 2024-10-22 NOTE — PATIENT INSTRUCTIONS
CARDIOLOGY ASSESSMENT & PLAN      Diagnosis ICD-10-CM Associated Orders   1. Atherosclerosis of native coronary artery of native heart without angina pectoris  I25.10 POCT ECG      2. Essential hypertension  I10       3. PFO (patent foramen ovale)  Q21.12       4. 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 PSVT (paroxysmal supraventricular tachycardia) ([facility])  I47.10          1. Atherosclerosis of native coronary artery of native heart without angina pectoris  Assessment & Plan:    RELEVANT FINDINGS   PCI of unspecified vessel in March 2023.  Has had no recent anginal-like symptoms.  Exercise tolerance has improved.  Previously reported still symptoms of heart failure have resolved.   CURRENT RELEVANT MEDICATIONS Aspirin 81 mg daily Brilinta 90 mg twice daily  carvedilol 12.5 g twice daily doxazosin 2 mg at bedtime amlodipine 5 mg daily hydralazine 503 times daily and isosorbide mononitrate 60 mg daily and valsartan 160 mg daily.   GOALS Control risk factors and any anginal symptoms.   MEDICATION CHANGES Advising to discontinue taking Brilinta and continue all other medications.   OTHER RECOMMENDATIONS Advising to continue normal activity as tolerated including exercise activity.  Continue to eat healthy.       Orders:  -     POCT ECG  2. Essential hypertension  Assessment & Plan:  Blood pressure reasonably controlled for her age and comorbidities.  Is on multiple antihypertensive medications.  -- Will continue current medications.  3. PFO (patent foramen ovale)  Assessment & Plan:  History of small PFO demonstrated by echocardiogram in 2023.  Her ROPE score is 3 and stroke unlikely be related to the PFO and more likely secondary to small vessel disease.  Will have to readdress this if got for which she has another event.     -- To continue aspirin therapy.  4. Type 2 diabetes mellitus with both  eyes affected by proliferative retinopathy and macular edema, without long-term current use of insulin ([facility])  Assessment & Plan:    Lab Results   Component Value Date    HGBA1C 6.0 (H) 04/10/2024     Blood sugars recently have been in good range.  Advised to continue current medications and follow-up closely with primary physician.  5. PSVT (paroxysmal supraventricular tachycardia) ([facility])  Assessment & Plan:  She is noted to have paroxysmal SVT episodes on extended Holter monitoring.  No atrial fibrillation was identified.  She is already on beta-blocker therapy.  She is not symptomatic due to dysrhythmia events.  -- Will continue current medications at this time and consider increasing the beta-blocker dose if she has significant symptoms of palpitations in the future.

## 2024-10-22 NOTE — PROGRESS NOTES
CARDIOLOGY ASSOCIATES  [facility]  Primary Office: [address] [place] [id number]  Phone: [phone]; Fax: [phone]  *-*-*-*-*-*-*-*-*-*-*-*-*-*-*-*-*-*-*-*-*-*-*-*-*-*-*-*-*-*-*-*-*-*-*-*-*-*-*-*-*-*-*-*-*-*-*-*-*-*-*-*-*-*  ENCOUNTER DATE: 10/22/24 2:42 PM  PATIENT NAME: Erin Arroyo   1955    [id number]  AGE:69 y.o.      SEX: female  ENCOUNTER PROVIDER: Rakel Krishnan MD, A, [facility]  PRIMARY CARE PHYSICIAN: Laureano Macias DO    DIAGNOSES:  1.  Coronary artery disease, status post MI, status post drug-eluting stent placement to  unspecified vessel 3/1/2023 (proximal LAD -- information given over telephone by Cath Lab charge nurse on 6/4/2024 -- no formal report was seen by me.).  2.  Chronic diastolic heart failure, history of systolic heart failure with mildly reduced function  3.  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  Dyslipidemia  5.  Chronic kidney disease, stage III  6.  History of multinodular goiter  7.  Asthma  8.  Obesity  9.  Gastroparesis  10.  Glaucoma suspect  11.  History of TIA 2005 and more recently history of CVA with right-sided weakness and aphasia (November 2023 -- acute to subacute infarction left genu of corpus callosum and left thalamus, bilateral chronic lacunar infarcts involving centrum semiovale internal capsule on the right side and lateral thalamic regions-likely small vessel disease)  12.  Hypertension with hypertensive emergency due to medication noncompliance  13.  History of PFO  14.  Cerebral aneurysm  15.  Carpal tunnel syndrome, status right wrist  16.  Recurrent major depression  17. Myesthenia Gravis.    Echocardiogram 11/30/2023:  Mildly increased left ventricular wall thickness, normal left ventricular systolic function EF 66%.  Grade 1 diastolic dysfunction.  Normal RV size and function.  Normal left and right atrial size.  Small PFO with right to left shunt noted with Valsalva  maneuver and saline contrast  Aortic valve.  Trace mitral and tricuspid valve regurgitation.  No obvious pulmonary hypertension.  No pericardial effusion.  Mildly dilated aortic root.     Cardiac catheterization information given over telephone by charge nurse at   Cath Lab [facility]: Cardiac cath 3/1/2023 showed:  Left main: Minimal luminal irregularities  LAD: Proximal LAD stent was placed.  LCx: Minimal luminal irregularities  RCA: Dominant, 25% proximal disease.     [facility] 7/28/2017:  VENTRICLES: -- Global left ventricular function was normal. EF calculated by contrast ventriculography was 55 %. Limited study.   CORONARY VESSELS: -- The coronary circulation is right dominant.   -- Left main: Angiography showed minor luminal irregularities.   -- LAD: Angiography showed minor luminal irregularities.   -- Circumflex: Angiography showed minor luminal irregularities.   -- RCA: Angiography showed minor luminal irregularities.     ZIO XT extended Holter report -- June 2024:  -- Patient was monitored for 13 days and 20 hours between 6/7/2024 and 6/21/2024.  -- Patient had a min HR of 49 bpm, max HR of 141 bpm, and avg HR of 61 bpm.  -- Predominant underlying rhythm was Sinus Rhythm. Bundle Branch Block/IVCD was present.  -- 1 run of Ventricular Tachycardia occurred lasting 5 beats with a max rate of 130 bpm (avg 122 bpm).  -- 22 Supraventricular Tachycardia runs occurred, the run with the fastest interval lasting 5 beats with a max rate of 141 bpm, the longest lasting 16 beats with an avg rate of 111 bpm.  -- No atrial fibrillation or clinically significant pause events or Mobitz type II or higher degree AV block events were noted.  -- Isolated SVEs were occasional (1.9%, 75643), SVE Couplets were rare (<1.0%, 77), and SVE Triplets were rare (<1.0%, 25).  -- Isolated VEs were rare (<1.0%), and no VE Couplets or VE Triplets were present. Ventricular Bigeminy was present.  -- There was 1 triggered event  and 0 diary entries.  Triggered event correlated with artifact.  Conclusion: Overall benign extended Holter monitoring findings with predominant sinus rhythm with presence of bundle branch block conduction abnormality.  There was minor ectopy as described above.  There were no sustained arrhythmias.  No atrial fibrillation was identified.       CURRENT ECG     Results for orders placed or performed in visit on 10/22/24   POCT ECG    Narrative    Sinus bradycardia, HR 56 bpm, right bundle branch block with left anterior hemiblock, left ventricular hypertrophy with repolarization abnormality, no left or right atrial cavity enlargement, nonspecific ST-T wave normalities.        CARDIOLOGY ASSESSMENT & PLAN      Diagnosis ICD-10-CM Associated Orders   1. Atherosclerosis of native coronary artery of native heart without angina pectoris  I25.10 POCT ECG      2. Essential hypertension  I10       3. PFO (patent foramen ovale)  Q21.12       4. 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 proliferative retinopathy and macular edema, without long-term current use of insulin ([facility])  E11.3513       5. PSVT (paroxysmal supraventricular tachycardia) ([facility])  I47.10          1. Atherosclerosis of native coronary artery of native heart without angina pectoris  Assessment & Plan:    RELEVANT FINDINGS   PCI of unspecified vessel in March 2023.  Has had no recent anginal-like symptoms.  Exercise tolerance has improved.  Previously reported still symptoms of heart failure have resolved.   CURRENT RELEVANT MEDICATIONS Aspirin 81 mg daily Brilinta 90 mg twice daily  carvedilol 12.5 g twice daily doxazosin 2 mg at bedtime amlodipine 5 mg daily hydralazine 503 times daily and isosorbide mononitrate 60 mg daily and valsartan 160 mg daily.   GOALS Control risk factors and any anginal symptoms.   MEDICATION CHANGES Advising to discontinue taking Brilinta and continue all other medications.   OTHER RECOMMENDATIONS Advising to continue  normal activity as tolerated including exercise activity.  Continue to eat healthy.       Orders:  -     POCT ECG  2. Essential hypertension  Assessment & Plan:  Blood pressure reasonably controlled for her age and comorbidities.  Is on multiple antihypertensive medications.  -- Will continue current medications.  3. PFO (patent foramen ovale)  Assessment & Plan:  History of small PFO demonstrated by echocardiogram in 2023.  Her ROPE score is 3 and stroke unlikely be related to the PFO and more likely secondary to small vessel disease.  Will have to readdress this if got for which she has another event.     -- To continue aspirin therapy.  4. 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 proliferative retinopathy and macular edema, without long-term current use of insulin ([facility])  Assessment & Plan:    Lab Results   Component Value Date    HGBA1C 6.0 (H) 04/10/2024     Blood sugars recently have been in good range.  Advised to continue current medications and follow-up closely with primary physician.  5. PSVT (paroxysmal supraventricular tachycardia) ([facility])  Assessment & Plan:  She is noted to have paroxysmal SVT episodes on extended Holter monitoring.  No atrial fibrillation was identified.  She is already on beta-blocker therapy.  She is not symptomatic due to dysrhythmia events.  -- Will continue current medications at this time and consider increasing the beta-blocker dose if she has significant symptoms of palpitations in the future.        INTERVAL HISTORY, HISTORY OF PRESENT ILLNESS & COMPREHENSIVE VISIT INFORMATION     Erin AMY Arroyo is here for follow-up regarding her cardiac comorbidities which include: Coronary artery disease, history of chronic heart failure, heart failure with mildly reduced ejection fraction, diabetes mellitus, dyslipidemia, CKD, history of TIA and CVA, history of PFO and other comorbidities.  She was last seen by me in June 2024.  She was referred to undergo an extended Holter  monitor and blood pressure medications were adjusted at last visit.  She is here for follow-up today.  She mentions that since last visit she has had no new diagnosis or hospitalizations or significant illnesses.    She is here for follow-up today.  She mentions that compared to last visit in June she is doing much better now.  She mentions that she was previously living in a nursing home and was feeling sick all the time however now she is living independently at her own apartment and reports that she has been functioning better.  She has had no unusual chest pain or shortness of breath or palpitations or dizziness.  She uses all   She does have  She denies any recent falls.a walker for ambulation.some heartburn and excessive belching at times.  In addition she also reports that she has lost sense of taste and everything taste the same to her.  She smells the food and it sounds good but when she starts eating she has complete loss of appetite.  This has led to weight loss.  She also reports intermittent pain in the left groin region when she is walking.  She often walks to nearby grocery store from her home and then comes back and feels the pain worse in the left groin region when she comes back.  Has had no passing out or near passing out episodes.  Denies any orthopnea or PND or pedal edema.  She has compression  socks and this prevents swelling.    Functional capacity status: Moderate   (Excellent- >10 METs; Good: (7-10 METs); Moderate (4-7 METs); Poor (<= 4 METs)    Any chronic stressors: None   (feeling of poor health, financial problems, and social isolation etc).    Tobacco or alcohol dependence: She has never been a smoker. She denies any alcohol use.     Current cardiac medications: Current cardiac meds: Aspirin 81 mg daily atorvastatin 80 mg daily Brilinta 90 mg twice daily carvedilol 12.5 mg twice daily dapagliflozin daily doxazosin 2 mg daily at bedtime, amlodipine 5 mg daily hydralazine 50 mg  3 times  "daily isosorbide mononitrate 60 mg once daily Januvia 50 mg daily valsartan 160 mg daily.  She was on furosemide previously but she says that she has not been taking it.    Last recent comprehensive blood work available:   Blood work 8/6/2024 sodium 144 potassium 4.2 chloride 100 bicarb 26 BUN 20 creatinine 1.55 GFR 36  Normal LFTs except decreased albumin and total protein in April 2024  Hemoglobin A1c 6.0 on 4/10/2024  TSH 1.45 on 8/9/2024  Lipid profile 11/29/2023  total cholesterol 193 triglyceride 82 HDL 47 calculated       REVIEW OF SYSTEMS   Positive for: As noted above in HPI  Negative for: All remaining as reviewed below and in HPI.    SYSTEM SYMPTOMS REVIEWED:  General--weight change, fever, night sweats  Respiratory--cough, wheezing, shortness of breath, sputum production  Cardiovascular--chest pain, syncope, dyspnea on exertion, edema, decline in exercise tolerance, claudication   Gastrointestinal--persistent vomiting, diarrhea, abdominal distention, blood in stool   Muscular or skeletal--joint pain or swelling   Neurologic--headaches, syncope, abnormal movement  Hematologic--history of easy bruising and bleeding   Endocrine--thyroid enlargement, heat or cold intolerance, polyuria   Psychiatric--anxiety, depression     *-*-*-*-*-*-*-*-*-*-*-*-*-*-*-*-*-*-*-*-*-*-*-*-*-*-*-*-*-*-*-*-*-*-*-*-*-*-*-*-*-*-*-*-*-*-*-*-*-*-*-*-*-*-  VITAL SIGNS     CURRENT VITAL SIGNS:   Vitals:    10/22/24 1355   BP: 140/76   BP Location: Left arm   Patient Position: Sitting   Cuff Size: Large   Pulse: 60   Resp: 20   Temp: 98 °F (36.7 °C)   SpO2: 96%   Weight: 92.5 kg (204 lb)   Height: 5' 6\" (1.676 m)       BMI: Body mass index is 32.93 kg/m².    WEIGHTS:   Wt Readings from Last 25 Encounters:   10/22/24 92.5 kg (204 lb)   07/16/24 97.9 kg (215 lb 12.8 oz)   06/04/24 97.5 kg (215 lb)   04/09/24 95.3 kg (210 lb)   11/30/23 93.4 kg (206 lb)   11/13/23 97.1 kg (214 lb)   10/30/23 92.9 kg (204 lb 12.9 oz)   09/07/23 98.9 " kg (218 lb)   07/19/23 95.9 kg (211 lb 6.4 oz)   06/23/23 93.5 kg (206 lb 2.1 oz)   06/23/23 95.1 kg (209 lb 9.6 oz)   06/07/23 97.8 kg (215 lb 9.6 oz)   04/16/23 94 kg (207 lb 3.2 oz)   02/07/23 93.9 kg (207 lb)   12/21/22 93.9 kg (207 lb)   11/23/22 98.4 kg (217 lb)   11/13/22 96.2 kg (212 lb)   10/03/22 95.7 kg (211 lb)   09/19/22 95.7 kg (211 lb)   07/22/22 92.2 kg (203 lb 3.2 oz)   07/18/22 91.8 kg (202 lb 6.4 oz)   07/15/22 93.4 kg (206 lb)   07/08/22 90.2 kg (198 lb 13.7 oz)   03/07/22 97.2 kg (214 lb 4.8 oz)   11/16/21 99.8 kg (220 lb)        *-*-*-*-*-*-*-*-*-*-*-*-*-*-*-*-*-*-*-*-*-*-*-*-*-*-*-*-*-*-*-*-*-*-*-*-*-*-*-*-*-*-*-*-*-*-*-*-*-*-*-*-*-*-  PHYSICAL EXAM     General Appearance:    Alert, cooperative, no distress, appears stated age, slightly increased BMI   Head, Eyes, ENT:    No obvious abnormality, moist mucous mebranes.   Neck:   Supple, no carotid bruit. No JVD, no obvious lymphadenoapthy   Back:     Symmetric, no curvature.   Lungs:     Respirations unlabored. Clear to auscultation bilaterally,    Chest wall:    No tenderness or deformity   Heart:    Regular rate and rhythm, S1 and S2 normal, no murmur, rub  or gallop.     Abdomen:     Soft, non-tender.   Extremities:   Extremities warm, no cyanosis or edema    Skin:   No venostatic changes in lower extremities.   Normal skin color, texture, and turgor. No rashes or lesions   Neuro: Pt is alert and oriented time 3 with no gross motor deficits.   Psych/ behavioral: Mood is normal. Behavior is normal.     *-*-*-*-*-*-*-*-*-*-*-*-*-*-*-*-*-*-*-*-*-*-*-*-*-*-*-*-*-*-*-*-*-*-*-*-*-*-*-*-*-*-*-*-*-*-*-*-*-*-*-*-*-*-  CURRENT MEDICATIONS LIST     Current Outpatient Medications:     albuterol (PROVENTIL HFA,VENTOLIN HFA) 90 mcg/act inhaler, Inhale 2 puffs every 6 (six) hours as needed for wheezing or shortness of breath, Disp: 54 g, Rfl: 1    Alcohol Swabs (DropSafe Alcohol Prep) 70 % PADS, Use one pad twice daily when check blood sugar, Disp: 200  each, Rfl: 2    amLODIPine (NORVASC) 5 mg tablet, Take 1 tablet (5 mg total) by mouth daily, Disp: 90 tablet, Rfl: 3    aspirin (ECOTRIN LOW STRENGTH) 81 mg EC tablet, Take 1 tablet (81 mg total) by mouth daily, Disp: 30 tablet, Rfl: 3    atorvastatin (LIPITOR) 80 mg tablet, Take 1 tablet (80 mg total) by mouth daily with dinner, Disp: 90 tablet, Rfl: 3    Blood Glucose Monitoring Suppl (True Metrix Air Glucose Meter) w/Device KIT, USE AS DIRECTED, Disp: 1 kit, Rfl: 0    carvedilol (COREG) 6.25 mg tablet, Take 6.25 mg by mouth 2 (two) times a day with meals, Disp: , Rfl:     dapagliflozin (Farxiga) 10 MG tablet, Take 1 tablet (10 mg total) by mouth in the morning 1/2 tablet daily for the 1st month then increase to a full tablet daily in the morning, Disp: 90 tablet, Rfl: 3    doxazosin (CARDURA) 2 mg tablet, Take 1 tablet (2 mg total) by mouth daily Do not start before November 3, 2023., Disp: 30 tablet, Rfl: 0    gabapentin (NEURONTIN) 300 mg capsule, Take 1 capsule (300 mg total) by mouth 3 (three) times a day Do not start before December 5, 2023., Disp: 270 capsule, Rfl: 0    hydrALAZINE (APRESOLINE) 50 mg tablet, Take 1 tablet (50 mg total) by mouth 3 (three) times a day, Disp: 90 tablet, Rfl: 3    isosorbide mononitrate (IMDUR) 60 mg 24 hr tablet, Take 1 tablet (60 mg total) by mouth daily, Disp: 90 tablet, Rfl: 0    Januvia 50 MG tablet, , Disp: , Rfl:     ondansetron (ZOFRAN) 4 mg tablet, Take 1 tablet (4 mg total) by mouth every 8 (eight) hours as needed for nausea or vomiting, Disp: 20 tablet, Rfl: 0    pantoprazole (PROTONIX) 40 mg tablet, Take 40 mg by mouth every morning At 8AM, Disp: , Rfl:     polyethylene glycol (GLYCOLAX) 17 GM/SCOOP powder, Take 17 g by mouth daily Increase to twice daily if still constiptated, Disp: 578 g, Rfl: 3    sitaGLIPtin (Januvia) 100 mg tablet, Take 1 tablet (100 mg total) by mouth daily (Patient taking differently: Take 50 mg by mouth daily), Disp: 90 tablet, Rfl: 3     TRUEplus Lancets 33G MISC, Use 2 (two) times a day, Disp: 200 each, Rfl: 2    valsartan (DIOVAN) 160 mg tablet, Take 320 mg by mouth daily, Disp: , Rfl:     Diclofenac Sodium (VOLTAREN) 1 %, Apply 2 g topically 4 (four) times a day (Patient not taking: Reported on 10/22/2024), Disp: 2 g, Rfl: 0    escitalopram (LEXAPRO) 5 mg tablet, Take 5 mg by mouth daily (Patient not taking: Reported on 10/22/2024), Disp: , Rfl:     furosemide (LASIX) 20 mg tablet, Take 1 tablet (20 mg total) by mouth daily as needed (LE Edema only daily as needed) (Patient not taking: Reported on 4/9/2024), Disp: 30 tablet, Rfl: 0       ALLERGIES     Allergies   Allergen Reactions    Bactrim [Sulfamethoxazole-Trimethoprim] Shortness Of Breath    Lisinopril Angioedema    Vicodin [Hydrocodone-Acetaminophen] GI Intolerance    Hydrocodone-Acetaminophen Nausea Only    Oxycodone-Acetaminophen GI Intolerance and Nausea Only       *-*-*-*-*-*-*-*-*-*-*-*-*-*-*-*-*-*-*-*-*-*-*-*-*-*-*-*-*-*-*-*-*-*-*-*-*-*-*-*-*-*-*-*-*-*-*-*-*-*-  SIGNATURES:   @SIG@   Rakel Krishnan MD; [facility]    *-*-*-*-*-*-*-*-*-*-*-*-*-*-*-*-*-*-*-*-*-*-*-*-*-*-*-*-*-*-*-*-*-*-*-*-*-*-*-*-*-*-*-*-*-*-*-*-*-*-*-*-*-*-  PAST MEDICAL HISTORY IN:  Past Medical History:   Diagnosis Date    Abnormal ECG     last assessed: 5/15/17    Abnormal mammogram     last assessed: 7/11/17    Abnormal stress test     last assesed: 7/24/17    Anemia     Anxiety     Arthritis     Asthma     Back problem     Breast cyst     CAD (coronary artery disease)     Chest pain     last assessed: 7/24/17    Chronic pain disorder     Cyst of left breast     last assessed: 8/18/17    Depression     Depression     resolved: 1/2/18    Diabetes mellitus (HCC)     Hiatal hernia     last assessed: 11/7/17    Hyperlipidemia     Hypertension     Idiopathic intracranial hypertension 05/15/2017    Keloid of skin     last assessed: 11/2/16    Multiple thyroid nodules     Neuropathy     Psychiatric disorder     anxiety    Stroke  (HCC)     TIA 2005    Tuberculosis     as a child     PAST SURGICAL HISTORY:  Past Surgical History:   Procedure Laterality Date    ARM WOUND REPAIR / CLOSURE      CARPAL TUNNEL RELEASE      CARPAL TUNNEL RELEASE Left     CATARACT EXTRACTION Bilateral         COLONOSCOPY      CORONARY ANGIOPLASTY WITH STENT PLACEMENT  2023    at [facility]    CYST REMOVAL      right upper arm.    EYE SURGERY      cataract removaql    FL LUMBAR PUNCTURE DIAGNOSTIC  08/15/2019    CT ESOPHAGOGASTRODUODENOSCOPY TRANSORAL DIAGNOSTIC N/A 2018    Procedure: ESOPHAGOGASTRODUODENOSCOPY (EGD);  Surgeon: Kerline Coley DO;  Location: [facility];  Service: Gastroenterology    CT NDSC WRST SURG W/RLS TRANSVRS CARPL LIGM Left 2019    Procedure: ENDOSCOPIC CARPAL TUNNEL RELEASE;  Surgeon: Dalton Jesus MD;  Location:  MAIN OR;  Service: Orthopedics    TUBAL LIGATION      US GUIDED THYROID BIOPSY  04/15/2019         FAMILY HISTORY:  Family History   Problem Relation Age of Onset    Heart disease Mother     Diabetes Mother     Arthritis Mother     Hypertension Mother     MONE disease Mother     Kidney disease Father     Hypertension Father     Stomach cancer Father     Arthritis Sister     Kidney disease Brother         age 29     Stroke Maternal Grandmother     Stomach cancer Maternal Grandmother     No Known Problems Maternal Grandfather     Arthritis Paternal Grandmother     Heart attack Paternal Grandmother     No Known Problems Paternal Grandfather     No Known Problems Daughter     No Known Problems Daughter     No Known Problems Daughter     No Known Problems Daughter     Stroke Maternal Aunt     Liver cancer Maternal Aunt     No Known Problems Maternal Aunt     No Known Problems Maternal Aunt     Heart attack Maternal Uncle     No Known Problems Paternal Aunt     Cancer Family         spinal column    Coronary artery disease Family     Glaucoma Family     Rheum arthritis Family     SOCIAL  HISTORY:  Social History     Tobacco Use   Smoking Status Never   Smokeless Tobacco Never      Social History     Substance and Sexual Activity   Alcohol Use Not Currently    Alcohol/week: 0.0 standard drinks of alcohol     Social History     Substance and Sexual Activity   Drug Use No    [unfilled]       *-*-*-*-*-*-*-*-*-*-*-*-*-*-*-*-*-*-*-*-*-*-*-*-*-*-*-*-*-*-*-*-*-*-*-*-*-*-*-*-*-*-*-*-*-*-*-*-*-*-*-*-*-*

## 2024-10-23 ENCOUNTER — TELEPHONE (OUTPATIENT)
Age: 69
End: 2024-10-23

## 2024-10-23 NOTE — TELEPHONE ENCOUNTER
10/23/24  Screened by: Juan Mack    Referring Provider Dr. Coley    Pre- Screening:     There is no height or weight on file to calculate BMI.  Has patient been referred for a routine screening Colonoscopy? yes  Is the patient between 45-75 years old? yes      Previous Colonoscopy yes   If yes:    Date: 2019    Facility: [facility]     Reason: screening      SCHEDULING STAFF: If the patient is between 45yrs-49yrs, please advise patient to confirm benefits/coverage with their insurance company for a routine screening colonoscopy, some insurance carriers will only cover at 50yrs or older. If the patient is over 75years old, please schedule an office visit.     Does the patient want to see a Gastroenterologist prior to their procedure OR are they having any GI symptoms? yes    Has the patient been hospitalized or had abdominal surgery in the past 6 months? N/A    Does the patient use supplemental oxygen? N/A    Does the patient take Coumadin, Lovenox, Plavix, Elliquis, Xarelto, or other blood thinning medication? N/A    Has the patient had a stroke, cardiac event, or stent placed in the past year? N/A    SCHEDULING STAFF: If patient answers NO to above questions, then schedule procedure. If patient answers YES to above questions, then schedule office appointment.     If patient is between 45yrs - 49yrs, please advise patient that we will have to confirm benefits & coverage with their insurance company for a routine screening colonoscopy.

## 2024-10-28 ENCOUNTER — OFFICE VISIT (OUTPATIENT)
Dept: FAMILY MEDICINE CLINIC | Facility: CLINIC | Age: 69
End: 2024-10-28
Payer: COMMERCIAL

## 2024-10-28 ENCOUNTER — LAB (OUTPATIENT)
Dept: LAB | Facility: MEDICAL CENTER | Age: 69
End: 2024-10-28
Payer: COMMERCIAL

## 2024-10-28 ENCOUNTER — APPOINTMENT (OUTPATIENT)
Dept: RADIOLOGY | Facility: MEDICAL CENTER | Age: 69
End: 2024-10-28
Payer: COMMERCIAL

## 2024-10-28 VITALS
WEIGHT: 208 LBS | OXYGEN SATURATION: 96 % | HEIGHT: 66 IN | TEMPERATURE: 97.1 F | BODY MASS INDEX: 33.43 KG/M2 | SYSTOLIC BLOOD PRESSURE: 176 MMHG | HEART RATE: 62 BPM | DIASTOLIC BLOOD PRESSURE: 80 MMHG

## 2024-10-28 DIAGNOSIS — N18.31 STAGE 3A CHRONIC KIDNEY DISEASE (HCC): ICD-10-CM

## 2024-10-28 DIAGNOSIS — D63.8 ANEMIA OF CHRONIC DISEASE: ICD-10-CM

## 2024-10-28 DIAGNOSIS — R53.82 CHRONIC FATIGUE: Primary | ICD-10-CM

## 2024-10-28 DIAGNOSIS — R53.82 CHRONIC FATIGUE: ICD-10-CM

## 2024-10-28 DIAGNOSIS — R10.32 LEFT GROIN PAIN: ICD-10-CM

## 2024-10-28 DIAGNOSIS — I10 ESSENTIAL HYPERTENSION: ICD-10-CM

## 2024-10-28 DIAGNOSIS — N18.32 STAGE 3B CHRONIC KIDNEY DISEASE (HCC): ICD-10-CM

## 2024-10-28 LAB
ALBUMIN SERPL BCG-MCNC: 3.7 G/DL (ref 3.5–5)
ALP SERPL-CCNC: 92 U/L (ref 34–104)
ALT SERPL W P-5'-P-CCNC: 16 U/L (ref 7–52)
ANION GAP SERPL CALCULATED.3IONS-SCNC: 6 MMOL/L (ref 4–13)
AST SERPL W P-5'-P-CCNC: 20 U/L (ref 13–39)
BASOPHILS # BLD AUTO: 0.02 THOUSANDS/ΜL (ref 0–0.1)
BASOPHILS NFR BLD AUTO: 0 % (ref 0–1)
BILIRUB SERPL-MCNC: 0.36 MG/DL (ref 0.2–1)
BUN SERPL-MCNC: 20 MG/DL (ref 5–25)
CALCIUM SERPL-MCNC: 8.8 MG/DL (ref 8.4–10.2)
CHLORIDE SERPL-SCNC: 106 MMOL/L (ref 96–108)
CO2 SERPL-SCNC: 29 MMOL/L (ref 21–32)
CREAT SERPL-MCNC: 1.36 MG/DL (ref 0.6–1.3)
EOSINOPHIL # BLD AUTO: 0.06 THOUSAND/ΜL (ref 0–0.61)
EOSINOPHIL NFR BLD AUTO: 1 % (ref 0–6)
ERYTHROCYTE [DISTWIDTH] IN BLOOD BY AUTOMATED COUNT: 16.4 % (ref 11.6–15.1)
EST. AVERAGE GLUCOSE BLD GHB EST-MCNC: 134 MG/DL
GFR SERPL CREATININE-BSD FRML MDRD: 39 ML/MIN/1.73SQ M
GLUCOSE P FAST SERPL-MCNC: 100 MG/DL (ref 65–99)
HBA1C MFR BLD: 6.3 %
HCT VFR BLD AUTO: 37.4 % (ref 34.8–46.1)
HGB BLD-MCNC: 11.8 G/DL (ref 11.5–15.4)
IMM GRANULOCYTES # BLD AUTO: 0.02 THOUSAND/UL (ref 0–0.2)
IMM GRANULOCYTES NFR BLD AUTO: 0 % (ref 0–2)
LYMPHOCYTES # BLD AUTO: 1.67 THOUSANDS/ΜL (ref 0.6–4.47)
LYMPHOCYTES NFR BLD AUTO: 35 % (ref 14–44)
MCH RBC QN AUTO: 26.8 PG (ref 26.8–34.3)
MCHC RBC AUTO-ENTMCNC: 31.6 G/DL (ref 31.4–37.4)
MCV RBC AUTO: 85 FL (ref 82–98)
MONOCYTES # BLD AUTO: 0.47 THOUSAND/ΜL (ref 0.17–1.22)
MONOCYTES NFR BLD AUTO: 10 % (ref 4–12)
NEUTROPHILS # BLD AUTO: 2.48 THOUSANDS/ΜL (ref 1.85–7.62)
NEUTS SEG NFR BLD AUTO: 54 % (ref 43–75)
NRBC BLD AUTO-RTO: 0 /100 WBCS
PLATELET # BLD AUTO: 184 THOUSANDS/UL (ref 149–390)
PMV BLD AUTO: 10.9 FL (ref 8.9–12.7)
POTASSIUM SERPL-SCNC: 4.1 MMOL/L (ref 3.5–5.3)
PROT SERPL-MCNC: 7.1 G/DL (ref 6.4–8.4)
RBC # BLD AUTO: 4.41 MILLION/UL (ref 3.81–5.12)
SODIUM SERPL-SCNC: 141 MMOL/L (ref 135–147)
TSH SERPL DL<=0.05 MIU/L-ACNC: 1.73 UIU/ML (ref 0.45–4.5)
WBC # BLD AUTO: 4.72 THOUSAND/UL (ref 4.31–10.16)

## 2024-10-28 PROCEDURE — 84443 ASSAY THYROID STIM HORMONE: CPT

## 2024-10-28 PROCEDURE — 84165 PROTEIN E-PHORESIS SERUM: CPT

## 2024-10-28 PROCEDURE — 80053 COMPREHEN METABOLIC PANEL: CPT | Performed by: FAMILY MEDICINE

## 2024-10-28 PROCEDURE — 85025 COMPLETE CBC W/AUTO DIFF WBC: CPT

## 2024-10-28 PROCEDURE — 86334 IMMUNOFIX E-PHORESIS SERUM: CPT

## 2024-10-28 PROCEDURE — 73502 X-RAY EXAM HIP UNI 2-3 VIEWS: CPT

## 2024-10-28 PROCEDURE — 84166 PROTEIN E-PHORESIS/URINE/CSF: CPT

## 2024-10-28 PROCEDURE — 36415 COLL VENOUS BLD VENIPUNCTURE: CPT

## 2024-10-28 PROCEDURE — G0439 PPPS, SUBSEQ VISIT: HCPCS | Performed by: FAMILY MEDICINE

## 2024-10-28 NOTE — PATIENT INSTRUCTIONS
Medicare Preventive Visit Patient Instructions  Thank you for completing your Welcome to Medicare Visit or Medicare Annual Wellness Visit today. Your next wellness visit will be due in one year (10/29/2025).  The screening/preventive services that you may require over the next 5-10 years are detailed below. Some tests may not apply to you based off risk factors and/or age. Screening tests ordered at today's visit but not completed yet may show as past due. Also, please note that scanned in results may not display below.  Preventive Screenings:  Service Recommendations Previous Testing/Comments   Colorectal Cancer Screening  * Colonoscopy    * Fecal Occult Blood Test (FOBT)/Fecal Immunochemical Test (FIT)  * Fecal DNA/Cologuard Test  * Flexible Sigmoidoscopy Age: 45-75 years old   Colonoscopy: every 10 years (may be performed more frequently if at higher risk)  OR  FOBT/FIT: every 1 year  OR  Cologuard: every 3 years  OR  Sigmoidoscopy: every 5 years  Screening may be recommended earlier than age 45 if at higher risk for colorectal cancer. Also, an individualized decision between you and your healthcare provider will decide whether screening between the ages of 76-85 would be appropriate. Colonoscopy: 10/29/2019  FOBT/FIT: Not on file  Cologuard: Not on file  Sigmoidoscopy: Not on file    Screening Current     Breast Cancer Screening Age: 40+ years old  Frequency: every 1-2 years  Not required if history of left and right mastectomy Mammogram: 07/28/2021        Cervical Cancer Screening Between the ages of 21-29, pap smear recommended once every 3 years.   Between the ages of 30-65, can perform pap smear with HPV co-testing every 5 years.   Recommendations may differ for women with a history of total hysterectomy, cervical cancer, or abnormal pap smears in past. Pap Smear: 10/03/2022    Screening Not Indicated   Hepatitis C Screening Once for adults born between 1945 and 1965  More frequently in patients at high risk  for Hepatitis C Hep C Antibody: 03/24/2020    Screening Current   Diabetes Screening 1-2 times per year if you're at risk for diabetes or have pre-diabetes Fasting glucose: 92 mg/dL (10/31/2023)  A1C: 6.0 % (4/10/2024)  Screening Not Indicated  History Diabetes   Cholesterol Screening Once every 5 years if you don't have a lipid disorder. May order more often based on risk factors. Lipid panel: 04/10/2024    Screening Not Indicated  History Lipid Disorder     Other Preventive Screenings Covered by Medicare:  Abdominal Aortic Aneurysm (AAA) Screening: covered once if your at risk. You're considered to be at risk if you have a family history of AAA.  Lung Cancer Screening: covers low dose CT scan once per year if you meet all of the following conditions: (1) Age 55-77; (2) No signs or symptoms of lung cancer; (3) Current smoker or have quit smoking within the last 15 years; (4) You have a tobacco smoking history of at least 20 pack years (packs per day multiplied by number of years you smoked); (5) You get a written order from a healthcare provider.  Glaucoma Screening: covered annually if you're considered high risk: (1) You have diabetes OR (2) Family history of glaucoma OR (3)  aged 50 and older OR (4)  American aged 65 and older  Osteoporosis Screening: covered every 2 years if you meet one of the following conditions: (1) You're estrogen deficient and at risk for osteoporosis based off medical history and other findings; (2) Have a vertebral abnormality; (3) On glucocorticoid therapy for more than 3 months; (4) Have primary hyperparathyroidism; (5) On osteoporosis medications and need to assess response to drug therapy.   Last bone density test (DXA Scan): Not on file.  HIV Screening: covered annually if you're between the age of 15-65. Also covered annually if you are younger than 15 and older than 65 with risk factors for HIV infection. For pregnant patients, it is covered up to 3 times  per pregnancy.    Immunizations:  Immunization Recommendations   Influenza Vaccine Annual influenza vaccination during flu season is recommended for all persons aged >= 6 months who do not have contraindications   Pneumococcal Vaccine   * Pneumococcal conjugate vaccine = PCV13 (Prevnar 13), PCV15 (Vaxneuvance), PCV20 (Prevnar 20)  * Pneumococcal polysaccharide vaccine = PPSV23 (Pneumovax) Adults 19-63 yo with certain risk factors or if 65+ yo  If never received any pneumonia vaccine: recommend Prevnar 20 (PCV20)  Give PCV20 if previously received 1 dose of PCV13 or PPSV23   Hepatitis B Vaccine 3 dose series if at intermediate or high risk (ex: diabetes, end stage renal disease, liver disease)   Respiratory syncytial virus (RSV) Vaccine - COVERED BY MEDICARE PART D  * RSVPreF3 (Arexvy) CDC recommends that adults 60 years of age and older may receive a single dose of RSV vaccine using shared clinical decision-making (SCDM)   Tetanus (Td) Vaccine - COST NOT COVERED BY MEDICARE PART B Following completion of primary series, a booster dose should be given every 10 years to maintain immunity against tetanus. Td may also be given as tetanus wound prophylaxis.   Tdap Vaccine - COST NOT COVERED BY MEDICARE PART B Recommended at least once for all adults. For pregnant patients, recommended with each pregnancy.   Shingles Vaccine (Shingrix) - COST NOT COVERED BY MEDICARE PART B  2 shot series recommended in those 19 years and older who have or will have weakened immune systems or those 50 years and older     Health Maintenance Due:      Topic Date Due   • Breast Cancer Screening: Mammogram  07/28/2022   • Colorectal Cancer Screening  10/29/2024   • Hepatitis C Screening  Completed   • Cervical Cancer Screening  Discontinued     Immunizations Due:      Topic Date Due   • DTaP,Tdap,and Td Vaccines (1 - Tdap) Never done   • Influenza Vaccine (1) 09/01/2024   • COVID-19 Vaccine (1 - 2023-24 season) Never done     Advance  Directives   What are advance directives?  Advance directives are legal documents that state your wishes and plans for medical care. These plans are made ahead of time in case you lose your ability to make decisions for yourself. Advance directives can apply to any medical decision, such as the treatments you want, and if you want to donate organs.   What are the types of advance directives?  There are many types of advance directives, and each state has rules about how to use them. You may choose a combination of any of the following:  Living will:  This is a written record of the treatment you want. You can also choose which treatments you do not want, which to limit, and which to stop at a certain time. This includes surgery, medicine, IV fluid, and tube feedings.   Durable power of  for healthcare (DPAHC):  This is a written record that states who you want to make healthcare choices for you when you are unable to make them for yourself. This person, called a proxy, is usually a family member or a friend. You may choose more than 1 proxy.  Do not resuscitate (DNR) order:  A DNR order is used in case your heart stops beating or you stop breathing. It is a request not to have certain forms of treatment, such as CPR. A DNR order may be included in other types of advance directives.  Medical directive:  This covers the care that you want if you are in a coma, near death, or unable to make decisions for yourself. You can list the treatments you want for each condition. Treatment may include pain medicine, surgery, blood transfusions, dialysis, IV or tube feedings, and a ventilator (breathing machine).  Values history:  This document has questions about your views, beliefs, and how you feel and think about life. This information can help others choose the care that you would choose.  Why are advance directives important?  An advance directive helps you control your care. Although spoken wishes may be used, it  is better to have your wishes written down. Spoken wishes can be misunderstood, or not followed. Treatments may be given even if you do not want them. An advance directive may make it easier for your family to make difficult choices about your care.   Fall Prevention    Fall prevention  includes ways to make your home and other areas safer. It also includes ways you can move more carefully to prevent a fall. Health conditions that cause changes in your blood pressure, vision, or muscle strength and coordination may increase your risk for falls. Medicines may also increase your risk for falls if they make you dizzy, weak, or sleepy.   Fall prevention tips:   Stand or sit up slowly.    Use assistive devices as directed.    Wear shoes that fit well and have soles that .    Wear a personal alarm.    Stay active.    Manage your medical conditions.    Home Safety Tips:  Add items to prevent falls in the bathroom.    Keep paths clear.    Install bright lights in your home.    Keep items you use often on shelves within reach.    Paint or place reflective tape on the edges of your stairs.    Weight Management   Why it is important to manage your weight:  Being overweight increases your risk of health conditions such as heart disease, high blood pressure, type 2 diabetes, and certain types of cancer. It can also increase your risk for osteoarthritis, sleep apnea, and other respiratory problems. Aim for a slow, steady weight loss. Even a small amount of weight loss can lower your risk of health problems.  How to lose weight safely:  A safe and healthy way to lose weight is to eat fewer calories and get regular exercise. You can lose up about 1 pound a week by decreasing the number of calories you eat by 500 calories each day.   Healthy meal plan for weight management:  A healthy meal plan includes a variety of foods, contains fewer calories, and helps you stay healthy. A healthy meal plan includes the following:  Eat  whole-grain foods more often.  A healthy meal plan should contain fiber. Fiber is the part of grains, fruits, and vegetables that is not broken down by your body. Whole-grain foods are healthy and provide extra fiber in your diet. Some examples of whole-grain foods are whole-wheat breads and pastas, oatmeal, brown rice, and bulgur.  Eat a variety of vegetables every day.  Include dark, leafy greens such as spinach, kale, desiree greens, and mustard greens. Eat yellow and orange vegetables such as carrots, sweet potatoes, and winter squash.   Eat a variety of fruits every day.  Choose fresh or canned fruit (canned in its own juice or light syrup) instead of juice. Fruit juice has very little or no fiber.  Eat low-fat dairy foods.  Drink fat-free (skim) milk or 1% milk. Eat fat-free yogurt and low-fat cottage cheese. Try low-fat cheeses such as mozzarella and other reduced-fat cheeses.  Choose meat and other protein foods that are low in fat.  Choose beans or other legumes such as split peas or lentils. Choose fish, skinless poultry (chicken or turkey), or lean cuts of red meat (beef or pork). Before you cook meat or poultry, cut off any visible fat.   Use less fat and oil.  Try baking foods instead of frying them. Add less fat, such as margarine, sour cream, regular salad dressing and mayonnaise to foods. Eat fewer high-fat foods. Some examples of high-fat foods include french fries, doughnuts, ice cream, and cakes.  Eat fewer sweets.  Limit foods and drinks that are high in sugar. This includes candy, cookies, regular soda, and sweetened drinks.  Exercise:  Exercise at least 30 minutes per day on most days of the week. Some examples of exercise include walking, biking, dancing, and swimming. You can also fit in more physical activity by taking the stairs instead of the elevator or parking farther away from stores. Ask your healthcare provider about the best exercise plan for you.      © Copyright [employer]  2018 Information is for End User's use only and may not be sold, redistributed or otherwise used for commercial purposes. All illustrations and images included in CareNotes® are the copyrighted property of [employer] or [employer]      Medicare Preventive Visit Patient Instructions  Thank you for completing your Welcome to Medicare Visit or Medicare Annual Wellness Visit today. Your next wellness visit will be due in one year (10/29/2025).  The screening/preventive services that you may require over the next 5-10 years are detailed below. Some tests may not apply to you based off risk factors and/or age. Screening tests ordered at today's visit but not completed yet may show as past due. Also, please note that scanned in results may not display below.  Preventive Screenings:  Service Recommendations Previous Testing/Comments   Colorectal Cancer Screening  * Colonoscopy    * Fecal Occult Blood Test (FOBT)/Fecal Immunochemical Test (FIT)  * Fecal DNA/Cologuard Test  * Flexible Sigmoidoscopy Age: 45-75 years old   Colonoscopy: every 10 years (may be performed more frequently if at higher risk)  OR  FOBT/FIT: every 1 year  OR  Cologuard: every 3 years  OR  Sigmoidoscopy: every 5 years  Screening may be recommended earlier than age 45 if at higher risk for colorectal cancer. Also, an individualized decision between you and your healthcare provider will decide whether screening between the ages of 76-85 would be appropriate. Colonoscopy: 10/29/2019  FOBT/FIT: Not on file  Cologuard: Not on file  Sigmoidoscopy: Not on file    Screening Current     Breast Cancer Screening Age: 40+ years old  Frequency: every 1-2 years  Not required if history of left and right mastectomy Mammogram: 07/28/2021        Cervical Cancer Screening Between the ages of 21-29, pap smear recommended once every 3 years.   Between the ages of 30-65, can perform pap smear with HPV co-testing every 5 years.   Recommendations may differ for  women with a history of total hysterectomy, cervical cancer, or abnormal pap smears in past. Pap Smear: 10/03/2022    Screening Not Indicated   Hepatitis C Screening Once for adults born between 1945 and 1965  More frequently in patients at high risk for Hepatitis C Hep C Antibody: 03/24/2020    Screening Current   Diabetes Screening 1-2 times per year if you're at risk for diabetes or have pre-diabetes Fasting glucose: 92 mg/dL (10/31/2023)  A1C: 6.0 % (4/10/2024)  Screening Not Indicated  History Diabetes   Cholesterol Screening Once every 5 years if you don't have a lipid disorder. May order more often based on risk factors. Lipid panel: 04/10/2024    Screening Not Indicated  History Lipid Disorder     Other Preventive Screenings Covered by Medicare:  Abdominal Aortic Aneurysm (AAA) Screening: covered once if your at risk. You're considered to be at risk if you have a family history of AAA.  Lung Cancer Screening: covers low dose CT scan once per year if you meet all of the following conditions: (1) Age 55-77; (2) No signs or symptoms of lung cancer; (3) Current smoker or have quit smoking within the last 15 years; (4) You have a tobacco smoking history of at least 20 pack years (packs per day multiplied by number of years you smoked); (5) You get a written order from a healthcare provider.  Glaucoma Screening: covered annually if you're considered high risk: (1) You have diabetes OR (2) Family history of glaucoma OR (3)  aged 50 and older OR (4)  American aged 65 and older  Osteoporosis Screening: covered every 2 years if you meet one of the following conditions: (1) You're estrogen deficient and at risk for osteoporosis based off medical history and other findings; (2) Have a vertebral abnormality; (3) On glucocorticoid therapy for more than 3 months; (4) Have primary hyperparathyroidism; (5) On osteoporosis medications and need to assess response to drug therapy.   Last bone density  test (DXA Scan): Not on file.  HIV Screening: covered annually if you're between the age of 15-65. Also covered annually if you are younger than 15 and older than 65 with risk factors for HIV infection. For pregnant patients, it is covered up to 3 times per pregnancy.    Immunizations:  Immunization Recommendations   Influenza Vaccine Annual influenza vaccination during flu season is recommended for all persons aged >= 6 months who do not have contraindications   Pneumococcal Vaccine   * Pneumococcal conjugate vaccine = PCV13 (Prevnar 13), PCV15 (Vaxneuvance), PCV20 (Prevnar 20)  * Pneumococcal polysaccharide vaccine = PPSV23 (Pneumovax) Adults 19-65 yo with certain risk factors or if 65+ yo  If never received any pneumonia vaccine: recommend Prevnar 20 (PCV20)  Give PCV20 if previously received 1 dose of PCV13 or PPSV23   Hepatitis B Vaccine 3 dose series if at intermediate or high risk (ex: diabetes, end stage renal disease, liver disease)   Respiratory syncytial virus (RSV) Vaccine - COVERED BY MEDICARE PART D  * RSVPreF3 (Arexvy) CDC recommends that adults 60 years of age and older may receive a single dose of RSV vaccine using shared clinical decision-making (SCDM)   Tetanus (Td) Vaccine - COST NOT COVERED BY MEDICARE PART B Following completion of primary series, a booster dose should be given every 10 years to maintain immunity against tetanus. Td may also be given as tetanus wound prophylaxis.   Tdap Vaccine - COST NOT COVERED BY MEDICARE PART B Recommended at least once for all adults. For pregnant patients, recommended with each pregnancy.   Shingles Vaccine (Shingrix) - COST NOT COVERED BY MEDICARE PART B  2 shot series recommended in those 19 years and older who have or will have weakened immune systems or those 50 years and older     Health Maintenance Due:      Topic Date Due   • Breast Cancer Screening: Mammogram  07/28/2022   • Colorectal Cancer Screening  10/29/2024   • Hepatitis C Screening   Completed   • Cervical Cancer Screening  Discontinued     Immunizations Due:      Topic Date Due   • DTaP,Tdap,and Td Vaccines (1 - Tdap) Never done   • Influenza Vaccine (1) 09/01/2024   • COVID-19 Vaccine (1 - 2023-24 season) Never done     Advance Directives   What are advance directives?  Advance directives are legal documents that state your wishes and plans for medical care. These plans are made ahead of time in case you lose your ability to make decisions for yourself. Advance directives can apply to any medical decision, such as the treatments you want, and if you want to donate organs.   What are the types of advance directives?  There are many types of advance directives, and each state has rules about how to use them. You may choose a combination of any of the following:  Living will:  This is a written record of the treatment you want. You can also choose which treatments you do not want, which to limit, and which to stop at a certain time. This includes surgery, medicine, IV fluid, and tube feedings.   Durable power of  for healthcare (DPAHC):  This is a written record that states who you want to make healthcare choices for you when you are unable to make them for yourself. This person, called a proxy, is usually a family member or a friend. You may choose more than 1 proxy.  Do not resuscitate (DNR) order:  A DNR order is used in case your heart stops beating or you stop breathing. It is a request not to have certain forms of treatment, such as CPR. A DNR order may be included in other types of advance directives.  Medical directive:  This covers the care that you want if you are in a coma, near death, or unable to make decisions for yourself. You can list the treatments you want for each condition. Treatment may include pain medicine, surgery, blood transfusions, dialysis, IV or tube feedings, and a ventilator (breathing machine).  Values history:  This document has questions about your views,  beliefs, and how you feel and think about life. This information can help others choose the care that you would choose.  Why are advance directives important?  An advance directive helps you control your care. Although spoken wishes may be used, it is better to have your wishes written down. Spoken wishes can be misunderstood, or not followed. Treatments may be given even if you do not want them. An advance directive may make it easier for your family to make difficult choices about your care.   Fall Prevention    Fall prevention  includes ways to make your home and other areas safer. It also includes ways you can move more carefully to prevent a fall. Health conditions that cause changes in your blood pressure, vision, or muscle strength and coordination may increase your risk for falls. Medicines may also increase your risk for falls if they make you dizzy, weak, or sleepy.   Fall prevention tips:   Stand or sit up slowly.    Use assistive devices as directed.    Wear shoes that fit well and have soles that .    Wear a personal alarm.    Stay active.    Manage your medical conditions.    Home Safety Tips:  Add items to prevent falls in the bathroom.    Keep paths clear.    Install bright lights in your home.    Keep items you use often on shelves within reach.    Paint or place reflective tape on the edges of your stairs.    Weight Management   Why it is important to manage your weight:  Being overweight increases your risk of health conditions such as heart disease, high blood pressure, type 2 diabetes, and certain types of cancer. It can also increase your risk for osteoarthritis, sleep apnea, and other respiratory problems. Aim for a slow, steady weight loss. Even a small amount of weight loss can lower your risk of health problems.  How to lose weight safely:  A safe and healthy way to lose weight is to eat fewer calories and get regular exercise. You can lose up about 1 pound a week by decreasing the  number of calories you eat by 500 calories each day.   Healthy meal plan for weight management:  A healthy meal plan includes a variety of foods, contains fewer calories, and helps you stay healthy. A healthy meal plan includes the following:  Eat whole-grain foods more often.  A healthy meal plan should contain fiber. Fiber is the part of grains, fruits, and vegetables that is not broken down by your body. Whole-grain foods are healthy and provide extra fiber in your diet. Some examples of whole-grain foods are whole-wheat breads and pastas, oatmeal, brown rice, and bulgur.  Eat a variety of vegetables every day.  Include dark, leafy greens such as spinach, kale, desiree greens, and mustard greens. Eat yellow and orange vegetables such as carrots, sweet potatoes, and winter squash.   Eat a variety of fruits every day.  Choose fresh or canned fruit (canned in its own juice or light syrup) instead of juice. Fruit juice has very little or no fiber.  Eat low-fat dairy foods.  Drink fat-free (skim) milk or 1% milk. Eat fat-free yogurt and low-fat cottage cheese. Try low-fat cheeses such as mozzarella and other reduced-fat cheeses.  Choose meat and other protein foods that are low in fat.  Choose beans or other legumes such as split peas or lentils. Choose fish, skinless poultry (chicken or turkey), or lean cuts of red meat (beef or pork). Before you cook meat or poultry, cut off any visible fat.   Use less fat and oil.  Try baking foods instead of frying them. Add less fat, such as margarine, sour cream, regular salad dressing and mayonnaise to foods. Eat fewer high-fat foods. Some examples of high-fat foods include french fries, doughnuts, ice cream, and cakes.  Eat fewer sweets.  Limit foods and drinks that are high in sugar. This includes candy, cookies, regular soda, and sweetened drinks.  Exercise:  Exercise at least 30 minutes per day on most days of the week. Some examples of exercise include walking, biking,  dancing, and swimming. You can also fit in more physical activity by taking the stairs instead of the elevator or parking farther away from stores. Ask your healthcare provider about the best exercise plan for you.      © Copyright [employer] 2018 Information is for End User's use only and may not be sold, redistributed or otherwise used for commercial purposes. All illustrations and images included in CareNotes® are the copyrighted property of [employer] or [employer]

## 2024-10-28 NOTE — PROGRESS NOTES
Ambulatory Visit  Name: Erin Arroyo      : 1955      MRN: [id number]  Encounter Provider: Laureano Macias DO  Encounter Date: 10/28/2024   Encounter department: [facility]    Assessment & Plan  Chronic fatigue         Essential hypertension         Anemia of chronic disease           Falls Plan of Care: balance, strength, and gait training instructions were provided and referral to physical therapy. Recommended assistive device to help with gait and balance.     Preventive health issues were discussed with patient, and age appropriate screening tests were ordered as noted in patient's After Visit Summary. Personalized health advice and appropriate referrals for health education or preventive services given if needed, as noted in patient's After Visit Summary.    History of Present Illness     Medicare subsequent well visit     Patient Care Team:  Laureano Macias DO as PCP - General (Family Medicine)  Laureano Macias DO as PCP - PCP-[facility] ([facility])  DO Kerline Cordova DO Richard Kolecki, MD Remy Wilson Mimms, DO (Endocrinology)  Mary Thakur PA-C as Physician Assistant (Physician Assistant)  JUAN Doss as Nurse Practitioner (Nephrology)  Luan Cassidy PA-C as Medical Student (Nephrology)    Review of Systems   Constitutional:  Positive for activity change, appetite change and fatigue. Negative for diaphoresis and fever.   HENT: Negative.  Negative for dental problem.    Eyes:  Positive for visual disturbance.   Respiratory:  Negative for apnea, cough, chest tightness, shortness of breath and wheezing.    Cardiovascular:  Positive for leg swelling. Negative for chest pain and palpitations.   Gastrointestinal:  Negative for abdominal distention, abdominal pain, anal bleeding, constipation, diarrhea, nausea and vomiting.   Endocrine: Negative for cold intolerance, heat intolerance, polydipsia, polyphagia and polyuria.        Blood sugars variable    Genitourinary:  Negative for difficulty urinating, dysuria, flank pain, hematuria and urgency.   Musculoskeletal:  Positive for arthralgias. Negative for back pain, gait problem, joint swelling and myalgias.   Skin:  Negative for color change, rash and wound.   Allergic/Immunologic: Negative for environmental allergies, food allergies and immunocompromised state.   Neurological:  Positive for headaches. Negative for dizziness, seizures, syncope, speech difficulty and numbness.        Hx of stroke with speech  with right hemiparesis near full recovery after rehab and stroke rehabilitation    Sense of taste is altered since having stroke   Hematological:  Negative for adenopathy. Does not bruise/bleed easily.   Psychiatric/Behavioral:  Negative for agitation, behavioral problems, hallucinations, sleep disturbance and suicidal ideas.      Medical History Reviewed by provider this encounter:       Annual Wellness Visit Questionnaire   Erin is here for her Subsequent Wellness visit.     Health Risk Assessment:   Patient rates overall health as fair. Patient feels that their physical health rating is slightly better. Patient is satisfied with their life. Eyesight was rated as slightly worse. Hearing was rated as same. Patient feels that their emotional and mental health rating is slightly better. Patients states they are never, rarely angry. Patient states they are never, rarely unusually tired/fatigued. Pain experienced in the last 7 days has been some. Patient's pain rating has been 7/10. Patient states that she has experienced no weight loss or gain in last 6 months.     Fall Risk Screening:   In the past year, patient has experienced: history of falling in past year    Number of falls: 1  Injured during fall?: Yes    Feels unsteady when standing or walking?: Yes    Worried about falling?: Yes      Urinary Incontinence Screening:   Patient has not leaked urine accidently in the last six months.     Home  Safety:  Patient does not have trouble with stairs inside or outside of their home. Patient has working smoke alarms and has working carbon monoxide detector. Home safety hazards include: none.     Nutrition:   Current diet is Regular and Limited junk food.     Medications:   Patient is currently taking over-the-counter supplements. OTC medications include: see medication list. Patient is able to manage medications.     Activities of Daily Living (ADLs)/Instrumental Activities of Daily Living (IADLs):   Walk and transfer into and out of bed and chair?: Yes  Dress and groom yourself?: Yes    Bathe or shower yourself?: Yes    Feed yourself? Yes  Do your laundry/housekeeping?: Yes  Manage your money, pay your bills and track your expenses?: Yes  Make your own meals?: Yes    Do your own shopping?: Yes    Previous Hospitalizations:   Any hospitalizations or ED visits within the last 12 months?: Yes    How many hospitalizations have you had in the last year?: 1-2    Advance Care Planning:   Living will: No    Durable POA for healthcare: No    Advanced directive: No    Advanced directive counseling given: No    Five wishes given: No    Patient declined ACP directive: No    End of Life Decisions reviewed with patient: Yes    Provider agrees with end of life decisions: Yes      Cognitive Screening:   Provider or family/friend/caregiver concerned regarding cognition?: No    PREVENTIVE SCREENINGS      Cardiovascular Screening:    General: Screening Not Indicated and History Lipid Disorder      Diabetes Screening:     General: Screening Not Indicated and History Diabetes      Colorectal Cancer Screening:     General: Screening Current      Cervical Cancer Screening:    General: Screening Not Indicated      Lung Cancer Screening:     General: Screening Not Indicated      Hepatitis C Screening:    General: Screening Current    Screening, Brief Intervention, and Referral to Treatment (SBIRT)    Screening  Typical number of drinks  "in a day: 0  Typical number of drinks in a week: 0  Interpretation: Low risk drinking behavior.    Single Item Drug Screening:  How often have you used an illegal drug (including marijuana) or a prescription medication for non-medical reasons in the past year? never    Single Item Drug Screen Score: 0  Interpretation: Negative screen for possible drug use disorder    Social Determinants of Health     Financial Resource Strain: Low Risk  (9/7/2023)    Overall Financial Resource Strain (CARDIA)     Difficulty of Paying Living Expenses: Not hard at all   Food Insecurity: No Food Insecurity (10/30/2023)    Nursing - Inadequate Food Risk Classification     Worried About Running Out of Food in the Last Year: Never true     Ran Out of Food in the Last Year: Never true   Transportation Needs: No Transportation Needs (10/30/2023)    PRAPARE - Transportation     Lack of Transportation (Medical): No     Lack of Transportation (Non-Medical): No   Housing Stability: Low Risk  (10/30/2023)    Housing Stability Vital Sign     Unable to Pay for Housing in the Last Year: No     Number of Times Moved in the Last Year: 1     Homeless in the Last Year: No    Received from [employer]     No results found.    Objective     BP (!) 176/80 (BP Location: Left arm, Patient Position: Sitting, Cuff Size: Standard)   Pulse 62   Temp (!) 97.1 °F (36.2 °C) (Temporal)   Ht 5' 6\" (1.676 m)   Wt 94.3 kg (208 lb)   SpO2 96%   BMI 33.57 kg/m²     Physical Exam  Constitutional:       Appearance: She is well-developed.   HENT:      Head: Normocephalic and atraumatic.      Right Ear: External ear normal.      Left Ear: External ear normal.      Nose: Nose normal.   Eyes:      Conjunctiva/sclera: Conjunctivae normal.      Pupils: Pupils are equal, round, and reactive to light.   Cardiovascular:      Rate and Rhythm: Normal rate and regular rhythm.      Heart sounds: Normal heart sounds. No murmur heard.     No friction rub.   Pulmonary: "      Effort: Pulmonary effort is normal. No respiratory distress.      Breath sounds: Normal breath sounds. No wheezing or rales.   Chest:      Chest wall: No tenderness.   Abdominal:      General: Bowel sounds are normal.      Palpations: Abdomen is soft.   Musculoskeletal:         General: Normal range of motion.      Cervical back: Normal range of motion and neck supple.   Skin:     General: Skin is warm and dry.      Capillary Refill: Capillary refill takes 2 to 3 seconds.   Neurological:      Mental Status: She is alert and oriented to person, place, and time.      Gait: Gait abnormal.   Psychiatric:         Behavior: Behavior normal.         Thought Content: Thought content normal.         Judgment: Judgment normal.

## 2024-10-28 NOTE — PROGRESS NOTES
Ambulatory Visit  Name: Erin Arroyo      : 1955      MRN: [id number]  Encounter Provider: Laureano Macias DO  Encounter Date: 10/28/2024   Encounter department: [facility]    Assessment & Plan  Chronic fatigue    Orders:  •  TSH, 3rd generation; Future  •  Comprehensive metabolic panel      Essential hypertension    Orders:  •  Comprehensive metabolic panel      Anemia of chronic disease    Orders:  •  CBC and differential; Future      Left groin pain    Orders:  •  XR hip/pelv 4+ vw left if performed; Future         Preventive health issues were discussed with patient, and age appropriate screening tests were ordered as noted in patient's After Visit Summary. Personalized health advice and appropriate referrals for health education or preventive services given if needed, as noted in patient's After Visit Summary.    History of Present Illness     HPI   Patient Care Team:  Laureano Macias DO as PCP - General (Family Medicine)  Laureano Macias DO as PCP - PCP-[facility] ([facility])  DO Kerline Cordova DO Richard Kolecki, MD Remy Wilson Mimms, DO (Endocrinology)  Mary Thakur PA-C as Physician Assistant (Physician Assistant)  JUAN Doss as Nurse Practitioner (Nephrology)  Luan Cassidy PA-C as Medical Student (Nephrology)    Review of Systems  Medical History Reviewed by provider this encounter:  Tobacco  Allergies  Meds  Problems  Med Hx  Surg Hx  Fam Hx       Annual Wellness Visit Questionnaire   Annual Wellness Visit  Social Determinants of Health     Financial Resource Strain: Low Risk  (2023)    Overall Financial Resource Strain (CARDIA)    • Difficulty of Paying Living Expenses: Not hard at all   Food Insecurity: No Food Insecurity (10/28/2024)    Hunger Vital Sign    • Worried About Running Out of Food in the Last Year: Never true    • Ran Out of Food in the Last Year: Never true   Transportation Needs: No Transportation Needs (10/28/2024)  "   PRAPARE - Transportation    • Lack of Transportation (Medical): No    • Lack of Transportation (Non-Medical): No   Housing Stability: Low Risk  (10/28/2024)    Housing Stability Vital Sign    • Unable to Pay for Housing in the Last Year: No    • Number of Times Moved in the Last Year: 1    • Homeless in the Last Year: No   Utilities: Not At Risk (10/28/2024)    [facility] Utilities    • Threatened with loss of utilities: No     No results found.    Objective     BP (!) 176/80 (BP Location: Left arm, Patient Position: Sitting, Cuff Size: Standard)   Pulse 62   Temp (!) 97.1 °F (36.2 °C) (Temporal)   Ht 5' 6\" (1.676 m)   Wt 94.3 kg (208 lb)   SpO2 96%   BMI 33.57 kg/m²     Physical Exam    "

## 2024-10-28 NOTE — RESULT ENCOUNTER NOTE
Please call the patient regarding her abnormal result.  Arthritis of both hips mild to moderate in severity

## 2024-10-29 DIAGNOSIS — E11.22 TYPE 2 DIABETES MELLITUS WITH STAGE 3B CHRONIC KIDNEY DISEASE, WITH LONG-TERM CURRENT USE OF INSULIN (HCC): ICD-10-CM

## 2024-10-29 DIAGNOSIS — I10 HYPERTENSION: ICD-10-CM

## 2024-10-29 DIAGNOSIS — N18.32 TYPE 2 DIABETES MELLITUS WITH STAGE 3B CHRONIC KIDNEY DISEASE, WITH LONG-TERM CURRENT USE OF INSULIN (HCC): ICD-10-CM

## 2024-10-29 DIAGNOSIS — I10 ESSENTIAL HYPERTENSION: ICD-10-CM

## 2024-10-29 DIAGNOSIS — I16.0 HYPERTENSIVE URGENCY: ICD-10-CM

## 2024-10-29 DIAGNOSIS — E78.5 DYSLIPIDEMIA: ICD-10-CM

## 2024-10-29 DIAGNOSIS — K21.9 GASTRO-ESOPHAGEAL REFLUX DISEASE WITHOUT ESOPHAGITIS: Primary | ICD-10-CM

## 2024-10-29 DIAGNOSIS — Z79.4 TYPE 2 DIABETES MELLITUS WITH STAGE 3B CHRONIC KIDNEY DISEASE, WITH LONG-TERM CURRENT USE OF INSULIN (HCC): ICD-10-CM

## 2024-10-29 LAB
ALBUMIN SERPL ELPH-MCNC: 3.56 G/DL (ref 3.2–5.1)
ALBUMIN SERPL ELPH-MCNC: 52.3 % (ref 48–70)
ALBUMIN UR ELPH-MCNC: 81.8 %
ALPHA1 GLOB MFR UR ELPH: 1.3 %
ALPHA1 GLOB SERPL ELPH-MCNC: 0.25 G/DL (ref 0.15–0.47)
ALPHA1 GLOB SERPL ELPH-MCNC: 3.7 % (ref 1.8–7)
ALPHA2 GLOB MFR UR ELPH: 3 %
ALPHA2 GLOB SERPL ELPH-MCNC: 0.8 G/DL (ref 0.42–1.04)
ALPHA2 GLOB SERPL ELPH-MCNC: 11.7 % (ref 5.9–14.9)
B-GLOBULIN MFR UR ELPH: 4.4 %
BETA GLOB ABNORMAL SERPL ELPH-MCNC: 0.39 G/DL (ref 0.31–0.57)
BETA1 GLOB SERPL ELPH-MCNC: 5.8 % (ref 4.7–7.7)
BETA2 GLOB SERPL ELPH-MCNC: 6.2 % (ref 3.1–7.9)
BETA2+GAMMA GLOB SERPL ELPH-MCNC: 0.42 G/DL (ref 0.2–0.58)
GAMMA GLOB ABNORMAL SERPL ELPH-MCNC: 1.38 G/DL (ref 0.4–1.66)
GAMMA GLOB MFR UR ELPH: 9.5 %
GAMMA GLOB SERPL ELPH-MCNC: 20.3 % (ref 6.9–22.3)
IGG/ALB SER: 1.1 {RATIO} (ref 1.1–1.8)
INTERPRETATION UR IFE-IMP: NORMAL
PROT PATTERN SERPL ELPH-IMP: NORMAL
PROT PATTERN UR ELPH-IMP: NORMAL
PROT SERPL-MCNC: 6.8 G/DL (ref 6.4–8.2)
PROT UR-MCNC: 312.5 MG/DL

## 2024-10-29 PROCEDURE — 84165 PROTEIN E-PHORESIS SERUM: CPT | Performed by: PATHOLOGY

## 2024-10-29 PROCEDURE — 84166 PROTEIN E-PHORESIS/URINE/CSF: CPT | Performed by: PATHOLOGY

## 2024-10-29 PROCEDURE — 86334 IMMUNOFIX E-PHORESIS SERUM: CPT | Performed by: PATHOLOGY

## 2024-10-29 RX ORDER — CARVEDILOL 6.25 MG/1
6.25 TABLET ORAL 2 TIMES DAILY WITH MEALS
Status: CANCELLED | OUTPATIENT
Start: 2024-10-29

## 2024-10-29 NOTE — RESULT ENCOUNTER NOTE
Call patient to notify normal results patient's labs are very good this is the best I have seen her labs in years which means she needs to follow the same diet she was on in the rehab center and she absolutely needs to stay on her medications make sure that we have a home health nurse going in to check on her medications her supplies try to set her up for automatic refill from the drugstore

## 2024-10-29 NOTE — TELEPHONE ENCOUNTER
Please see phone call   Pt states she was started on escitalopram 5 mg daily and carvedilol 12.5 mg 2 times daily

## 2024-10-30 DIAGNOSIS — I50.9 ACUTE ON CHRONIC CONGESTIVE HEART FAILURE, UNSPECIFIED HEART FAILURE TYPE (HCC): Primary | ICD-10-CM

## 2024-10-30 DIAGNOSIS — F32.A DEPRESSION, UNSPECIFIED DEPRESSION TYPE: Primary | ICD-10-CM

## 2024-10-30 RX ORDER — VALSARTAN 160 MG/1
320 TABLET ORAL DAILY
Qty: 60 TABLET | Refills: 5 | Status: SHIPPED | OUTPATIENT
Start: 2024-10-30

## 2024-10-30 RX ORDER — HYDRALAZINE HYDROCHLORIDE 50 MG/1
50 TABLET, FILM COATED ORAL 3 TIMES DAILY
Qty: 90 TABLET | Refills: 3 | Status: SHIPPED | OUTPATIENT
Start: 2024-10-30

## 2024-10-30 RX ORDER — PANTOPRAZOLE SODIUM 40 MG/1
40 TABLET, DELAYED RELEASE ORAL
Qty: 30 TABLET | Refills: 5 | Status: SHIPPED | OUTPATIENT
Start: 2024-10-30

## 2024-10-30 RX ORDER — ESCITALOPRAM OXALATE 5 MG/1
5 TABLET ORAL DAILY
Qty: 30 TABLET | Refills: 5 | Status: SHIPPED | OUTPATIENT
Start: 2024-10-30

## 2024-10-30 RX ORDER — DAPAGLIFLOZIN 10 MG/1
10 TABLET, FILM COATED ORAL DAILY
Qty: 90 TABLET | Refills: 3 | Status: SHIPPED | OUTPATIENT
Start: 2024-10-30

## 2024-10-30 RX ORDER — ATORVASTATIN CALCIUM 80 MG/1
80 TABLET, FILM COATED ORAL
Qty: 90 TABLET | Refills: 3 | Status: SHIPPED | OUTPATIENT
Start: 2024-10-30

## 2024-10-30 RX ORDER — CARVEDILOL 6.25 MG/1
6.25 TABLET ORAL 2 TIMES DAILY WITH MEALS
Qty: 60 TABLET | Refills: 5 | Status: SHIPPED | OUTPATIENT
Start: 2024-10-30

## 2024-10-30 RX ORDER — AMLODIPINE BESYLATE 5 MG/1
5 TABLET ORAL DAILY
Qty: 90 TABLET | Refills: 3 | Status: SHIPPED | OUTPATIENT
Start: 2024-10-30

## 2024-10-30 RX ORDER — SITAGLIPTIN 50 MG/1
50 TABLET, FILM COATED ORAL DAILY
Qty: 30 TABLET | Refills: 5 | Status: SHIPPED | OUTPATIENT
Start: 2024-10-30

## 2024-10-30 RX ORDER — DOXAZOSIN 2 MG/1
2 TABLET ORAL DAILY
Qty: 30 TABLET | Refills: 0 | Status: SHIPPED | OUTPATIENT
Start: 2024-10-30

## 2024-10-31 NOTE — TELEPHONE ENCOUNTER
[facility] called requesting a refill of all patients medications be sent to [facility] for a 100 day supply.  Made them aware the prescriptions have been recently been refilled at the [facility] pharmacy on 10/29/24.    They will reach out to patient and pharmacy

## 2024-11-13 DIAGNOSIS — I25.10 CORONARY ARTERY DISEASE INVOLVING NATIVE CORONARY ARTERY OF NATIVE HEART: ICD-10-CM

## 2024-11-13 RX ORDER — ISOSORBIDE MONONITRATE 60 MG/1
60 TABLET, EXTENDED RELEASE ORAL DAILY
Qty: 90 TABLET | Refills: 1 | Status: SHIPPED | OUTPATIENT
Start: 2024-11-13

## 2024-11-13 NOTE — TELEPHONE ENCOUNTER
Reason for call:   [x] Refill   [] Prior Auth  [] Other:     Office:   [x] PCP/Provider -   [] Specialty/Provider -     Medication: isosorbide mononitrate (IMDUR) 60 mg 24 hr tablet     Dose/Frequency: 1 tablet daily    Quantity: 90    Pharmacy: [facility]     Does the patient have enough for 3 days?   [] Yes   [x] No - Send as HP to POD

## 2024-12-05 DIAGNOSIS — E11.42 DIABETIC POLYNEUROPATHY ASSOCIATED WITH TYPE 2 DIABETES MELLITUS (HCC): ICD-10-CM

## 2024-12-05 RX ORDER — GABAPENTIN 300 MG/1
300 CAPSULE ORAL 3 TIMES DAILY
Qty: 270 CAPSULE | Refills: 0 | Status: SHIPPED | OUTPATIENT
Start: 2024-12-05

## 2024-12-05 NOTE — TELEPHONE ENCOUNTER
Reason for call:   [x] Refill   [] Prior Auth  [x] Other: medication prescribed in the hospital     Office:   [x] PCP/Provider - [facility]   [] Specialty/Provider -     Medication: gabapentin (NEURONTIN) 300 mg capsule     Dose/Frequency: 300 mg, 3 times daily     Quantity: 270    Pharmacy: [facility]    Does the patient have enough for 3 days?   [] Yes   [x] No - Send as HP to POD

## 2024-12-10 DIAGNOSIS — I16.0 HYPERTENSIVE URGENCY: ICD-10-CM

## 2024-12-11 RX ORDER — DOXAZOSIN 2 MG/1
2 TABLET ORAL DAILY
Qty: 30 TABLET | Refills: 5 | Status: SHIPPED | OUTPATIENT
Start: 2024-12-11

## 2025-01-08 ENCOUNTER — APPOINTMENT (EMERGENCY)
Dept: CT IMAGING | Facility: HOSPITAL | Age: 70
End: 2025-01-08
Payer: COMMERCIAL

## 2025-01-08 ENCOUNTER — HOSPITAL ENCOUNTER (EMERGENCY)
Facility: HOSPITAL | Age: 70
Discharge: HOME | End: 2025-01-08
Attending: EMERGENCY MEDICINE
Payer: COMMERCIAL

## 2025-01-08 VITALS
SYSTOLIC BLOOD PRESSURE: 137 MMHG | RESPIRATION RATE: 16 BRPM | TEMPERATURE: 97.5 F | DIASTOLIC BLOOD PRESSURE: 71 MMHG | OXYGEN SATURATION: 97 % | BODY MASS INDEX: 33.73 KG/M2 | HEART RATE: 62 BPM | WEIGHT: 209 LBS

## 2025-01-08 DIAGNOSIS — K04.7 DENTAL INFECTION: ICD-10-CM

## 2025-01-08 DIAGNOSIS — R07.9 CHEST PAIN: Primary | ICD-10-CM

## 2025-01-08 LAB
2HR DELTA HS TROPONIN: -1 NG/L
ALBUMIN SERPL BCG-MCNC: 3.3 G/DL (ref 3.5–5)
ALP SERPL-CCNC: 77 U/L (ref 34–104)
ALT SERPL W P-5'-P-CCNC: 9 U/L (ref 7–52)
ANION GAP SERPL CALCULATED.3IONS-SCNC: 4 MMOL/L (ref 4–13)
APTT PPP: 25 SECONDS (ref 23–34)
AST SERPL W P-5'-P-CCNC: 14 U/L (ref 13–39)
BASOPHILS # BLD AUTO: 0.01 THOUSANDS/ΜL (ref 0–0.1)
BASOPHILS NFR BLD AUTO: 0 % (ref 0–1)
BILIRUB SERPL-MCNC: 0.47 MG/DL (ref 0.2–1)
BNP SERPL-MCNC: 166 PG/ML (ref 0–100)
BUN SERPL-MCNC: 20 MG/DL (ref 5–25)
CALCIUM ALBUM COR SERPL-MCNC: 9.2 MG/DL (ref 8.3–10.1)
CALCIUM SERPL-MCNC: 8.6 MG/DL (ref 8.4–10.2)
CARDIAC TROPONIN I PNL SERPL HS: 5 NG/L (ref ?–50)
CARDIAC TROPONIN I PNL SERPL HS: 6 NG/L (ref ?–50)
CHLORIDE SERPL-SCNC: 108 MMOL/L (ref 96–108)
CO2 SERPL-SCNC: 28 MMOL/L (ref 21–32)
CREAT SERPL-MCNC: 1.48 MG/DL (ref 0.6–1.3)
D DIMER PPP FEU-MCNC: 2.33 UG/ML FEU
EOSINOPHIL # BLD AUTO: 0.03 THOUSAND/ΜL (ref 0–0.61)
EOSINOPHIL NFR BLD AUTO: 1 % (ref 0–6)
ERYTHROCYTE [DISTWIDTH] IN BLOOD BY AUTOMATED COUNT: 15.9 % (ref 11.6–15.1)
GFR SERPL CREATININE-BSD FRML MDRD: 35 ML/MIN/1.73SQ M
GLUCOSE SERPL-MCNC: 119 MG/DL (ref 65–140)
GLUCOSE SERPL-MCNC: 142 MG/DL (ref 65–140)
HCT VFR BLD AUTO: 34.2 % (ref 34.8–46.1)
HGB BLD-MCNC: 11 G/DL (ref 11.5–15.4)
IMM GRANULOCYTES # BLD AUTO: 0.02 THOUSAND/UL (ref 0–0.2)
IMM GRANULOCYTES NFR BLD AUTO: 0 % (ref 0–2)
INR PPP: 1.11 (ref 0.85–1.19)
LYMPHOCYTES # BLD AUTO: 1.31 THOUSANDS/ΜL (ref 0.6–4.47)
LYMPHOCYTES NFR BLD AUTO: 21 % (ref 14–44)
MAGNESIUM SERPL-MCNC: 1.8 MG/DL (ref 1.9–2.7)
MCH RBC QN AUTO: 26.9 PG (ref 26.8–34.3)
MCHC RBC AUTO-ENTMCNC: 32.2 G/DL (ref 31.4–37.4)
MCV RBC AUTO: 84 FL (ref 82–98)
MONOCYTES # BLD AUTO: 0.44 THOUSAND/ΜL (ref 0.17–1.22)
MONOCYTES NFR BLD AUTO: 7 % (ref 4–12)
NEUTROPHILS # BLD AUTO: 4.35 THOUSANDS/ΜL (ref 1.85–7.62)
NEUTS SEG NFR BLD AUTO: 71 % (ref 43–75)
NRBC BLD AUTO-RTO: 0 /100 WBCS
PLATELET # BLD AUTO: 174 THOUSANDS/UL (ref 149–390)
PMV BLD AUTO: 10.2 FL (ref 8.9–12.7)
POTASSIUM SERPL-SCNC: 4.1 MMOL/L (ref 3.5–5.3)
PROT SERPL-MCNC: 6.5 G/DL (ref 6.4–8.4)
PROTHROMBIN TIME: 14.7 SECONDS (ref 12.3–15)
RBC # BLD AUTO: 4.09 MILLION/UL (ref 3.81–5.12)
SODIUM SERPL-SCNC: 140 MMOL/L (ref 135–147)
WBC # BLD AUTO: 6.16 THOUSAND/UL (ref 4.31–10.16)

## 2025-01-08 PROCEDURE — 85730 THROMBOPLASTIN TIME PARTIAL: CPT | Performed by: PHYSICIAN ASSISTANT

## 2025-01-08 PROCEDURE — 85025 COMPLETE CBC W/AUTO DIFF WBC: CPT | Performed by: PHYSICIAN ASSISTANT

## 2025-01-08 PROCEDURE — 80053 COMPREHEN METABOLIC PANEL: CPT | Performed by: PHYSICIAN ASSISTANT

## 2025-01-08 PROCEDURE — 99285 EMERGENCY DEPT VISIT HI MDM: CPT | Performed by: PHYSICIAN ASSISTANT

## 2025-01-08 PROCEDURE — 36415 COLL VENOUS BLD VENIPUNCTURE: CPT | Performed by: PHYSICIAN ASSISTANT

## 2025-01-08 PROCEDURE — 84484 ASSAY OF TROPONIN QUANT: CPT | Performed by: PHYSICIAN ASSISTANT

## 2025-01-08 PROCEDURE — 96365 THER/PROPH/DIAG IV INF INIT: CPT

## 2025-01-08 PROCEDURE — 99285 EMERGENCY DEPT VISIT HI MDM: CPT

## 2025-01-08 PROCEDURE — 82948 REAGENT STRIP/BLOOD GLUCOSE: CPT

## 2025-01-08 PROCEDURE — 71275 CT ANGIOGRAPHY CHEST: CPT

## 2025-01-08 PROCEDURE — 85379 FIBRIN DEGRADATION QUANT: CPT | Performed by: PHYSICIAN ASSISTANT

## 2025-01-08 PROCEDURE — 85610 PROTHROMBIN TIME: CPT | Performed by: PHYSICIAN ASSISTANT

## 2025-01-08 PROCEDURE — 83880 ASSAY OF NATRIURETIC PEPTIDE: CPT | Performed by: PHYSICIAN ASSISTANT

## 2025-01-08 PROCEDURE — 83735 ASSAY OF MAGNESIUM: CPT | Performed by: PHYSICIAN ASSISTANT

## 2025-01-08 PROCEDURE — 96367 TX/PROPH/DG ADDL SEQ IV INF: CPT

## 2025-01-08 RX ORDER — PENICILLIN V POTASSIUM 500 MG/1
500 TABLET, FILM COATED ORAL 4 TIMES DAILY
Qty: 28 TABLET | Refills: 0 | Status: SHIPPED | OUTPATIENT
Start: 2025-01-08 | End: 2025-01-15

## 2025-01-08 RX ORDER — PENICILLIN V POTASSIUM 250 MG/1
500 TABLET, FILM COATED ORAL ONCE
Status: COMPLETED | OUTPATIENT
Start: 2025-01-08 | End: 2025-01-08

## 2025-01-08 RX ORDER — ACETAMINOPHEN 10 MG/ML
1000 INJECTION, SOLUTION INTRAVENOUS ONCE
Status: COMPLETED | OUTPATIENT
Start: 2025-01-08 | End: 2025-01-08

## 2025-01-08 RX ORDER — MAGNESIUM SULFATE 1 G/100ML
1 INJECTION INTRAVENOUS ONCE
Status: COMPLETED | OUTPATIENT
Start: 2025-01-08 | End: 2025-01-08

## 2025-01-08 RX ORDER — ASPIRIN 325 MG
325 TABLET ORAL ONCE
Status: DISCONTINUED | OUTPATIENT
Start: 2025-01-08 | End: 2025-01-08

## 2025-01-08 RX ADMIN — MAGNESIUM SULFATE HEPTAHYDRATE 1 G: 1 INJECTION, SOLUTION INTRAVENOUS at 13:24

## 2025-01-08 RX ADMIN — IOHEXOL 85 ML: 350 INJECTION, SOLUTION INTRAVENOUS at 13:18

## 2025-01-08 RX ADMIN — ACETAMINOPHEN 1000 MG: 10 INJECTION, SOLUTION INTRAVENOUS at 14:06

## 2025-01-08 RX ADMIN — PENICILLIN V POTASSIUM 500 MG: 250 TABLET, FILM COATED ORAL at 14:05

## 2025-01-08 NOTE — DISCHARGE INSTRUCTIONS
Take antibiotics as prescribed for your dental infection please follow-up with dentistry.  Additionally please follow-up with cardiology and return to the emergency department any new or worsening symptoms regarding your chest pain.

## 2025-01-08 NOTE — ED PROVIDER NOTES
Time reflects when diagnosis was documented in both MDM as applicable and the Disposition within this note       Time User Action Codes Description Comment    1/8/2025  3:23 PM Saba Robb Add [R07.9] Chest pain     1/8/2025  3:23 PM Saba Robb Add [K04.7] Dental infection           ED Disposition       ED Disposition   Discharge    Condition   Stable    Date/Time   Wed Jan 8, 2025  3:23 PM    Comment   Erin Arroyo discharge to home/self care.                   Assessment & Plan       Medical Decision Making  69-year-old female presents to the emergency department for evaluation of episode of chest pain additionally reported some dental pain.  Vitals and medical record reviewed.  Patient at risk for the following but not limited to MI, PE, anxiety, musculoskeletal pain, pneumothorax, pneumonia, dental infection, dental abscess, dental caries.  Exam patient overall poor dentition was treated with antibiotics for likely dental infection has scheduled dental follow-up.  Her EKG was nonischemic, troponin within normal limits x 2, low concern for MI.  She did have elevated D-dimer therefore CTA was performed resulting in no evidence of pulmonary embolism, pneumothorax or pneumonia.  Pain was reproducible and we discussed possible musculoskeletal nature.  We did discuss follow-up with her cardiologist and family doctor as well as tricked return precautions and patient verbalized understanding.  She was clinically and hemodynamically stable for discharge    Amount and/or Complexity of Data Reviewed  Labs: ordered. Decision-making details documented in ED Course.  Radiology: ordered.    Risk  OTC drugs.  Prescription drug management.        ED Course as of 01/08/25 2257   Wed Jan 08, 2025   1236 POC Glucose(!): 142   1301 D-Dimer, Quant(!): 2.33   1306 MAGNESIUM(!): 1.8   1307 Creatinine(!): 1.48  baseline   1332 hs TnI 0hr: 6   1346 Reevaluated the patient.  Resting comfortably.  No chest pain.  States she  had forgotten to mention she does have pain in the left side of her teeth.  Reports she feels a small bubble that comes and goes on the top of her gum.  She denies any foul tasting drainage.  States she is post follow-up with dentistry next week.   1422 CTA: No pulmonary embolism or acute thoracic pathology.   1522 Delta 2hr hsTnI: -1       Medications   magnesium sulfate IVPB (premix) SOLN 1 g (0 g Intravenous Stopped 1/8/25 1408)   iohexol (OMNIPAQUE) 350 MG/ML injection (MULTI-DOSE) 85 mL (85 mL Intravenous Given 1/8/25 1318)   penicillin V potassium (VEETID) tablet 500 mg (500 mg Oral Given 1/8/25 1405)   acetaminophen (Ofirmev) injection 1,000 mg (0 mg Intravenous Stopped 1/8/25 1442)       ED Risk Strat Scores   HEART Risk Score      Flowsheet Row Most Recent Value   Heart Score Risk Calculator    History 0 Filed at: 01/08/2025 1522   ECG 0 Filed at: 01/08/2025 1522   Age 2 Filed at: 01/08/2025 1522   Risk Factors 1 Filed at: 01/08/2025 1522   Troponin 0 Filed at: 01/08/2025 1522   HEART Score 3 Filed at: 01/08/2025 1522          HEART Risk Score      Flowsheet Row Most Recent Value   Heart Score Risk Calculator    History 0 Filed at: 01/08/2025 1522   ECG 0 Filed at: 01/08/2025 1522   Age 2 Filed at: 01/08/2025 1522   Risk Factors 1 Filed at: 01/08/2025 1522   Troponin 0 Filed at: 01/08/2025 1522   HEART Score 3 Filed at: 01/08/2025 1522                            SBIRT 22yo+      Flowsheet Row Most Recent Value   Initial Alcohol Screen: US AUDIT-C     1. How often do you have a drink containing alcohol? 0 Filed at: 01/08/2025 1211   2. How many drinks containing alcohol do you have on a typical day you are drinking?  0 Filed at: 01/08/2025 1211   3b. FEMALE Any Age, or MALE 65+: How often do you have 4 or more drinks on one occassion? 0 Filed at: 01/08/2025 1211   Audit-C Score 0 Filed at: 01/08/2025 1211   TALAT: How many times in the past year have you...    Used an illegal drug or used a prescription  medication for non-medical reasons? Never Filed at: 01/08/2025 1211                            History of Present Illness       Chief Complaint   Patient presents with    Chest Pain     Patient presents to the ED with complaints of chest pain that began this morning. The patient reports that the pain began in the left side of her neck and shoulder, and then began to radiate into the left side of her chest.  administered by EMS.        Past Medical History:   Diagnosis Date    Abnormal ECG     last assessed: 5/15/17    Abnormal mammogram     last assessed: 7/11/17    Abnormal stress test     last assesed: 7/24/17    Anemia     Anxiety     Arthritis     Asthma     Back problem     Breast cyst     CAD (coronary artery disease)     Chest pain     last assessed: 7/24/17    Chronic pain disorder     Cyst of left breast     last assessed: 8/18/17    Depression     Depression     resolved: 1/2/18    Diabetes mellitus (HCC)     Hiatal hernia     last assessed: 11/7/17    Hyperlipidemia     Hypertension     Idiopathic intracranial hypertension 05/15/2017    Keloid of skin     last assessed: 11/2/16    Multiple thyroid nodules     Neuropathy     Psychiatric disorder     anxiety    Stroke (HCC)     TIA 2005    Tuberculosis     as a child       Past Surgical History:   Procedure Laterality Date    ARM WOUND REPAIR / CLOSURE      CARPAL TUNNEL RELEASE      CARPAL TUNNEL RELEASE Left     CATARACT EXTRACTION Bilateral     2015    COLONOSCOPY      CORONARY ANGIOPLASTY WITH STENT PLACEMENT  03/01/2023    at [facility]    CYST REMOVAL      right upper arm.    EYE SURGERY      cataract removaql    FL LUMBAR PUNCTURE DIAGNOSTIC  08/15/2019    AR ESOPHAGOGASTRODUODENOSCOPY TRANSORAL DIAGNOSTIC N/A 01/05/2018    Procedure: ESOPHAGOGASTRODUODENOSCOPY (EGD);  Surgeon: Kerline Coley DO;  Location: [facility];  Service: Gastroenterology    AR NDSC WRST SURG W/RLS TRANSVRS CARPL LIGM Left 04/19/2019    Procedure:  ENDOSCOPIC CARPAL TUNNEL RELEASE;  Surgeon: Dalton Jesus MD;  Location:  MAIN OR;  Service: Orthopedics    TUBAL LIGATION      US GUIDED THYROID BIOPSY  04/15/2019      Family History   Problem Relation Age of Onset    Heart disease Mother     Diabetes Mother     Arthritis Mother     Hypertension Mother     MONE disease Mother     Kidney disease Father     Hypertension Father     Stomach cancer Father     Arthritis Sister     Kidney disease Brother         age 29     Stroke Maternal Grandmother     Stomach cancer Maternal Grandmother     No Known Problems Maternal Grandfather     Arthritis Paternal Grandmother     Heart attack Paternal Grandmother     No Known Problems Paternal Grandfather     No Known Problems Daughter     No Known Problems Daughter     No Known Problems Daughter     No Known Problems Daughter     Stroke Maternal Aunt     Liver cancer Maternal Aunt     No Known Problems Maternal Aunt     No Known Problems Maternal Aunt     Heart attack Maternal Uncle     No Known Problems Paternal Aunt     Cancer Family         spinal column    Coronary artery disease Family     Glaucoma Family     Rheum arthritis Family       Social History     Tobacco Use    Smoking status: Never    Smokeless tobacco: Never   Vaping Use    Vaping status: Never Used   Substance Use Topics    Alcohol use: Not Currently     Alcohol/week: 0.0 standard drinks of alcohol    Drug use: No      E-Cigarette/Vaping    E-Cigarette Use Never User       E-Cigarette/Vaping Substances    Nicotine No     THC No     CBD No     Flavoring No     Other No     Unknown No       I have reviewed and agree with the history as documented.     69 year old female presents to the ED for evaluation of chest pain. States she was woken up at 2am due to pain radiating from collar bone into the neck and the back of her head on the left. States she was awake for a while and then went back to sleep. States this morning she woke up and still ahd the pain.  States she also too all her medications. Reports she had to go to the bathroom because she felt nsuease. Notes in the bathroom she got hot and sweaty and felt dizzy/weak. Denies history of blood clots. States she was on a thinner for a year but states she was taken off of it, unsure why. She denies palpations. Notes she was SOB ealier today. Is a non smoker. No leg swelling. States pain is now a 2/10.  Patient reports she is supposed to take aspirin daily but ran out about 3 weeks ago.  Did receive 325 aspirin via EMS en route.        Review of Systems   Constitutional:  Positive for diaphoresis. Negative for appetite change.   HENT:  Positive for dental problem.    Respiratory:  Positive for shortness of breath.    Cardiovascular:  Positive for chest pain. Negative for palpitations and leg swelling.   Gastrointestinal: Negative.    Musculoskeletal: Negative.    Skin: Negative.    Neurological:  Positive for dizziness and weakness.   All other systems reviewed and are negative.          Objective       ED Triage Vitals [01/08/25 1209]   Temperature Pulse Blood Pressure Respirations SpO2 Patient Position - Orthostatic VS   97.5 °F (36.4 °C) 62 137/71 16 97 % Lying      Temp Source Heart Rate Source BP Location FiO2 (%) Pain Score    Temporal Monitor Right arm -- No Pain      Vitals      Date and Time Temp Pulse SpO2 Resp BP Pain Score FACES Pain Rating User   01/08/25 1209 97.5 °F (36.4 °C) 62 97 % 16 137/71 No Pain -- RR            Physical Exam  Vitals and nursing note reviewed.   Constitutional:       General: She is not in acute distress.     Appearance: She is well-developed. She is obese. She is not ill-appearing, toxic-appearing or diaphoretic.   HENT:      Head: Normocephalic.      Mouth/Throat:      Dentition: Dental tenderness and dental caries present.      Comments: Overall poor dentition, tenderness along the left upper gum with no palpable abscess at this time.  No posterior oropharynx abscess or  swelling.  Eyes:      Pupils: Pupils are equal, round, and reactive to light.   Cardiovascular:      Rate and Rhythm: Normal rate and regular rhythm.   Pulmonary:      Effort: Pulmonary effort is normal.      Breath sounds: Normal breath sounds.   Chest:      Chest wall: Tenderness (Left-sided chest wall tenderness to palpation) present.   Abdominal:      General: Bowel sounds are normal. There is no distension.      Palpations: Abdomen is soft.      Tenderness: There is no abdominal tenderness.   Musculoskeletal:         General: Normal range of motion.      Cervical back: Normal range of motion and neck supple.      Right lower leg: No edema.      Left lower leg: No edema.   Skin:     General: Skin is warm and dry.   Neurological:      General: No focal deficit present.      Mental Status: She is alert and oriented to person, place, and time.   Psychiatric:         Mood and Affect: Mood normal.         Results Reviewed       Procedure Component Value Units Date/Time    HS Troponin I 2hr [[id number]]  (Normal) Collected: 01/08/25 1443    Lab Status: Final result Specimen: Blood from Arm, Right Updated: 01/08/25 1518     hs TnI 2hr 5 ng/L      Delta 2hr hsTnI -1 ng/L     HS Troponin 0hr (reflex protocol) [[id number]]  (Normal) Collected: 01/08/25 1236    Lab Status: Final result Specimen: Blood from Arm, Left Updated: 01/08/25 1310     hs TnI 0hr 6 ng/L     B-Type Natriuretic Peptide(BNP) [[id number]]  (Abnormal) Collected: 01/08/25 1236    Lab Status: Final result Specimen: Blood from Arm, Left Updated: 01/08/25 1309      pg/mL     Comprehensive metabolic panel [[id number]]  (Abnormal) Collected: 01/08/25 1236    Lab Status: Final result Specimen: Blood from Arm, Left Updated: 01/08/25 1302     Sodium 140 mmol/L      Potassium 4.1 mmol/L      Chloride 108 mmol/L      CO2 28 mmol/L      ANION GAP 4 mmol/L      BUN 20 mg/dL      Creatinine 1.48 mg/dL      Glucose 119 mg/dL      Calcium 8.6 mg/dL      Corrected  Calcium 9.2 mg/dL      AST 14 U/L      ALT 9 U/L      Alkaline Phosphatase 77 U/L      Total Protein 6.5 g/dL      Albumin 3.3 g/dL      Total Bilirubin 0.47 mg/dL      eGFR 35 ml/min/1.73sq m     Narrative:      National Kid[employer] Disease Foundation guidelines for Chronic Kidney Disease (CKD):     Stage 1 with normal or high GFR (GFR > 90 mL/min/1.73 square meters)    Stage 2 Mild CKD (GFR = 60-89 mL/min/1.73 square meters)    Stage 3A Moderate CKD (GFR = 45-59 mL/min/1.73 square meters)    Stage 3B Moderate CKD (GFR = 30-44 mL/min/1.73 square meters)    Stage 4 Severe CKD (GFR = 15-29 mL/min/1.73 square meters)    Stage 5 End Stage CKD (GFR <15 mL/min/1.73 square meters)  Note: GFR calculation is accurate only with a steady state creatinine    Magnesium [[id number]]  (Abnormal) Collected: 01/08/25 1236    Lab Status: Final result Specimen: Blood from Arm, Left Updated: 01/08/25 1302     Magnesium 1.8 mg/dL     D-Dimer [[id number]]  (Abnormal) Collected: 01/08/25 1236    Lab Status: Final result Specimen: Blood from Arm, Left Updated: 01/08/25 1300     D-Dimer, Quant 2.33 ug/ml FEU     Narrative:      In the evaluation for possible pulmonary embolism, in the appropriate (Well's Score of 4 or less) patient, the age adjusted d-dimer cutoff for this patient can be calculated as:    Age x 0.01 (in ug/mL) for Age-adjusted D-dimer exclusion threshold for a patient over 50 years.    Protime-INR [[id number]]  (Normal) Collected: 01/08/25 1236    Lab Status: Final result Specimen: Blood from Arm, Left Updated: 01/08/25 1258     Protime 14.7 seconds      INR 1.11    Narrative:      INR Therapeutic Range    Indication                                             INR Range      Atrial Fibrillation                                               2.0-3.0  Hypercoagulable State                                    2.0.2.3  Left Ventricular Asist Device                            2.0-3.0  Mechanical Heart Valve                                   -    Aortic(with afib, MI, embolism, HF, LA enlargement,    and/or coagulopathy)                                     2.0-3.0 (2.5-3.5)     Mitral                                                             2.5-3.5  Prosthetic/Bioprosthetic Heart Valve               2.0-3.0  Venous thromboembolism (VTE: VT, PE        2.0-3.0    APTT [[id number]]  (Normal) Collected: 01/08/25 1236    Lab Status: Final result Specimen: Blood from Arm, Left Updated: 01/08/25 1258     PTT 25 seconds     CBC and differential [[id number]]  (Abnormal) Collected: 01/08/25 1236    Lab Status: Final result Specimen: Blood from Arm, Left Updated: 01/08/25 1243     WBC 6.16 Thousand/uL      RBC 4.09 Million/uL      Hemoglobin 11.0 g/dL      Hematocrit 34.2 %      MCV 84 fL      MCH 26.9 pg      MCHC 32.2 g/dL      RDW 15.9 %      MPV 10.2 fL      Platelets 174 Thousands/uL      nRBC 0 /100 WBCs      Segmented % 71 %      Immature Grans % 0 %      Lymphocytes % 21 %      Monocytes % 7 %      Eosinophils Relative 1 %      Basophils Relative 0 %      Absolute Neutrophils 4.35 Thousands/µL      Absolute Immature Grans 0.02 Thousand/uL      Absolute Lymphocytes 1.31 Thousands/µL      Absolute Monocytes 0.44 Thousand/µL      Eosinophils Absolute 0.03 Thousand/µL      Basophils Absolute 0.01 Thousands/µL     Fingerstick Glucose (POCT) [[id number]]  (Abnormal) Collected: 01/08/25 1235    Lab Status: Final result Specimen: Blood Updated: 01/08/25 1236     POC Glucose 142 mg/dl             CTA chest pe study   Final Interpretation by Celso Plaza MD (01/08 1416)      No pulmonary embolism or acute thoracic pathology.         Resident: Collin Rod I, the attending radiologist, have reviewed the images and agree with the final report above.      Workstation performed: [id number]             ECG 12 Lead Documentation Only    Date/Time: 1/8/2025 12:25 PM    Performed by: Saba Robb PA-C  Authorized by: Saba Robb PA-C    Patient location:   ED  Interpretation:     Interpretation: non-specific    Rate:     ECG rate:  61    ECG rate assessment: normal    Rhythm:     Rhythm: sinus rhythm    Ectopy:     Ectopy: PVCs    QRS:     QRS axis:  Normal    QRS intervals:  Normal  Conduction:     Conduction: abnormal        ED Medication and Procedure Management   Prior to Admission Medications   Prescriptions Last Dose Informant Patient Reported? Taking?   Alcohol Swabs (DropSafe Alcohol Prep) 70 % PADS  Self No No   Sig: Use one pad twice daily when check blood sugar   Blood Glucose Monitoring Suppl (True Metrix Air Glucose Meter) w/Device KIT  Self No No   Sig: USE AS DIRECTED   Januvia 50 MG tablet   No No   Sig: Take 1 tablet (50 mg total) by mouth daily   TRUEplus Lancets 33G MISC  Self No No   Sig: Use 2 (two) times a day   albuterol (PROVENTIL HFA,VENTOLIN HFA) 90 mcg/act inhaler   No No   Sig: Inhale 2 puffs every 6 (six) hours as needed for wheezing or shortness of breath   amLODIPine (NORVASC) 5 mg tablet   No No   Sig: Take 1 tablet (5 mg total) by mouth daily   aspirin (ECOTRIN LOW STRENGTH) 81 mg EC tablet  Self No No   Sig: Take 1 tablet (81 mg total) by mouth daily   atorvastatin (LIPITOR) 80 mg tablet   No No   Sig: Take 1 tablet (80 mg total) by mouth daily with dinner   carvedilol (COREG) 6.25 mg tablet   No No   Sig: Take 1 tablet (6.25 mg total) by mouth 2 (two) times a day with meals   dapagliflozin (Farxiga) 10 MG tablet   No No   Sig: Take 1 tablet (10 mg total) by mouth in the morning 1/2 tablet daily for the 1st month then increase to a full tablet daily in the morning   doxazosin (CARDURA) 2 mg tablet   No No   Sig: TAKE 1 TABLET (2 MG TOTAL) BY MOUTH DAILY   escitalopram (LEXAPRO) 5 mg tablet   No No   Sig: Take 1 tablet (5 mg total) by mouth daily   gabapentin (NEURONTIN) 300 mg capsule   No No   Sig: Take 1 capsule (300 mg total) by mouth 3 (three) times a day   hydrALAZINE (APRESOLINE) 50 mg tablet   No No   Sig: Take 1 tablet (50 mg  total) by mouth 3 (three) times a day   isosorbide mononitrate (IMDUR) 60 mg 24 hr tablet   No No   Sig: Take 1 tablet (60 mg total) by mouth daily   ondansetron (ZOFRAN) 4 mg tablet   No No   Sig: Take 1 tablet (4 mg total) by mouth every 8 (eight) hours as needed for nausea or vomiting   pantoprazole (PROTONIX) 40 mg tablet   No No   Sig: Take 1 tablet (40 mg total) by mouth daily before breakfast At 8AM   polyethylene glycol (GLYCOLAX) 17 GM/SCOOP powder  Self No No   Sig: Take 17 g by mouth daily Increase to twice daily if still constiptated   valsartan (DIOVAN) 160 mg tablet   No No   Sig: Take 2 tablets (320 mg total) by mouth daily      Facility-Administered Medications: None     Discharge Medication List as of 1/8/2025  3:24 PM        START taking these medications    Details   penicillin V potassium (VEETID) 500 mg tablet Take 1 tablet (500 mg total) by mouth 4 (four) times a day for 7 days, Starting Wed 1/8/2025, Until Wed 1/15/2025, Normal           CONTINUE these medications which have NOT CHANGED    Details   albuterol (PROVENTIL HFA,VENTOLIN HFA) 90 mcg/act inhaler Inhale 2 puffs every 6 (six) hours as needed for wheezing or shortness of breath, Starting Wed 10/25/2023, Normal      Alcohol Swabs (DropSafe Alcohol Prep) 70 % PADS Use one pad twice daily when check blood sugar, Normal      amLODIPine (NORVASC) 5 mg tablet Take 1 tablet (5 mg total) by mouth daily, Starting Wed 10/30/2024, Normal      aspirin (ECOTRIN LOW STRENGTH) 81 mg EC tablet Take 1 tablet (81 mg total) by mouth daily, Starting Wed 12/14/2022, Normal      atorvastatin (LIPITOR) 80 mg tablet Take 1 tablet (80 mg total) by mouth daily with dinner, Starting Wed 10/30/2024, Normal      Blood Glucose Monitoring Suppl (True Metrix Air Glucose Meter) w/Device KIT USE AS DIRECTED, Normal      carvedilol (COREG) 6.25 mg tablet Take 1 tablet (6.25 mg total) by mouth 2 (two) times a day with meals, Starting Wed 10/30/2024, Normal       dapagliflozin (Farxiga) 10 MG tablet Take 1 tablet (10 mg total) by mouth in the morning 1/2 tablet daily for the 1st month then increase to a full tablet daily in the morning, Starting Wed 10/30/2024, Normal      doxazosin (CARDURA) 2 mg tablet TAKE 1 TABLET (2 MG TOTAL) BY MOUTH DAILY, Starting Wed 12/11/2024, Normal      escitalopram (LEXAPRO) 5 mg tablet Take 1 tablet (5 mg total) by mouth daily, Starting Wed 10/30/2024, Normal      gabapentin (NEURONTIN) 300 mg capsule Take 1 capsule (300 mg total) by mouth 3 (three) times a day, Starting Thu 12/5/2024, Normal      hydrALAZINE (APRESOLINE) 50 mg tablet Take 1 tablet (50 mg total) by mouth 3 (three) times a day, Starting Wed 10/30/2024, Normal      isosorbide mononitrate (IMDUR) 60 mg 24 hr tablet Take 1 tablet (60 mg total) by mouth daily, Starting Wed 11/13/2024, Normal      Januvia 50 MG tablet Take 1 tablet (50 mg total) by mouth daily, Starting Wed 10/30/2024, Normal      ondansetron (ZOFRAN) 4 mg tablet Take 1 tablet (4 mg total) by mouth every 8 (eight) hours as needed for nausea or vomiting, Starting Thu 11/2/2023, Normal      pantoprazole (PROTONIX) 40 mg tablet Take 1 tablet (40 mg total) by mouth daily before breakfast At 8AM, Starting Wed 10/30/2024, Normal      polyethylene glycol (GLYCOLAX) 17 GM/SCOOP powder Take 17 g by mouth daily Increase to twice daily if still constiptated, Starting Wed 12/21/2022, Normal      TRUEplus Lancets 33G MISC Use 2 (two) times a day, Starting Mon 1/23/2023, Normal      valsartan (DIOVAN) 160 mg tablet Take 2 tablets (320 mg total) by mouth daily, Starting Wed 10/30/2024, Normal           No discharge procedures on file.  ED SEPSIS DOCUMENTATION   Time reflects when diagnosis was documented in both MDM as applicable and the Disposition within this note       Time User Action Codes Description Comment    1/8/2025  3:23 PM Saba Robb Add [R07.9] Chest pain     1/8/2025  3:23 PM Saba Robb Add [K04.7] Dental  infection                  Saba Robb PA-C  01/08/25 1744

## 2025-01-09 ENCOUNTER — VBI (OUTPATIENT)
Dept: FAMILY MEDICINE CLINIC | Facility: CLINIC | Age: 70
End: 2025-01-09

## 2025-01-09 ENCOUNTER — PATIENT OUTREACH (OUTPATIENT)
Dept: CASE MANAGEMENT | Facility: HOSPITAL | Age: 70
End: 2025-01-09

## 2025-01-09 DIAGNOSIS — Z91.89 HIGH RISK FOR READMISSION: Primary | ICD-10-CM

## 2025-01-09 NOTE — PROGRESS NOTES
ER referral received via IB. Pt seen in [facility] ED on  for Chest and Dental pain.    PMH: [place], DM2-A1C6.3 10/2024, Asthma, CKD3, HF, Hx CVA,     Out reach to pt, confirmed name/, introduced self, RNCM role and CM services.  Pt states she is feeling better than yesterday, denies CP, CT, SOB or swelling. Pt still has dental discomfort. Med Rec reviewed with patient. Pt currently does not have a working scale so is not weighing herself, discussed importance with her HF. Pt watches her sodium intake, drinks less than 64 oz a day of fluids. Discussed frozen or fresh veggies over canned, watching dry foods and packets that have a lot of sodium in them. Pt did not check BS this morning as she is just out of strips, normally takes before breakfast and before dinner. Pt lives alone in her apartment, uses her walker at all times, uses [employer] transportation. Pt has 4 daughters and 1 son. Most of her children check in with her multiple times a week, she is close with her downstairs neighbor, retired nurse who also calls her daily or will bang on her door if she does not get a hold of her.   Pt stated she was having some insurance issues and would be calling them to straighten out, she also may be moving apartments closer to her friend.   Discussed f/u apts with pt, she will call Cardiology, pt aware I will send message to PCP to reach out for apt as well as pt asking about HHA hours. Pt aware she can request CM services if needed through PCP, contact info given. Pt then needed to go as [employer] was calling her.      F/U Apts:  PCP-message sent to PCP and [facility] to reach out to schedule pt and pt also requesting HHA assistance.  Cardiology Pt will call to make apt  Dental 1/15/25  GI

## 2025-01-09 NOTE — TELEPHONE ENCOUNTER
01/09/25 10:59 AM    Patient contacted post ED visit, outreach attempt made but message could not be left. Additional outreach attempt will be made.     Thank you.  Michelle Boyle MA  [facility]

## 2025-01-13 NOTE — TELEPHONE ENCOUNTER
01/13/25 10:39 AM    Patient contacted post ED visit, [facility]BI department spoke with patient/caregiver and outreach was successful.    Thank you.  Michelle Boyle MA  [facility]

## 2025-01-13 NOTE — TELEPHONE ENCOUNTER
01/13/25 10:39 AM    Patient contacted post ED visit, second outreach attempt made. Message was left for patient to return a call to the VBI Department at Michelle: Phone [phone].    Thank you.  Michelle Boyle MA  [facility]

## 2025-01-16 ENCOUNTER — TELEPHONE (OUTPATIENT)
Age: 70
End: 2025-01-16

## 2025-01-16 NOTE — TELEPHONE ENCOUNTER
Patient canceled appointment today due to lack of transportation.    Stating she can't eat due to her teeth   Requesting penicillin V potassium (VEETID) 500 mg tablet .  Sent to [facility]

## 2025-01-17 DIAGNOSIS — K04.7 DENTAL INFECTION: Primary | ICD-10-CM

## 2025-01-17 RX ORDER — PENICILLIN V POTASSIUM 500 MG/1
500 TABLET, FILM COATED ORAL EVERY 6 HOURS SCHEDULED
Qty: 40 TABLET | Refills: 0 | Status: SHIPPED | OUTPATIENT
Start: 2025-01-17 | End: 2025-01-27

## 2025-01-17 NOTE — TELEPHONE ENCOUNTER
Pt calling back stating that she is still in a lot of pain and is getting worse. Pt asking for medication to be sent asap. Pt unsure if she needs to go to an ED or not.   Pt requesting call back

## 2025-01-30 LAB
ATRIAL RATE: 61 BPM
P AXIS: 39 DEGREES
PR INTERVAL: 208 MS
QRS AXIS: -59 DEGREES
QRSD INTERVAL: 132 MS
QT INTERVAL: 482 MS
QTC INTERVAL: 485 MS
T WAVE AXIS: -32 DEGREES
VENTRICULAR RATE: 61 BPM

## 2025-02-06 ENCOUNTER — TELEPHONE (OUTPATIENT)
Dept: GASTROENTEROLOGY | Facility: CLINIC | Age: 70
End: 2025-02-06

## 2025-02-06 NOTE — TELEPHONE ENCOUNTER
Attempted to call patient to let her know of our office closure secondary to the increment weather today and to offer a virtual video visit or telephone visit today or to personally reschedule her within the next 7 to 10 days for her to be seen.  Left a message for her to contact our office and I will need to speak to her directly so I can get her scheduled in an appropriate spot in the appropriate amount of time.

## 2025-02-28 ENCOUNTER — TELEPHONE (OUTPATIENT)
Dept: FAMILY MEDICINE CLINIC | Facility: CLINIC | Age: 70
End: 2025-02-28

## 2025-03-03 ENCOUNTER — TELEPHONE (OUTPATIENT)
Dept: NEPHROLOGY | Facility: CLINIC | Age: 70
End: 2025-03-03

## 2025-03-03 NOTE — TELEPHONE ENCOUNTER
I called and spoke with Erin regarding her missed appointment on 02/28/2025 in [place] with Dr Barrett. She states someone reached out and spoke to her from the [place] location on 02/28/2025 regarding rescheduling the missed appt. She states she was given an appt date for March 7th 2025. She was unsure of time, provider, and if it was the [place] location. There is nothing scheduled on her appt desk. No phone encounters stating someone spoke with her from the [place] location. She states she will call the office when she gets home later to confirm information she was given so she can be properly scheduled to be seen.

## 2025-03-27 ENCOUNTER — OFFICE VISIT (OUTPATIENT)
Dept: FAMILY MEDICINE CLINIC | Facility: CLINIC | Age: 70
End: 2025-03-27
Payer: COMMERCIAL

## 2025-03-27 ENCOUNTER — APPOINTMENT (OUTPATIENT)
Dept: LAB | Facility: MEDICAL CENTER | Age: 70
End: 2025-03-27
Payer: COMMERCIAL

## 2025-03-27 ENCOUNTER — RESULTS FOLLOW-UP (OUTPATIENT)
Dept: NEPHROLOGY | Facility: CLINIC | Age: 70
End: 2025-03-27

## 2025-03-27 VITALS
HEART RATE: 58 BPM | DIASTOLIC BLOOD PRESSURE: 78 MMHG | BODY MASS INDEX: 31.34 KG/M2 | WEIGHT: 195 LBS | TEMPERATURE: 96.9 F | OXYGEN SATURATION: 99 % | HEIGHT: 66 IN | SYSTOLIC BLOOD PRESSURE: 144 MMHG

## 2025-03-27 DIAGNOSIS — Z12.31 ENCOUNTER FOR SCREENING MAMMOGRAM FOR MALIGNANT NEOPLASM OF BREAST: ICD-10-CM

## 2025-03-27 DIAGNOSIS — N18.32 STAGE 3B CHRONIC KIDNEY DISEASE (HCC): ICD-10-CM

## 2025-03-27 DIAGNOSIS — E78.00 PURE HYPERCHOLESTEROLEMIA: ICD-10-CM

## 2025-03-27 DIAGNOSIS — I10 ESSENTIAL HYPERTENSION: ICD-10-CM

## 2025-03-27 DIAGNOSIS — Z23 ENCOUNTER FOR IMMUNIZATION: Primary | ICD-10-CM

## 2025-03-27 DIAGNOSIS — Q01.9: ICD-10-CM

## 2025-03-27 DIAGNOSIS — R10.30 LOWER ABDOMINAL PAIN: ICD-10-CM

## 2025-03-27 DIAGNOSIS — E11.3513 TYPE 2 DIABETES MELLITUS WITH BOTH EYES AFFECTED BY PROLIFERATIVE RETINOPATHY AND MACULAR EDEMA, WITHOUT LONG-TERM CURRENT USE OF INSULIN (HCC): ICD-10-CM

## 2025-03-27 DIAGNOSIS — F32.A DEPRESSION, UNSPECIFIED DEPRESSION TYPE: ICD-10-CM

## 2025-03-27 LAB
ALBUMIN SERPL BCG-MCNC: 3.6 G/DL (ref 3.5–5)
ALP SERPL-CCNC: 120 U/L (ref 34–104)
ALT SERPL W P-5'-P-CCNC: 17 U/L (ref 7–52)
ANION GAP SERPL CALCULATED.3IONS-SCNC: 9 MMOL/L (ref 4–13)
AST SERPL W P-5'-P-CCNC: 24 U/L (ref 13–39)
BACTERIA UR QL AUTO: ABNORMAL /HPF
BILIRUB SERPL-MCNC: 0.33 MG/DL (ref 0.2–1)
BILIRUB UR QL STRIP: NEGATIVE
BUN SERPL-MCNC: 21 MG/DL (ref 5–25)
CALCIUM SERPL-MCNC: 8.8 MG/DL (ref 8.4–10.2)
CHLORIDE SERPL-SCNC: 104 MMOL/L (ref 96–108)
CLARITY UR: CLEAR
CO2 SERPL-SCNC: 27 MMOL/L (ref 21–32)
COLOR UR: YELLOW
CREAT SERPL-MCNC: 1.71 MG/DL (ref 0.6–1.3)
CREAT UR-MCNC: 257.6 MG/DL
ERYTHROCYTE [DISTWIDTH] IN BLOOD BY AUTOMATED COUNT: 15.3 % (ref 11.6–15.1)
GFR SERPL CREATININE-BSD FRML MDRD: 30 ML/MIN/1.73SQ M
GLUCOSE P FAST SERPL-MCNC: 95 MG/DL (ref 65–99)
GLUCOSE UR STRIP-MCNC: ABNORMAL MG/DL
HCT VFR BLD AUTO: 39.5 % (ref 34.8–46.1)
HGB BLD-MCNC: 12.4 G/DL (ref 11.5–15.4)
HGB UR QL STRIP.AUTO: ABNORMAL
KETONES UR STRIP-MCNC: NEGATIVE MG/DL
LEUKOCYTE ESTERASE UR QL STRIP: NEGATIVE
MAGNESIUM SERPL-MCNC: 2.1 MG/DL (ref 1.9–2.7)
MCH RBC QN AUTO: 26.9 PG (ref 26.8–34.3)
MCHC RBC AUTO-ENTMCNC: 31.4 G/DL (ref 31.4–37.4)
MCV RBC AUTO: 86 FL (ref 82–98)
MICROALBUMIN UR-MCNC: 2974.5 MG/L
MICROALBUMIN/CREAT 24H UR: 1155 MG/G CREATININE (ref 0–30)
MUCOUS THREADS UR QL AUTO: ABNORMAL
NITRITE UR QL STRIP: NEGATIVE
NON-SQ EPI CELLS URNS QL MICRO: ABNORMAL /HPF
PH UR STRIP.AUTO: 6 [PH]
PHOSPHATE SERPL-MCNC: 2.9 MG/DL (ref 2.3–4.1)
PLATELET # BLD AUTO: 273 THOUSANDS/UL (ref 149–390)
PMV BLD AUTO: 10.1 FL (ref 8.9–12.7)
POTASSIUM SERPL-SCNC: 3.8 MMOL/L (ref 3.5–5.3)
PROT SERPL-MCNC: 7.9 G/DL (ref 6.4–8.4)
PROT UR STRIP-MCNC: ABNORMAL MG/DL
PTH-INTACT SERPL-MCNC: 152.7 PG/ML (ref 12–88)
RBC # BLD AUTO: 4.61 MILLION/UL (ref 3.81–5.12)
RBC #/AREA URNS AUTO: ABNORMAL /HPF
SL AMB POCT HEMOGLOBIN AIC: 6.2 (ref ?–6.5)
SODIUM SERPL-SCNC: 140 MMOL/L (ref 135–147)
SP GR UR STRIP.AUTO: 1.02 (ref 1–1.03)
TSH SERPL DL<=0.05 MIU/L-ACNC: 1 UIU/ML (ref 0.45–4.5)
UROBILINOGEN UR STRIP-ACNC: <2 MG/DL
WBC # BLD AUTO: 4.91 THOUSAND/UL (ref 4.31–10.16)
WBC #/AREA URNS AUTO: ABNORMAL /HPF

## 2025-03-27 PROCEDURE — 82043 UR ALBUMIN QUANTITATIVE: CPT

## 2025-03-27 PROCEDURE — 84443 ASSAY THYROID STIM HORMONE: CPT

## 2025-03-27 PROCEDURE — 87086 URINE CULTURE/COLONY COUNT: CPT | Performed by: FAMILY MEDICINE

## 2025-03-27 PROCEDURE — G0008 ADMIN INFLUENZA VIRUS VAC: HCPCS | Performed by: FAMILY MEDICINE

## 2025-03-27 PROCEDURE — 83036 HEMOGLOBIN GLYCOSYLATED A1C: CPT | Performed by: FAMILY MEDICINE

## 2025-03-27 PROCEDURE — 80053 COMPREHEN METABOLIC PANEL: CPT

## 2025-03-27 PROCEDURE — 36415 COLL VENOUS BLD VENIPUNCTURE: CPT

## 2025-03-27 PROCEDURE — 83970 ASSAY OF PARATHORMONE: CPT

## 2025-03-27 PROCEDURE — 84100 ASSAY OF PHOSPHORUS: CPT

## 2025-03-27 PROCEDURE — 81001 URINALYSIS AUTO W/SCOPE: CPT

## 2025-03-27 PROCEDURE — 90662 IIV NO PRSV INCREASED AG IM: CPT | Performed by: FAMILY MEDICINE

## 2025-03-27 PROCEDURE — 99214 OFFICE O/P EST MOD 30 MIN: CPT | Performed by: FAMILY MEDICINE

## 2025-03-27 PROCEDURE — 82570 ASSAY OF URINE CREATININE: CPT

## 2025-03-27 PROCEDURE — 85027 COMPLETE CBC AUTOMATED: CPT

## 2025-03-27 PROCEDURE — 83735 ASSAY OF MAGNESIUM: CPT

## 2025-03-27 RX ORDER — ESCITALOPRAM OXALATE 5 MG/1
5 TABLET ORAL DAILY
Qty: 30 TABLET | Refills: 5 | Status: SHIPPED | OUTPATIENT
Start: 2025-03-27

## 2025-03-27 NOTE — ASSESSMENT & PLAN NOTE
Lab Results   Component Value Date    HGBA1C 6.2 03/27/2025       Orders:    POCT hemoglobin A1c    Albumin / creatinine urine ratio; Future    Comprehensive metabolic panel; Future    CBC and Platelet; Future    TSH, 3rd generation with Free T4 reflex; Future

## 2025-03-27 NOTE — PROGRESS NOTES
Name: Erin Arroyo      : 1955      MRN: [id number]  Encounter Provider: Laureano Macias DO  Encounter Date: 3/27/2025   Encounter department: [facility]  :  Assessment & Plan  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 proliferative retinopathy and macular edema, without long-term current use of insulin (HCC)    Lab Results   Component Value Date    HGBA1C 6.2 2025       Orders:    POCT hemoglobin A1c    Albumin / creatinine urine ratio; Future    Comprehensive metabolic panel; Future    CBC and Platelet; Future    TSH, 3rd generation with Free T4 reflex; Future    Encounter for screening mammogram for malignant neoplasm of breast    Orders:    Mammo screening bilateral w 3d and cad; Future    Encounter for immunization    Orders:    influenza vaccine, high-dose, PF 0.5 mL (Fluzone High Dose)    Pure hypercholesterolemia         Stage 3b chronic kidney disease (HCC)  Lab Results   Component Value Date    EGFR 35 2025    EGFR 39 10/28/2024    EGFR 36 (L) 2024    CREATININE 1.48 (H) 2025    CREATININE 1.36 (H) 10/28/2024    CREATININE 1.55 (H) 2024            Essential hypertension         Lower abdominal pain    Orders:    Urinalysis with microscopic; Future    Urine culture           History of Present Illness   Mrs. Arroyo is here she has not been feeling well this whole week she has had some lower abdominal cramping maybe a little nausea but no vomiting she has had some issues with frequency of urination she has had some back pain weakness of her legs but her sugars are really well-controlled her hemoglobin A1c was 6.2 which was a big surprise to me she gets meals delivered for dinner from meals for [employer] she does make her own breakfast and lunch      Review of Systems   Constitutional:  Positive for activity change and fatigue.   HENT:  Positive for dental problem. Negative for hearing loss.    Respiratory:  Negative for cough.   "  Cardiovascular:  Negative for chest pain, palpitations and leg swelling.   Gastrointestinal:  Positive for abdominal pain.   Genitourinary:  Positive for frequency and urgency.   Musculoskeletal:  Positive for arthralgias and gait problem.   Neurological:  Positive for weakness.   Psychiatric/Behavioral:  Negative for dysphoric mood and sleep disturbance.        Objective   /78 (BP Location: Right arm, Patient Position: Sitting, Cuff Size: Standard)   Pulse 58   Temp (!) 96.9 °F (36.1 °C) (Temporal)   Ht 5' 6\" (1.676 m)   Wt 88.5 kg (195 lb)   SpO2 99%   BMI 31.47 kg/m²      Physical Exam  Constitutional:       Appearance: Normal appearance.   HENT:      Head: Normocephalic.      Nose: Nose normal.   Eyes:      Extraocular Movements: Extraocular movements intact.      Pupils: Pupils are equal, round, and reactive to light.   Cardiovascular:      Rate and Rhythm: Normal rate and regular rhythm.      Pulses: no weak pulses.           Dorsalis pedis pulses are 2+ on the right side and 2+ on the left side.        Posterior tibial pulses are 2+ on the right side and 2+ on the left side.   Pulmonary:      Effort: Pulmonary effort is normal.      Breath sounds: Normal breath sounds.   Abdominal:      General: Abdomen is flat.      Palpations: Abdomen is soft.   Musculoskeletal:         General: No swelling or tenderness.      Cervical back: Normal range of motion.      Right lower leg: No edema.      Left lower leg: No edema.        Feet:    Feet:      Right foot:      Skin integrity: No ulcer, skin breakdown, erythema, warmth, callus or dry skin.      Left foot:      Skin integrity: No ulcer, skin breakdown, erythema, warmth, callus or dry skin.   Neurological:      General: No focal deficit present.      Mental Status: She is alert and oriented to person, place, and time.   Psychiatric:         Mood and Affect: Mood normal.         Behavior: Behavior normal.         Thought Content: Thought content normal.  "        Judgment: Judgment normal.     Patient's shoes and socks removed.    Right Foot/Ankle   Right Foot Inspection  Skin Exam: skin normal and skin intact. No dry skin, no warmth, no callus, no erythema, no maceration, no abnormal color, no pre-ulcer, no ulcer and no callus.     Toe Exam: ROM and strength within normal limits. No swelling, no tenderness, erythema and  no right toe deformity    Sensory   Vibration: intact  Proprioception: intact  Monofilament testing: intact    Vascular  Capillary refills: < 3 seconds  The right DP pulse is 2+. The right PT pulse is 2+.     Left Foot/Ankle  Left Foot Inspection  Skin Exam: skin normal and skin intact. No dry skin, no warmth, no erythema, no maceration, normal color, no pre-ulcer, no ulcer and no callus.     Toe Exam: ROM and strength within normal limits. No swelling, no tenderness, no erythema and no left toe deformity.     Sensory   Vibration: intact  Proprioception: intact  Monofilament testing: intact    Vascular  Capillary refills: < 3 seconds  The left DP pulse is 2+. The left PT pulse is 2+.     Assign Risk Category  No deformity present  No loss of protective sensation  No weak pulses  Risk: 1

## 2025-03-27 NOTE — ASSESSMENT & PLAN NOTE
Lab Results   Component Value Date    EGFR 35 01/08/2025    EGFR 39 10/28/2024    EGFR 36 (L) 08/06/2024    CREATININE 1.48 (H) 01/08/2025    CREATININE 1.36 (H) 10/28/2024    CREATININE 1.55 (H) 08/06/2024

## 2025-03-28 ENCOUNTER — RESULTS FOLLOW-UP (OUTPATIENT)
Dept: FAMILY MEDICINE CLINIC | Facility: CLINIC | Age: 70
End: 2025-03-28

## 2025-03-28 ENCOUNTER — TELEPHONE (OUTPATIENT)
Dept: FAMILY MEDICINE CLINIC | Facility: CLINIC | Age: 70
End: 2025-03-28

## 2025-03-28 LAB — BACTERIA UR CULT: NORMAL

## 2025-03-28 NOTE — TELEPHONE ENCOUNTER
Kidney function is slightly worse than it has been she needs to drink more water her magnesium level is normal her phosphorus level is normal her blood count is good she is not anemic her thyroid function is normal her urinalysis has a fairly high amount of protein and the sugar is high but that is probably to be expected her parathyroid level is elevated and that is because of the the disease that she has please find out if she has a nephrologist that she goes to and if she does make sure they are access to these labs she does not have a nephrologist like to set her up with 1 she lives in [facility] feel it would be much easier for her to get to a nephrology group at the [facility] see if they have nephrology at that campus and set her up for an appointment

## 2025-03-28 NOTE — TELEPHONE ENCOUNTER
Called Pt with results & recommendations - Pt is established @ [facility] - Results were reviewed by Dr Luan Cassidy - Pt will call and schedule F/U appointment

## 2025-03-28 NOTE — RESULT ENCOUNTER NOTE
Please call the patient regarding her abnormal result.  Kidney function is slightly worse than it has been she needs to drink more water her magnesium level is normal her phosphorus level is normal her blood count is good she is not anemic her thyroid function is normal her urinalysis has a fairly high amount of protein and the sugar is high but that is probably to be expected her parathyroid level is elevated and that is because of the the disease that she has please find out if she has a nephrologist that she goes to and if she does make sure they are access to these labs she does not have a nephrologist like to set her up with 1 she lives in [facility] feel it would be much easier for her to get to a nephrology group at the [facility] see if they have nephrology at that [place] and set her up for an appointment

## 2025-03-29 ENCOUNTER — APPOINTMENT (EMERGENCY)
Dept: CT IMAGING | Facility: HOSPITAL | Age: 70
DRG: 700 | End: 2025-03-29
Payer: COMMERCIAL

## 2025-03-29 ENCOUNTER — HOSPITAL ENCOUNTER (INPATIENT)
Facility: HOSPITAL | Age: 70
LOS: 5 days | Discharge: HOME HEALTH | DRG: 700 | End: 2025-04-03
Attending: EMERGENCY MEDICINE | Admitting: STUDENT IN AN ORGANIZED HEALTH CARE EDUCATION/TRAINING PROGRAM
Payer: COMMERCIAL

## 2025-03-29 DIAGNOSIS — N18.9 ACUTE KIDNEY INJURY SUPERIMPOSED ON CHRONIC KIDNEY DISEASE  (HCC): ICD-10-CM

## 2025-03-29 DIAGNOSIS — K59.09 CHRONIC CONSTIPATION: ICD-10-CM

## 2025-03-29 DIAGNOSIS — R10.9 ABDOMINAL PAIN: ICD-10-CM

## 2025-03-29 DIAGNOSIS — K42.9 UMBILICAL HERNIA WITHOUT OBSTRUCTION AND WITHOUT GANGRENE: ICD-10-CM

## 2025-03-29 DIAGNOSIS — I10 ESSENTIAL HYPERTENSION: ICD-10-CM

## 2025-03-29 DIAGNOSIS — N17.9 ACUTE KIDNEY INJURY SUPERIMPOSED ON CHRONIC KIDNEY DISEASE  (HCC): ICD-10-CM

## 2025-03-29 DIAGNOSIS — R10.84 GENERALIZED ABDOMINAL PAIN: Primary | ICD-10-CM

## 2025-03-29 DIAGNOSIS — R79.89 ELEVATED TROPONIN: ICD-10-CM

## 2025-03-29 LAB
2HR DELTA HS TROPONIN: 3 NG/L
4HR DELTA HS TROPONIN: 17 NG/L
ALBUMIN SERPL BCG-MCNC: 3.6 G/DL (ref 3.5–5)
ALP SERPL-CCNC: 107 U/L (ref 34–104)
ALT SERPL W P-5'-P-CCNC: 18 U/L (ref 7–52)
ANION GAP SERPL CALCULATED.3IONS-SCNC: 7 MMOL/L (ref 4–13)
APTT PPP: 21 SECONDS (ref 23–34)
AST SERPL W P-5'-P-CCNC: 32 U/L (ref 13–39)
BACTERIA UR QL AUTO: ABNORMAL /HPF
BASOPHILS # BLD AUTO: 0.01 THOUSANDS/ÂΜL (ref 0–0.1)
BASOPHILS NFR BLD AUTO: 0 % (ref 0–1)
BILIRUB SERPL-MCNC: 0.53 MG/DL (ref 0.2–1)
BILIRUB UR QL STRIP: NEGATIVE
BUN SERPL-MCNC: 17 MG/DL (ref 5–25)
CALCIUM SERPL-MCNC: 8.9 MG/DL (ref 8.4–10.2)
CARDIAC TROPONIN I PNL SERPL HS: 49 NG/L (ref ?–50)
CARDIAC TROPONIN I PNL SERPL HS: 52 NG/L (ref ?–50)
CARDIAC TROPONIN I PNL SERPL HS: 66 NG/L (ref ?–50)
CHLORIDE SERPL-SCNC: 107 MMOL/L (ref 96–108)
CLARITY UR: CLEAR
CO2 SERPL-SCNC: 27 MMOL/L (ref 21–32)
COLOR UR: YELLOW
CREAT SERPL-MCNC: 1.42 MG/DL (ref 0.6–1.3)
EOSINOPHIL # BLD AUTO: 0.05 THOUSAND/ÂΜL (ref 0–0.61)
EOSINOPHIL NFR BLD AUTO: 1 % (ref 0–6)
ERYTHROCYTE [DISTWIDTH] IN BLOOD BY AUTOMATED COUNT: 15.3 % (ref 11.6–15.1)
FLUAV AG UPPER RESP QL IA.RAPID: NEGATIVE
FLUBV AG UPPER RESP QL IA.RAPID: NEGATIVE
GFR SERPL CREATININE-BSD FRML MDRD: 37 ML/MIN/1.73SQ M
GLUCOSE SERPL-MCNC: 131 MG/DL (ref 65–140)
GLUCOSE UR STRIP-MCNC: ABNORMAL MG/DL
HCT VFR BLD AUTO: 39.2 % (ref 34.8–46.1)
HGB BLD-MCNC: 12.6 G/DL (ref 11.5–15.4)
HGB UR QL STRIP.AUTO: ABNORMAL
IMM GRANULOCYTES # BLD AUTO: 0.03 THOUSAND/UL (ref 0–0.2)
IMM GRANULOCYTES NFR BLD AUTO: 0 % (ref 0–2)
INR PPP: 1.01 (ref 0.85–1.19)
KETONES UR STRIP-MCNC: NEGATIVE MG/DL
LACTATE SERPL-SCNC: 1.1 MMOL/L (ref 0.5–2)
LEUKOCYTE ESTERASE UR QL STRIP: NEGATIVE
LIPASE SERPL-CCNC: 121 U/L (ref 11–82)
LYMPHOCYTES # BLD AUTO: 0.46 THOUSANDS/ÂΜL (ref 0.6–4.47)
LYMPHOCYTES NFR BLD AUTO: 5 % (ref 14–44)
MAGNESIUM SERPL-MCNC: 1.9 MG/DL (ref 1.9–2.7)
MCH RBC QN AUTO: 26.6 PG (ref 26.8–34.3)
MCHC RBC AUTO-ENTMCNC: 32.1 G/DL (ref 31.4–37.4)
MCV RBC AUTO: 83 FL (ref 82–98)
MONOCYTES # BLD AUTO: 0.59 THOUSAND/ÂΜL (ref 0.17–1.22)
MONOCYTES NFR BLD AUTO: 6 % (ref 4–12)
MUCOUS THREADS UR QL AUTO: ABNORMAL
NEUTROPHILS # BLD AUTO: 8.01 THOUSANDS/ÂΜL (ref 1.85–7.62)
NEUTS SEG NFR BLD AUTO: 88 % (ref 43–75)
NITRITE UR QL STRIP: NEGATIVE
NON-SQ EPI CELLS URNS QL MICRO: ABNORMAL /HPF
NRBC BLD AUTO-RTO: 0 /100 WBCS
PH UR STRIP.AUTO: 6 [PH]
PLATELET # BLD AUTO: 241 THOUSANDS/UL (ref 149–390)
PMV BLD AUTO: 9.6 FL (ref 8.9–12.7)
POTASSIUM SERPL-SCNC: 4.8 MMOL/L (ref 3.5–5.3)
PROT SERPL-MCNC: 7.8 G/DL (ref 6.4–8.4)
PROT UR STRIP-MCNC: ABNORMAL MG/DL
PROTHROMBIN TIME: 13.7 SECONDS (ref 12.3–15)
RBC # BLD AUTO: 4.73 MILLION/UL (ref 3.81–5.12)
RBC #/AREA URNS AUTO: ABNORMAL /HPF
SARS-COV+SARS-COV-2 AG RESP QL IA.RAPID: NEGATIVE
SODIUM SERPL-SCNC: 141 MMOL/L (ref 135–147)
SP GR UR STRIP.AUTO: 1.01 (ref 1–1.03)
UROBILINOGEN UR QL STRIP.AUTO: 0.2 E.U./DL
WBC # BLD AUTO: 9.15 THOUSAND/UL (ref 4.31–10.16)
WBC #/AREA URNS AUTO: ABNORMAL /HPF

## 2025-03-29 PROCEDURE — 83605 ASSAY OF LACTIC ACID: CPT | Performed by: EMERGENCY MEDICINE

## 2025-03-29 PROCEDURE — 96374 THER/PROPH/DIAG INJ IV PUSH: CPT

## 2025-03-29 PROCEDURE — 99223 1ST HOSP IP/OBS HIGH 75: CPT | Performed by: STUDENT IN AN ORGANIZED HEALTH CARE EDUCATION/TRAINING PROGRAM

## 2025-03-29 PROCEDURE — 87804 INFLUENZA ASSAY W/OPTIC: CPT | Performed by: EMERGENCY MEDICINE

## 2025-03-29 PROCEDURE — 87811 SARS-COV-2 COVID19 W/OPTIC: CPT | Performed by: EMERGENCY MEDICINE

## 2025-03-29 PROCEDURE — 85610 PROTHROMBIN TIME: CPT | Performed by: EMERGENCY MEDICINE

## 2025-03-29 PROCEDURE — 85730 THROMBOPLASTIN TIME PARTIAL: CPT | Performed by: EMERGENCY MEDICINE

## 2025-03-29 PROCEDURE — 85025 COMPLETE CBC W/AUTO DIFF WBC: CPT | Performed by: EMERGENCY MEDICINE

## 2025-03-29 PROCEDURE — 93005 ELECTROCARDIOGRAM TRACING: CPT

## 2025-03-29 PROCEDURE — 99285 EMERGENCY DEPT VISIT HI MDM: CPT

## 2025-03-29 PROCEDURE — 36415 COLL VENOUS BLD VENIPUNCTURE: CPT | Performed by: EMERGENCY MEDICINE

## 2025-03-29 PROCEDURE — 74177 CT ABD & PELVIS W/CONTRAST: CPT

## 2025-03-29 PROCEDURE — 99285 EMERGENCY DEPT VISIT HI MDM: CPT | Performed by: EMERGENCY MEDICINE

## 2025-03-29 PROCEDURE — 83690 ASSAY OF LIPASE: CPT | Performed by: EMERGENCY MEDICINE

## 2025-03-29 PROCEDURE — 81001 URINALYSIS AUTO W/SCOPE: CPT | Performed by: EMERGENCY MEDICINE

## 2025-03-29 PROCEDURE — 84484 ASSAY OF TROPONIN QUANT: CPT | Performed by: EMERGENCY MEDICINE

## 2025-03-29 PROCEDURE — 96361 HYDRATE IV INFUSION ADD-ON: CPT

## 2025-03-29 PROCEDURE — 83735 ASSAY OF MAGNESIUM: CPT | Performed by: EMERGENCY MEDICINE

## 2025-03-29 PROCEDURE — 80053 COMPREHEN METABOLIC PANEL: CPT | Performed by: EMERGENCY MEDICINE

## 2025-03-29 RX ORDER — ONDANSETRON 2 MG/ML
4 INJECTION INTRAMUSCULAR; INTRAVENOUS EVERY 6 HOURS PRN
Status: DISCONTINUED | OUTPATIENT
Start: 2025-03-29 | End: 2025-04-03 | Stop reason: HOSPADM

## 2025-03-29 RX ORDER — ISOSORBIDE MONONITRATE 60 MG/1
60 TABLET, EXTENDED RELEASE ORAL DAILY
Status: DISCONTINUED | OUTPATIENT
Start: 2025-03-30 | End: 2025-04-03 | Stop reason: HOSPADM

## 2025-03-29 RX ORDER — CARVEDILOL 6.25 MG/1
6.25 TABLET ORAL 2 TIMES DAILY WITH MEALS
Status: DISCONTINUED | OUTPATIENT
Start: 2025-03-30 | End: 2025-04-03 | Stop reason: HOSPADM

## 2025-03-29 RX ORDER — INSULIN LISPRO 100 [IU]/ML
1-6 INJECTION, SOLUTION INTRAVENOUS; SUBCUTANEOUS
Status: DISCONTINUED | OUTPATIENT
Start: 2025-03-30 | End: 2025-03-30

## 2025-03-29 RX ORDER — HEPARIN SODIUM 1000 [USP'U]/ML
4000 INJECTION, SOLUTION INTRAVENOUS; SUBCUTANEOUS ONCE
Status: COMPLETED | OUTPATIENT
Start: 2025-03-29 | End: 2025-03-29

## 2025-03-29 RX ORDER — ATORVASTATIN CALCIUM 40 MG/1
80 TABLET, FILM COATED ORAL
Status: DISCONTINUED | OUTPATIENT
Start: 2025-03-29 | End: 2025-04-03 | Stop reason: HOSPADM

## 2025-03-29 RX ORDER — INSULIN LISPRO 100 [IU]/ML
1-5 INJECTION, SOLUTION INTRAVENOUS; SUBCUTANEOUS
Status: DISCONTINUED | OUTPATIENT
Start: 2025-03-29 | End: 2025-04-03 | Stop reason: HOSPADM

## 2025-03-29 RX ORDER — GABAPENTIN 300 MG/1
300 CAPSULE ORAL 3 TIMES DAILY
Status: DISCONTINUED | OUTPATIENT
Start: 2025-03-29 | End: 2025-04-03 | Stop reason: HOSPADM

## 2025-03-29 RX ORDER — HEPARIN SODIUM 10000 [USP'U]/100ML
3-20 INJECTION, SOLUTION INTRAVENOUS
Status: DISPENSED | OUTPATIENT
Start: 2025-03-29 | End: 2025-03-31

## 2025-03-29 RX ORDER — ASPIRIN 81 MG/1
81 TABLET ORAL DAILY
Status: DISCONTINUED | OUTPATIENT
Start: 2025-03-30 | End: 2025-04-03 | Stop reason: HOSPADM

## 2025-03-29 RX ORDER — ESCITALOPRAM OXALATE 5 MG/1
5 TABLET ORAL DAILY
Status: DISCONTINUED | OUTPATIENT
Start: 2025-03-30 | End: 2025-04-03 | Stop reason: HOSPADM

## 2025-03-29 RX ORDER — HYDRALAZINE HYDROCHLORIDE 25 MG/1
50 TABLET, FILM COATED ORAL 3 TIMES DAILY
Status: DISCONTINUED | OUTPATIENT
Start: 2025-03-29 | End: 2025-04-03 | Stop reason: HOSPADM

## 2025-03-29 RX ORDER — AMLODIPINE BESYLATE 5 MG/1
5 TABLET ORAL DAILY
Status: DISCONTINUED | OUTPATIENT
Start: 2025-03-30 | End: 2025-04-02

## 2025-03-29 RX ORDER — ASPIRIN 81 MG/1
324 TABLET, CHEWABLE ORAL ONCE
Status: COMPLETED | OUTPATIENT
Start: 2025-03-29 | End: 2025-03-29

## 2025-03-29 RX ORDER — DOCUSATE SODIUM 100 MG/1
100 CAPSULE, LIQUID FILLED ORAL 2 TIMES DAILY
Status: DISCONTINUED | OUTPATIENT
Start: 2025-03-29 | End: 2025-04-03 | Stop reason: HOSPADM

## 2025-03-29 RX ORDER — ONDANSETRON 2 MG/ML
4 INJECTION INTRAMUSCULAR; INTRAVENOUS ONCE
Status: COMPLETED | OUTPATIENT
Start: 2025-03-29 | End: 2025-03-29

## 2025-03-29 RX ADMIN — GABAPENTIN 300 MG: 300 CAPSULE ORAL at 20:39

## 2025-03-29 RX ADMIN — HYDRALAZINE HYDROCHLORIDE 50 MG: 25 TABLET ORAL at 20:39

## 2025-03-29 RX ADMIN — SODIUM CHLORIDE 1000 ML: 0.9 INJECTION, SOLUTION INTRAVENOUS at 13:08

## 2025-03-29 RX ADMIN — ATORVASTATIN CALCIUM 80 MG: 40 TABLET, FILM COATED ORAL at 20:39

## 2025-03-29 RX ADMIN — ASPIRIN 81 MG CHEWABLE TABLET 324 MG: 81 TABLET CHEWABLE at 16:14

## 2025-03-29 RX ADMIN — HEPARIN SODIUM 4000 UNITS: 1000 INJECTION, SOLUTION INTRAVENOUS; SUBCUTANEOUS at 18:14

## 2025-03-29 RX ADMIN — ONDANSETRON 4 MG: 2 INJECTION, SOLUTION INTRAMUSCULAR; INTRAVENOUS at 13:08

## 2025-03-29 RX ADMIN — IOHEXOL 100 ML: 350 INJECTION, SOLUTION INTRAVENOUS at 13:52

## 2025-03-29 RX ADMIN — HEPARIN SODIUM 11.8 UNITS/KG/HR: 10000 INJECTION, SOLUTION INTRAVENOUS at 18:14

## 2025-03-29 NOTE — ASSESSMENT & PLAN NOTE
Noted to have paroxysmal SVT on extended Holter monitoring.  No A-fib identified.  Continue beta-blocker therapy  Telemetry monitoring

## 2025-03-29 NOTE — ED NOTES
Secure chat sent to 3M charge RN. Pt transported to unit on monitor by this RN, No s/s of distress, VS stable, A&Ox4, ID band in place.      Ashwini Pino RN  03/29/25 [id number]

## 2025-03-29 NOTE — H&P
"H&P - Hospitalist   Name: Erin Arroyo 69 y.o. female I MRN: [id number]  Unit/Bed#: -01 I Date of Admission: 3/29/2025   Date of Service: 3/29/2025 I Hospital Day: 0     Assessment & Plan  Elevated troponin  Patient presents to the ED with complaints of \"chest feels funny\".    EKG per ED provider shows normal sinus rhythm  IMELDA score = 5  Troponin 49-->52-->66   Lipid panel,TSH  ordered  A1c of 6.2  Patient received ASA 325mg in the ED; continue 81mg daily   Continue atorvastatin 80 mg daily  B-blockers; Hr<70  Hold NSAIDS  Pain management  Cardiology consult; recommendations appreciated  Telemetry  Cardiac diet; low sodium    Discussed with on-call cardiology: Recommend heparin gtt.  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 proliferative retinopathy and macular edema, without long-term current use of insulin ([facility])  Lab Results   Component Value Date    HGBA1C 6.2 03/27/2025       No results for input(s): \"POCGLU\" in the last 72 hours.    Blood Sugar Average: Last 72 hrs:  Home meds on hold  Sliding scale insulin  Accu-Cheks before meals and nightly  Optimal glucose control 140-180 while hospitalized  Hypoglycemia protocol  Carb controlled diet  Essential hypertension  Chronic, continue home medication regimen: Carvedilol, doxazosin, amlodipine, hydralazine, isosorbide mononitrate  Hyperlipidemia  Chronic, continue statin therapy  Chronic kidney disease, stage 3 unspecified ([facility])  Lab Results   Component Value Date    EGFR 37 03/29/2025    EGFR 30 03/27/2025    EGFR 35 01/08/2025    CREATININE 1.42 (H) 03/29/2025    CREATININE 1.71 (H) 03/27/2025    CREATININE 1.48 (H) 01/08/2025   Chronic, at baseline  Atherosclerosis of native coronary artery of native heart without angina pectoris  Please see discussion under elevated troponin  PFO (patent foramen ovale)  History of small PFO on TTE in 2023.  PSVT (paroxysmal supraventricular tachycardia) ([facility])  Noted to have paroxysmal SVT on extended " "Holter monitoring.  No A-fib identified.  Continue beta-blocker therapy  Telemetry monitoring  Abdominal pain  Patient complains of 2 week history of abdominal pain with associated nausea and vomiting that began on the day of admission. She reports 5-6 episodes of vomiting within a 3 hours period.  No hematemesis. Denies diarrhea. Denies recent travel or sick contacts  She does report eating chinese food on thurs  CT A/P: No acute findings with a normal appendix identified.   Zofran as needed  Mylanta  No leukocytosis  Obtain procalcitonin  Monitor off abx  Nausea and vomiting  As above.       VTE Pharmacologic Prophylaxis:   High Risk (Score >/= 5) - Pharmacological DVT Prophylaxis Ordered: heparin drip. Sequential Compression Devices Ordered.  Code Status: Level 1 - Full Code Patient  Discussion with family: Patient declined call to .     Anticipated Length of Stay: Patient will be admitted on an inpatient basis with an anticipated length of stay of greater than 2 midnights secondary to elevated troponin.    History of Present Illness   Chief Complaint: Abdominal pain    Erin Arroyo is a 69 y.o. female with a PMH of hypertension, hyperlipidemia, CAD, prior MI s/p stenting, type II DM who presents with abdominal pain with nausea and vomiting over the last 2 weeks.  Patient reports that she has been taking Tylenol which provided minimal relief.  Patient denies any recent travel or sick contacts.  Patient complained of periumbilical pain that is progressively worsened with decreased appetite, reports feeling weak and tired.  She denies any diarrhea or cough or cold symptoms.  In the ED patient underwent extensive workup which was negative for any GI abnormality.  CT A/P unremarkable.  Upon reevaluation by ED provider patient complained of \"chest feels funny\".  She was noted to have rising troponin 49-->52-->66.  This was discussed between ED provider, me, and on-call cardiology, who recommends " starting heparin gtt.  Given patient's extensive cardiac history and rising troponin, patient admitted to the medical service for further management.    Review of Systems   Constitutional:  Positive for activity change, appetite change, chills and fever. Negative for diaphoresis, fatigue and unexpected weight change.   HENT: Negative.     Respiratory:  Positive for chest tightness. Negative for cough, shortness of breath, wheezing and stridor.    Cardiovascular:  Positive for chest pain. Negative for palpitations and leg swelling.   Gastrointestinal:  Positive for abdominal pain, constipation, nausea and vomiting. Negative for abdominal distention, anal bleeding, blood in stool and diarrhea.   Genitourinary: Negative.    Musculoskeletal: Negative.    Skin: Negative.    Neurological: Negative.    Psychiatric/Behavioral: Negative.         Historical Information   Past Medical History:   Diagnosis Date    Abnormal ECG     last assessed: 5/15/17    Abnormal mammogram     last assessed: 7/11/17    Abnormal stress test     last assesed: 7/24/17    Anemia     Anxiety     Arthritis     Asthma     Back problem     Breast cyst     CAD (coronary artery disease)     Chest pain     last assessed: 7/24/17    Chronic pain disorder     Cyst of left breast     last assessed: 8/18/17    Depression     Depression     resolved: 1/2/18    Diabetes mellitus (HCC)     Hiatal hernia     last assessed: 11/7/17    Hyperlipidemia     Hypertension     Idiopathic intracranial hypertension 05/15/2017    Keloid of skin     last assessed: 11/2/16    Multiple thyroid nodules     Neuropathy     Psychiatric disorder     anxiety    Stroke (HCC)     TIA 2005    Tuberculosis     as a child      Past Surgical History:   Procedure Laterality Date    ARM WOUND REPAIR / CLOSURE      CARPAL TUNNEL RELEASE      CARPAL TUNNEL RELEASE Left     CATARACT EXTRACTION Bilateral     2015    COLONOSCOPY      CORONARY ANGIOPLASTY WITH STENT PLACEMENT  03/01/2023    at  [facility]    CYST REMOVAL      right upper arm.    EYE SURGERY      cataract removaql    FL LUMBAR PUNCTURE DIAGNOSTIC  08/15/2019    MD ESOPHAGOGASTRODUODENOSCOPY TRANSORAL DIAGNOSTIC N/A 01/05/2018    Procedure: ESOPHAGOGASTRODUODENOSCOPY (EGD);  Surgeon: Kerline Coley DO;  Location: [facility];  Service: Gastroenterology    MD NDSC WRST SURG W/RLS TRANSVRS CARPL LIGM Left 04/19/2019    Procedure: ENDOSCOPIC CARPAL TUNNEL RELEASE;  Surgeon: Dalton Jesus MD;  Location:  MAIN OR;  Service: Orthopedics    TUBAL LIGATION      US GUIDED THYROID BIOPSY  04/15/2019     Social History     Tobacco Use    Smoking status: Never    Smokeless tobacco: Never   Vaping Use    Vaping status: Never Used   Substance and Sexual Activity    Alcohol use: Not Currently     Alcohol/week: 0.0 standard drinks of alcohol    Drug use: No    Sexual activity: Not Currently     Partners: Male     Comment:  passed 2019 due to cancer     E-Cigarette/Vaping    E-Cigarette Use Never User      E-Cigarette/Vaping Substances    Nicotine No     THC No     CBD No     Flavoring No     Other No     Unknown No        Social History:  Marital Status:    Occupation:   Patient Pre-hospital Living Situation: Home, Alone  Patient Pre-hospital Level of Mobility: walks  Patient Pre-hospital Diet Restrictions: None    Meds/Allergies   I have reviewed home medications with patient personally.  Prior to Admission medications    Medication Sig Start Date End Date Taking? Authorizing Provider   albuterol (PROVENTIL HFA,VENTOLIN HFA) 90 mcg/act inhaler Inhale 2 puffs every 6 (six) hours as needed for wheezing or shortness of breath 10/25/23  Yes Laureano Macias DO   amLODIPine (NORVASC) 5 mg tablet Take 1 tablet (5 mg total) by mouth daily 10/30/24  Yes Laureano Macias DO   aspirin (ECOTRIN LOW STRENGTH) 81 mg EC tablet Take 1 tablet (81 mg total) by mouth daily 12/14/22  Yes Laureano Macias DO   atorvastatin (LIPITOR) 80 mg tablet Take 1  tablet (80 mg total) by mouth daily with dinner 10/30/24  Yes Laureano Macias DO   carvedilol (COREG) 6.25 mg tablet Take 1 tablet (6.25 mg total) by mouth 2 (two) times a day with meals 10/30/24  Yes Laureano Macias DO   dapagliflozin (Farxiga) 10 MG tablet Take 1 tablet (10 mg total) by mouth in the morning 1/2 tablet daily for the 1st month then increase to a full tablet daily in the morning 10/30/24  Yes Laureano Macias DO   doxazosin (CARDURA) 2 mg tablet TAKE 1 TABLET (2 MG TOTAL) BY MOUTH DAILY  Patient taking differently: Take 2 mg by mouth daily at bedtime 12/11/24  Yes Laureano Macias DO   escitalopram (LEXAPRO) 5 mg tablet Take 1 tablet (5 mg total) by mouth daily 3/27/25  Yes Laureano Macias DO   gabapentin (NEURONTIN) 300 mg capsule Take 1 capsule (300 mg total) by mouth 3 (three) times a day 12/5/24  Yes Laureano Macias DO   hydrALAZINE (APRESOLINE) 50 mg tablet Take 1 tablet (50 mg total) by mouth 3 (three) times a day 10/30/24  Yes Laureano Macias DO   isosorbide mononitrate (IMDUR) 60 mg 24 hr tablet Take 1 tablet (60 mg total) by mouth daily 11/13/24  Yes Laureano Macias DO   Januvia 50 MG tablet Take 1 tablet (50 mg total) by mouth daily 10/30/24  Yes Laureano Macias DO   pantoprazole (PROTONIX) 40 mg tablet Take 1 tablet (40 mg total) by mouth daily before breakfast At 8AM 10/30/24  Yes Laureano Macias DO   polyethylene glycol (GLYCOLAX) 17 GM/SCOOP powder Take 17 g by mouth daily Increase to twice daily if still constiptated  Patient taking differently: Take 17 g by mouth if needed Increase to twice daily if still constiptated 12/21/22  Yes Mary Thakur PA-C   valsartan (DIOVAN) 160 mg tablet Take 2 tablets (320 mg total) by mouth daily 10/30/24  Yes Laureano Macias DO   Alcohol Swabs (DropSafe Alcohol Prep) 70 % PADS Use one pad twice daily when check blood sugar 1/23/23   Laureano Macias, DO   Blood Glucose Monitoring Suppl (True Metrix Air Glucose Meter) w/Device KIT USE AS DIRECTED 3/27/23   Laureano Macias DO    TRUEplus Lancets 33G MISC Use 2 (two) times a day 1/23/23   Laureano Vanguse,      Allergies   Allergen Reactions    Bactrim [Sulfamethoxazole-Trimethoprim] Shortness Of Breath    Lisinopril Angioedema    Vicodin [Hydrocodone-Acetaminophen] GI Intolerance    Hydrocodone-Acetaminophen Nausea Only    Oxycodone-Acetaminophen GI Intolerance and Nausea Only       Objective :  Temp:  [97.3 °F (36.3 °C)] 97.3 °F (36.3 °C)  HR:  [73-89] 73  BP: (129-202)/() 154/86  Resp:  [17-18] 18  SpO2:  [95 %-100 %] 96 %  O2 Device: None (Room air)    Physical Exam  Vitals and nursing note reviewed.   Constitutional:       General: She is not in acute distress.     Appearance: She is obese. She is not toxic-appearing.   HENT:      Head: Normocephalic and atraumatic.   Eyes:      Extraocular Movements: Extraocular movements intact.      Pupils: Pupils are equal, round, and reactive to light.   Cardiovascular:      Rate and Rhythm: Normal rate and regular rhythm.   Pulmonary:      Effort: Pulmonary effort is normal.      Breath sounds: Normal breath sounds.   Abdominal:      General: There is no distension.      Palpations: Abdomen is soft.      Tenderness: There is abdominal tenderness. There is no guarding.   Musculoskeletal:         General: No swelling.   Skin:     General: Skin is warm.   Neurological:      General: No focal deficit present.      Mental Status: She is alert and oriented to person, place, and time. Mental status is at baseline.   Psychiatric:         Mood and Affect: Mood normal.         Behavior: Behavior normal.          Lines/Drains:            Lab Results: I have reviewed the following results:  Results from last 7 days   Lab Units 03/29/25  1307   WBC Thousand/uL 9.15   HEMOGLOBIN g/dL 12.6   HEMATOCRIT % 39.2   PLATELETS Thousands/uL 241   SEGS PCT % 88*   LYMPHO PCT % 5*   MONO PCT % 6   EOS PCT % 1     Results from last 7 days   Lab Units 03/29/25  1307   SODIUM mmol/L 141   POTASSIUM mmol/L 4.8    CHLORIDE mmol/L 107   CO2 mmol/L 27   BUN mg/dL 17   CREATININE mg/dL 1.42*   ANION GAP mmol/L 7   CALCIUM mg/dL 8.9   ALBUMIN g/dL 3.6   TOTAL BILIRUBIN mg/dL 0.53   ALK PHOS U/L 107*   ALT U/L 18   AST U/L 32   GLUCOSE RANDOM mg/dL 131     Results from last 7 days   Lab Units 03/29/25  1307   INR  1.01         Lab Results   Component Value Date    HGBA1C 6.2 03/27/2025    HGBA1C 6.3 (H) 10/28/2024    HGBA1C 6.0 (H) 04/10/2024     Results from last 7 days   Lab Units 03/29/25  1307   LACTIC ACID mmol/L 1.1             Administrative Statements       ** Please Note: This note has been constructed using a voice recognition system. **

## 2025-03-29 NOTE — DISCHARGE INSTRUCTIONS
"Patient Education     Abdominal wall hernias   The Basics   Written by the doctors and editors at [facility]   What is an abdominal wall hernia? -- The internal organs and tissues in the belly are held in place by a tough outer wall of tissue called the \"abdominal wall.\" An abdominal hernia is an area in that wall that is weak or torn. Often, when there is a hernia, organs or tissues that are normally held in place by the abdominal wall bulge or stick out through the weak or torn spot.  The size of the bulge can change depending on how much pressure is put on the abdominal wall. For example, straining when coughing or having a bowel movement can make a hernia look bigger. Lying down overnight can make the hernia look smaller, or even disappear, in the morning.  There are many different kinds of abdominal wall hernias (figure 1).  What are the symptoms of abdominal wall hernias? -- Abdominal wall hernias do not always cause symptoms. When they do, they can cause some or all of these symptoms:   A bulge somewhere on the trunk of the body - This bulge can be so small that you don't even realize it's there.   Pain, especially when coughing, straining, or using nearby muscles   A pulling sensation around the bulge   Nausea or vomiting if part of the intestine is blocked in the hernia  Abdominal wall hernias can balloon out and form a sac. That sac can hold a loop of intestine or a piece of fat that should normally be tucked inside of the belly. This can be painful and even dangerous if the tissue in the hernia gets trapped and unable to slide back into the belly. When this happens, the tissue does not get enough blood, so it can become swollen or even die (figure 2).  Should I see a doctor or nurse? -- Yes. See a doctor or nurse if you have any of the symptoms of a hernia. In most cases, doctors can diagnose a hernia just by doing an exam. During the exam, the doctor might ask you to cough or bear down while pressing on " "your hernia. This might be uncomfortable, but it is necessary to find the source of the problem.  Most of the time, the contents of the hernia can be \"reduced,\" or gently pushed back into the belly. But sometimes, the hernia gets trapped and won't go back in. If that happens, the tissue that is trapped can get damaged.  If you develop severe pain around a hernia bulge or feel sick, call your doctor or surgeon right away.  How are hernias treated? -- Not all hernias need treatment right away. But many do need to be repaired with surgery.  Surgeons can repair most hernias in 1 of 2 ways. The right surgery for you depends on the size of your hernia, where on the abdominal wall it is, whether this is the first time it is getting repaired, what your general health is like, and your surgeon's experience.  The 2 types of surgery are:   Open surgery - During an open surgery, the doctor makes 1 large cut near the hernia to repair it.   Minimally invasive surgery - \"Minimally invasive\" surgery lets the doctor make smaller cuts in the skin. They insert long, thin tools through the cuts. One of the tools has a camera (called a \"laparoscope\") on the end, which sends pictures to a TV screen. The doctor can look at the screen to see inside the body. Then, they use the long tools to do the surgery. They can control the tools directly, or with the help of a robot (this is called \"robot-assisted\" surgery).  If your hernia has reduced the blood supply to a loop of intestine, your doctor might need to remove that piece of intestine. Usually, they will then sew the intestine back together.  The recovery and aftercare for each type of hernia repair is different. Your doctor or nurse can tell you what to expect after your surgery.  All topics are updated as new evidence becomes available and our peer review process is complete.  This topic retrieved from [employer] on: Feb 26, 2024.  Topic 90716 Version 10.0  Release: 32.2.4 - C32.56  © " 2024 UpToDate, Inc. and/or its affiliates. All rights reserved.  figure 1: Abdominal wall hernias     Abdominal wall hernias can happen in different parts of the torso:  Incisional hernias happen along incisions from surgery.  Umbilical hernias happen at the belly button.  Epigastric hernias happen in the midline above the belly button.  Spigelian hernias happen to the left or right of the midline, where 2 layers of muscle meet.  Lumbar hernias (not shown) happen at the back.  Inguinal hernias happen in the groin area.  Femoral hernias happen where the thigh joins the torso.  Graphic 31662 Version 4.0  figure 2: Intestines bulging through hernia     In some cases, a loop of intestine can poke through a hernia. Often, surgeons can push the loop of intestine back in. But if the loop gets trapped or does not get enough blood, surgeons sometimes have to remove it and reconnect the 2 ends of intestine that are left.  Graphic 60316 Version 3.0  Consumer Information Use and Disclaimer   Disclaimer: This generalized information is a limited summary of diagnosis, treatment, and/or medication information. It is not meant to be comprehensive and should be used as a tool to help the user understand and/or assess potential diagnostic and treatment options. It does NOT include all information about conditions, treatments, medications, side effects, or risks that may apply to a specific patient. It is not intended to be medical advice or a substitute for the medical advice, diagnosis, or treatment of a health care provider based on the health care provider's examination and assessment of a patient's specific and unique circumstances. Patients must speak with a health care provider for complete information about their health, medical questions, and treatment options, including any risks or benefits regarding use of medications. This information does not endorse any treatments or medications as safe, effective, or approved for  treating a specific patient. UpToDate, Inc. and its affiliates disclaim any warranty or liability relating to this information or the use thereof.The use of this information is governed by the Terms of Use, available at https://www.Spinl[employer]wer.com/en/know/clinical-effectiveness-terms. 2024© UpToDate, Inc. and its affiliates and/or licensors. All rights reserved.  Copyright   © 2024 UpToDate, Inc. and/or its affiliates. All rights reserved.  Patient Education     Abdominal Pain, Adult ED   General Information   You came to the Emergency Department (ED) for abdominal or belly pain. The doctor feels that the risk of a serious cause for your belly pain is low.  Many things can cause belly pain. Some are serious things like bleeding or an infection. Less serious things, like an upset stomach, can also cause belly pain.  The doctors may not be able to find all serious causes of belly pain the first time they see you. It is important that you follow up with your doctor. You may be waiting on some test results. The staff will notify you if there are concerning results.  What care is needed at home?   Call your regular doctor to let them know you were in the ED. Make a follow-up appointment if you were told to.  Keep a diary about your pain to help your doctor learn more about the cause. Write down the foods you eat to see if they may be the cause of your pain. Also write down what you were doing before and during the pain.  Eat small meals more often. Eat more fiber if hard stools are a problem.  Avoid foods or drinks that make your pain worse. Some people are bothered by:  Drinks that are fizzy or have caffeine.  Fried, greasy, or fatty foods.  Orange juice.  Milk or cheese can bother some people’s stomach as well.  When you have pain, you can:  Try to have a bowel movement.  Lie down and rest.  Avoid solid foods for a few hours. If you are hungry, try liquids like broth or water. When you feel better, try mild foods  like rice, crackers, bananas, applesauce, or toast.  Don’t take over-the-counter medicines, such as antacids or laxatives, unless they are ordered.  Check with the doctor before you take any herbal medicines or supplements.  When do I need to get emergency help?   Call for an ambulance right away if:   You have sudden severe belly pain, or the pain is constant.  You have trouble breathing or chest pain along with your belly pain.  You start throwing up blood or pass a lot of blood in your stool.  Your belly becomes very hard or swollen.  You get a fever 102.2°F (39°C) or higher or shaking chills.  Return to the ED if:   You have signs of severe fluid loss, such as:  No urine for more than 8 hours.  You feel very light-headed or like you are going to pass out.  You feel weak, like you are going to fall.  Your pain gets worse, comes more often or moves to one area of the belly  You have an upset stomach or throwing up that isn’t getting better and are having trouble keeping down food and drink.  Your stools are black or tar colored.  When do I need to call the doctor?   If the pain is not gone or getting better in 1 to 2 days.  You have a fever of 100.4°F (38°C) or higher, chills.  You develop early signs of fluid loss, such as:  Your urine is very dark colored.  Your mouth is dry.  You have muscle cramps.  You have a lack of energy.  You feel light-headed when you get up.  You have pain with passing urine or have blood in your urine.  Your stools have a small amount (less than 1 teaspoon or 5 mL) of blood in them.  You feel that something is not right in your belly.  You have new or worsening symptoms.  Last Reviewed Date   2021-05-07  Consumer Information Use and Disclaimer   This generalized information is a limited summary of diagnosis, treatment, and/or medication information. It is not meant to be comprehensive and should be used as a tool to help the user understand and/or assess potential diagnostic and  treatment options. It does NOT include all information about conditions, treatments, medications, side effects, or risks that may apply to a specific patient. It is not intended to be medical advice or a substitute for the medical advice, diagnosis, or treatment of a health care provider based on the health care provider's examination and assessment of a patient’s specific and unique circumstances. Patients must speak with a health care provider for complete information about their health, medical questions, and treatment options, including any risks or benefits regarding use of medications. This information does not endorse any treatments or medications as safe, effective, or approved for treating a specific patient. UpToDate, Inc. and its affiliates disclaim any warranty or liability relating to this information or the use thereof. The use of this information is governed by the Terms of Use, available at https://www.[employer]er.com/en/know/clinical-effectiveness-terms   Copyright   Copyright © 2024 UpToDate, Inc. and its affiliates and/or licensors. All rights reserved.

## 2025-03-29 NOTE — ASSESSMENT & PLAN NOTE
"Lab Results   Component Value Date    HGBA1C 6.2 03/27/2025       No results for input(s): \"POCGLU\" in the last 72 hours.    Blood Sugar Average: Last 72 hrs:  Home meds on hold  Sliding scale insulin  Accu-Cheks before meals and nightly  Optimal glucose control 140-180 while hospitalized  Hypoglycemia protocol  Carb controlled diet  "

## 2025-03-29 NOTE — ASSESSMENT & PLAN NOTE
Lab Results   Component Value Date    EGFR 37 03/29/2025    EGFR 30 03/27/2025    EGFR 35 01/08/2025    CREATININE 1.42 (H) 03/29/2025    CREATININE 1.71 (H) 03/27/2025    CREATININE 1.48 (H) 01/08/2025   Chronic, at baseline   Condition:: Acne Please Describe Your Condition:: comes in for a chief complaint of acne on the face, chest, and back. The acne consists of blackheads/whiteheads, cysts/nodules, and pimples, is moderate in severity and has been present for many years. Pertinent history includes: combination skin (more oily), no skin sensitivity and sensitive skin. Pertinent negatives include: no history of accutane use and no history of previous acne medications in the past. She is currently using otc clean and clear, Bioré cleanser, no moisturizer, and no sunscreen. She is here for further evaluation and management.

## 2025-03-29 NOTE — ASSESSMENT & PLAN NOTE
Patient complains of 2 week history of abdominal pain with associated nausea and vomiting that began on the day of admission. She reports 5-6 episodes of vomiting within a 3 hours period.  No hematemesis. Denies diarrhea. Denies recent travel or sick contacts  She does report eating chinese food on thurs  CT A/P: No acute findings with a normal appendix identified.   Zofran as needed  Mylanta  No leukocytosis  Obtain procalcitonin  Monitor off abx

## 2025-03-29 NOTE — ASSESSMENT & PLAN NOTE
"Patient presents to the ED with complaints of \"chest feels funny\".    EKG per ED provider shows normal sinus rhythm  IMELDA score = 5  Troponin 49-->52-->66   Lipid panel,TSH  ordered  A1c of 6.2  Patient received ASA 325mg in the ED; continue 81mg daily   Continue atorvastatin 80 mg daily  B-blockers; Hr<70  Hold NSAIDS  Pain management  Cardiology consult; recommendations appreciated  Telemetry  Cardiac diet; low sodium    Discussed with on-call cardiology: Recommend heparin gtt.  "

## 2025-03-29 NOTE — ED CARE HANDOFF
"Emergency Department Sign Out Note        Sign out and transfer of care from Dr. Earl. See Separate Emergency Department note.     The patient, Erin Arroyo, was evaluated by the previous provider for abdominal pain.    Workup Completed:  Initial evaluation and workup for abdominal pain negative with the patient having negative CT scan and lab work at baseline with the exception of first troponin 49.    ED Course / Workup Pending (followup):  Patient signed out to me pending repeat troponin.  Second troponin result elevated at 52.  I interviewed the patient once again and she told me she had a sensation of her \"chest feels funny\".  Repeat EKG remains negative with normal sinus rhythm rate 86, left axis deviation and LVH.  Case was discussed with on-call cardiology, Dr. Ramirez, agrees with plan for observation admission for rule out ACS.  Discussed with hospitalist and she will accept patient to her service.    CT abdomen pelvis with contrast   Final Result      No acute findings with a normal appendix identified.            Workstation performed: [id number]           Results for orders placed or performed during the hospital encounter of 03/29/25   FLU/COVID Rapid Antigen (30 min. TAT) - Preferred screening test in ED    Specimen: Nose; Nares   Result Value Ref Range    SARS COV Rapid Antigen Negative Negative    Influenza A Rapid Antigen Negative Negative    Influenza B Rapid Antigen Negative Negative   CBC and differential   Result Value Ref Range    WBC 9.15 4.31 - 10.16 Thousand/uL    RBC 4.73 3.81 - 5.12 Million/uL    Hemoglobin 12.6 11.5 - 15.4 g/dL    Hematocrit 39.2 34.8 - 46.1 %    MCV 83 82 - 98 fL    MCH 26.6 (L) 26.8 - 34.3 pg    MCHC 32.1 31.4 - 37.4 g/dL    RDW 15.3 (H) 11.6 - 15.1 %    MPV 9.6 8.9 - 12.7 fL    Platelets 241 149 - 390 Thousands/uL    nRBC 0 /100 WBCs    Segmented % 88 (H) 43 - 75 %    Immature Grans % 0 0 - 2 %    Lymphocytes % 5 (L) 14 - 44 %    Monocytes % 6 4 - 12 %    " Eosinophils Relative 1 0 - 6 %    Basophils Relative 0 0 - 1 %    Absolute Neutrophils 8.01 (H) 1.85 - 7.62 Thousands/µL    Absolute Immature Grans 0.03 0.00 - 0.20 Thousand/uL    Absolute Lymphocytes 0.46 (L) 0.60 - 4.47 Thousands/µL    Absolute Monocytes 0.59 0.17 - 1.22 Thousand/µL    Eosinophils Absolute 0.05 0.00 - 0.61 Thousand/µL    Basophils Absolute 0.01 0.00 - 0.10 Thousands/µL   Protime-INR   Result Value Ref Range    Protime 13.7 12.3 - 15.0 seconds    INR 1.01 0.85 - 1.19   APTT   Result Value Ref Range    PTT 21 (L) 23 - 34 seconds   Comprehensive metabolic panel   Result Value Ref Range    Sodium 141 135 - 147 mmol/L    Potassium 4.8 3.5 - 5.3 mmol/L    Chloride 107 96 - 108 mmol/L    CO2 27 21 - 32 mmol/L    ANION GAP 7 4 - 13 mmol/L    BUN 17 5 - 25 mg/dL    Creatinine 1.42 (H) 0.60 - 1.30 mg/dL    Glucose 131 65 - 140 mg/dL    Calcium 8.9 8.4 - 10.2 mg/dL    AST 32 13 - 39 U/L    ALT 18 7 - 52 U/L    Alkaline Phosphatase 107 (H) 34 - 104 U/L    Total Protein 7.8 6.4 - 8.4 g/dL    Albumin 3.6 3.5 - 5.0 g/dL    Total Bilirubin 0.53 0.20 - 1.00 mg/dL    eGFR 37 ml/min/1.73sq m   Magnesium   Result Value Ref Range    Magnesium 1.9 1.9 - 2.7 mg/dL   Lipase   Result Value Ref Range    Lipase 121 (H) 11 - 82 u/L   Lactic acid, plasma (w/reflex if result > 2.0)   Result Value Ref Range    LACTIC ACID 1.1 0.5 - 2.0 mmol/L   UA w Reflex to Microscopic w Reflex to Culture    Specimen: Urine, Clean Catch   Result Value Ref Range    Color, UA Yellow     Clarity, UA Clear     Specific Gravity, UA 1.015 1.003 - 1.030    pH, UA 6.0 4.5, 5.0, 5.5, 6.0, 6.5, 7.0, 7.5, 8.0    Leukocytes, UA Negative Negative    Nitrite, UA Negative Negative    Protein,  (2+) (A) Negative mg/dl    Glucose,  (1/10%) (A) Negative mg/dl    Ketones, UA Negative Negative mg/dl    Urobilinogen, UA 0.2 0.2, 1.0 E.U./dl E.U./dl    Bilirubin, UA Negative Negative    Occult Blood, UA Trace-Intact (A) Negative   HS Troponin 0hr  "(reflex protocol)   Result Value Ref Range    hs TnI 0hr 49 \"Refer to ACS Flowchart\"- see link ng/L   HS Troponin I 2hr   Result Value Ref Range    hs TnI 2hr 52 (H) \"Refer to ACS Flowchart\"- see link ng/L    Delta 2hr hsTnI 3 <20 ng/L   HS Troponin I 4hr   Result Value Ref Range    hs TnI 4hr 66 (H) \"Refer to ACS Flowchart\"- see link ng/L    Delta 4hr hsTnI 17 <20 ng/L   Urine Microscopic   Result Value Ref Range    RBC, UA 0-1 None Seen, 0-1, 1-2, 2-4, 0-5 /hpf    WBC, UA 0-1 None Seen, 0-1, 1-2, 0-5, 2-4 /hpf    Epithelial Cells Occasional None Seen, Occasional /hpf    Bacteria, UA Occasional None Seen, Occasional /hpf    MUCUS THREADS Occasional (A) None Seen                                       ED Course as of 03/29/25 1758   Sat Mar 29, 2025   1640 Medicine and cardiology are now recommending 4-hour troponin prior to disposition.  Will obtain 4-hour troponin at 17:10 and reevaluate.  Repeat EKG unchanged.   1756 4-hour troponin continues to increase.  Therefore, patient will be admitted to the hospital.     Procedures  Medical Decision Making  Amount and/or Complexity of Data Reviewed  Labs: ordered.  Radiology: ordered.    Risk  OTC drugs.  Prescription drug management.  Decision regarding hospitalization.            Disposition  Final diagnoses:   Generalized abdominal pain   Umbilical hernia without obstruction and without gangrene   Elevated troponin     Time reflects when diagnosis was documented in both MDM as applicable and the Disposition within this note       Time User Action Codes Description Comment    3/29/2025  2:36 PM Mac Earl Add [R10.84] Generalized abdominal pain     3/29/2025  2:53 PM Mac Earl Add [K42.9] Umbilical hernia without obstruction and without gangrene     3/29/2025  4:21 PM Torres Gutierrez Add [R79.89] Elevated troponin           ED Disposition       ED Disposition   Admit    Condition   Stable    Date/Time   Sat Mar 29, 2025  5:57 PM    Comment              "     Follow-up Information       Follow up With Specialties Details Why Contact Info    Laureano Macias DO Family Medicine Call in 2 days  [address] [facility] [address] [phone]            Patient's Medications   Discharge Prescriptions    No medications on file     No discharge procedures on file.       ED Provider  Electronically Signed by     Torres Gutierrez MD  03/29/25 8526       Torres Gutierrez MD  03/29/25 0025

## 2025-03-29 NOTE — ED PROVIDER NOTES
Time reflects when diagnosis was documented in both MDM as applicable and the Disposition within this note       Time User Action Codes Description Comment    3/29/2025  2:36 PM Mac Earl Add [R10.84] Generalized abdominal pain     3/29/2025  2:53 PM Mac Earl Add [K42.9] Umbilical hernia without obstruction and without gangrene           ED Disposition       ED Disposition   Discharge    Condition   Stable    Date/Time   Sat Mar 29, 2025  2:36 PM    Comment   Erin Arroyo discharge to home/self care.                   Assessment & Plan       Medical Decision Making  Amount and/or Complexity of Data Reviewed  Labs: ordered. Decision-making details documented in ED Course.  Radiology: ordered. Decision-making details documented in ED Course.  ECG/medicine tests: ordered and independent interpretation performed. Decision-making details documented in ED Course.  Discussion of management or test interpretation with external provider(s): At risk for but not limited to STEMI, NSTEMI, cholelithiasis, cholecystitis, peptic ulcer disease, gastritis, pancreatitis, diverticulitis, colitis, bowel obstruction, appendicitis, UTI, ureteral stone, kidney stone, inflammatory bowel disease.    Risk  Prescription drug management.        ED Course as of 03/29/25 1454   Sat Mar 29, 2025   1451 With patient by CAT scan results.  Made aware of umbilical hernia.  Will follow-up with her surgeon.  She will call him on Monday.       Medications   sodium chloride 0.9 % bolus 1,000 mL (0 mL Intravenous Stopped 3/29/25 1427)   ondansetron (ZOFRAN) injection 4 mg (4 mg Intravenous Given 3/29/25 1308)   iohexol (OMNIPAQUE) 350 MG/ML injection (MULTI-DOSE) 100 mL (100 mL Intravenous Given 3/29/25 1352)       ED Risk Strat Scores                                                History of Present Illness       Chief Complaint   Patient presents with    Abdominal Pain     Pt presented to this ED via EMS from home c/o abdominal  pain w/nausea and vomiting over past 2wks. Pt taking tylenol with some relief. Denies travel/sob/fevers/cough/diarrhea       Past Medical History:   Diagnosis Date    Abnormal ECG     last assessed: 5/15/17    Abnormal mammogram     last assessed: 7/11/17    Abnormal stress test     last assesed: 7/24/17    Anemia     Anxiety     Arthritis     Asthma     Back problem     Breast cyst     CAD (coronary artery disease)     Chest pain     last assessed: 7/24/17    Chronic pain disorder     Cyst of left breast     last assessed: 8/18/17    Depression     Depression     resolved: 1/2/18    Diabetes mellitus (HCC)     Hiatal hernia     last assessed: 11/7/17    Hyperlipidemia     Hypertension     Idiopathic intracranial hypertension 05/15/2017    Keloid of skin     last assessed: 11/2/16    Multiple thyroid nodules     Neuropathy     Psychiatric disorder     anxiety    Stroke (HCC)     TIA 2005    Tuberculosis     as a child       Past Surgical History:   Procedure Laterality Date    ARM WOUND REPAIR / CLOSURE      CARPAL TUNNEL RELEASE      CARPAL TUNNEL RELEASE Left     CATARACT EXTRACTION Bilateral     2015    COLONOSCOPY      CORONARY ANGIOPLASTY WITH STENT PLACEMENT  03/01/2023    at [facility]    CYST REMOVAL      right upper arm.    EYE SURGERY      cataract removaql    [place] LUMBAR PUNCTURE DIAGNOSTIC  08/15/2019    [place] ESOPHAGOGASTRODUODENOSCOPY TRANSORAL DIAGNOSTIC N/A 01/05/2018    Procedure: ESOPHAGOGASTRODUODENOSCOPY (EGD);  Surgeon: Kerline Coley DO;  Location: [facility];  Service: Gastroenterology    [place] NDSC WRST SURG W/RLS TRANSVRS CARPL LIGM Left 04/19/2019    Procedure: ENDOSCOPIC CARPAL TUNNEL RELEASE;  Surgeon: Dalton Jesus MD;  Location:  MAIN OR;  Service: Orthopedics    TUBAL LIGATION      US GUIDED THYROID BIOPSY  04/15/2019      Family History   Problem Relation Age of Onset    Heart disease Mother     Diabetes Mother     Arthritis Mother     Hypertension Mother     MONE disease  Mother     Kidney disease Father     Hypertension Father     Stomach cancer Father     Arthritis Sister     Kidney disease Brother         age 29     Stroke Maternal Grandmother     Stomach cancer Maternal Grandmother     No Known Problems Maternal Grandfather     Arthritis Paternal Grandmother     Heart attack Paternal Grandmother     No Known Problems Paternal Grandfather     No Known Problems Daughter     No Known Problems Daughter     No Known Problems Daughter     No Known Problems Daughter     Stroke Maternal Aunt     Liver cancer Maternal Aunt     No Known Problems Maternal Aunt     No Known Problems Maternal Aunt     Heart attack Maternal Uncle     No Known Problems Paternal Aunt     Cancer Family         spinal column    Coronary artery disease Family     Glaucoma Family     Rheum arthritis Family       Social History     Tobacco Use    Smoking status: Never    Smokeless tobacco: Never   Vaping Use    Vaping status: Never Used   Substance Use Topics    Alcohol use: Not Currently     Alcohol/week: 0.0 standard drinks of alcohol    Drug use: No      E-Cigarette/Vaping    E-Cigarette Use Never User       E-Cigarette/Vaping Substances    Nicotine No     THC No     CBD No     Flavoring No     Other No     Unknown No       I have reviewed and agree with the history as documented.     Patient complains of periumbilical pain with nausea vomiting for the past 2 weeks.  Getting progressively worse.  Decreased appetite.  Feels weak and tired.  No diarrhea.  No recent cough or cold symptoms.  No fevers or chills.  Tylenol without any relief.      History provided by:  Patient   used: No    Abdominal Pain  Pain location:  Periumbilical  Pain quality: aching    Pain radiates to:  Does not radiate  Pain severity:  Mild  Onset quality:  Gradual  Duration:  2 weeks  Timing:  Constant  Progression:  Unchanged  Chronicity:  New  Context: not awakening from sleep, not recent travel, not sick  contacts and not trauma    Relieved by:  Nothing  Worsened by:  Nothing  Ineffective treatments:  Acetaminophen  Associated symptoms: anorexia, chills, nausea and vomiting    Associated symptoms: no chest pain, no constipation, no cough, no diarrhea, no dysuria, no fatigue, no fever, no flatus, no hematuria, no shortness of breath and no sore throat        Review of Systems   Constitutional:  Positive for activity change, appetite change and chills. Negative for fatigue and fever.   HENT:  Negative for ear pain, hearing loss, sore throat, trouble swallowing and voice change.    Eyes:  Negative for pain and discharge.   Respiratory:  Negative for cough, shortness of breath and wheezing.    Cardiovascular:  Negative for chest pain and palpitations.   Gastrointestinal:  Positive for abdominal pain, anorexia, nausea and vomiting. Negative for blood in stool, constipation, diarrhea and flatus.   Genitourinary:  Negative for dysuria, flank pain, frequency and hematuria.   Musculoskeletal:  Negative for joint swelling, neck pain and neck stiffness.   Skin:  Negative for rash and wound.   Neurological:  Positive for weakness. Negative for dizziness, seizures, syncope, facial asymmetry and headaches.   Psychiatric/Behavioral:  Negative for hallucinations, self-injury and suicidal ideas.    All other systems reviewed and are negative.          Objective       ED Triage Vitals   Temperature Pulse Blood Pressure Respirations SpO2 Patient Position - Orthostatic VS   03/29/25 1253 03/29/25 1253 03/29/25 1253 03/29/25 1253 03/29/25 1251 03/29/25 1253   (!) 97.3 °F (36.3 °C) 82 (!) 190/84 18 98 % Lying      Temp src Heart Rate Source BP Location FiO2 (%) Pain Score    -- 03/29/25 1253 03/29/25 1253 -- 03/29/25 1253     Monitor Right arm  8      Vitals      Date and Time Temp Pulse SpO2 Resp BP Pain Score FACES Pain Rating User   03/29/25 1415 -- 78 97 % 18 183/89 -- -- MY   03/29/25 1400 -- 85 96 % -- -- -- -- MY   03/29/25 1345 --  75 98 % -- 181/81 -- --    03/29/25 1330 -- 76 99 % 18 188/94 -- --    03/29/25 1315 -- 73 96 % -- 163/84 -- --    03/29/25 1300 -- 85 100 % -- 190/84 -- --    03/29/25 1253 97.3 °F (36.3 °C) 82 98 % 18 190/84 8 --    03/29/25 1251 -- -- 98 % -- -- -- --             Physical Exam  Vitals and nursing note reviewed.   Constitutional:       General: She is not in acute distress.     Appearance: She is well-developed.   HENT:      Head: Normocephalic and atraumatic.      Right Ear: External ear normal.      Left Ear: External ear normal.   Eyes:      General: No scleral icterus.        Right eye: No discharge.         Left eye: No discharge.      Extraocular Movements: Extraocular movements intact.      Conjunctiva/sclera: Conjunctivae normal.   Cardiovascular:      Rate and Rhythm: Normal rate and regular rhythm.      Heart sounds: Normal heart sounds. No murmur heard.  Pulmonary:      Effort: Pulmonary effort is normal.      Breath sounds: Normal breath sounds. No wheezing or rales.   Abdominal:      General: Bowel sounds are normal. There is no distension.      Palpations: Abdomen is soft.      Tenderness: There is generalized abdominal tenderness. There is no guarding or rebound.   Musculoskeletal:         General: No deformity. Normal range of motion.      Cervical back: Normal range of motion and neck supple.   Skin:     General: Skin is warm and dry.      Findings: No rash.   Neurological:      Mental Status: She is alert and oriented to person, place, and time.      Cranial Nerves: No cranial nerve deficit.      Comments: Slight facial droop and residual weakness on right side from previous stroke.   Psychiatric:         Mood and Affect: Mood normal.         Behavior: Behavior normal.         Thought Content: Thought content normal.         Judgment: Judgment normal.         Results Reviewed       Procedure Component Value Units Date/Time    Comprehensive metabolic panel [[id number]]  (Abnormal)  Collected: 03/29/25 1307    Lab Status: Final result Specimen: Blood from Arm, Left Updated: 03/29/25 1345     Sodium 141 mmol/L      Potassium 4.8 mmol/L      Chloride 107 mmol/L      CO2 27 mmol/L      ANION GAP 7 mmol/L      BUN 17 mg/dL      Creatinine 1.42 mg/dL      Glucose 131 mg/dL      Calcium 8.9 mg/dL      AST 32 U/L      ALT 18 U/L      Alkaline Phosphatase 107 U/L      Total Protein 7.8 g/dL      Albumin 3.6 g/dL      Total Bilirubin 0.53 mg/dL      eGFR 37 ml/min/1.73sq m     Narrative:      National Kid[employer] Disease Foundation guidelines for Chronic Kidney Disease (CKD):     Stage 1 with normal or high GFR (GFR > 90 mL/min/1.73 square meters)    Stage 2 Mild CKD (GFR = 60-89 mL/min/1.73 square meters)    Stage 3A Moderate CKD (GFR = 45-59 mL/min/1.73 square meters)    Stage 3B Moderate CKD (GFR = 30-44 mL/min/1.73 square meters)    Stage 4 Severe CKD (GFR = 15-29 mL/min/1.73 square meters)    Stage 5 End Stage CKD (GFR <15 mL/min/1.73 square meters)  Note: GFR calculation is accurate only with a steady state creatinine    Magnesium [[id number]]  (Normal) Collected: 03/29/25 1307    Lab Status: Final result Specimen: Blood from Arm, Left Updated: 03/29/25 1345     Magnesium 1.9 mg/dL     Lipase [[id number]]  (Abnormal) Collected: 03/29/25 1307    Lab Status: Final result Specimen: Blood from Arm, Left Updated: 03/29/25 1345     Lipase 121 u/L     HS Troponin I 2hr [[id number]]     Lab Status: No result Specimen: Blood     HS Troponin 0hr (reflex protocol) [[id number]]  (Normal) Collected: 03/29/25 1307    Lab Status: Final result Specimen: Blood from Arm, Left Updated: 03/29/25 1340     hs TnI 0hr 49 ng/L     Lactic acid, plasma (w/reflex if result > 2.0) [[id number]]  (Normal) Collected: 03/29/25 1307    Lab Status: Final result Specimen: Blood from Arm, Left Updated: 03/29/25 1336     LACTIC ACID 1.1 mmol/L     Narrative:      Result may be elevated if tourniquet was used during collection.    Protime-INR  [[id number]]  (Normal) Collected: 03/29/25 1307    Lab Status: Final result Specimen: Blood from Arm, Left Updated: 03/29/25 1336     Protime 13.7 seconds      INR 1.01    Narrative:      INR Therapeutic Range    Indication                                             INR Range      Atrial Fibrillation                                               2.0-3.0  Hypercoagulable State                                    2.0.2.3  Left Ventricular Asist Device                            2.0-3.0  Mechanical Heart Valve                                  -    Aortic(with afib, MI, embolism, HF, LA enlargement,    and/or coagulopathy)                                     2.0-3.0 (2.5-3.5)     Mitral                                                             2.5-3.5  Prosthetic/Bioprosthetic Heart Valve               2.0-3.0  Venous thromboembolism (VTE: VT, PE        2.0-3.0    APTT [[id number]]  (Abnormal) Collected: 03/29/25 1307    Lab Status: Final result Specimen: Blood from Arm, Left Updated: 03/29/25 1336     PTT 21 seconds     FLU/COVID Rapid Antigen (30 min. TAT) - Preferred screening test in ED [[id number]]  (Normal) Collected: 03/29/25 1307    Lab Status: Final result Specimen: Nares from Nose Updated: 03/29/25 1330     SARS COV Rapid Antigen Negative     Influenza A Rapid Antigen Negative     Influenza B Rapid Antigen Negative    Narrative:      This test has been performed using the [employer] Philomena 2 FLU+SARS Antigen test under the Emergency Use Authorization (EUA). This test has been validated by the  and verified by the performing laboratory. The Philomena uses lateral flow immunofluorescent sandwich assay to detect SARS-COV, Influenza A and Influenza B Antigen.     The [employer] Philomena 2 SARS Antigen test does not differentiate between SARS-CoV and SARS-CoV-2.     Negative results are presumptive and may be confirmed with a molecular assay, if necessary, for patient management. Negative results do not rule out  SARS-CoV-2 or influenza infection and should not be used as the sole basis for treatment or patient management decisions. A negative test result may occur if the level of antigen in a sample is below the limit of detection of this test.     Positive results are indicative of the presence of viral antigens, but do not rule out bacterial infection or co-infection with other viruses.     All test results should be used as an adjunct to clinical observations and other information available to the provider.    FOR PEDIATRIC PATIENTS - copy/paste COVID Guidelines URL to browser: [email or web address]    CBC and differential [[id number]]  (Abnormal) Collected: 03/29/25 1307    Lab Status: Final result Specimen: Blood from Arm, Left Updated: 03/29/25 1313     WBC 9.15 Thousand/uL      RBC 4.73 Million/uL      Hemoglobin 12.6 g/dL      Hematocrit 39.2 %      MCV 83 fL      MCH 26.6 pg      MCHC 32.1 g/dL      RDW 15.3 %      MPV 9.6 fL      Platelets 241 Thousands/uL      nRBC 0 /100 WBCs      Segmented % 88 %      Immature Grans % 0 %      Lymphocytes % 5 %      Monocytes % 6 %      Eosinophils Relative 1 %      Basophils Relative 0 %      Absolute Neutrophils 8.01 Thousands/µL      Absolute Immature Grans 0.03 Thousand/uL      Absolute Lymphocytes 0.46 Thousands/µL      Absolute Monocytes 0.59 Thousand/µL      Eosinophils Absolute 0.05 Thousand/µL      Basophils Absolute 0.01 Thousands/µL     UA w Reflex to Microscopic w Reflex to Culture [[id number]]     Lab Status: No result Specimen: Urine             CT abdomen pelvis with contrast   Final Interpretation by Long Zee MD (03/29 1530)      No acute findings with a normal appendix identified.            Workstation performed: [id number]             ECG 12 Lead Documentation Only    Date/Time: 3/29/2025 1:26 PM    Performed by: Mac Earl MD  Authorized by: Mac Ealr MD    ECG reviewed by me, the ED  Provider: yes    Patient location:  ED  Previous ECG:     Previous ECG:  Compared to current    Similarity:  No change  Rate:     ECG rate:  70  Rhythm:     Rhythm: sinus rhythm    Ectopy:     Ectopy: none    QRS:     QRS axis:  Normal      ED Medication and Procedure Management   Prior to Admission Medications   Prescriptions Last Dose Informant Patient Reported? Taking?   Alcohol Swabs (DropSafe Alcohol Prep) 70 % PADS  Self No No   Sig: Use one pad twice daily when check blood sugar   Blood Glucose Monitoring Suppl (True Metrix Air Glucose Meter) w/Device KIT  Self No No   Sig: USE AS DIRECTED   Januvia 50 MG tablet   No No   Sig: Take 1 tablet (50 mg total) by mouth daily   TRUEplus Lancets 33G MISC  Self No No   Sig: Use 2 (two) times a day   albuterol (PROVENTIL HFA,VENTOLIN HFA) 90 mcg/act inhaler   No No   Sig: Inhale 2 puffs every 6 (six) hours as needed for wheezing or shortness of breath   amLODIPine (NORVASC) 5 mg tablet   No No   Sig: Take 1 tablet (5 mg total) by mouth daily   aspirin (ECOTRIN LOW STRENGTH) 81 mg EC tablet  Self No No   Sig: Take 1 tablet (81 mg total) by mouth daily   atorvastatin (LIPITOR) 80 mg tablet   No No   Sig: Take 1 tablet (80 mg total) by mouth daily with dinner   carvedilol (COREG) 6.25 mg tablet   No No   Sig: Take 1 tablet (6.25 mg total) by mouth 2 (two) times a day with meals   dapagliflozin (Farxiga) 10 MG tablet   No No   Sig: Take 1 tablet (10 mg total) by mouth in the morning 1/2 tablet daily for the 1st month then increase to a full tablet daily in the morning   doxazosin (CARDURA) 2 mg tablet   No No   Sig: TAKE 1 TABLET (2 MG TOTAL) BY MOUTH DAILY   escitalopram (LEXAPRO) 5 mg tablet   No No   Sig: Take 1 tablet (5 mg total) by mouth daily   gabapentin (NEURONTIN) 300 mg capsule   No No   Sig: Take 1 capsule (300 mg total) by mouth 3 (three) times a day   hydrALAZINE (APRESOLINE) 50 mg tablet   No No   Sig: Take 1 tablet (50 mg total) by mouth 3 (three) times a  day   isosorbide mononitrate (IMDUR) 60 mg 24 hr tablet   No No   Sig: Take 1 tablet (60 mg total) by mouth daily   pantoprazole (PROTONIX) 40 mg tablet   No No   Sig: Take 1 tablet (40 mg total) by mouth daily before breakfast At 8AM   polyethylene glycol (GLYCOLAX) 17 GM/SCOOP powder  Self No No   Sig: Take 17 g by mouth daily Increase to twice daily if still constiptated   Patient not taking: Reported on 3/27/2025   valsartan (DIOVAN) 160 mg tablet   No No   Sig: Take 2 tablets (320 mg total) by mouth daily      Facility-Administered Medications: None     Patient's Medications   Discharge Prescriptions    No medications on file     No discharge procedures on file.  ED SEPSIS DOCUMENTATION   Time reflects when diagnosis was documented in both MDM as applicable and the Disposition within this note       Time User Action Codes Description Comment    3/29/2025  2:36 PM Mac Earl [R10.84] Generalized abdominal pain     3/29/2025  2:53 PM Mac Earl [K42.9] Umbilical hernia without obstruction and without gangrene                  Mac Ealr MD  03/29/25 2243

## 2025-03-29 NOTE — ASSESSMENT & PLAN NOTE
Chronic, continue home medication regimen: Carvedilol, doxazosin, amlodipine, hydralazine, isosorbide mononitrate

## 2025-03-30 LAB
ANION GAP SERPL CALCULATED.3IONS-SCNC: 5 MMOL/L (ref 4–13)
APTT PPP: 120 SECONDS (ref 23–34)
APTT PPP: 65 SECONDS (ref 23–34)
APTT PPP: 79 SECONDS (ref 23–34)
APTT PPP: >210 SECONDS (ref 23–34)
ATRIAL RATE: 66 BPM
ATRIAL RATE: 86 BPM
BUN SERPL-MCNC: 15 MG/DL (ref 5–25)
CALCIUM SERPL-MCNC: 8.2 MG/DL (ref 8.4–10.2)
CHLORIDE SERPL-SCNC: 110 MMOL/L (ref 96–108)
CO2 SERPL-SCNC: 27 MMOL/L (ref 21–32)
CREAT SERPL-MCNC: 1.58 MG/DL (ref 0.6–1.3)
ERYTHROCYTE [DISTWIDTH] IN BLOOD BY AUTOMATED COUNT: 15.9 % (ref 11.6–15.1)
GFR SERPL CREATININE-BSD FRML MDRD: 33 ML/MIN/1.73SQ M
GLUCOSE SERPL-MCNC: 111 MG/DL (ref 65–140)
GLUCOSE SERPL-MCNC: 116 MG/DL (ref 65–140)
GLUCOSE SERPL-MCNC: 126 MG/DL (ref 65–140)
GLUCOSE SERPL-MCNC: 84 MG/DL (ref 65–140)
GLUCOSE SERPL-MCNC: 90 MG/DL (ref 65–140)
HCT VFR BLD AUTO: 35.7 % (ref 34.8–46.1)
HGB BLD-MCNC: 11.1 G/DL (ref 11.5–15.4)
MAGNESIUM SERPL-MCNC: 1.9 MG/DL (ref 1.9–2.7)
MCH RBC QN AUTO: 26.5 PG (ref 26.8–34.3)
MCHC RBC AUTO-ENTMCNC: 31.1 G/DL (ref 31.4–37.4)
MCV RBC AUTO: 85 FL (ref 82–98)
P AXIS: 33 DEGREES
P AXIS: 44 DEGREES
PLATELET # BLD AUTO: 184 THOUSANDS/UL (ref 149–390)
PMV BLD AUTO: 10.3 FL (ref 8.9–12.7)
POTASSIUM SERPL-SCNC: 4.3 MMOL/L (ref 3.5–5.3)
PR INTERVAL: 210 MS
PR INTERVAL: 212 MS
QRS AXIS: -61 DEGREES
QRS AXIS: -61 DEGREES
QRSD INTERVAL: 136 MS
QRSD INTERVAL: 138 MS
QT INTERVAL: 420 MS
QT INTERVAL: 472 MS
QTC INTERVAL: 494 MS
QTC INTERVAL: 502 MS
RBC # BLD AUTO: 4.19 MILLION/UL (ref 3.81–5.12)
SODIUM SERPL-SCNC: 142 MMOL/L (ref 135–147)
T WAVE AXIS: -8 DEGREES
T WAVE AXIS: 6 DEGREES
VENTRICULAR RATE: 66 BPM
VENTRICULAR RATE: 86 BPM
WBC # BLD AUTO: 6.67 THOUSAND/UL (ref 4.31–10.16)

## 2025-03-30 PROCEDURE — 99232 SBSQ HOSP IP/OBS MODERATE 35: CPT | Performed by: STUDENT IN AN ORGANIZED HEALTH CARE EDUCATION/TRAINING PROGRAM

## 2025-03-30 PROCEDURE — 85730 THROMBOPLASTIN TIME PARTIAL: CPT | Performed by: STUDENT IN AN ORGANIZED HEALTH CARE EDUCATION/TRAINING PROGRAM

## 2025-03-30 PROCEDURE — 83735 ASSAY OF MAGNESIUM: CPT | Performed by: STUDENT IN AN ORGANIZED HEALTH CARE EDUCATION/TRAINING PROGRAM

## 2025-03-30 PROCEDURE — 82948 REAGENT STRIP/BLOOD GLUCOSE: CPT

## 2025-03-30 PROCEDURE — 80048 BASIC METABOLIC PNL TOTAL CA: CPT | Performed by: STUDENT IN AN ORGANIZED HEALTH CARE EDUCATION/TRAINING PROGRAM

## 2025-03-30 PROCEDURE — 85730 THROMBOPLASTIN TIME PARTIAL: CPT | Performed by: HOSPITALIST

## 2025-03-30 PROCEDURE — PBNCHG PB NO CHARGE PLACEHOLDER: Performed by: INTERNAL MEDICINE

## 2025-03-30 PROCEDURE — 85027 COMPLETE CBC AUTOMATED: CPT | Performed by: STUDENT IN AN ORGANIZED HEALTH CARE EDUCATION/TRAINING PROGRAM

## 2025-03-30 RX ORDER — MAGNESIUM HYDROXIDE/ALUMINUM HYDROXICE/SIMETHICONE 120; 1200; 1200 MG/30ML; MG/30ML; MG/30ML
30 SUSPENSION ORAL EVERY 4 HOURS PRN
Status: DISCONTINUED | OUTPATIENT
Start: 2025-03-30 | End: 2025-04-03 | Stop reason: HOSPADM

## 2025-03-30 RX ORDER — INSULIN LISPRO 100 [IU]/ML
1-6 INJECTION, SOLUTION INTRAVENOUS; SUBCUTANEOUS
Status: DISCONTINUED | OUTPATIENT
Start: 2025-03-30 | End: 2025-04-03 | Stop reason: HOSPADM

## 2025-03-30 RX ORDER — PANTOPRAZOLE SODIUM 40 MG/1
40 TABLET, DELAYED RELEASE ORAL
Status: DISCONTINUED | OUTPATIENT
Start: 2025-03-30 | End: 2025-03-30

## 2025-03-30 RX ORDER — PANTOPRAZOLE SODIUM 40 MG/10ML
40 INJECTION, POWDER, LYOPHILIZED, FOR SOLUTION INTRAVENOUS
Status: DISCONTINUED | OUTPATIENT
Start: 2025-03-30 | End: 2025-03-30

## 2025-03-30 RX ORDER — PANTOPRAZOLE SODIUM 40 MG/1
40 TABLET, DELAYED RELEASE ORAL
Status: DISCONTINUED | OUTPATIENT
Start: 2025-03-30 | End: 2025-04-03 | Stop reason: HOSPADM

## 2025-03-30 RX ADMIN — DOCUSATE SODIUM 100 MG: 100 CAPSULE, LIQUID FILLED ORAL at 08:43

## 2025-03-30 RX ADMIN — HYDRALAZINE HYDROCHLORIDE 50 MG: 25 TABLET ORAL at 08:43

## 2025-03-30 RX ADMIN — AMLODIPINE BESYLATE 5 MG: 5 TABLET ORAL at 08:43

## 2025-03-30 RX ADMIN — ALUMINUM HYDROXIDE, MAGNESIUM HYDROXIDE, AND DIMETHICONE 30 ML: 200; 20; 200 SUSPENSION ORAL at 12:20

## 2025-03-30 RX ADMIN — GABAPENTIN 300 MG: 300 CAPSULE ORAL at 16:36

## 2025-03-30 RX ADMIN — GABAPENTIN 300 MG: 300 CAPSULE ORAL at 21:37

## 2025-03-30 RX ADMIN — GABAPENTIN 300 MG: 300 CAPSULE ORAL at 08:43

## 2025-03-30 RX ADMIN — ESCITALOPRAM OXALATE 5 MG: 5 TABLET, FILM COATED ORAL at 08:43

## 2025-03-30 RX ADMIN — PANTOPRAZOLE SODIUM 40 MG: 40 TABLET, DELAYED RELEASE ORAL at 09:33

## 2025-03-30 RX ADMIN — ASPIRIN 81 MG: 81 TABLET, COATED ORAL at 08:43

## 2025-03-30 RX ADMIN — CARVEDILOL 6.25 MG: 6.25 TABLET, FILM COATED ORAL at 08:46

## 2025-03-30 RX ADMIN — PANTOPRAZOLE SODIUM 40 MG: 40 TABLET, DELAYED RELEASE ORAL at 16:36

## 2025-03-30 RX ADMIN — ATORVASTATIN CALCIUM 80 MG: 40 TABLET, FILM COATED ORAL at 16:36

## 2025-03-30 RX ADMIN — HYDRALAZINE HYDROCHLORIDE 50 MG: 25 TABLET ORAL at 21:37

## 2025-03-30 RX ADMIN — ISOSORBIDE MONONITRATE 60 MG: 60 TABLET, EXTENDED RELEASE ORAL at 08:43

## 2025-03-30 NOTE — UTILIZATION REVIEW
"Initial Clinical Review    Admission: Date/Time/Statement:   Admission Orders (From admission, onward)       Ordered        03/29/25 1757  INPATIENT ADMISSION  Once            03/29/25 1622  Place in Observation  Once,   Status:  Canceled                          Orders Placed This Encounter   Procedures    INPATIENT ADMISSION     Standing Status:   Standing     Number of Occurrences:   1     Level of Care:   Med Surg [16]     Estimated length of stay:   More than 2 Midnights     Certification:   I certify that inpatient services are medically necessary for this patient for a duration of greater than two midnights. See H&P and MD Progress Notes for additional information about the patient's course of treatment.     ED Arrival Information       Expected   3/29/2025 12:50    Arrival   3/29/2025 12:50    Acuity   Urgent              Means of arrival   Ambulance    Escorted by   [facility]    Service   Hospitalist    Admission type   Emergency              Arrival complaint   umbilical pain             Chief Complaint   Patient presents with    Abdominal Pain     Pt presented to this ED via EMS from home c/o abdominal pain w/nausea and vomiting over past 2wks. Pt taking tylenol with some relief. Denies travel/sob/fevers/cough/diarrhea       Initial Presentation: 69 y.o. female presents to ED via  EMS from home with abdominal pain nausea and vomiting for past  2 weeks.  Has been taking  tylenol with minimal relief.  Patient complained of periumbilical pain that is progressively worsened with decreased appetite, reports feeling weak and tired. She denies any diarrhea or cough or cold symptoms.   Ct abdomen unremarkable.   Stated  \" chest feels  funny\".    IMELDA = 5. Troponin  49>52>66.  PMH  is  HTN, CAD,  CKD,  HLD, prior  MI S/P stent and DM 2.  Hepatin  drip recommended per cardiology.    Admit  Ip with Elevated troponin  and plan is  IV  heparin, tele, pain control, cardiology consult, aspirin/plavix,   hold " antibiotics and continue home meds.       Anticipated Length of Stay/Certification Statement: Patient will be admitted on an inpatient basis with an anticipated length of stay of greater than 2 midnights secondary to elevated troponin.     Date:    3/30   Day 2:   Complains  of  abdominal pain, worse with food.   Feeling  in her chest improved.   Remains  on tele, continue  IV heparin.     Date:   3.31  Day 3: Has surpassed a 2nd midnight with active treatments and services.  Cardiology  consult  Remain on  IV  heparin.   Troponin trended to 66.  Wait  2  DE.  If negative, plan  IV  heparin for  48 hrs, DAPT/statin/BB.   No persistent chest pain  since admission.      Continue current meds.         ED Treatment-Medication Administration from 03/29/2025 1250 to 03/29/2025 1828         Date/Time Order Dose Route Action     03/29/2025 1308 sodium chloride 0.9 % bolus 1,000 mL 1,000 mL Intravenous New Bag     03/29/2025 1308 ondansetron (ZOFRAN) injection 4 mg 4 mg Intravenous Given     03/29/2025 1352 iohexol (OMNIPAQUE) 350 MG/ML injection (MULTI-DOSE) 100 mL 100 mL Intravenous Given     03/29/2025 1614 aspirin chewable tablet 324 mg 324 mg Oral Given     03/29/2025 1814 heparin (porcine) injection 4,000 Units 4,000 Units Intravenous Given     03/29/2025 1814 heparin (porcine) 25,000 units in 0.45% NaCl 250 mL infusion (premix) 11.8 Units/kg/hr Intravenous New Bag            Scheduled Medications:  amLODIPine, 5 mg, Oral, Daily  aspirin, 81 mg, Oral, Daily  atorvastatin, 80 mg, Oral, Daily With Dinner  carvedilol, 6.25 mg, Oral, BID With Meals  docusate sodium, 100 mg, Oral, BID  escitalopram, 5 mg, Oral, Daily  gabapentin, 300 mg, Oral, TID  hydrALAZINE, 50 mg, Oral, TID  insulin lispro, 1-5 Units, Subcutaneous, HS  insulin lispro, 1-6 Units, Subcutaneous, TID AC  isosorbide mononitrate, 60 mg, Oral, Daily  pantoprazole, 40 mg, Oral, BID AC      Continuous IV Infusions:  heparin (porcine), 3-20 Units/kg/hr  (Order-Specific), Intravenous, Titrated      PRN Meds:  aluminum-magnesium hydroxide-simethicone, 30 mL, Oral, Q4H PRN  ondansetron, 4 mg, Intravenous, Q6H PRN      ED Triage Vitals   Temperature Pulse Respirations Blood Pressure SpO2 Pain Score   03/29/25 1253 03/29/25 1253 03/29/25 1253 03/29/25 1253 03/29/25 1251 03/29/25 1253   (!) 97.3 °F (36.3 °C) 82 18 (!) 190/84 98 % 8     Weight (last 2 days)       Date/Time Weight    03/29/25 1253 88.2 (194.45)            Vital Signs (last 3 days)       Date/Time Temp Pulse Resp BP MAP (mmHg) SpO2 O2 Device Patient Position - Orthostatic VS Weir Coma Scale Score Pain    03/30/25 14:21:48 97 °F (36.1 °C) 62 18 106/56 73 96 % -- -- -- --    03/30/25 0900 -- -- -- -- -- -- None (Room air) -- 15 No Pain    03/30/25 0842 -- 82 -- 112/67 -- -- -- -- -- No Pain    03/30/25 07:50:19 96.8 °F (36 °C) 58 18 101/68 79 97 % -- -- -- --    03/30/25 03:45:12 96.8 °F (36 °C) 59 -- 151/63 92 97 % -- -- -- --    03/29/25 22:17:21 97.2 °F (36.2 °C) 75 -- 146/70 95 95 % -- -- -- --    03/29/25 2039 -- -- -- 171/73 -- -- -- -- -- --    03/29/25 1930 -- -- -- -- -- -- None (Room air) -- 15 No Pain    03/29/25 18:34:22 -- 73 18 154/86 109 96 % -- -- -- --    03/29/25 1815 -- 78 -- -- -- 98 % -- -- -- --    03/29/25 1800 -- 82 -- 129/73 96 99 % -- -- -- --    03/29/25 1745 -- 79 -- 155/79 111 97 % -- -- -- --    03/29/25 1730 -- 82 -- 159/77 110 97 % -- -- -- --    03/29/25 1715 -- 85 -- 202/101 138 97 % -- -- -- --    03/29/25 1700 -- 83 -- -- -- 98 % -- -- -- --    03/29/25 1645 -- 86 -- -- -- 97 % -- -- -- --    03/29/25 1630 -- 89 -- -- -- 98 % -- -- -- --    03/29/25 1615 -- 85 -- 163/78 114 98 % -- -- -- --    03/29/25 1600 -- 84 -- 174/85 122 99 % -- -- -- --    03/29/25 1545 -- 85 -- 194/86 124 99 % -- -- -- --    03/29/25 1530 -- 85 17 159/76 109 97 % None (Room air) Lying -- --    03/29/25 1515 -- 84 -- 176/67 111 97 % -- -- -- --    03/29/25 1500 -- 80 -- 181/83 119 95 % -- -- -- --     03/29/25 1445 -- 82 -- 179/79 117 95 % -- -- -- --    03/29/25 1430 -- 81 -- 176/81 117 99 % -- -- -- --    03/29/25 1415 -- 78 18 183/89 -- 97 % -- -- -- --    03/29/25 1400 -- 85 -- -- -- 96 % -- -- -- --    03/29/25 1345 -- 75 -- 181/81 117 98 % -- -- -- --    03/29/25 1330 -- 76 18 188/94 134 99 % -- -- -- --    03/29/25 1315 -- 73 -- 163/84 106 96 % -- -- -- --    03/29/25 1300 -- 85 -- 190/84 121 100 % -- -- -- --    03/29/25 1253 97.3 °F (36.3 °C) 82 18 190/84 -- 98 % None (Room air) Lying -- 8    03/29/25 1251 -- -- -- -- -- 98 % -- -- -- --              Pertinent Labs/Diagnostic Test Results:   Radiology:  CT abdomen pelvis with contrast   Final Interpretation by Long Zee MD (03/29 1450)      No acute findings with a normal appendix identified.            Workstation performed: [id number]           Cardiology:  ECG 12 lead   Final Result by Inna Aguirer MD (03/30 1346)   Sinus rhythm with 1st degree A-V block   Left axis deviation   Left ventricular hypertrophy with QRS widening   Abnormal ECG   When compared with ECG of 29-Mar-2025 13:22, (unconfirmed)   No significant change was found   Confirmed by Inna Aguirre ([id number]) on 3/30/2025 1:46:47 PM      ECG 12 lead   Final Result by Inna Aguirre MD (03/30 0182)   Sinus rhythm with 1st degree A-V block   Left axis deviation   Left ventricular hypertrophy with QRS widening   Abnormal ECG   When compared with ECG of 08-Jan-2025 12:18,   Premature ventricular complexes are no longer Present   (RBBB and left anterior fascicular block) is no longer Present   Confirmed by Inna Aguirre ([id number]) on 3/30/2025 1:47:19 PM        GI:      Results from last 7 days   Lab Units 03/30/25  0742 03/29/25  1307 03/27/25  0938   WBC Thousand/uL 6.67 9.15 4.91   HEMOGLOBIN g/dL 11.1* 12.6 12.4   HEMATOCRIT % 35.7 39.2 39.5   PLATELETS Thousands/uL 184 241 273   TOTAL NEUT ABS Thousands/µL  --  8.01*  --          Results from last 7 days   Lab Units  03/30/25  0742 03/29/25  1307 03/27/25  0938   SODIUM mmol/L 142 141 140   POTASSIUM mmol/L 4.3 4.8 3.8   CHLORIDE mmol/L 110* 107 104   CO2 mmol/L 27 27 27   ANION GAP mmol/L 5 7 9   BUN mg/dL 15 17 21   CREATININE mg/dL 1.58* 1.42* 1.71*   EGFR ml/min/1.73sq m 33 37 30   CALCIUM mg/dL 8.2* 8.9 8.8   MAGNESIUM mg/dL 1.9 1.9 2.1   PHOSPHORUS mg/dL  --   --  2.9     Results from last 7 days   Lab Units 03/29/25  1307 03/27/25  0938   AST U/L 32 24   ALT U/L 18 17   ALK PHOS U/L 107* 120*   TOTAL PROTEIN g/dL 7.8 7.9   ALBUMIN g/dL 3.6 3.6   TOTAL BILIRUBIN mg/dL 0.53 0.33     Results from last 7 days   Lab Units 03/30/25  1122 03/30/25  0745   POC GLUCOSE mg/dl 126 90     Results from last 7 days   Lab Units 03/30/25  0742 03/29/25  1307   GLUCOSE RANDOM mg/dL 84 131         Results from last 7 days   Lab Units 03/27/25  0849   HEMOGLOBIN A1C  6.2               Results from last 7 days   Lab Units 03/29/25  1705 03/29/25  1535 03/29/25  1307   HS TNI 0HR ng/L  --   --  49   HS TNI 2HR ng/L  --  52*  --    HSTNI D2 ng/L  --  3  --    HS TNI 4HR ng/L 66*  --   --    HSTNI D4 ng/L 17  --   --          Results from last 7 days   Lab Units 03/30/25  0742 03/30/25  0008 03/29/25  1307   PROTIME seconds  --   --  13.7   INR   --   --  1.01   PTT seconds 120* >210* 21*     Results from last 7 days   Lab Units 03/27/25  0938   TSH 3RD GENERATON uIU/mL 0.996         Results from last 7 days   Lab Units 03/29/25  1307   LACTIC ACID mmol/L 1.1                                 Results from last 7 days   Lab Units 03/29/25  1307   LIPASE u/L 121*                 Results from last 7 days   Lab Units 03/29/25  1630 03/27/25  0938   CLARITY UA  Clear Clear   COLOR UA  Yellow Yellow   SPEC GRAV UA  1.015 1.024   PH UA  6.0 6.0   GLUCOSE UA mg/dl 100 (1/10%)* 300 (3/10%)*   KETONES UA mg/dl Negative Negative   BLOOD UA  Trace-Intact* Trace*   PROTEIN UA mg/dl 100 (2+)* 600 (3+)*   NITRITE UA  Negative Negative   BILIRUBIN UA  Negative  Negative   UROBILINOGEN UA E.U./dl 0.2  --    UROBILINOGEN UA (BE) mg/dl  --  <2.0   LEUKOCYTES UA  Negative Negative   WBC UA /hpf 0-1 2-4*   RBC UA /hpf 0-1 1-2   BACTERIA UA /hpf Occasional Occasional   EPITHELIAL CELLS WET PREP /hpf Occasional Occasional   MUCUS THREADS  Occasional* Occasional*   CREATININE UR mg/dL  --  257.6                                 Results from last 7 days   Lab Units 03/27/25  0938   URINE CULTURE  No Growth <1000 cfu/mL               Present on Admission:   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 proliferative retinopathy and macular edema, without long-term current use of insulin ([facility])   Hyperlipidemia   Essential hypertension   Chronic kidney disease, stage 3 unspecified ([facility])   Atherosclerosis of native coronary artery of native heart without angina pectoris   PSVT (paroxysmal supraventricular tachycardia) ([facility])   Nausea and vomiting      Admitting Diagnosis: Abdominal pain [R10.9]  Generalized abdominal pain [R10.84]  Elevated troponin [R79.89]  Umbilical hernia without obstruction and without gangrene [K42.9]  Age/Sex: 69 y.o. female    Network Utilization Review Department  ATTENTION: Please call with any questions or concerns to [phone] and carefully listen to the prompts so that you are directed to the right person. All voicemails are confidential.   For Discharge needs, contact Care Management DC Support Team at [phone] opt. [phone]  Send all requests for admission clinical reviews, approved or denied determinations and any other requests to dedicated fax number below belonging to the campus where the patient is receiving treatment. List of dedicated fax numbers for the Facilities:  FACILITY NAME UR FAX NUMBER   ADMISSION DENIALS (Administrative/Medical Necessity) [phone]   DISCHARGE SUPPORT TEAM (NETWORK) [phone]   PARENT CHILD HEALTH (Maternity/NICU/Pediatrics) [phone]   [facility] [phone]   [facility]  [facility] [phone]   Critical access hospital [phone]   [facility] [phone]   [facility] [phone]   [facility] [phone]   [facility] [phone]   [facility] [phone]   [facility] [phone]   [facility] [phone]   [facility] [phone]   [facility] [phone]         Alert-The patient is alert, awake and responds to voice. The patient is oriented to time, place, and person. The triage nurse is able to obtain subjective information.

## 2025-03-30 NOTE — ASSESSMENT & PLAN NOTE
Patient complains of 2 week history of abdominal pain with associated nausea and vomiting that began on the day of admission. She reports 5-6 episodes of vomiting within a 3 hours period.  No hematemesis. Denies diarrhea. Denies recent travel or sick contacts  She does report eating chinese food on thurs  CT A/P: No acute findings with a normal appendix identified.   Likely GERD.  CT A/P does show small hiatal hernia, patient was supposed to follow-up with GI outpatient.  Zofran as needed  Mylanta as needed  Increase PPI to twice daily  No leukocytosis  Obtain procalcitonin  Monitor off abx

## 2025-03-30 NOTE — PROGRESS NOTES
"Progress Note - Hospitalist   Name: Erin Arroyo 69 y.o. female I MRN: [id number]  Unit/Bed#: [facility]-01 I Date of Admission: 3/29/2025   Date of Service: 3/30/2025 I Hospital Day: 1    Assessment & Plan  Elevated troponin  Patient presents to the ED with complaints of \"chest feels funny\".    EKG per ED provider shows normal sinus rhythm  IMELDA score = 5  Troponin 49-->52-->66   Lipid panel,TSH  ordered  A1c of 6.2  Patient received ASA 325mg in the ED; continue 81mg daily   Continue atorvastatin 80 mg daily  B-blockers; Hr<70  Hold NSAIDS  Pain management  Cardiology consult; recommendations appreciated  Telemetry  Cardiac diet; low sodium    Discussed with on-call cardiology: Recommend heparin gtt.  Abdominal pain  Patient complains of 2 week history of abdominal pain with associated nausea and vomiting that began on the day of admission. She reports 5-6 episodes of vomiting within a 3 hours period.  No hematemesis. Denies diarrhea. Denies recent travel or sick contacts  She does report eating chinese food on thurs  CT A/P: No acute findings with a normal appendix identified.   Likely GERD.  CT A/P does show small hiatal hernia, patient was supposed to follow-up with GI outpatient.  Zofran as needed  Mylanta as needed  Increase PPI to twice daily  No leukocytosis  Obtain procalcitonin  Monitor off abx  Nausea and vomiting  As above.   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 proliferative retinopathy and macular edema, without long-term current use of insulin ([facility])  Lab Results   Component Value Date    HGBA1C 6.2 03/27/2025       Recent Labs     03/30/25  0745   POCGLU 90       Blood Sugar Average: Last 72 hrs:  (P) 90Home meds on hold  Sliding scale insulin  Accu-Cheks before meals and nightly  Optimal glucose control 140-180 while hospitalized  Hypoglycemia protocol  Carb controlled diet  Essential hypertension  Chronic, continue home medication regimen: Carvedilol, doxazosin, amlodipine, " "hydralazine, isosorbide mononitrate  Hyperlipidemia  Chronic, continue statin therapy  Chronic kidney disease, stage 3 unspecified ([facility])  Lab Results   Component Value Date    EGFR 33 03/30/2025    EGFR 37 03/29/2025    EGFR 30 03/27/2025    CREATININE 1.58 (H) 03/30/2025    CREATININE 1.42 (H) 03/29/2025    CREATININE 1.71 (H) 03/27/2025   Chronic, at baseline  Atherosclerosis of native coronary artery of native heart without angina pectoris  Please see discussion under elevated troponin  PFO (patent foramen ovale)  History of small PFO on TTE in 2023.  PSVT (paroxysmal supraventricular tachycardia) ([facility])  Noted to have paroxysmal SVT on extended Holter monitoring.  No A-fib identified.  Continue beta-blocker therapy  Telemetry monitoring    VTE Pharmacologic Prophylaxis:   High Risk (Score >/= 5) - Pharmacological DVT Prophylaxis Ordered: heparin drip. Sequential Compression Devices Ordered.    Mobility:   Basic Mobility Inpatient Raw Score: 20  JH-HLM Goal: 6: Walk 10 steps or more  JH-HLM Achieved: 7: Walk 25 feet or more  JH-HLM Goal achieved. Continue to encourage appropriate mobility.    Patient Centered Rounds: I performed bedside rounds with nursing staff today.   Discussions with Specialists or Other Care Team Provider: CM    Education and Discussions with Family / Patient: Patient declined call to .     Current Length of Stay: 1 day(s)  Current Patient Status: Inpatient   Certification Statement: The patient will continue to require additional inpatient hospital stay due to elevated troponin  Discharge Plan: Anticipate discharge in 24-48 hrs to home.    Code Status: Level 1 - Full Code    Subjective   Patient seen and examined at bedside.  No acute events overnight.  She does complain of abdominal tenderness that worsens with food.  She does report the \"funny feeling in her chest\" has improved slightly.  No other acute complaints at this time.    Objective :  Temp:  [96.8 °F (36 °C)-97.3 °F " (36.3 °C)] 96.8 °F (36 °C)  HR:  [58-89] 82  BP: (101-202)/() 112/67  Resp:  [17-18] 18  SpO2:  [95 %-100 %] 97 %  O2 Device: None (Room air)    Body mass index is 31.38 kg/m².     Input and Output Summary (last 24 hours):     Intake/Output Summary (Last 24 hours) at 3/30/2025 0856  Last data filed at 3/30/2025 0801  Gross per 24 hour   Intake 1600 ml   Output --   Net 1600 ml       Physical Exam  Vitals and nursing note reviewed.   Constitutional:       General: She is not in acute distress.     Appearance: She is obese. She is not toxic-appearing.   HENT:      Head: Normocephalic and atraumatic.   Eyes:      Extraocular Movements: Extraocular movements intact.      Pupils: Pupils are equal, round, and reactive to light.   Cardiovascular:      Rate and Rhythm: Normal rate and regular rhythm.   Pulmonary:      Effort: Pulmonary effort is normal.      Breath sounds: Normal breath sounds.   Abdominal:      General: There is no distension.      Palpations: Abdomen is soft.      Tenderness: There is abdominal tenderness. There is no guarding.   Musculoskeletal:         General: No swelling.   Skin:     General: Skin is warm.   Neurological:      General: No focal deficit present.      Mental Status: She is alert and oriented to person, place, and time. Mental status is at baseline.   Psychiatric:         Mood and Affect: Mood normal.         Behavior: Behavior normal.        Lines/Drains:        Telemetry:  Telemetry Orders (From admission, onward)               24 Hour Telemetry Monitoring  Continuous x 24 Hours (Telem)        Expiring   Question:  Reason for 24 Hour Telemetry  Answer:  PCI/EP study (including pacer and ICD implementation), Cardiac surgery, MI, abnormal cardiac cath, and chest pain- rule out MI                     Telemetry Reviewed: Normal Sinus Rhythm  Indication for Continued Telemetry Use: Acute MI/Unstable Angina/Rule out ACS               Lab Results: I have reviewed the following results:    Results from last 7 days   Lab Units 03/30/25  0742 03/29/25  1307   WBC Thousand/uL 6.67 9.15   HEMOGLOBIN g/dL 11.1* 12.6   HEMATOCRIT % 35.7 39.2   PLATELETS Thousands/uL 184 241   SEGS PCT %  --  88*   LYMPHO PCT %  --  5*   MONO PCT %  --  6   EOS PCT %  --  1     Results from last 7 days   Lab Units 03/30/25  0742 03/29/25  1307   SODIUM mmol/L 142 141   POTASSIUM mmol/L 4.3 4.8   CHLORIDE mmol/L 110* 107   CO2 mmol/L 27 27   BUN mg/dL 15 17   CREATININE mg/dL 1.58* 1.42*   ANION GAP mmol/L 5 7   CALCIUM mg/dL 8.2* 8.9   ALBUMIN g/dL  --  3.6   TOTAL BILIRUBIN mg/dL  --  0.53   ALK PHOS U/L  --  107*   ALT U/L  --  18   AST U/L  --  32   GLUCOSE RANDOM mg/dL 84 131     Results from last 7 days   Lab Units 03/29/25  1307   INR  1.01     Results from last 7 days   Lab Units 03/30/25  0745   POC GLUCOSE mg/dl 90     Results from last 7 days   Lab Units 03/27/25  0849   HEMOGLOBIN A1C  6.2     Results from last 7 days   Lab Units 03/29/25  1307   LACTIC ACID mmol/L 1.1       Recent Cultures (last 7 days):   Results from last 7 days   Lab Units 03/27/25  0938   URINE CULTURE  No Growth <1000 cfu/mL             Last 24 Hours Medication List:     Current Facility-Administered Medications:     amLODIPine (NORVASC) tablet 5 mg, Daily    aspirin (ECOTRIN LOW STRENGTH) EC tablet 81 mg, Daily    atorvastatin (LIPITOR) tablet 80 mg, Daily With Dinner    carvedilol (COREG) tablet 6.25 mg, BID With Meals    docusate sodium (COLACE) capsule 100 mg, BID    escitalopram (LEXAPRO) tablet 5 mg, Daily    gabapentin (NEURONTIN) capsule 300 mg, TID    heparin (porcine) 25,000 units in 0.45% NaCl 250 mL infusion (premix), Titrated, Last Rate: Stopped (03/30/25 0831)    hydrALAZINE (APRESOLINE) tablet 50 mg, TID    insulin lispro (HumALOG/ADMELOG) 100 units/mL subcutaneous injection 1-5 Units, HS    insulin lispro (HumALOG/ADMELOG) 100 units/mL subcutaneous injection 1-6 Units, TID AC **AND** Fingerstick Glucose (POCT), TID AC     isosorbide mononitrate (IMDUR) 24 hr tablet 60 mg, Daily    ondansetron (ZOFRAN) injection 4 mg, Q6H PRN    pantoprazole (PROTONIX) EC tablet 40 mg, Early Morning    Administrative Statements   Today, Patient Was Seen By: Madelyn Garcia MD      **Please Note: This note may have been constructed using a voice recognition system.**

## 2025-03-30 NOTE — ASSESSMENT & PLAN NOTE
Lab Results   Component Value Date    HGBA1C 6.2 03/27/2025       Recent Labs     03/30/25  0745   POCGLU 90       Blood Sugar Average: Last 72 hrs:  (P) 90Home meds on hold  Sliding scale insulin  Accu-Cheks before meals and nightly  Optimal glucose control 140-180 while hospitalized  Hypoglycemia protocol  Carb controlled diet

## 2025-03-30 NOTE — PLAN OF CARE
Problem: SAFETY ADULT  Goal: Patient will remain free of falls  Description: INTERVENTIONS:- Educate patient/family on patient safety including physical limitations- Instruct patient to call for assistance with activity - Consult OT/PT to assist with strengthening/mobility - Keep Call bell within reach- Keep bed low and locked with side rails adjusted as appropriate- Keep care items and personal belongings within reach- Initiate and maintain comfort rounds  Goal: Maintain or return to baseline ADL function  Description: INTERVENTIONS:-  Assess patient's ability to carry out ADLs; assess patient's baseline for ADL function and identify physical deficits which impact ability to perform ADLs (bathing, care of mouth/teeth, toileting, grooming, dressing, etc.)- Assess/evaluate cause of self-care deficits - Assess range of motion- Assess patient's mobility; develop plan if impaired- Assess patient's need for assistive devices and provide as appropriate- Encourage maximum independence but intervene and supervise when necessary- Involve family in performance of ADLs- Assess for home care needs following discharge - Consider OT consult to assist with ADL evaluation and planning for discharge- Provide patient education as appropriate  Outcome: Progressing  Goal: Maintains/Returns to pre admission functional level  Description: INTERVENTIONS:- Perform AM-PAC 6 Click Basic Mobility/ Daily Activity assessment daily.- Set and communicate daily mobility goal to care team and patient/family/caregiver. - Collaborate with rehabilitation services on mobility goals if consulted  Outcome: Progressing

## 2025-03-30 NOTE — ASSESSMENT & PLAN NOTE
Lab Results   Component Value Date    EGFR 33 03/30/2025    EGFR 37 03/29/2025    EGFR 30 03/27/2025    CREATININE 1.58 (H) 03/30/2025    CREATININE 1.42 (H) 03/29/2025    CREATININE 1.71 (H) 03/27/2025   Chronic, at baseline

## 2025-03-31 ENCOUNTER — APPOINTMENT (INPATIENT)
Dept: NON INVASIVE DIAGNOSTICS | Facility: HOSPITAL | Age: 70
DRG: 700 | End: 2025-03-31
Payer: COMMERCIAL

## 2025-03-31 ENCOUNTER — APPOINTMENT (INPATIENT)
Dept: RADIOLOGY | Facility: HOSPITAL | Age: 70
DRG: 700 | End: 2025-03-31
Payer: COMMERCIAL

## 2025-03-31 PROBLEM — N17.9 AKI (ACUTE KIDNEY INJURY) (HCC): Status: ACTIVE | Noted: 2023-04-12

## 2025-03-31 LAB
ANION GAP SERPL CALCULATED.3IONS-SCNC: 6 MMOL/L (ref 4–13)
AORTIC ROOT: 3.1 CM
APTT PPP: 75 SECONDS (ref 23–34)
ASCENDING AORTA: 3 CM
BACTERIA UR QL AUTO: ABNORMAL /HPF
BILIRUB UR QL STRIP: NEGATIVE
BSA FOR ECHO PROCEDURE: 1.98 M2
BUN SERPL-MCNC: 18 MG/DL (ref 5–25)
CALCIUM SERPL-MCNC: 7.9 MG/DL (ref 8.4–10.2)
CHLORIDE SERPL-SCNC: 108 MMOL/L (ref 96–108)
CHOLEST SERPL-MCNC: 118 MG/DL (ref ?–200)
CLARITY UR: CLEAR
CO2 SERPL-SCNC: 24 MMOL/L (ref 21–32)
COLOR UR: YELLOW
CREAT SERPL-MCNC: 2.07 MG/DL (ref 0.6–1.3)
E WAVE DECELERATION TIME: 306 MS
E/A RATIO: 0.63
ERYTHROCYTE [DISTWIDTH] IN BLOOD BY AUTOMATED COUNT: 15.9 % (ref 11.6–15.1)
FRACTIONAL SHORTENING: 31 (ref 28–44)
GFR SERPL CREATININE-BSD FRML MDRD: 23 ML/MIN/1.73SQ M
GLUCOSE SERPL-MCNC: 101 MG/DL (ref 65–140)
GLUCOSE SERPL-MCNC: 103 MG/DL (ref 65–140)
GLUCOSE SERPL-MCNC: 103 MG/DL (ref 65–140)
GLUCOSE SERPL-MCNC: 78 MG/DL (ref 65–140)
GLUCOSE SERPL-MCNC: 81 MG/DL (ref 65–140)
GLUCOSE UR STRIP-MCNC: NEGATIVE MG/DL
HCT VFR BLD AUTO: 31.5 % (ref 34.8–46.1)
HDLC SERPL-MCNC: 23 MG/DL
HGB BLD-MCNC: 10.1 G/DL (ref 11.5–15.4)
HGB UR QL STRIP.AUTO: NEGATIVE
INTERVENTRICULAR SEPTUM IN DIASTOLE (PARASTERNAL SHORT AXIS VIEW): 1.1 CM
INTERVENTRICULAR SEPTUM: 1.1 CM (ref 0.6–1.1)
KETONES UR STRIP-MCNC: NEGATIVE MG/DL
LAAS-AP2: 17.9 CM2
LAAS-AP4: 19.5 CM2
LDLC SERPL CALC-MCNC: 73 MG/DL (ref 0–100)
LEFT ATRIUM SIZE: 3.9 CM
LEFT ATRIUM VOLUME (MOD BIPLANE): 53 ML
LEFT ATRIUM VOLUME INDEX (MOD BIPLANE): 26.8 ML/M2
LEFT INTERNAL DIMENSION IN SYSTOLE: 2.7 CM (ref 2.1–4)
LEFT VENTRICLE DIASTOLIC VOLUME (MOD BIPLANE): 80 ML
LEFT VENTRICLE DIASTOLIC VOLUME INDEX (MOD BIPLANE): 40.4 ML/M2
LEFT VENTRICLE SYSTOLIC VOLUME (MOD BIPLANE): 27 ML
LEFT VENTRICLE SYSTOLIC VOLUME INDEX (MOD BIPLANE): 13.6 ML/M2
LEFT VENTRICULAR INTERNAL DIMENSION IN DIASTOLE: 3.9 CM (ref 3.5–6)
LEFT VENTRICULAR POSTERIOR WALL IN END DIASTOLE: 1.1 CM
LEFT VENTRICULAR STROKE VOLUME: 39 ML
LEUKOCYTE ESTERASE UR QL STRIP: NEGATIVE
LV EF BIPLANE MOD: 66 %
LV EF US.2D.A4C+ESTIMATED: 70 %
LVSV (TEICH): 39 ML
MCH RBC QN AUTO: 27.1 PG (ref 26.8–34.3)
MCHC RBC AUTO-ENTMCNC: 32.1 G/DL (ref 31.4–37.4)
MCV RBC AUTO: 85 FL (ref 82–98)
MV E'TISSUE VEL-LAT: 7 CM/S
MV E'TISSUE VEL-SEP: 5 CM/S
MV PEAK A VEL: 1.04 M/S
MV PEAK E VEL: 66 CM/S
MV STENOSIS PRESSURE HALF TIME: 89 MS
MV VALVE AREA P 1/2 METHOD: 2.47
NITRITE UR QL STRIP: NEGATIVE
NON-SQ EPI CELLS URNS QL MICRO: ABNORMAL /HPF
NONHDLC SERPL-MCNC: 95 MG/DL
PH UR STRIP.AUTO: 5.5 [PH]
PLATELET # BLD AUTO: 198 THOUSANDS/UL (ref 149–390)
PMV BLD AUTO: 10 FL (ref 8.9–12.7)
POTASSIUM SERPL-SCNC: 4 MMOL/L (ref 3.5–5.3)
PROT UR STRIP-MCNC: ABNORMAL MG/DL
RA PRESSURE ESTIMATED: 3 MMHG
RBC # BLD AUTO: 3.73 MILLION/UL (ref 3.81–5.12)
RBC #/AREA URNS AUTO: ABNORMAL /HPF
RIGHT ATRIUM AREA SYSTOLE A4C: 10.9 CM2
RIGHT VENTRICLE ID DIMENSION: 3.2 CM
SL CV LEFT ATRIUM LENGTH A2C: 5.3 CM
SL CV LV EF: 66
SL CV PED ECHO LEFT VENTRICLE DIASTOLIC VOLUME (MOD BIPLANE) 2D: 66 ML
SL CV PED ECHO LEFT VENTRICLE SYSTOLIC VOLUME (MOD BIPLANE) 2D: 27 ML
SODIUM SERPL-SCNC: 138 MMOL/L (ref 135–147)
SP GR UR STRIP.AUTO: <=1.005 (ref 1–1.03)
TRICUSPID ANNULAR PLANE SYSTOLIC EXCURSION: 1.8 CM
TRIGL SERPL-MCNC: 111 MG/DL (ref ?–150)
UROBILINOGEN UR QL STRIP.AUTO: 0.2 E.U./DL
WBC # BLD AUTO: 4.24 THOUSAND/UL (ref 4.31–10.16)
WBC #/AREA URNS AUTO: ABNORMAL /HPF

## 2025-03-31 PROCEDURE — 85730 THROMBOPLASTIN TIME PARTIAL: CPT | Performed by: STUDENT IN AN ORGANIZED HEALTH CARE EDUCATION/TRAINING PROGRAM

## 2025-03-31 PROCEDURE — 80048 BASIC METABOLIC PNL TOTAL CA: CPT | Performed by: STUDENT IN AN ORGANIZED HEALTH CARE EDUCATION/TRAINING PROGRAM

## 2025-03-31 PROCEDURE — 80061 LIPID PANEL: CPT

## 2025-03-31 PROCEDURE — 99232 SBSQ HOSP IP/OBS MODERATE 35: CPT

## 2025-03-31 PROCEDURE — 85027 COMPLETE CBC AUTOMATED: CPT | Performed by: STUDENT IN AN ORGANIZED HEALTH CARE EDUCATION/TRAINING PROGRAM

## 2025-03-31 PROCEDURE — 82948 REAGENT STRIP/BLOOD GLUCOSE: CPT

## 2025-03-31 PROCEDURE — 81001 URINALYSIS AUTO W/SCOPE: CPT

## 2025-03-31 PROCEDURE — 71045 X-RAY EXAM CHEST 1 VIEW: CPT

## 2025-03-31 PROCEDURE — 93306 TTE W/DOPPLER COMPLETE: CPT

## 2025-03-31 RX ORDER — DOXAZOSIN 1 MG/1
2 TABLET ORAL DAILY
Status: DISCONTINUED | OUTPATIENT
Start: 2025-03-31 | End: 2025-04-03 | Stop reason: HOSPADM

## 2025-03-31 RX ORDER — SODIUM CHLORIDE, SODIUM GLUCONATE, SODIUM ACETATE, POTASSIUM CHLORIDE, MAGNESIUM CHLORIDE, SODIUM PHOSPHATE, DIBASIC, AND POTASSIUM PHOSPHATE .53; .5; .37; .037; .03; .012; .00082 G/100ML; G/100ML; G/100ML; G/100ML; G/100ML; G/100ML; G/100ML
75 INJECTION, SOLUTION INTRAVENOUS CONTINUOUS
Status: DISCONTINUED | OUTPATIENT
Start: 2025-03-31 | End: 2025-04-03 | Stop reason: HOSPADM

## 2025-03-31 RX ORDER — CLOPIDOGREL BISULFATE 75 MG/1
75 TABLET ORAL DAILY
Status: DISCONTINUED | OUTPATIENT
Start: 2025-03-31 | End: 2025-04-03 | Stop reason: HOSPADM

## 2025-03-31 RX ADMIN — DOCUSATE SODIUM 100 MG: 100 CAPSULE, LIQUID FILLED ORAL at 09:03

## 2025-03-31 RX ADMIN — SODIUM CHLORIDE, SODIUM GLUCONATE, SODIUM ACETATE, POTASSIUM CHLORIDE, MAGNESIUM CHLORIDE, SODIUM PHOSPHATE, DIBASIC, AND POTASSIUM PHOSPHATE 75 ML/HR: .53; .5; .37; .037; .03; .012; .00082 INJECTION, SOLUTION INTRAVENOUS at 09:04

## 2025-03-31 RX ADMIN — CARVEDILOL 6.25 MG: 6.25 TABLET, FILM COATED ORAL at 09:03

## 2025-03-31 RX ADMIN — GABAPENTIN 300 MG: 300 CAPSULE ORAL at 09:02

## 2025-03-31 RX ADMIN — DOXAZOSIN 2 MG: 1 TABLET ORAL at 09:01

## 2025-03-31 RX ADMIN — ALUMINUM HYDROXIDE, MAGNESIUM HYDROXIDE, AND DIMETHICONE 30 ML: 200; 20; 200 SUSPENSION ORAL at 09:01

## 2025-03-31 RX ADMIN — CARVEDILOL 6.25 MG: 6.25 TABLET, FILM COATED ORAL at 18:17

## 2025-03-31 RX ADMIN — ESCITALOPRAM OXALATE 5 MG: 5 TABLET, FILM COATED ORAL at 09:03

## 2025-03-31 RX ADMIN — ATORVASTATIN CALCIUM 80 MG: 40 TABLET, FILM COATED ORAL at 18:17

## 2025-03-31 RX ADMIN — HEPARIN SODIUM 5.8 UNITS/KG/HR: 10000 INJECTION, SOLUTION INTRAVENOUS at 13:15

## 2025-03-31 RX ADMIN — HYDRALAZINE HYDROCHLORIDE 50 MG: 25 TABLET ORAL at 21:22

## 2025-03-31 RX ADMIN — HYDRALAZINE HYDROCHLORIDE 50 MG: 25 TABLET ORAL at 18:17

## 2025-03-31 RX ADMIN — ASPIRIN 81 MG: 81 TABLET, COATED ORAL at 09:03

## 2025-03-31 RX ADMIN — GABAPENTIN 300 MG: 300 CAPSULE ORAL at 21:22

## 2025-03-31 RX ADMIN — SODIUM CHLORIDE, SODIUM GLUCONATE, SODIUM ACETATE, POTASSIUM CHLORIDE, MAGNESIUM CHLORIDE, SODIUM PHOSPHATE, DIBASIC, AND POTASSIUM PHOSPHATE 75 ML/HR: .53; .5; .37; .037; .03; .012; .00082 INJECTION, SOLUTION INTRAVENOUS at 21:22

## 2025-03-31 RX ADMIN — AMLODIPINE BESYLATE 5 MG: 5 TABLET ORAL at 09:03

## 2025-03-31 RX ADMIN — CLOPIDOGREL 75 MG: 75 TABLET ORAL at 11:37

## 2025-03-31 RX ADMIN — HYDRALAZINE HYDROCHLORIDE 50 MG: 25 TABLET ORAL at 09:01

## 2025-03-31 RX ADMIN — PANTOPRAZOLE SODIUM 40 MG: 40 TABLET, DELAYED RELEASE ORAL at 05:01

## 2025-03-31 RX ADMIN — GABAPENTIN 300 MG: 300 CAPSULE ORAL at 18:17

## 2025-03-31 RX ADMIN — DOCUSATE SODIUM 100 MG: 100 CAPSULE, LIQUID FILLED ORAL at 18:17

## 2025-03-31 RX ADMIN — PANTOPRAZOLE SODIUM 40 MG: 40 TABLET, DELAYED RELEASE ORAL at 16:31

## 2025-03-31 NOTE — ASSESSMENT & PLAN NOTE
Lab Results   Component Value Date    EGFR 23 03/31/2025    EGFR 33 03/30/2025    EGFR 37 03/29/2025    CREATININE 2.07 (H) 03/31/2025    CREATININE 1.58 (H) 03/30/2025    CREATININE 1.42 (H) 03/29/2025   See francisco

## 2025-03-31 NOTE — ASSESSMENT & PLAN NOTE
Lab Results   Component Value Date    HGBA1C 6.2 03/27/2025       Recent Labs     03/30/25  1122 03/30/25  1612 03/30/25  2100 03/31/25  0743   POCGLU 126 111 116 78       Blood Sugar Average: Last 72 hrs:  (P) 104.2  As noted

## 2025-03-31 NOTE — ASSESSMENT & PLAN NOTE
Patient complains of 2 week history of abdominal pain with associated nausea and vomiting that began on the day of admission. She reports 5-6 episodes of vomiting within a 3 hours period.  No hematemesis. Denies diarrhea. Denies recent travel or sick contacts  She does report eating chinese food on thurs  CT A/P: No acute findings with a normal appendix identified.   Likely GERD.  CT A/P does show small hiatal hernia, patient was supposed to follow-up with GI outpatient.  Zofran as needed  Mylanta as needed  Increase PPI to twice daily  No leukocytosis  Monitor off abx  GI consulted, appreciate recs

## 2025-03-31 NOTE — PROGRESS NOTES
"Progress Note - Hospitalist   Name: Erin Arroyo 69 y.o. female I MRN: [id number]  Unit/Bed#: -01 I Date of Admission: 3/29/2025   Date of Service: 3/31/2025 I Hospital Day: 2    Assessment & Plan  Elevated troponin  Patient presents to the ED with complaints of \"chest feels funny\".    EKG per ED provider shows normal sinus rhythm  IMELDA score = 5  Troponin 49-->52-->66   Lipid panel pending   A1c of 6.2  Patient received ASA 325mg in the ED; continue 81mg daily   Continue atorvastatin 80 mg daily  Telemetry  Cardiac diet; low sodium    ECHO: EF 66%, systolic function normal, no diagnostic regional wall motion abnormality identified, this possibility cannot be excluded on the basis of the study.  Diastolic function mildly abnormal consistent with grade 1 relaxation.  Small pericardial effusion along the right ventricular free wall.  No evidence of tamponade.  Cardiology consulted, apprecaite recs   Symptoms thought to be 2/2 poor compliance with BP medications and elevated BP on admission   Recommending heparin x48 hours   Start on plavix 75mg daily + continue aspirin 81mg daily - DAPT  Continue BB and Statin   No further chest pain  Abdominal pain  Patient complains of 2 week history of abdominal pain with associated nausea and vomiting that began on the day of admission. She reports 5-6 episodes of vomiting within a 3 hours period.  No hematemesis. Denies diarrhea. Denies recent travel or sick contacts  She does report eating chinese food on thurs  CT A/P: No acute findings with a normal appendix identified.   Likely GERD.  CT A/P does show small hiatal hernia, patient was supposed to follow-up with GI outpatient.  Zofran as needed  Mylanta as needed  Increase PPI to twice daily  No leukocytosis  Monitor off abx  GI consulted, appreciate recs   Nausea and vomiting  As above.   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 proliferative retinopathy and macular edema, without long-term current use " of insulin ([facility])  Lab Results   Component Value Date    HGBA1C 6.2 03/27/2025       Recent Labs     03/30/25  1122 03/30/25  1612 03/30/25  2100 03/31/25  0743   POCGLU 126 111 116 78       Blood Sugar Average: Last 72 hrs:  (P) 104.2  Home meds on hold  Sliding scale insulin  Accu-Cheks before meals and nightly  Optimal glucose control 140-180 while hospitalized  Hypoglycemia protocol  Carb controlled diet  Essential hypertension  Home regimen: Carvedilol 6.25 mg twice daily, amlodipine 5 mg daily, doxazosin 2 mg daily, hydralazine 50 mg 3 times daily, Imdur 60 mg daily, valsartan 320 mg daily  Imdur and valsartan currently on hold for PADMINI, resume when able   Hyperlipidemia  Chronic, continue statin therapy  Repeat lipid panel pending   Chronic kidney disease, stage 3 unspecified ([facility])  Lab Results   Component Value Date    EGFR 23 03/31/2025    EGFR 33 03/30/2025    EGFR 37 03/29/2025    CREATININE 2.07 (H) 03/31/2025    CREATININE 1.58 (H) 03/30/2025    CREATININE 1.42 (H) 03/29/2025   Baseline appears 1.3 - 1.4  Bump to 2.07 today   Hold Imdur and valsartan   Repeat BMP in the am   Atherosclerosis of native coronary artery of native heart without angina pectoris  Please see discussion under elevated troponin  PFO (patent foramen ovale)  History of small PFO on TTE in 2023.  PSVT (paroxysmal supraventricular tachycardia) ([facility])  Noted to have paroxysmal SVT on extended Holter monitoring.  No A-fib identified.  Continue beta-blocker therapy  Telemetry monitoring  PADMINI (acute kidney injury) ([facility])  Cr 2.07 on am labs from 1.58 yesterday   Imdur and valsartan on hold   Start light hydration w/ Isolyte 75 mg/hr  I/O, bladder scan, daily weights    VTE Pharmacologic Prophylaxis:   Moderate Risk (Score 3-4) - Pharmacological DVT Prophylaxis Ordered: heparin drip.    Mobility:   Basic Mobility Inpatient Raw Score: 19  JH-HLM Goal: 6: Walk 10 steps or more  JH-HLM Achieved: 6: Walk 10 steps or more  JH-HLM Goal achieved.  Continue to encourage appropriate mobility.    Patient Centered Rounds: I performed bedside rounds with nursing staff today.   Discussions with Specialists or Other Care Team Provider: cardiology    Education and Discussions with Family / Patient: Attempted to update  (daughter) via phone. Unable to contact.    Current Length of Stay: 2 day(s)  Current Patient Status: Inpatient   Certification Statement: The patient will continue to require additional inpatient hospital stay due to continued monitoring, GI eval, pending safe dispo  Discharge Plan: Anticipate discharge in 24-48 hrs to home.    Code Status: Level 1 - Full Code    Subjective   Seen and examined. No acute events overnight. States she continues to have poor appetite and pain after eating. States nothing makes it better or worse. Reports some nausea this morning but no pain. Denies any further chest pain, SOB, palpitations.    Objective :  Temp:  [96.8 °F (36 °C)-97 °F (36.1 °C)] 96.8 °F (36 °C)  HR:  [55-82] 60  BP: (106-158)/(56-76) 158/76  Resp:  [17-18] 18  SpO2:  [93 %-97 %] 97 %  O2 Device: None (Room air)    Body mass index is 31.38 kg/m².     Input and Output Summary (last 24 hours):     Intake/Output Summary (Last 24 hours) at 3/31/2025 0822  Last data filed at 3/31/2025 0601  Gross per 24 hour   Intake 263.45 ml   Output --   Net 263.45 ml       Physical Exam  Constitutional:       General: She is not in acute distress.     Appearance: She is ill-appearing (chronically).   HENT:      Head: Normocephalic and atraumatic.      Nose: Nose normal.      Mouth/Throat:      Mouth: Mucous membranes are dry.      Comments: Poor dentition  Eyes:      Conjunctiva/sclera: Conjunctivae normal.   Cardiovascular:      Rate and Rhythm: Normal rate.      Heart sounds: Normal heart sounds. No murmur heard.     No friction rub. No gallop.   Pulmonary:      Breath sounds: Normal breath sounds. No wheezing, rhonchi or rales.   Abdominal:      General:  There is no distension.      Palpations: Abdomen is soft.      Tenderness: There is abdominal tenderness.   Musculoskeletal:      Right lower leg: No edema.      Left lower leg: No edema.   Skin:     General: Skin is warm and dry.   Neurological:      Mental Status: Mental status is at baseline.   Psychiatric:         Mood and Affect: Mood normal.           Lines/Drains:        Telemetry:  Telemetry Orders (From admission, onward)               24 Hour Telemetry Monitoring  Continuous x 24 Hours (Telem)        Expiring   Question:  Reason for 24 Hour Telemetry  Answer:  PCI/EP study (including pacer and ICD implementation), Cardiac surgery, MI, abnormal cardiac cath, and chest pain- rule out MI                     Telemetry removed prior to review                Lab Results: I have reviewed the following results:   Results from last 7 days   Lab Units 03/31/25  0500 03/30/25  0742 03/29/25  1307   WBC Thousand/uL 4.24*   < > 9.15   HEMOGLOBIN g/dL 10.1*   < > 12.6   HEMATOCRIT % 31.5*   < > 39.2   PLATELETS Thousands/uL 198   < > 241   SEGS PCT %  --   --  88*   LYMPHO PCT %  --   --  5*   MONO PCT %  --   --  6   EOS PCT %  --   --  1    < > = values in this interval not displayed.     Results from last 7 days   Lab Units 03/31/25  0500 03/30/25  0742 03/29/25  1307   SODIUM mmol/L 138   < > 141   POTASSIUM mmol/L 4.0   < > 4.8   CHLORIDE mmol/L 108   < > 107   CO2 mmol/L 24   < > 27   BUN mg/dL 18   < > 17   CREATININE mg/dL 2.07*   < > 1.42*   ANION GAP mmol/L 6   < > 7   CALCIUM mg/dL 7.9*   < > 8.9   ALBUMIN g/dL  --   --  3.6   TOTAL BILIRUBIN mg/dL  --   --  0.53   ALK PHOS U/L  --   --  107*   ALT U/L  --   --  18   AST U/L  --   --  32   GLUCOSE RANDOM mg/dL 81   < > 131    < > = values in this interval not displayed.     Results from last 7 days   Lab Units 03/29/25  1307   INR  1.01     Results from last 7 days   Lab Units 03/31/25  0743 03/30/25  2100 03/30/25  1612 03/30/25  1122 03/30/25  0745   POC  GLUCOSE mg/dl 78 116 111 126 90     Results from last 7 days   Lab Units 03/27/25  0849   HEMOGLOBIN A1C  6.2     Results from last 7 days   Lab Units 03/29/25  1307   LACTIC ACID mmol/L 1.1       Recent Cultures (last 7 days):   Results from last 7 days   Lab Units 03/27/25  0938   URINE CULTURE  No Growth <1000 cfu/mL       Imaging Results Review: I reviewed radiology reports from this admission including: CT abdomen/pelvis.  Other Study Results Review: EKG was reviewed.     Last 24 Hours Medication List:     Current Facility-Administered Medications:     aluminum-magnesium hydroxide-simethicone (MAALOX) oral suspension 30 mL, Q4H PRN    amLODIPine (NORVASC) tablet 5 mg, Daily    aspirin (ECOTRIN LOW STRENGTH) EC tablet 81 mg, Daily    atorvastatin (LIPITOR) tablet 80 mg, Daily With Dinner    carvedilol (COREG) tablet 6.25 mg, BID With Meals    docusate sodium (COLACE) capsule 100 mg, BID    escitalopram (LEXAPRO) tablet 5 mg, Daily    gabapentin (NEURONTIN) capsule 300 mg, TID    heparin (porcine) 25,000 units in 0.45% NaCl 250 mL infusion (premix), Titrated, Last Rate: 5.8 Units/kg/hr (03/30/25 0931)    hydrALAZINE (APRESOLINE) tablet 50 mg, TID    insulin lispro (HumALOG/ADMELOG) 100 units/mL subcutaneous injection 1-5 Units, HS    insulin lispro (HumALOG/ADMELOG) 100 units/mL subcutaneous injection 1-6 Units, TID AC **AND** Fingerstick Glucose (POCT), 4x Daily AC and at bedtime    isosorbide mononitrate (IMDUR) 24 hr tablet 60 mg, Daily    ondansetron (ZOFRAN) injection 4 mg, Q6H PRN    pantoprazole (PROTONIX) EC tablet 40 mg, BID AC    Administrative Statements   Today, Patient Was Seen By: Carmen Plummer PA-C    **Please Note: This note may have been constructed using a voice recognition system.**

## 2025-03-31 NOTE — PLAN OF CARE
Problem: Nutrition/Hydration-ADULT  Goal: Nutrient/Hydration intake appropriate for improving, restoring or maintaining nutritional needs  Description: Monitor and assess patient's nutrition/hydration status for malnutrition. Collaborate with interdisciplinary team and initiate plan and interventions as ordered.  Monitor patient's weight and dietary intake as ordered or per policy. Utilize nutrition screening tool and intervene as necessary. Determine patient's food preferences and provide high-protein, high-caloric foods as appropriate. INTERVENTIONS:- Monitor oral intake, urinary output, labs, and treatment plans- Assess nutrition and hydration status and recommend course of action- Evaluate amount of meals eaten- Assist patient with eating if necessary - Allow adequate time for meals- Recommend/ encourage appropriate diets, oral nutritional supplements, and vitamin/mineral supplements- Order, calculate, and assess calorie counts as needed- Recommend, monitor, and adjust tube feedings and TPN/PPN based on assessed needs- Assess need for intravenous fluids- Provide specific nutrition/hydration education as appropriate- Include patient/family/caregiver in decisions related to nutrition  3/31/2025 1057 by Umm Rayo RN  Outcome: Progressing  3/31/2025 1057 by Umm Rayo RN  Outcome: Progressing     Problem: PAIN - ADULT  Goal: Verbalizes/displays adequate comfort level or baseline comfort level  Description: Interventions:- Encourage patient to monitor pain and request assistance- Assess pain using appropriate pain scale- Administer analgesics based on type and severity of pain and evaluate response- Implement non-pharmacological measures as appropriate and evaluate response- Consider cultural and social influences on pain and pain management- Notify physician/advanced practitioner if interventions unsuccessful or patient reports new pain  3/31/2025 1057 by Umm Rayo RN  Outcome: Progressing  3/31/2025 1057 by  Umm Rayo RN  Outcome: Progressing     Problem: INFECTION - ADULT  Goal: Absence or prevention of progression during hospitalization  Description: INTERVENTIONS:- Assess and monitor for signs and symptoms of infection- Monitor lab/diagnostic results- Monitor all insertion sites, i.e. indwelling lines, tubes, and drains- Monitor endotracheal if appropriate and nasal secretions for changes in amount and color- [place] appropriate cooling/warming therapies per order- Administer medications as ordered- Instruct and encourage patient and family to use good hand hygiene technique- Identify and instruct in appropriate isolation precautions for identified infection/condition  3/31/2025 1057 by Umm Rayo RN  Outcome: Progressing  3/31/2025 1057 by Umm Rayo RN  Outcome: Progressing  Goal: Absence of fever/infection during neutropenic period  Description: INTERVENTIONS:- Monitor WBC  3/31/2025 1057 by Umm Rayo RN  Outcome: Progressing  3/31/2025 1057 by Umm Rayo RN  Outcome: Progressing     Problem: SAFETY ADULT  Goal: Patient will remain free of falls  Description: INTERVENTIONS:- Educate patient/family on patient safety including physical limitations- Instruct patient to call for assistance with activity - Consult OT/PT to assist with strengthening/mobility - Keep Call bell within reach- Keep bed low and locked with side rails adjusted as appropriate- Keep care items and personal belongings within reach- Initiate and maintain comfort rounds- Make Fall   3/31/2025 1057 by Umm Rayo RN  Outcome: Progressing  3/31/2025 1057 by Umm Rayo RN  Outcome: Progressing  Goal: Maintain or return to baseline ADL function  Description: INTERVENTIONS:-  Assess patient's ability to carry out ADLs; assess patient's baseline for ADL function and identify physical deficits which impact ability to perform ADLs (bathing, care of mouth/teeth, toileting, grooming, dressing, etc.)- Assess/evaluate cause of self-care deficits -  Assess range of motion- Assess patient's mobility; develop plan if impaired- Assess patient's need for assistive devices and provide as appropriate- Encourage maximum independence but intervene and supervise when necessary- Involve family in performance of ADLs- Assess for home care needs following discharge - Consider OT consult to assist with ADL evaluation and planning for discharge- Provide patient education as appropriate  3/31/2025 1057 by Umm Rayo RN  Outcome: Progressing  3/31/2025 1057 by Umm Rayo RN  Outcome: Progressing    3/31/2025 1057 by Umm Rayo, BHARAT  Outcome: Progressing  3/31/2025 1057 by Umm Rayo, RN  Outcome: Progressing

## 2025-03-31 NOTE — ASSESSMENT & PLAN NOTE
Cr 2.07 on am labs from 1.58 yesterday   Imdur and valsartan on hold   Start light hydration w/ Isolyte 75 mg/hr  I/O, bladder scan, daily weights

## 2025-03-31 NOTE — ASSESSMENT & PLAN NOTE
Home regimen: Carvedilol 6.25 mg twice daily, amlodipine 5 mg daily, doxazosin 2 mg daily, hydralazine 50 mg 3 times daily, Imdur 60 mg daily, valsartan 320 mg daily  Imdur and valsartan currently on hold for PADMINI, resume when able

## 2025-03-31 NOTE — ASSESSMENT & PLAN NOTE
Lab Results   Component Value Date    EGFR 23 03/31/2025    EGFR 33 03/30/2025    EGFR 37 03/29/2025    CREATININE 2.07 (H) 03/31/2025    CREATININE 1.58 (H) 03/30/2025    CREATININE 1.42 (H) 03/29/2025   Baseline appears 1.3 - 1.4  Bump to 2.07 today   Hold Imdur and valsartan   Repeat BMP in the am

## 2025-03-31 NOTE — ASSESSMENT & PLAN NOTE
"Patient presents to the ED with complaints of \"chest feels funny\".    EKG per ED provider shows normal sinus rhythm  IMELDA score = 5  Troponin 49-->52-->66   Lipid panel pending   A1c of 6.2  Patient received ASA 325mg in the ED; continue 81mg daily   Continue atorvastatin 80 mg daily  Telemetry  Cardiac diet; low sodium    ECHO: EF 66%, systolic function normal, no diagnostic regional wall motion abnormality identified, this possibility cannot be excluded on the basis of the study.  Diastolic function mildly abnormal consistent with grade 1 relaxation.  Small pericardial effusion along the right ventricular free wall.  No evidence of tamponade.  Cardiology consulted, apprecaite recs   Symptoms thought to be 2/2 poor compliance with BP medications and elevated BP on admission   Recommending heparin x48 hours   Start on plavix 75mg daily + continue aspirin 81mg daily - DAPT  Continue BB and Statin   No further chest pain  "

## 2025-03-31 NOTE — ASSESSMENT & PLAN NOTE
Lab Results   Component Value Date    HGBA1C 6.2 03/27/2025       Recent Labs     03/30/25  1122 03/30/25  1612 03/30/25  2100 03/31/25  0743   POCGLU 126 111 116 78       Blood Sugar Average: Last 72 hrs:  (P) 104.2  Home meds on hold  Sliding scale insulin  Accu-Cheks before meals and nightly  Optimal glucose control 140-180 while hospitalized  Hypoglycemia protocol  Carb controlled diet

## 2025-03-31 NOTE — PROGRESS NOTES
Pt reports poor appetite for several months, at least since September 2024. Reports no desire to eat, forcing herself to eat. Prefers liquids over solid foods. Reports having a broken tooth/difficulties chewing. Not interested in milky based ensure supplements though reports drinking juice and eating jello ok. Chart review of weight hx: 11/30/23 206lb, 7/16/24 215lb, 10/28/24 208lb, 3/29/25 194lb. Nonsignificant weight loss.     Pt does not meet criteria for malnutrition at this time. Will d/c CCD 2 restriction to increase options available to pt given poor appetite, n/v, limited oral intake. Will trial ensure clear BID and gelatein daily for supplementation.

## 2025-03-31 NOTE — CONSULTS
Consultation - Cardiology   Name: Erin Arryoo 69 y.o. female I MRN: [id number]  Unit/Bed#: -01 I Date of Admission: 3/29/2025   Date of Service: 3/31/2025 I Hospital Day: 2   Inpatient consult to Cardiology  Consult performed by: Fely Rodriguez PA-C  Consult ordered by: Madelyn Garcia MD      Physician Requesting Evaluation: Madelyn Garcia MD   Reason for Evaluation / Principal Problem: elevated troponin     Assessment & Plan  Elevated troponin  Hx CAD sp PCI to LAD in the setting of STEMI 3/2023 per records  This episode is associated with HTN and feels different than her prior STEMI   Troponin trended to 66  On heparin currently   Recommend echocardiogram  If negative plan for heparin for 48 hours, DAPT, BB, statin   Will need OP fu with cardiology- sees [facility] cardiology  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 proliferative retinopathy and macular edema, without long-term current use of insulin ([facility])  Lab Results   Component Value Date    HGBA1C 6.2 03/27/2025       Recent Labs     03/30/25  1122 03/30/25  1612 03/30/25  2100 03/31/25  0743   POCGLU 126 111 116 78       Blood Sugar Average: Last 72 hrs:  (P) 104.2  As noted  Essential hypertension  Uncontrolled on admission but wasn't taking her medications secondary to n/v/abd pain  Hyperlipidemia  On statin   Chronic kidney disease, stage 3 unspecified ([facility])  Lab Results   Component Value Date    EGFR 23 03/31/2025    EGFR 33 03/30/2025    EGFR 37 03/29/2025    CREATININE 2.07 (H) 03/31/2025    CREATININE 1.58 (H) 03/30/2025    CREATININE 1.42 (H) 03/29/2025   See beloq  PADMINI (acute kidney injury) ([facility])  Likely contrast induced PADMINI  PSVT (paroxysmal supraventricular tachycardia) ([facility])  As noted  On BB   Abdominal pain  Pt reports resolved   I have discussed the above management plan in detail with the primary service.     History of Present Illness   Erin Arroyo is a 69 y.o. female with history of HTN, pSVT, CKD  presented to the hospital for abdominal pain, nausea and vomiting for the last few weeks having missed her medications for the last few weeks. Her BP was uncontrolled but medication was restarted and BP has improved. Pt has known CKD and after contrast renal function did worsen. Pt reports she did have chest pain but it felt very different than when she had her prior stemi requiring PCI to LAD back in 2023. She reports this was in [facility] when she was visiting family. She reports she has not had persistent chest pain while admitted and stomach is feeling better.     Review of Systems   Constitutional:  Positive for fatigue. Negative for chills and unexpected weight change.   Respiratory:  Negative for chest tightness and shortness of breath.    Cardiovascular:  Positive for chest pain. Negative for leg swelling.   Gastrointestinal:  Positive for abdominal pain, nausea and vomiting.   Skin:  Negative for color change and pallor.   Neurological:  Negative for dizziness and weakness.   Psychiatric/Behavioral:  Negative for agitation.      Medical History Review: I have reviewed the patient's PMH, PSH, Social History, Family History, Meds, and Allergies     Objective :  Temp:  [96.8 °F (36 °C)-97 °F (36.1 °C)] 96.8 °F (36 °C)  HR:  [55-62] 60  BP: (106-158)/(56-76) 158/76  Resp:  [17-18] 18  SpO2:  [93 %-97 %] 97 %  O2 Device: None (Room air)  Orthostatic Blood Pressures      Flowsheet Row Most Recent Value   Blood Pressure 158/76 filed at 03/31/2025 0740   Patient Position - Orthostatic VS Lying filed at 03/30/2025 1900          First Weight: Weight - Scale: 88.2 kg (194 lb 7.1 oz) (03/29/25 1253)  Vitals:    03/29/25 1253   Weight: 88.2 kg (194 lb 7.1 oz)       Physical Exam  Vitals and nursing note reviewed.   Constitutional:       Appearance: Normal appearance.   HENT:      Head: Normocephalic and atraumatic.   Cardiovascular:      Rate and Rhythm: Normal rate.      Heart sounds: No murmur heard.     No  gallop.   Pulmonary:      Effort: Pulmonary effort is normal.      Breath sounds: No wheezing.   Musculoskeletal:         General: No swelling.      Cervical back: Neck supple.   Skin:     General: Skin is warm.      Capillary Refill: Capillary refill takes less than 2 seconds.   Neurological:      General: No focal deficit present.      Mental Status: She is alert and oriented to person, place, and time.   Psychiatric:         Mood and Affect: Mood normal.         Thought Content: Thought content normal.           Lab Results: I have reviewed the following results:CBC/BMP:   .     03/31/25  0500   WBC 4.24*   HGB 10.1*   HCT 31.5*      SODIUM 138   K 4.0      CO2 24   BUN 18   CREATININE 2.07*   GLUC 81      Results from last 7 days   Lab Units 03/31/25  0500 03/30/25  0742 03/29/25  1307   WBC Thousand/uL 4.24* 6.67 9.15   HEMOGLOBIN g/dL 10.1* 11.1* 12.6   HEMATOCRIT % 31.5* 35.7 39.2   PLATELETS Thousands/uL 198 184 241     Results from last 7 days   Lab Units 03/31/25  0500 03/30/25  0742 03/29/25  1307   POTASSIUM mmol/L 4.0 4.3 4.8   CHLORIDE mmol/L 108 110* 107   CO2 mmol/L 24 27 27   BUN mg/dL 18 15 17   CREATININE mg/dL 2.07* 1.58* 1.42*   CALCIUM mg/dL 7.9* 8.2* 8.9     Results from last 7 days   Lab Units 03/31/25  0500 03/30/25  2154 03/30/25  1555 03/30/25  0008 03/29/25  1307   INR   --   --   --   --  1.01   PTT seconds 75* 65* 79*   < > 21*    < > = values in this interval not displayed.     Lab Results   Component Value Date    HGBA1C 6.2 03/27/2025     Lab Results   Component Value Date    CKTOTAL 71 10/12/2021    TROPONINI <0.02 10/12/2021       Imaging Results Review: I reviewed radiology reports from this admission including: abd CT  Other Study Results Review: EKG was reviewed.       Per records:  Coronary artery disease, status post MI, status post drug-eluting stent placement to unspecified vessel 3/1/2023 (proximal LAD -- information given over telephone by Cath Lab charge  nurse on 6/4/2024 -- no formal report was seen by dr. Hillman.).

## 2025-03-31 NOTE — ASSESSMENT & PLAN NOTE
Hx CAD sp PCI to LAD in the setting of STEMI 3/2023 per records  This episode is associated with HTN and feels different than her prior STEMI   Troponin trended to 66  On heparin currently   Recommend echocardiogram  If negative plan for heparin for 48 hours, DAPT, BB, statin   Will need OP fu with cardiology- sees [facility]

## 2025-04-01 PROBLEM — N18.9 ACUTE KIDNEY INJURY SUPERIMPOSED ON CHRONIC KIDNEY DISEASE  (HCC): Status: ACTIVE | Noted: 2023-04-12

## 2025-04-01 PROBLEM — K02.9 DENTAL CARIES: Status: ACTIVE | Noted: 2025-04-01

## 2025-04-01 PROBLEM — N18.30 CHRONIC KIDNEY DISEASE, STAGE 3 UNSPECIFIED (HCC): Status: RESOLVED | Noted: 2022-07-08 | Resolved: 2025-04-01

## 2025-04-01 LAB
ANION GAP SERPL CALCULATED.3IONS-SCNC: 3 MMOL/L (ref 4–13)
APTT PPP: 32 SECONDS (ref 23–34)
BUN SERPL-MCNC: 16 MG/DL (ref 5–25)
CALCIUM SERPL-MCNC: 8.1 MG/DL (ref 8.4–10.2)
CHLORIDE SERPL-SCNC: 109 MMOL/L (ref 96–108)
CO2 SERPL-SCNC: 30 MMOL/L (ref 21–32)
CREAT SERPL-MCNC: 1.89 MG/DL (ref 0.6–1.3)
ERYTHROCYTE [DISTWIDTH] IN BLOOD BY AUTOMATED COUNT: 15.4 % (ref 11.6–15.1)
GFR SERPL CREATININE-BSD FRML MDRD: 26 ML/MIN/1.73SQ M
GLUCOSE SERPL-MCNC: 101 MG/DL (ref 65–140)
GLUCOSE SERPL-MCNC: 84 MG/DL (ref 65–140)
GLUCOSE SERPL-MCNC: 87 MG/DL (ref 65–140)
GLUCOSE SERPL-MCNC: 92 MG/DL (ref 65–140)
GLUCOSE SERPL-MCNC: 96 MG/DL (ref 65–140)
HCT VFR BLD AUTO: 30.9 % (ref 34.8–46.1)
HGB BLD-MCNC: 9.9 G/DL (ref 11.5–15.4)
MCH RBC QN AUTO: 26.5 PG (ref 26.8–34.3)
MCHC RBC AUTO-ENTMCNC: 32 G/DL (ref 31.4–37.4)
MCV RBC AUTO: 83 FL (ref 82–98)
PLATELET # BLD AUTO: 197 THOUSANDS/UL (ref 149–390)
PMV BLD AUTO: 10.2 FL (ref 8.9–12.7)
POTASSIUM SERPL-SCNC: 4.1 MMOL/L (ref 3.5–5.3)
RBC # BLD AUTO: 3.73 MILLION/UL (ref 3.81–5.12)
SODIUM SERPL-SCNC: 142 MMOL/L (ref 135–147)
WBC # BLD AUTO: 4.8 THOUSAND/UL (ref 4.31–10.16)

## 2025-04-01 PROCEDURE — 85730 THROMBOPLASTIN TIME PARTIAL: CPT | Performed by: STUDENT IN AN ORGANIZED HEALTH CARE EDUCATION/TRAINING PROGRAM

## 2025-04-01 PROCEDURE — 99232 SBSQ HOSP IP/OBS MODERATE 35: CPT | Performed by: FAMILY MEDICINE

## 2025-04-01 PROCEDURE — 82948 REAGENT STRIP/BLOOD GLUCOSE: CPT

## 2025-04-01 PROCEDURE — 80048 BASIC METABOLIC PNL TOTAL CA: CPT

## 2025-04-01 PROCEDURE — 85027 COMPLETE CBC AUTOMATED: CPT

## 2025-04-01 RX ORDER — ACETAMINOPHEN 325 MG/1
650 TABLET ORAL EVERY 6 HOURS PRN
Status: DISCONTINUED | OUTPATIENT
Start: 2025-04-01 | End: 2025-04-03 | Stop reason: HOSPADM

## 2025-04-01 RX ORDER — DICYCLOMINE HYDROCHLORIDE 10 MG/1
10 CAPSULE ORAL ONCE
Status: COMPLETED | OUTPATIENT
Start: 2025-04-01 | End: 2025-04-01

## 2025-04-01 RX ADMIN — SODIUM CHLORIDE, SODIUM GLUCONATE, SODIUM ACETATE, POTASSIUM CHLORIDE, MAGNESIUM CHLORIDE, SODIUM PHOSPHATE, DIBASIC, AND POTASSIUM PHOSPHATE 75 ML/HR: .53; .5; .37; .037; .03; .012; .00082 INJECTION, SOLUTION INTRAVENOUS at 21:48

## 2025-04-01 RX ADMIN — ACETAMINOPHEN 650 MG: 325 TABLET ORAL at 06:10

## 2025-04-01 RX ADMIN — DOCUSATE SODIUM 100 MG: 100 CAPSULE, LIQUID FILLED ORAL at 09:42

## 2025-04-01 RX ADMIN — HYDRALAZINE HYDROCHLORIDE 50 MG: 25 TABLET ORAL at 09:42

## 2025-04-01 RX ADMIN — PANTOPRAZOLE SODIUM 40 MG: 40 TABLET, DELAYED RELEASE ORAL at 17:48

## 2025-04-01 RX ADMIN — ATORVASTATIN CALCIUM 80 MG: 40 TABLET, FILM COATED ORAL at 17:48

## 2025-04-01 RX ADMIN — GABAPENTIN 300 MG: 300 CAPSULE ORAL at 17:47

## 2025-04-01 RX ADMIN — SODIUM CHLORIDE, SODIUM GLUCONATE, SODIUM ACETATE, POTASSIUM CHLORIDE, MAGNESIUM CHLORIDE, SODIUM PHOSPHATE, DIBASIC, AND POTASSIUM PHOSPHATE 75 ML/HR: .53; .5; .37; .037; .03; .012; .00082 INJECTION, SOLUTION INTRAVENOUS at 09:45

## 2025-04-01 RX ADMIN — GABAPENTIN 300 MG: 300 CAPSULE ORAL at 09:42

## 2025-04-01 RX ADMIN — GABAPENTIN 300 MG: 300 CAPSULE ORAL at 21:22

## 2025-04-01 RX ADMIN — DOXAZOSIN 2 MG: 1 TABLET ORAL at 09:42

## 2025-04-01 RX ADMIN — DOCUSATE SODIUM 100 MG: 100 CAPSULE, LIQUID FILLED ORAL at 17:48

## 2025-04-01 RX ADMIN — CARVEDILOL 6.25 MG: 6.25 TABLET, FILM COATED ORAL at 17:48

## 2025-04-01 RX ADMIN — ESCITALOPRAM OXALATE 5 MG: 5 TABLET, FILM COATED ORAL at 09:42

## 2025-04-01 RX ADMIN — AMLODIPINE BESYLATE 5 MG: 5 TABLET ORAL at 09:42

## 2025-04-01 RX ADMIN — HYDRALAZINE HYDROCHLORIDE 50 MG: 25 TABLET ORAL at 21:21

## 2025-04-01 RX ADMIN — PANTOPRAZOLE SODIUM 40 MG: 40 TABLET, DELAYED RELEASE ORAL at 09:42

## 2025-04-01 RX ADMIN — CARVEDILOL 6.25 MG: 6.25 TABLET, FILM COATED ORAL at 09:42

## 2025-04-01 RX ADMIN — ASPIRIN 81 MG: 81 TABLET, COATED ORAL at 09:42

## 2025-04-01 RX ADMIN — HYDRALAZINE HYDROCHLORIDE 50 MG: 25 TABLET ORAL at 17:48

## 2025-04-01 RX ADMIN — ISOSORBIDE MONONITRATE 60 MG: 60 TABLET, EXTENDED RELEASE ORAL at 09:42

## 2025-04-01 RX ADMIN — CLOPIDOGREL 75 MG: 75 TABLET ORAL at 09:42

## 2025-04-01 RX ADMIN — DICYCLOMINE HYDROCHLORIDE 10 MG: 10 CAPSULE ORAL at 13:32

## 2025-04-01 NOTE — ASSESSMENT & PLAN NOTE
Patient with hx of diabetic gastroparesis presenting with abdominal pain, N/V and poor PO intake x2 weeks   CT AP w/ IV contrast unremarkable   Currently she is feeling improved     Continue PPI daily  Avoid NSAIDs  Continue anti nausea regimen   Could consider trial of reglan if symptoms return  Recommend low fat, low fiber diet   She will need outpatient follow up with dentist for her dental caries   Overdue for follow up with GI, encouraged patient to call her established GI with [facility] for a follow up visit  Will likely need outpatient EGD and screening colonoscopy    GI will sign off at this time, please reach out with any questions or concerns.

## 2025-04-01 NOTE — CONSULTS
Consultation - Gastroenterology   Name: Erin Arroyo 69 y.o. female I MRN: [id number]  Unit/Bed#: -01 I Date of Admission: 3/29/2025   Date of Service: 4/1/2025 I Hospital Day: 3     Inpatient consult to gastroenterology  Consult performed by: Carmen Pierson PA-C  Consult ordered by: Carmen Plummer PA-C      Physician Requesting Evaluation: Madisyn Ramirez MD   Reason for Evaluation / Principal Problem: 2 weeks of poor PO intake, pain after eating, nausea and vomiting    Assessment & Plan  Nausea and vomiting  Patient with hx of diabetic gastroparesis presenting with abdominal pain, N/V and poor PO intake x2 weeks   CT AP w/ IV contrast unremarkable   Currently she is feeling improved     Continue PPI daily  Avoid NSAIDs  Continue anti nausea regimen   Could consider trial of reglan if symptoms return  Recommend low fat, low fiber diet   She will need outpatient follow up with dentist for her dental caries   Overdue for follow up with GI, encouraged patient to call her established GI with [facility] for a follow up visit  Will likely need outpatient EGD and screening colonoscopy    GI will sign off at this time, please reach out with any questions or concerns.   Abdominal pain  Hx of constipation on miralax daily     HPI: Erin Arroyo is a 69 y.o. year old female with a PMHx CAD, hx of STEMI (2023) on DAPT, type 2 DM, HTN, HLD, CKD 3, who presented with abdominal pain, N/V x 2 weeks.    In the ED, labs with rising troponins, otherwise unremarkable. CT AP w/ IV contrast was unremarkable. She was admitted with elevated troponin and started on heparin drip due to her cardiac hx. GI has been consulted for poor PO intake, abdominal pain and N/V.     Patient seen at bedside this AM. She reports she has been having difficulty eating for the past few months due to missing multiple teeth as well as multiple dental caries. She reports it painful to chew and this makes her not want to eat. She finds it  easier to drink liquids. She reports she was nauseous and weak prior to admission. She did have multiple episodes of vomiting just prior to coming in to the ED. Currently she is feeling better. She denies any abdominal pain, nausea or vomiting.     Per chart review, patient follows with [facility]. She had an appt with them 2/6/25 which was cancelled due to inclement weather. She was last seen on 12/2022 with reports of early satiety, nausea, constipation, dysphagia. She does have a hx of diabetic gastroparesis. They ordered an EGD which was completed, results below.      - EGD 12/2022 with non obstructing Schatzki ring in GE junction s/p bx, 3cm sliding hiatal hernia, normal stomach s/p bx (neg HP), normal duodenum.   - Colonoscopy 7/2019 with one polyp removed. Recall 5 years.     Endoscopic risk assessment:  Anticoagulation use: Plavix, ASA, heparin drip  Diabetes medication: insulin  CVS history: see above  Abdominal surgeries: tubal ligation  Sleep apnea: n  O2 use: n    Past Medical History:   Diagnosis Date    Abnormal ECG     last assessed: 5/15/17    Abnormal mammogram     last assessed: 7/11/17    Abnormal stress test     last assesed: 7/24/17    Anemia     Anxiety     Arthritis     Asthma     Back problem     Breast cyst     CAD (coronary artery disease)     Chest pain     last assessed: 7/24/17    Chronic pain disorder     Cyst of left breast     last assessed: 8/18/17    Depression     Depression     resolved: 1/2/18    Diabetes mellitus (HCC)     Hiatal hernia     last assessed: 11/7/17    Hyperlipidemia     Hypertension     Idiopathic intracranial hypertension 05/15/2017    Keloid of skin     last assessed: 11/2/16    Multiple thyroid nodules     Neuropathy     Psychiatric disorder     anxiety    Stroke (HCC)     TIA 2005    Tuberculosis     as a child      Past Surgical History:   Procedure Laterality Date    ARM WOUND REPAIR / CLOSURE      CARPAL TUNNEL RELEASE      CARPAL TUNNEL RELEASE Left      CATARACT EXTRACTION Bilateral     2015    COLONOSCOPY      CORONARY ANGIOPLASTY WITH STENT PLACEMENT  2023    at [facility]    CYST REMOVAL      right upper arm.    EYE SURGERY      cataract removaql    FL LUMBAR PUNCTURE DIAGNOSTIC  08/15/2019    KS ESOPHAGOGASTRODUODENOSCOPY TRANSORAL DIAGNOSTIC N/A 2018    Procedure: ESOPHAGOGASTRODUODENOSCOPY (EGD);  Surgeon: Kerline Coley DO;  Location: [facility];  Service: Gastroenterology    KS NDSC WRST SURG W/RLS TRANSVRS CARPL LIGM Left 2019    Procedure: ENDOSCOPIC CARPAL TUNNEL RELEASE;  Surgeon: Dalton Jesus MD;  Location:  MAIN OR;  Service: Orthopedics    TUBAL LIGATION      US GUIDED THYROID BIOPSY  04/15/2019     Social History   Social History     Substance and Sexual Activity   Alcohol Use Not Currently    Alcohol/week: 0.0 standard drinks of alcohol     Social History     Substance and Sexual Activity   Drug Use No     Social History     Tobacco Use   Smoking Status Never   Smokeless Tobacco Never       Family History   Problem Relation Age of Onset    Heart disease Mother     Diabetes Mother     Arthritis Mother     Hypertension Mother     MONE disease Mother     Kidney disease Father     Hypertension Father     Stomach cancer Father     Arthritis Sister     Kidney disease Brother         age 29     Stroke Maternal Grandmother     Stomach cancer Maternal Grandmother     No Known Problems Maternal Grandfather     Arthritis Paternal Grandmother     Heart attack Paternal Grandmother     No Known Problems Paternal Grandfather     No Known Problems Daughter     No Known Problems Daughter     No Known Problems Daughter     No Known Problems Daughter     Stroke Maternal Aunt     Liver cancer Maternal Aunt     No Known Problems Maternal Aunt     No Known Problems Maternal Aunt     Heart attack Maternal Uncle     No Known Problems Paternal Aunt     Cancer Family         spinal column    Coronary artery disease Family      Glaucoma Family     Rheum arthritis Family          Medications Prior to Admission:     albuterol (PROVENTIL HFA,VENTOLIN HFA) 90 mcg/act inhaler    amLODIPine (NORVASC) 5 mg tablet    aspirin (ECOTRIN LOW STRENGTH) 81 mg EC tablet    atorvastatin (LIPITOR) 80 mg tablet    carvedilol (COREG) 6.25 mg tablet    dapagliflozin (Farxiga) 10 MG tablet    doxazosin (CARDURA) 2 mg tablet    escitalopram (LEXAPRO) 5 mg tablet    gabapentin (NEURONTIN) 300 mg capsule    hydrALAZINE (APRESOLINE) 50 mg tablet    isosorbide mononitrate (IMDUR) 60 mg 24 hr tablet    Januvia 50 MG tablet    pantoprazole (PROTONIX) 40 mg tablet    polyethylene glycol (GLYCOLAX) 17 GM/SCOOP powder    valsartan (DIOVAN) 160 mg tablet    Alcohol Swabs (DropSafe Alcohol Prep) 70 % PADS    Blood Glucose Monitoring Suppl (True Metrix Air Glucose Meter) w/Device KIT    TRUEplus Lancets 33G MISC    Current Facility-Administered Medications:     acetaminophen (TYLENOL) tablet 650 mg, Q6H PRN    aluminum-magnesium hydroxide-simethicone (MAALOX) oral suspension 30 mL, Q4H PRN    amLODIPine (NORVASC) tablet 5 mg, Daily    aspirin (ECOTRIN LOW STRENGTH) EC tablet 81 mg, Daily    atorvastatin (LIPITOR) tablet 80 mg, Daily With Dinner    carvedilol (COREG) tablet 6.25 mg, BID With Meals    clopidogrel (PLAVIX) tablet 75 mg, Daily    docusate sodium (COLACE) capsule 100 mg, BID    doxazosin (CARDURA) tablet 2 mg, Daily    escitalopram (LEXAPRO) tablet 5 mg, Daily    gabapentin (NEURONTIN) capsule 300 mg, TID    hydrALAZINE (APRESOLINE) tablet 50 mg, TID    insulin lispro (HumALOG/ADMELOG) 100 units/mL subcutaneous injection 1-5 Units, HS    insulin lispro (HumALOG/ADMELOG) 100 units/mL subcutaneous injection 1-6 Units, TID AC **AND** Fingerstick Glucose (POCT), 4x Daily AC and at bedtime    isosorbide mononitrate (IMDUR) 24 hr tablet 60 mg, Daily    multi-electrolyte (Plasmalyte-A/Isolyte-S PH 7.4/Normosol-R) IV solution, Continuous, Last Rate: 75 mL/hr (03/31/25  2122)    ondansetron (ZOFRAN) injection 4 mg, Q6H PRN    pantoprazole (PROTONIX) EC tablet 40 mg, BID AC  Allergies   Allergen Reactions    Bactrim [Sulfamethoxazole-Trimethoprim] Shortness Of Breath    Lisinopril Angioedema    Vicodin [Hydrocodone-Acetaminophen] GI Intolerance    Hydrocodone-Acetaminophen Nausea Only    Oxycodone-Acetaminophen GI Intolerance and Nausea Only       Physical Exam  Constitutional:       General: She is not in acute distress.     Appearance: She is not ill-appearing.   HENT:      Head: Normocephalic and atraumatic.      Mouth/Throat:      Mouth: Mucous membranes are moist.   Eyes:      General: No scleral icterus.  Pulmonary:      Effort: Pulmonary effort is normal. No respiratory distress.   Abdominal:      General: Abdomen is flat. There is no distension.      Palpations: Abdomen is soft.      Tenderness: There is no abdominal tenderness. There is no guarding.   Skin:     General: Skin is warm and dry.      Coloration: Skin is not jaundiced.   Neurological:      Mental Status: She is alert.       Most Recent Vital Signs:  Vitals:    04/01/25 0200 04/01/25 0554 04/01/25 0724 04/01/25 0943   BP: 144/74  154/69 156/71   Pulse: 63  (!) 52 60   Resp: 18  18    Temp: (!) 97 °F (36.1 °C)  (!) 97 °F (36.1 °C)    TempSrc:       SpO2: 96%  96% 96%   Weight:  88.5 kg (195 lb 3.2 oz)     Height:           Intake/Output Summary (Last 24 hours) at 4/1/2025 0944  Last data filed at 4/1/2025 0023  Gross per 24 hour   Intake 340 ml   Output 950 ml   Net -610 ml       LABS/IMAGING  Lab Results: I have reviewed all relevant lab results during this hospitalization.    Imaging Studies:  I have reviewed all the relevant images during this hospitalizations    Counseling / Coordination of Care  Total time spent today 45 minutes. Greater than 50% of total time was spent with the patient and / or family counseling and / or coordination of care.    Carmen Pierson PA-C

## 2025-04-01 NOTE — PLAN OF CARE
Problem: Nutrition/Hydration-ADULT  Goal: Nutrient/Hydration intake appropriate for improving, restoring or maintaining nutritional needs  Description: Monitor and assess patient's nutrition/hydration status for malnutrition. Collaborate with interdisciplinary team and initiate plan and interventions as ordered.  Monitor patient's weight and dietary intake as ordered or per policy. Utilize nutrition screening tool and intervene as necessary. Determine patient's food preferences and provide high-protein, high-caloric foods as appropriate. INTERVENTIONS:- Monitor oral intake, urinary output, labs, and treatment plans- Assess nutrition and hydration status and recommend course of action- Evaluate amount of meals eaten- Assist patient with eating if necessary - Allow adequate time for meals- Recommend/ encourage appropriate diets, oral nutritional supplements, and vitamin/mineral supplements- Order, calculate, and assess calorie counts as needed- Recommend, monitor, and adjust tube feedings and TPN/PPN based on assessed needs- Assess need for intravenous fluids- Provide specific nutrition/hydration education as appropriate- Include patient/family/caregiver in decisions related to nutrition  Outcome: Progressing     Problem: PAIN - ADULT  Goal: Verbalizes/displays adequate comfort level or baseline comfort level  Description: Interventions:- Encourage patient to monitor pain and request assistance- Assess pain using appropriate pain scale- Administer analgesics based on type and severity of pain and evaluate response- Implement non-pharmacological measures as appropriate and evaluate response- Consider cultural and social influences on pain and pain management- Notify physician/advanced practitioner if interventions unsuccessful or patient reports new pain  Outcome: Progressing     Problem: INFECTION - ADULT  Goal: Absence or prevention of progression during hospitalization  Description: INTERVENTIONS:- Assess and  monitor for signs and symptoms of infection- Monitor lab/diagnostic results- Monitor all insertion sites, i.e. indwelling lines, tubes, and drains- Monitor endotracheal if appropriate and nasal secretions for changes in amount and color- [place] appropriate cooling/warming therapies per order- Administer medications as ordered- Instruct and encourage patient and family to use good hand hygiene technique- Identify and instruct in appropriate isolation precautions for identified infection/condition  Outcome: Progressing  Goal: Absence of fever/infection during neutropenic period  Description: INTERVENTIONS:- Monitor WBC  Outcome: Progressing     Problem: SAFETY ADULT  Goal: Patient will remain free of falls  Description: INTERVENTIONS:- Educate patient/family on patient safety including physical limitations- Instruct patient to call for assistance with activity - Consult OT/PT to assist with strengthening/mobility - Keep Call bell within reach- Keep bed low and locked with side rails adjusted as appropriate- Keep care items and personal belongings within reach- Initiate and maintain comfort rounds- Make Fall Risk Sign visible to staff- Offer Toileting every 2 Hours, in advance of need- Initiate/Maintain bed alarm- Obtain necessary fall risk management equipment: bed alarm - Apply yellow socks and bracelet for high fall risk patients- Consider moving patient to room near nurses station  Outcome: Progressing  Goal: Maintain or return to baseline ADL function  Description: INTERVENTIONS:-  Assess patient's ability to carry out ADLs; assess patient's baseline for ADL function and identify physical deficits which impact ability to perform ADLs (bathing, care of mouth/teeth, toileting, grooming, dressing, etc.)- Assess/evaluate cause of self-care deficits - Assess range of motion- Assess patient's mobility; develop plan if impaired- Assess patient's need for assistive devices and provide as appropriate- Encourage maximum  independence but intervene and supervise when necessary- Involve family in performance of ADLs- Assess for home care needs following discharge - Consider OT consult to assist with ADL evaluation and planning for discharge- Provide patient education as appropriate  Outcome: Progressing  Goal: Maintains/Returns to pre admission functional level  Description: INTERVENTIONS:- Perform AM-PAC 6 Click Basic Mobility/ Daily Activity assessment daily.- Set and communicate daily mobility goal to care team and patient/family/caregiver. - Outcome: Progressing     Problem: DISCHARGE PLANNING  Goal: Discharge to home or other facility with appropriate resources  Description: INTERVENTIONS:- Identify barriers to discharge w/patient and caregiver- Arrange for needed discharge resources and transportation as appropriate- Identify discharge learning needs (meds, wound care, etc.)- Arrange for interpretive services to assist at discharge as needed- Refer to Case Management Department for coordinating discharge planning if the patient needs post-hospital services based on physician/advanced practitioner order or complex needs related to functional status, cognitive ability, or social support system  Outcome: Progressing     Problem: Knowledge Deficit  Goal: Patient/family/caregiver demonstrates understanding of disease process, treatment plan, medications, and discharge instructions  Description: Complete learning assessment and assess knowledge base.Interventions:- Provide teaching at level of understanding- Provide teaching via preferred learning methods  Outcome: Progressing

## 2025-04-01 NOTE — ASSESSMENT & PLAN NOTE
Home regimen: Carvedilol 6.25 mg twice daily, amlodipine 5 mg daily, doxazosin 2 mg daily, hydralazine 50 mg 3 times daily, Imdur 60 mg daily, valsartan 320 mg daily  Valsartan on hold secondary to PADMINI

## 2025-04-01 NOTE — ASSESSMENT & PLAN NOTE
Patient complains of 2 week history of abdominal pain with associated nausea and vomiting that began on the day of admission. She reports 5-6 episodes of vomiting within a 3 hours period.  She had a BM prior to coming in  No hematemesis. Denies diarrhea. Denies recent travel or sick contacts  She does report eating chinese food on thurs  CT A/P: No acute findings with a normal appendix identified.   Likely GERD.  CT A/P does show small hiatal hernia, patient was supposed to follow-up with GI outpatient.  Patient is tender in the left lower quadrant will trial Bentyl but she is eating more no nausea vomiting discussed with GI no plan for further investigation and patient will follow-up with her outpatient

## 2025-04-01 NOTE — ASSESSMENT & PLAN NOTE
Lab Results   Component Value Date    EGFR 26 04/01/2025    EGFR 23 03/31/2025    EGFR 33 03/30/2025    CREATININE 1.89 (H) 04/01/2025    CREATININE 2.07 (H) 03/31/2025    CREATININE 1.58 (H) 03/30/2025   Baseline appears 1.3 - 1.4  Bump to 2.07 today   Hold Imdur and valsartan   Repeat BMP in the am

## 2025-04-01 NOTE — ASSESSMENT & PLAN NOTE
Lab Results   Component Value Date    HGBA1C 6.2 03/27/2025       Recent Labs     03/31/25  1556 03/31/25  2100 04/01/25  0725 04/01/25  1108   POCGLU 103 103 87 101       Blood Sugar Average: Last 72 hrs:  (P) 101.6  Home meds on hold  Sliding scale insulin  Accu-Cheks before meals and nightly  Optimal glucose control 140-180 while hospitalized  Hypoglycemia protocol  Carb controlled diet

## 2025-04-01 NOTE — ASSESSMENT & PLAN NOTE
Cr 2.07, and CKD stage III with a baseline of 1.1-1.5  Suspect in light of nausea vomiting.  Improving with IV hydration to 1.8.  Will continue hydration patient is eating more there is no report of nausea vomiting

## 2025-04-01 NOTE — ASSESSMENT & PLAN NOTE
"Patient presents to the ED with complaints of \"chest feels funny\".    EKG per ED provider shows normal sinus rhythm  IMELDA score = 5  Troponin 49-->52-->66   Lipid panel LDL controlled  A1c of 6.2  Patient received ASA 325mg in the ED; continue 81mg daily   Continue atorvastatin 80 mg daily  Telemetry  Cardiac diet; low sodium    ECHO: EF 66%, systolic function normal, no diagnostic regional wall motion abnormality identified, this possibility cannot be excluded on the basis of the study.  Diastolic function mildly abnormal consistent with grade 1 relaxation.  Small pericardial effusion along the right ventricular free wall.  No evidence of tamponade.  Cardiology consulted, apprecaite recs   Symptoms thought to be 2/2 poor noncompliance with BP medications and elevated BP on admission   Status post 48 hours of heparin drip  Started on plavix 75mg daily + continue aspirin 81mg daily - DAPT  Continue BB and Statin   No further chest pain  "

## 2025-04-01 NOTE — PROGRESS NOTES
"Progress Note - Hospitalist   Name: Erin Arroyo 69 y.o. female I MRN: [id number]  Unit/Bed#: -01 I Date of Admission: 3/29/2025   Date of Service: 4/1/2025 I Hospital Day: 3    Assessment & Plan  Elevated troponin  Patient presents to the ED with complaints of \"chest feels funny\".    EKG per ED provider shows normal sinus rhythm  IMELDA score = 5  Troponin 49-->52-->66   Lipid panel LDL controlled  A1c of 6.2  Patient received ASA 325mg in the ED; continue 81mg daily   Continue atorvastatin 80 mg daily  Telemetry  Cardiac diet; low sodium    ECHO: EF 66%, systolic function normal, no diagnostic regional wall motion abnormality identified, this possibility cannot be excluded on the basis of the study.  Diastolic function mildly abnormal consistent with grade 1 relaxation.  Small pericardial effusion along the right ventricular free wall.  No evidence of tamponade.  Cardiology consulted, apprecaite recs   Symptoms thought to be 2/2 poor noncompliance with BP medications and elevated BP on admission   Status post 48 hours of heparin drip  Started on plavix 75mg daily + continue aspirin 81mg daily - DAPT  Continue BB and Statin   No further chest pain  Abdominal pain  Patient complains of 2 week history of abdominal pain with associated nausea and vomiting that began on the day of admission. She reports 5-6 episodes of vomiting within a 3 hours period.  She had a BM prior to coming in  No hematemesis. Denies diarrhea. Denies recent travel or sick contacts  She does report eating chinese food on thurs  CT A/P: No acute findings with a normal appendix identified.   Likely GERD.  CT A/P does show small hiatal hernia, patient was supposed to follow-up with GI outpatient.  Patient is tender in the left lower quadrant will trial Bentyl but she is eating more no nausea vomiting discussed with GI no plan for further investigation and patient will follow-up with her outpatient  Nausea and vomiting  As above.   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, without long-term current use of insulin ([facility])  Lab Results   Component Value Date    HGBA1C 6.2 03/27/2025       Recent Labs     03/31/25  1556 03/31/25  2100 04/01/25  0725 04/01/25  1108   POCGLU 103 103 87 101       Blood Sugar Average: Last 72 hrs:  (P) 101.6  Home meds on hold  Sliding scale insulin  Accu-Cheks before meals and nightly  Optimal glucose control 140-180 while hospitalized  Hypoglycemia protocol  Carb controlled diet  Essential hypertension  Home regimen: Carvedilol 6.25 mg twice daily, amlodipine 5 mg daily, doxazosin 2 mg daily, hydralazine 50 mg 3 times daily, Imdur 60 mg daily, valsartan 320 mg daily  Valsartan on hold secondary to PADMINI  Hyperlipidemia  Chronic, continue statin therapy  Repeat lipid panel -LDL controlled  Chronic kidney disease, stage 3 unspecified ([facility]) (Resolved: 4/1/2025)  Lab Results   Component Value Date    EGFR 26 04/01/2025    EGFR 23 03/31/2025    EGFR 33 03/30/2025    CREATININE 1.89 (H) 04/01/2025    CREATININE 2.07 (H) 03/31/2025    CREATININE 1.58 (H) 03/30/2025   Baseline appears 1.3 - 1.4  Bump to 2.07 today   Hold Imdur and valsartan   Repeat BMP in the am   Atherosclerosis of native coronary artery of native heart without angina pectoris  Please see discussion under elevated troponin  PFO (patent foramen ovale)  History of small PFO on TTE in 2023.  PSVT (paroxysmal supraventricular tachycardia) ([facility])  Noted to have paroxysmal SVT on extended Holter monitoring.  No A-fib identified.  Continue beta-blocker therapy  Telemetry monitoring  Acute kidney injury superimposed on chronic kidney disease  ([facility])  Cr 2.07, and CKD stage III with a baseline of 1.1-1.5  Suspect in light of nausea vomiting.  Improving with IV hydration to 1.8.  Will continue hydration patient is eating more there is no report of nausea vomiting  Dental caries  Discussed with patient that she needs to  follow a soft diet and needs to follow-up with dentist    VTE Pharmacologic Prophylaxis:   Moderate Risk (Score 3-4) - Pharmacological DVT Prophylaxis Contraindicated. Sequential Compression Devices Ordered.    Mobility:   Basic Mobility Inpatient Raw Score: 19  JH-HLM Goal: 6: Walk 10 steps or more  JH-HLM Achieved: 6: Walk 10 steps or more  JH-HLM Goal achieved. Continue to encourage appropriate mobility.    Patient Centered Rounds: I performed bedside rounds with nursing staff today.   Discussions with Specialists or Other Care Team Provider: none    Education and Discussions with Family / Patient: patient will update her daughter     Current Length of Stay: 3 day(s)  Current Patient Status: Inpatient   Certification Statement: The patient will continue to require additional inpatient hospital stay due to abdominal pain   Discharge Plan: Anticipate discharge tomorrow to home.    Code Status: Level 1 - Full Code    Subjective   Patient seen and examined no chest pain no nausea no vomiting ate a little bit today complaining of dental pain and still some lower abdominal pain    Objective :  Temp:  [96.8 °F (36 °C)-97 °F (36.1 °C)] 97 °F (36.1 °C)  HR:  [52-63] 60  BP: (144-156)/(69-74) 156/71  Resp:  [17-18] 18  SpO2:  [95 %-97 %] 96 %  O2 Device: None (Room air)    Body mass index is 31.51 kg/m².     Input and Output Summary (last 24 hours):     Intake/Output Summary (Last 24 hours) at 4/1/2025 1305  Last data filed at 4/1/2025 1042  Gross per 24 hour   Intake 240 ml   Output 1250 ml   Net -1010 ml       Physical Exam  Vitals and nursing note reviewed.   Constitutional:       General: She is not in acute distress.     Appearance: She is well-developed.   HENT:      Head: Normocephalic and atraumatic.   Eyes:      Conjunctiva/sclera: Conjunctivae normal.   Cardiovascular:      Rate and Rhythm: Normal rate and regular rhythm.      Heart sounds: No murmur heard.  Pulmonary:      Effort: Pulmonary effort is normal. No  respiratory distress.      Breath sounds: Normal breath sounds. No wheezing or rales.   Abdominal:      Palpations: Abdomen is soft.      Tenderness: There is abdominal tenderness (mostly on deep palpation  the palpation of left lower quadrant).   Musculoskeletal:         General: No swelling.      Cervical back: Neck supple.   Skin:     General: Skin is warm and dry.      Capillary Refill: Capillary refill takes less than 2 seconds.   Neurological:      Mental Status: She is alert.   Psychiatric:         Mood and Affect: Mood normal.           Lines/Drains:              Lab Results: I have reviewed the following results:   Results from last 7 days   Lab Units 04/01/25  0546 03/30/25  0742 03/29/25  1307   WBC Thousand/uL 4.80   < > 9.15   HEMOGLOBIN g/dL 9.9*   < > 12.6   HEMATOCRIT % 30.9*   < > 39.2   PLATELETS Thousands/uL 197   < > 241   SEGS PCT %  --   --  88*   LYMPHO PCT %  --   --  5*   MONO PCT %  --   --  6   EOS PCT %  --   --  1    < > = values in this interval not displayed.     Results from last 7 days   Lab Units 04/01/25  0546 03/30/25  0742 03/29/25  1307   SODIUM mmol/L 142   < > 141   POTASSIUM mmol/L 4.1   < > 4.8   CHLORIDE mmol/L 109*   < > 107   CO2 mmol/L 30   < > 27   BUN mg/dL 16   < > 17   CREATININE mg/dL 1.89*   < > 1.42*   ANION GAP mmol/L 3*   < > 7   CALCIUM mg/dL 8.1*   < > 8.9   ALBUMIN g/dL  --   --  3.6   TOTAL BILIRUBIN mg/dL  --   --  0.53   ALK PHOS U/L  --   --  107*   ALT U/L  --   --  18   AST U/L  --   --  32   GLUCOSE RANDOM mg/dL 84   < > 131    < > = values in this interval not displayed.     Results from last 7 days   Lab Units 03/29/25  1307   INR  1.01     Results from last 7 days   Lab Units 04/01/25  1108 04/01/25  0725 03/31/25  2100 03/31/25  1556 03/31/25  1107 03/31/25  0743 03/30/25  2100 03/30/25  1612 03/30/25  1122 03/30/25  0745   POC GLUCOSE mg/dl 101 87 103 103 101 78 116 111 126 90     Results from last 7 days   Lab Units 03/27/25  0849   HEMOGLOBIN  A1C  6.2     Results from last 7 days   Lab Units 03/29/25  1307   LACTIC ACID mmol/L 1.1       Recent Cultures (last 7 days):   Results from last 7 days   Lab Units 03/27/25  0938   URINE CULTURE  No Growth <1000 cfu/mL       Imaging Results Review: I reviewed radiology reports from this admission including: CT abdomen/pelvis.  Other Study Results Review: No additional pertinent studies reviewed.    Last 24 Hours Medication List:     Current Facility-Administered Medications:     acetaminophen (TYLENOL) tablet 650 mg, Q6H PRN    aluminum-magnesium hydroxide-simethicone (MAALOX) oral suspension 30 mL, Q4H PRN    amLODIPine (NORVASC) tablet 5 mg, Daily    aspirin (ECOTRIN LOW STRENGTH) EC tablet 81 mg, Daily    atorvastatin (LIPITOR) tablet 80 mg, Daily With Dinner    carvedilol (COREG) tablet 6.25 mg, BID With Meals    clopidogrel (PLAVIX) tablet 75 mg, Daily    dicyclomine (BENTYL) capsule 10 mg, Once    docusate sodium (COLACE) capsule 100 mg, BID    doxazosin (CARDURA) tablet 2 mg, Daily    escitalopram (LEXAPRO) tablet 5 mg, Daily    gabapentin (NEURONTIN) capsule 300 mg, TID    hydrALAZINE (APRESOLINE) tablet 50 mg, TID    insulin lispro (HumALOG/ADMELOG) 100 units/mL subcutaneous injection 1-5 Units, HS    insulin lispro (HumALOG/ADMELOG) 100 units/mL subcutaneous injection 1-6 Units, TID AC **AND** Fingerstick Glucose (POCT), 4x Daily AC and at bedtime    isosorbide mononitrate (IMDUR) 24 hr tablet 60 mg, Daily    multi-electrolyte (Plasmalyte-A/Isolyte-S PH 7.4/Normosol-R) IV solution, Continuous, Last Rate: 75 mL/hr (04/01/25 0945)    ondansetron (ZOFRAN) injection 4 mg, Q6H PRN    pantoprazole (PROTONIX) EC tablet 40 mg, BID AC    Administrative Statements   Today, Patient Was Seen By: Madisyn Ramirez MD  I have spent a total time of >35 minutes in caring for this patient on the day of the visit/encounter including Documenting in the medical record and Reviewing/placing orders in the medical record  (including tests, medications, and/or procedures).    **Please Note: This note may have been constructed using a voice recognition system.**

## 2025-04-02 LAB
ANION GAP SERPL CALCULATED.3IONS-SCNC: 4 MMOL/L (ref 4–13)
BUN SERPL-MCNC: 14 MG/DL (ref 5–25)
CALCIUM SERPL-MCNC: 7.9 MG/DL (ref 8.4–10.2)
CHLORIDE SERPL-SCNC: 108 MMOL/L (ref 96–108)
CO2 SERPL-SCNC: 29 MMOL/L (ref 21–32)
CREAT SERPL-MCNC: 1.73 MG/DL (ref 0.6–1.3)
GFR SERPL CREATININE-BSD FRML MDRD: 29 ML/MIN/1.73SQ M
GLUCOSE SERPL-MCNC: 137 MG/DL (ref 65–140)
GLUCOSE SERPL-MCNC: 145 MG/DL (ref 65–140)
GLUCOSE SERPL-MCNC: 81 MG/DL (ref 65–140)
GLUCOSE SERPL-MCNC: 88 MG/DL (ref 65–140)
GLUCOSE SERPL-MCNC: 99 MG/DL (ref 65–140)
POTASSIUM SERPL-SCNC: 4.1 MMOL/L (ref 3.5–5.3)
SODIUM SERPL-SCNC: 141 MMOL/L (ref 135–147)

## 2025-04-02 PROCEDURE — 80048 BASIC METABOLIC PNL TOTAL CA: CPT | Performed by: FAMILY MEDICINE

## 2025-04-02 PROCEDURE — 99232 SBSQ HOSP IP/OBS MODERATE 35: CPT | Performed by: FAMILY MEDICINE

## 2025-04-02 PROCEDURE — 82948 REAGENT STRIP/BLOOD GLUCOSE: CPT

## 2025-04-02 RX ORDER — HEPARIN SODIUM 5000 [USP'U]/ML
5000 INJECTION, SOLUTION INTRAVENOUS; SUBCUTANEOUS EVERY 8 HOURS SCHEDULED
Status: DISCONTINUED | OUTPATIENT
Start: 2025-04-02 | End: 2025-04-03 | Stop reason: HOSPADM

## 2025-04-02 RX ORDER — AMLODIPINE BESYLATE 10 MG/1
10 TABLET ORAL DAILY
Status: DISCONTINUED | OUTPATIENT
Start: 2025-04-03 | End: 2025-04-03 | Stop reason: HOSPADM

## 2025-04-02 RX ORDER — BISACODYL 5 MG/1
10 TABLET, DELAYED RELEASE ORAL ONCE
Status: COMPLETED | OUTPATIENT
Start: 2025-04-02 | End: 2025-04-02

## 2025-04-02 RX ADMIN — SODIUM CHLORIDE, SODIUM GLUCONATE, SODIUM ACETATE, POTASSIUM CHLORIDE, MAGNESIUM CHLORIDE, SODIUM PHOSPHATE, DIBASIC, AND POTASSIUM PHOSPHATE 75 ML/HR: .53; .5; .37; .037; .03; .012; .00082 INJECTION, SOLUTION INTRAVENOUS at 21:24

## 2025-04-02 RX ADMIN — PANTOPRAZOLE SODIUM 40 MG: 40 TABLET, DELAYED RELEASE ORAL at 17:53

## 2025-04-02 RX ADMIN — PANTOPRAZOLE SODIUM 40 MG: 40 TABLET, DELAYED RELEASE ORAL at 08:26

## 2025-04-02 RX ADMIN — DOCUSATE SODIUM 100 MG: 100 CAPSULE, LIQUID FILLED ORAL at 17:53

## 2025-04-02 RX ADMIN — AMLODIPINE BESYLATE 5 MG: 5 TABLET ORAL at 08:30

## 2025-04-02 RX ADMIN — CLOPIDOGREL 75 MG: 75 TABLET ORAL at 08:26

## 2025-04-02 RX ADMIN — HEPARIN SODIUM 5000 UNITS: 5000 INJECTION INTRAVENOUS; SUBCUTANEOUS at 21:19

## 2025-04-02 RX ADMIN — GABAPENTIN 300 MG: 300 CAPSULE ORAL at 17:53

## 2025-04-02 RX ADMIN — ESCITALOPRAM OXALATE 5 MG: 5 TABLET, FILM COATED ORAL at 08:26

## 2025-04-02 RX ADMIN — GABAPENTIN 300 MG: 300 CAPSULE ORAL at 08:26

## 2025-04-02 RX ADMIN — SODIUM CHLORIDE, SODIUM GLUCONATE, SODIUM ACETATE, POTASSIUM CHLORIDE, MAGNESIUM CHLORIDE, SODIUM PHOSPHATE, DIBASIC, AND POTASSIUM PHOSPHATE 75 ML/HR: .53; .5; .37; .037; .03; .012; .00082 INJECTION, SOLUTION INTRAVENOUS at 11:18

## 2025-04-02 RX ADMIN — HYDRALAZINE HYDROCHLORIDE 50 MG: 25 TABLET ORAL at 21:19

## 2025-04-02 RX ADMIN — DOCUSATE SODIUM 100 MG: 100 CAPSULE, LIQUID FILLED ORAL at 08:26

## 2025-04-02 RX ADMIN — ONDANSETRON 4 MG: 2 INJECTION INTRAMUSCULAR; INTRAVENOUS at 13:49

## 2025-04-02 RX ADMIN — HYDRALAZINE HYDROCHLORIDE 50 MG: 25 TABLET ORAL at 08:26

## 2025-04-02 RX ADMIN — CARVEDILOL 6.25 MG: 6.25 TABLET, FILM COATED ORAL at 08:26

## 2025-04-02 RX ADMIN — HEPARIN SODIUM 5000 UNITS: 5000 INJECTION INTRAVENOUS; SUBCUTANEOUS at 13:49

## 2025-04-02 RX ADMIN — ISOSORBIDE MONONITRATE 60 MG: 60 TABLET, EXTENDED RELEASE ORAL at 08:26

## 2025-04-02 RX ADMIN — CARVEDILOL 6.25 MG: 6.25 TABLET, FILM COATED ORAL at 17:53

## 2025-04-02 RX ADMIN — HYDRALAZINE HYDROCHLORIDE 50 MG: 25 TABLET ORAL at 17:53

## 2025-04-02 RX ADMIN — ASPIRIN 81 MG: 81 TABLET, COATED ORAL at 08:26

## 2025-04-02 RX ADMIN — GABAPENTIN 300 MG: 300 CAPSULE ORAL at 21:19

## 2025-04-02 RX ADMIN — DOXAZOSIN 2 MG: 1 TABLET ORAL at 08:26

## 2025-04-02 RX ADMIN — BISACODYL 10 MG: 5 TABLET, COATED ORAL at 13:49

## 2025-04-02 RX ADMIN — ATORVASTATIN CALCIUM 80 MG: 40 TABLET, FILM COATED ORAL at 17:53

## 2025-04-02 NOTE — PLAN OF CARE
Problem: Nutrition/Hydration-ADULT  Goal: Nutrient/Hydration intake appropriate for improving, restoring or maintaining nutritional needs  Description: Monitor and assess patient's nutrition/hydration status for malnutrition. Collaborate with interdisciplinary team and initiate plan and interventions as ordered.  Monitor patient's weight and dietary intake as ordered or per policy. Utilize nutrition screening tool and intervene as necessary. Determine patient's food preferences and provide high-protein, high-caloric foods as appropriate. INTERVENTIONS:- Monitor oral intake, urinary output, labs, and treatment plans- Assess nutrition and hydration status and recommend course of action- Evaluate amount of meals eaten- Assist patient with eating if necessary - Allow adequate time for meals- Recommend/ encourage appropriate diets, oral nutritional supplements, and vitamin/mineral supplements- Order, calculate, and assess calorie counts as needed- Recommend, monitor, and adjust tube feedings and TPN/PPN based on assessed needs- Assess need for intravenous fluids- Provide specific nutrition/hydration education as appropriate- Include patient/family/caregiver in decisions related to nutrition  Outcome: Progressing     Problem: PAIN - ADULT  Goal: Verbalizes/displays adequate comfort level or baseline comfort level  Description: Interventions:- Encourage patient to monitor pain and request assistance- Assess pain using appropriate pain scale- Administer analgesics based on type and severity of pain and evaluate response- Implement non-pharmacological measures as appropriate and evaluate response- Consider cultural and social influences on pain and pain management- Notify physician/advanced practitioner if interventions unsuccessful or patient reports new pain  Outcome: Progressing     Problem: INFECTION - ADULT  Goal: Absence or prevention of progression during hospitalization  Description: INTERVENTIONS:- Assess and  monitor for signs and symptoms of infection- Monitor lab/diagnostic results- Monitor all insertion sites, i.e. indwelling lines, tubes, and drains- Monitor endotracheal if appropriate and nasal secretions for changes in amount and color- [place] appropriate cooling/warming therapies per order- Administer medications as ordered- Instruct and encourage patient and family to use good hand hygiene technique- Identify and instruct in appropriate isolation precautions for identified infection/condition  Outcome: Progressing  Goal: Absence of fever/infection during neutropenic period  Description: INTERVENTIONS:- Monitor WBC  Outcome: Progressing     Problem: SAFETY ADULT  Goal: Patient will remain free of falls  Description: INTERVENTIONS:- Educate patient/family on patient safety including physical limitations- Instruct patient to call for assistance with activity - Consult OT/PT to assist with strengthening/mobility - Keep Call bell within reach- Keep bed low and locked with side rails adjusted as appropriate- Keep care items and personal belongings within reach- Initiate and maintain comfort rounds- Make Fall Risk Sign visible to staff- Offer Toileting every 2 Hours, in advance of need- Initiate/Maintain bed and chair alarm- Obtain necessary fall risk management equipment: - Apply yellow socks and bracelet for high fall risk patients- Consider moving patient to room near nurses station  Outcome: Progressing  Goal: Maintain or return to baseline ADL function  Description: INTERVENTIONS:-  Assess patient's ability to carry out ADLs; assess patient's baseline for ADL function and identify physical deficits which impact ability to perform ADLs (bathing, care of mouth/teeth, toileting, grooming, dressing, etc.)- Assess/evaluate cause of self-care deficits - Assess range of motion- Assess patient's mobility; develop plan if impaired- Assess patient's need for assistive devices and provide as appropriate- Encourage maximum  independence but intervene and supervise when necessary- Involve family in performance of ADLs- Assess for home care needs following discharge - Consider OT consult to assist with ADL evaluation and planning for discharge- Provide patient education as appropriate  Outcome: Progressing  Goal: Maintains/Returns to pre admission functional level  Description: INTERVENTIONS:- Perform AM-PAC 6 Click Basic Mobility/ Daily Activity assessment daily.- Set and communicate daily mobility goal to care team and patient/family/caregiver. - Collaborate with rehabilitation services on mobility goals if consulted- Perform Range of Motion 3 times a day.- Reposition patient every 2 hours.- Dangle patient 3 times a day- Stand patient 3 times a day- Ambulate patient 3 times a day- Out of bed to chair 3 times a day - Out of bed for meals 3 times a day- Out of bed for toileting- Record patient progress and toleration of activity level   Outcome: Progressing     Problem: DISCHARGE PLANNING  Goal: Discharge to home or other facility with appropriate resources  Description: INTERVENTIONS:- Identify barriers to discharge w/patient and caregiver- Arrange for needed discharge resources and transportation as appropriate- Identify discharge learning needs (meds, wound care, etc.)- Arrange for interpretive services to assist at discharge as needed- Refer to Case Management Department for coordinating discharge planning if the patient needs post-hospital services based on physician/advanced practitioner order or complex needs related to functional status, cognitive ability, or social support system  Outcome: Progressing     Problem: Knowledge Deficit  Goal: Patient/family/caregiver demonstrates understanding of disease process, treatment plan, medications, and discharge instructions  Description: Complete learning assessment and assess knowledge base.Interventions:- Provide teaching at level of understanding- Provide teaching via preferred  learning methods  Outcome: Progressing

## 2025-04-02 NOTE — ASSESSMENT & PLAN NOTE
Cr 2.07, and CKD stage III with a baseline of 1.1-1.5  Suspect in light of nausea vomiting.  Improving with IV hydration to 1.7.  Will continue hydration patient is eating more there is no report of nausea vomiting

## 2025-04-02 NOTE — ASSESSMENT & PLAN NOTE
Home regimen: Carvedilol 6.25 mg twice daily, amlodipine 5 mg daily, doxazosin 2 mg daily, hydralazine 50 mg 3 times daily, Imdur 60 mg daily, valsartan 320 mg daily  Valsartan on hold secondary to PADMINI  4/2 uncontrolled increase Norvasc to 10 mg daily

## 2025-04-02 NOTE — PLAN OF CARE
Problem: Nutrition/Hydration-ADULT  Goal: Nutrient/Hydration intake appropriate for improving, restoring or maintaining nutritional needs  Description: Monitor and assess patient's nutrition/hydration status for malnutrition. Collaborate with interdisciplinary team and initiate plan and interventions as ordered.  Monitor patient's weight and dietary intake as ordered or per policy. Utilize nutrition screening tool and intervene as necessary. Determine patient's food preferences and provide high-protein, high-caloric foods as appropriate. INTERVENTIONS:- Monitor oral intake, urinary output, labs, and treatment plans- Assess nutrition and hydration status and recommend course of action- Evaluate amount of meals eaten- Assist patient with eating if necessary - Allow adequate time for meals- Recommend/ encourage appropriate diets, oral nutritional supplements, and vitamin/mineral supplements- Order, calculate, and assess calorie counts as needed- Recommend, monitor, and adjust tube feedings and TPN/PPN based on assessed needs- Assess need for intravenous fluids- Provide specific nutrition/hydration education as appropriate- Include patient/family/caregiver in decisions related to nutrition  Outcome: Progressing     Problem: PAIN - ADULT  Goal: Verbalizes/displays adequate comfort level or baseline comfort level  Description: Interventions:- Encourage patient to monitor pain and request assistance- Assess pain using appropriate pain scale- Administer analgesics based on type and severity of pain and evaluate response- Implement non-pharmacological measures as appropriate and evaluate response- Consider cultural and social influences on pain and pain management- Notify physician/advanced practitioner if interventions unsuccessful or patient reports new pain  Outcome: Progressing     Problem: INFECTION - ADULT  Goal: Absence or prevention of progression during hospitalization  Description: INTERVENTIONS:- Assess and  monitor for signs and symptoms of infection- Monitor lab/diagnostic results- Monitor all insertion sites, i.e. indwelling lines, tubes, and drains- Monitor endotracheal if appropriate and nasal secretions for changes in amount and color- [place] appropriate cooling/warming therapies per order- Administer medications as ordered- Instruct and encourage patient and family to use good hand hygiene technique- Identify and instruct in appropriate isolation precautions for identified infection/condition  Outcome: Progressing  Goal: Absence of fever/infection during neutropenic period  Description: INTERVENTIONS:- Monitor WBC  Outcome: Progressing     Problem: SAFETY ADULT  Goal: Patient will remain free of falls  Description: INTERVENTIONS:- Educate patient/family on patient safety including physical limitations- Instruct patient to call for assistance with activity - Consult OT/PT to assist with strengthening/mobility - Keep Call bell within reach- Keep bed low and locked with side rails adjusted as appropriate- Keep care items and personal belongings within reach- Initiate and maintain comfort rounds- Make Fall Risk Sign visible to staff- Offer Toileting every 2 Hours, in advance of need- Initiate/Maintain bed and chair alarm- Obtain necessary fall risk management equipment: - Apply yellow socks and bracelet for high fall risk patients- Consider moving patient to room near nurses station  Outcome: Progressing  Goal: Maintain or return to baseline ADL function  Description: INTERVENTIONS:-  Assess patient's ability to carry out ADLs; assess patient's baseline for ADL function and identify physical deficits which impact ability to perform ADLs (bathing, care of mouth/teeth, toileting, grooming, dressing, etc.)- Assess/evaluate cause of self-care deficits - Assess range of motion- Assess patient's mobility; develop plan if impaired- Assess patient's need for assistive devices and provide as appropriate- Encourage maximum  independence but intervene and supervise when necessary- Involve family in performance of ADLs- Assess for home care needs following discharge - Consider OT consult to assist with ADL evaluation and planning for discharge- Provide patient education as appropriate  Outcome: Progressing  Goal: Maintains/Returns to pre admission functional level  Description: INTERVENTIONS:- Perform AM-PAC 6 Click Basic Mobility/ Daily Activity assessment daily.- Set and communicate daily mobility goal to care team and patient/family/caregiver. - Collaborate with rehabilitation services on mobility goals if consulted- Perform Range of Motion 3 times a day.- Reposition patient every 2 hours.- Dangle patient 3 times a day- Stand patient 3 times a day- Ambulate patient 3 times a day- Out of bed to chair 3 times a day - Out of bed for meals 3 times a day- Out of bed for toileting- Record patient progress and toleration of activity level   Outcome: Progressing     Problem: DISCHARGE PLANNING  Goal: Discharge to home or other facility with appropriate resources  Description: INTERVENTIONS:- Identify barriers to discharge w/patient and caregiver- Arrange for needed discharge resources and transportation as appropriate- Identify discharge learning needs (meds, wound care, etc.)- Arrange for interpretive services to assist at discharge as needed- Refer to Case Management Department for coordinating discharge planning if the patient needs post-hospital services based on physician/advanced practitioner order or complex needs related to functional status, cognitive ability, or social support system  Outcome: Progressing     Problem: Knowledge Deficit  Goal: Patient/family/caregiver demonstrates understanding of disease process, treatment plan, medications, and discharge instructions  Description: Complete learning assessment and assess knowledge base.Interventions:- Provide teaching at level of understanding- Provide teaching via preferred  learning methods  Outcome: Progressing

## 2025-04-02 NOTE — CASE MANAGEMENT
Case Management Assessment & Discharge Planning Note    Patient name Erin Arroyo  Location /-01 MRN [id number]  : 1955 Date 2025       Current Admission Date: 3/29/2025  Current Admission Diagnosis:Elevated troponin   Patient Active Problem List    Diagnosis Date Noted Date Diagnosed    Dental caries 2025     Elevated troponin 2025     Abdominal pain 2025     PSVT (paroxysmal supraventricular tachycardia) ([facility]) 10/22/2024     History of CVA (cerebrovascular accident) 2023     Hypertensive urgency 2023     Atherosclerosis of native coronary artery of native heart without angina pectoris 2023     PFO (patent foramen ovale) 2023     Palpitations 2023     Acute kidney injury superimposed on chronic kidney disease  ([facility]) 2023     Nausea and vomiting 2022     Thyroid nodule 2022     Abnormal CT scan 08/10/2021     Depression, recurrent ([facility]) 2021     Cerebral meningocele ([facility]) 2021     Obesity, morbid ([facility]) 2020     Chronic heart failure ([facility]) 2020     Subcortical microvascular ischemic occlusive disease 2019     Sixth nerve palsy of right eye      Numbness in both hands 2019     Carpal tunnel syndrome of left wrist 2019     Multinodular goiter 2019     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 proliferative retinopathy and macular edema, without long-term current use of insulin ([facility])      Diabetic neuropathy ([facility]) 2018     Aneurysm ([facility]) 2017     Impaired hearing 2017     Hyperlipidemia 2017     Moderate persistent asthma without complication 10/31/2016     Lumbar pain 10/18/2016     Hiatal hernia 10/18/2016     Chronic back pain 2016     Essential hypertension 2016     Neuropathy, diabetic ([facility]) 2016       LOS (days): 4  Geometric Mean LOS (GMLOS) (days): 2.1  Days to GMLOS:-1.8     OBJECTIVE:    Risk of Unplanned  Readmission Score: 16.7         Current admission status: Inpatient  Referral Reason: Other (Disposition Planning)    Preferred Pharmacy:   [facility] - [place], [place] - [address]  [address] [facility] [address]  Phone: [phone] Fax: [phone]    [facility] - [place], [place] - [address]  [address]  [facility] [address]  Phone: [phone] Fax: [phone]    [id number] - [place] - [address]  [address] [facility] [address]  Phone: [phone] Fax: [phone]    [facility] [place] - [place] - [address]  [address]  [facility] [address]  Phone: [phone] Fax: [phone]    [id number] - [place] - [address]  [address] [facility] [address]  Phone: [phone] Fax: [phone]    Primary Care Provider: Laureano Macias DO    Primary Insurance: [employer] DUAL COMPLETE  REP  Secondary Insurance: [employer] [facility]    ASSESSMENT:  Active Health Care Proxies       Ca Arroyo Health Care Representative - Daughter   Primary Phone: [phone] (Mobile)                 Advance Directives  Does patient have a Health Care POA?: No  Was patient offered paperwork?: Yes (patient denied)  Does patient currently have a Health Care decision maker?: Yes, please see Health Care Proxy section  Does patient have Advance Directives?: No  Was patient offered paperwork?: Yes (patient denied)  Primary Contact: Ca Arroyo, fiorella         Readmission Root Cause  30 Day Readmission: No    Patient Information  Admitted from:: Home  Mental Status: Alert  During Assessment patient was accompanied by: Not accompanied during assessment  Assessment information provided by:: Patient  Primary Caregiver: Self  Support Systems: Son, Daughter, Friend  County of Residence: [facility]  What [facility] do you live in?: [facility]  Home entry access  options. Select all that apply.: Elevator  Type of Current Residence: Apartment  Floor Level: 10 (11th floor)  Upon entering residence, is there a bedroom on the main floor (no further steps)?: Yes  Upon entering residence, is there a bathroom on the main floor (no further steps)?: Yes  Living Arrangements: Lives Alone  Is patient a ?: No    Activities of Daily Living Prior to Admission  Functional Status: Independent  Completes ADLs independently?: Yes  Ambulates independently?: Yes  Does patient use assisted devices?: Yes  Assisted Devices (DME) used: Walker  Does patient currently own DME?: Yes  What DME does the patient currently own?: Walker  Does patient have a history of Outpatient Therapy (PT/OT)?: No  Does the patient have a history of Short-Term Rehab?: Yes ([facility])  Does patient have a history of HHC?: Yes (unknown agency)  Does patient currently have HHC?: No         Patient Information Continued  Income Source: SSI/SSD  Does patient have prescription coverage?: Yes  Can the patient afford their medications and any related supplies (such as glucometers or test strips)?: Yes  Does patient receive dialysis treatments?: No  Does patient have a history of substance abuse?: No  Does patient have a history of Mental Health Diagnosis?: Yes (anxiety)  Is patient receiving treatment for mental health?: Yes (medication management)  Has patient received inpatient treatment related to mental health in the last 2 years?: No         Means of Transportation  Means of Transport to Appts:: Public Transportation - Bus          DISCHARGE DETAILS:    Discharge planning discussed with:: patient  Freedom of Choice: Yes  Comments - Freedom of Choice: [facility] referral to explore VN availability  CM contacted family/caregiver?: No- see comments (pt declined)  Were Treatment Team discharge recommendations reviewed with patient/caregiver?: Yes  Did patient/caregiver verbalize understanding of patient care needs?: Yes                  CM met with patient at the bedside, baseline information was obtained. CM discussed the role of CM in helping the patient develop a discharge plan and assist the patient in carry out their plan.     Patient is currently residing in [facility], 11 th floor, $261/mo rent. Patient reports she was at [facility] from December 2023 until September 18, 2024 when they assisted patient in securing her own residence.  Patient received [facility]. Patient ambulates with RW. Patient uses [facility] for transportation.      CM discussed with patient her request for VN upon discharge from [facility].  CM informed patient a HHC referral will be made in [facility]IN but CM unsure if agency will be secured due to patients insurance.  Patient questioned CM about adult day care and/or HHC aides and CM discussed [facility] program and referral for assessment. Patient in agreement.            CM submitted [facility] referral for VN.    CM submitted [facility] referral for patient.    CM to follow for any additional CM discharge referral needs.

## 2025-04-02 NOTE — ASSESSMENT & PLAN NOTE
Patient complains of 2 week history of abdominal pain with associated nausea and vomiting that began on the day of admission. She reports 5-6 episodes of vomiting within a 3 hours period.  She had a BM prior to coming in  No hematemesis. Denies diarrhea. Denies recent travel or sick contacts  She does report eating chinese food on thurs  CT A/P: No acute findings with a normal appendix identified.   Likely GERD.  CT A/P does show small hiatal hernia, patient was supposed to follow-up with GI outpatient.  4/2 appreciate GI evaluation and further workup for patient's abdominal exam is benign her abdomen is actually better after the Bentyl.  She is asking to have a bowel movement will give her Dulcolax 10 mg p.o. x 1 continue Colace

## 2025-04-02 NOTE — ASSESSMENT & PLAN NOTE
Lab Results   Component Value Date    HGBA1C 6.2 03/27/2025       Recent Labs     04/01/25  1557 04/01/25 2039 04/02/25  0727 04/02/25  1124   POCGLU 96 92 88 145*       Blood Sugar Average: Last 72 hrs:  (P) 102.5922961971004214  Home meds on hold  Sliding scale insulin  Accu-Cheks before meals and nightly  Optimal glucose control 140-180 while hospitalized  Hypoglycemia protocol  Carb controlled diet

## 2025-04-02 NOTE — PROGRESS NOTES
"Progress Note - Hospitalist   Name: Erin Arroyo 69 y.o. female I MRN: [id number]  Unit/Bed#: -01 I Date of Admission: 3/29/2025   Date of Service: 4/2/2025 I Hospital Day: 4    Assessment & Plan  Elevated troponin  Patient presents to the ED with complaints of \"chest feels funny\".    EKG per ED provider shows normal sinus rhythm  IMELDA score = 5  Troponin 49-->52-->66   Lipid panel LDL controlled  A1c of 6.2  Patient received ASA 325mg in the ED; continue 81mg daily   Continue atorvastatin 80 mg daily  Telemetry  Cardiac diet; low sodium    ECHO: EF 66%, systolic function normal, no diagnostic regional wall motion abnormality identified, this possibility cannot be excluded on the basis of the study.  Diastolic function mildly abnormal consistent with grade 1 relaxation.  Small pericardial effusion along the right ventricular free wall.  No evidence of tamponade.  Cardiology consulted, apprecaite recs   Symptoms thought to be 2/2 poor noncompliance with BP medications and elevated BP on admission   Status post 48 hours of heparin drip  Started on plavix 75mg daily + continue aspirin 81mg daily - DAPT  Continue BB and Statin   No further chest pain  Abdominal pain  Patient complains of 2 week history of abdominal pain with associated nausea and vomiting that began on the day of admission. She reports 5-6 episodes of vomiting within a 3 hours period.  She had a BM prior to coming in  No hematemesis. Denies diarrhea. Denies recent travel or sick contacts  She does report eating chinese food on thurs  CT A/P: No acute findings with a normal appendix identified.   Likely GERD.  CT A/P does show small hiatal hernia, patient was supposed to follow-up with GI outpatient.  4/2 appreciate GI evaluation and further workup for patient's abdominal exam is benign her abdomen is actually better after the Bentyl.  She is asking to have a bowel movement will give her Dulcolax 10 mg p.o. x 1 continue Colace  Nausea and " vomiting  As above.   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 proliferative retinopathy and macular edema, without long-term current use of insulin ([facility])  Lab Results   Component Value Date    HGBA1C 6.2 03/27/2025       Recent Labs     04/01/25  1557 04/01/25 2039 04/02/25  0727 04/02/25  1124   POCGLU 96 92 88 145*       Blood Sugar Average: Last 72 hrs:  (P) 102.6266701932577845  Home meds on hold  Sliding scale insulin  Accu-Cheks before meals and nightly  Optimal glucose control 140-180 while hospitalized  Hypoglycemia protocol  Carb controlled diet  Essential hypertension  Home regimen: Carvedilol 6.25 mg twice daily, amlodipine 5 mg daily, doxazosin 2 mg daily, hydralazine 50 mg 3 times daily, Imdur 60 mg daily, valsartan 320 mg daily  Valsartan on hold secondary to PADMINI  4/2 uncontrolled increase Norvasc to 10 mg daily  Hyperlipidemia  Chronic, continue statin therapy  Repeat lipid panel -LDL controlled  Atherosclerosis of native coronary artery of native heart without angina pectoris  Please see discussion under elevated troponin  PFO (patent foramen ovale)  History of small PFO on TTE in 2023.  PSVT (paroxysmal supraventricular tachycardia) ([facility])  Noted to have paroxysmal SVT on extended Holter monitoring.  No A-fib identified.  Continue beta-blocker therapy  Telemetry monitoring  Acute kidney injury superimposed on chronic kidney disease  ([facility])  Cr 2.07, and CKD stage III with a baseline of 1.1-1.5  Suspect in light of nausea vomiting.  Improving with IV hydration to 1.7.  Will continue hydration patient is eating more there is no report of nausea vomiting  Dental caries  Discussed with patient that she needs to follow a soft diet and needs to follow-up with dentist    VTE Pharmacologic Prophylaxis:   Moderate Risk (Score 3-4) - Pharmacological DVT Prophylaxis Ordered: heparin.    Mobility:   Basic Mobility Inpatient Raw Score: 19  JH-HLM Goal: 6: Walk 10 steps or more  -HLM  Achieved: 7: Walk 25 feet or more  -HLM Goal achieved. Continue to encourage appropriate mobility.    Patient Centered Rounds: I performed bedside rounds with nursing staff today.   Discussions with Specialists or Other Care Team Provider: None    Education and Discussions with Family / Patient: Patient declined call to .     Current Length of Stay: 4 day(s)  Current Patient Status: Inpatient   Certification Statement: The patient will continue to require additional inpatient hospital stay due to secondary to PADMINI  Discharge Plan: Anticipate discharge in 24-48 hrs to home.    Code Status: Level 1 - Full Code    Subjective   Patient seen and examined states she has no appetite but does not want any supplement medications she is eating little but she can abdominal pain has improved she wants to have a BM    Objective :  Temp:  [96.8 °F (36 °C)] 96.8 °F (36 °C)  HR:  [57-59] 57  BP: (128-162)/() 161/67  Resp:  [18] 18  SpO2:  [95 %-97 %] 95 %  O2 Device: None (Room air)    Body mass index is 31.46 kg/m².     Input and Output Summary (last 24 hours):     Intake/Output Summary (Last 24 hours) at 4/2/2025 1244  Last data filed at 4/2/2025 0923  Gross per 24 hour   Intake 495 ml   Output 1000 ml   Net -505 ml       Physical Exam  Vitals and nursing note reviewed.   Constitutional:       General: She is not in acute distress.     Appearance: She is well-developed.   HENT:      Head: Normocephalic and atraumatic.   Eyes:      Conjunctiva/sclera: Conjunctivae normal.   Cardiovascular:      Rate and Rhythm: Normal rate and regular rhythm.      Heart sounds: No murmur heard.  Pulmonary:      Effort: Pulmonary effort is normal. No respiratory distress.      Breath sounds: Normal breath sounds. No wheezing or rales.   Abdominal:      General: There is no distension.      Palpations: Abdomen is soft.      Tenderness: There is no abdominal tenderness.   Musculoskeletal:         General: No swelling.       Cervical back: Neck supple.   Skin:     General: Skin is warm and dry.      Capillary Refill: Capillary refill takes less than 2 seconds.   Neurological:      Mental Status: She is alert and oriented to person, place, and time.   Psychiatric:         Mood and Affect: Mood normal.           Lines/Drains:              Lab Results: I have reviewed the following results:   Results from last 7 days   Lab Units 04/01/25  0546 03/30/25  0742 03/29/25  1307   WBC Thousand/uL 4.80   < > 9.15   HEMOGLOBIN g/dL 9.9*   < > 12.6   HEMATOCRIT % 30.9*   < > 39.2   PLATELETS Thousands/uL 197   < > 241   SEGS PCT %  --   --  88*   LYMPHO PCT %  --   --  5*   MONO PCT %  --   --  6   EOS PCT %  --   --  1    < > = values in this interval not displayed.     Results from last 7 days   Lab Units 04/02/25  0509 03/30/25  0742 03/29/25  1307   SODIUM mmol/L 141   < > 141   POTASSIUM mmol/L 4.1   < > 4.8   CHLORIDE mmol/L 108   < > 107   CO2 mmol/L 29   < > 27   BUN mg/dL 14   < > 17   CREATININE mg/dL 1.73*   < > 1.42*   ANION GAP mmol/L 4   < > 7   CALCIUM mg/dL 7.9*   < > 8.9   ALBUMIN g/dL  --   --  3.6   TOTAL BILIRUBIN mg/dL  --   --  0.53   ALK PHOS U/L  --   --  107*   ALT U/L  --   --  18   AST U/L  --   --  32   GLUCOSE RANDOM mg/dL 81   < > 131    < > = values in this interval not displayed.     Results from last 7 days   Lab Units 03/29/25  1307   INR  1.01     Results from last 7 days   Lab Units 04/02/25  1124 04/02/25  0727 04/01/25  2039 04/01/25  1557 04/01/25  1108 04/01/25  0725 03/31/25  2100 03/31/25  1556 03/31/25  1107 03/31/25  0743 03/30/25  2100 03/30/25  1612   POC GLUCOSE mg/dl 145* 88 92 96 101 87 103 103 101 78 116 111     Results from last 7 days   Lab Units 03/27/25  0849   HEMOGLOBIN A1C  6.2     Results from last 7 days   Lab Units 03/29/25  1307   LACTIC ACID mmol/L 1.1       Recent Cultures (last 7 days):   Results from last 7 days   Lab Units 03/27/25  0938   URINE CULTURE  No Growth <1000 cfu/mL        Imaging Results Review: No pertinent imaging studies reviewed.  Other Study Results Review: No additional pertinent studies reviewed.    Last 24 Hours Medication List:     Current Facility-Administered Medications:     acetaminophen (TYLENOL) tablet 650 mg, Q6H PRN    aluminum-magnesium hydroxide-simethicone (MAALOX) oral suspension 30 mL, Q4H PRN    [START ON 4/3/2025] amLODIPine (NORVASC) tablet 10 mg, Daily    aspirin (ECOTRIN LOW STRENGTH) EC tablet 81 mg, Daily    atorvastatin (LIPITOR) tablet 80 mg, Daily With Dinner    bisacodyl (DULCOLAX) EC tablet 10 mg, Once    carvedilol (COREG) tablet 6.25 mg, BID With Meals    clopidogrel (PLAVIX) tablet 75 mg, Daily    docusate sodium (COLACE) capsule 100 mg, BID    doxazosin (CARDURA) tablet 2 mg, Daily    escitalopram (LEXAPRO) tablet 5 mg, Daily    gabapentin (NEURONTIN) capsule 300 mg, TID    heparin (porcine) subcutaneous injection 5,000 Units, Q8H ANTONIO    hydrALAZINE (APRESOLINE) tablet 50 mg, TID    insulin lispro (HumALOG/ADMELOG) 100 units/mL subcutaneous injection 1-5 Units, HS    insulin lispro (HumALOG/ADMELOG) 100 units/mL subcutaneous injection 1-6 Units, TID AC **AND** Fingerstick Glucose (POCT), 4x Daily AC and at bedtime    isosorbide mononitrate (IMDUR) 24 hr tablet 60 mg, Daily    multi-electrolyte (Plasmalyte-A/Isolyte-S PH 7.4/Normosol-R) IV solution, Continuous, Last Rate: 75 mL/hr (04/02/25 1118)    ondansetron (ZOFRAN) injection 4 mg, Q6H PRN    pantoprazole (PROTONIX) EC tablet 40 mg, BID AC    Administrative Statements   Today, Patient Was Seen By: Madisyn Ramirez MD  I have spent a total time of >35 minutes in caring for this patient on the day of the visit/encounter including Documenting in the medical record and Reviewing/placing orders in the medical record (including tests, medications, and/or procedures).    **Please Note: This note may have been constructed using a voice recognition system.**

## 2025-04-03 ENCOUNTER — TRANSITIONAL CARE MANAGEMENT (OUTPATIENT)
Dept: FAMILY MEDICINE CLINIC | Facility: CLINIC | Age: 70
End: 2025-04-03

## 2025-04-03 VITALS
RESPIRATION RATE: 19 BRPM | HEIGHT: 66 IN | HEART RATE: 58 BPM | TEMPERATURE: 97.2 F | BODY MASS INDEX: 31.6 KG/M2 | OXYGEN SATURATION: 95 % | DIASTOLIC BLOOD PRESSURE: 57 MMHG | SYSTOLIC BLOOD PRESSURE: 134 MMHG | WEIGHT: 196.65 LBS

## 2025-04-03 LAB
ANION GAP SERPL CALCULATED.3IONS-SCNC: 5 MMOL/L (ref 4–13)
BUN SERPL-MCNC: 11 MG/DL (ref 5–25)
CALCIUM SERPL-MCNC: 8.4 MG/DL (ref 8.4–10.2)
CHLORIDE SERPL-SCNC: 108 MMOL/L (ref 96–108)
CO2 SERPL-SCNC: 29 MMOL/L (ref 21–32)
CREAT SERPL-MCNC: 1.64 MG/DL (ref 0.6–1.3)
GFR SERPL CREATININE-BSD FRML MDRD: 31 ML/MIN/1.73SQ M
GLUCOSE SERPL-MCNC: 131 MG/DL (ref 65–140)
GLUCOSE SERPL-MCNC: 89 MG/DL (ref 65–140)
GLUCOSE SERPL-MCNC: 97 MG/DL (ref 65–140)
POTASSIUM SERPL-SCNC: 4.1 MMOL/L (ref 3.5–5.3)
SODIUM SERPL-SCNC: 142 MMOL/L (ref 135–147)

## 2025-04-03 PROCEDURE — 80048 BASIC METABOLIC PNL TOTAL CA: CPT | Performed by: FAMILY MEDICINE

## 2025-04-03 PROCEDURE — 82948 REAGENT STRIP/BLOOD GLUCOSE: CPT

## 2025-04-03 PROCEDURE — 99239 HOSP IP/OBS DSCHRG MGMT >30: CPT | Performed by: FAMILY MEDICINE

## 2025-04-03 RX ORDER — AMLODIPINE BESYLATE 10 MG/1
10 TABLET ORAL DAILY
Qty: 30 TABLET | Refills: 0 | Status: SHIPPED | OUTPATIENT
Start: 2025-04-04

## 2025-04-03 RX ORDER — CLOPIDOGREL BISULFATE 75 MG/1
75 TABLET ORAL DAILY
Qty: 30 TABLET | Refills: 0 | Status: SHIPPED | OUTPATIENT
Start: 2025-04-04

## 2025-04-03 RX ORDER — POLYETHYLENE GLYCOL 3350 17 G/17G
17 POWDER, FOR SOLUTION ORAL AS NEEDED
Start: 2025-04-03

## 2025-04-03 RX ORDER — DOCUSATE SODIUM 100 MG/1
100 CAPSULE, LIQUID FILLED ORAL 2 TIMES DAILY
Qty: 60 CAPSULE | Refills: 0 | Status: SHIPPED | OUTPATIENT
Start: 2025-04-03

## 2025-04-03 RX ADMIN — PANTOPRAZOLE SODIUM 40 MG: 40 TABLET, DELAYED RELEASE ORAL at 09:06

## 2025-04-03 RX ADMIN — AMLODIPINE BESYLATE 10 MG: 10 TABLET ORAL at 09:02

## 2025-04-03 RX ADMIN — ESCITALOPRAM OXALATE 5 MG: 5 TABLET, FILM COATED ORAL at 09:03

## 2025-04-03 RX ADMIN — HEPARIN SODIUM 5000 UNITS: 5000 INJECTION INTRAVENOUS; SUBCUTANEOUS at 05:19

## 2025-04-03 RX ADMIN — SODIUM CHLORIDE, SODIUM GLUCONATE, SODIUM ACETATE, POTASSIUM CHLORIDE, MAGNESIUM CHLORIDE, SODIUM PHOSPHATE, DIBASIC, AND POTASSIUM PHOSPHATE 75 ML/HR: .53; .5; .37; .037; .03; .012; .00082 INJECTION, SOLUTION INTRAVENOUS at 10:53

## 2025-04-03 RX ADMIN — HYDRALAZINE HYDROCHLORIDE 50 MG: 25 TABLET ORAL at 09:03

## 2025-04-03 RX ADMIN — DOXAZOSIN 2 MG: 1 TABLET ORAL at 09:02

## 2025-04-03 RX ADMIN — CLOPIDOGREL 75 MG: 75 TABLET ORAL at 09:02

## 2025-04-03 RX ADMIN — ASPIRIN 81 MG: 81 TABLET, COATED ORAL at 09:03

## 2025-04-03 RX ADMIN — ISOSORBIDE MONONITRATE 60 MG: 60 TABLET, EXTENDED RELEASE ORAL at 09:02

## 2025-04-03 RX ADMIN — GABAPENTIN 300 MG: 300 CAPSULE ORAL at 09:03

## 2025-04-03 NOTE — ASSESSMENT & PLAN NOTE
Lab Results   Component Value Date    HGBA1C 6.2 03/27/2025       Recent Labs     04/02/25  1124 04/02/25  1529 04/02/25  2131 04/03/25  0738   POCGLU 145* 137 99 89       Blood Sugar Average: Last 72 hrs:  (P) 101.2085806300983672  Sugars have been stable she does have a poor p.o. intake for now secondary to GFR being less than 45 discontinue Farxiga till further kidney improvement and repeat BMP and continue Januvia

## 2025-04-03 NOTE — ASSESSMENT & PLAN NOTE
Home regimen: Carvedilol 6.25 mg twice daily, amlodipine 5 mg daily, doxazosin 2 mg daily, hydralazine 50 mg 3 times daily, Imdur 60 mg daily, valsartan 320 mg daily  Valsartan on hold secondary to PADMINI  4/2 uncontrolled increase Norvasc to 10 mg daily  4/3 blood pressure improved on outpatient will hold off and repeat BMP on 4/8 if kidney numbers are stable or continue to improve can restart if needed by PCP

## 2025-04-03 NOTE — ASSESSMENT & PLAN NOTE
Patient complains of 2 week history of abdominal pain with associated nausea and vomiting that began on the day of admission. She reports 5-6 episodes of vomiting within a 3 hours period.  She had a BM prior to coming in  No hematemesis. Denies diarrhea. Denies recent travel or sick contacts  She does report eating chinese food on thurs  CT A/P: No acute findings with a normal appendix identified.   Likely GERD.  CT A/P does show small hiatal hernia, patient was supposed to follow-up with GI outpatient.  4/2 appreciate GI evaluation and further workup for patient's abdominal exam is benign her abdomen is actually better after the Bentyl.  She is asking to have a bowel movement will give her Dulcolax 10 mg p.o. x 1 continue Colace  4/3 had bm no abdominal pain and exam is benign

## 2025-04-03 NOTE — CASE MANAGEMENT
Case Management Discharge Planning Note    Patient name Erin Arroyo  Location /-01 MRN [id number]  : 1955 Date 4/3/2025       Current Admission Date: 3/29/2025  Current Admission Diagnosis:Elevated troponin   Patient Active Problem List    Diagnosis Date Noted Date Diagnosed    Dental caries 2025     Elevated troponin 2025     Abdominal pain 2025     PSVT (paroxysmal supraventricular tachycardia) ([facility]) 10/22/2024     History of CVA (cerebrovascular accident) 2023     Hypertensive urgency 2023     Atherosclerosis of native coronary artery of native heart without angina pectoris 2023     PFO (patent foramen ovale) 2023     Palpitations 2023     Acute kidney injury superimposed on chronic kidney disease  ([facility]) 2023     Nausea and vomiting 2022     Thyroid nodule 2022     Abnormal CT scan 08/10/2021     Depression, recurrent ([facility]) 2021     Cerebral meningocele ([facility]) 2021     Obesity, morbid ([facility]) 2020     Chronic heart failure ([facility]) 2020     Subcortical microvascular ischemic occlusive disease 2019     Sixth nerve palsy of right eye      Numbness in both hands 2019     Carpal tunnel syndrome of left wrist 2019     Multinodular goiter 2019     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 proliferative retinopathy and macular edema, without long-term current use of insulin ([facility])      Diabetic neuropathy ([facility]) 2018     Aneurysm ([facility]) 2017     Impaired hearing 2017     Hyperlipidemia 2017     Moderate persistent asthma without complication 10/31/2016     Lumbar pain 10/18/2016     Hiatal hernia 10/18/2016     Chronic back pain 2016     Essential hypertension 2016     Neuropathy, diabetic ([facility]) 2016       LOS (days): 5  Geometric Mean LOS (GMLOS) (days): 2.1  Days to GMLOS:-2.7     OBJECTIVE:  Risk of Unplanned Readmission Score:  16.11         Current admission status: Inpatient   Preferred Pharmacy:   [facility] - [place] - [address]  [address] [facility] [address] [address]  Phone: [phone] Fax: [phone]    [facility] - [place], [place] - [address]  [address]  [facility] [address]  Phone: [phone] Fax: [phone]    [id number] - [place] - [address]  [address] [facility] [address]  Phone: [phone] Fax: [phone]    [facility] [place] - [place] - [address]  [address]  [place] [address]  Phone: [phone] Fax: [phone]    [id number] - [place] - [address]  [address] [facility] [address]  Phone: [phone] Fax: [phone]    Primary Care Provider: Laureano Macias DO    Primary Insurance: [facility] [place] DUAL COMPLETE [facility] RE[facility]  Secondary Insurance: [facility]    DISCHARGE DETAILS:              AVS and HHC order attached in [facility] for [facility]

## 2025-04-03 NOTE — CASE MANAGEMENT
Case Management Discharge Planning Note    Patient name Erin Arroyo  Location /-01 MRN [id number]  : 1955 Date 4/3/2025       Current Admission Date: 3/29/2025  Current Admission Diagnosis:Elevated troponin   Patient Active Problem List    Diagnosis Date Noted Date Diagnosed    Dental caries 2025     Elevated troponin 2025     Abdominal pain 2025     PSVT (paroxysmal supraventricular tachycardia) ([facility]) 10/22/2024     History of CVA (cerebrovascular accident) 2023     Hypertensive urgency 2023     Atherosclerosis of native coronary artery of native heart without angina pectoris 2023     PFO (patent foramen ovale) 2023     Palpitations 2023     Acute kidney injury superimposed on chronic kidney disease  ([facility]) 2023     Nausea and vomiting 2022     Thyroid nodule 2022     Abnormal CT scan 08/10/2021     Depression, recurrent ([facility]) 2021     Cerebral meningocele ([facility]) 2021     Obesity, morbid ([facility]) 2020     Chronic heart failure ([facility]) 2020     Subcortical microvascular ischemic occlusive disease 2019     Sixth nerve palsy of right eye      Numbness in both hands 2019     Carpal tunnel syndrome of left wrist 2019     Multinodular goiter 2019     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 proliferative retinopathy and macular edema, without long-term current use of insulin ([facility])      Diabetic neuropathy ([facility]) 2018     Aneurysm ([facility]) 2017     Impaired hearing 2017     Hyperlipidemia 2017     Moderate persistent asthma without complication 10/31/2016     Lumbar pain 10/18/2016     Hiatal hernia 10/18/2016     Chronic back pain 2016     Essential hypertension 2016     Neuropathy, diabetic ([facility]) 2016       LOS (days): 5  Geometric Mean LOS (GMLOS) (days): 2.1  Days to GMLOS:-2.6     OBJECTIVE:  Risk of Unplanned Readmission Score:  15.89         Current admission status: Inpatient   Preferred Pharmacy:   [facility] - [place], [place] - [address]  [address] [facility] [address] [facility] [address]  Phone: [phone] Fax: [phone]    [facility] - [place], [place] - [address]  [address]  [facility] [address]  Phone: [phone] Fax: [phone]    [id number] - [place] - [address]  [address] [facility] [address]  Phone: [phone] Fax: [phone]    [facility] [place] - [place] - [address]  [address]  [place] [address]  Phone: [phone] Fax: [phone]    [id number] - [place] - [address] [address]  Phone: [phone] Fax: [phone]    Primary Care Provider: Laureano Macias DO    Primary Insurance: [facility] [place] D[place]TE  REP  Secondary Insurance: [facility]    DISCHARGE DETAILS:           CM Met with patient and reviewed discharge today home. CM informed patient [facility] VN set up and patient referred to [facility].  Patient thankful.    Patient will require transport home via [employer].     ** 1230 CM submitted Round[employer] referral for [employer] transport of patient                                                                             IMM Given (Date):: 04/03/25  IMM Given to:: Patient  Family notified:: appeal rights provided to patient

## 2025-04-03 NOTE — NURSING NOTE
Reviewed discharge instructions , med rec, lab work to be completed, follow up appointments, worsening s/s when to call PCP or return to ER verbally understood

## 2025-04-03 NOTE — PROGRESS NOTES
Patient:    MRN:  [id number]    Yao Request ID:  [id number]    Level of care reserved:  Home Health Agency    Partner Reserved:  [facility] [address] [phone]    Clinical needs requested:    Geography searched:  [id number]    Start of Service:    Request sent:  3:12pm EDT on 4/2/2025 by Elvira Cornell    Partner reserved:  8:30am EDT on 4/3/2025 by Elvira Cornell    Choice list shared:  8:29am EDT on 4/3/2025 by Elvira Cornell

## 2025-04-03 NOTE — DISCHARGE SUMMARY
"Discharge Summary - Hospitalist   Name: Erin Arroyo 69 y.o. female I MRN: [id number]  Unit/Bed#: -01 I Date of Admission: 3/29/2025   Date of Service: 4/3/2025 I Hospital Day: 5     Assessment & Plan  Elevated troponin  Patient presents to the ED with complaints of \"chest feels funny\".    EKG per ED provider shows normal sinus rhythm  IMELDA score = 5  Troponin 49-->52-->66   Lipid panel LDL controlled  A1c of 6.2  Patient received ASA 325mg in the ED; continue 81mg daily   Continue atorvastatin 80 mg daily  Telemetry  Cardiac diet; low sodium    ECHO: EF 66%, systolic function normal, no diagnostic regional wall motion abnormality identified, this possibility cannot be excluded on the basis of the study.  Diastolic function mildly abnormal consistent with grade 1 relaxation.  Small pericardial effusion along the right ventricular free wall.  No evidence of tamponade.  Cardiology consulted, apprecaite recs   Symptoms thought to be 2/2 poor noncompliance with BP medications and elevated BP on admission   Status post 48 hours of heparin drip  Started on plavix 75mg daily + continue aspirin 81mg daily - DAPT  Continue BB and Statin   No further chest pain  Abdominal pain  Patient complains of 2 week history of abdominal pain with associated nausea and vomiting that began on the day of admission. She reports 5-6 episodes of vomiting within a 3 hours period.  She had a BM prior to coming in  No hematemesis. Denies diarrhea. Denies recent travel or sick contacts  She does report eating chinese food on thurs  CT A/P: No acute findings with a normal appendix identified.   Likely GERD.  CT A/P does show small hiatal hernia, patient was supposed to follow-up with GI outpatient.  4/2 appreciate GI evaluation and further workup for patient's abdominal exam is benign her abdomen is actually better after the Bentyl.  She is asking to have a bowel movement will give her Dulcolax 10 mg p.o. x 1 continue Colace  4/3 had " bm no abdominal pain and exam is benign  Nausea and vomiting  As above.   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 proliferative retinopathy and macular edema, without long-term current use of insulin ([facility])  Lab Results   Component Value Date    HGBA1C 6.2 03/27/2025       Recent Labs     04/02/25  1124 04/02/25  1529 04/02/25  2131 04/03/25  0738   POCGLU 145* 137 99 89       Blood Sugar Average: Last 72 hrs:  (P) [id number]  Sugars have been stable she does have a poor p.o. intake for now secondary to GFR being less than 45 discontinue Farxiga till further kidney improvement and repeat BMP and continue Januvia  Essential hypertension  Home regimen: Carvedilol 6.25 mg twice daily, amlodipine 5 mg daily, doxazosin 2 mg daily, hydralazine 50 mg 3 times daily, Imdur 60 mg daily, valsartan 320 mg daily  Valsartan on hold secondary to PADMINI  4/2 uncontrolled increase Norvasc to 10 mg daily  4/3 blood pressure improved on outpatient will hold off and repeat BMP on 4/8 if kidney numbers are stable or continue to improve can restart if needed by PCP  Hyperlipidemia  Chronic, continue statin therapy  Repeat lipid panel -LDL controlled  Atherosclerosis of native coronary artery of native heart without angina pectoris  Please see discussion under elevated troponin  PFO (patent foramen ovale)  History of small PFO on TTE in 2023.  PSVT (paroxysmal supraventricular tachycardia) ([facility])  Noted to have paroxysmal SVT on extended Holter monitoring.  No A-fib identified.  Continue beta-blocker therapy  Telemetry monitoring  Acute kidney injury superimposed on chronic kidney disease  ([facility])  Cr 2.07, and CKD stage III with a baseline of 1.1-1.5  Suspect in light of nausea vomiting.  Down to 1.6 she is eating there is no nausea vomiting no diarrhea she is close to baseline discontinue further fluids continue to hold losartan till repeat BMP in 4/8 to see if it is stable or continues to improve before the  need to restart losartan.  Discharged home  Dental caries  Discussed with patient that she needs to follow a soft diet and needs to follow-up with dentist     Medical Problems       Resolved Problems  Date Reviewed: 4/1/2025          Resolved    Chronic kidney disease, stage 3 unspecified (HCC) 4/1/2025     Resolved by  Madisyn Ramirez MD          MESSAGE TO PCP (Laureano Macias DO) FOR FOLLOW UP:   Thank you for allowing us to participate in the care of your patient, Erin Arroyo, who was hospitalized from 3/29/2025 through 04/03/25 with the admitting diagnosis of PADMINI troponin elevation.      Medication Changes:  Holding valsartan  Hold Farxiga  Norvasc increased to 10 mg daily  Plavix 75 mg daily  Outpatient testing recommended:  BMP 4/8/25  If you have any additional questions or would like to discuss further, please feel free to contact me.  Madisyn Ramirez MD  [facility] Internal Medicine, Hospitalist, [phone]     Admission Date:   Admission Orders (From admission, onward)       Ordered        03/29/25 1757  INPATIENT ADMISSION  Once            03/29/25 1622  Place in Observation  Once,   Status:  Canceled                          Discharge Date: 04/03/25    Consultations During Hospital Stay:  Cardiology  GI    Procedures Performed:   none    Significant Findings / Test Results:   CT abdomen and pelvis with contrast-no acute findings  Chest x-ray is negative  Tte-Left Ventricle: Left ventricular cavity size is normal. There is mild concentric hypertrophy. The left ventricular ejection fraction is 66% by biplane measurement. Systolic function is normal. Although no diagnostic regional wall motion abnormality was identified, this possibility cannot be completely excluded on the basis of this study. Diastolic function is mildly abnormal, consistent with grade I (abnormal) relaxation.    Mitral Valve: There is mild annular calcification. There is mild regurgitation.    Tricuspid Valve: Right ventricular  systolic pressure could not be assessed.    IVC/SVC: The right atrial pressure is estimated at 3.0 mmHg. The inferior vena cava is normal in size. Respirophasic changes were normal.    Pericardium: There is a small pericardial effusion along the right ventricular free wall. The fluid exhibits no internal echoes. There is no echocardiographic evidence of tamponade.    Incidental Findings:   None    Test Results Pending at Discharge (will require follow up):   [facility] 4/8/2025     Complications: None    Reason for Admission: Elevation troponin    Hospital Course:   Erin Arroyo is a 69 y.o. female patient who originally presented to the hospital on 3/29/2025 due to troponin elevation and PADMINI cardiology consulted heparin drip was started and continued for 48 hours this recommendation and recommendation was to start Plavix her echo did not show any regional wall motion abnormality EF was normal suspect was secondary to hypertension hypertension throughout the hospital stay improved home losartan had to be held secondary to PADMINI which improved with fluids her PADMINI was prerenal secondary to poor p.o. intake and some nausea and vomiting.  Abdominal pain improved as well with a BM.  Patient also has dental caries and is difficult for her to eat all the food although she does eat some poor effort her eating has improved during the hospital stay.  Her PADMINI has improved down to almost baseline her Farxiga and valsartan will be held for now till repeat BMP in 4/8 to ensure it stays stable or improved and the GFR in her primary care physician could restart if necessary.  Otherwise patient is medically cleared to be discharged home with home health.          Please see above list of diagnoses and related plan for additional information.     Condition at Discharge: stable    Discharge Day Visit / Exam:   Subjective: Patient seen and examined denies abdominal pain no nausea no vomiting ate a little bit of breakfast.  Had a  "BM  Vitals: Blood Pressure: 134/57 (04/03/25 0737)  Pulse: 58 (04/03/25 0737)  Temperature: (!) 97.2 °F (36.2 °C) (04/03/25 0737)  Temp Source: Temporal (03/30/25 1900)  Respirations: 19 (04/03/25 0737)  Height: 5' 6\" (167.6 cm) (03/31/25 1121)  Weight - Scale: 89.2 kg (196 lb 10.4 oz) (04/03/25 0500)  SpO2: 95 % (04/03/25 0737)  Physical Exam  Vitals and nursing note reviewed.   Constitutional:       General: She is not in acute distress.     Appearance: She is well-developed.   HENT:      Head: Normocephalic and atraumatic.   Eyes:      Conjunctiva/sclera: Conjunctivae normal.   Cardiovascular:      Rate and Rhythm: Normal rate and regular rhythm.      Heart sounds: No murmur heard.  Pulmonary:      Effort: Pulmonary effort is normal. No respiratory distress.      Breath sounds: Normal breath sounds. No wheezing or rales.   Abdominal:      General: There is no distension.      Palpations: Abdomen is soft.      Tenderness: There is no abdominal tenderness.   Musculoskeletal:         General: No swelling.      Cervical back: Neck supple.   Skin:     General: Skin is warm and dry.      Capillary Refill: Capillary refill takes less than 2 seconds.   Neurological:      Mental Status: She is alert and oriented to person, place, and time.   Psychiatric:         Mood and Affect: Mood normal.          Discussion with Family: Patient declined call to .     Discharge instructions/Information to patient and family:   See after visit summary for information provided to patient and family.      Provisions for Follow-Up Care:  See after visit summary for information related to follow-up care and any pertinent home health orders.      Mobility at time of Discharge:   Basic Mobility Inpatient Raw Score: 20  JH-HLM Goal: 6: Walk 10 steps or more  JH-HLM Achieved: 6: Walk 10 steps or more  HLM Goal achieved. Continue to encourage appropriate mobility.     Disposition:   Home with VNA Services (Reminder: Complete face " to face encounter)    Planned Readmission: no    Discharge Medications:  See after visit summary for reconciled discharge medications provided to patient and/or family.      Administrative Statements   Discharge Statement:  I have spent a total time of >35 minutes in caring for this patient on the day of the visit/encounter. >30 minutes of time was spent on: Documenting in the medical record and Reviewing / ordering tests, medicine, procedures  .    **Please Note: This note may have been constructed using a voice recognition system**

## 2025-04-03 NOTE — ASSESSMENT & PLAN NOTE
Cr 2.07, and CKD stage III with a baseline of 1.1-1.5  Suspect in light of nausea vomiting.  Down to 1.6 she is eating there is no nausea vomiting no diarrhea she is close to baseline discontinue further fluids continue to hold losartan till repeat BMP in 4/8 to see if it is stable or continues to improve before the need to restart losartan.  Discharged home

## 2025-04-03 NOTE — CASE MANAGEMENT
Case Management Discharge Planning Note    Patient name Erin Arroyo  Location /-01 MRN [id number]  : 1955 Date 4/3/2025       Current Admission Date: 3/29/2025  Current Admission Diagnosis:Elevated troponin   Patient Active Problem List    Diagnosis Date Noted Date Diagnosed    Dental caries 2025     Elevated troponin 2025     Abdominal pain 2025     PSVT (paroxysmal supraventricular tachycardia) ([facility]) 10/22/2024     History of CVA (cerebrovascular accident) 2023     Hypertensive urgency 2023     Atherosclerosis of native coronary artery of native heart without angina pectoris 2023     PFO (patent foramen ovale) 2023     Palpitations 2023     Acute kidney injury superimposed on chronic kidney disease  ([facility]) 2023     Nausea and vomiting 2022     Thyroid nodule 2022     Abnormal CT scan 08/10/2021     Depression, recurrent ([facility]) 2021     Cerebral meningocele ([facility]) 2021     Obesity, morbid ([facility]) 2020     Chronic heart failure ([facility]) 2020     Subcortical microvascular ischemic occlusive disease 2019     Sixth nerve palsy of right eye      Numbness in both hands 2019     Carpal tunnel syndrome of left wrist 2019     Multinodular goiter 2019     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 proliferative retinopathy and macular edema, without long-term current use of insulin ([facility])      Diabetic neuropathy ([facility]) 2018     Aneurysm ([facility]) 2017     Impaired hearing 2017     Hyperlipidemia 2017     Moderate persistent asthma without complication 10/31/2016     Lumbar pain 10/18/2016     Hiatal hernia 10/18/2016     Chronic back pain 2016     Essential hypertension 2016     Neuropathy, diabetic ([facility]) 2016       LOS (days): 5  Geometric Mean LOS (GMLOS) (days): 2.1  Days to GMLOS:-2.5     OBJECTIVE:  Risk of Unplanned Readmission Score:  15.89         Current admission status: Inpatient   Preferred Pharmacy:   [facility] - [place], [place] - [address]  [address] [facility] [address] [address]  Phone: [phone] Fax: [phone]    [facility] - [place], [place] - [address]  [address]  [facility] [address]  Phone: [phone] Fax: [phone]    [id number] - [place] - [address]  [address] [facility] [address]  Phone: [phone] Fax: [phone]    [facility] [place] - [place] - [address]  [address]  [place] [address]  Phone: [phone] Fax: [phone]    [id number] - [place], [place] - [address]  [address] [facility] [address] [facility] [address]  Phone: [phone] Fax: [phone]    Primary Care Provider: Laureano Macias DO    Primary Insurance: [facility] [place] DUAL COMPLETE  REP  Secondary Insurance: [employer] [facility]    DISCHARGE DETAILS:                                Requested Home Health Care         Is the patient interested in HHC at discharge?: Yes  Home Health Discipline requested:: Nursing  Home Health Agency Name:: [facility]  HHA External Referral Reason (only applicable if external HHA name selected): Services not provided in network or near patient location  Home Health Follow-Up Provider:: PCP  Home Health Services Needed:: Other (comment) (Medication Management)  Homebound Criteria Met:: Uses an Assist Device (i.e. cane, walker, etc)  Supporting Clincal Findings:: Limited Endurance    DME Referral Provided  Referral made for DME?: No    Other Referral/Resources/Interventions Provided:  Interventions: HHC  Referral Comments: [facility]         Treatment Team Recommendation: Home  Discharge Destination Plan:: Home with Home Health Care  Transport at Discharge : Family                  CM reserved HC in [facility] for VN, follow up care from [facility] discharge.

## 2025-04-03 NOTE — DISCHARGE INSTR - AVS FIRST PAGE
Hold farsiga and valsartan till repeat labs on 4/8 to see your kidney numbers continue to improve

## 2025-04-04 NOTE — UTILIZATION REVIEW
NOTIFICATION OF ADMISSION DISCHARGE   This is a Notification of Discharge from [facility]. Please be advised that this patient has been discharge from our facility. Below you will find the admission and discharge date and time including the patient’s disposition.   UTILIZATION REVIEW CONTACT:  Utilization Review Assistants  Network Utilization Review Department  Phone: [phone] x carefully listen to the prompts. All voicemails are confidential.  Email: [email or web address]     ADMISSION INFORMATION  PRESENTATION DATE: 3/29/2025 12:50 PM  OBERVATION ADMISSION DATE: N/A  INPATIENT ADMISSION DATE: 3/29/25  5:57 PM   DISCHARGE DATE: 4/3/2025  1:54 PM   DISPOSITION:Home with Home Health Care    [facility] Utilization Review Department  ATTENTION: Please call with any questions or concerns to [phone] and carefully listen to the prompts so that you are directed to the right person. All voicemails are confidential.   For Discharge needs, contact Care Management DC Support Team at [phone] opt. 2  Send all requests for admission clinical reviews, approved or denied determinations and any other requests to dedicated fax number below belonging to the campus where the patient is receiving treatment. List of dedicated fax numbers for the Facilities:  FACILITY NAME UR FAX NUMBER   ADMISSION DENIALS (Administrative/Medical Necessity) [phone]   DISCHARGE SUPPORT TEAM ([facility]) [phone]   PARENT CHILD HEALTH (Maternity/NICU/Pediatrics) [phone]   [facility] [phone]   [facility] [phone]   [facility] [phone]   [facility] [phone]   [facility] [phone]   [facility] [phone]   [facility] [phone]   [facility] [phone]   [facility]  [facility] [phone]   [facility] [phone]   [facility] [phone]   [facility] [phone]

## 2025-04-07 PROBLEM — I50.32 CHRONIC HEART FAILURE WITH PRESERVED EJECTION FRACTION (HCC): Status: ACTIVE | Noted: 2020-04-14

## 2025-04-09 DIAGNOSIS — I25.10 CORONARY ARTERY DISEASE INVOLVING NATIVE CORONARY ARTERY OF NATIVE HEART: ICD-10-CM

## 2025-04-09 RX ORDER — ISOSORBIDE MONONITRATE 60 MG/1
60 TABLET, EXTENDED RELEASE ORAL DAILY
Qty: 90 TABLET | Refills: 1 | Status: SHIPPED | OUTPATIENT
Start: 2025-04-09

## 2025-04-15 ENCOUNTER — TELEPHONE (OUTPATIENT)
Dept: FAMILY MEDICINE CLINIC | Facility: CLINIC | Age: 70
End: 2025-04-15

## 2025-05-02 DIAGNOSIS — Z12.31 ENCOUNTER FOR SCREENING MAMMOGRAM FOR MALIGNANT NEOPLASM OF BREAST: ICD-10-CM

## 2025-07-11 ENCOUNTER — DOCTOR'S OFFICE (OUTPATIENT)
Dept: URBAN - NONMETROPOLITAN AREA CLINIC 1 | Facility: CLINIC | Age: 70
Setting detail: OPHTHALMOLOGY
End: 2025-07-11
Payer: COMMERCIAL

## 2025-07-11 ENCOUNTER — OPTICAL OFFICE (OUTPATIENT)
Dept: URBAN - NONMETROPOLITAN AREA CLINIC 4 | Facility: CLINIC | Age: 70
Setting detail: OPHTHALMOLOGY
End: 2025-07-11

## 2025-07-11 DIAGNOSIS — H52.203: ICD-10-CM

## 2025-07-11 DIAGNOSIS — H52.4: ICD-10-CM

## 2025-07-11 LAB
LEFT EYE DIABETIC RETINOPATHY: POSITIVE
RIGHT EYE DIABETIC RETINOPATHY: POSITIVE

## 2025-07-11 PROCEDURE — 92014 COMPRE OPH EXAM EST PT 1/>: CPT

## 2025-07-11 PROCEDURE — V2203 LENS SPHCYL BIFOCAL 4.00D/.1: HCPCS

## 2025-07-11 PROCEDURE — V2203 LENS SPHCYL BIFOCAL 4.00D/.1: HCPCS | Mod: LT

## 2025-07-11 PROCEDURE — 92015 DETERMINE REFRACTIVE STATE: CPT

## 2025-07-11 PROCEDURE — V2020 VISION SVCS FRAMES PURCHASES: HCPCS

## 2025-07-11 ASSESSMENT — REFRACTION_AUTOREFRACTION
OS_AXIS: 162
OS_CYLINDER: -0.25
OS_SPHERE: -0.25
OD_CYLINDER: -0.50
OD_AXIS: 138
OD_SPHERE: +0.50

## 2025-07-11 ASSESSMENT — REFRACTION_MANIFEST
OS_AXIS: 065
OD_CYLINDER: -0.50
OD_ADD: +2.50
OD_AXIS: 180
OS_SPHERE: -0.25
OU_VA: 20/30
OS_VA1: 20/30-2
OS_CYLINDER: -0.75
OD_VA2: 20/30
OD_SPHERE: PLANO
OD_VA1: 20/30-2
OS_VA2: 20/30
OS_ADD: +2.50

## 2025-07-11 ASSESSMENT — KERATOMETRY
OS_K2POWER_DIOPTERS: 43.75
OD_AXISANGLE_DEGREES: 056
OD_K1POWER_DIOPTERS: 42.50
OS_AXISANGLE_DEGREES: 129
OD_K2POWER_DIOPTERS: 43.25
OS_K1POWER_DIOPTERS: 42.75

## 2025-07-11 ASSESSMENT — CONFRONTATIONAL VISUAL FIELD TEST (CVF)
OD_FINDINGS: FULL
OS_FINDINGS: FULL

## 2025-07-11 ASSESSMENT — VISUAL ACUITY
OD_BCVA: 20/40-1
OS_BCVA: 20/40+2

## (undated) DEVICE — INTENDED FOR TISSUE SEPARATION, AND OTHER PROCEDURES THAT REQUIRE A SHARP SURGICAL BLADE TO PUNCTURE OR CUT.: Brand: BARD-PARKER ® CARBON RIB-BACK BLADES

## (undated) DEVICE — BANDAGE, ESMARK LF STR 6"X9' (20/CS): Brand: CYPRESS

## (undated) DEVICE — SPONGE SCRUB 4 PCT CHLORHEXIDINE

## (undated) DEVICE — SUT ETHILON 4-0 PS-2 18 IN 1667H

## (undated) DEVICE — KNIFE RETROGRADE LIGAMENT

## (undated) DEVICE — GLOVE SRG BIOGEL 8

## (undated) DEVICE — 2000CC GUARDIAN II: Brand: GUARDIAN

## (undated) DEVICE — ZIMMER® STERILE DISPOSABLE TOURNIQUET CUFF, DUAL PORT, SINGLE BLADDER, 18 IN. (46 CM)

## (undated) DEVICE — GLOVE INDICATOR UNDERGLOVE SZ 6.5 GREEN

## (undated) DEVICE — STERILE BETHLEHEM PLASTIC HAND: Brand: CARDINAL HEALTH

## (undated) DEVICE — LUBRICANT SURGILUBE TUBE 4 OZ  FLIP TOP

## (undated) DEVICE — 3M™ COBAN™ NL STERILE NON-LATEX SELF-ADHERENT WRAP, 2084S, 4 IN X 5 YD (10 CM X 4,5 M), 18 ROLLS/CASE: Brand: 3M™ COBAN™

## (undated) DEVICE — GLOVE SRG BIOGEL 6.5

## (undated) DEVICE — FORCEPS BIOPSY ALLIGATOR JAW W/NEEDLE 2.8MM

## (undated) DEVICE — CONMED SCOPE SAVER BITE BLOCK, 20X27 MM: Brand: SCOPE SAVER

## (undated) DEVICE — DRAPE STERI 1010 18IN X 12IN

## (undated) DEVICE — TUBING SUCTION 5MM X 12 FT